# Patient Record
Sex: MALE | Race: WHITE | NOT HISPANIC OR LATINO | Employment: OTHER | URBAN - METROPOLITAN AREA
[De-identification: names, ages, dates, MRNs, and addresses within clinical notes are randomized per-mention and may not be internally consistent; named-entity substitution may affect disease eponyms.]

---

## 2017-04-03 ENCOUNTER — OFFICE VISIT (OUTPATIENT)
Dept: FAMILY MEDICINE CLINIC | Facility: CLINIC | Age: 62
End: 2017-04-03

## 2017-04-03 VITALS
WEIGHT: 315 LBS | BODY MASS INDEX: 39.17 KG/M2 | HEIGHT: 75 IN | DIASTOLIC BLOOD PRESSURE: 80 MMHG | SYSTOLIC BLOOD PRESSURE: 130 MMHG | TEMPERATURE: 98.4 F | RESPIRATION RATE: 18 BRPM | HEART RATE: 76 BPM | OXYGEN SATURATION: 96 %

## 2017-04-03 DIAGNOSIS — F98.8 ADD (ATTENTION DEFICIT DISORDER): ICD-10-CM

## 2017-04-03 DIAGNOSIS — I10 ESSENTIAL HYPERTENSION: ICD-10-CM

## 2017-04-03 DIAGNOSIS — F32.5 MAJOR DEPRESSIVE DISORDER WITH SINGLE EPISODE, IN FULL REMISSION (HCC): ICD-10-CM

## 2017-04-03 DIAGNOSIS — E34.9 TESTOSTERONE DEFICIENCY: ICD-10-CM

## 2017-04-03 DIAGNOSIS — E11.9 TYPE 2 DIABETES MELLITUS WITHOUT COMPLICATION, WITHOUT LONG-TERM CURRENT USE OF INSULIN (HCC): ICD-10-CM

## 2017-04-03 DIAGNOSIS — M19.90 ARTHRITIS: ICD-10-CM

## 2017-04-03 DIAGNOSIS — F51.01 PRIMARY INSOMNIA: ICD-10-CM

## 2017-04-03 DIAGNOSIS — F41.1 GAD (GENERALIZED ANXIETY DISORDER): ICD-10-CM

## 2017-04-03 DIAGNOSIS — E78.5 HYPERLIPIDEMIA, UNSPECIFIED HYPERLIPIDEMIA TYPE: ICD-10-CM

## 2017-04-03 DIAGNOSIS — E34.9 TESTOSTERONE DEFICIENCY: Primary | ICD-10-CM

## 2017-04-03 PROCEDURE — 99214 OFFICE O/P EST MOD 30 MIN: CPT | Performed by: NURSE PRACTITIONER

## 2017-04-03 RX ORDER — TEMAZEPAM 15 MG/1
15 CAPSULE ORAL NIGHTLY PRN
Qty: 30 CAPSULE | Refills: 2 | Status: SHIPPED | OUTPATIENT
Start: 2017-04-03 | End: 2017-10-26

## 2017-04-03 RX ORDER — LOSARTAN POTASSIUM AND HYDROCHLOROTHIAZIDE 12.5; 1 MG/1; MG/1
1 TABLET ORAL DAILY
Qty: 30 TABLET | Refills: 5 | Status: SHIPPED | OUTPATIENT
Start: 2017-04-03 | End: 2017-10-26 | Stop reason: SDUPTHER

## 2017-04-03 RX ORDER — SIMVASTATIN 40 MG
40 TABLET ORAL NIGHTLY
Qty: 30 TABLET | Refills: 5 | Status: SHIPPED | OUTPATIENT
Start: 2017-04-03 | End: 2017-11-10 | Stop reason: SDUPTHER

## 2017-04-03 RX ORDER — DEXTROAMPHETAMINE SACCHARATE, AMPHETAMINE ASPARTATE, DEXTROAMPHETAMINE SULFATE AND AMPHETAMINE SULFATE 7.5; 7.5; 7.5; 7.5 MG/1; MG/1; MG/1; MG/1
30 TABLET ORAL 2 TIMES DAILY
Qty: 60 TABLET | Refills: 0 | Status: SHIPPED | OUTPATIENT
Start: 2017-04-03 | End: 2017-05-10 | Stop reason: SDUPTHER

## 2017-04-03 RX ORDER — TESTOSTERONE 30 MG/1.5ML
2 SOLUTION TOPICAL DAILY
Qty: 90 ML | Refills: 5 | Status: SHIPPED | OUTPATIENT
Start: 2017-04-03 | End: 2017-10-26

## 2017-04-03 RX ORDER — ALPRAZOLAM 1 MG/1
1 TABLET ORAL 3 TIMES DAILY PRN
Qty: 90 TABLET | Refills: 2 | Status: SHIPPED | OUTPATIENT
Start: 2017-04-03 | End: 2017-10-26

## 2017-04-03 RX ORDER — GLIMEPIRIDE 4 MG/1
4 TABLET ORAL 2 TIMES DAILY
Qty: 60 TABLET | Refills: 5 | Status: SHIPPED | OUTPATIENT
Start: 2017-04-03 | End: 2017-12-04 | Stop reason: SDUPTHER

## 2017-04-03 RX ORDER — DULOXETIN HYDROCHLORIDE 60 MG/1
60 CAPSULE, DELAYED RELEASE ORAL DAILY
Qty: 30 CAPSULE | Refills: 5 | Status: SHIPPED | OUTPATIENT
Start: 2017-04-03 | End: 2017-11-13 | Stop reason: SDUPTHER

## 2017-04-03 NOTE — PROGRESS NOTES
Subjective   Duane Mark Witten is a 62 y.o. male.     History of Present Illness   Patient comes in today to see me for the first time. Is a long term patient of Dr. Lindsey. Seeing me today due to Dr. Lindsey no longer working here. Discussed with pt today that I will not be writing pain medications or benzodiazepines for them past a 90 day window and that we will be aggressively decreasing doses along the way. They have a choice of being sent to pain management for any narcotics taken, if they wish. I will be getting them off of any benzos.they are taking, or they can see a psychiatrist to discuss this further (list of names and phone numbers given to pt today). They are also aware that they are free to transfer their care to a different facility before the 90 days are up if they wish. After that point, they are aware that they will no longer be seen in this facility by our doctors. Again, reiterated the fact that those types of medications will not be written here past 90 days, and pt voices understanding. Pt has been told that we are not currently accepting new patients and that, after 90 days, they will need to have found and seen a new PCP in order to receive care.    Chief Complaint:   Chief Complaint   Patient presents with   • Diabetes     send to [facility]   • Hypertension   • Anxiety   • Hyperlipidemia       Duane Mark Witten 62 y.o. male who presents today for Medical Management of the below listed issues and medication refills.  he has a history of   Patient Active Problem List   Diagnosis   • YANELI (generalized anxiety disorder)   • ADD (attention deficit disorder)   • Depression   • Diabetes type 2, controlled   • ED (erectile dysfunction) of non-organic origin   • HLD (hyperlipidemia)   • Essential hypertension   • Arthritis   • Psoriasis   • Testosterone deficiency   • Primary insomnia   .  Since the last visit, he has overall felt well.  he has been compliant with   Current Outpatient Prescriptions:   •  " metFORMIN (GLUCOPHAGE) 1000 MG tablet, Take 1 tablet by mouth 2 (Two) Times a Day With Meals., Disp: 60 tablet, Rfl: 5  •  ALPRAZolam (XANAX) 1 MG tablet, Take 1 tablet by mouth 3 (Three) Times a Day As Needed for Anxiety., Disp: 90 tablet, Rfl: 2  •  amphetamine-dextroamphetamine (ADDERALL) 30 MG tablet, Take 1 tablet by mouth 2 (Two) Times a Day., Disp: 60 tablet, Rfl: 0  •  DULoxetine (CYMBALTA) 60 MG capsule, Take 1 capsule by mouth Daily., Disp: 30 capsule, Rfl: 5  •  exenatide (BYETTA 10 MCG PEN) 10 MCG/0.04ML injection, Inject 0.04 mL under the skin 2 (Two) Times a Day., Disp: 2.4 mL, Rfl: 5  •  glimepiride (AMARYL) 4 MG tablet, Take 1 tablet by mouth 2 (Two) Times a Day., Disp: 60 tablet, Rfl: 5  •  glucose blood test strip, Use as instructed, Disp: 100 each, Rfl: 12  •  losartan-hydrochlorothiazide (HYZAAR) 100-12.5 MG per tablet, Take 1 tablet by mouth Daily for 30 days., Disp: 30 tablet, Rfl: 5  •  oxaprozin (DAYPRO) 600 MG tablet, Take 2 tablets by mouth Daily., Disp: 60 tablet, Rfl: 5  •  simvastatin (ZOCOR) 40 MG tablet, Take 1 tablet by mouth Every Night., Disp: 30 tablet, Rfl: 5  •  temazepam (RESTORIL) 15 MG capsule, Take 1 capsule by mouth At Night As Needed for Sleep., Disp: 30 capsule, Rfl: 2  •  Testosterone (AXIRON) 30 MG/ACT solution, Place 2 applicators on the skin Daily., Disp: 90 mL, Rfl: 5.  he denies medication side effects.    All of the chronic condition(s) listed above are stable w/o issues.    /80  Pulse 76  Temp 98.4 °F (36.9 °C)  Resp 18  Ht 75\" (190.5 cm)  Wt (!) 354 lb (161 kg)  SpO2 96%  BMI 44.25 kg/m2    Results for orders placed or performed in visit on 11/02/16   Hemoglobin A1c   Result Value Ref Range    Hemoglobin A1C 9.06 (H) 4.80 - 5.60 %       The following portions of the patient's history were reviewed and updated as appropriate: allergies, current medications, past family history, past medical history, past social history, past surgical history and " problem list.    Review of Systems   Constitutional: Negative for fatigue.   Respiratory: Negative for cough and shortness of breath.    Cardiovascular: Negative for chest pain and palpitations.   Skin: Negative for rash.   Psychiatric/Behavioral: Negative for dysphoric mood and sleep disturbance. The patient is not nervous/anxious.        Objective   Physical Exam   Constitutional: He is oriented to person, place, and time. He appears well-developed and well-nourished.   Neck: Carotid bruit is not present.   Cardiovascular: Normal rate and regular rhythm.    Pulmonary/Chest: Effort normal and breath sounds normal.   Neurological: He is oriented to person, place, and time.   Skin: Skin is warm and dry.   Psychiatric: He has a normal mood and affect. His behavior is normal. Judgment and thought content normal.   Nursing note and vitals reviewed.      Assessment/Plan   Duane was seen today for diabetes, hypertension, anxiety and hyperlipidemia.    Diagnoses and all orders for this visit:    Testosterone deficiency  -     Testosterone, Free, Total    Major depressive disorder with single episode, in full remission  -     DULoxetine (CYMBALTA) 60 MG capsule; Take 1 capsule by mouth Daily.    Type 2 diabetes mellitus without complication, without long-term current use of insulin  -     Comprehensive metabolic panel  -     Lipid panel  -     Hemoglobin A1c  -     CBC & Differential  -     exenatide (BYETTA 10 MCG PEN) 10 MCG/0.04ML injection; Inject 0.04 mL under the skin 2 (Two) Times a Day.  -     glimepiride (AMARYL) 4 MG tablet; Take 1 tablet by mouth 2 (Two) Times a Day.  -     metFORMIN (GLUCOPHAGE) 1000 MG tablet; Take 1 tablet by mouth 2 (Two) Times a Day With Meals.  -     glucose blood test strip; Use as instructed    Essential hypertension  -     losartan-hydrochlorothiazide (HYZAAR) 100-12.5 MG per tablet; Take 1 tablet by mouth Daily for 30 days.    Arthritis  -     oxaprozin (DAYPRO) 600 MG tablet; Take 2  tablets by mouth Daily.    Hyperlipidemia, unspecified hyperlipidemia type  -     simvastatin (ZOCOR) 40 MG tablet; Take 1 tablet by mouth Every Night.          Patient given RX for temazepam, xanax and adderall at reduced doses.  Temazepam reduced to 15 mg at bedtime. Xanax reduced to TID.  Adderall reduced to BID.  BEKAH reviewed.   Discussed with pt the current medications and the fact that we are not writing these for them going forward past 90 days. If they wish to stay on these medications, they are given referrals to the appropriate specialists.

## 2017-05-10 ENCOUNTER — TELEPHONE (OUTPATIENT)
Dept: FAMILY MEDICINE CLINIC | Facility: CLINIC | Age: 62
End: 2017-05-10

## 2017-05-10 DIAGNOSIS — E11.9 CONTROLLED TYPE 2 DIABETES MELLITUS WITHOUT COMPLICATION, WITH LONG-TERM CURRENT USE OF INSULIN (HCC): Primary | ICD-10-CM

## 2017-05-10 DIAGNOSIS — Z79.4 CONTROLLED TYPE 2 DIABETES MELLITUS WITHOUT COMPLICATION, WITH LONG-TERM CURRENT USE OF INSULIN (HCC): Primary | ICD-10-CM

## 2017-05-10 DIAGNOSIS — F98.8 ADD (ATTENTION DEFICIT DISORDER): ICD-10-CM

## 2017-05-10 RX ORDER — BLOOD SUGAR DIAGNOSTIC
STRIP MISCELLANEOUS
Qty: 100 EACH | Refills: 11 | Status: SHIPPED | OUTPATIENT
Start: 2017-05-10 | End: 2017-10-26 | Stop reason: SDUPTHER

## 2017-05-10 RX ORDER — DEXTROAMPHETAMINE SACCHARATE, AMPHETAMINE ASPARTATE, DEXTROAMPHETAMINE SULFATE AND AMPHETAMINE SULFATE 7.5; 7.5; 7.5; 7.5 MG/1; MG/1; MG/1; MG/1
30 TABLET ORAL 2 TIMES DAILY
Qty: 60 TABLET | Refills: 0 | Status: SHIPPED | OUTPATIENT
Start: 2017-05-10 | End: 2017-10-26 | Stop reason: ALTCHOICE

## 2017-10-09 DIAGNOSIS — E11.9 TYPE 2 DIABETES MELLITUS WITHOUT COMPLICATION, WITHOUT LONG-TERM CURRENT USE OF INSULIN (HCC): ICD-10-CM

## 2017-10-10 RX ORDER — GLIMEPIRIDE 4 MG/1
TABLET ORAL
Qty: 60 TABLET | Refills: 4 | OUTPATIENT
Start: 2017-10-10

## 2017-10-16 DIAGNOSIS — M19.90 ARTHRITIS: ICD-10-CM

## 2017-10-26 ENCOUNTER — OFFICE VISIT (OUTPATIENT)
Dept: FAMILY MEDICINE CLINIC | Facility: CLINIC | Age: 62
End: 2017-10-26

## 2017-10-26 VITALS
TEMPERATURE: 98.7 F | RESPIRATION RATE: 18 BRPM | DIASTOLIC BLOOD PRESSURE: 70 MMHG | OXYGEN SATURATION: 99 % | WEIGHT: 315 LBS | BODY MASS INDEX: 39.17 KG/M2 | SYSTOLIC BLOOD PRESSURE: 128 MMHG | HEIGHT: 75 IN | HEART RATE: 75 BPM

## 2017-10-26 DIAGNOSIS — E78.5 HYPERLIPIDEMIA, UNSPECIFIED HYPERLIPIDEMIA TYPE: ICD-10-CM

## 2017-10-26 DIAGNOSIS — F98.8 ATTENTION DEFICIT DISORDER, UNSPECIFIED HYPERACTIVITY PRESENCE: ICD-10-CM

## 2017-10-26 DIAGNOSIS — I10 ESSENTIAL HYPERTENSION: Primary | ICD-10-CM

## 2017-10-26 DIAGNOSIS — E34.9 TESTOSTERONE DEFICIENCY: ICD-10-CM

## 2017-10-26 DIAGNOSIS — E11.9 TYPE 2 DIABETES MELLITUS NOT AT GOAL (HCC): ICD-10-CM

## 2017-10-26 DIAGNOSIS — J06.9 ACUTE URI: ICD-10-CM

## 2017-10-26 PROCEDURE — 99214 OFFICE O/P EST MOD 30 MIN: CPT | Performed by: NURSE PRACTITIONER

## 2017-10-26 RX ORDER — LOSARTAN POTASSIUM AND HYDROCHLOROTHIAZIDE 12.5; 1 MG/1; MG/1
1 TABLET ORAL DAILY
Qty: 30 TABLET | Refills: 5 | Status: SHIPPED | OUTPATIENT
Start: 2017-10-26 | End: 2018-05-18 | Stop reason: SDUPTHER

## 2017-10-26 RX ORDER — ATOMOXETINE 40 MG/1
40 CAPSULE ORAL DAILY
Qty: 30 CAPSULE | Refills: 0 | Status: SHIPPED | OUTPATIENT
Start: 2017-10-26 | End: 2017-11-15 | Stop reason: SDUPTHER

## 2017-10-26 RX ORDER — BLOOD SUGAR DIAGNOSTIC
STRIP MISCELLANEOUS
Qty: 100 EACH | Refills: 11 | Status: SHIPPED | OUTPATIENT
Start: 2017-10-26 | End: 2018-05-18 | Stop reason: SDUPTHER

## 2017-10-26 RX ORDER — AMOXICILLIN AND CLAVULANATE POTASSIUM 875; 125 MG/1; MG/1
1 TABLET, FILM COATED ORAL 2 TIMES DAILY
Qty: 20 TABLET | Refills: 0 | Status: SHIPPED | OUTPATIENT
Start: 2017-10-26 | End: 2017-11-05

## 2017-10-26 NOTE — PROGRESS NOTES
Subjective   Duane Mark Witten is a 62 y.o. male.     History of Present Illness   Chief Complaint:   Chief Complaint   Patient presents with   • Hypertension     med refill   • Diabetes   • ADD     wanting adderall       Duane Mark Witten 62 y.o. male who presents today for Medical Management of the below listed issues and medication refills.  he has a problem list of   Patient Active Problem List   Diagnosis   • YANELI (generalized anxiety disorder)   • ADD (attention deficit disorder)   • Depression   • Diabetes type 2, controlled   • ED (erectile dysfunction) of non-organic origin   • HLD (hyperlipidemia)   • Essential hypertension   • Arthritis   • Psoriasis   • Testosterone deficiency   • Primary insomnia   .  Since the last visit, he has overall felt well.  he has been compliant with   Current Outpatient Prescriptions:   •  ACCU-CHEK AGNES PLUS test strip, Use as directed to test blood sugar tid E11.9, Disp: 100 each, Rfl: 11  •  amoxicillin-clavulanate (AUGMENTIN) 875-125 MG per tablet, Take 1 tablet by mouth 2 (Two) Times a Day for 10 days., Disp: 20 tablet, Rfl: 0  •  atomoxetine (STRATTERA) 40 MG capsule, Take 1 capsule by mouth Daily., Disp: 30 capsule, Rfl: 0  •  DULoxetine (CYMBALTA) 60 MG capsule, Take 1 capsule by mouth Daily., Disp: 30 capsule, Rfl: 5  •  exenatide (BYETTA 10 MCG PEN) 10 MCG/0.04ML injection, Inject 0.04 mL under the skin 2 (Two) Times a Day., Disp: 2.4 mL, Rfl: 5  •  glimepiride (AMARYL) 4 MG tablet, Take 1 tablet by mouth 2 (Two) Times a Day., Disp: 60 tablet, Rfl: 5  •  Insulin Pen Needle (BD ULTRA-FINE PEN NEEDLES) 29G X 12.7MM misc, 12.7 mm 2 (Two) Times a Day., Disp: 60 each, Rfl: 5  •  losartan-hydrochlorothiazide (HYZAAR) 100-12.5 MG per tablet, Take 1 tablet by mouth Daily for 30 days., Disp: 30 tablet, Rfl: 5  •  metFORMIN (GLUCOPHAGE) 1000 MG tablet, Take 1 tablet by mouth 2 (Two) Times a Day With Meals., Disp: 60 tablet, Rfl: 5  •  oxaprozin (DAYPRO) 600 MG tablet, Take 2  "tablets by mouth Daily., Disp: 60 tablet, Rfl: 5  •  simvastatin (ZOCOR) 40 MG tablet, Take 1 tablet by mouth Every Night., Disp: 30 tablet, Rfl: 5.  he denies medication side effects.    All of the chronic condition(s) listed above are stable w/o issues.    /70  Pulse 75  Temp 98.7 °F (37.1 °C)  Resp 18  Ht 75\" (190.5 cm)  Wt (!) 348 lb (158 kg)  SpO2 99%  BMI 43.5 kg/m2    Results for orders placed or performed in visit on 11/02/16   Hemoglobin A1c   Result Value Ref Range    Hemoglobin A1C 9.06 (H) 4.80 - 5.60 %       Patient has not had labs in a year and was not controlled with last lab set.  He understands labs need to be done and will get done Saturday.      Patient is still requesting adderall RX.  It has already been explained to him that this medication will not be filled at this office but that he can be referred to specialist to continue medication.   Patient states he would like to try strattera first.      Patient is also still using axiron.  He understands this will also not be filled here and that he will need referral to urologist. He declines referral at this time and will notify when he would like referral.        Duane Mark Witten 62 y.o. male who presents for evaluation of upper respiratory congestion, sinus pressure and congestion. Symptoms include congestion, sore throat, nasal blockage, post nasal drip and dry cough.  Onset of symptoms was 5 days ago, gradually worsening since that time. Patient denies shortness of breath, fever.   Evaluation to date: none Treatment to date:  OTC antihistamines.     The following portions of the patient's history were reviewed and updated as appropriate: allergies, current medications, past family history, past medical history, past social history, past surgical history and problem list.    Review of Systems   Constitutional: Negative for chills, fatigue and fever.   HENT: Positive for congestion, sinus pain, sinus pressure and sore throat.  "   Respiratory: Positive for cough. Negative for shortness of breath.    Cardiovascular: Negative for chest pain and palpitations.   Skin: Negative for rash.   Psychiatric/Behavioral: Negative for dysphoric mood and sleep disturbance. The patient is not nervous/anxious.        Objective   Physical Exam   Constitutional: He is oriented to person, place, and time. He appears well-developed and well-nourished.   HENT:   Right Ear: Tympanic membrane, external ear and ear canal normal.   Left Ear: Tympanic membrane, external ear and ear canal normal.   Nose: Right sinus exhibits no maxillary sinus tenderness and no frontal sinus tenderness. Left sinus exhibits no maxillary sinus tenderness and no frontal sinus tenderness.   Mouth/Throat: Uvula is midline and oropharynx is clear and moist.   Cardiovascular: Normal rate and regular rhythm.    Pulmonary/Chest: Effort normal and breath sounds normal.   Neurological: He is oriented to person, place, and time.   Skin: Skin is warm and dry.   Psychiatric: He has a normal mood and affect. His behavior is normal. Judgment and thought content normal.   Nursing note and vitals reviewed.      Assessment/Plan   Duane was seen today for hypertension, diabetes and add.    Diagnoses and all orders for this visit:    Essential hypertension  -     Comprehensive metabolic panel  -     Lipid panel  -     CBC and Differential  -     TSH  -     Hemoglobin A1c  -     MicroAlbumin, Urine, Random - Urine, Clean Catch  -     losartan-hydrochlorothiazide (HYZAAR) 100-12.5 MG per tablet; Take 1 tablet by mouth Daily for 30 days.    Hyperlipidemia, unspecified hyperlipidemia type  -     Comprehensive metabolic panel  -     Lipid panel  -     CBC and Differential  -     TSH  -     Hemoglobin A1c  -     MicroAlbumin, Urine, Random - Urine, Clean Catch    Type 2 diabetes mellitus not at goal  -     Comprehensive metabolic panel  -     Lipid panel  -     CBC and Differential  -     TSH  -     Hemoglobin  A1c  -     MicroAlbumin, Urine, Random - Urine, Clean Catch  -     ACCU-CHEK AGNES PLUS test strip; Use as directed to test blood sugar tid E11.9  -     Insulin Pen Needle (BD ULTRA-FINE PEN NEEDLES) 29G X 12.7MM misc; 12.7 mm 2 (Two) Times a Day.    Attention deficit disorder, unspecified hyperactivity presence  -     atomoxetine (STRATTERA) 40 MG capsule; Take 1 capsule by mouth Daily.    Acute URI  -     amoxicillin-clavulanate (AUGMENTIN) 875-125 MG per tablet; Take 1 tablet by mouth 2 (Two) Times a Day for 10 days.    Testosterone deficiency          Will await lab results to determine next f/u.  Patient will contact if strattera is helping ADD. Will increase as needed.      Will refer to urology for testosterone management. Patient will notify when wants referral placed as he is helping to care for his wife currently.

## 2017-10-30 LAB
ALBUMIN SERPL-MCNC: 4.1 G/DL (ref 3.6–4.8)
ALBUMIN/GLOB SERPL: 1.6 {RATIO} (ref 1.2–2.2)
ALP SERPL-CCNC: 75 IU/L (ref 39–117)
ALT SERPL-CCNC: 16 IU/L (ref 0–44)
AST SERPL-CCNC: 19 IU/L (ref 0–40)
BASOPHILS # BLD AUTO: 0.1 X10E3/UL (ref 0–0.2)
BASOPHILS NFR BLD AUTO: 1 %
BILIRUB SERPL-MCNC: 0.5 MG/DL (ref 0–1.2)
BUN SERPL-MCNC: 18 MG/DL (ref 8–27)
BUN/CREAT SERPL: 16 (ref 10–24)
CALCIUM SERPL-MCNC: 9.5 MG/DL (ref 8.6–10.2)
CHLORIDE SERPL-SCNC: 99 MMOL/L (ref 96–106)
CHOLEST SERPL-MCNC: 123 MG/DL (ref 100–199)
CO2 SERPL-SCNC: 26 MMOL/L (ref 18–29)
CREAT SERPL-MCNC: 1.15 MG/DL (ref 0.76–1.27)
EOSINOPHIL # BLD AUTO: 1.2 X10E3/UL (ref 0–0.4)
EOSINOPHIL NFR BLD AUTO: 12 %
ERYTHROCYTE [DISTWIDTH] IN BLOOD BY AUTOMATED COUNT: 14.1 % (ref 12.3–15.4)
GFR SERPLBLD CREATININE-BSD FMLA CKD-EPI: 68 ML/MIN/1.73
GFR SERPLBLD CREATININE-BSD FMLA CKD-EPI: 78 ML/MIN/1.73
GLOBULIN SER CALC-MCNC: 2.6 G/DL (ref 1.5–4.5)
GLUCOSE SERPL-MCNC: 162 MG/DL (ref 65–99)
HBA1C MFR BLD: 9.6 % (ref 4.8–5.6)
HCT VFR BLD AUTO: 45.8 % (ref 37.5–51)
HDLC SERPL-MCNC: 23 MG/DL
HGB BLD-MCNC: 14.8 G/DL (ref 12.6–17.7)
IMM GRANULOCYTES # BLD: 0 X10E3/UL (ref 0–0.1)
IMM GRANULOCYTES NFR BLD: 0 %
LDLC SERPL CALC-MCNC: 60 MG/DL (ref 0–99)
LYMPHOCYTES # BLD AUTO: 2.7 X10E3/UL (ref 0.7–3.1)
LYMPHOCYTES NFR BLD AUTO: 25 %
MCH RBC QN AUTO: 28.2 PG (ref 26.6–33)
MCHC RBC AUTO-ENTMCNC: 32.3 G/DL (ref 31.5–35.7)
MCV RBC AUTO: 87 FL (ref 79–97)
MICROALBUMIN UR-MCNC: 15.7 UG/ML
MONOCYTES # BLD AUTO: 0.8 X10E3/UL (ref 0.1–0.9)
MONOCYTES NFR BLD AUTO: 8 %
NEUTROPHILS # BLD AUTO: 5.7 X10E3/UL (ref 1.4–7)
NEUTROPHILS NFR BLD AUTO: 54 %
PLATELET # BLD AUTO: 300 X10E3/UL (ref 150–379)
POTASSIUM SERPL-SCNC: 4.4 MMOL/L (ref 3.5–5.2)
PROT SERPL-MCNC: 6.7 G/DL (ref 6–8.5)
RBC # BLD AUTO: 5.25 X10E6/UL (ref 4.14–5.8)
SODIUM SERPL-SCNC: 141 MMOL/L (ref 134–144)
TRIGL SERPL-MCNC: 200 MG/DL (ref 0–149)
TSH SERPL DL<=0.005 MIU/L-ACNC: 1.37 UIU/ML (ref 0.45–4.5)
VLDLC SERPL CALC-MCNC: 40 MG/DL (ref 5–40)
WBC # BLD AUTO: 10.5 X10E3/UL (ref 3.4–10.8)

## 2017-11-10 DIAGNOSIS — E78.5 HYPERLIPIDEMIA, UNSPECIFIED HYPERLIPIDEMIA TYPE: ICD-10-CM

## 2017-11-10 RX ORDER — SIMVASTATIN 40 MG
TABLET ORAL
Qty: 30 TABLET | Refills: 0 | Status: SHIPPED | OUTPATIENT
Start: 2017-11-10 | End: 2017-12-04 | Stop reason: SDUPTHER

## 2017-11-13 DIAGNOSIS — E11.9 TYPE 2 DIABETES MELLITUS WITHOUT COMPLICATION, WITHOUT LONG-TERM CURRENT USE OF INSULIN (HCC): ICD-10-CM

## 2017-11-13 DIAGNOSIS — F32.5 MAJOR DEPRESSIVE DISORDER WITH SINGLE EPISODE, IN FULL REMISSION (HCC): ICD-10-CM

## 2017-11-13 RX ORDER — DULOXETIN HYDROCHLORIDE 60 MG/1
60 CAPSULE, DELAYED RELEASE ORAL DAILY
Qty: 30 CAPSULE | Refills: 5 | Status: SHIPPED | OUTPATIENT
Start: 2017-11-13 | End: 2018-05-18 | Stop reason: SDUPTHER

## 2017-11-15 ENCOUNTER — OFFICE VISIT (OUTPATIENT)
Dept: FAMILY MEDICINE CLINIC | Facility: CLINIC | Age: 62
End: 2017-11-15

## 2017-11-15 VITALS
WEIGHT: 315 LBS | HEART RATE: 77 BPM | TEMPERATURE: 97.8 F | RESPIRATION RATE: 18 BRPM | HEIGHT: 75 IN | SYSTOLIC BLOOD PRESSURE: 130 MMHG | BODY MASS INDEX: 39.17 KG/M2 | DIASTOLIC BLOOD PRESSURE: 72 MMHG

## 2017-11-15 DIAGNOSIS — F98.8 ATTENTION DEFICIT DISORDER, UNSPECIFIED HYPERACTIVITY PRESENCE: ICD-10-CM

## 2017-11-15 DIAGNOSIS — E11.9 CONTROLLED TYPE 2 DIABETES MELLITUS WITHOUT COMPLICATION, UNSPECIFIED LONG TERM INSULIN USE STATUS: Primary | ICD-10-CM

## 2017-11-15 PROCEDURE — 99214 OFFICE O/P EST MOD 30 MIN: CPT | Performed by: NURSE PRACTITIONER

## 2017-11-15 RX ORDER — ATOMOXETINE 40 MG/1
40 CAPSULE ORAL DAILY
Qty: 30 CAPSULE | Refills: 5 | Status: SHIPPED | OUTPATIENT
Start: 2017-11-15 | End: 2018-05-18 | Stop reason: SDUPTHER

## 2017-11-15 NOTE — PROGRESS NOTES
Subjective   Duane Mark Witten is a 62 y.o. male.     History of Present Illness   Chief Complaint:   Chief Complaint   Patient presents with   • Diabetes       Duane Mark Witten 62 y.o. male who presents today for Medical Management of the below listed issues and medication refills.  he has a problem list of   Patient Active Problem List   Diagnosis   • YANELI (generalized anxiety disorder)   • ADD (attention deficit disorder)   • Depression   • Diabetes type 2, controlled   • ED (erectile dysfunction) of non-organic origin   • HLD (hyperlipidemia)   • Essential hypertension   • Arthritis   • Psoriasis   • Testosterone deficiency   • Primary insomnia   .  Since the last visit, he has overall felt well.  he has been compliant with   Current Outpatient Prescriptions:   •  atomoxetine (STRATTERA) 40 MG capsule, Take 1 capsule by mouth Daily., Disp: 30 capsule, Rfl: 5  •  DULoxetine (CYMBALTA) 60 MG capsule, Take 1 capsule by mouth Daily., Disp: 30 capsule, Rfl: 5  •  exenatide (BYETTA 10 MCG PEN) 10 MCG/0.04ML injection, Inject 0.04 mL under the skin 2 (Two) Times a Day., Disp: 2.4 mL, Rfl: 5  •  glimepiride (AMARYL) 4 MG tablet, Take 1 tablet by mouth 2 (Two) Times a Day., Disp: 60 tablet, Rfl: 5  •  Insulin Pen Needle (BD ULTRA-FINE PEN NEEDLES) 29G X 12.7MM misc, 12.7 mm 2 (Two) Times a Day., Disp: 60 each, Rfl: 5  •  losartan-hydrochlorothiazide (HYZAAR) 100-12.5 MG per tablet, Take 1 tablet by mouth Daily for 30 days., Disp: 30 tablet, Rfl: 5  •  metFORMIN (GLUCOPHAGE) 1000 MG tablet, Take 1 tablet by mouth 2 (Two) Times a Day With Meals., Disp: 60 tablet, Rfl: 5  •  oxaprozin (DAYPRO) 600 MG tablet, Take 2 tablets by mouth Daily., Disp: 60 tablet, Rfl: 5  •  simvastatin (ZOCOR) 40 MG tablet, TAKE ONE TABLET BY MOUTH ONCE NIGHTLY, Disp: 30 tablet, Rfl: 0  •  ACCU-CHEK AGNES PLUS test strip, Use as directed to test blood sugar tid E11.9, Disp: 100 each, Rfl: 11  •  Dapagliflozin Propanediol 10 MG tablet, Take 10 mg  "by mouth Daily., Disp: 30 tablet, Rfl: 5.  he denies medication side effects.    All of the chronic condition(s) listed above are stable w/o issues.    /72  Pulse 77  Temp 97.8 °F (36.6 °C) (Oral)   Resp 18  Ht 75\" (190.5 cm)  Wt (!) 353 lb (160 kg)  BMI 44.12 kg/m2    Results for orders placed or performed in visit on 10/26/17   Comprehensive metabolic panel   Result Value Ref Range    Glucose 162 (H) 65 - 99 mg/dL    BUN 18 8 - 27 mg/dL    Creatinine 1.15 0.76 - 1.27 mg/dL    eGFR Non African Am 68 >59 mL/min/1.73    eGFR African Am 78 >59 mL/min/1.73    BUN/Creatinine Ratio 16 10 - 24    Sodium 141 134 - 144 mmol/L    Potassium 4.4 3.5 - 5.2 mmol/L    Chloride 99 96 - 106 mmol/L    Total CO2 26 18 - 29 mmol/L    Calcium 9.5 8.6 - 10.2 mg/dL    Total Protein 6.7 6.0 - 8.5 g/dL    Albumin 4.1 3.6 - 4.8 g/dL    Globulin 2.6 1.5 - 4.5 g/dL    A/G Ratio 1.6 1.2 - 2.2    Total Bilirubin 0.5 0.0 - 1.2 mg/dL    Alkaline Phosphatase 75 39 - 117 IU/L    AST (SGOT) 19 0 - 40 IU/L    ALT (SGPT) 16 0 - 44 IU/L   Lipid panel   Result Value Ref Range    Total Cholesterol 123 100 - 199 mg/dL    Triglycerides 200 (H) 0 - 149 mg/dL    HDL Cholesterol 23 (L) >39 mg/dL    VLDL Cholesterol 40 5 - 40 mg/dL    LDL Cholesterol  60 0 - 99 mg/dL   TSH   Result Value Ref Range    TSH 1.370 0.450 - 4.500 uIU/mL   Hemoglobin A1c   Result Value Ref Range    Hemoglobin A1C 9.6 (H) 4.8 - 5.6 %   MicroAlbumin, Urine, Random - Urine, Clean Catch   Result Value Ref Range    Microalbumin, Urine 15.7 Not Estab. ug/mL   CBC and Differential   Result Value Ref Range    WBC 10.5 3.4 - 10.8 x10E3/uL    RBC 5.25 4.14 - 5.80 x10E6/uL    Hemoglobin 14.8 12.6 - 17.7 g/dL    Hematocrit 45.8 37.5 - 51.0 %    MCV 87 79 - 97 fL    MCH 28.2 26.6 - 33.0 pg    MCHC 32.3 31.5 - 35.7 g/dL    RDW 14.1 12.3 - 15.4 %    Platelets 300 150 - 379 x10E3/uL    Neutrophil Rel % 54 Not Estab. %    Lymphocyte Rel % 25 Not Estab. %    Monocyte Rel % 8 Not Estab. % "    Eosinophil Rel % 12 Not Estab. %    Basophil Rel % 1 Not Estab. %    Neutrophils Absolute 5.7 1.4 - 7.0 x10E3/uL    Lymphocytes Absolute 2.7 0.7 - 3.1 x10E3/uL    Monocytes Absolute 0.8 0.1 - 0.9 x10E3/uL    Eosinophils Absolute 1.2 (H) 0.0 - 0.4 x10E3/uL    Basophils Absolute 0.1 0.0 - 0.2 x10E3/uL    Immature Granulocyte Rel % 0 Not Estab. %    Immature Grans Absolute 0.0 0.0 - 0.1 x10E3/uL     Presents to discuss lab results. A1c not at goal with metformin, glimepiride and byetta.  Patient motivated to lose weight.    The following portions of the patient's history were reviewed and updated as appropriate: allergies, current medications, past family history, past medical history, past social history, past surgical history and problem list.    Review of Systems   Constitutional: Negative for fatigue.   Respiratory: Negative for cough and shortness of breath.    Cardiovascular: Negative for chest pain and palpitations.   Skin: Negative for rash.   Psychiatric/Behavioral: Negative for dysphoric mood and sleep disturbance. The patient is not nervous/anxious.        Objective   Physical Exam   Constitutional: He is oriented to person, place, and time. He appears well-developed and well-nourished.   Pulmonary/Chest: Effort normal.   Neurological: He is oriented to person, place, and time.   Skin: Skin is warm and dry.   Psychiatric: He has a normal mood and affect. His behavior is normal. Judgment and thought content normal.   Nursing note and vitals reviewed.      Assessment/Plan   Duane was seen today for diabetes.    Diagnoses and all orders for this visit:    Controlled type 2 diabetes mellitus without complication, unspecified long term insulin use status  -     Dapagliflozin Propanediol 10 MG tablet; Take 10 mg by mouth Daily.  -     Hemoglobin A1c    Attention deficit disorder, unspecified hyperactivity presence  -     atomoxetine (STRATTERA) 40 MG capsule; Take 1 capsule by mouth Daily.        Patient would  like to try diet and farxiga before seeing endocrinologist.   He will make diet changes and exercise to help reduce a1c and triglycerides. Will recheck a1c in 3 months.

## 2017-12-04 DIAGNOSIS — E78.5 HYPERLIPIDEMIA, UNSPECIFIED HYPERLIPIDEMIA TYPE: ICD-10-CM

## 2017-12-04 DIAGNOSIS — E11.9 TYPE 2 DIABETES MELLITUS WITHOUT COMPLICATION, WITHOUT LONG-TERM CURRENT USE OF INSULIN (HCC): ICD-10-CM

## 2017-12-05 RX ORDER — GLIMEPIRIDE 4 MG/1
TABLET ORAL
Qty: 60 TABLET | Refills: 4 | Status: SHIPPED | OUTPATIENT
Start: 2017-12-05 | End: 2018-05-18 | Stop reason: SDUPTHER

## 2017-12-05 RX ORDER — SIMVASTATIN 40 MG
TABLET ORAL
Qty: 30 TABLET | Refills: 4 | Status: SHIPPED | OUTPATIENT
Start: 2017-12-05 | End: 2018-05-18 | Stop reason: SDUPTHER

## 2018-03-20 DIAGNOSIS — E11.9 TYPE 2 DIABETES MELLITUS WITHOUT COMPLICATION, WITHOUT LONG-TERM CURRENT USE OF INSULIN (HCC): ICD-10-CM

## 2018-05-18 ENCOUNTER — OFFICE VISIT (OUTPATIENT)
Dept: FAMILY MEDICINE CLINIC | Facility: CLINIC | Age: 63
End: 2018-05-18

## 2018-05-18 VITALS
SYSTOLIC BLOOD PRESSURE: 120 MMHG | BODY MASS INDEX: 39.17 KG/M2 | TEMPERATURE: 98 F | RESPIRATION RATE: 16 BRPM | HEIGHT: 75 IN | DIASTOLIC BLOOD PRESSURE: 78 MMHG | WEIGHT: 315 LBS

## 2018-05-18 DIAGNOSIS — Z12.12 SCREENING FOR COLORECTAL CANCER: Primary | ICD-10-CM

## 2018-05-18 DIAGNOSIS — M19.90 ARTHRITIS: ICD-10-CM

## 2018-05-18 DIAGNOSIS — E11.9 TYPE 2 DIABETES MELLITUS WITHOUT COMPLICATION, WITHOUT LONG-TERM CURRENT USE OF INSULIN (HCC): ICD-10-CM

## 2018-05-18 DIAGNOSIS — F98.8 ATTENTION DEFICIT DISORDER, UNSPECIFIED HYPERACTIVITY PRESENCE: ICD-10-CM

## 2018-05-18 DIAGNOSIS — E78.5 HYPERLIPIDEMIA, UNSPECIFIED HYPERLIPIDEMIA TYPE: ICD-10-CM

## 2018-05-18 DIAGNOSIS — F32.5 MAJOR DEPRESSIVE DISORDER WITH SINGLE EPISODE, IN FULL REMISSION (HCC): ICD-10-CM

## 2018-05-18 DIAGNOSIS — I10 ESSENTIAL HYPERTENSION: ICD-10-CM

## 2018-05-18 DIAGNOSIS — E11.9 TYPE 2 DIABETES MELLITUS NOT AT GOAL (HCC): ICD-10-CM

## 2018-05-18 DIAGNOSIS — Z12.11 SCREENING FOR COLORECTAL CANCER: Primary | ICD-10-CM

## 2018-05-18 PROCEDURE — 99214 OFFICE O/P EST MOD 30 MIN: CPT | Performed by: NURSE PRACTITIONER

## 2018-05-18 RX ORDER — SIMVASTATIN 40 MG
TABLET ORAL
Qty: 30 TABLET | Refills: 3 | Status: CANCELLED | OUTPATIENT
Start: 2018-05-18

## 2018-05-18 RX ORDER — LOSARTAN POTASSIUM AND HYDROCHLOROTHIAZIDE 12.5; 1 MG/1; MG/1
1 TABLET ORAL DAILY
Qty: 30 TABLET | Refills: 5 | Status: SHIPPED | OUTPATIENT
Start: 2018-05-18 | End: 2018-10-22 | Stop reason: SDUPTHER

## 2018-05-18 RX ORDER — ATOMOXETINE 60 MG/1
60 CAPSULE ORAL DAILY
Qty: 30 CAPSULE | Refills: 5 | Status: SHIPPED | OUTPATIENT
Start: 2018-05-18 | End: 2018-06-11 | Stop reason: SDUPTHER

## 2018-05-18 RX ORDER — GLIMEPIRIDE 4 MG/1
TABLET ORAL
Qty: 60 TABLET | Refills: 3 | Status: CANCELLED | OUTPATIENT
Start: 2018-05-18

## 2018-05-18 RX ORDER — SIMVASTATIN 40 MG
40 TABLET ORAL NIGHTLY
Qty: 30 TABLET | Refills: 5 | Status: SHIPPED | OUTPATIENT
Start: 2018-05-18 | End: 2018-10-22 | Stop reason: SDUPTHER

## 2018-05-18 RX ORDER — MELOXICAM 15 MG/1
15 TABLET ORAL DAILY
Qty: 30 TABLET | Refills: 5 | Status: SHIPPED | OUTPATIENT
Start: 2018-05-18 | End: 2018-10-22 | Stop reason: SDUPTHER

## 2018-05-18 RX ORDER — BLOOD SUGAR DIAGNOSTIC
STRIP MISCELLANEOUS
Qty: 100 EACH | Refills: 11 | Status: SHIPPED | OUTPATIENT
Start: 2018-05-18 | End: 2020-11-12 | Stop reason: SDUPTHER

## 2018-05-18 RX ORDER — DULOXETIN HYDROCHLORIDE 60 MG/1
60 CAPSULE, DELAYED RELEASE ORAL DAILY
Qty: 30 CAPSULE | Refills: 5 | Status: SHIPPED | OUTPATIENT
Start: 2018-05-18 | End: 2018-10-22 | Stop reason: SDUPTHER

## 2018-05-18 RX ORDER — GLIMEPIRIDE 4 MG/1
4 TABLET ORAL 2 TIMES DAILY
Qty: 60 TABLET | Refills: 5 | Status: SHIPPED | OUTPATIENT
Start: 2018-05-18 | End: 2018-10-17 | Stop reason: SDUPTHER

## 2018-05-18 NOTE — PROGRESS NOTES
Subjective   Duane Mark Witten is a 63 y.o. male.     History of Present Illness   Duane Mark Witten 63 y.o. male who presents today for routine follow up check and medication refills.  he has a history of   Patient Active Problem List   Diagnosis   • YANELI (generalized anxiety disorder)   • ADD (attention deficit disorder)   • Depression   • Diabetes type 2, controlled   • ED (erectile dysfunction) of non-organic origin   • HLD (hyperlipidemia)   • Essential hypertension   • Arthritis   • Psoriasis   • Testosterone deficiency   • Primary insomnia   .  Since the last visit, he has overall felt well.  He has Hypertenision and is well controlled on medication, Hyperlipidemia and working on this with diet and exercise and type 2 diabetes and is due for labs.   he has been compliant with current medications have reviewed them.  The patient denies medication side effects.    Results for orders placed or performed in visit on 10/26/17   Comprehensive metabolic panel   Result Value Ref Range    Glucose 162 (H) 65 - 99 mg/dL    BUN 18 8 - 27 mg/dL    Creatinine 1.15 0.76 - 1.27 mg/dL    eGFR Non African Am 68 >59 mL/min/1.73    eGFR African Am 78 >59 mL/min/1.73    BUN/Creatinine Ratio 16 10 - 24    Sodium 141 134 - 144 mmol/L    Potassium 4.4 3.5 - 5.2 mmol/L    Chloride 99 96 - 106 mmol/L    Total CO2 26 18 - 29 mmol/L    Calcium 9.5 8.6 - 10.2 mg/dL    Total Protein 6.7 6.0 - 8.5 g/dL    Albumin 4.1 3.6 - 4.8 g/dL    Globulin 2.6 1.5 - 4.5 g/dL    A/G Ratio 1.6 1.2 - 2.2    Total Bilirubin 0.5 0.0 - 1.2 mg/dL    Alkaline Phosphatase 75 39 - 117 IU/L    AST (SGOT) 19 0 - 40 IU/L    ALT (SGPT) 16 0 - 44 IU/L   Lipid panel   Result Value Ref Range    Total Cholesterol 123 100 - 199 mg/dL    Triglycerides 200 (H) 0 - 149 mg/dL    HDL Cholesterol 23 (L) >39 mg/dL    VLDL Cholesterol 40 5 - 40 mg/dL    LDL Cholesterol  60 0 - 99 mg/dL   TSH   Result Value Ref Range    TSH 1.370 0.450 - 4.500 uIU/mL   Hemoglobin A1c   Result  Value Ref Range    Hemoglobin A1C 9.6 (H) 4.8 - 5.6 %   MicroAlbumin, Urine, Random - Urine, Clean Catch   Result Value Ref Range    Microalbumin, Urine 15.7 Not Estab. ug/mL   CBC and Differential   Result Value Ref Range    WBC 10.5 3.4 - 10.8 x10E3/uL    RBC 5.25 4.14 - 5.80 x10E6/uL    Hemoglobin 14.8 12.6 - 17.7 g/dL    Hematocrit 45.8 37.5 - 51.0 %    MCV 87 79 - 97 fL    MCH 28.2 26.6 - 33.0 pg    MCHC 32.3 31.5 - 35.7 g/dL    RDW 14.1 12.3 - 15.4 %    Platelets 300 150 - 379 x10E3/uL    Neutrophil Rel % 54 Not Estab. %    Lymphocyte Rel % 25 Not Estab. %    Monocyte Rel % 8 Not Estab. %    Eosinophil Rel % 12 Not Estab. %    Basophil Rel % 1 Not Estab. %    Neutrophils Absolute 5.7 1.4 - 7.0 x10E3/uL    Lymphocytes Absolute 2.7 0.7 - 3.1 x10E3/uL    Monocytes Absolute 0.8 0.1 - 0.9 x10E3/uL    Eosinophils Absolute 1.2 (H) 0.0 - 0.4 x10E3/uL    Basophils Absolute 0.1 0.0 - 0.2 x10E3/uL    Immature Granulocyte Rel % 0 Not Estab. %    Immature Grans Absolute 0.0 0.0 - 0.1 x10E3/uL     Needs to increase strattera to 60 mg.  Will likely need to increase in a month as he was previously on 100 mg.   The following portions of the patient's history were reviewed and updated as appropriate: allergies, current medications, past family history, past medical history, past social history, past surgical history and problem list.    Review of Systems   Constitutional: Negative for fatigue.   Respiratory: Negative for cough and shortness of breath.    Cardiovascular: Negative for chest pain and palpitations.   Skin: Negative for rash.   Psychiatric/Behavioral: Negative for dysphoric mood and sleep disturbance. The patient is not nervous/anxious.        Objective   Physical Exam   Constitutional: He is oriented to person, place, and time. He appears well-developed and well-nourished.   Cardiovascular: Normal rate and regular rhythm.    Pulmonary/Chest: Effort normal and breath sounds normal.   Neurological: He is oriented to  person, place, and time.   Skin: Skin is warm and dry.   Psychiatric: He has a normal mood and affect. His behavior is normal. Judgment and thought content normal.   Nursing note and vitals reviewed.      Assessment/Plan   Duane was seen today for diabetes, hyperlipidemia, hypertension, add, anxiety, depression and insomnia.    Diagnoses and all orders for this visit:    Screening for colorectal cancer  -     Ambulatory Referral For Screening Colonoscopy    Type 2 diabetes mellitus not at goal  -     ACCU-CHEK AGNES PLUS test strip; Use as directed to test blood sugar tid E11.9    Attention deficit disorder, unspecified hyperactivity presence  -     atomoxetine (STRATTERA) 60 MG capsule; Take 1 capsule by mouth Daily.    Major depressive disorder with single episode, in full remission  -     DULoxetine (CYMBALTA) 60 MG capsule; Take 1 capsule by mouth Daily.    Type 2 diabetes mellitus without complication, without long-term current use of insulin  -     glimepiride (AMARYL) 4 MG tablet; Take 1 tablet by mouth 2 (Two) Times a Day.  -     metFORMIN (GLUCOPHAGE) 1000 MG tablet; Take 1 tablet by mouth 2 (Two) Times a Day With Meals.  -     Comprehensive metabolic panel  -     Lipid panel  -     CBC and Differential  -     TSH  -     PSA SCREENING  -     Hemoglobin A1c    Essential hypertension  -     losartan-hydrochlorothiazide (HYZAAR) 100-12.5 MG per tablet; Take 1 tablet by mouth Daily for 30 days.  -     Comprehensive metabolic panel  -     Lipid panel  -     CBC and Differential  -     TSH  -     PSA SCREENING  -     Hemoglobin A1c    Arthritis  -     Discontinue: oxaprozin (DAYPRO) 600 MG tablet; Take 2 tablets by mouth Daily.  -     meloxicam (MOBIC) 15 MG tablet; Take 1 tablet by mouth Daily.    Hyperlipidemia, unspecified hyperlipidemia type  -     simvastatin (ZOCOR) 40 MG tablet; Take 1 tablet by mouth Every Night.  -     Comprehensive metabolic panel  -     Lipid panel  -     CBC and Differential  -      TSH  -     PSA SCREENING  -     Hemoglobin A1c    Other orders  -     Dulaglutide (TRULICITY) 1.5 MG/0.5ML solution pen-injector; Inject 1.5 mg under the skin 1 (One) Time Per Week.        farxiga caused urinary frequecy.  Requests change. Patient would like to switch from byetta to trulicity.     Will get labs today.    Due for colonoscopy.   Will call in 30 days for increase in strattera if tolerating.

## 2018-05-19 LAB
ALBUMIN SERPL-MCNC: 4.7 G/DL (ref 3.6–4.8)
ALBUMIN/GLOB SERPL: 1.8 {RATIO} (ref 1.2–2.2)
ALP SERPL-CCNC: 80 IU/L (ref 39–117)
ALT SERPL-CCNC: 59 IU/L (ref 0–44)
AST SERPL-CCNC: 48 IU/L (ref 0–40)
BASOPHILS # BLD AUTO: 0.1 X10E3/UL (ref 0–0.2)
BASOPHILS NFR BLD AUTO: 1 %
BILIRUB SERPL-MCNC: 0.7 MG/DL (ref 0–1.2)
BUN SERPL-MCNC: 21 MG/DL (ref 8–27)
BUN/CREAT SERPL: 18 (ref 10–24)
CALCIUM SERPL-MCNC: 10 MG/DL (ref 8.6–10.2)
CHLORIDE SERPL-SCNC: 96 MMOL/L (ref 96–106)
CHOLEST SERPL-MCNC: 152 MG/DL (ref 100–199)
CO2 SERPL-SCNC: 23 MMOL/L (ref 18–29)
CREAT SERPL-MCNC: 1.14 MG/DL (ref 0.76–1.27)
EOSINOPHIL # BLD AUTO: 1.1 X10E3/UL (ref 0–0.4)
EOSINOPHIL NFR BLD AUTO: 10 %
ERYTHROCYTE [DISTWIDTH] IN BLOOD BY AUTOMATED COUNT: 14 % (ref 12.3–15.4)
GFR SERPLBLD CREATININE-BSD FMLA CKD-EPI: 68 ML/MIN/1.73
GFR SERPLBLD CREATININE-BSD FMLA CKD-EPI: 79 ML/MIN/1.73
GLOBULIN SER CALC-MCNC: 2.6 G/DL (ref 1.5–4.5)
GLUCOSE SERPL-MCNC: 277 MG/DL (ref 65–99)
HBA1C MFR BLD: 10.8 % (ref 4.8–5.6)
HCT VFR BLD AUTO: 46 % (ref 37.5–51)
HDLC SERPL-MCNC: 36 MG/DL
HGB BLD-MCNC: 16 G/DL (ref 13–17.7)
IMM GRANULOCYTES # BLD: 0 X10E3/UL (ref 0–0.1)
IMM GRANULOCYTES NFR BLD: 0 %
LDLC SERPL CALC-MCNC: 84 MG/DL (ref 0–99)
LYMPHOCYTES # BLD AUTO: 3.1 X10E3/UL (ref 0.7–3.1)
LYMPHOCYTES NFR BLD AUTO: 28 %
MCH RBC QN AUTO: 29.5 PG (ref 26.6–33)
MCHC RBC AUTO-ENTMCNC: 34.8 G/DL (ref 31.5–35.7)
MCV RBC AUTO: 85 FL (ref 79–97)
MONOCYTES # BLD AUTO: 0.5 X10E3/UL (ref 0.1–0.9)
MONOCYTES NFR BLD AUTO: 4 %
NEUTROPHILS # BLD AUTO: 6.4 X10E3/UL (ref 1.4–7)
NEUTROPHILS NFR BLD AUTO: 57 %
PLATELET # BLD AUTO: 264 X10E3/UL (ref 150–379)
POTASSIUM SERPL-SCNC: 4.5 MMOL/L (ref 3.5–5.2)
PROT SERPL-MCNC: 7.3 G/DL (ref 6–8.5)
PSA SERPL-MCNC: 0.4 NG/ML (ref 0–4)
RBC # BLD AUTO: 5.42 X10E6/UL (ref 4.14–5.8)
SODIUM SERPL-SCNC: 139 MMOL/L (ref 134–144)
TRIGL SERPL-MCNC: 158 MG/DL (ref 0–149)
TSH SERPL DL<=0.005 MIU/L-ACNC: 2.04 UIU/ML (ref 0.45–4.5)
VLDLC SERPL CALC-MCNC: 32 MG/DL (ref 5–40)
WBC # BLD AUTO: 11.3 X10E3/UL (ref 3.4–10.8)

## 2018-05-22 DIAGNOSIS — E11.9 TYPE 2 DIABETES MELLITUS WITHOUT COMPLICATION, WITHOUT LONG-TERM CURRENT USE OF INSULIN (HCC): ICD-10-CM

## 2018-05-22 DIAGNOSIS — E78.5 HYPERLIPIDEMIA, UNSPECIFIED HYPERLIPIDEMIA TYPE: ICD-10-CM

## 2018-05-23 RX ORDER — GLIMEPIRIDE 4 MG/1
TABLET ORAL
Qty: 60 TABLET | Refills: 3 | OUTPATIENT
Start: 2018-05-23

## 2018-05-23 RX ORDER — SIMVASTATIN 40 MG
TABLET ORAL
Qty: 30 TABLET | Refills: 3 | OUTPATIENT
Start: 2018-05-23

## 2018-05-24 DIAGNOSIS — R73.09 ELEVATED HEMOGLOBIN A1C: ICD-10-CM

## 2018-05-24 DIAGNOSIS — R74.8 ELEVATED LIVER ENZYMES: Primary | ICD-10-CM

## 2018-06-10 ENCOUNTER — PATIENT MESSAGE (OUTPATIENT)
Dept: FAMILY MEDICINE CLINIC | Facility: CLINIC | Age: 63
End: 2018-06-10

## 2018-06-10 DIAGNOSIS — F98.8 ATTENTION DEFICIT DISORDER, UNSPECIFIED HYPERACTIVITY PRESENCE: ICD-10-CM

## 2018-06-11 RX ORDER — ATOMOXETINE 80 MG/1
80 CAPSULE ORAL DAILY
Qty: 30 CAPSULE | Refills: 1 | Status: SHIPPED | OUTPATIENT
Start: 2018-06-11 | End: 2018-06-18 | Stop reason: SDUPTHER

## 2018-06-11 NOTE — TELEPHONE ENCOUNTER
Jessica Brunner 6/11/2018 10:33 AM EDT        ----- Message -----  From: Duane Mark Witten  Sent: 6/10/2018 9:50 PM  To: [facility] Clinical Pool  Subject: Prescription Question     Can you call in a script to increase my Strattera to 80mg per our last visit. Please send it to [facility] pharmacy on [address] Thanks Wojciech Ramirez

## 2018-06-17 DIAGNOSIS — F98.8 ATTENTION DEFICIT DISORDER, UNSPECIFIED HYPERACTIVITY PRESENCE: ICD-10-CM

## 2018-06-18 DIAGNOSIS — F98.8 ATTENTION DEFICIT DISORDER, UNSPECIFIED HYPERACTIVITY PRESENCE: ICD-10-CM

## 2018-06-18 RX ORDER — ATOMOXETINE 60 MG/1
60 CAPSULE ORAL DAILY
Qty: 30 CAPSULE | Refills: 0 | Status: SHIPPED | OUTPATIENT
Start: 2018-06-18 | End: 2018-08-22 | Stop reason: SDUPTHER

## 2018-06-18 RX ORDER — ATOMOXETINE 80 MG/1
80 CAPSULE ORAL DAILY
Qty: 30 CAPSULE | Refills: 1 | Status: SHIPPED | OUTPATIENT
Start: 2018-06-18 | End: 2018-08-22 | Stop reason: SDUPTHER

## 2018-07-16 DIAGNOSIS — F98.8 ATTENTION DEFICIT DISORDER, UNSPECIFIED HYPERACTIVITY PRESENCE: ICD-10-CM

## 2018-07-16 RX ORDER — ATOMOXETINE 80 MG/1
80 CAPSULE ORAL DAILY
Qty: 30 CAPSULE | OUTPATIENT
Start: 2018-07-16

## 2018-08-16 DIAGNOSIS — F98.8 ATTENTION DEFICIT DISORDER, UNSPECIFIED HYPERACTIVITY PRESENCE: ICD-10-CM

## 2018-08-16 RX ORDER — ATOMOXETINE 80 MG/1
80 CAPSULE ORAL DAILY
Qty: 30 CAPSULE | Refills: 0 | OUTPATIENT
Start: 2018-08-16

## 2018-08-22 DIAGNOSIS — F98.8 ATTENTION DEFICIT DISORDER, UNSPECIFIED HYPERACTIVITY PRESENCE: ICD-10-CM

## 2018-08-22 RX ORDER — ATOMOXETINE 80 MG/1
80 CAPSULE ORAL DAILY
Qty: 30 CAPSULE | Refills: 0 | Status: SHIPPED | OUTPATIENT
Start: 2018-08-22 | End: 2018-09-17 | Stop reason: SDUPTHER

## 2018-08-24 ENCOUNTER — TELEPHONE (OUTPATIENT)
Dept: FAMILY MEDICINE CLINIC | Facility: CLINIC | Age: 63
End: 2018-08-24

## 2018-08-24 NOTE — TELEPHONE ENCOUNTER
KRISHNA THE PHARMACIST WITH [employer] IS WANTING TO KNOW IF THE STRATTERA IS 80 MG THE PATIENT SWEARS IT IS SUPPOSE TO  MG. [phone]

## 2018-08-24 NOTE — TELEPHONE ENCOUNTER
We have increased the dose a couple of times at patient's request but the last fill was 80 mg.  If he just has the pharmacy send a refill request then it will just be refilled at 80 mg.  If he needs the dose to be increased, he will have to call to request a dose increase.  It can be increased to 100 mg, but that is the max daily dose.

## 2018-08-26 LAB
ALBUMIN SERPL-MCNC: 4.6 G/DL (ref 3.6–4.8)
ALBUMIN/GLOB SERPL: 1.8 {RATIO} (ref 1.2–2.2)
ALP SERPL-CCNC: 81 IU/L (ref 39–117)
ALT SERPL-CCNC: 43 IU/L (ref 0–44)
AST SERPL-CCNC: 31 IU/L (ref 0–40)
BILIRUB SERPL-MCNC: 0.8 MG/DL (ref 0–1.2)
BUN SERPL-MCNC: 18 MG/DL (ref 8–27)
BUN/CREAT SERPL: 20 (ref 10–24)
CALCIUM SERPL-MCNC: 10 MG/DL (ref 8.6–10.2)
CHLORIDE SERPL-SCNC: 99 MMOL/L (ref 96–106)
CO2 SERPL-SCNC: 24 MMOL/L (ref 20–29)
CREAT SERPL-MCNC: 0.88 MG/DL (ref 0.76–1.27)
GLOBULIN SER CALC-MCNC: 2.6 G/DL (ref 1.5–4.5)
GLUCOSE SERPL-MCNC: 165 MG/DL (ref 65–99)
HBA1C MFR BLD: 8.6 % (ref 4.8–5.6)
POTASSIUM SERPL-SCNC: 4.2 MMOL/L (ref 3.5–5.2)
PROT SERPL-MCNC: 7.2 G/DL (ref 6–8.5)
SODIUM SERPL-SCNC: 141 MMOL/L (ref 134–144)

## 2018-08-27 DIAGNOSIS — F98.8 ATTENTION DEFICIT DISORDER, UNSPECIFIED HYPERACTIVITY PRESENCE: ICD-10-CM

## 2018-08-27 RX ORDER — ATOMOXETINE 80 MG/1
80 CAPSULE ORAL DAILY
Qty: 30 CAPSULE | Refills: 0 | Status: SHIPPED | OUTPATIENT
Start: 2018-08-27 | End: 2018-09-17 | Stop reason: SDUPTHER

## 2018-08-28 DIAGNOSIS — R73.09 ELEVATED HEMOGLOBIN A1C: Primary | ICD-10-CM

## 2018-08-28 RX ORDER — PIOGLITAZONEHYDROCHLORIDE 15 MG/1
15 TABLET ORAL DAILY
Qty: 90 TABLET | Refills: 0 | Status: SHIPPED | OUTPATIENT
Start: 2018-08-28 | End: 2018-10-17 | Stop reason: SDUPTHER

## 2018-09-17 DIAGNOSIS — F98.8 ATTENTION DEFICIT DISORDER, UNSPECIFIED HYPERACTIVITY PRESENCE: ICD-10-CM

## 2018-09-17 RX ORDER — ATOMOXETINE 80 MG/1
80 CAPSULE ORAL DAILY
Qty: 30 CAPSULE | Refills: 1 | Status: SHIPPED | OUTPATIENT
Start: 2018-09-17 | End: 2018-09-21

## 2018-09-21 RX ORDER — ATOMOXETINE 100 MG/1
100 CAPSULE ORAL DAILY
Qty: 30 CAPSULE | Refills: 1 | Status: SHIPPED | OUTPATIENT
Start: 2018-09-21 | End: 2018-10-22 | Stop reason: SDUPTHER

## 2018-10-17 DIAGNOSIS — E11.9 TYPE 2 DIABETES MELLITUS WITHOUT COMPLICATION, WITHOUT LONG-TERM CURRENT USE OF INSULIN (HCC): ICD-10-CM

## 2018-10-17 RX ORDER — GLIMEPIRIDE 4 MG/1
TABLET ORAL
Qty: 60 TABLET | Refills: 0 | Status: SHIPPED | OUTPATIENT
Start: 2018-10-17 | End: 2018-10-22 | Stop reason: SDUPTHER

## 2018-10-17 RX ORDER — PIOGLITAZONEHYDROCHLORIDE 15 MG/1
15 TABLET ORAL DAILY
Qty: 30 TABLET | Refills: 0 | Status: SHIPPED | OUTPATIENT
Start: 2018-10-17 | End: 2018-10-22 | Stop reason: SDUPTHER

## 2018-10-19 RX ORDER — DULAGLUTIDE 1.5 MG/.5ML
1.5 INJECTION, SOLUTION SUBCUTANEOUS WEEKLY
Qty: 4 PEN | Refills: 0 | Status: SHIPPED | OUTPATIENT
Start: 2018-10-19 | End: 2018-10-22 | Stop reason: SDUPTHER

## 2018-10-22 ENCOUNTER — OFFICE VISIT (OUTPATIENT)
Dept: FAMILY MEDICINE CLINIC | Facility: CLINIC | Age: 63
End: 2018-10-22

## 2018-10-22 VITALS
SYSTOLIC BLOOD PRESSURE: 124 MMHG | HEART RATE: 80 BPM | WEIGHT: 315 LBS | TEMPERATURE: 98.1 F | BODY MASS INDEX: 39.17 KG/M2 | RESPIRATION RATE: 18 BRPM | HEIGHT: 75 IN | OXYGEN SATURATION: 97 % | DIASTOLIC BLOOD PRESSURE: 78 MMHG

## 2018-10-22 DIAGNOSIS — E78.5 HYPERLIPIDEMIA, UNSPECIFIED HYPERLIPIDEMIA TYPE: ICD-10-CM

## 2018-10-22 DIAGNOSIS — I10 ESSENTIAL HYPERTENSION: ICD-10-CM

## 2018-10-22 DIAGNOSIS — E11.9 TYPE 2 DIABETES MELLITUS WITHOUT COMPLICATION, WITHOUT LONG-TERM CURRENT USE OF INSULIN (HCC): ICD-10-CM

## 2018-10-22 DIAGNOSIS — M19.90 ARTHRITIS: ICD-10-CM

## 2018-10-22 DIAGNOSIS — F32.5 MAJOR DEPRESSIVE DISORDER WITH SINGLE EPISODE, IN FULL REMISSION (HCC): ICD-10-CM

## 2018-10-22 PROCEDURE — 99214 OFFICE O/P EST MOD 30 MIN: CPT | Performed by: NURSE PRACTITIONER

## 2018-10-22 RX ORDER — LOSARTAN POTASSIUM AND HYDROCHLOROTHIAZIDE 12.5; 1 MG/1; MG/1
1 TABLET ORAL DAILY
Qty: 30 TABLET | Refills: 5 | Status: SHIPPED | OUTPATIENT
Start: 2018-10-22 | End: 2018-12-19 | Stop reason: HOSPADM

## 2018-10-22 RX ORDER — SIMVASTATIN 40 MG
40 TABLET ORAL NIGHTLY
Qty: 30 TABLET | Refills: 5 | Status: SHIPPED | OUTPATIENT
Start: 2018-10-22 | End: 2019-05-13

## 2018-10-22 RX ORDER — PIOGLITAZONEHYDROCHLORIDE 15 MG/1
15 TABLET ORAL DAILY
Qty: 30 TABLET | Refills: 5 | Status: SHIPPED | OUTPATIENT
Start: 2018-10-22 | End: 2018-12-19 | Stop reason: HOSPADM

## 2018-10-22 RX ORDER — ATOMOXETINE 100 MG/1
100 CAPSULE ORAL DAILY
Qty: 30 CAPSULE | Refills: 5 | Status: SHIPPED | OUTPATIENT
Start: 2018-10-22 | End: 2019-04-02 | Stop reason: SDUPTHER

## 2018-10-22 RX ORDER — MELOXICAM 15 MG/1
15 TABLET ORAL DAILY
Qty: 30 TABLET | Refills: 5 | Status: SHIPPED | OUTPATIENT
Start: 2018-10-22 | End: 2018-12-19 | Stop reason: HOSPADM

## 2018-10-22 RX ORDER — GLIMEPIRIDE 4 MG/1
4 TABLET ORAL 2 TIMES DAILY
Qty: 60 TABLET | Refills: 5 | Status: SHIPPED | OUTPATIENT
Start: 2018-10-22 | End: 2019-05-13 | Stop reason: SDUPTHER

## 2018-10-22 RX ORDER — DULOXETIN HYDROCHLORIDE 60 MG/1
60 CAPSULE, DELAYED RELEASE ORAL DAILY
Qty: 30 CAPSULE | Refills: 5 | Status: SHIPPED | OUTPATIENT
Start: 2018-10-22 | End: 2019-05-13 | Stop reason: SDUPTHER

## 2018-10-22 NOTE — PROGRESS NOTES
Subjective   Duane Mark Witten is a 63 y.o. male.     History of Present Illness   Duane Mark Witten 63 y.o. male who presents today for routine follow up check and medication refills.  he has a history of   Patient Active Problem List   Diagnosis   • YANELI (generalized anxiety disorder)   • ADD (attention deficit disorder)   • Depression   • Diabetes type 2, controlled (CMS/[facility])   • ED (erectile dysfunction) of non-organic origin   • HLD (hyperlipidemia)   • Essential hypertension   • Arthritis   • Psoriasis   • Testosterone deficiency   • Primary insomnia   .  Since the last visit, he has overall felt tired.  He has Hypertenision and is well controlled on medication and DM2 and is due for labs.  Patient has ADD which he believes is wel maintained on Strattera at 100 mg dose.  He states anxiety and depression well controlled on cymbalta.  he has been compliant with current medications have reviewed them.  The patient denies medication side effects.    Results for orders placed or performed in visit on 05/24/18   Comprehensive metabolic panel   Result Value Ref Range    Glucose 165 (H) 65 - 99 mg/dL    BUN 18 8 - 27 mg/dL    Creatinine 0.88 0.76 - 1.27 mg/dL    eGFR Non African Am 91 >59 mL/min/1.73    eGFR African Am 106 >59 mL/min/1.73    BUN/Creatinine Ratio 20 10 - 24    Sodium 141 134 - 144 mmol/L    Potassium 4.2 3.5 - 5.2 mmol/L    Chloride 99 96 - 106 mmol/L    Total CO2 24 20 - 29 mmol/L    Calcium 10.0 8.6 - 10.2 mg/dL    Total Protein 7.2 6.0 - 8.5 g/dL    Albumin 4.6 3.6 - 4.8 g/dL    Globulin 2.6 1.5 - 4.5 g/dL    A/G Ratio 1.8 1.2 - 2.2    Total Bilirubin 0.8 0.0 - 1.2 mg/dL    Alkaline Phosphatase 81 39 - 117 IU/L    AST (SGOT) 31 0 - 40 IU/L    ALT (SGPT) 43 0 - 44 IU/L   Hemoglobin A1c   Result Value Ref Range    Hemoglobin A1C 8.6 (H) 4.8 - 5.6 %       The following portions of the patient's history were reviewed and updated as appropriate: allergies, current medications, past family history, past  medical history, past social history, past surgical history and problem list.    Review of Systems   Constitutional: Negative for fatigue.   Respiratory: Negative for cough and shortness of breath.    Cardiovascular: Negative for chest pain and palpitations.   Endocrine: Negative for cold intolerance, heat intolerance, polydipsia, polyphagia and polyuria.   Skin: Negative for rash.   Psychiatric/Behavioral: Negative for dysphoric mood and sleep disturbance. The patient is not nervous/anxious.        Objective   Physical Exam   Constitutional: He is oriented to person, place, and time. He appears well-developed and well-nourished.   Neck: Carotid bruit is not present.   Cardiovascular: Normal rate and regular rhythm.    Pulmonary/Chest: Effort normal and breath sounds normal.    Duane had a diabetic foot exam performed today.   During the foot exam he had a monofilament test performed (sensation intact bilaterally ).  Neurological: He is oriented to person, place, and time.   Skin: Skin is warm and dry.   Psychiatric: He has a normal mood and affect. His behavior is normal. Judgment and thought content normal.   Nursing note and vitals reviewed.      Assessment/Plan   Problems Addressed this Visit        Cardiovascular and Mediastinum    HLD (hyperlipidemia)    Relevant Medications    simvastatin (ZOCOR) 40 MG tablet    Other Relevant Orders    Comprehensive metabolic panel    Lipid panel    CBC and Differential    TSH    MicroAlbumin, Urine, Random - Urine, Clean Catch    Hemoglobin A1c    Essential hypertension    Relevant Medications    losartan-hydrochlorothiazide (HYZAAR) 100-12.5 MG per tablet    Other Relevant Orders    Comprehensive metabolic panel    Lipid panel    CBC and Differential    TSH    MicroAlbumin, Urine, Random - Urine, Clean Catch    Hemoglobin A1c       Musculoskeletal and Integument    Arthritis    Relevant Medications    meloxicam (MOBIC) 15 MG tablet       Other    Depression    Relevant  Medications    DULoxetine (CYMBALTA) 60 MG capsule    atomoxetine (STRATTERA) 100 MG capsule      Other Visit Diagnoses     Type 2 diabetes mellitus without complication, without long-term current use of insulin (CMS/[facility])        Relevant Medications    glimepiride (AMARYL) 4 MG tablet    metFORMIN (GLUCOPHAGE) 1000 MG tablet    pioglitazone (ACTOS) 15 MG tablet    Dulaglutide (TRULICITY) 1.5 MG/0.5ML solution pen-injector    Other Relevant Orders    Comprehensive metabolic panel    Lipid panel    CBC and Differential    TSH    MicroAlbumin, Urine, Random - Urine, Clean Catch    Hemoglobin A1c            Will get labs today as he is fasting.

## 2018-10-23 LAB
ALBUMIN SERPL-MCNC: 4.4 G/DL (ref 3.5–5.2)
ALBUMIN/GLOB SERPL: 1.5 G/DL
ALP SERPL-CCNC: 93 U/L (ref 39–117)
ALT SERPL-CCNC: 23 U/L (ref 1–41)
AST SERPL-CCNC: 16 U/L (ref 1–40)
BASOPHILS # BLD AUTO: 0.07 10*3/MM3 (ref 0–0.2)
BASOPHILS NFR BLD AUTO: 0.7 % (ref 0–1.5)
BILIRUB SERPL-MCNC: 0.6 MG/DL (ref 0.1–1.2)
BUN SERPL-MCNC: 9 MG/DL (ref 8–23)
BUN/CREAT SERPL: 7.6 (ref 7–25)
CALCIUM SERPL-MCNC: 9.5 MG/DL (ref 8.6–10.5)
CHLORIDE SERPL-SCNC: 100 MMOL/L (ref 98–107)
CHOLEST SERPL-MCNC: 154 MG/DL (ref 0–200)
CO2 SERPL-SCNC: 29.4 MMOL/L (ref 22–29)
CREAT SERPL-MCNC: 1.18 MG/DL (ref 0.76–1.27)
EOSINOPHIL # BLD AUTO: 1.25 10*3/MM3 (ref 0–0.7)
EOSINOPHIL NFR BLD AUTO: 11.8 % (ref 0.3–6.2)
ERYTHROCYTE [DISTWIDTH] IN BLOOD BY AUTOMATED COUNT: 13.9 % (ref 11.5–14.5)
GLOBULIN SER CALC-MCNC: 3 GM/DL
GLUCOSE SERPL-MCNC: 150 MG/DL (ref 65–99)
HBA1C MFR BLD: 7.32 % (ref 4.8–5.6)
HCT VFR BLD AUTO: 47.6 % (ref 40.4–52.2)
HDLC SERPL-MCNC: 40 MG/DL (ref 40–60)
HGB BLD-MCNC: 14.8 G/DL (ref 13.7–17.6)
IMM GRANULOCYTES # BLD: 0.03 10*3/MM3 (ref 0–0.03)
IMM GRANULOCYTES NFR BLD: 0.3 % (ref 0–0.5)
LDLC SERPL CALC-MCNC: 89 MG/DL (ref 0–100)
LYMPHOCYTES # BLD AUTO: 2.79 10*3/MM3 (ref 0.9–4.8)
LYMPHOCYTES NFR BLD AUTO: 26.4 % (ref 19.6–45.3)
MCH RBC QN AUTO: 28.1 PG (ref 27–32.7)
MCHC RBC AUTO-ENTMCNC: 31.1 G/DL (ref 32.6–36.4)
MCV RBC AUTO: 90.3 FL (ref 79.8–96.2)
MICROALBUMIN UR-MCNC: 12.2 UG/ML
MONOCYTES # BLD AUTO: 0.55 10*3/MM3 (ref 0.2–1.2)
MONOCYTES NFR BLD AUTO: 5.2 % (ref 5–12)
NEUTROPHILS # BLD AUTO: 5.88 10*3/MM3 (ref 1.9–8.1)
NEUTROPHILS NFR BLD AUTO: 55.6 % (ref 42.7–76)
PLATELET # BLD AUTO: 256 10*3/MM3 (ref 140–500)
POTASSIUM SERPL-SCNC: 5 MMOL/L (ref 3.5–5.2)
PROT SERPL-MCNC: 7.4 G/DL (ref 6–8.5)
RBC # BLD AUTO: 5.27 10*6/MM3 (ref 4.6–6)
SODIUM SERPL-SCNC: 141 MMOL/L (ref 136–145)
TRIGL SERPL-MCNC: 126 MG/DL (ref 0–150)
TSH SERPL DL<=0.005 MIU/L-ACNC: 2.36 MIU/ML (ref 0.27–4.2)
VLDLC SERPL CALC-MCNC: 25.2 MG/DL (ref 5–40)
WBC # BLD AUTO: 10.57 10*3/MM3 (ref 4.5–10.7)

## 2018-11-06 ENCOUNTER — OFFICE VISIT (OUTPATIENT)
Dept: FAMILY MEDICINE CLINIC | Facility: CLINIC | Age: 63
End: 2018-11-06

## 2018-11-06 VITALS
SYSTOLIC BLOOD PRESSURE: 125 MMHG | HEART RATE: 80 BPM | TEMPERATURE: 98 F | OXYGEN SATURATION: 99 % | DIASTOLIC BLOOD PRESSURE: 72 MMHG

## 2018-11-06 DIAGNOSIS — R53.1 GENERALIZED WEAKNESS: ICD-10-CM

## 2018-11-06 DIAGNOSIS — T50.905A ADVERSE EFFECT OF DRUG, INITIAL ENCOUNTER: Primary | ICD-10-CM

## 2018-11-06 PROCEDURE — 99214 OFFICE O/P EST MOD 30 MIN: CPT | Performed by: NURSE PRACTITIONER

## 2018-11-06 NOTE — PROGRESS NOTES
Subjective   Duane Mark Witten is a 63 y.o. male.     History of Present Illness   Patient presents to office to discuss generalized weakness to bilateral upper and lower extremities. Patient also reports dizziness and fatigue.  He denies headache, syncope, confusion, speech changes, vision changes, shortness of breath, chest pain, palpitations, lower extremity edema.  Patient denies pain in extremities.  States he just feels weak.  Patient states it started after his dose of Trulicity on Saturday. He reports two family members that have used the medication and reported similar symptoms which resolved after discontinuing or changing to different medication.   The following portions of the patient's history were reviewed and updated as appropriate: allergies, current medications, past family history, past medical history, past social history, past surgical history and problem list.    Review of Systems   Constitutional: Positive for fatigue. Negative for activity change, appetite change, chills, diaphoresis, fever and unexpected weight change.   HENT: Negative for congestion, ear pain, postnasal drip, sinus pain, sinus pressure and sore throat.    Eyes: Negative for photophobia, pain, discharge, redness, itching and visual disturbance.   Respiratory: Negative for apnea, cough, choking, chest tightness, shortness of breath, wheezing and stridor.    Cardiovascular: Negative for chest pain and palpitations.   Gastrointestinal: Negative for abdominal pain, diarrhea and vomiting.   Endocrine: Negative for cold intolerance, heat intolerance, polydipsia, polyphagia and polyuria.   Genitourinary: Negative for decreased urine volume, difficulty urinating and urgency.   Musculoskeletal: Positive for gait problem. Negative for arthralgias, back pain, joint swelling, myalgias, neck pain and neck stiffness.   Skin: Negative for rash.   Neurological: Positive for dizziness and weakness. Negative for tremors, seizures, syncope,  facial asymmetry, speech difficulty, light-headedness, numbness and headaches.   Psychiatric/Behavioral: Negative for agitation, dysphoric mood and sleep disturbance. The patient is not nervous/anxious.        Objective   Physical Exam   Constitutional: He is oriented to person, place, and time. He appears well-developed and well-nourished.   Cardiovascular: Normal rate and regular rhythm.    Pulmonary/Chest: Effort normal and breath sounds normal.   Neurological: He is alert and oriented to person, place, and time. He has normal strength. No sensory deficit.   Reflex Scores:       Tricep reflexes are 2+ on the right side and 2+ on the left side.       Bicep reflexes are 2+ on the right side and 2+ on the left side.       Brachioradialis reflexes are 2+ on the right side and 2+ on the left side.       Patellar reflexes are 2+ on the right side and 2+ on the left side.       Achilles reflexes are 2+ on the right side and 2+ on the left side.  Strength slightly decreased in upper and lower extremities but equal bilaterally   Skin: Skin is warm and dry.   Psychiatric: He has a normal mood and affect. His behavior is normal. Judgment and thought content normal.   Nursing note and vitals reviewed.      Assessment/Plan   Duane was seen today for medication problem, numbness, extremity weakness and dizziness.    Diagnoses and all orders for this visit:    Adverse effect of drug, initial encounter    Generalized weakness        Patient will stop trulicity and will f/u in 3 weeks either by appt or contacting office. He will contact office sooner or go to ER if symptoms worsen.

## 2018-11-09 ENCOUNTER — PATIENT MESSAGE (OUTPATIENT)
Dept: FAMILY MEDICINE CLINIC | Facility: CLINIC | Age: 63
End: 2018-11-09

## 2018-11-19 ENCOUNTER — TELEPHONE (OUTPATIENT)
Dept: FAMILY MEDICINE CLINIC | Facility: CLINIC | Age: 63
End: 2018-11-19

## 2018-11-19 DIAGNOSIS — R53.1 GENERALIZED WEAKNESS: Primary | ICD-10-CM

## 2018-11-22 ENCOUNTER — APPOINTMENT (OUTPATIENT)
Dept: CT IMAGING | Facility: HOSPITAL | Age: 63
End: 2018-11-22

## 2018-11-22 ENCOUNTER — HOSPITAL ENCOUNTER (INPATIENT)
Facility: HOSPITAL | Age: 63
LOS: 8 days | Discharge: NURSING, REHAB OR LONG-TERM CARE | End: 2018-11-30
Attending: EMERGENCY MEDICINE | Admitting: NEUROLOGICAL SURGERY

## 2018-11-22 ENCOUNTER — APPOINTMENT (OUTPATIENT)
Dept: MRI IMAGING | Facility: HOSPITAL | Age: 63
End: 2018-11-22
Attending: PSYCHIATRY & NEUROLOGY

## 2018-11-22 ENCOUNTER — APPOINTMENT (OUTPATIENT)
Dept: MRI IMAGING | Facility: HOSPITAL | Age: 63
End: 2018-11-22

## 2018-11-22 DIAGNOSIS — R29.898 WEAKNESS OF BOTH LOWER EXTREMITIES: Primary | ICD-10-CM

## 2018-11-22 DIAGNOSIS — R20.2 PARESTHESIAS: ICD-10-CM

## 2018-11-22 DIAGNOSIS — R26.2 DIFFICULTY WALKING: ICD-10-CM

## 2018-11-22 PROBLEM — M79.601 PARESTHESIA AND PAIN OF BOTH UPPER EXTREMITIES: Status: ACTIVE | Noted: 2018-11-22

## 2018-11-22 PROBLEM — D72.10 EOSINOPHILIA: Status: ACTIVE | Noted: 2018-11-22

## 2018-11-22 PROBLEM — E66.01 MORBID OBESITY WITH BMI OF 40.0-44.9, ADULT (HCC): Status: ACTIVE | Noted: 2018-11-22

## 2018-11-22 PROBLEM — M79.602 PARESTHESIA AND PAIN OF BOTH UPPER EXTREMITIES: Status: ACTIVE | Noted: 2018-11-22

## 2018-11-22 LAB
ALBUMIN SERPL-MCNC: 4.1 G/DL (ref 3.5–5.2)
ALBUMIN SERPL-MCNC: 4.2 G/DL (ref 3.5–5.2)
ALBUMIN/GLOB SERPL: 1.4 G/DL
ALBUMIN/GLOB SERPL: 1.4 G/DL
ALP SERPL-CCNC: 81 U/L (ref 39–117)
ALP SERPL-CCNC: 84 U/L (ref 39–117)
ALT SERPL W P-5'-P-CCNC: 23 U/L (ref 1–41)
ALT SERPL W P-5'-P-CCNC: 24 U/L (ref 1–41)
ANION GAP SERPL CALCULATED.3IONS-SCNC: 13.9 MMOL/L
ANION GAP SERPL CALCULATED.3IONS-SCNC: 15.5 MMOL/L
AST SERPL-CCNC: 15 U/L (ref 1–40)
AST SERPL-CCNC: 16 U/L (ref 1–40)
BACTERIA UR QL AUTO: ABNORMAL /HPF
BASOPHILS # BLD AUTO: 0.1 10*3/MM3 (ref 0–0.2)
BASOPHILS # BLD AUTO: 0.11 10*3/MM3 (ref 0–0.2)
BASOPHILS NFR BLD AUTO: 0.8 % (ref 0–1.5)
BASOPHILS NFR BLD AUTO: 0.8 % (ref 0–1.5)
BILIRUB SERPL-MCNC: 0.5 MG/DL (ref 0.1–1.2)
BILIRUB SERPL-MCNC: 0.7 MG/DL (ref 0.1–1.2)
BILIRUB UR QL STRIP: NEGATIVE
BUN BLD-MCNC: 20 MG/DL (ref 8–23)
BUN BLD-MCNC: 21 MG/DL (ref 8–23)
BUN/CREAT SERPL: 19.8 (ref 7–25)
BUN/CREAT SERPL: 20 (ref 7–25)
CALCIUM SPEC-SCNC: 9.3 MG/DL (ref 8.6–10.5)
CALCIUM SPEC-SCNC: 9.7 MG/DL (ref 8.6–10.5)
CHLORIDE SERPL-SCNC: 98 MMOL/L (ref 98–107)
CHLORIDE SERPL-SCNC: 99 MMOL/L (ref 98–107)
CK SERPL-CCNC: 108 U/L (ref 20–200)
CLARITY UR: CLEAR
CO2 SERPL-SCNC: 24.5 MMOL/L (ref 22–29)
CO2 SERPL-SCNC: 27.1 MMOL/L (ref 22–29)
COLOR UR: YELLOW
CREAT BLD-MCNC: 1 MG/DL (ref 0.76–1.27)
CREAT BLD-MCNC: 1.06 MG/DL (ref 0.76–1.27)
CRP SERPL-MCNC: 0.12 MG/DL (ref 0–0.5)
DEPRECATED RDW RBC AUTO: 43.3 FL (ref 37–54)
DEPRECATED RDW RBC AUTO: 43.8 FL (ref 37–54)
EOSINOPHIL # BLD AUTO: 1.28 10*3/MM3 (ref 0–0.7)
EOSINOPHIL # BLD AUTO: 1.38 10*3/MM3 (ref 0–0.7)
EOSINOPHIL NFR BLD AUTO: 10.3 % (ref 0.3–6.2)
EOSINOPHIL NFR BLD AUTO: 9.8 % (ref 0.3–6.2)
ERYTHROCYTE [DISTWIDTH] IN BLOOD BY AUTOMATED COUNT: 13.5 % (ref 11.5–14.5)
ERYTHROCYTE [DISTWIDTH] IN BLOOD BY AUTOMATED COUNT: 13.5 % (ref 11.5–14.5)
ERYTHROCYTE [SEDIMENTATION RATE] IN BLOOD: 6 MM/HR (ref 0–20)
GFR SERPL CREATININE-BSD FRML MDRD: 71 ML/MIN/1.73
GFR SERPL CREATININE-BSD FRML MDRD: 75 ML/MIN/1.73
GLOBULIN UR ELPH-MCNC: 2.9 GM/DL
GLOBULIN UR ELPH-MCNC: 3.1 GM/DL
GLUCOSE BLD-MCNC: 180 MG/DL (ref 65–99)
GLUCOSE BLD-MCNC: 188 MG/DL (ref 65–99)
GLUCOSE BLDC GLUCOMTR-MCNC: 150 MG/DL (ref 70–130)
GLUCOSE BLDC GLUCOMTR-MCNC: 203 MG/DL (ref 70–130)
GLUCOSE BLDC GLUCOMTR-MCNC: 240 MG/DL (ref 70–130)
GLUCOSE BLDC GLUCOMTR-MCNC: 396 MG/DL (ref 70–130)
GLUCOSE UR STRIP-MCNC: ABNORMAL MG/DL
HCT VFR BLD AUTO: 44.3 % (ref 40.4–52.2)
HCT VFR BLD AUTO: 45.6 % (ref 40.4–52.2)
HGB BLD-MCNC: 14.5 G/DL (ref 13.7–17.6)
HGB BLD-MCNC: 15 G/DL (ref 13.7–17.6)
HGB UR QL STRIP.AUTO: NEGATIVE
HYALINE CASTS UR QL AUTO: ABNORMAL /LPF
IMM GRANULOCYTES # BLD: 0.04 10*3/MM3 (ref 0–0.03)
IMM GRANULOCYTES # BLD: 0.04 10*3/MM3 (ref 0–0.03)
IMM GRANULOCYTES NFR BLD: 0.3 % (ref 0–0.5)
IMM GRANULOCYTES NFR BLD: 0.3 % (ref 0–0.5)
KETONES UR QL STRIP: ABNORMAL
LEUKOCYTE ESTERASE UR QL STRIP.AUTO: ABNORMAL
LYMPHOCYTES # BLD AUTO: 2.99 10*3/MM3 (ref 0.9–4.8)
LYMPHOCYTES # BLD AUTO: 3.62 10*3/MM3 (ref 0.9–4.8)
LYMPHOCYTES NFR BLD AUTO: 22.9 % (ref 19.6–45.3)
LYMPHOCYTES NFR BLD AUTO: 27.1 % (ref 19.6–45.3)
MAGNESIUM SERPL-MCNC: 1.7 MG/DL (ref 1.6–2.4)
MCH RBC QN AUTO: 28.8 PG (ref 27–32.7)
MCH RBC QN AUTO: 28.9 PG (ref 27–32.7)
MCHC RBC AUTO-ENTMCNC: 32.7 G/DL (ref 32.6–36.4)
MCHC RBC AUTO-ENTMCNC: 32.9 G/DL (ref 32.6–36.4)
MCV RBC AUTO: 87.9 FL (ref 79.8–96.2)
MCV RBC AUTO: 88.1 FL (ref 79.8–96.2)
MONOCYTES # BLD AUTO: 0.68 10*3/MM3 (ref 0.2–1.2)
MONOCYTES # BLD AUTO: 0.71 10*3/MM3 (ref 0.2–1.2)
MONOCYTES NFR BLD AUTO: 5.1 % (ref 5–12)
MONOCYTES NFR BLD AUTO: 5.4 % (ref 5–12)
NEUTROPHILS # BLD AUTO: 7.54 10*3/MM3 (ref 1.9–8.1)
NEUTROPHILS # BLD AUTO: 7.96 10*3/MM3 (ref 1.9–8.1)
NEUTROPHILS NFR BLD AUTO: 56.4 % (ref 42.7–76)
NEUTROPHILS NFR BLD AUTO: 60.8 % (ref 42.7–76)
NITRITE UR QL STRIP: NEGATIVE
PH UR STRIP.AUTO: <=5 [PH] (ref 5–8)
PLATELET # BLD AUTO: 239 10*3/MM3 (ref 140–500)
PLATELET # BLD AUTO: 255 10*3/MM3 (ref 140–500)
PMV BLD AUTO: 10 FL (ref 6–12)
PMV BLD AUTO: 9.9 FL (ref 6–12)
POTASSIUM BLD-SCNC: 3.6 MMOL/L (ref 3.5–5.2)
POTASSIUM BLD-SCNC: 4.1 MMOL/L (ref 3.5–5.2)
PROT SERPL-MCNC: 7 G/DL (ref 6–8.5)
PROT SERPL-MCNC: 7.3 G/DL (ref 6–8.5)
PROT UR QL STRIP: NEGATIVE
RBC # BLD AUTO: 5.03 10*6/MM3 (ref 4.6–6)
RBC # BLD AUTO: 5.19 10*6/MM3 (ref 4.6–6)
RBC # UR: ABNORMAL /HPF
REF LAB TEST METHOD: ABNORMAL
SODIUM BLD-SCNC: 139 MMOL/L (ref 136–145)
SODIUM BLD-SCNC: 139 MMOL/L (ref 136–145)
SP GR UR STRIP: 1.03 (ref 1–1.03)
SQUAMOUS #/AREA URNS HPF: ABNORMAL /HPF
UROBILINOGEN UR QL STRIP: ABNORMAL
WBC NRBC COR # BLD: 13.08 10*3/MM3 (ref 4.5–10.7)
WBC NRBC COR # BLD: 13.37 10*3/MM3 (ref 4.5–10.7)
WBC UR QL AUTO: ABNORMAL /HPF

## 2018-11-22 PROCEDURE — 63710000001 INSULIN GLARGINE PER 5 UNITS: Performed by: HOSPITALIST

## 2018-11-22 PROCEDURE — 72156 MRI NECK SPINE W/O & W/DYE: CPT

## 2018-11-22 PROCEDURE — 85025 COMPLETE CBC W/AUTO DIFF WBC: CPT | Performed by: INTERNAL MEDICINE

## 2018-11-22 PROCEDURE — 0 GADOBENATE DIMEGLUMINE 529 MG/ML SOLUTION: Performed by: HOSPITALIST

## 2018-11-22 PROCEDURE — 25010000002 ENOXAPARIN PER 10 MG: Performed by: INTERNAL MEDICINE

## 2018-11-22 PROCEDURE — 80053 COMPREHEN METABOLIC PANEL: CPT | Performed by: PHYSICIAN ASSISTANT

## 2018-11-22 PROCEDURE — 85652 RBC SED RATE AUTOMATED: CPT | Performed by: PHYSICIAN ASSISTANT

## 2018-11-22 PROCEDURE — 99285 EMERGENCY DEPT VISIT HI MDM: CPT

## 2018-11-22 PROCEDURE — 25010000002 LORAZEPAM PER 2 MG: Performed by: PSYCHIATRY & NEUROLOGY

## 2018-11-22 PROCEDURE — A9577 INJ MULTIHANCE: HCPCS | Performed by: HOSPITALIST

## 2018-11-22 PROCEDURE — 99233 SBSQ HOSP IP/OBS HIGH 50: CPT | Performed by: PSYCHIATRY & NEUROLOGY

## 2018-11-22 PROCEDURE — 86140 C-REACTIVE PROTEIN: CPT | Performed by: PHYSICIAN ASSISTANT

## 2018-11-22 PROCEDURE — 80053 COMPREHEN METABOLIC PANEL: CPT | Performed by: INTERNAL MEDICINE

## 2018-11-22 PROCEDURE — 82550 ASSAY OF CK (CPK): CPT | Performed by: PHYSICIAN ASSISTANT

## 2018-11-22 PROCEDURE — 83735 ASSAY OF MAGNESIUM: CPT | Performed by: PHYSICIAN ASSISTANT

## 2018-11-22 PROCEDURE — 70553 MRI BRAIN STEM W/O & W/DYE: CPT

## 2018-11-22 PROCEDURE — 85025 COMPLETE CBC W/AUTO DIFF WBC: CPT | Performed by: PHYSICIAN ASSISTANT

## 2018-11-22 PROCEDURE — 82962 GLUCOSE BLOOD TEST: CPT

## 2018-11-22 PROCEDURE — 63710000001 INSULIN LISPRO (HUMAN) PER 5 UNITS: Performed by: INTERNAL MEDICINE

## 2018-11-22 PROCEDURE — 25010000002 METHYLPREDNISOLONE PER 125 MG: Performed by: PSYCHIATRY & NEUROLOGY

## 2018-11-22 PROCEDURE — 70450 CT HEAD/BRAIN W/O DYE: CPT

## 2018-11-22 PROCEDURE — 36415 COLL VENOUS BLD VENIPUNCTURE: CPT | Performed by: INTERNAL MEDICINE

## 2018-11-22 PROCEDURE — 81001 URINALYSIS AUTO W/SCOPE: CPT | Performed by: PHYSICIAN ASSISTANT

## 2018-11-22 RX ORDER — SODIUM PHOSPHATE,MONO-DIBASIC 19G-7G/118
1 ENEMA (ML) RECTAL DAILY
COMMUNITY
End: 2022-08-26

## 2018-11-22 RX ORDER — INSULIN GLARGINE 100 [IU]/ML
15 INJECTION, SOLUTION SUBCUTANEOUS NIGHTLY
Status: DISCONTINUED | OUTPATIENT
Start: 2018-11-22 | End: 2018-11-23

## 2018-11-22 RX ORDER — PIOGLITAZONEHYDROCHLORIDE 15 MG/1
15 TABLET ORAL DAILY
Status: DISCONTINUED | OUTPATIENT
Start: 2018-11-22 | End: 2018-11-30 | Stop reason: HOSPADM

## 2018-11-22 RX ORDER — SODIUM CHLORIDE 0.9 % (FLUSH) 0.9 %
3 SYRINGE (ML) INJECTION EVERY 12 HOURS SCHEDULED
Status: DISCONTINUED | OUTPATIENT
Start: 2018-11-22 | End: 2018-11-22

## 2018-11-22 RX ORDER — ONDANSETRON 4 MG/1
4 TABLET, ORALLY DISINTEGRATING ORAL EVERY 6 HOURS PRN
Status: DISCONTINUED | OUTPATIENT
Start: 2018-11-22 | End: 2018-11-30 | Stop reason: HOSPADM

## 2018-11-22 RX ORDER — LORAZEPAM 2 MG/ML
1 INJECTION INTRAMUSCULAR ONCE
Status: COMPLETED | OUTPATIENT
Start: 2018-11-22 | End: 2018-11-22

## 2018-11-22 RX ORDER — ACETAMINOPHEN 325 MG/1
650 TABLET ORAL EVERY 4 HOURS PRN
Status: DISCONTINUED | OUTPATIENT
Start: 2018-11-22 | End: 2018-11-28

## 2018-11-22 RX ORDER — ONDANSETRON 2 MG/ML
4 INJECTION INTRAMUSCULAR; INTRAVENOUS EVERY 6 HOURS PRN
Status: DISCONTINUED | OUTPATIENT
Start: 2018-11-22 | End: 2018-11-30 | Stop reason: HOSPADM

## 2018-11-22 RX ORDER — ONDANSETRON 4 MG/1
4 TABLET, FILM COATED ORAL EVERY 6 HOURS PRN
Status: DISCONTINUED | OUTPATIENT
Start: 2018-11-22 | End: 2018-11-30 | Stop reason: HOSPADM

## 2018-11-22 RX ORDER — ASPIRIN 325 MG
325 TABLET ORAL DAILY
COMMUNITY
End: 2018-12-19 | Stop reason: HOSPADM

## 2018-11-22 RX ORDER — DULOXETIN HYDROCHLORIDE 60 MG/1
60 CAPSULE, DELAYED RELEASE ORAL DAILY
Status: DISCONTINUED | OUTPATIENT
Start: 2018-11-22 | End: 2018-11-30 | Stop reason: HOSPADM

## 2018-11-22 RX ORDER — SENNA AND DOCUSATE SODIUM 50; 8.6 MG/1; MG/1
2 TABLET, FILM COATED ORAL 2 TIMES DAILY PRN
Status: DISCONTINUED | OUTPATIENT
Start: 2018-11-22 | End: 2018-11-30 | Stop reason: HOSPADM

## 2018-11-22 RX ORDER — DEXTROSE MONOHYDRATE 25 G/50ML
25 INJECTION, SOLUTION INTRAVENOUS
Status: DISCONTINUED | OUTPATIENT
Start: 2018-11-22 | End: 2018-11-30 | Stop reason: HOSPADM

## 2018-11-22 RX ORDER — NICOTINE POLACRILEX 4 MG
15 LOZENGE BUCCAL
Status: DISCONTINUED | OUTPATIENT
Start: 2018-11-22 | End: 2018-11-30 | Stop reason: HOSPADM

## 2018-11-22 RX ORDER — GLIPIZIDE 10 MG/1
10 TABLET ORAL
Status: DISCONTINUED | OUTPATIENT
Start: 2018-11-22 | End: 2018-11-30 | Stop reason: HOSPADM

## 2018-11-22 RX ORDER — SODIUM CHLORIDE 0.9 % (FLUSH) 0.9 %
10 SYRINGE (ML) INJECTION AS NEEDED
Status: DISCONTINUED | OUTPATIENT
Start: 2018-11-22 | End: 2018-11-22

## 2018-11-22 RX ORDER — SODIUM CHLORIDE 0.9 % (FLUSH) 0.9 %
3-10 SYRINGE (ML) INJECTION AS NEEDED
Status: DISCONTINUED | OUTPATIENT
Start: 2018-11-22 | End: 2018-11-22

## 2018-11-22 RX ADMIN — ACETAMINOPHEN 650 MG: 325 TABLET, FILM COATED ORAL at 18:33

## 2018-11-22 RX ADMIN — DULOXETINE HYDROCHLORIDE 60 MG: 60 CAPSULE, DELAYED RELEASE ORAL at 12:27

## 2018-11-22 RX ADMIN — INSULIN LISPRO 5 UNITS: 100 INJECTION, SOLUTION INTRAVENOUS; SUBCUTANEOUS at 18:33

## 2018-11-22 RX ADMIN — SODIUM CHLORIDE 500 MG: 900 INJECTION INTRAVENOUS at 15:12

## 2018-11-22 RX ADMIN — LORAZEPAM 1 MG: 2 INJECTION INTRAMUSCULAR; INTRAVENOUS at 16:15

## 2018-11-22 RX ADMIN — ENOXAPARIN SODIUM 40 MG: 40 INJECTION SUBCUTANEOUS at 08:33

## 2018-11-22 RX ADMIN — GADOBENATE DIMEGLUMINE 20 ML: 529 INJECTION, SOLUTION INTRAVENOUS at 21:05

## 2018-11-22 RX ADMIN — INSULIN LISPRO 5 UNITS: 100 INJECTION, SOLUTION INTRAVENOUS; SUBCUTANEOUS at 12:25

## 2018-11-22 RX ADMIN — INSULIN GLARGINE 15 UNITS: 100 INJECTION, SOLUTION SUBCUTANEOUS at 22:21

## 2018-11-22 RX ADMIN — INSULIN LISPRO 12 UNITS: 100 INJECTION, SOLUTION INTRAVENOUS; SUBCUTANEOUS at 22:22

## 2018-11-22 RX ADMIN — ATOMOXETINE 100 MG: 60 CAPSULE ORAL at 12:26

## 2018-11-22 RX ADMIN — SODIUM CHLORIDE, PRESERVATIVE FREE 3 ML: 5 INJECTION INTRAVENOUS at 08:33

## 2018-11-22 RX ADMIN — SODIUM CHLORIDE 1000 ML: 9 INJECTION, SOLUTION INTRAVENOUS at 04:39

## 2018-11-22 RX ADMIN — GADOBENATE DIMEGLUMINE 20 ML: 529 INJECTION, SOLUTION INTRAVENOUS at 17:45

## 2018-11-22 RX ADMIN — GLIPIZIDE 10 MG: 10 TABLET ORAL at 12:26

## 2018-11-22 RX ADMIN — PIOGLITAZONE 15 MG: 15 TABLET ORAL at 12:26

## 2018-11-23 ENCOUNTER — APPOINTMENT (OUTPATIENT)
Dept: GENERAL RADIOLOGY | Facility: HOSPITAL | Age: 63
End: 2018-11-23
Attending: NEUROLOGICAL SURGERY

## 2018-11-23 PROBLEM — M48.02 CERVICAL STENOSIS OF SPINAL CANAL: Status: ACTIVE | Noted: 2018-11-23

## 2018-11-23 PROBLEM — G95.19 EDEMA OF SPINAL CORD: Status: ACTIVE | Noted: 2018-11-23

## 2018-11-23 PROBLEM — E11.65 TYPE 2 DIABETES MELLITUS WITH HYPERGLYCEMIA: Status: ACTIVE | Noted: 2018-11-23

## 2018-11-23 PROBLEM — T38.0X5A ADVERSE EFFECT OF CORTICOSTEROIDS: Status: ACTIVE | Noted: 2018-11-23

## 2018-11-23 LAB
ALBUMIN SERPL-MCNC: 4.2 G/DL (ref 3.5–5.2)
ALBUMIN/GLOB SERPL: 1.4 G/DL
ALP SERPL-CCNC: 85 U/L (ref 39–117)
ALT SERPL W P-5'-P-CCNC: 22 U/L (ref 1–41)
ANION GAP SERPL CALCULATED.3IONS-SCNC: 13.4 MMOL/L
AST SERPL-CCNC: 15 U/L (ref 1–40)
BASOPHILS # BLD AUTO: 0.01 10*3/MM3 (ref 0–0.2)
BASOPHILS NFR BLD AUTO: 0.1 % (ref 0–1.5)
BILIRUB SERPL-MCNC: 0.6 MG/DL (ref 0.1–1.2)
BUN BLD-MCNC: 21 MG/DL (ref 8–23)
BUN/CREAT SERPL: 19.3 (ref 7–25)
CALCIUM SPEC-SCNC: 9.7 MG/DL (ref 8.6–10.5)
CHLORIDE SERPL-SCNC: 100 MMOL/L (ref 98–107)
CO2 SERPL-SCNC: 24.6 MMOL/L (ref 22–29)
CREAT BLD-MCNC: 1.09 MG/DL (ref 0.76–1.27)
DEPRECATED RDW RBC AUTO: 40.8 FL (ref 37–54)
EOSINOPHIL # BLD AUTO: 0.02 10*3/MM3 (ref 0–0.7)
EOSINOPHIL NFR BLD AUTO: 0.2 % (ref 0.3–6.2)
ERYTHROCYTE [DISTWIDTH] IN BLOOD BY AUTOMATED COUNT: 13.3 % (ref 11.5–14.5)
GFR SERPL CREATININE-BSD FRML MDRD: 68 ML/MIN/1.73
GLOBULIN UR ELPH-MCNC: 2.9 GM/DL
GLUCOSE BLD-MCNC: 343 MG/DL (ref 65–99)
GLUCOSE BLDC GLUCOMTR-MCNC: 295 MG/DL (ref 70–130)
GLUCOSE BLDC GLUCOMTR-MCNC: 321 MG/DL (ref 70–130)
GLUCOSE BLDC GLUCOMTR-MCNC: 383 MG/DL (ref 70–130)
GLUCOSE BLDC GLUCOMTR-MCNC: 509 MG/DL (ref 70–130)
GLUCOSE BLDC GLUCOMTR-MCNC: 516 MG/DL (ref 70–130)
HCT VFR BLD AUTO: 43.5 % (ref 40.4–52.2)
HGB BLD-MCNC: 15.2 G/DL (ref 13.7–17.6)
IMM GRANULOCYTES # BLD: 0.04 10*3/MM3 (ref 0–0.03)
IMM GRANULOCYTES NFR BLD: 0.3 % (ref 0–0.5)
LYMPHOCYTES # BLD AUTO: 1.25 10*3/MM3 (ref 0.9–4.8)
LYMPHOCYTES NFR BLD AUTO: 10.2 % (ref 19.6–45.3)
MCH RBC QN AUTO: 29.3 PG (ref 27–32.7)
MCHC RBC AUTO-ENTMCNC: 34.9 G/DL (ref 32.6–36.4)
MCV RBC AUTO: 84 FL (ref 79.8–96.2)
MONOCYTES # BLD AUTO: 0.05 10*3/MM3 (ref 0.2–1.2)
MONOCYTES NFR BLD AUTO: 0.4 % (ref 5–12)
NEUTROPHILS # BLD AUTO: 10.93 10*3/MM3 (ref 1.9–8.1)
NEUTROPHILS NFR BLD AUTO: 89.1 % (ref 42.7–76)
PLATELET # BLD AUTO: 252 10*3/MM3 (ref 140–500)
PMV BLD AUTO: 10.1 FL (ref 6–12)
POTASSIUM BLD-SCNC: 4.5 MMOL/L (ref 3.5–5.2)
PROT SERPL-MCNC: 7.1 G/DL (ref 6–8.5)
RBC # BLD AUTO: 5.18 10*6/MM3 (ref 4.6–6)
SODIUM BLD-SCNC: 138 MMOL/L (ref 136–145)
WBC NRBC COR # BLD: 12.26 10*3/MM3 (ref 4.5–10.7)

## 2018-11-23 PROCEDURE — 63710000001 INSULIN LISPRO (HUMAN) PER 5 UNITS: Performed by: HOSPITALIST

## 2018-11-23 PROCEDURE — 82962 GLUCOSE BLOOD TEST: CPT

## 2018-11-23 PROCEDURE — 25010000002 METHYLPREDNISOLONE PER 125 MG: Performed by: PSYCHIATRY & NEUROLOGY

## 2018-11-23 PROCEDURE — 99231 SBSQ HOSP IP/OBS SF/LOW 25: CPT | Performed by: PSYCHIATRY & NEUROLOGY

## 2018-11-23 PROCEDURE — 63710000001 INSULIN GLARGINE PER 5 UNITS: Performed by: HOSPITALIST

## 2018-11-23 PROCEDURE — 72052 X-RAY EXAM NECK SPINE 6/>VWS: CPT

## 2018-11-23 PROCEDURE — 80053 COMPREHEN METABOLIC PANEL: CPT | Performed by: HOSPITALIST

## 2018-11-23 PROCEDURE — 63710000001 INSULIN LISPRO (HUMAN) PER 5 UNITS: Performed by: INTERNAL MEDICINE

## 2018-11-23 PROCEDURE — 25010000002 METHYLPREDNISOLONE: Performed by: PSYCHIATRY & NEUROLOGY

## 2018-11-23 PROCEDURE — 85025 COMPLETE CBC W/AUTO DIFF WBC: CPT | Performed by: HOSPITALIST

## 2018-11-23 PROCEDURE — 99254 IP/OBS CNSLTJ NEW/EST MOD 60: CPT | Performed by: NEUROLOGICAL SURGERY

## 2018-11-23 RX ORDER — INSULIN GLARGINE 100 [IU]/ML
20 INJECTION, SOLUTION SUBCUTANEOUS EVERY MORNING
Status: DISCONTINUED | OUTPATIENT
Start: 2018-11-23 | End: 2018-11-24

## 2018-11-23 RX ORDER — FAMOTIDINE 20 MG/1
20 TABLET, FILM COATED ORAL DAILY
Status: DISCONTINUED | OUTPATIENT
Start: 2018-11-23 | End: 2018-11-30 | Stop reason: HOSPADM

## 2018-11-23 RX ADMIN — INSULIN LISPRO 20 UNITS: 100 INJECTION, SOLUTION INTRAVENOUS; SUBCUTANEOUS at 21:22

## 2018-11-23 RX ADMIN — ATOMOXETINE 100 MG: 60 CAPSULE ORAL at 08:12

## 2018-11-23 RX ADMIN — INSULIN LISPRO 10 UNITS: 100 INJECTION, SOLUTION INTRAVENOUS; SUBCUTANEOUS at 08:10

## 2018-11-23 RX ADMIN — INSULIN LISPRO 24 UNITS: 100 INJECTION, SOLUTION INTRAVENOUS; SUBCUTANEOUS at 11:37

## 2018-11-23 RX ADMIN — INSULIN LISPRO 10 UNITS: 100 INJECTION, SOLUTION INTRAVENOUS; SUBCUTANEOUS at 12:40

## 2018-11-23 RX ADMIN — ACETAMINOPHEN 650 MG: 325 TABLET, FILM COATED ORAL at 12:40

## 2018-11-23 RX ADMIN — PIOGLITAZONE 15 MG: 15 TABLET ORAL at 08:11

## 2018-11-23 RX ADMIN — SODIUM CHLORIDE 500 MG: 900 INJECTION INTRAVENOUS at 12:40

## 2018-11-23 RX ADMIN — INSULIN LISPRO 10 UNITS: 100 INJECTION, SOLUTION INTRAVENOUS; SUBCUTANEOUS at 17:34

## 2018-11-23 RX ADMIN — DULOXETINE HYDROCHLORIDE 60 MG: 60 CAPSULE, DELAYED RELEASE ORAL at 08:12

## 2018-11-23 RX ADMIN — SODIUM CHLORIDE 500 MG: 900 INJECTION INTRAVENOUS at 01:37

## 2018-11-23 RX ADMIN — INSULIN LISPRO 12 UNITS: 100 INJECTION, SOLUTION INTRAVENOUS; SUBCUTANEOUS at 17:34

## 2018-11-23 RX ADMIN — LOSARTAN POTASSIUM: 50 TABLET, FILM COATED ORAL at 08:12

## 2018-11-23 RX ADMIN — FAMOTIDINE 20 MG: 20 TABLET, FILM COATED ORAL at 11:37

## 2018-11-23 RX ADMIN — INSULIN GLARGINE 20 UNITS: 100 INJECTION, SOLUTION SUBCUTANEOUS at 11:38

## 2018-11-23 RX ADMIN — GLIPIZIDE 10 MG: 10 TABLET ORAL at 08:12

## 2018-11-24 LAB
GLUCOSE BLDC GLUCOMTR-MCNC: 238 MG/DL (ref 70–130)
GLUCOSE BLDC GLUCOMTR-MCNC: 249 MG/DL (ref 70–130)
GLUCOSE BLDC GLUCOMTR-MCNC: 250 MG/DL (ref 70–130)
GLUCOSE BLDC GLUCOMTR-MCNC: 375 MG/DL (ref 70–130)

## 2018-11-24 PROCEDURE — 25010000002 METHYLPREDNISOLONE PER 125 MG: Performed by: PSYCHIATRY & NEUROLOGY

## 2018-11-24 PROCEDURE — 99231 SBSQ HOSP IP/OBS SF/LOW 25: CPT | Performed by: PSYCHIATRY & NEUROLOGY

## 2018-11-24 PROCEDURE — 63710000001 INSULIN LISPRO (HUMAN) PER 5 UNITS: Performed by: HOSPITALIST

## 2018-11-24 PROCEDURE — 63710000001 INSULIN GLARGINE PER 5 UNITS: Performed by: HOSPITALIST

## 2018-11-24 PROCEDURE — 82962 GLUCOSE BLOOD TEST: CPT

## 2018-11-24 RX ORDER — INSULIN GLARGINE 100 [IU]/ML
40 INJECTION, SOLUTION SUBCUTANEOUS EVERY MORNING
Status: DISCONTINUED | OUTPATIENT
Start: 2018-11-25 | End: 2018-11-29

## 2018-11-24 RX ORDER — INSULIN GLARGINE 100 [IU]/ML
20 INJECTION, SOLUTION SUBCUTANEOUS ONCE
Status: COMPLETED | OUTPATIENT
Start: 2018-11-24 | End: 2018-11-24

## 2018-11-24 RX ADMIN — INSULIN LISPRO 10 UNITS: 100 INJECTION, SOLUTION INTRAVENOUS; SUBCUTANEOUS at 08:47

## 2018-11-24 RX ADMIN — GLIPIZIDE 10 MG: 10 TABLET ORAL at 08:43

## 2018-11-24 RX ADMIN — FAMOTIDINE 20 MG: 20 TABLET, FILM COATED ORAL at 08:43

## 2018-11-24 RX ADMIN — INSULIN LISPRO 12 UNITS: 100 INJECTION, SOLUTION INTRAVENOUS; SUBCUTANEOUS at 17:18

## 2018-11-24 RX ADMIN — INSULIN LISPRO 15 UNITS: 100 INJECTION, SOLUTION INTRAVENOUS; SUBCUTANEOUS at 17:17

## 2018-11-24 RX ADMIN — PIOGLITAZONE 15 MG: 15 TABLET ORAL at 08:43

## 2018-11-24 RX ADMIN — INSULIN LISPRO 15 UNITS: 100 INJECTION, SOLUTION INTRAVENOUS; SUBCUTANEOUS at 12:10

## 2018-11-24 RX ADMIN — ATOMOXETINE 100 MG: 60 CAPSULE ORAL at 08:45

## 2018-11-24 RX ADMIN — LOSARTAN POTASSIUM: 50 TABLET, FILM COATED ORAL at 08:43

## 2018-11-24 RX ADMIN — SODIUM CHLORIDE 500 MG: 900 INJECTION INTRAVENOUS at 13:59

## 2018-11-24 RX ADMIN — INSULIN GLARGINE 20 UNITS: 100 INJECTION, SOLUTION SUBCUTANEOUS at 08:52

## 2018-11-24 RX ADMIN — INSULIN GLARGINE 20 UNITS: 100 INJECTION, SOLUTION SUBCUTANEOUS at 12:09

## 2018-11-24 RX ADMIN — INSULIN LISPRO 8 UNITS: 100 INJECTION, SOLUTION INTRAVENOUS; SUBCUTANEOUS at 21:59

## 2018-11-24 RX ADMIN — DULOXETINE HYDROCHLORIDE 60 MG: 60 CAPSULE, DELAYED RELEASE ORAL at 08:44

## 2018-11-24 RX ADMIN — INSULIN LISPRO 8 UNITS: 100 INJECTION, SOLUTION INTRAVENOUS; SUBCUTANEOUS at 08:47

## 2018-11-24 RX ADMIN — INSULIN LISPRO 20 UNITS: 100 INJECTION, SOLUTION INTRAVENOUS; SUBCUTANEOUS at 12:10

## 2018-11-24 RX ADMIN — SODIUM CHLORIDE 500 MG: 900 INJECTION INTRAVENOUS at 00:19

## 2018-11-24 RX ADMIN — ACETAMINOPHEN 650 MG: 325 TABLET, FILM COATED ORAL at 17:22

## 2018-11-25 PROBLEM — D72.10 EOSINOPHILIA: Status: RESOLVED | Noted: 2018-11-22 | Resolved: 2018-11-25

## 2018-11-25 LAB
GLUCOSE BLDC GLUCOMTR-MCNC: 241 MG/DL (ref 70–130)
GLUCOSE BLDC GLUCOMTR-MCNC: 244 MG/DL (ref 70–130)
GLUCOSE BLDC GLUCOMTR-MCNC: 387 MG/DL (ref 70–130)
GLUCOSE BLDC GLUCOMTR-MCNC: 396 MG/DL (ref 70–130)

## 2018-11-25 PROCEDURE — 99231 SBSQ HOSP IP/OBS SF/LOW 25: CPT | Performed by: PSYCHIATRY & NEUROLOGY

## 2018-11-25 PROCEDURE — 25010000002 METHYLPREDNISOLONE: Performed by: PSYCHIATRY & NEUROLOGY

## 2018-11-25 PROCEDURE — 99233 SBSQ HOSP IP/OBS HIGH 50: CPT | Performed by: NEUROLOGICAL SURGERY

## 2018-11-25 PROCEDURE — 82962 GLUCOSE BLOOD TEST: CPT

## 2018-11-25 PROCEDURE — 63710000001 INSULIN GLARGINE PER 5 UNITS: Performed by: HOSPITALIST

## 2018-11-25 PROCEDURE — 63710000001 INSULIN LISPRO (HUMAN) PER 5 UNITS: Performed by: HOSPITALIST

## 2018-11-25 PROCEDURE — 25010000002 METHYLPREDNISOLONE PER 125 MG: Performed by: PSYCHIATRY & NEUROLOGY

## 2018-11-25 PROCEDURE — 25010000002 ENOXAPARIN PER 10 MG: Performed by: HOSPITALIST

## 2018-11-25 RX ADMIN — SODIUM CHLORIDE 500 MG: 900 INJECTION INTRAVENOUS at 01:00

## 2018-11-25 RX ADMIN — INSULIN LISPRO 8 UNITS: 100 INJECTION, SOLUTION INTRAVENOUS; SUBCUTANEOUS at 18:37

## 2018-11-25 RX ADMIN — ACETAMINOPHEN 650 MG: 325 TABLET, FILM COATED ORAL at 07:56

## 2018-11-25 RX ADMIN — GLIPIZIDE 10 MG: 10 TABLET ORAL at 07:48

## 2018-11-25 RX ADMIN — INSULIN LISPRO 20 UNITS: 100 INJECTION, SOLUTION INTRAVENOUS; SUBCUTANEOUS at 21:52

## 2018-11-25 RX ADMIN — FAMOTIDINE 20 MG: 20 TABLET, FILM COATED ORAL at 08:03

## 2018-11-25 RX ADMIN — ENOXAPARIN SODIUM 40 MG: 40 INJECTION SUBCUTANEOUS at 11:42

## 2018-11-25 RX ADMIN — ENOXAPARIN SODIUM 40 MG: 40 INJECTION SUBCUTANEOUS at 21:52

## 2018-11-25 RX ADMIN — ACETAMINOPHEN 650 MG: 325 TABLET, FILM COATED ORAL at 01:00

## 2018-11-25 RX ADMIN — LOSARTAN POTASSIUM: 50 TABLET, FILM COATED ORAL at 08:02

## 2018-11-25 RX ADMIN — INSULIN LISPRO 8 UNITS: 100 INJECTION, SOLUTION INTRAVENOUS; SUBCUTANEOUS at 07:48

## 2018-11-25 RX ADMIN — DULOXETINE HYDROCHLORIDE 60 MG: 60 CAPSULE, DELAYED RELEASE ORAL at 08:03

## 2018-11-25 RX ADMIN — SODIUM CHLORIDE 500 MG: 900 INJECTION INTRAVENOUS at 23:32

## 2018-11-25 RX ADMIN — ATOMOXETINE 100 MG: 60 CAPSULE ORAL at 08:02

## 2018-11-25 RX ADMIN — INSULIN LISPRO 15 UNITS: 100 INJECTION, SOLUTION INTRAVENOUS; SUBCUTANEOUS at 11:43

## 2018-11-25 RX ADMIN — PIOGLITAZONE 15 MG: 15 TABLET ORAL at 08:03

## 2018-11-25 RX ADMIN — INSULIN LISPRO 15 UNITS: 100 INJECTION, SOLUTION INTRAVENOUS; SUBCUTANEOUS at 07:47

## 2018-11-25 RX ADMIN — INSULIN GLARGINE 40 UNITS: 100 INJECTION, SOLUTION SUBCUTANEOUS at 07:46

## 2018-11-25 RX ADMIN — INSULIN LISPRO 15 UNITS: 100 INJECTION, SOLUTION INTRAVENOUS; SUBCUTANEOUS at 18:37

## 2018-11-25 RX ADMIN — INSULIN LISPRO 20 UNITS: 100 INJECTION, SOLUTION INTRAVENOUS; SUBCUTANEOUS at 11:44

## 2018-11-25 RX ADMIN — ACETAMINOPHEN 650 MG: 325 TABLET, FILM COATED ORAL at 18:37

## 2018-11-25 RX ADMIN — SODIUM CHLORIDE 500 MG: 900 INJECTION INTRAVENOUS at 12:51

## 2018-11-26 ENCOUNTER — TRANSCRIBE ORDERS (OUTPATIENT)
Dept: NEUROSURGERY | Facility: CLINIC | Age: 63
End: 2018-11-26

## 2018-11-26 DIAGNOSIS — M79.602 PARESTHESIA AND PAIN OF BOTH UPPER EXTREMITIES: ICD-10-CM

## 2018-11-26 DIAGNOSIS — M79.601 PARESTHESIA AND PAIN OF BOTH UPPER EXTREMITIES: ICD-10-CM

## 2018-11-26 DIAGNOSIS — M48.02 CERVICAL STENOSIS OF SPINAL CANAL: Primary | ICD-10-CM

## 2018-11-26 DIAGNOSIS — R20.2 PARESTHESIA AND PAIN OF BOTH UPPER EXTREMITIES: ICD-10-CM

## 2018-11-26 DIAGNOSIS — R29.898 WEAKNESS OF BOTH LOWER EXTREMITIES: ICD-10-CM

## 2018-11-26 LAB
GLUCOSE BLDC GLUCOMTR-MCNC: 172 MG/DL (ref 70–130)
GLUCOSE BLDC GLUCOMTR-MCNC: 207 MG/DL (ref 70–130)
GLUCOSE BLDC GLUCOMTR-MCNC: 240 MG/DL (ref 70–130)
GLUCOSE BLDC GLUCOMTR-MCNC: 304 MG/DL (ref 70–130)
INR PPP: 1.06 (ref 0.9–1.1)
PROTHROMBIN TIME: 13.7 SECONDS (ref 11.7–14.2)

## 2018-11-26 PROCEDURE — 25010000002 METHYLPREDNISOLONE PER 125 MG: Performed by: PSYCHIATRY & NEUROLOGY

## 2018-11-26 PROCEDURE — 85610 PROTHROMBIN TIME: CPT | Performed by: PHYSICIAN ASSISTANT

## 2018-11-26 PROCEDURE — 63710000001 INSULIN LISPRO (HUMAN) PER 5 UNITS: Performed by: HOSPITALIST

## 2018-11-26 PROCEDURE — 63710000001 INSULIN GLARGINE PER 5 UNITS: Performed by: HOSPITALIST

## 2018-11-26 PROCEDURE — 99231 SBSQ HOSP IP/OBS SF/LOW 25: CPT | Performed by: PSYCHIATRY & NEUROLOGY

## 2018-11-26 PROCEDURE — 82962 GLUCOSE BLOOD TEST: CPT

## 2018-11-26 RX ADMIN — INSULIN LISPRO 4 UNITS: 100 INJECTION, SOLUTION INTRAVENOUS; SUBCUTANEOUS at 18:41

## 2018-11-26 RX ADMIN — INSULIN LISPRO 8 UNITS: 100 INJECTION, SOLUTION INTRAVENOUS; SUBCUTANEOUS at 22:10

## 2018-11-26 RX ADMIN — LOSARTAN POTASSIUM: 50 TABLET, FILM COATED ORAL at 10:35

## 2018-11-26 RX ADMIN — INSULIN LISPRO 8 UNITS: 100 INJECTION, SOLUTION INTRAVENOUS; SUBCUTANEOUS at 10:40

## 2018-11-26 RX ADMIN — INSULIN LISPRO 16 UNITS: 100 INJECTION, SOLUTION INTRAVENOUS; SUBCUTANEOUS at 13:01

## 2018-11-26 RX ADMIN — INSULIN LISPRO 20 UNITS: 100 INJECTION, SOLUTION INTRAVENOUS; SUBCUTANEOUS at 18:41

## 2018-11-26 RX ADMIN — FAMOTIDINE 20 MG: 20 TABLET, FILM COATED ORAL at 10:37

## 2018-11-26 RX ADMIN — GLIPIZIDE 10 MG: 10 TABLET ORAL at 10:37

## 2018-11-26 RX ADMIN — SODIUM CHLORIDE 500 MG: 900 INJECTION INTRAVENOUS at 12:34

## 2018-11-26 RX ADMIN — ATOMOXETINE 100 MG: 60 CAPSULE ORAL at 10:36

## 2018-11-26 RX ADMIN — DULOXETINE HYDROCHLORIDE 60 MG: 60 CAPSULE, DELAYED RELEASE ORAL at 10:37

## 2018-11-26 RX ADMIN — INSULIN GLARGINE 40 UNITS: 100 INJECTION, SOLUTION SUBCUTANEOUS at 10:38

## 2018-11-26 RX ADMIN — INSULIN LISPRO 15 UNITS: 100 INJECTION, SOLUTION INTRAVENOUS; SUBCUTANEOUS at 10:40

## 2018-11-26 RX ADMIN — PIOGLITAZONE 15 MG: 15 TABLET ORAL at 10:37

## 2018-11-26 RX ADMIN — ACETAMINOPHEN 650 MG: 325 TABLET, FILM COATED ORAL at 19:55

## 2018-11-27 ENCOUNTER — APPOINTMENT (OUTPATIENT)
Dept: GENERAL RADIOLOGY | Facility: HOSPITAL | Age: 63
End: 2018-11-27

## 2018-11-27 ENCOUNTER — ANESTHESIA EVENT (OUTPATIENT)
Dept: PERIOP | Facility: HOSPITAL | Age: 63
End: 2018-11-27

## 2018-11-27 ENCOUNTER — ANESTHESIA (OUTPATIENT)
Dept: PERIOP | Facility: HOSPITAL | Age: 63
End: 2018-11-27

## 2018-11-27 LAB
ANION GAP SERPL CALCULATED.3IONS-SCNC: 18.8 MMOL/L
BUN BLD-MCNC: 36 MG/DL (ref 8–23)
BUN/CREAT SERPL: 33.6 (ref 7–25)
CALCIUM SPEC-SCNC: 8.3 MG/DL (ref 8.6–10.5)
CHLORIDE SERPL-SCNC: 99 MMOL/L (ref 98–107)
CO2 SERPL-SCNC: 22.2 MMOL/L (ref 22–29)
CREAT BLD-MCNC: 1.07 MG/DL (ref 0.76–1.27)
DEPRECATED RDW RBC AUTO: 42.3 FL (ref 37–54)
ERYTHROCYTE [DISTWIDTH] IN BLOOD BY AUTOMATED COUNT: 13.2 % (ref 11.5–14.5)
GFR SERPL CREATININE-BSD FRML MDRD: 70 ML/MIN/1.73
GLUCOSE BLD-MCNC: 305 MG/DL (ref 65–99)
GLUCOSE BLDC GLUCOMTR-MCNC: 215 MG/DL (ref 70–130)
GLUCOSE BLDC GLUCOMTR-MCNC: 238 MG/DL (ref 70–130)
GLUCOSE BLDC GLUCOMTR-MCNC: 340 MG/DL (ref 70–130)
GLUCOSE BLDC GLUCOMTR-MCNC: 367 MG/DL (ref 70–130)
HCT VFR BLD AUTO: 43.7 % (ref 40.4–52.2)
HGB BLD-MCNC: 14.3 G/DL (ref 13.7–17.6)
MCH RBC QN AUTO: 28.7 PG (ref 27–32.7)
MCHC RBC AUTO-ENTMCNC: 32.7 G/DL (ref 32.6–36.4)
MCV RBC AUTO: 87.6 FL (ref 79.8–96.2)
PLATELET # BLD AUTO: 274 10*3/MM3 (ref 140–500)
PMV BLD AUTO: 9.9 FL (ref 6–12)
POTASSIUM BLD-SCNC: 4.2 MMOL/L (ref 3.5–5.2)
RBC # BLD AUTO: 4.99 10*6/MM3 (ref 4.6–6)
SODIUM BLD-SCNC: 140 MMOL/L (ref 136–145)
T4 FREE SERPL-MCNC: 1.41 NG/DL (ref 0.93–1.7)
TSH SERPL DL<=0.05 MIU/L-ACNC: 0.53 MIU/ML (ref 0.27–4.2)
WBC NRBC COR # BLD: 13.2 10*3/MM3 (ref 4.5–10.7)

## 2018-11-27 PROCEDURE — 25010000002 HEPARIN (PORCINE) PER 1000 UNITS: Performed by: NEUROLOGICAL SURGERY

## 2018-11-27 PROCEDURE — 93005 ELECTROCARDIOGRAM TRACING: CPT | Performed by: ANESTHESIOLOGY

## 2018-11-27 PROCEDURE — 22854 INSJ BIOMECHANICAL DEVICE: CPT | Performed by: NEUROLOGICAL SURGERY

## 2018-11-27 PROCEDURE — 20936 SP BONE AGRFT LOCAL ADD-ON: CPT | Performed by: NEUROLOGICAL SURGERY

## 2018-11-27 PROCEDURE — 25010000002 HYDROMORPHONE PER 4 MG: Performed by: NURSE ANESTHETIST, CERTIFIED REGISTERED

## 2018-11-27 PROCEDURE — 25010000002 CEFAZOLIN PER 500 MG: Performed by: NEUROLOGICAL SURGERY

## 2018-11-27 PROCEDURE — 85027 COMPLETE CBC AUTOMATED: CPT | Performed by: HOSPITALIST

## 2018-11-27 PROCEDURE — C1713 ANCHOR/SCREW BN/BN,TIS/BN: HCPCS | Performed by: NEUROLOGICAL SURGERY

## 2018-11-27 PROCEDURE — 25010000002 ONDANSETRON PER 1 MG: Performed by: NURSE ANESTHETIST, CERTIFIED REGISTERED

## 2018-11-27 PROCEDURE — 25010000002 DEXAMETHASONE PER 1 MG: Performed by: NURSE ANESTHETIST, CERTIFIED REGISTERED

## 2018-11-27 PROCEDURE — 82962 GLUCOSE BLOOD TEST: CPT

## 2018-11-27 PROCEDURE — 25010000002 ALBUMIN HUMAN 5% PER 50 ML: Performed by: NURSE ANESTHETIST, CERTIFIED REGISTERED

## 2018-11-27 PROCEDURE — 25010000002 MIDAZOLAM PER 1 MG: Performed by: NURSE ANESTHETIST, CERTIFIED REGISTERED

## 2018-11-27 PROCEDURE — 80048 BASIC METABOLIC PNL TOTAL CA: CPT | Performed by: NURSE PRACTITIONER

## 2018-11-27 PROCEDURE — 72040 X-RAY EXAM NECK SPINE 2-3 VW: CPT

## 2018-11-27 PROCEDURE — 99254 IP/OBS CNSLTJ NEW/EST MOD 60: CPT | Performed by: NURSE PRACTITIONER

## 2018-11-27 PROCEDURE — 25010000002 ONDANSETRON PER 1 MG: Performed by: INTERNAL MEDICINE

## 2018-11-27 PROCEDURE — 25010000002 HYDROMORPHONE PER 4 MG: Performed by: NEUROLOGICAL SURGERY

## 2018-11-27 PROCEDURE — 22846 INSERT SPINE FIXATION DEVICE: CPT | Performed by: NEUROLOGICAL SURGERY

## 2018-11-27 PROCEDURE — 25010000002 FENTANYL CITRATE (PF) 100 MCG/2ML SOLUTION: Performed by: NURSE ANESTHETIST, CERTIFIED REGISTERED

## 2018-11-27 PROCEDURE — 76000 FLUOROSCOPY <1 HR PHYS/QHP: CPT

## 2018-11-27 PROCEDURE — 84439 ASSAY OF FREE THYROXINE: CPT | Performed by: NURSE PRACTITIONER

## 2018-11-27 PROCEDURE — 22585 ARTHRD ANT NTRBD MIN DSC EA: CPT | Performed by: NEUROLOGICAL SURGERY

## 2018-11-27 PROCEDURE — 20930 SP BONE ALGRFT MORSEL ADD-ON: CPT | Performed by: NEUROLOGICAL SURGERY

## 2018-11-27 PROCEDURE — 0RB30ZZ EXCISION OF CERVICAL VERTEBRAL DISC, OPEN APPROACH: ICD-10-PCS | Performed by: NEUROLOGICAL SURGERY

## 2018-11-27 PROCEDURE — 93010 ELECTROCARDIOGRAM REPORT: CPT | Performed by: INTERNAL MEDICINE

## 2018-11-27 PROCEDURE — 63710000001 INSULIN LISPRO (HUMAN) PER 5 UNITS: Performed by: HOSPITALIST

## 2018-11-27 PROCEDURE — 25010000002 METHYLPREDNISOLONE PER 125 MG: Performed by: PSYCHIATRY & NEUROLOGY

## 2018-11-27 PROCEDURE — 63082 REMOVE VERTEBRAL BODY ADD-ON: CPT | Performed by: NEUROLOGICAL SURGERY

## 2018-11-27 PROCEDURE — 25010000003 CEFAZOLIN PER 500 MG: Performed by: NEUROLOGICAL SURGERY

## 2018-11-27 PROCEDURE — 63081 REMOVE VERT BODY DCMPRN CRVL: CPT | Performed by: NEUROLOGICAL SURGERY

## 2018-11-27 PROCEDURE — 22554 ARTHRD ANT NTRBD MIN DSC CRV: CPT | Performed by: NEUROLOGICAL SURGERY

## 2018-11-27 PROCEDURE — P9041 ALBUMIN (HUMAN),5%, 50ML: HCPCS | Performed by: NURSE ANESTHETIST, CERTIFIED REGISTERED

## 2018-11-27 PROCEDURE — 0PB30ZZ EXCISION OF CERVICAL VERTEBRA, OPEN APPROACH: ICD-10-PCS | Performed by: NEUROLOGICAL SURGERY

## 2018-11-27 PROCEDURE — 25010000002 MIDAZOLAM PER 1 MG: Performed by: ANESTHESIOLOGY

## 2018-11-27 PROCEDURE — 25010000002 METHOCARBAMOL 1000 MG/10ML SOLUTION: Performed by: NEUROLOGICAL SURGERY

## 2018-11-27 PROCEDURE — 0RG20A0 FUSION OF 2 OR MORE CERVICAL VERTEBRAL JOINTS WITH INTERBODY FUSION DEVICE, ANTERIOR APPROACH, ANTERIOR COLUMN, OPEN APPROACH: ICD-10-PCS | Performed by: NEUROLOGICAL SURGERY

## 2018-11-27 PROCEDURE — 25010000002 PROPOFOL 10 MG/ML EMULSION: Performed by: NURSE ANESTHETIST, CERTIFIED REGISTERED

## 2018-11-27 PROCEDURE — 84443 ASSAY THYROID STIM HORMONE: CPT | Performed by: NURSE PRACTITIONER

## 2018-11-27 PROCEDURE — 25010000002 PHENYLEPHRINE PER 1 ML: Performed by: NURSE ANESTHETIST, CERTIFIED REGISTERED

## 2018-11-27 DEVICE — SCREW 3120317 4.0 X 17 SELF TAP VAR
Type: IMPLANTABLE DEVICE | Site: NECK | Status: FUNCTIONAL
Brand: ATLANTIS® ANTERIOR CERVICAL PLATE SYSTEM

## 2018-11-27 DEVICE — SPACER 6287341 PSR LAT PORTS 13X14X11
Type: IMPLANTABLE DEVICE | Site: NECK | Status: FUNCTIONAL
Brand: VERTE-STACK® SPINAL SYSTEM

## 2018-11-27 DEVICE — DBM T44145 5CC ORTHOBLEND SMALL DEFGRAFT
Type: IMPLANTABLE DEVICE | Site: NECK | Status: FUNCTIONAL
Brand: GRAFTON®AND GRAFTON PLUS®DEMINERALIZED BONE MATRIX (DBM)

## 2018-11-27 DEVICE — SPACER 6272044 PSR STRUT 44 X 14 X 11MM
Type: IMPLANTABLE DEVICE | Site: NECK | Status: FUNCTIONAL
Brand: VERTE-STACK® SPINAL SYSTEM

## 2018-11-27 DEVICE — SSC BONE WAX
Type: IMPLANTABLE DEVICE | Site: NECK | Status: FUNCTIONAL
Brand: SSC BONE WAX

## 2018-11-27 RX ORDER — DEXAMETHASONE SODIUM PHOSPHATE 4 MG/ML
INJECTION, SOLUTION INTRA-ARTICULAR; INTRALESIONAL; INTRAMUSCULAR; INTRAVENOUS; SOFT TISSUE AS NEEDED
Status: DISCONTINUED | OUTPATIENT
Start: 2018-11-27 | End: 2018-11-27 | Stop reason: SURG

## 2018-11-27 RX ORDER — FENTANYL CITRATE 50 UG/ML
50 INJECTION, SOLUTION INTRAMUSCULAR; INTRAVENOUS
Status: DISCONTINUED | OUTPATIENT
Start: 2018-11-27 | End: 2018-11-27 | Stop reason: HOSPADM

## 2018-11-27 RX ORDER — ROCURONIUM BROMIDE 10 MG/ML
INJECTION, SOLUTION INTRAVENOUS AS NEEDED
Status: DISCONTINUED | OUTPATIENT
Start: 2018-11-27 | End: 2018-11-27 | Stop reason: SURG

## 2018-11-27 RX ORDER — MIDAZOLAM HYDROCHLORIDE 1 MG/ML
1 INJECTION INTRAMUSCULAR; INTRAVENOUS
Status: DISCONTINUED | OUTPATIENT
Start: 2018-11-27 | End: 2018-11-27 | Stop reason: HOSPADM

## 2018-11-27 RX ORDER — SODIUM CHLORIDE 0.9 % (FLUSH) 0.9 %
1-10 SYRINGE (ML) INJECTION AS NEEDED
Status: DISCONTINUED | OUTPATIENT
Start: 2018-11-27 | End: 2018-11-27 | Stop reason: HOSPADM

## 2018-11-27 RX ORDER — SODIUM CHLORIDE 0.9 % (FLUSH) 0.9 %
3-10 SYRINGE (ML) INJECTION AS NEEDED
Status: DISCONTINUED | OUTPATIENT
Start: 2018-11-27 | End: 2018-11-28

## 2018-11-27 RX ORDER — LIDOCAINE HYDROCHLORIDE 10 MG/ML
0.5 INJECTION, SOLUTION EPIDURAL; INFILTRATION; INTRACAUDAL; PERINEURAL ONCE AS NEEDED
Status: DISCONTINUED | OUTPATIENT
Start: 2018-11-27 | End: 2018-11-27 | Stop reason: HOSPADM

## 2018-11-27 RX ORDER — SENNA AND DOCUSATE SODIUM 50; 8.6 MG/1; MG/1
1 TABLET, FILM COATED ORAL NIGHTLY PRN
Status: DISCONTINUED | OUTPATIENT
Start: 2018-11-27 | End: 2018-11-30 | Stop reason: HOSPADM

## 2018-11-27 RX ORDER — PROMETHAZINE HYDROCHLORIDE 25 MG/1
25 TABLET ORAL ONCE AS NEEDED
Status: DISCONTINUED | OUTPATIENT
Start: 2018-11-27 | End: 2018-11-27 | Stop reason: HOSPADM

## 2018-11-27 RX ORDER — SODIUM CHLORIDE, SODIUM LACTATE, POTASSIUM CHLORIDE, CALCIUM CHLORIDE 600; 310; 30; 20 MG/100ML; MG/100ML; MG/100ML; MG/100ML
9 INJECTION, SOLUTION INTRAVENOUS CONTINUOUS
Status: DISCONTINUED | OUTPATIENT
Start: 2018-11-27 | End: 2018-11-27

## 2018-11-27 RX ORDER — ONDANSETRON 4 MG/1
4 TABLET, ORALLY DISINTEGRATING ORAL EVERY 6 HOURS PRN
Status: DISCONTINUED | OUTPATIENT
Start: 2018-11-27 | End: 2018-11-30 | Stop reason: HOSPADM

## 2018-11-27 RX ORDER — CEFAZOLIN SODIUM IN 0.9 % NACL 3 G/100 ML
3 INTRAVENOUS SOLUTION, PIGGYBACK (ML) INTRAVENOUS EVERY 8 HOURS
Status: COMPLETED | OUTPATIENT
Start: 2018-11-27 | End: 2018-11-28

## 2018-11-27 RX ORDER — MIDAZOLAM HYDROCHLORIDE 1 MG/ML
INJECTION INTRAMUSCULAR; INTRAVENOUS AS NEEDED
Status: DISCONTINUED | OUTPATIENT
Start: 2018-11-27 | End: 2018-11-27 | Stop reason: SURG

## 2018-11-27 RX ORDER — ONDANSETRON 2 MG/ML
4 INJECTION INTRAMUSCULAR; INTRAVENOUS ONCE AS NEEDED
Status: DISCONTINUED | OUTPATIENT
Start: 2018-11-27 | End: 2018-11-27 | Stop reason: HOSPADM

## 2018-11-27 RX ORDER — ALBUMIN, HUMAN INJ 5% 5 %
SOLUTION INTRAVENOUS CONTINUOUS PRN
Status: DISCONTINUED | OUTPATIENT
Start: 2018-11-27 | End: 2018-11-27 | Stop reason: SURG

## 2018-11-27 RX ORDER — SODIUM CHLORIDE 9 MG/ML
INJECTION, SOLUTION INTRAVENOUS CONTINUOUS PRN
Status: DISCONTINUED | OUTPATIENT
Start: 2018-11-27 | End: 2018-11-27 | Stop reason: SURG

## 2018-11-27 RX ORDER — PROMETHAZINE HYDROCHLORIDE 25 MG/ML
12.5 INJECTION, SOLUTION INTRAMUSCULAR; INTRAVENOUS ONCE AS NEEDED
Status: DISCONTINUED | OUTPATIENT
Start: 2018-11-27 | End: 2018-11-27 | Stop reason: HOSPADM

## 2018-11-27 RX ORDER — SODIUM CHLORIDE 0.9 % (FLUSH) 0.9 %
3 SYRINGE (ML) INJECTION EVERY 12 HOURS SCHEDULED
Status: DISCONTINUED | OUTPATIENT
Start: 2018-11-27 | End: 2018-11-28

## 2018-11-27 RX ORDER — ACETAMINOPHEN 325 MG/1
650 TABLET ORAL EVERY 4 HOURS PRN
Status: DISCONTINUED | OUTPATIENT
Start: 2018-11-27 | End: 2018-11-30 | Stop reason: HOSPADM

## 2018-11-27 RX ORDER — HYDROMORPHONE HCL 110MG/55ML
PATIENT CONTROLLED ANALGESIA SYRINGE INTRAVENOUS AS NEEDED
Status: DISCONTINUED | OUTPATIENT
Start: 2018-11-27 | End: 2018-11-27 | Stop reason: SURG

## 2018-11-27 RX ORDER — MIDAZOLAM HYDROCHLORIDE 1 MG/ML
2 INJECTION INTRAMUSCULAR; INTRAVENOUS
Status: DISCONTINUED | OUTPATIENT
Start: 2018-11-27 | End: 2018-11-27 | Stop reason: HOSPADM

## 2018-11-27 RX ORDER — HYDROCODONE BITARTRATE AND ACETAMINOPHEN 7.5; 325 MG/1; MG/1
1 TABLET ORAL ONCE AS NEEDED
Status: DISCONTINUED | OUTPATIENT
Start: 2018-11-27 | End: 2018-11-27 | Stop reason: HOSPADM

## 2018-11-27 RX ORDER — LABETALOL HYDROCHLORIDE 5 MG/ML
5 INJECTION, SOLUTION INTRAVENOUS
Status: DISCONTINUED | OUTPATIENT
Start: 2018-11-27 | End: 2018-11-27 | Stop reason: HOSPADM

## 2018-11-27 RX ORDER — FLUMAZENIL 0.1 MG/ML
0.2 INJECTION INTRAVENOUS AS NEEDED
Status: DISCONTINUED | OUTPATIENT
Start: 2018-11-27 | End: 2018-11-27 | Stop reason: HOSPADM

## 2018-11-27 RX ORDER — PROMETHAZINE HYDROCHLORIDE 25 MG/1
12.5 TABLET ORAL ONCE AS NEEDED
Status: DISCONTINUED | OUTPATIENT
Start: 2018-11-27 | End: 2018-11-27 | Stop reason: HOSPADM

## 2018-11-27 RX ORDER — CYCLOBENZAPRINE HCL 10 MG
10 TABLET ORAL 3 TIMES DAILY PRN
Status: DISCONTINUED | OUTPATIENT
Start: 2018-11-27 | End: 2018-11-30 | Stop reason: HOSPADM

## 2018-11-27 RX ORDER — SODIUM CHLORIDE, SODIUM LACTATE, POTASSIUM CHLORIDE, CALCIUM CHLORIDE 600; 310; 30; 20 MG/100ML; MG/100ML; MG/100ML; MG/100ML
100 INJECTION, SOLUTION INTRAVENOUS CONTINUOUS
Status: DISCONTINUED | OUTPATIENT
Start: 2018-11-27 | End: 2018-11-29

## 2018-11-27 RX ORDER — DOCUSATE SODIUM 100 MG/1
100 CAPSULE, LIQUID FILLED ORAL 2 TIMES DAILY PRN
Status: DISCONTINUED | OUTPATIENT
Start: 2018-11-27 | End: 2018-11-30 | Stop reason: HOSPADM

## 2018-11-27 RX ORDER — FAMOTIDINE 10 MG/ML
20 INJECTION, SOLUTION INTRAVENOUS ONCE
Status: COMPLETED | OUTPATIENT
Start: 2018-11-27 | End: 2018-11-27

## 2018-11-27 RX ORDER — OXYCODONE AND ACETAMINOPHEN 7.5; 325 MG/1; MG/1
1 TABLET ORAL ONCE AS NEEDED
Status: DISCONTINUED | OUTPATIENT
Start: 2018-11-27 | End: 2018-11-27 | Stop reason: HOSPADM

## 2018-11-27 RX ORDER — HYDROMORPHONE HYDROCHLORIDE 1 MG/ML
0.5 INJECTION, SOLUTION INTRAMUSCULAR; INTRAVENOUS; SUBCUTANEOUS
Status: DISCONTINUED | OUTPATIENT
Start: 2018-11-27 | End: 2018-11-30 | Stop reason: HOSPADM

## 2018-11-27 RX ORDER — DIPHENHYDRAMINE HCL 25 MG
25 CAPSULE ORAL EVERY 6 HOURS PRN
Status: DISCONTINUED | OUTPATIENT
Start: 2018-11-27 | End: 2018-11-27 | Stop reason: HOSPADM

## 2018-11-27 RX ORDER — FAMOTIDINE 10 MG/ML
20 INJECTION, SOLUTION INTRAVENOUS ONCE
Status: DISCONTINUED | OUTPATIENT
Start: 2018-11-27 | End: 2018-11-27 | Stop reason: HOSPADM

## 2018-11-27 RX ORDER — LIDOCAINE HYDROCHLORIDE 20 MG/ML
INJECTION, SOLUTION INFILTRATION; PERINEURAL AS NEEDED
Status: DISCONTINUED | OUTPATIENT
Start: 2018-11-27 | End: 2018-11-27 | Stop reason: SURG

## 2018-11-27 RX ORDER — NALOXONE HCL 0.4 MG/ML
0.2 VIAL (ML) INJECTION AS NEEDED
Status: DISCONTINUED | OUTPATIENT
Start: 2018-11-27 | End: 2018-11-27 | Stop reason: HOSPADM

## 2018-11-27 RX ORDER — ONDANSETRON 2 MG/ML
INJECTION INTRAMUSCULAR; INTRAVENOUS AS NEEDED
Status: DISCONTINUED | OUTPATIENT
Start: 2018-11-27 | End: 2018-11-27 | Stop reason: SURG

## 2018-11-27 RX ORDER — PROMETHAZINE HYDROCHLORIDE 25 MG/1
25 SUPPOSITORY RECTAL ONCE AS NEEDED
Status: DISCONTINUED | OUTPATIENT
Start: 2018-11-27 | End: 2018-11-27 | Stop reason: HOSPADM

## 2018-11-27 RX ORDER — EPHEDRINE SULFATE 50 MG/ML
5 INJECTION, SOLUTION INTRAVENOUS ONCE AS NEEDED
Status: DISCONTINUED | OUTPATIENT
Start: 2018-11-27 | End: 2018-11-27 | Stop reason: HOSPADM

## 2018-11-27 RX ORDER — HYDROCODONE BITARTRATE AND ACETAMINOPHEN 7.5; 325 MG/1; MG/1
2 TABLET ORAL EVERY 4 HOURS PRN
Status: DISCONTINUED | OUTPATIENT
Start: 2018-11-27 | End: 2018-11-30 | Stop reason: HOSPADM

## 2018-11-27 RX ORDER — CEFAZOLIN SODIUM IN 0.9 % NACL 3 G/100 ML
3 INTRAVENOUS SOLUTION, PIGGYBACK (ML) INTRAVENOUS ONCE
Status: COMPLETED | OUTPATIENT
Start: 2018-11-27 | End: 2018-11-27

## 2018-11-27 RX ORDER — FENTANYL CITRATE 50 UG/ML
INJECTION, SOLUTION INTRAMUSCULAR; INTRAVENOUS AS NEEDED
Status: DISCONTINUED | OUTPATIENT
Start: 2018-11-27 | End: 2018-11-27 | Stop reason: SURG

## 2018-11-27 RX ORDER — HYDROMORPHONE HYDROCHLORIDE 1 MG/ML
0.5 INJECTION, SOLUTION INTRAMUSCULAR; INTRAVENOUS; SUBCUTANEOUS
Status: DISCONTINUED | OUTPATIENT
Start: 2018-11-27 | End: 2018-11-27 | Stop reason: HOSPADM

## 2018-11-27 RX ORDER — NALOXONE HCL 0.4 MG/ML
0.4 VIAL (ML) INJECTION
Status: DISCONTINUED | OUTPATIENT
Start: 2018-11-27 | End: 2018-11-30 | Stop reason: HOSPADM

## 2018-11-27 RX ORDER — ONDANSETRON 2 MG/ML
4 INJECTION INTRAMUSCULAR; INTRAVENOUS EVERY 6 HOURS PRN
Status: DISCONTINUED | OUTPATIENT
Start: 2018-11-27 | End: 2018-11-30 | Stop reason: HOSPADM

## 2018-11-27 RX ORDER — ONDANSETRON 4 MG/1
4 TABLET, FILM COATED ORAL EVERY 6 HOURS PRN
Status: DISCONTINUED | OUTPATIENT
Start: 2018-11-27 | End: 2018-11-30 | Stop reason: HOSPADM

## 2018-11-27 RX ORDER — METHOCARBAMOL 100 MG/ML
1000 INJECTION, SOLUTION INTRAMUSCULAR; INTRAVENOUS ONCE
Status: COMPLETED | OUTPATIENT
Start: 2018-11-27 | End: 2018-11-27

## 2018-11-27 RX ORDER — PROPOFOL 10 MG/ML
VIAL (ML) INTRAVENOUS AS NEEDED
Status: DISCONTINUED | OUTPATIENT
Start: 2018-11-27 | End: 2018-11-27 | Stop reason: SURG

## 2018-11-27 RX ADMIN — PHENYLEPHRINE HYDROCHLORIDE 200 MCG: 10 INJECTION INTRAVENOUS at 12:14

## 2018-11-27 RX ADMIN — MIDAZOLAM HYDROCHLORIDE 1 MG: 2 INJECTION, SOLUTION INTRAMUSCULAR; INTRAVENOUS at 07:48

## 2018-11-27 RX ADMIN — ROCURONIUM BROMIDE 50 MG: 10 INJECTION INTRAVENOUS at 08:07

## 2018-11-27 RX ADMIN — HYDROMORPHONE HYDROCHLORIDE 0.5 MG: 1 INJECTION, SOLUTION INTRAMUSCULAR; INTRAVENOUS; SUBCUTANEOUS at 17:06

## 2018-11-27 RX ADMIN — ROCURONIUM BROMIDE 20 MG: 10 INJECTION INTRAVENOUS at 08:29

## 2018-11-27 RX ADMIN — PHENYLEPHRINE HYDROCHLORIDE 200 MCG: 10 INJECTION INTRAVENOUS at 10:54

## 2018-11-27 RX ADMIN — VASOPRESSIN 1 UNITS: 20 INJECTION INTRAMUSCULAR; SUBCUTANEOUS at 13:05

## 2018-11-27 RX ADMIN — PHENYLEPHRINE HYDROCHLORIDE 100 MCG: 10 INJECTION INTRAVENOUS at 09:46

## 2018-11-27 RX ADMIN — FENTANYL CITRATE 50 MCG: 50 INJECTION, SOLUTION INTRAMUSCULAR; INTRAVENOUS at 13:02

## 2018-11-27 RX ADMIN — Medication 2 MG: at 07:59

## 2018-11-27 RX ADMIN — PHENYLEPHRINE HYDROCHLORIDE 150 MCG: 10 INJECTION INTRAVENOUS at 08:30

## 2018-11-27 RX ADMIN — PHENYLEPHRINE HYDROCHLORIDE 200 MCG: 10 INJECTION INTRAVENOUS at 09:10

## 2018-11-27 RX ADMIN — FENTANYL CITRATE 50 MCG: 50 INJECTION, SOLUTION INTRAMUSCULAR; INTRAVENOUS at 08:43

## 2018-11-27 RX ADMIN — DEXAMETHASONE SODIUM PHOSPHATE 10 MG: 4 INJECTION INTRA-ARTICULAR; INTRALESIONAL; INTRAMUSCULAR; INTRAVENOUS; SOFT TISSUE at 08:30

## 2018-11-27 RX ADMIN — FENTANYL CITRATE 50 MCG: 50 INJECTION, SOLUTION INTRAMUSCULAR; INTRAVENOUS at 08:03

## 2018-11-27 RX ADMIN — CEFAZOLIN 3 G: 1 INJECTION, POWDER, FOR SOLUTION INTRAMUSCULAR; INTRAVENOUS; PARENTERAL at 08:13

## 2018-11-27 RX ADMIN — SODIUM CHLORIDE, POTASSIUM CHLORIDE, SODIUM LACTATE AND CALCIUM CHLORIDE 9 ML/HR: 600; 310; 30; 20 INJECTION, SOLUTION INTRAVENOUS at 07:47

## 2018-11-27 RX ADMIN — PHENYLEPHRINE HYDROCHLORIDE 200 MCG: 10 INJECTION INTRAVENOUS at 09:21

## 2018-11-27 RX ADMIN — SODIUM CHLORIDE, POTASSIUM CHLORIDE, SODIUM LACTATE AND CALCIUM CHLORIDE 125 ML/HR: 600; 310; 30; 20 INJECTION, SOLUTION INTRAVENOUS at 17:44

## 2018-11-27 RX ADMIN — METOPROLOL TARTRATE 25 MG: 25 TABLET ORAL at 17:41

## 2018-11-27 RX ADMIN — ONDANSETRON 4 MG: 2 INJECTION INTRAMUSCULAR; INTRAVENOUS at 13:00

## 2018-11-27 RX ADMIN — PHENYLEPHRINE HYDROCHLORIDE 200 MCG: 10 INJECTION INTRAVENOUS at 11:10

## 2018-11-27 RX ADMIN — PHENYLEPHRINE HYDROCHLORIDE 200 MCG: 10 INJECTION INTRAVENOUS at 10:26

## 2018-11-27 RX ADMIN — CEFAZOLIN 3 G: 1 INJECTION, POWDER, FOR SOLUTION INTRAMUSCULAR; INTRAVENOUS; PARENTERAL at 12:08

## 2018-11-27 RX ADMIN — ONDANSETRON 4 MG: 2 INJECTION INTRAMUSCULAR; INTRAVENOUS at 17:07

## 2018-11-27 RX ADMIN — METHOCARBAMOL 1000 MG: 100 INJECTION, SOLUTION INTRAMUSCULAR; INTRAVENOUS at 14:30

## 2018-11-27 RX ADMIN — CYCLOBENZAPRINE 10 MG: 10 TABLET, FILM COATED ORAL at 17:06

## 2018-11-27 RX ADMIN — VASOPRESSIN 2 UNITS: 20 INJECTION INTRAMUSCULAR; SUBCUTANEOUS at 12:26

## 2018-11-27 RX ADMIN — FENTANYL CITRATE 50 MCG: 50 INJECTION, SOLUTION INTRAMUSCULAR; INTRAVENOUS at 10:17

## 2018-11-27 RX ADMIN — SODIUM CHLORIDE 500 MG: 900 INJECTION INTRAVENOUS at 00:02

## 2018-11-27 RX ADMIN — SODIUM CHLORIDE, PRESERVATIVE FREE 3 ML: 5 INJECTION INTRAVENOUS at 20:05

## 2018-11-27 RX ADMIN — PHENYLEPHRINE HYDROCHLORIDE 150 MCG: 10 INJECTION INTRAVENOUS at 08:36

## 2018-11-27 RX ADMIN — ALBUMIN (HUMAN): 0.05 SOLUTION INTRAVENOUS at 12:53

## 2018-11-27 RX ADMIN — VASOPRESSIN 1 UNITS: 20 INJECTION INTRAMUSCULAR; SUBCUTANEOUS at 12:39

## 2018-11-27 RX ADMIN — SODIUM CHLORIDE: 9 INJECTION, SOLUTION INTRAVENOUS at 12:45

## 2018-11-27 RX ADMIN — PHENYLEPHRINE HYDROCHLORIDE 200 MCG: 10 INJECTION INTRAVENOUS at 10:59

## 2018-11-27 RX ADMIN — ROCURONIUM BROMIDE 10 MG: 10 INJECTION INTRAVENOUS at 09:28

## 2018-11-27 RX ADMIN — PROPOFOL 150 MG: 10 INJECTION, EMULSION INTRAVENOUS at 08:07

## 2018-11-27 RX ADMIN — PHENYLEPHRINE HYDROCHLORIDE 200 MCG: 10 INJECTION INTRAVENOUS at 10:41

## 2018-11-27 RX ADMIN — FENTANYL CITRATE 50 MCG: 50 INJECTION, SOLUTION INTRAMUSCULAR; INTRAVENOUS at 08:15

## 2018-11-27 RX ADMIN — HYDROCODONE BITARTRATE AND ACETAMINOPHEN 2 TABLET: 7.5; 325 TABLET ORAL at 18:56

## 2018-11-27 RX ADMIN — INSULIN LISPRO 20 UNITS: 100 INJECTION, SOLUTION INTRAVENOUS; SUBCUTANEOUS at 21:44

## 2018-11-27 RX ADMIN — CEFAZOLIN SODIUM 3 G: 10 INJECTION, POWDER, FOR SOLUTION INTRAVENOUS at 20:04

## 2018-11-27 RX ADMIN — ROCURONIUM BROMIDE 10 MG: 10 INJECTION INTRAVENOUS at 11:25

## 2018-11-27 RX ADMIN — HYDROMORPHONE HYDROCHLORIDE 0.5 MG: 2 INJECTION INTRAMUSCULAR; INTRAVENOUS; SUBCUTANEOUS at 13:19

## 2018-11-27 RX ADMIN — DEXAMETHASONE SODIUM PHOSPHATE 10 MG: 4 INJECTION INTRA-ARTICULAR; INTRALESIONAL; INTRAMUSCULAR; INTRAVENOUS; SOFT TISSUE at 12:18

## 2018-11-27 RX ADMIN — PHENYLEPHRINE HYDROCHLORIDE 200 MCG: 10 INJECTION INTRAVENOUS at 11:58

## 2018-11-27 RX ADMIN — SODIUM CHLORIDE, POTASSIUM CHLORIDE, SODIUM LACTATE AND CALCIUM CHLORIDE: 600; 310; 30; 20 INJECTION, SOLUTION INTRAVENOUS at 07:59

## 2018-11-27 RX ADMIN — SUGAMMADEX 400 MG: 100 INJECTION, SOLUTION INTRAVENOUS at 13:17

## 2018-11-27 RX ADMIN — VASOPRESSIN 2 UNITS: 20 INJECTION INTRAMUSCULAR; SUBCUTANEOUS at 11:58

## 2018-11-27 RX ADMIN — PHENYLEPHRINE HYDROCHLORIDE 200 MCG: 10 INJECTION INTRAVENOUS at 08:50

## 2018-11-27 RX ADMIN — ROCURONIUM BROMIDE 10 MG: 10 INJECTION INTRAVENOUS at 10:50

## 2018-11-27 RX ADMIN — PROPOFOL 50 MG: 10 INJECTION, EMULSION INTRAVENOUS at 08:26

## 2018-11-27 RX ADMIN — HYDROMORPHONE HYDROCHLORIDE 0.5 MG: 2 INJECTION INTRAMUSCULAR; INTRAVENOUS; SUBCUTANEOUS at 13:35

## 2018-11-27 RX ADMIN — LIDOCAINE HYDROCHLORIDE 100 MG: 20 INJECTION, SOLUTION INFILTRATION; PERINEURAL at 08:07

## 2018-11-27 RX ADMIN — FAMOTIDINE 20 MG: 10 INJECTION, SOLUTION INTRAVENOUS at 07:48

## 2018-11-27 RX ADMIN — PHENYLEPHRINE HYDROCHLORIDE 200 MCG: 10 INJECTION INTRAVENOUS at 11:21

## 2018-11-27 RX ADMIN — INSULIN LISPRO 16 UNITS: 100 INJECTION, SOLUTION INTRAVENOUS; SUBCUTANEOUS at 17:42

## 2018-11-27 NOTE — ANESTHESIA PROCEDURE NOTES
ANESTHESIA INTUBATION  Urgency: elective      General Information and Staff    Patient location during procedure: OR  Anesthesiologist: Odette Castellon MD  CRNA: Emily Myrick CRNA    Indications and Patient Condition  Indications for airway management: airway protection    Preoxygenated: yes  Mask difficulty assessment: 3 - difficult mask (inadequate, unstable or two providers) +/- NMBA    Final Airway Details  Final airway type: endotracheal airway      Successful airway: ETT  Cuffed: yes   Successful intubation technique: video laryngoscopy  Facilitating devices/methods: cricoid pressure and intubating stylet  Blade: CMAC  Blade size: D  ETT size (mm): 7.5  Cormack-Lehane Classification: grade IIb - view of arytenoids or posterior of glottis only  Placement verified by: chest auscultation and capnometry   Measured from: lips  ETT to lips (cm): 24  Number of attempts at approach: 1    Additional Comments  Preoxygenated with 100% FiO2. Smooth IV induction. Atraumatic insertion. No change to dentition or soft tissue.    CMAC D blade utilized for first attempt. 2 handed mask with oral airway r/t beard. Neck neutral throughout induction.

## 2018-11-27 NOTE — ANESTHESIA POSTPROCEDURE EVALUATION
"Patient: Duane Mark Witten    Procedure Summary     Date:  11/27/18 Room / Location:  [facility] OR [address] [place] [address] MAIN OR    Anesthesia Start:  0758 Anesthesia Stop:  1356    Procedure:  C4, C5, C6 ANTERIOR CERVICAL CORPECTOMY (Bilateral ) Diagnosis:       Paresthesia and pain of both upper extremities      Weakness of both lower extremities      Cervical stenosis of spinal canal      (Paresthesia and pain of both upper extremities [R20.2, M79.601, M79.602])      (Weakness of both lower extremities [R29.898])      (Cervical stenosis of spinal canal [M48.02])    Surgeon:  Chirag Markham MD Provider:  Odette Castellon MD    Anesthesia Type:  general ASA Status:  3          Anesthesia Type: general  Last vitals  BP   109/64 (11/27/18 1503)   Temp   36.5 °C (97.7 °F) (11/27/18 1352)   Pulse   114 (11/27/18 1503)   Resp   22 (11/27/18 1445)     SpO2   94 % (11/27/18 1503)     Post Anesthesia Care and Evaluation    Patient location during evaluation: bedside  Patient participation: complete - patient participated  Level of consciousness: sleepy but conscious  Pain score: 0  Pain management: adequate  Airway patency: patent  Anesthetic complications: No anesthetic complications    Cardiovascular status: acceptable  Respiratory status: acceptable  Hydration status: acceptable    Comments: /64   Pulse 114   Temp 36.5 °C (97.7 °F) (Oral)   Resp 22   Ht 190.5 cm (75\")   Wt (!) 152 kg (334 lb)   SpO2 94%   BMI 41.75 kg/m²         "

## 2018-11-27 NOTE — ANESTHESIA PREPROCEDURE EVALUATION
Anesthesia Evaluation     history of anesthetic complications: PONV  NPO Solid Status: > 8 hours             Airway   Mallampati: IV  Dental - normal exam     Pulmonary - normal exam   Cardiovascular - normal exam    (+) hypertension, hyperlipidemia,       Neuro/Psych  GI/Hepatic/Renal/Endo    (+) obesity, morbid obesity,  diabetes mellitus type 2 using insulin,     Musculoskeletal     Abdominal   (+) obese,    Substance History      OB/GYN          Other   (+) arthritis                     Anesthesia Plan    ASA 3     general     intravenous induction   Anesthetic plan, all risks, benefits, and alternatives have been provided, discussed and informed consent has been obtained with: patient.

## 2018-11-28 ENCOUNTER — APPOINTMENT (OUTPATIENT)
Dept: CARDIOLOGY | Facility: HOSPITAL | Age: 63
End: 2018-11-28

## 2018-11-28 ENCOUNTER — APPOINTMENT (OUTPATIENT)
Dept: GENERAL RADIOLOGY | Facility: HOSPITAL | Age: 63
End: 2018-11-28
Attending: NEUROLOGICAL SURGERY

## 2018-11-28 DIAGNOSIS — M48.02 CERVICAL STENOSIS OF SPINAL CANAL: Primary | ICD-10-CM

## 2018-11-28 PROBLEM — I48.91 POSTOPERATIVE ATRIAL FIBRILLATION (HCC): Status: ACTIVE | Noted: 2018-11-28

## 2018-11-28 PROBLEM — I97.89 POSTOPERATIVE ATRIAL FIBRILLATION: Status: ACTIVE | Noted: 2018-11-28

## 2018-11-28 LAB
ANION GAP SERPL CALCULATED.3IONS-SCNC: 10.4 MMOL/L
AORTIC DIMENSIONLESS INDEX: 0.4 (DI)
BASOPHILS # BLD AUTO: 0 10*3/MM3 (ref 0–0.2)
BASOPHILS NFR BLD AUTO: 0 % (ref 0–1.5)
BH CV ECHO MEAS - ACS: 0.7 CM
BH CV ECHO MEAS - AO MAX PG (FULL): 30 MMHG
BH CV ECHO MEAS - AO MAX PG: 36.7 MMHG
BH CV ECHO MEAS - AO MEAN PG (FULL): 17 MMHG
BH CV ECHO MEAS - AO MEAN PG: 21 MMHG
BH CV ECHO MEAS - AO ROOT AREA (BSA CORRECTED): 1.3
BH CV ECHO MEAS - AO ROOT AREA: 10.2 CM^2
BH CV ECHO MEAS - AO ROOT DIAM: 3.6 CM
BH CV ECHO MEAS - AO V2 MAX: 303 CM/SEC
BH CV ECHO MEAS - AO V2 MEAN: 217 CM/SEC
BH CV ECHO MEAS - AO V2 VTI: 66.3 CM
BH CV ECHO MEAS - AVA(I,A): 1.6 CM^2
BH CV ECHO MEAS - AVA(I,D): 1.6 CM^2
BH CV ECHO MEAS - AVA(V,A): 1.8 CM^2
BH CV ECHO MEAS - AVA(V,D): 1.8 CM^2
BH CV ECHO MEAS - BSA(HAYCOCK): 2.9 M^2
BH CV ECHO MEAS - BSA: 2.7 M^2
BH CV ECHO MEAS - BZI_BMI: 41.7 KILOGRAMS/M^2
BH CV ECHO MEAS - BZI_METRIC_HEIGHT: 190.5 CM
BH CV ECHO MEAS - BZI_METRIC_WEIGHT: 151.5 KG
BH CV ECHO MEAS - EDV(CUBED): 262.1 ML
BH CV ECHO MEAS - EDV(MOD-SP2): 152 ML
BH CV ECHO MEAS - EDV(MOD-SP4): 176 ML
BH CV ECHO MEAS - EDV(TEICH): 208.5 ML
BH CV ECHO MEAS - EF(CUBED): 62.9 %
BH CV ECHO MEAS - EF(MOD-BP): 47 %
BH CV ECHO MEAS - EF(MOD-SP2): 46.1 %
BH CV ECHO MEAS - EF(MOD-SP4): 45.5 %
BH CV ECHO MEAS - EF(TEICH): 53.3 %
BH CV ECHO MEAS - ESV(CUBED): 97.3 ML
BH CV ECHO MEAS - ESV(MOD-SP2): 82 ML
BH CV ECHO MEAS - ESV(MOD-SP4): 96 ML
BH CV ECHO MEAS - ESV(TEICH): 97.3 ML
BH CV ECHO MEAS - FS: 28.1 %
BH CV ECHO MEAS - IVS/LVPW: 1.1
BH CV ECHO MEAS - IVSD: 1.2 CM
BH CV ECHO MEAS - LAT PEAK E' VEL: 16 CM/SEC
BH CV ECHO MEAS - LV DIASTOLIC VOL/BSA (35-75): 64.5 ML/M^2
BH CV ECHO MEAS - LV MASS(C)D: 330.4 GRAMS
BH CV ECHO MEAS - LV MASS(C)DI: 121.1 GRAMS/M^2
BH CV ECHO MEAS - LV MAX PG: 6.8 MMHG
BH CV ECHO MEAS - LV MEAN PG: 4 MMHG
BH CV ECHO MEAS - LV SYSTOLIC VOL/BSA (12-30): 35.2 ML/M^2
BH CV ECHO MEAS - LV V1 MAX: 130 CM/SEC
BH CV ECHO MEAS - LV V1 MEAN: 92 CM/SEC
BH CV ECHO MEAS - LV V1 VTI: 26.2 CM
BH CV ECHO MEAS - LVIDD: 6.4 CM
BH CV ECHO MEAS - LVIDS: 4.6 CM
BH CV ECHO MEAS - LVLD AP2: 8.7 CM
BH CV ECHO MEAS - LVLD AP4: 9.2 CM
BH CV ECHO MEAS - LVLS AP2: 8.1 CM
BH CV ECHO MEAS - LVLS AP4: 7.9 CM
BH CV ECHO MEAS - LVOT AREA (M): 4.2 CM^2
BH CV ECHO MEAS - LVOT AREA: 4.2 CM^2
BH CV ECHO MEAS - LVOT DIAM: 2.3 CM
BH CV ECHO MEAS - LVPWD: 1.1 CM
BH CV ECHO MEAS - MED PEAK E' VEL: 8 CM/SEC
BH CV ECHO MEAS - MR MAX PG: 98.8 MMHG
BH CV ECHO MEAS - MR MAX VEL: 497 CM/SEC
BH CV ECHO MEAS - MV DEC SLOPE: 524 CM/SEC^2
BH CV ECHO MEAS - MV DEC TIME: 0.19 SEC
BH CV ECHO MEAS - MV E MAX VEL: 149 CM/SEC
BH CV ECHO MEAS - MV MAX PG: 9.5 MMHG
BH CV ECHO MEAS - MV MEAN PG: 4 MMHG
BH CV ECHO MEAS - MV P1/2T MAX VEL: 156 CM/SEC
BH CV ECHO MEAS - MV P1/2T: 87.2 MSEC
BH CV ECHO MEAS - MV V2 MAX: 154 CM/SEC
BH CV ECHO MEAS - MV V2 MEAN: 86.6 CM/SEC
BH CV ECHO MEAS - MV V2 VTI: 31.3 CM
BH CV ECHO MEAS - MVA P1/2T LCG: 1.4 CM^2
BH CV ECHO MEAS - MVA(P1/2T): 2.5 CM^2
BH CV ECHO MEAS - MVA(VTI): 3.5 CM^2
BH CV ECHO MEAS - PA ACC TIME: 0.13 SEC
BH CV ECHO MEAS - PA MAX PG (FULL): 4.8 MMHG
BH CV ECHO MEAS - PA MAX PG: 7 MMHG
BH CV ECHO MEAS - PA PR(ACCEL): 21.9 MMHG
BH CV ECHO MEAS - PA V2 MAX: 132 CM/SEC
BH CV ECHO MEAS - PULM DIAS VEL: 69.1 CM/SEC
BH CV ECHO MEAS - PULM S/D: 0.74
BH CV ECHO MEAS - PULM SYS VEL: 51.4 CM/SEC
BH CV ECHO MEAS - PVA(V,A): 2.5 CM^2
BH CV ECHO MEAS - PVA(V,D): 2.5 CM^2
BH CV ECHO MEAS - QP/QS: 0.62
BH CV ECHO MEAS - RV MAX PG: 2.1 MMHG
BH CV ECHO MEAS - RV MEAN PG: 1 MMHG
BH CV ECHO MEAS - RV V1 MAX: 73.2 CM/SEC
BH CV ECHO MEAS - RV V1 MEAN: 44.1 CM/SEC
BH CV ECHO MEAS - RV V1 VTI: 14.9 CM
BH CV ECHO MEAS - RVOT AREA: 4.5 CM^2
BH CV ECHO MEAS - RVOT DIAM: 2.4 CM
BH CV ECHO MEAS - SI(AO): 247.3 ML/M^2
BH CV ECHO MEAS - SI(CUBED): 60.4 ML/M^2
BH CV ECHO MEAS - SI(LVOT): 39.9 ML/M^2
BH CV ECHO MEAS - SI(MOD-SP2): 25.7 ML/M^2
BH CV ECHO MEAS - SI(MOD-SP4): 29.3 ML/M^2
BH CV ECHO MEAS - SI(TEICH): 40.7 ML/M^2
BH CV ECHO MEAS - SV(AO): 674.9 ML
BH CV ECHO MEAS - SV(CUBED): 164.8 ML
BH CV ECHO MEAS - SV(LVOT): 108.9 ML
BH CV ECHO MEAS - SV(MOD-SP2): 70 ML
BH CV ECHO MEAS - SV(MOD-SP4): 80 ML
BH CV ECHO MEAS - SV(RVOT): 67.4 ML
BH CV ECHO MEAS - SV(TEICH): 111.2 ML
BH CV ECHO MEAS - TAPSE (>1.6): 2.2 CM2
BH CV ECHO MEASUREMENTS AVERAGE E/E' RATIO: 12.42
BH CV VAS BP RIGHT ARM: NORMAL MMHG
BH CV XLRA - RV BASE: 4 CM
BH CV XLRA - TDI S': 15 CM/SEC
BUN BLD-MCNC: 35 MG/DL (ref 8–23)
BUN/CREAT SERPL: 27.3 (ref 7–25)
CALCIUM SPEC-SCNC: 8.6 MG/DL (ref 8.6–10.5)
CHLORIDE SERPL-SCNC: 98 MMOL/L (ref 98–107)
CO2 SERPL-SCNC: 27.6 MMOL/L (ref 22–29)
CREAT BLD-MCNC: 1.28 MG/DL (ref 0.76–1.27)
DEPRECATED RDW RBC AUTO: 42.7 FL (ref 37–54)
EOSINOPHIL # BLD AUTO: 0.01 10*3/MM3 (ref 0–0.7)
EOSINOPHIL NFR BLD AUTO: 0.1 % (ref 0.3–6.2)
ERYTHROCYTE [DISTWIDTH] IN BLOOD BY AUTOMATED COUNT: 13.3 % (ref 11.5–14.5)
GFR SERPL CREATININE-BSD FRML MDRD: 57 ML/MIN/1.73
GLUCOSE BLD-MCNC: 315 MG/DL (ref 65–99)
GLUCOSE BLDC GLUCOMTR-MCNC: 217 MG/DL (ref 70–130)
GLUCOSE BLDC GLUCOMTR-MCNC: 255 MG/DL (ref 70–130)
GLUCOSE BLDC GLUCOMTR-MCNC: 255 MG/DL (ref 70–130)
GLUCOSE BLDC GLUCOMTR-MCNC: 421 MG/DL (ref 70–130)
GLUCOSE BLDC GLUCOMTR-MCNC: 443 MG/DL (ref 70–130)
HCT VFR BLD AUTO: 42.2 % (ref 40.4–52.2)
HGB BLD-MCNC: 13.4 G/DL (ref 13.7–17.6)
IMM GRANULOCYTES # BLD: 0.13 10*3/MM3 (ref 0–0.03)
IMM GRANULOCYTES NFR BLD: 0.8 % (ref 0–0.5)
LEFT ATRIUM VOLUME INDEX: 34 ML/M2
LEFT ATRIUM VOLUME: 87 CM3
LYMPHOCYTES # BLD AUTO: 1.07 10*3/MM3 (ref 0.9–4.8)
LYMPHOCYTES NFR BLD AUTO: 6.9 % (ref 19.6–45.3)
MAXIMAL PREDICTED HEART RATE: 157 BPM
MCH RBC QN AUTO: 28.1 PG (ref 27–32.7)
MCHC RBC AUTO-ENTMCNC: 31.8 G/DL (ref 32.6–36.4)
MCV RBC AUTO: 88.5 FL (ref 79.8–96.2)
MONOCYTES # BLD AUTO: 0.65 10*3/MM3 (ref 0.2–1.2)
MONOCYTES NFR BLD AUTO: 4.2 % (ref 5–12)
NEUTROPHILS # BLD AUTO: 13.61 10*3/MM3 (ref 1.9–8.1)
NEUTROPHILS NFR BLD AUTO: 88 % (ref 42.7–76)
PLATELET # BLD AUTO: 238 10*3/MM3 (ref 140–500)
PMV BLD AUTO: 10.2 FL (ref 6–12)
POTASSIUM BLD-SCNC: 4.3 MMOL/L (ref 3.5–5.2)
RBC # BLD AUTO: 4.77 10*6/MM3 (ref 4.6–6)
SODIUM BLD-SCNC: 136 MMOL/L (ref 136–145)
STRESS TARGET HR: 133 BPM
WBC NRBC COR # BLD: 15.47 10*3/MM3 (ref 4.5–10.7)

## 2018-11-28 PROCEDURE — 97535 SELF CARE MNGMENT TRAINING: CPT

## 2018-11-28 PROCEDURE — 93306 TTE W/DOPPLER COMPLETE: CPT | Performed by: INTERNAL MEDICINE

## 2018-11-28 PROCEDURE — 93306 TTE W/DOPPLER COMPLETE: CPT

## 2018-11-28 PROCEDURE — 97165 OT EVAL LOW COMPLEX 30 MIN: CPT

## 2018-11-28 PROCEDURE — 85025 COMPLETE CBC W/AUTO DIFF WBC: CPT | Performed by: NEUROLOGICAL SURGERY

## 2018-11-28 PROCEDURE — 93005 ELECTROCARDIOGRAM TRACING: CPT | Performed by: INTERNAL MEDICINE

## 2018-11-28 PROCEDURE — 72040 X-RAY EXAM NECK SPINE 2-3 VW: CPT

## 2018-11-28 PROCEDURE — 25010000003 METHYLPREDNISOLONE PER 125 MG: Performed by: NEUROLOGICAL SURGERY

## 2018-11-28 PROCEDURE — 97162 PT EVAL MOD COMPLEX 30 MIN: CPT

## 2018-11-28 PROCEDURE — 25010000002 CEFAZOLIN PER 500 MG: Performed by: NEUROLOGICAL SURGERY

## 2018-11-28 PROCEDURE — 82962 GLUCOSE BLOOD TEST: CPT

## 2018-11-28 PROCEDURE — 99232 SBSQ HOSP IP/OBS MODERATE 35: CPT | Performed by: INTERNAL MEDICINE

## 2018-11-28 PROCEDURE — 63710000001 INSULIN LISPRO (HUMAN) PER 5 UNITS: Performed by: HOSPITALIST

## 2018-11-28 PROCEDURE — 63710000001 INSULIN GLARGINE PER 5 UNITS: Performed by: HOSPITALIST

## 2018-11-28 PROCEDURE — 99024 POSTOP FOLLOW-UP VISIT: CPT | Performed by: PHYSICIAN ASSISTANT

## 2018-11-28 PROCEDURE — 80048 BASIC METABOLIC PNL TOTAL CA: CPT | Performed by: NEUROLOGICAL SURGERY

## 2018-11-28 PROCEDURE — 97110 THERAPEUTIC EXERCISES: CPT

## 2018-11-28 PROCEDURE — 99232 SBSQ HOSP IP/OBS MODERATE 35: CPT | Performed by: NURSE PRACTITIONER

## 2018-11-28 PROCEDURE — 93010 ELECTROCARDIOGRAM REPORT: CPT | Performed by: INTERNAL MEDICINE

## 2018-11-28 RX ADMIN — PIOGLITAZONE 15 MG: 15 TABLET ORAL at 09:56

## 2018-11-28 RX ADMIN — CEFAZOLIN SODIUM 3 G: 10 INJECTION, POWDER, FOR SOLUTION INTRAVENOUS at 03:50

## 2018-11-28 RX ADMIN — CYCLOBENZAPRINE 10 MG: 10 TABLET, FILM COATED ORAL at 00:31

## 2018-11-28 RX ADMIN — INSULIN LISPRO 20 UNITS: 100 INJECTION, SOLUTION INTRAVENOUS; SUBCUTANEOUS at 12:08

## 2018-11-28 RX ADMIN — HYDROCODONE BITARTRATE AND ACETAMINOPHEN 2 TABLET: 7.5; 325 TABLET ORAL at 12:52

## 2018-11-28 RX ADMIN — SODIUM CHLORIDE, PRESERVATIVE FREE 3 ML: 5 INJECTION INTRAVENOUS at 09:51

## 2018-11-28 RX ADMIN — METOPROLOL TARTRATE 25 MG: 25 TABLET ORAL at 09:56

## 2018-11-28 RX ADMIN — HYDROCODONE BITARTRATE AND ACETAMINOPHEN 2 TABLET: 7.5; 325 TABLET ORAL at 22:14

## 2018-11-28 RX ADMIN — FAMOTIDINE 20 MG: 20 TABLET, FILM COATED ORAL at 09:54

## 2018-11-28 RX ADMIN — INSULIN LISPRO 24 UNITS: 100 INJECTION, SOLUTION INTRAVENOUS; SUBCUTANEOUS at 12:08

## 2018-11-28 RX ADMIN — GLIPIZIDE 10 MG: 10 TABLET ORAL at 09:55

## 2018-11-28 RX ADMIN — INSULIN LISPRO 20 UNITS: 100 INJECTION, SOLUTION INTRAVENOUS; SUBCUTANEOUS at 10:01

## 2018-11-28 RX ADMIN — INSULIN LISPRO 10 UNITS: 100 INJECTION, SOLUTION INTRAVENOUS; SUBCUTANEOUS at 17:18

## 2018-11-28 RX ADMIN — SODIUM CHLORIDE 500 MG: 900 INJECTION, SOLUTION INTRAVENOUS at 00:30

## 2018-11-28 RX ADMIN — HYDROCODONE BITARTRATE AND ACETAMINOPHEN 2 TABLET: 7.5; 325 TABLET ORAL at 01:02

## 2018-11-28 RX ADMIN — HYDROCODONE BITARTRATE AND ACETAMINOPHEN 2 TABLET: 7.5; 325 TABLET ORAL at 17:19

## 2018-11-28 RX ADMIN — METOPROLOL TARTRATE 25 MG: 25 TABLET ORAL at 22:14

## 2018-11-28 RX ADMIN — INSULIN LISPRO 4 UNITS: 100 INJECTION, SOLUTION INTRAVENOUS; SUBCUTANEOUS at 17:21

## 2018-11-28 RX ADMIN — INSULIN GLARGINE 40 UNITS: 100 INJECTION, SOLUTION SUBCUTANEOUS at 09:57

## 2018-11-28 RX ADMIN — CEFAZOLIN SODIUM 3 G: 10 INJECTION, POWDER, FOR SOLUTION INTRAVENOUS at 12:09

## 2018-11-28 RX ADMIN — CYCLOBENZAPRINE 10 MG: 10 TABLET, FILM COATED ORAL at 17:28

## 2018-11-28 RX ADMIN — ATOMOXETINE 100 MG: 60 CAPSULE ORAL at 09:54

## 2018-11-28 RX ADMIN — INSULIN LISPRO 12 UNITS: 100 INJECTION, SOLUTION INTRAVENOUS; SUBCUTANEOUS at 09:57

## 2018-11-28 RX ADMIN — SODIUM CHLORIDE, POTASSIUM CHLORIDE, SODIUM LACTATE AND CALCIUM CHLORIDE 125 ML/HR: 600; 310; 30; 20 INJECTION, SOLUTION INTRAVENOUS at 01:05

## 2018-11-28 RX ADMIN — SODIUM CHLORIDE 500 MG: 900 INJECTION, SOLUTION INTRAVENOUS at 13:21

## 2018-11-28 RX ADMIN — INSULIN LISPRO 6 UNITS: 100 INJECTION, SOLUTION INTRAVENOUS; SUBCUTANEOUS at 22:15

## 2018-11-28 RX ADMIN — DULOXETINE HYDROCHLORIDE 60 MG: 60 CAPSULE, DELAYED RELEASE ORAL at 09:56

## 2018-11-29 LAB
ANION GAP SERPL CALCULATED.3IONS-SCNC: 8 MMOL/L
BASOPHILS # BLD AUTO: 0 10*3/MM3 (ref 0–0.2)
BASOPHILS NFR BLD AUTO: 0 % (ref 0–1.5)
BUN BLD-MCNC: 30 MG/DL (ref 8–23)
BUN/CREAT SERPL: 39 (ref 7–25)
CALCIUM SPEC-SCNC: 8 MG/DL (ref 8.6–10.5)
CHLORIDE SERPL-SCNC: 102 MMOL/L (ref 98–107)
CO2 SERPL-SCNC: 26 MMOL/L (ref 22–29)
CREAT BLD-MCNC: 0.77 MG/DL (ref 0.76–1.27)
DEPRECATED RDW RBC AUTO: 40.5 FL (ref 37–54)
EOSINOPHIL # BLD AUTO: 0 10*3/MM3 (ref 0–0.7)
EOSINOPHIL NFR BLD AUTO: 0 % (ref 0.3–6.2)
ERYTHROCYTE [DISTWIDTH] IN BLOOD BY AUTOMATED COUNT: 13.4 % (ref 11.5–14.5)
GFR SERPL CREATININE-BSD FRML MDRD: 102 ML/MIN/1.73
GLUCOSE BLD-MCNC: 206 MG/DL (ref 65–99)
GLUCOSE BLDC GLUCOMTR-MCNC: 117 MG/DL (ref 70–130)
GLUCOSE BLDC GLUCOMTR-MCNC: 177 MG/DL (ref 70–130)
GLUCOSE BLDC GLUCOMTR-MCNC: 287 MG/DL (ref 70–130)
GLUCOSE BLDC GLUCOMTR-MCNC: 99 MG/DL (ref 70–130)
HCT VFR BLD AUTO: 35.3 % (ref 40.4–52.2)
HGB BLD-MCNC: 11.8 G/DL (ref 13.7–17.6)
IMM GRANULOCYTES # BLD: 0.17 10*3/MM3 (ref 0–0.03)
IMM GRANULOCYTES NFR BLD: 1.2 % (ref 0–0.5)
LYMPHOCYTES # BLD AUTO: 1.59 10*3/MM3 (ref 0.9–4.8)
LYMPHOCYTES NFR BLD AUTO: 10.9 % (ref 19.6–45.3)
MCH RBC QN AUTO: 28.2 PG (ref 27–32.7)
MCHC RBC AUTO-ENTMCNC: 33.4 G/DL (ref 32.6–36.4)
MCV RBC AUTO: 84.2 FL (ref 79.8–96.2)
MONOCYTES # BLD AUTO: 0.89 10*3/MM3 (ref 0.2–1.2)
MONOCYTES NFR BLD AUTO: 6.1 % (ref 5–12)
NEUTROPHILS # BLD AUTO: 12.05 10*3/MM3 (ref 1.9–8.1)
NEUTROPHILS NFR BLD AUTO: 83 % (ref 42.7–76)
PLATELET # BLD AUTO: 212 10*3/MM3 (ref 140–500)
PMV BLD AUTO: 10.4 FL (ref 6–12)
POTASSIUM BLD-SCNC: 4.2 MMOL/L (ref 3.5–5.2)
RBC # BLD AUTO: 4.19 10*6/MM3 (ref 4.6–6)
SODIUM BLD-SCNC: 136 MMOL/L (ref 136–145)
WBC NRBC COR # BLD: 14.53 10*3/MM3 (ref 4.5–10.7)

## 2018-11-29 PROCEDURE — 82962 GLUCOSE BLOOD TEST: CPT

## 2018-11-29 PROCEDURE — 80048 BASIC METABOLIC PNL TOTAL CA: CPT | Performed by: HOSPITALIST

## 2018-11-29 PROCEDURE — 99232 SBSQ HOSP IP/OBS MODERATE 35: CPT | Performed by: NURSE PRACTITIONER

## 2018-11-29 PROCEDURE — 63710000001 INSULIN GLARGINE PER 5 UNITS: Performed by: HOSPITALIST

## 2018-11-29 PROCEDURE — 85025 COMPLETE CBC W/AUTO DIFF WBC: CPT | Performed by: PHYSICIAN ASSISTANT

## 2018-11-29 PROCEDURE — 97110 THERAPEUTIC EXERCISES: CPT

## 2018-11-29 PROCEDURE — 97110 THERAPEUTIC EXERCISES: CPT | Performed by: PHYSICAL THERAPIST

## 2018-11-29 PROCEDURE — 63710000001 INSULIN LISPRO (HUMAN) PER 5 UNITS: Performed by: HOSPITALIST

## 2018-11-29 RX ORDER — INSULIN GLARGINE 100 [IU]/ML
30 INJECTION, SOLUTION SUBCUTANEOUS EVERY MORNING
Status: DISCONTINUED | OUTPATIENT
Start: 2018-11-30 | End: 2018-11-30

## 2018-11-29 RX ADMIN — CYCLOBENZAPRINE 10 MG: 10 TABLET, FILM COATED ORAL at 22:41

## 2018-11-29 RX ADMIN — CYCLOBENZAPRINE 10 MG: 10 TABLET, FILM COATED ORAL at 03:36

## 2018-11-29 RX ADMIN — METOPROLOL TARTRATE 25 MG: 25 TABLET ORAL at 22:40

## 2018-11-29 RX ADMIN — HYDROCODONE BITARTRATE AND ACETAMINOPHEN 2 TABLET: 7.5; 325 TABLET ORAL at 13:04

## 2018-11-29 RX ADMIN — HYDROCODONE BITARTRATE AND ACETAMINOPHEN 2 TABLET: 7.5; 325 TABLET ORAL at 03:36

## 2018-11-29 RX ADMIN — INSULIN LISPRO 6 UNITS: 100 INJECTION, SOLUTION INTRAVENOUS; SUBCUTANEOUS at 12:15

## 2018-11-29 RX ADMIN — METOPROLOL TARTRATE 25 MG: 25 TABLET ORAL at 08:46

## 2018-11-29 RX ADMIN — INSULIN LISPRO 10 UNITS: 100 INJECTION, SOLUTION INTRAVENOUS; SUBCUTANEOUS at 08:47

## 2018-11-29 RX ADMIN — METFORMIN HYDROCHLORIDE 1000 MG: 1000 TABLET ORAL at 18:35

## 2018-11-29 RX ADMIN — SODIUM CHLORIDE, POTASSIUM CHLORIDE, SODIUM LACTATE AND CALCIUM CHLORIDE 100 ML/HR: 600; 310; 30; 20 INJECTION, SOLUTION INTRAVENOUS at 04:05

## 2018-11-29 RX ADMIN — INSULIN LISPRO 2 UNITS: 100 INJECTION, SOLUTION INTRAVENOUS; SUBCUTANEOUS at 08:45

## 2018-11-29 RX ADMIN — HYDROCODONE BITARTRATE AND ACETAMINOPHEN 2 TABLET: 7.5; 325 TABLET ORAL at 08:46

## 2018-11-29 RX ADMIN — INSULIN LISPRO 10 UNITS: 100 INJECTION, SOLUTION INTRAVENOUS; SUBCUTANEOUS at 18:35

## 2018-11-29 RX ADMIN — HYDROCODONE BITARTRATE AND ACETAMINOPHEN 2 TABLET: 7.5; 325 TABLET ORAL at 18:35

## 2018-11-29 RX ADMIN — HYDROCODONE BITARTRATE AND ACETAMINOPHEN 2 TABLET: 7.5; 325 TABLET ORAL at 22:41

## 2018-11-29 RX ADMIN — INSULIN GLARGINE 40 UNITS: 100 INJECTION, SOLUTION SUBCUTANEOUS at 06:58

## 2018-11-29 RX ADMIN — CYCLOBENZAPRINE 10 MG: 10 TABLET, FILM COATED ORAL at 11:13

## 2018-11-29 RX ADMIN — ATOMOXETINE 100 MG: 60 CAPSULE ORAL at 08:46

## 2018-11-29 RX ADMIN — GLIPIZIDE 10 MG: 10 TABLET ORAL at 06:58

## 2018-11-29 RX ADMIN — FAMOTIDINE 20 MG: 20 TABLET, FILM COATED ORAL at 08:46

## 2018-11-29 RX ADMIN — PIOGLITAZONE 15 MG: 15 TABLET ORAL at 08:46

## 2018-11-29 RX ADMIN — INSULIN LISPRO 10 UNITS: 100 INJECTION, SOLUTION INTRAVENOUS; SUBCUTANEOUS at 12:16

## 2018-11-29 RX ADMIN — DULOXETINE HYDROCHLORIDE 60 MG: 60 CAPSULE, DELAYED RELEASE ORAL at 08:46

## 2018-11-30 ENCOUNTER — HOSPITAL ENCOUNTER (INPATIENT)
Facility: HOSPITAL | Age: 63
LOS: 19 days | Discharge: HOME | End: 2018-12-19
Attending: PHYSICAL MEDICINE & REHABILITATION | Admitting: PHYSICAL MEDICINE & REHABILITATION

## 2018-11-30 ENCOUNTER — RESULTS ENCOUNTER (OUTPATIENT)
Dept: FAMILY MEDICINE CLINIC | Facility: CLINIC | Age: 63
End: 2018-11-30

## 2018-11-30 VITALS
HEIGHT: 75 IN | HEART RATE: 85 BPM | BODY MASS INDEX: 39.17 KG/M2 | SYSTOLIC BLOOD PRESSURE: 147 MMHG | RESPIRATION RATE: 18 BRPM | OXYGEN SATURATION: 97 % | WEIGHT: 315 LBS | TEMPERATURE: 98.3 F | DIASTOLIC BLOOD PRESSURE: 86 MMHG

## 2018-11-30 DIAGNOSIS — R26.89 FUNCTIONAL GAIT ABNORMALITY: ICD-10-CM

## 2018-11-30 DIAGNOSIS — M48.02 CERVICAL STENOSIS OF SPINAL CANAL: Primary | ICD-10-CM

## 2018-11-30 DIAGNOSIS — Z74.09 IMPAIRED FUNCTIONAL MOBILITY, BALANCE, GAIT, AND ENDURANCE: ICD-10-CM

## 2018-11-30 DIAGNOSIS — M79.601 PARESTHESIA AND PAIN OF BOTH UPPER EXTREMITIES: ICD-10-CM

## 2018-11-30 DIAGNOSIS — R20.2 PARESTHESIA AND PAIN OF BOTH UPPER EXTREMITIES: ICD-10-CM

## 2018-11-30 DIAGNOSIS — E66.01 MORBID OBESITY WITH BMI OF 40.0-44.9, ADULT (HCC): ICD-10-CM

## 2018-11-30 DIAGNOSIS — M79.602 PARESTHESIA AND PAIN OF BOTH UPPER EXTREMITIES: ICD-10-CM

## 2018-11-30 DIAGNOSIS — R29.898 WEAKNESS OF BOTH LOWER EXTREMITIES: ICD-10-CM

## 2018-11-30 DIAGNOSIS — R73.09 ELEVATED HEMOGLOBIN A1C: ICD-10-CM

## 2018-11-30 LAB
ANION GAP SERPL CALCULATED.3IONS-SCNC: 8.5 MMOL/L
BUN BLD-MCNC: 26 MG/DL (ref 8–23)
BUN/CREAT SERPL: 32.5 (ref 7–25)
CALCIUM SPEC-SCNC: 8.2 MG/DL (ref 8.6–10.5)
CHLORIDE SERPL-SCNC: 104 MMOL/L (ref 98–107)
CO2 SERPL-SCNC: 26.5 MMOL/L (ref 22–29)
CREAT BLD-MCNC: 0.8 MG/DL (ref 0.76–1.27)
DEPRECATED RDW RBC AUTO: 43.3 FL (ref 37–54)
ERYTHROCYTE [DISTWIDTH] IN BLOOD BY AUTOMATED COUNT: 13.5 % (ref 11.5–14.5)
GFR SERPL CREATININE-BSD FRML MDRD: 98 ML/MIN/1.73
GLUCOSE BLD-MCNC: 71 MG/DL (ref 65–99)
GLUCOSE BLDC GLUCOMTR-MCNC: 104 MG/DL (ref 70–130)
GLUCOSE BLDC GLUCOMTR-MCNC: 126 MG/DL (ref 70–130)
GLUCOSE BLDC GLUCOMTR-MCNC: 63 MG/DL (ref 70–130)
GLUCOSE BLDC GLUCOMTR-MCNC: 77 MG/DL (ref 70–130)
GLUCOSE BLDC GLUCOMTR-MCNC: 90 MG/DL (ref 70–130)
HCT VFR BLD AUTO: 37.9 % (ref 40.4–52.2)
HGB BLD-MCNC: 12.2 G/DL (ref 13.7–17.6)
MCH RBC QN AUTO: 28.2 PG (ref 27–32.7)
MCHC RBC AUTO-ENTMCNC: 32.2 G/DL (ref 32.6–36.4)
MCV RBC AUTO: 87.5 FL (ref 79.8–96.2)
PLATELET # BLD AUTO: 219 10*3/MM3 (ref 140–500)
PMV BLD AUTO: 10.4 FL (ref 6–12)
POTASSIUM BLD-SCNC: 4 MMOL/L (ref 3.5–5.2)
RBC # BLD AUTO: 4.33 10*6/MM3 (ref 4.6–6)
SODIUM BLD-SCNC: 139 MMOL/L (ref 136–145)
WBC NRBC COR # BLD: 15.71 10*3/MM3 (ref 4.5–10.7)

## 2018-11-30 PROCEDURE — 85027 COMPLETE CBC AUTOMATED: CPT | Performed by: NURSE PRACTITIONER

## 2018-11-30 PROCEDURE — 99024 POSTOP FOLLOW-UP VISIT: CPT | Performed by: PHYSICIAN ASSISTANT

## 2018-11-30 PROCEDURE — 80048 BASIC METABOLIC PNL TOTAL CA: CPT | Performed by: HOSPITALIST

## 2018-11-30 PROCEDURE — 63710000001 INSULIN GLARGINE PER 5 UNITS: Performed by: HOSPITALIST

## 2018-11-30 PROCEDURE — 82962 GLUCOSE BLOOD TEST: CPT

## 2018-11-30 PROCEDURE — 99232 SBSQ HOSP IP/OBS MODERATE 35: CPT | Performed by: NURSE PRACTITIONER

## 2018-11-30 PROCEDURE — 97110 THERAPEUTIC EXERCISES: CPT

## 2018-11-30 RX ORDER — INSULIN GLARGINE 100 [IU]/ML
20 INJECTION, SOLUTION SUBCUTANEOUS EVERY MORNING
Status: DISCONTINUED | OUTPATIENT
Start: 2018-12-01 | End: 2018-11-30 | Stop reason: HOSPADM

## 2018-11-30 RX ORDER — DOCUSATE SODIUM 100 MG/1
100 CAPSULE, LIQUID FILLED ORAL 2 TIMES DAILY PRN
Status: CANCELLED | OUTPATIENT
Start: 2018-11-30

## 2018-11-30 RX ORDER — INSULIN GLARGINE 100 [IU]/ML
20 INJECTION, SOLUTION SUBCUTANEOUS EVERY MORNING
Status: DISCONTINUED | OUTPATIENT
Start: 2018-12-01 | End: 2018-12-01

## 2018-11-30 RX ORDER — GLIPIZIDE 10 MG/1
10 TABLET ORAL
Status: CANCELLED | OUTPATIENT
Start: 2018-12-01

## 2018-11-30 RX ORDER — ACETAMINOPHEN 325 MG/1
650 TABLET ORAL EVERY 4 HOURS PRN
Status: DISCONTINUED | OUTPATIENT
Start: 2018-11-30 | End: 2018-12-19 | Stop reason: HOSPADM

## 2018-11-30 RX ORDER — NICOTINE POLACRILEX 4 MG
15 LOZENGE BUCCAL
Status: CANCELLED | OUTPATIENT
Start: 2018-11-30

## 2018-11-30 RX ORDER — PIOGLITAZONEHYDROCHLORIDE 15 MG/1
15 TABLET ORAL DAILY
Status: CANCELLED | OUTPATIENT
Start: 2018-12-01

## 2018-11-30 RX ORDER — INSULIN GLARGINE 100 [IU]/ML
20 INJECTION, SOLUTION SUBCUTANEOUS EVERY MORNING
Status: CANCELLED | OUTPATIENT
Start: 2018-12-01

## 2018-11-30 RX ORDER — ONDANSETRON 4 MG/1
4 TABLET, FILM COATED ORAL EVERY 6 HOURS PRN
Status: DISCONTINUED | OUTPATIENT
Start: 2018-11-30 | End: 2018-12-19 | Stop reason: HOSPADM

## 2018-11-30 RX ORDER — NICOTINE POLACRILEX 4 MG
15 LOZENGE BUCCAL
Status: DISCONTINUED | OUTPATIENT
Start: 2018-11-30 | End: 2018-12-19 | Stop reason: HOSPADM

## 2018-11-30 RX ORDER — SENNA AND DOCUSATE SODIUM 50; 8.6 MG/1; MG/1
1 TABLET, FILM COATED ORAL NIGHTLY PRN
Status: DISCONTINUED | OUTPATIENT
Start: 2018-11-30 | End: 2018-12-05

## 2018-11-30 RX ORDER — FAMOTIDINE 20 MG/1
20 TABLET, FILM COATED ORAL DAILY
Status: DISCONTINUED | OUTPATIENT
Start: 2018-12-01 | End: 2018-12-19 | Stop reason: HOSPADM

## 2018-11-30 RX ORDER — DULOXETIN HYDROCHLORIDE 60 MG/1
60 CAPSULE, DELAYED RELEASE ORAL DAILY
Status: DISCONTINUED | OUTPATIENT
Start: 2018-12-01 | End: 2018-12-19 | Stop reason: HOSPADM

## 2018-11-30 RX ORDER — CYCLOBENZAPRINE HCL 10 MG
10 TABLET ORAL 3 TIMES DAILY PRN
Status: DISCONTINUED | OUTPATIENT
Start: 2018-11-30 | End: 2018-12-19 | Stop reason: HOSPADM

## 2018-11-30 RX ORDER — PIOGLITAZONEHYDROCHLORIDE 15 MG/1
15 TABLET ORAL DAILY
Status: DISCONTINUED | OUTPATIENT
Start: 2018-12-01 | End: 2018-12-13

## 2018-11-30 RX ORDER — ONDANSETRON 2 MG/ML
4 INJECTION INTRAMUSCULAR; INTRAVENOUS EVERY 6 HOURS PRN
Status: CANCELLED | OUTPATIENT
Start: 2018-11-30

## 2018-11-30 RX ORDER — HYDROCODONE BITARTRATE AND ACETAMINOPHEN 7.5; 325 MG/1; MG/1
2 TABLET ORAL EVERY 4 HOURS PRN
Status: DISCONTINUED | OUTPATIENT
Start: 2018-11-30 | End: 2018-12-13

## 2018-11-30 RX ORDER — DEXTROSE MONOHYDRATE 25 G/50ML
25 INJECTION, SOLUTION INTRAVENOUS
Status: CANCELLED | OUTPATIENT
Start: 2018-11-30

## 2018-11-30 RX ORDER — GLIPIZIDE 10 MG/1
10 TABLET ORAL
Status: DISCONTINUED | OUTPATIENT
Start: 2018-12-01 | End: 2018-12-19 | Stop reason: HOSPADM

## 2018-11-30 RX ORDER — DOCUSATE SODIUM 100 MG/1
100 CAPSULE, LIQUID FILLED ORAL 2 TIMES DAILY PRN
Status: DISCONTINUED | OUTPATIENT
Start: 2018-11-30 | End: 2018-12-19 | Stop reason: HOSPADM

## 2018-11-30 RX ORDER — ONDANSETRON 4 MG/1
4 TABLET, ORALLY DISINTEGRATING ORAL EVERY 6 HOURS PRN
Status: CANCELLED | OUTPATIENT
Start: 2018-11-30

## 2018-11-30 RX ORDER — HYDROCODONE BITARTRATE AND ACETAMINOPHEN 7.5; 325 MG/1; MG/1
2 TABLET ORAL EVERY 4 HOURS PRN
Status: CANCELLED | OUTPATIENT
Start: 2018-11-30 | End: 2018-12-07

## 2018-11-30 RX ORDER — DEXTROSE MONOHYDRATE 25 G/50ML
25 INJECTION, SOLUTION INTRAVENOUS
Status: DISCONTINUED | OUTPATIENT
Start: 2018-11-30 | End: 2018-12-19 | Stop reason: HOSPADM

## 2018-11-30 RX ORDER — ONDANSETRON 2 MG/ML
4 INJECTION INTRAMUSCULAR; INTRAVENOUS EVERY 6 HOURS PRN
Status: DISCONTINUED | OUTPATIENT
Start: 2018-11-30 | End: 2018-12-19 | Stop reason: HOSPADM

## 2018-11-30 RX ORDER — CYCLOBENZAPRINE HCL 10 MG
10 TABLET ORAL 3 TIMES DAILY PRN
Status: CANCELLED | OUTPATIENT
Start: 2018-11-30

## 2018-11-30 RX ORDER — ONDANSETRON 4 MG/1
4 TABLET, ORALLY DISINTEGRATING ORAL EVERY 6 HOURS PRN
Status: DISCONTINUED | OUTPATIENT
Start: 2018-11-30 | End: 2018-12-19 | Stop reason: HOSPADM

## 2018-11-30 RX ORDER — FAMOTIDINE 20 MG/1
20 TABLET, FILM COATED ORAL DAILY
Status: CANCELLED | OUTPATIENT
Start: 2018-12-01

## 2018-11-30 RX ORDER — DULOXETIN HYDROCHLORIDE 60 MG/1
60 CAPSULE, DELAYED RELEASE ORAL DAILY
Status: CANCELLED | OUTPATIENT
Start: 2018-12-01

## 2018-11-30 RX ORDER — UREA 10 %
3 LOTION (ML) TOPICAL NIGHTLY
Status: DISCONTINUED | OUTPATIENT
Start: 2018-11-30 | End: 2018-12-07

## 2018-11-30 RX ORDER — ACETAMINOPHEN 325 MG/1
650 TABLET ORAL EVERY 4 HOURS PRN
Status: CANCELLED | OUTPATIENT
Start: 2018-11-30

## 2018-11-30 RX ORDER — SENNA AND DOCUSATE SODIUM 50; 8.6 MG/1; MG/1
1 TABLET, FILM COATED ORAL NIGHTLY PRN
Status: CANCELLED | OUTPATIENT
Start: 2018-11-30

## 2018-11-30 RX ORDER — ONDANSETRON 4 MG/1
4 TABLET, FILM COATED ORAL EVERY 6 HOURS PRN
Status: CANCELLED | OUTPATIENT
Start: 2018-11-30

## 2018-11-30 RX ADMIN — CYCLOBENZAPRINE 10 MG: 10 TABLET, FILM COATED ORAL at 16:35

## 2018-11-30 RX ADMIN — ATOMOXETINE 100 MG: 60 CAPSULE ORAL at 08:40

## 2018-11-30 RX ADMIN — METFORMIN HYDROCHLORIDE 1000 MG: 1000 TABLET ORAL at 18:38

## 2018-11-30 RX ADMIN — HYDROCODONE BITARTRATE AND ACETAMINOPHEN 2 TABLET: 7.5; 325 TABLET ORAL at 16:35

## 2018-11-30 RX ADMIN — RIVAROXABAN 20 MG: 20 TABLET, FILM COATED ORAL at 18:38

## 2018-11-30 RX ADMIN — METOPROLOL TARTRATE 25 MG: 25 TABLET ORAL at 08:40

## 2018-11-30 RX ADMIN — PIOGLITAZONE 15 MG: 15 TABLET ORAL at 08:40

## 2018-11-30 RX ADMIN — GLIPIZIDE 10 MG: 10 TABLET ORAL at 08:40

## 2018-11-30 RX ADMIN — INSULIN GLARGINE 30 UNITS: 100 INJECTION, SOLUTION SUBCUTANEOUS at 09:42

## 2018-11-30 RX ADMIN — CYCLOBENZAPRINE 10 MG: 10 TABLET, FILM COATED ORAL at 08:43

## 2018-11-30 RX ADMIN — METFORMIN HYDROCHLORIDE 1000 MG: 1000 TABLET ORAL at 08:40

## 2018-11-30 RX ADMIN — HYDROCODONE BITARTRATE AND ACETAMINOPHEN 2 TABLET: 7.5; 325 TABLET ORAL at 11:30

## 2018-11-30 RX ADMIN — HYDROCODONE BITARTRATE AND ACETAMINOPHEN 2 TABLET: 7.5; 325 TABLET ORAL at 05:10

## 2018-11-30 RX ADMIN — METOPROLOL TARTRATE 25 MG: 25 TABLET ORAL at 21:11

## 2018-11-30 RX ADMIN — FAMOTIDINE 20 MG: 20 TABLET, FILM COATED ORAL at 08:40

## 2018-11-30 RX ADMIN — DULOXETINE HYDROCHLORIDE 60 MG: 60 CAPSULE, DELAYED RELEASE ORAL at 08:41

## 2018-11-30 RX ADMIN — HYDROCODONE BITARTRATE AND ACETAMINOPHEN 2 TABLET: 7.5; 325 TABLET ORAL at 22:36

## 2018-12-01 LAB
GLUCOSE BLDC GLUCOMTR-MCNC: 101 MG/DL (ref 70–130)
GLUCOSE BLDC GLUCOMTR-MCNC: 157 MG/DL (ref 70–130)
GLUCOSE BLDC GLUCOMTR-MCNC: 159 MG/DL (ref 70–130)
GLUCOSE BLDC GLUCOMTR-MCNC: 72 MG/DL (ref 70–130)

## 2018-12-01 PROCEDURE — 97535 SELF CARE MNGMENT TRAINING: CPT

## 2018-12-01 PROCEDURE — 97530 THERAPEUTIC ACTIVITIES: CPT

## 2018-12-01 PROCEDURE — 97166 OT EVAL MOD COMPLEX 45 MIN: CPT

## 2018-12-01 PROCEDURE — 97110 THERAPEUTIC EXERCISES: CPT | Performed by: PHYSICAL THERAPIST

## 2018-12-01 PROCEDURE — 63710000001 INSULIN LISPRO (HUMAN) PER 5 UNITS: Performed by: HOSPITALIST

## 2018-12-01 PROCEDURE — 97162 PT EVAL MOD COMPLEX 30 MIN: CPT | Performed by: PHYSICAL THERAPIST

## 2018-12-01 PROCEDURE — 82962 GLUCOSE BLOOD TEST: CPT

## 2018-12-01 PROCEDURE — 94640 AIRWAY INHALATION TREATMENT: CPT

## 2018-12-01 PROCEDURE — 63710000001 INSULIN GLARGINE PER 5 UNITS: Performed by: HOSPITALIST

## 2018-12-01 RX ORDER — ALBUTEROL SULFATE 2.5 MG/3ML
2.5 SOLUTION RESPIRATORY (INHALATION) EVERY 6 HOURS PRN
Status: DISCONTINUED | OUTPATIENT
Start: 2018-12-01 | End: 2018-12-19 | Stop reason: HOSPADM

## 2018-12-01 RX ADMIN — METOPROLOL TARTRATE 25 MG: 25 TABLET ORAL at 08:35

## 2018-12-01 RX ADMIN — CYCLOBENZAPRINE 10 MG: 10 TABLET, FILM COATED ORAL at 00:20

## 2018-12-01 RX ADMIN — METOPROLOL TARTRATE 25 MG: 25 TABLET ORAL at 20:41

## 2018-12-01 RX ADMIN — ALBUTEROL SULFATE 2.5 MG: 2.5 SOLUTION RESPIRATORY (INHALATION) at 18:59

## 2018-12-01 RX ADMIN — ATOMOXETINE 100 MG: 60 CAPSULE ORAL at 08:34

## 2018-12-01 RX ADMIN — CYCLOBENZAPRINE 10 MG: 10 TABLET, FILM COATED ORAL at 08:35

## 2018-12-01 RX ADMIN — INSULIN GLARGINE 20 UNITS: 100 INJECTION, SOLUTION SUBCUTANEOUS at 08:27

## 2018-12-01 RX ADMIN — INSULIN LISPRO 2 UNITS: 100 INJECTION, SOLUTION INTRAVENOUS; SUBCUTANEOUS at 11:39

## 2018-12-01 RX ADMIN — HYDROCODONE BITARTRATE AND ACETAMINOPHEN 2 TABLET: 7.5; 325 TABLET ORAL at 15:19

## 2018-12-01 RX ADMIN — GLIPIZIDE 10 MG: 10 TABLET ORAL at 08:35

## 2018-12-01 RX ADMIN — PIOGLITAZONE 15 MG: 15 TABLET ORAL at 08:35

## 2018-12-01 RX ADMIN — HYDROCODONE BITARTRATE AND ACETAMINOPHEN 2 TABLET: 7.5; 325 TABLET ORAL at 20:40

## 2018-12-01 RX ADMIN — FAMOTIDINE 20 MG: 20 TABLET, FILM COATED ORAL at 08:35

## 2018-12-01 RX ADMIN — METFORMIN HYDROCHLORIDE 1000 MG: 1000 TABLET ORAL at 16:59

## 2018-12-01 RX ADMIN — DULOXETINE HYDROCHLORIDE 60 MG: 60 CAPSULE, DELAYED RELEASE ORAL at 08:35

## 2018-12-01 RX ADMIN — CYCLOBENZAPRINE 10 MG: 10 TABLET, FILM COATED ORAL at 16:59

## 2018-12-01 RX ADMIN — HYDROCODONE BITARTRATE AND ACETAMINOPHEN 2 TABLET: 7.5; 325 TABLET ORAL at 10:28

## 2018-12-01 RX ADMIN — RIVAROXABAN 20 MG: 20 TABLET, FILM COATED ORAL at 16:59

## 2018-12-01 RX ADMIN — INSULIN LISPRO 2 UNITS: 100 INJECTION, SOLUTION INTRAVENOUS; SUBCUTANEOUS at 21:09

## 2018-12-01 RX ADMIN — HYDROCODONE BITARTRATE AND ACETAMINOPHEN 2 TABLET: 7.5; 325 TABLET ORAL at 06:29

## 2018-12-01 RX ADMIN — METFORMIN HYDROCHLORIDE 1000 MG: 1000 TABLET ORAL at 08:35

## 2018-12-01 NOTE — PROGRESS NOTES
SECTION GG    Mobility Performance:     Roll Left and Right: [place] provides verbal cues and/or touching/steadying  and/or contact guard assistance as patient completes activity. Assistance may be  provided throughout the activity or intermittently.   Sit to Lying: [place] does all of the effort. Patient does none of the effort to  complete the activity. Or, the assistance of 2 or more helpers is required for  the patient to complete the activity.   Lying to Sitting on Side of Bed: [place] does all of the effort. Patient does  none of the effort to complete the activity. Or, the assistance of 2 or more  helpers is required for the patient to complete the activity.   Sit to Stand: [place] does all of the effort. Patient does none of the effort to  complete the activity. Or, the assistance of 2 or more helpers is required for  the patient to complete the activity.   Chair/Bed to Chair Transfer: [place] does all of the effort. Patient does none  of the effort to complete the activity. Or, the assistance of 2 or more helpers  is required for the patient to complete the activity.   Car Transfer: Not attempted due to medical or safety concerns.   Walk 10 Feet:   [place] does all of the effort. Patient does none of the effort  to complete the activity. Or, the assistance of 2 or more helpers is required  for the patient to complete the activity.  Walk 50 Feet with 2 Turns:   [place] does all of the effort. Patient does none of  the effort to complete the activity. Or, the assistance of 2 or more helpers is  required for the patient to complete the activity.  Walk 150 Feet:   Not attempted due to medical or safety concerns.  Walking 10 Feet on Uneven Surfaces:   Not attempted due to medical or safety  concerns.  1 Step Over Curb or Up/Down Stair:   Not attempted due to medical or safety  concerns.  Picking up an Object:   Not attempted due to medical or safety concerns.  Uses Wheelchair/Scooter: No    Mobility Discharge Goals:    Sit to Stand: [place] provides verbal cues or touching/steadying assistance as  patient completes activity.    Signed by: Zaria Parker PT

## 2018-12-01 NOTE — PROGRESS NOTES
Inpatient Rehabilitation Functional Measures Assessment    Functional Measures  ROCCO Eating:  Branch  [place] Grooming: Patient requires no physical assistance for washing, rinsing and  drying the face. Patient requires no physical assistance for washing, rinsing  and drying the hands. Patient requires total assistance for brushing teeth.  Patient requires total assistance for brushing/combing hair. Shaving or applying  makeup was not observed for this patient. Patient performs 50 % of grooming  tasks. Grooming Score = 3, Moderate Assistance. No assistive devices were  required.  [place] Bathing:  Patient took sponge bath. Patient requires total assistance for  washing, rinsing, or drying the right arm. Patient requires total assistance for  washing, rinsing, or drying the left arm. Patient requires no physical  assistance for washing, rinsing, and drying the chest. Patient requires no  physical assistance for washing, rinsing, and drying the abdomen. Patient  requires total assistance for washing, rinsing, or drying the perineal area.  Patient requires total assistance for washing, rinsing, or drying the buttocks.  Patient requires no physical assistance for washing, rinsing, and drying the  right upper leg. Patient requires no physical assistance for washing, rinsing,  and drying the left upper leg. Patient requires total assistance for washing,  rinsing, or drying the right lower leg, including the foot. Patient requires  total assistance for washing, rinsing, or drying the left lower leg, including  the foot. Patient performs 40 % of bathing tasks. Bathing Score = 2, Maximal  Assistance. No assistive devices were required.  [place]CCO Upper Body Dressing:  Patient requires no physical assistance for gathering  clothes. Wearing a bra or undershirt was not observed for this patient. Patient  requires no physical assistance for threading the right arm through the garment  (shirt/sweater). Patient requires no physical assistance  for threading the left  arm through the garment (shirt/sweater). Patient requires total assistance for  holding clothing and/or pulling an over-head-garment over head or pulling  front-fastening-garment around back. Patient requires total assistance for  holding clothing and/or pulling an over-head-garment down the trunk or  adjusting/fastening together a front-fastening-garment. Patient performs 60 % of  upper body dressing tasks. Upper Body Dressing Score = 3, Moderate Assistance.  No assistive devices were required.  ROCCO Lower Body Dressing:  Patient requires no physical assistance for gathering  clothes. Wearing underwear or an undergarment was not observed for this patient.  Patient requires total assistance for holding clothing and/or threading the  right leg through the pants/skirt. Patient requires total assistance for holding  clothing and/or threading the left leg through the pants/skirt. Patient requires  total assistance for holding clothing and/or pulling pants/skirt over hips and  adjusting fasteners. Patient requires total assistance for holding clothing  and/or donning and/or doffing right sock. Patient requires total assistance for  holding clothing and/or donning and/or doffing left sock. Donning and/or doffing  right shoe was not observed for this patient. Donning and/or doffing left shoe  was not observed for this patient. Patient performs 16.67 -  24% of lower body  dressing tasks. Lower Body Dressing  Score = 1, Total Assistance. No assistive  devices were required.  ROCCO Toileting:  Patient requires total assistance for adjusting clothing before  using a toilet, commode, bedpan, or urinal. Patient requires total assistance  for hygiene. Patient requires total assistance for adjusting clothing after  using a toilet, commode, bedpan, or urinal. Patient performs 0 -  24% of  toileting tasks.  Toileting Score = 1, Total Assistance. Patient requires the  following assistive device(s): Grab  bar.    ROCCO Bladder Management  Level of Assistance:  Branch  Frequency/Number of Accidents this Shift:  Branch    ROCCO Bowel Management  Level of Assistance: Branch  Frequency/Number of Accidents this Shift: Branch    ROCCO Bed/Chair/Wheelchair Transfer:  Bed/chair/wheelchair Transfer Score = 3.  Patient performs 50-74% of effort and requires moderate assistance (some  lifting) for transferring to and from the bed/chair/wheelchair. Patient requires  the following assistive device(s): Walker.  ROCCO Toilet Transfer:  Toilet Transfer Score = 3.  Patient performs 50-74% of  effort and requires moderate assistance (some lifting) for transferring to and  from the toilet/commode. Patient requires the following assistive device(s):  Grab bars.  ROCCO Tub/Shower Transfer:  Branch    Previously Documented Mode of Locomotion at Discharge: Field  [facility] Expected Mode of Locomotion at Discharge: Branch  ROCCO Walk/Wheelchair:  Branch  ROCCO Stairs:  Branch    ROCCO Comprehension:  Branch  ROCCO Expression:  Branch  [facility] Social Interaction:  Branch  [facility] Problem Solving:  Branch  [facility] Memory:  Branch    Therapy Mode Minutes  Occupational Therapy: Branch  Physical Therapy: Branch  Speech Language Pathology:  Branch    Signed by: Precious De Leon OT

## 2018-12-01 NOTE — THERAPY TREATMENT NOTE
Inpatient Rehabilitation - Occupational Therapy Treatment Note    [facility]     Patient Name: Duane Mark Witten  : 1955  MRN: [id number]    Today's Date: 2018                 Admit Date: 2018      Visit Dx:    ICD-10-CM ICD-9-CM   1. Cervical stenosis of spinal canal M48.02 723.0   2. Weakness of both lower extremities R29.898 729.89   3. Paresthesia of both upper extremities R20.2 782.0    M79.601 729.5    M79.602    4. Morbid obesity with BMI of 40.0-44.9, adult (CMS/[facility]) E66.01 278.01    Z68.41 V85.41   5. Impaired functional mobility, balance, gait, and endurance Z74.09 V49.89   6. Functional gait abnormality R26.89 781.2       Patient Active Problem List   Diagnosis   • YANELI (generalized anxiety disorder)   • ADD (attention deficit disorder)   • Depression   • Diabetes type 2, controlled (CMS/[facility])   • ED (erectile dysfunction) of non-organic origin   • HLD (hyperlipidemia)   • Essential hypertension   • Arthritis   • Psoriasis   • Testosterone deficiency   • Primary insomnia   • Weakness of both lower extremities   • Paresthesia of both upper extremities   • Morbid obesity with BMI of 40.0-44.9, adult (CMS/[facility])   • Adverse effect of corticosteroids   • Type 2 diabetes mellitus with hyperglycemia (CMS/[facility])   • Cervical stenosis of spinal canal   • Edema of cervical spinal cord (CMS/[facility])   • Postoperative atrial fibrillation (CMS/[facility])         Therapy Treatment    IRF Treatment Summary     Row Name 18 0889             Evaluation/Treatment Time and Intent    Subjective Information  no complaints;complains of;weakness;fatigue  -RP      Existing Precautions/Restrictions  fall;spinal  -RP      Document Type  therapy note (daily note)  -RP      Mode of Treatment  occupational therapy  -RP      Patient/Family Observations  pt supine in bed resting; still in pain but agreeable to therapy  -RP      Recorded by [RP] Precious De Leon OT      Row Name 18 1629              Cognition/Psychosocial- PT/OT    Orientation Status (Cognition)  oriented x 4  -RP      Follows Commands (Cognition)  WFL  -RP      Personal Safety Interventions  fall prevention program maintained;gait belt;nonskid shoes/slippers when out of bed;muscle strengthening facilitated  -RP      Recorded by [RP] Precious De Leon, OT      Row Name 12/01/18 7980             Bed Mobility Assessment/Treatment    Supine-Sit [place] (Bed Mobility)  moderate assist (50% patient effort);maximum assist (25% patient effort);verbal cues  -RP      Sit-Supine [place] (Bed Mobility)  minimum assist (75% patient effort);verbal cues  -RP      Bed Mobility, Safety Issues  decreased use of legs for bridging/pushing;decreased use of arms for pushing/pulling  -RP      Assistive Device (Bed Mobility)  bed rails  -RP      Recorded by [RP] Precious De Leon, OT      Row Name 12/01/18 1330             Transfer Assessment/Treatment    Transfer Assessment/Treatment  bed-chair transfer;chair-bed transfer;toilet transfer  -RP      Recorded by [RP] Precious De Leon, OT      Row Name 12/01/18 0320             Bed-Chair Transfer    Bed-Chair [place] (Transfers)  moderate assist (50% patient effort);verbal cues  -RP      Assistive Device (Bed-Chair Transfers)  wheelchair  -RP      Recorded by [RP] Precious De Leon OT      Row Name 12/01/18 1330             Chair-Bed Transfer    Chair-Bed [place] (Transfers)  moderate assist (50% patient effort);verbal cues  -RP      Assistive Device (Chair-Bed Transfers)  wheelchair  -RP      Recorded by [RP] Precious De Leon OT      Row Name 12/01/18 1330             Toilet Transfer    Type (Toilet Transfer)  stand pivot/stand step  -RP      [place] Level (Toilet Transfer)  moderate assist (50% patient effort);verbal cues  -RP      Assistive Device (Toilet Transfer)  grab bars/safety frame;wheelchair  -RP      Recorded by [RP] Precious De Leon, OT      Row Name 12/01/18 9010             Basic  Activities of Daily Living (BADLs)    Basic Activities of Daily Living  toileting  -RP      Recorded by [RP] Precious De Leon, OT      Row Name 12/01/18 6523             Toileting Assessment/Treatment    Toileting [place] Level  toileting skills;adjust/manage clothing;perform perineal hygiene;maximum assist (25% patient effort);dependent (less than 25% patient effort);verbal cues  -RP      Assistive Device Use (Toileting)  grab bar/safety frame  -RP      Toileting Position  supported sitting;supported standing  -RP      Comment (Toileting)  pt able to assist w/ clothing mgnt before toileting, but required A for completeness  -RP      Recorded by [RP] Precious De Leon, OT      Row Name 12/01/18 1376             Motor Assessment/Intervention    Additional Documentation  Fine Motor Testing & Training (Group);Hand  Strength Testing (Group)  -RP      Recorded by [RP] Precious De Leon, OT      Row Name 12/01/18 6118             Hand  Strength Testing    Right Hand, Setting 1 (Dynamometer Testing)  31  -RP      Left Hand, Setting 1 (Dynamometer Testing)  34  -RP      Right Hand: Lateral (Key) Pinch Strength (Pinch Dynamometer Testing)  8.5  -RP      Left Hand: Lateral (Key) Pinch Strength (Pinch Dynamometer Testing)  10.9  -RP      Recorded by [RP] Precious De Leon, OT      Row Name 12/01/18 7958             Fine Motor Testing & Training    Fine Motor Tests  9 Hole Peg Test of Fine Motor Coordination Results;Box N Block Test Results  -RP      Results, 9 Hole Peg Test of Fine Motor Coordination-Right  3 min 35 seconds  -RP      Results, 9 Hole Peg Test of Fine Motor Coordination-Left  10min 30 sec  -RP      Results, Box N Block Test-Right  27  -RP      Results, Box N Block Test-Left  29  -RP      Recorded by [RP] Precious De Leon, OT      Row Name 12/01/18 6511             Pain Scale: Numbers Pre/Post-Treatment    Pain Scale: Numbers, Pretreatment  9/10  -RP      Pain Scale: Numbers, Post-Treatment  9/10  -RP       Pain Location  neck  -RP      Pain Intervention(s)  Medication (See MAR);Repositioned;Ambulation/increased activity  -RP      Recorded by [RP] Precious De Leon OT      Row Name 12/01/18 1330             Positioning and Restraints    Pre-Treatment Position  in bed  -RP      Post Treatment Position  bed  -RP      In Bed  supine;call light within reach;encouraged to call for assist  -RP      Recorded by [RP] Precious De Leon OT        User Key  (r) = Recorded By, (t) = Taken By, (c) = Cosigned By    Initials Name Effective Dates    RP Precious De Leon OT 05/03/18 -           Wound 11/27/18 1318 Other (See comments) neck incision (Active)   Dressing Appearance dry;intact 12/1/2018  8:27 AM   Closure Liquid skin adhesive;Adhesive closure strips 12/1/2018  8:27 AM   Base purple 12/1/2018  8:27 AM   Drainage Characteristics/Odor serosanguineous 12/1/2018  8:27 AM   Drainage Amount scant 11/30/2018  9:11 PM   Care, Wound cleansed with;sterile normal saline 11/30/2018  9:11 PM   Dressing Care, Wound dressing removed;dressing applied;dressing changed;transparent film;other (see comments) 11/30/2018  9:11 PM         OT Recommendation and Plan    Planned Therapy Interventions (OT Eval): activity tolerance training, adaptive equipment training, BADL retraining, functional balance retraining, neuromuscular control/coordination retraining, passive ROM/stretching, patient/caregiver education/training, ROM/therapeutic exercise, strengthening exercise, transfer/mobility retraining, occupation/activity based interventions            OT IRF GOALS     Row Name 12/01/18 1000             Transfer Goal 1 (OT-IRF)    Activity/Assistive Device (Transfer Goal 1, OT-IRF)  toilet  -RP      [place] Level (Transfer Goal 1, OT-IRF)  minimum assist (75% or more patient effort)  -RP      Time Frame (Transfer Goal 1, OT-IRF)  short term goal (STG)  -RP      Progress/Outcomes (Transfer Goal 1, OT-IRF)  goal ongoing  -RP         Transfer Goal 2  (OT-IRF)    Activity/Assistive Device (Transfer Goal 2, OT-IRF)  toilet  -RP      [place] Level (Transfer Goal 2, OT-IRF)  supervision required  -RP      Time Frame (Transfer Goal 2, OT-IRF)  long term goal (LTG)  -RP      Progress/Outcomes (Transfer Goal 2, OT-IRF)  goal ongoing  -RP         Bathing Goal 1 (OT-IRF)    Activity/Device (Bathing Goal 1, OT-IRF)  bathing skills, all  -RP      [place] Level (Bathing Goal 1, OT-IRF)  moderate assist (50-74% patient effort)  -RP      Time Frame (Bathing Goal 1, OT-IRF)  short term goal (STG)  -RP      Progress/Outcomes (Bathing Goal 1, OT-IRF)  goal ongoing  -RP         Bathing Goal 2 (OT-IRF)    Activity/Device (Bathing Goal 2, OT-IRF)  bathing skills, all  -RP      [place] Level (Bathing Goal 2, OT-IRF)  supervision required;standby assist  -RP      Time Frame (Bathing Goal 2, OT-IRF)  long term goal (LTG)  -RP      Progress/Outcomes (Bathing Goal 2, OT-IRF)  goal ongoing  -RP         UB Dressing Goal 1 (OT-IRF)    Activity/Device (UB Dressing Goal 1, OT-IRF)  upper body dressing  -RP      [place] (UB Dress Goal 1, OT-IRF)  minimum assist (75% or more patient effort)  -RP      Time Frame (UB Dressing Goal 1, OT-IRF)  short term goal (STG)  -RP      Progress/Outcomes (UB Dressing Goal 1, OT-IRF)  goal ongoing  -RP         UB Dressing Goal 2 (OT-IRF)    Activity/Device (UB Dressing Goal 2, OT-IRF)  upper body dressing  -RP      [place] (UB Dress Goal 2, OT-IRF)  supervision required  -RP      Time Frame (UB Dressing Goal 2, OT-IRF)  long term goal (LTG)  -RP      Progress/Outcomes (UB Dressing Goal 2, OT-IRF)  goal ongoing  -RP         LB Dressing Goal 1 (OT-IRF)    Activity/Device (LB Dressing Goal 1, OT-IRF)  lower body dressing  -RP      [place] (LB Dressing Goal 1, OT-IRF)  maximum assist (25-49% patient effort)  -RP      Time Frame (LB Dressing Goal 1, OT-IRF)  short term goal (STG)  -RP      Progress/Outcomes (LB Dressing Goal 1,  OT-IRF)  goal ongoing  -RP         LB Dressing Goal 2 (OT-IRF)    Activity/Device (LB Dressing Goal 2, OT-IRF)  lower body dressing  -RP      [place] (LB Dressing Goal 2, OT-IRF)  supervision required;standby assist  -RP      Time Frame (LB Dressing Goal 2, OT-IRF)  long term goal (LTG)  -RP      Progress/Outcomes (LB Dressing Goal 2, OT-IRF)  goal ongoing  -RP         Grooming Goal 1 (OT-IRF)    Activity/Device (Grooming Goal 1, OT-IRF)  grooming skills, all  -RP      [place] (Grooming Goal 1, OT-IRF)  minimum assist (75% or more patient effort)  -RP      Time Frame (Grooming Goal 1, OT-IRF)  short term goal (STG)  -RP      Progress/Outcomes (Grooming Goal 1, OT-IRF)  goal ongoing  -RP         Grooming Goal 2 (OT-IRF)    Activity/Device (Grooming Goal 2, OT-IRF)  grooming skills, all  -RP      [place] (Grooming Goal 2, OT-IRF)  supervision required  -RP      Time Frame (Grooming Goal 2, OT-IRF)  long term goal (LTG)  -RP      Progress/Outcomes (Grooming Goal 2, OT-IRF)  goal ongoing  -RP         Toileting Goal 1 (OT-IRF)    Activity/Device (Toileting Goal 1, OT-IRF)  toileting skills, all  -RP      [place] Level (Toileting Goal 1, OT-IRF)  maximum assist (25-49% patient effort)  -RP      Time Frame (Toileting Goal 1, OT-IRF)  short term goal (STG)  -RP      Progress/Outcomes (Toileting Goal 1, OT-IRF)  goal ongoing  -RP         Toileting Goal 2 (OT-IRF)    Activity/Device (Toileting Goal 2, OT-IRF)  toileting skills, all  -RP      [place] Level (Toileting Goal 2, OT-IRF)  supervision required;standby assist  -RP      Time Frame (Toileting Goal 2, OT-IRF)  long term goal (LTG)  -RP      Progress/Outcomes (Toileting Goal 2, OT-IRF)  goal ongoing  -RP         ROM Goal 1 (OT-IRF)    ROM Goal 1 (OT-IRF)  Pt will increase B UE AROM to approx. 3/4 shoulder flexion to assist w/ ADLs.  -RP      Time Frame (ROM Goal 1, OT-IRF)  long term goal (LTG)  -RP      Progress/Outcomes (ROM Goal 1, OT-IRF)   goal ongoing  -RP        User Key  (r) = Recorded By, (t) = Taken By, (c) = Cosigned By    Initials Name Provider Type    RP Precious De Leon, OT Occupational Therapist          Outcome Measures     Row Name 11/30/18 1200 11/29/18 1557 11/29/18 1521       How much difficulty does the patient currently have...    Turning from your back to your side while in flat bed without using bedrails?  3  -  3  -KH  --    Standing up from a chair using your arms (e.g., wheelchair, bedside chair)?  2  -  3  -KH  --    Moving from lying on back to sitting on the side of a flat bed without bedrails?  2  -  3  -KH  --       How much help from another person do you currently need...    Moving to and from a bed to a chair (including a wheelchair)?  3  -  3  -KH  --    Climbing 3-5 steps with a railing?  1  -  2  -KH  --    To walk in hospital room?  3  -  3  -KH  --    AM-[facility] 6 Clicks Score  14  -  17  -KH  --       How much help from another is currently needed...    Putting on and taking off regular lower body clothing?  --  --  1  -SG    Bathing (including washing, rinsing, and drying)  --  --  2  -SG    Toileting (which includes using toilet bed pan or urinal)  --  --  1  -SG    Putting on and taking off regular upper body clothing  --  --  2  -SG    Taking care of personal grooming (such as brushing teeth)  --  --  2  -SG    Eating meals  --  --  2  -SG    Score  --  --  10  -SG       Functional Assessment    Outcome Measure Options  --  AM-PAC 6 Clicks Basic Mobility (PT)  -  --      User Key  (r) = Recorded By, (t) = Taken By, (c) = Cosigned By    Initials Name Provider Type    Yenny Jordan, PT Physical Therapist    Rosibel Haddad, OTR Occupational Therapist    Leticia Lugo, PTA Physical Therapy Assistant             Time Calculation:     Time Calculation- OT     Row Name 12/01/18 1418             Time Calculation- OT    OT Start Time  1330  -RP      OT Stop Time  1400  -RP      OT Time  Calculation (min)  30 min  -RP      OT Received On  12/01/18  -RP        User Key  (r) = Recorded By, (t) = Taken By, (c) = Cosigned By    Initials Name Provider Type    RP Precious De Leon OT Occupational Therapist          Therapy Suggested Charges     Code   Minutes Charges    None              Therapy Charges for Today     Code Description Service Date Service Provider Modifiers Qty    [id number]  OT THERAPEUTIC ACT EA 15 MIN 12/1/2018 Precious De Leon OT GO 1    [id number]  OT SELF CARE/MGMT/TRAIN EA 15 MIN 12/1/2018 Precious De Leon OT GO 1                   Precious De Leon OT  12/1/2018

## 2018-12-01 NOTE — PROGRESS NOTES
Occupational Therapy: Individual: 90 minutes.    Physical Therapy: Branch    Speech Language Pathology:  Branch    Signed by: Precious De Leon OT

## 2018-12-01 NOTE — PROGRESS NOTES
Inpatient Rehabilitation Functional Measures Assessment    Functional Measures  ROCCO Eating:  [facility] Grooming: [facility] Bathing:  [facility] Upper Body Dressing:  [facility] Lower Body Dressing:  [facility] Toileting:  [facility] Bladder Management  Level of Assistance:  [place]  Frequency/Number of Accidents this Shift:  [facility] Bowel Management  Level of Assistance: [place]  Frequency/Number of Accidents this Shift: [facility] Bed/Chair/Wheelchair Transfer:  [facility] Toilet Transfer:  [facility] Tub/Shower Transfer:  [place]    Previously Documented Mode of Locomotion at Discharge: Field  ROCCO Expected Mode of Locomotion at Discharge: [facility] Walk/Wheelchair:  [facility] Stairs:  [facility] Comprehension:  Auditory comprehension is the usual mode. Comprehension  Score = 7, Independent.  Patient comprehends complex/abstract information in  their primary language.  Patient is completely independent for auditory  comprehension.  There are no activity limitations.  ROCCO Expression:  Vocal expression is the usual mode. Expression Score = 7,  Independent.  Patient expresses complex/abstract information in their primary  language.  Patient is completely independent for vocal expression.  There are no  activity limitations.  ROCCO Social Interaction:  Social Interaction Score = 7, Independent. Patient is  completely independent for social interaction.  There are no activity  limitations.  [place]CC[place] Problem Solving:  Activity was not observed.  ROCCO Memory:  Memory Score = 6, Modified [place].  Patient is modified  independent for memory, requiring: Requires additional time.    Therapy Mode Minutes  Occupational Therapy: [place]  Physical Therapy: [place]  Speech Language Pathology:  [place]    Signed by: Shonda Collins RN

## 2018-12-01 NOTE — THERAPY EVALUATION
Inpatient Rehabilitation - Physical Therapy Initial Evaluation       [facility]     Patient Name: Duane Mark Witten  : 1955  MRN: [id number]    Today's Date: 2018                    Admit Date: 2018      Visit Dx:     ICD-10-CM ICD-9-CM   1. Cervical stenosis of spinal canal M48.02 723.0   2. Weakness of both lower extremities R29.898 729.89   3. Paresthesia of both upper extremities R20.2 782.0    M79.601 729.5    M79.602    4. Morbid obesity with BMI of 40.0-44.9, adult (CMS/[facility]) E66.01 278.01    Z68.41 V85.41   5. Impaired functional mobility, balance, gait, and endurance Z74.09 V49.89   6. Functional gait abnormality R26.89 781.2       Patient Active Problem List   Diagnosis   • YANELI (generalized anxiety disorder)   • ADD (attention deficit disorder)   • Depression   • Diabetes type 2, controlled (CMS/[facility])   • ED (erectile dysfunction) of non-organic origin   • HLD (hyperlipidemia)   • Essential hypertension   • Arthritis   • Psoriasis   • Testosterone deficiency   • Primary insomnia   • Weakness of both lower extremities   • Paresthesia of both upper extremities   • Morbid obesity with BMI of 40.0-44.9, adult (CMS/[facility])   • Adverse effect of corticosteroids   • Type 2 diabetes mellitus with hyperglycemia (CMS/[facility])   • Cervical stenosis of spinal canal   • Edema of cervical spinal cord (CMS/[facility])   • Postoperative atrial fibrillation (CMS/[facility])       Past Medical History:   Diagnosis Date   • ADD (attention deficit disorder)    • Depression    • ED (erectile dysfunction)    • Essential hypertension    • YANELI (generalized anxiety disorder)    • HLD (hyperlipidemia)    • Osteoarthrosis    • PONV (postoperative nausea and vomiting)    • Psoriasis    • Testosterone deficiency    • Type 2 diabetes mellitus (CMS/HCC)        Past Surgical History:   Procedure Laterality Date   • BIOPSY / EXCISION / DISSECTION AXILLARY NODE  1994    Lymph Node testing for cancer    • COLONOSCOPY      10+ years; normal    • PARATHYROIDECTOMY  2008          PT ASSESSMENT (last 72 hours)      IRF Physical Therapy Evaluation     Row Name 12/01/18 0950          Evaluation/Treatment Time and Intent    Subjective Information  complains of pulling in upper back and shoulders  -JK     Existing Precautions/Restrictions  fall;spinal  -     Document Type  evaluation  -     Mode of Treatment  physical therapy  -K     Patient/Family Observations  pt supine in bed  -JK     Row Name 12/01/18 0950          Relationship/Environment    Lives With  spouse  -K     Family Caregiver if Needed  spouse  -K     Row Name 12/01/18 0950          Resource/Environmental Concerns    Current Living Arrangements  home/apartment/condo  -K     Resource/Environmental Concerns  none  -     Transportation Concerns  car, none  -     Row Name 12/01/18 0950          Living Environment    Living Arrangements  house  -JK     Home Accessibility  stairs to enter home;stairs within home  -K     Living Environment Comment  2 step entry w/o rails; 5 steps to top floor with one rail  -     Additional Documentation  -- owns no DME, wife at home to help after DC to home  -     Row Name 12/01/18 0950          Cognition/Psychosocial- PT/OT    Affect/Mental Status (Cognitive)  WFL  -JK     Orientation Status (Cognition)  oriented x 4  -JK     Follows Commands (Cognition)  WFL  -     Personal Safety Interventions  fall prevention program maintained;gait belt  -     Row Name 12/01/18 0950          Bed Mobility Assessment/Treatment    Rolling Right [place] (Bed Mobility)  conditional independence  -JK     Supine-Sit [place] (Bed Mobility)  maximum assist (25% patient effort);2 person assist  -K     Sit-Supine [place] (Bed Mobility)  maximum assist (25% patient effort);2 person assist  -K     Bed Mobility, Safety Issues  decreased use of arms for pushing/pulling;impaired trunk control for bed mobility  -JK     Assistive Device (Bed Mobility)  bed  rails  -JK     Comment (Bed Mobility)  pt can roll R/L mod I  with use of rails; unable to lift upper body from supine postion without Max x 2  -JK     Row Name 12/01/18 0950          Bed-Chair Transfer    Bed-Chair [place] (Transfers)  minimum assist (75% patient effort);2 person assist;verbal cues  -JK     Assistive Device (Bed-Chair Transfers)  walker, front-wheeled  -JK     Row Name 12/01/18 0950          Chair-Bed Transfer    Chair-Bed [place] (Transfers)  minimum assist (75% patient effort);2 person assist;verbal cues  -JK     Assistive Device (Chair-Bed Transfers)  walker, front-wheeled  -JK     Row Name 12/01/18 0950          Sit-Stand Transfer    Sit-Stand [place] (Transfers)  minimum assist (75% patient effort);2 person assist;verbal cues  -JK     Assistive Device (Sit-Stand Transfers)  walker, front-wheeled  -JK     Row Name 12/01/18 0950          Stand-Sit Transfer    Stand-Sit [place] (Transfers)  minimum assist (75% patient effort);2 person assist;verbal cues  -JK     Assistive Device (Stand-Sit Transfers)  walker, front-wheeled  -JK     Row Name 12/01/18 0950          Gait/Stairs Assessment/Training    [place] Level (Gait)  minimum assist (75% patient effort);2 person assist;verbal cues  -JK     Assistive Device (Gait)  walker, front-wheeled  -JK     Distance in Feet (Gait)  80  -JK     Pattern (Gait)  step-to  -JK     Deviations/Abnormal Patterns (Gait)  ataxic;sarah decreased;stride length decreased  -JK     Bilateral Gait Deviations  weight shift ability decreased  -JK     Left Sided Gait Deviations  forward flexed posture;heel strike decreased  -JK     Right Sided Gait Deviations  forward flexed posture;heel strike decreased  -JK     Row Name 12/01/18 0950          Safety Issues, Functional Mobility    Impairments Affecting Function (Mobility)  balance;pain;strength;sensation/sensory awareness  -JK     Row Name 12/01/18 0950          General ROM    GENERAL ROM COMMENTS   B LEs WFL for size  -     Row Name 12/01/18 0950          MMT (Manual Muscle Testing)    General MMT Comments  B LEs grossly 3+/5 to 4-/5  -     Row Name 12/01/18 0950          Pain Scale: Numbers Pre/Post-Treatment    Pain Scale: Numbers, Pretreatment  4/10  -K     Pain Scale: Numbers, Post-Treatment  6/10  -     Pain Location  neck  -     Pain Intervention(s)  Medication (See MAR);Repositioned;Ambulation/increased activity  -     Row Name 12/01/18 0950          Lower Extremity Seated Therapeutic Exercise    Performed, Seated Lower Extremity (Therapeutic Exercise)  hip flexion/extension;knee flexion/extension;ankle dorsiflexion/plantarflexion  -     Exercise Type, Seated Lower Extremity (Therapeutic Exercise)  AROM (active range of motion)  -     Sets/Reps Detail, Seated Lower Extremity (Therapeutic Exercise)  1/10  -     Row Name             Wound 11/27/18 1318 Other (See comments) neck incision    Wound - Properties Group Date first assessed: 11/27/18  - Time first assessed: 1318  - Side: Other (See comments)  - Location: neck  - Type: incision  -    Row Name 12/01/18 0950          PT Clinical Impression    General Observations of Patient  Pt presents to PT following C4, C5, C^ anterior cervical corpectomy with use of cage and plates on 11/27 due to worsening weakness B LEs and paresthesia B UEs. Pt was previously independent with  all functional tasks and was working. Pt's goal is to return to work and return to home as independent as able. Pt has impaired transfers (supine to sit most impaired), impaired standing balance, impaired gait, impaired tolerance to functional tasks, pain. Pt is motivated to achieve goals and would benefit from skilled PT to improve functional abilities and facilitate safe return to home.  -     Patient's Goals For Discharge  return home;take care of myself at home;return to work;return to all previous roles/activities  -     Family Goals For Discharge   patient able to return to all previous activities/roles;patient able to return to work  -     Plan For Care Reviewed: Physical Therapy  patient voices agreement with PT plan for care  -K     Rehab Potential/Prognosis (PT Eval)  good, to achieve stated therapy goals  -K     Frequency of Treatment (PT Eval)  90 minutes per session  -JK     Estimated Length of Stay, Weeks (PT Eval)  2 weeks  -JK     Problem List (PT Eval)  sensation decreased;strength decreased;impaired balance;pain  -JK     Equipment Currently Used at Home  none  -JK     Anticipated Equipment Needs at Discharge (PT Eval)  front wheeled walker  -JK     Expected Discharge Disposition (PT Eval)  home or self care  -JK     Planned Therapy Interventions (PT Eval)  activity tolerance training;functional balance retraining;patient/caregiver education/training;ROM/therapeutic exercise;strengthening exercise;transfer/mobility retraining  -     Row Name 12/01/18 0950          IRF PT Goals    Bed Mobility Goal Selection (PT-IRF)  bed mobility, PT goal 1  -JK     Transfer Goal Selection (PT-IRF)  transfers, PT goal 1;transfers, PT goal 2  -JK     Gait (Walking Locomotion) Goal Selection (PT-IRF)  gait, PT goal 1  -JK     Stairs Goal Selection (PT-IRF)  stairs, PT goal 1;stairs, PT goal 2  -     Row Name 12/01/18 0950          Bed Mobility Goal 1 (PT-IRF)    Activity/Assistive Device (Bed Mobility Goal 1, PT-IRF)  sit to supine/supine to sit  -K     [place] Level (Bed Mobility Goal 1, PT-IRF)  conditional independence  -JK     Time Frame (Bed Mobility Goal 1, PT-IRF)  2 weeks;long term goal (LTG)  -     Progress/Outcomes (Bed Mobility Goal 1, PT-IRF)  goal ongoing  -     Row Name 12/01/18 0950          Transfer Goal 1 (PT-IRF)    Activity/Assistive Device (Transfer Goal 1, PT-IRF)  sit-to-stand/stand-to-sit  -K     [place] Level (Transfer Goal 1, PT-IRF)  supervision required  -K     Time Frame (Transfer Goal 1, PT-IRF)  2 weeks;long term  "goal (LTG)  -JK     Progress/Outcomes (Transfer Goal 1, PT-IRF)  goal ongoing  -"[employer]"TAYLOR     Row Name 12/01/18 0950          Transfer Goal 2 (PT-IRF)    Activity/Assistive Device (Transfer Goal 2, PT-IRF)  car transfer  -JK     [place] Level (Transfer Goal 2, PT-IRF)  supervision required  -JK     Time Frame (Transfer Goal 2, PT-IRF)  2 weeks;long term goal (LTG)  -JK     Progress/Outcomes (Transfer Goal 2, PT-IRF)  goal ongoing  -[employer]     Row Name 12/01/18 0950          Gait/Walking Locomotion Goal 1 (PT-IRF)    Activity/Assistive Device (Gait/Walking Locomotion Goal 1, PT-IRF)  walker, rolling  -JK     Gait/Walking Locomotion Distance Goal 1 (PT-IRF)  250  -JK     [place] Level (Gait/Walking Locomotion Goal 1, PT-IRF)  supervision required  -JK     Time Frame (Gait/Walking Locomotion Goal 1, PT-IRF)  2 weeks;long term goal (LTG)  -JK     Progress/Outcomes (Gait/Walking Locomotion Goal 1, PT-IRF)  goal ongoing  -[employer]     Row Name 12/01/18 0950          Stairs Goal 1 (PT-IRF)    Activity/Assistive Device (Stairs Goal 1, PT-IRF)  stairs, all skills  -JK     Number of Stairs (Stairs Goal 1, PT-IRF)  8  -JK     [place] Level (Stairs Goal 1, PT-IRF)  standby assist  -JK     Time Frame (Stairs Goal 1, PT-IRF)  2 weeks;long term goal (LTG)  -JK     Progress/Outcomes (Stairs Goal 1, PT-IRF)  goal ongoing  -IAN     Row Name 12/01/18 0950          Stairs Goal 2 (PT-IRF)    Activity/Assistive Device (Stairs Goal 2, PT-IRF)  walker, rolling  -JK     Number of Stairs (Stairs Goal 2, PT-IRF)  curb (6\" step)  -JK     [place] Level (Stairs Goal 2, PT-IRF)  standby assist  -JK     Time Frame (Stairs Goal 2, PT-IRF)  2 weeks;long term goal (LTG)  -JK     Progress/Outcomes (Stairs Goal 2, PT-IRF)  goal ongoing  -[employer]     Row Name 12/01/18 0950          Positioning and Restraints    Pre-Treatment Position  in bed  -JK     Post Treatment Position  bed  -JK     In Bed  supine;call light within reach;encouraged to call for " assist;exit alarm on  -JK       User Key  (r) = Recorded By, (t) = Taken By, (c) = Cosigned By    Initials Name Provider Type    Janae Wheatley, PT Physical Therapist    Laura Starr, RN Registered Nurse        IRF Treatment Summary     Row Name 12/01/18 1330             Evaluation/Treatment Time and Intent    Subjective Information  no complaints;complains of;weakness;fatigue  -RP      Existing Precautions/Restrictions  fall;spinal  -RP      Document Type  therapy note (daily note)  -RP      Mode of Treatment  occupational therapy  -RP      Patient/Family Observations  pt supine in bed resting; still in pain but agreeable to therapy  -RP      Recorded by [RP] Precious De Leon, OT      Row Name 12/01/18 1330             Cognition/Psychosocial- PT/OT    Orientation Status (Cognition)  oriented x 4  -RP      Follows Commands (Cognition)  WFL  -RP      Personal Safety Interventions  fall prevention program maintained;gait belt;nonskid shoes/slippers when out of bed;muscle strengthening facilitated  -RP      Recorded by [RP] Precious De Leon, OT      Row Name 12/01/18 1330             Bed Mobility Assessment/Treatment    Supine-Sit [place] (Bed Mobility)  moderate assist (50% patient effort);maximum assist (25% patient effort);verbal cues  -RP      Sit-Supine [place] (Bed Mobility)  minimum assist (75% patient effort);verbal cues  -RP      Bed Mobility, Safety Issues  decreased use of legs for bridging/pushing;decreased use of arms for pushing/pulling  -RP      Assistive Device (Bed Mobility)  bed rails  -RP      Recorded by [RP] Precious De Leon, OT      Row Name 12/01/18 1330             Transfer Assessment/Treatment    Transfer Assessment/Treatment  bed-chair transfer;chair-bed transfer;toilet transfer  -RP      Recorded by [RP] Precious De Leon, OT      Row Name 12/01/18 1330             Bed-Chair Transfer    Bed-Chair [place] (Transfers)  moderate assist (50% patient effort);verbal cues  -RP       Assistive Device (Bed-Chair Transfers)  wheelchair  -RP      Recorded by [RP] Precious De Leon, OT      Row Name 12/01/18 1330             Chair-Bed Transfer    Chair-Bed [place] (Transfers)  moderate assist (50% patient effort);verbal cues  -RP      Assistive Device (Chair-Bed Transfers)  wheelchair  -RP      Recorded by [RP] Precious De Leon, OT      Row Name 12/01/18 1330             Toilet Transfer    Type (Toilet Transfer)  stand pivot/stand step  -RP      [place] Level (Toilet Transfer)  moderate assist (50% patient effort);verbal cues  -RP      Assistive Device (Toilet Transfer)  grab bars/safety frame;wheelchair  -RP      Recorded by [RP] Precious De Leon, OT      Row Name 12/01/18 5690             Basic Activities of Daily Living (BADLs)    Basic Activities of Daily Living  toileting  -RP      Recorded by [RP] Precious De Leon, OT      Row Name 12/01/18 7010             Toileting Assessment/Treatment    Toileting [place] Level  toileting skills;adjust/manage clothing;perform perineal hygiene;maximum assist (25% patient effort);dependent (less than 25% patient effort);verbal cues  -RP      Assistive Device Use (Toileting)  grab bar/safety frame  -RP      Toileting Position  supported sitting;supported standing  -RP      Comment (Toileting)  pt able to assist w/ clothing mgnt before toileting, but required A for completeness  -RP      Recorded by [RP] Precious De Leon, OT      Row Name 12/01/18 8610             Motor Assessment/Intervention    Additional Documentation  Fine Motor Testing & Training (Group);Hand  Strength Testing (Group)  -RP      Recorded by [RP] Precious De Leon, OT      Row Name 12/01/18 0280             Hand  Strength Testing    Right Hand, Setting 1 (Dynamometer Testing)  31  -RP      Left Hand, Setting 1 (Dynamometer Testing)  34  -RP      Right Hand: Lateral (Key) Pinch Strength (Pinch Dynamometer Testing)  8.5  -RP      Left Hand: Lateral (Key) Pinch Strength (Pinch  Dynamometer Testing)  10.9  -RP      Recorded by [RP] Precious De Leon OT      Row Name 12/01/18 1330             Fine Motor Testing & Training    Fine Motor Tests  9 Hole Peg Test of Fine Motor Coordination Results;Box N Block Test Results  -RP      Results, 9 Hole Peg Test of Fine Motor Coordination-Right  3 min 35 seconds  -RP      Results, 9 Hole Peg Test of Fine Motor Coordination-Left  10min 30 sec  -RP      Results, Box N Block Test-Right  27  -RP      Results, Box N Block Test-Left  29  -RP      Recorded by [RP] Precious De Leon OT      Row Name 12/01/18 1330             Pain Scale: Numbers Pre/Post-Treatment    Pain Scale: Numbers, Pretreatment  9/10  -RP      Pain Scale: Numbers, Post-Treatment  9/10  -RP      Pain Location  neck  -RP      Pain Intervention(s)  Medication (See MAR);Repositioned;Ambulation/increased activity  -RP      Recorded by [RP] Precious De Leon OT      Row Name 12/01/18 3713             Positioning and Restraints    Pre-Treatment Position  in bed  -RP      Post Treatment Position  bed  -RP      In Bed  supine;call light within reach;encouraged to call for assist  -RP      Recorded by [RP] Precious De Leon OT        User Key  (r) = Recorded By, (t) = Taken By, (c) = Cosigned By    Initials Name Effective Dates    Precious Hamilton OT 05/03/18 -           PT Recommendation and Plan    Planned Therapy Interventions (PT Eval): balance training, bed mobility training, gait training, home exercise program, neuromuscular re-education, patient/family education, stair training, strengthening, transfer training  Frequency of Treatment (PT Eval): 90 minutes per session  Anticipated Equipment Needs at Discharge (PT Eval): front wheeled walker         Outcome Measures     Row Name 11/30/18 1200 11/29/18 1557 11/29/18 1521       How much difficulty does the patient currently have...    Turning from your back to your side while in flat bed without using bedrails?  3  -JM  3  -KH  --    Standing  up from a chair using your arms (e.g., wheelchair, bedside chair)?  2  -  3  -KH  --    Moving from lying on back to sitting on the side of a flat bed without bedrails?  2  -  3  -KH  --       How much help from another person do you currently need...    Moving to and from a bed to a chair (including a wheelchair)?  3  -  3  -KH  --    Climbing 3-5 steps with a railing?  1  -JM  2  -KH  --    To walk in hospital room?  3  -  3  -KH  --    AM-PAC 6 Clicks Score  14  -  17  -KH  --       How much help from another is currently needed...    Putting on and taking off regular lower body clothing?  --  --  1  -SG    Bathing (including washing, rinsing, and drying)  --  --  2  -SG    Toileting (which includes using toilet bed pan or urinal)  --  --  1  -SG    Putting on and taking off regular upper body clothing  --  --  2  -SG    Taking care of personal grooming (such as brushing teeth)  --  --  2  -SG    Eating meals  --  --  2  -SG    Score  --  --  10  -SG       Functional Assessment    Outcome Measure Options  --  AM-PAC 6 Clicks Basic Mobility (PT)  -  --      User Key  (r) = Recorded By, (t) = Taken By, (c) = Cosigned By    Initials Name Provider Type    Yenny Jordan, PT Physical Therapist    Rosibel Haddad, OTR Occupational Therapist    Leticia Lugo, PTA Physical Therapy Assistant               Time Calculation:     PT Charges     Row Name 12/01/18 1648 12/01/18 1628          Time Calculation    Start Time  1000  -JK  1230  -JK     Stop Time  1100  -JK  1300  -JK     Time Calculation (min)  60 min  -JK  30 min  -JK       User Key  (r) = Recorded By, (t) = Taken By, (c) = Cosigned By    Initials Name Provider Type    Janae Wheatley, PT Physical Therapist        Therapy Suggested Charges     Code   Minutes Charges    None             Therapy Charges for Today     Code Description Service Date Service Provider Modifiers Qty    29276395822 HC PT EVAL MOD COMPLEXITY 2 12/1/2018  Janae Trujillo, PT GP 1    38381268001 HC PT THER PROC EA 15 MIN 12/1/2018 Janae Trujillo, PT GP 5    77734496260 HC PT THER SUPP EA 15 MIN 12/1/2018 Janae Trujillo, PT GP 6                   Janae MURILLO. Ronnie, PT  12/1/2018

## 2018-12-01 NOTE — PROGRESS NOTES
LOS: 1 day   Patient Care Team:  Miladis Colbert APRN as PCP - General (Family Medicine)    Chief Complaint: same    Subjective     History of Present Illness    Subjective Pt states that his first night on rehab went well.     History taken from: patient    Objective     Vital Signs  Temp:  [97.2 °F (36.2 °C)-98.3 °F (36.8 °C)] 97.3 °F (36.3 °C)  Heart Rate:  [73-88] 73  Resp:  [18] 18  BP: (119-161)/(65-88) 124/77    Objective exam unchanged calves soft and NT resp unlabored and regul;ar    Results Review:     I reviewed the patient's new clinical results.    Medication Review:     Assessment/Plan       * No active hospital problems. *      Assessment & Plan Continue eval phase. H&P was dictated while pt was at [facility] yesterday. Adm orders reviewed and signed today    Mike Melissa MD  12/01/18  7:37 AM    Time:

## 2018-12-01 NOTE — PROGRESS NOTES
Inpatient Rehabilitation Functional Measures Assessment    Functional Measures  ROCCO Eating:  Eating Score = 5. Patient is supervision/set-up for eating,  requiring: No assistive devices were required.  ROCCO Grooming: Activity was not observed.  ROCCO Bathing:  Activity was not observed.  ROCCO Upper Body Dressing:  Activity was not observed.  ROCCO Lower Body Dressing:  Activity was not observed.  ROCCO Toileting:  Patient requires moderate assistance for adjusting clothing  before using a toilet, commode, bedpan, or urinal. Patient requires moderate  assistance for hygiene. Patient requires moderate assistance for adjusting  clothing after using a toilet, commode, bedpan, or urinal. Patient performs 0 -  24% of toileting tasks.  Toileting Score = 1, Total Assistance. No assistive  devices were required.    ROCCO Bladder Management  Level of Assistance:  Bladder Score = 4. Patient performs 75% or more of tasks  and requires minimal assistance for bladder management. Ashley positions the  urinal.  Frequency/Number of Accidents this Shift:  Bladder accidents this shift:  0 .  Patient has not had an accident this shift.    ROCCO Bowel Management  Level of Assistance: Bowel Score = 7.  Patient is completely independent for  bowel management.  Patient did not have bowel movement.  No  medication/intervention was provided.  Frequency/Number of Accidents this Shift: [place]    ROCCO Bed/Chair/Wheelchair Transfer:  Bed/chair/wheelchair Transfer Score = 4.  Patient performs 75% or more of effort and minimal assistance (little/incidental  help/lifting of one limb/steadying) for transferring to and from the  bed/chair/wheelchair, requiring: Contact guard. Steadying. Patient requires the  following assistive device(s): Bed rails. Walker. Grab bars. Arm rest.  ROCCO Toilet Transfer:  Toilet Transfer Score = 4.  Patient performs 75% or more  of effort and minimal assistance (little/incidental help/steadying) for  transferring to and from the  toilet/commode, requiring: Contact guard.  Steadying. Patient requires the following assistive device(s): Grab bars.  ROCCO Tub/Shower Transfer:  Activity was not observed.    Previously Documented Mode of Locomotion at Discharge: Field  ROCCO Expected Mode of Locomotion at Discharge: [facility] Walk/Wheelchair:  [facility] Stairs:  [place]    [facility] Comprehension:  [facility] Expression:  [facility] Social Interaction:  [facility] Problem Solving:  [facility] Memory:  [place]    Therapy Mode Minutes  Occupational Therapy: Branch  Physical Therapy: Branch  Speech Language Pathology:  [place]oro    Signed by: ALEJO Haynes

## 2018-12-01 NOTE — PROGRESS NOTES
Inpatient Rehabilitation Functional Measures Assessment    Functional Measures  [facility] Eating:  [facility] Grooming: [facility] Bathing:  [facility] Upper Body Dressing:  [facility] Lower Body Dressing:  [facility] Toileting:  [facility] Bladder Management  Level of Assistance:  [place]  Frequency/Number of Accidents this Shift:  [facility] Bowel Management  Level of Assistance: [place]  Frequency/Number of Accidents this Shift: [facility] Bed/Chair/Wheelchair Transfer:  [facility] Toilet Transfer:  [facility] Tub/Shower Transfer:  [place]    Previously Documented Mode of Locomotion at Discharge: Field  [facility] Expected Mode of Locomotion at Discharge: [facility] Walk/Wheelchair:  [facility] Stairs:  [facility] Comprehension:  Auditory comprehension is the usual mode. Comprehension  Score = 6, Modified [place].  Patient comprehends complex/abstract  information in their primary language, requiring:  [facility] Expression:  Vocal expression is the usual mode. Expression Score = 6,  Modified Independent.  Patient expresses complex/abstract information in their  primary language, requiring:  [facility] Social Interaction:  Social Interaction Score = 6, Modified Independent.  Patient is modified independent for social interaction, requiring:  [facility] Problem Solving:  Activity was not observed.  [facility] Memory:  Memory Score = 6, Modified [place].  Patient is modified  independent for memory, requiring:    Therapy Mode Minutes  Occupational Therapy: Branch  Physical Therapy: Branch  Speech Language Pathology:  Branch    Signed by: Maria Luisa Lora RN

## 2018-12-01 NOTE — PROGRESS NOTES
SECTION GG    Self Care Performance:   Oral Hygiene: [place] does less than half the effort. [place] lifts, holds or  supports trunk or limbs but provides less than half the effort.   Toileting Hygiene: [place] does all of the effort. Patient does none of the  effort to complete the activity. Or, the assistance of 2 or more helpers is  required for the patient to complete the activity.   Shower/Bathe Self: [place] does more than half the effort. [place] lifts or holds  trunk or limbs and provides more than half the effort.   Upper Body Dressing: [place] does less than half the effort. [place] lifts, holds  or supports trunk or limbs but provides less than half the effort.   Lower Body Dressing: [place] does all of the effort. Patient does none of the  effort to complete the activity. Or, the assistance of 2 or more helpers is  required for the patient to complete the activity.   Putting On/Taking Off Footwear: [place] does all of the effort. Patient does  none of the effort to complete the activity. Or, the assistance of 2 or more  helpers is required for the patient to complete the activity.    Self Care Discharge Goals:   Oral Hygiene: [place] sets up or cleans up; patient completes activity. [place]  assists only prior to or following the activity.   Toileting Hygiene: [place] sets up or cleans up; patient completes activity.  [place] assists only prior to or following the activity.   Shower/Bathe Self: [place] sets up or cleans up; patient completes activity.  [place] assists only prior to or following the activity.   Upper Body Dressing: [place] sets up or cleans up; patient completes activity.  [place] assists only prior to or following the activity.   Lower Body Dressing: [place] sets up or cleans up; patient completes activity.  [place] assists only prior to or following the activity.   Putting On/Taking Off Footwear: [place] sets up or cleans up; patient completes  activity. [place] assists only prior to or following the  activity.    Mobility Toilet Transfer Performance: [place] does less than half the effort.  [place] lifts, holds or supports trunk or limbs but provides less than half the  effort.    Mobility Toilet Transfer Discharge Goal: [place] sets up or cleans up; patient  completes activity. [place] assists only prior to or following the activity.    Signed by: Precious De Leon OT

## 2018-12-01 NOTE — PLAN OF CARE
Problem: Fall Risk (Adult)  Goal: Absence of Fall  Outcome: Ongoing (interventions implemented as appropriate)   12/01/18 0330   Fall Risk (Adult)   Absence of Fall making progress toward outcome       Problem: Pain, Acute (Adult)  Goal: Acceptable Pain Control/Comfort Level  Outcome: Ongoing (interventions implemented as appropriate)   12/01/18 0330   Pain, Acute (Adult)   Acceptable Pain Control/Comfort Level making progress toward outcome       Problem: Skin Injury Risk (Adult)  Goal: Skin Health and Integrity  Outcome: Ongoing (interventions implemented as appropriate)   12/01/18 0330   Skin Injury Risk (Adult)   Skin Health and Integrity making progress toward outcome

## 2018-12-01 NOTE — PROGRESS NOTES
Inpatient Rehabilitation Functional Measures Assessment    Functional Measures  [facility] Eating:  Branch  [facility] Grooming: Branch  [facility] Bathing:  Branch  [facility] Upper Body Dressing:  [place]  [facility] Lower Body Dressing:  [place]  [facility] Toileting:  Activity was not observed.    [facility] Bladder Management  Level of Assistance:  Bladder Score = 2. Patient performs 25-49% of tasks and  requires maximal assistance for bladder management. [place] provides most of the  assist to position the urinal, holds it in place, and empties the urinal.  Frequency/Number of Accidents this Shift:  Bladder accidents this shift:  0 .  Patient has not had an accident, but used a device/medication this shift  requiring: Urinal .    [facility] Bowel Management  Level of Assistance: Activity was not observed.  Frequency/Number of Accidents this Shift: Bowel accidents this shift: 0 .  Patient has not had an accident this shift.    [facility] Bed/Chair/Wheelchair Transfer:  Activity was not observed.  [facility] Toilet Transfer:  [place]anch  [facility] Tub/Shower Transfer:  Branch    Previously Documented Mode of Locomotion at Discharge: Field  [facility] Expected Mode of Locomotion at Discharge: [place]  [facility] Walk/Wheelchair:  [place]  [facility] Stairs:  [place]    [facility] Comprehension:  [place]  [facility] Expression:  [place]  [facility] Social Interaction:  [place]  [facility] Problem Solving:  [place]anch  [facility] Memory:  [place]anch    Therapy Mode Minutes  Occupational Therapy: Branch  Physical Therapy: [place]  Speech Language Pathology:  [place]    Signed by: ALEJO Ibanez

## 2018-12-01 NOTE — PROGRESS NOTES
Inpatient Rehabilitation Plan of Care Note    Plan of Care  Care Plan Reviewed - Updates as Follows    Body Systems    [RN] Endocrine(Active)  Current Status(12/01/2018): Blood sugar not controlled  Weekly Goal(12/08/2018): Blood sugar will be within acceptable limits  Discharge Goal: Independent with diabetes regimen    Performed Intervention(s)  Blood glucose AC & HS & insulin as ordered      Pain    [RN] Pain Management(Active)  Current Status(12/01/2018): Pain related to surgery  Weekly Goal(12/08/2018): Able to tolerate therapies, & pain controlled  Discharge Goal: Pain under control    Performed Intervention(s)  Medication prn & evaluate effectiveness  Monitor neck incision q shift      Psychosocial    [RN] Behavior(Active)  Current Status(01/01/1753):  Weekly Goal(01/01/1753):  Discharge Goal:    [RN] Coping/Adjustment(Active)  Current Status(12/01/2018): Expresses appropriate coping  Weekly Goal(12/08/2018): Allow opportunity to express concerns regarding current  status  Discharge Goal: Adequate coping regarding life changes with ongoing support.    Performed Intervention(s)  Verbalizes needs & concerns      Safety    [RN] Potential for Injury(Active)  Current Status(12/01/2018): No unsafe behaviors, uses call light appropriately  Weekly Goal(12/08/2018): Instruct family regarding safety precautions & need for  close supervision  Discharge Goal: Family will be knowledgeable of need for cueing & supervision to  avoid falls.    Performed Intervention(s)  Safety rounds/ Fall precautions  Items in reach, bed alarm & chair alarms      Sphincter Control    [RN] bowel & bladder management(Active)  Current Status(12/01/2018): Continent 100%  Weekly Goal(12/08/2018): Remains continent 100%  Discharge Goal: Remains 100% continent    Performed Intervention(s)  Monitor intake, output, & bowel movements  Encourage appropriate diet & fluids    Signed by: Maria Luisa Lora RN

## 2018-12-01 NOTE — PROGRESS NOTES
Inpatient Rehabilitation Plan of Care Note    Plan of Care  Care Plan Reviewed - No updates at this time.    Psychosocial    Performed Intervention(s)  Verbalizes needs & concerns      Safety    Performed Intervention(s)  Safety rounds/ Fall precautions  Items in reach, bed alarm & chair alarms      Sphincter Control    Performed Intervention(s)  Monitor intake, output, & bowel movements  Encourage appropriate diet & fluids      Body Systems    Performed Intervention(s)  Blood glucose AC & HS & insulin as ordered      Pain    Performed Intervention(s)  Medication prn & evaluate effectiveness  Monitor neck incision q shift    Signed by: Shonda Collins RN

## 2018-12-01 NOTE — PROGRESS NOTES
Inpatient Rehabilitation Functional Measures Assessment    Functional Measures  [facility] Eating:  Branch  [facility] Grooming: Branch  [facility] Bathing:  [place]anch  [facility] Upper Body Dressing:  [facility] Lower Body Dressing:  [facility] Toileting:  [facility] Bladder Management  Level of Assistance:  [place]  Frequency/Number of Accidents this Shift:  [facility] Bowel Management  Level of Assistance: [place]  Frequency/Number of Accidents this Shift: [place]    [facility] Bed/Chair/Wheelchair Transfer:  Bed/chair/wheelchair Transfer Score = 1.  Patient performs 25-49% of effort and requires maximal assistance (most of the  lifting) of two or more people for transferring to and from the  bed/chair/wheelchair. for lifiing . Patient requires the following assistive  device(s): Walker.  [facility] Toilet Transfer:  [facility] Tub/Shower Transfer:  [place]    Previously Documented Mode of Locomotion at Discharge: Field  [facility] Expected Mode of Locomotion at Discharge: [facility] Walk/Wheelchair:  WHEELCHAIR OBSERVATION   Activity was not observed.    WALK OBSERVATION   Walk Distance Scale = 2.  Distance walked is 50 -149 feet. Walk Score = 1.  Patient performs 50-74% of effort and requires moderate assistance of two or  more people. for safety . Patient walked a distance of 80 feet. Patient requires  the following assistive device(s): Rolling walker.  [facility] Stairs:  Stairs did not occur because activity was unsafe for patient.    [facility] Comprehension:  [facility] Expression:  [facility] Social Interaction:  [facility] Problem Solving:  [facility] Memory:  [place]    Therapy Mode Minutes  Occupational Therapy: [place]  Physical Therapy: Individual: 90 minutes.  Speech Language Pathology:  [place]    Signed by: Zaria Parker PT

## 2018-12-01 NOTE — PROGRESS NOTES
Inpatient Rehabilitation Functional Measures Assessment and Plan of Care    Plan of Care  Updated Problems/Interventions  Mobility    [OT] Bed/Chair/Wheelchair(Active)  Current Status(01/01/1753):  Weekly Goal(01/01/1753):  Discharge Goal:    [OT] Toilet Transfers(Active)  Current Status(12/01/2018): Mod A  Weekly Goal(12/08/2018): Min A  Discharge Goal: Sup        Self Care    [OT] Bathing(Active)  Current Status(12/01/2018): Max A  Weekly Goal(12/08/2018): Mod A  Discharge Goal: Sup/SBA    [OT] Dressing (Lower)(Active)  Current Status(12/01/2018): Dep  Weekly Goal(12/08/2018): Max/Mod A  Discharge Goal: Sup/SBA    [OT] Dressing (Upper)(Active)  Current Status(12/01/2018): Mod A  Weekly Goal(12/08/2018): Min A  Discharge Goal: Sup    [OT] Grooming(Active)  Current Status(12/01/2018): Mod A  Weekly Goal(12/08/2018): Min A  Discharge Goal: Sup    [OT] Toileting(Active)  Current Status(12/01/2018): Dep  Weekly Goal(12/08/2018): Max/Mod A  Discharge Goal: Sup/SBA    Functional Measures  [facility] Eating:  Branch  [facility] Grooming: [place]  [facility] Bathing:  Branch  [facility] Upper Body Dressing:  Branch  [facility] Lower Body Dressing:  [place]anch  [facility] Toileting:  Branch    [facility] Bladder Management  Level of Assistance:  [place]  Frequency/Number of Accidents this Shift:  [place]    [facility] Bowel Management  Level of Assistance: [place]  Frequency/Number of Accidents this Shift: [place]    [facility] Bed/Chair/Wheelchair Transfer:  [place]anch  [facility] Toilet Transfer:  Branch  [facility] Tub/Shower Transfer:  Branch    Previously Documented Mode of Locomotion at Discharge: [place]  [facility] Expected Mode of Locomotion at Discharge: [place]  [facility] Walk/Wheelchair:  [place]anch  [facility] Stairs:  [place]anch    [facility] Comprehension:  [place]anch  [facility] Expression:  Branch  [facility] Social Interaction:  [place]anch  [facility] Problem Solving:  Branch  [facility] Memory:  Branch    Therapy Mode Minutes  Occupational Therapy: Branch  Physical Therapy: Branch  Speech Language Pathology:  Branch    Signed by: Precious De Leon OT

## 2018-12-01 NOTE — THERAPY TREATMENT NOTE
Inpatient Rehabilitation - Occupational Therapy Initial Evaluation    Psychiatric     Patient Name: Duane Mark Witten  : 1955  MRN: [id number]    Today's Date: 2018                 Admit Date: 2018         ICD-10-CM ICD-9-CM   1. Cervical stenosis of spinal canal M48.02 723.0   2. Weakness of both lower extremities R29.898 729.89   3. Paresthesia of both upper extremities R20.2 782.0    M79.601 729.5    M79.602    4. Morbid obesity with BMI of 40.0-44.9, adult (CMS/[facility]) E66.01 278.01    Z68.41 V85.41   5. Impaired functional mobility, balance, gait, and endurance Z74.09 V49.89   6. Functional gait abnormality R26.89 781.2       Patient Active Problem List   Diagnosis   • YANELI (generalized anxiety disorder)   • ADD (attention deficit disorder)   • Depression   • Diabetes type 2, controlled (CMS/[facility])   • ED (erectile dysfunction) of non-organic origin   • HLD (hyperlipidemia)   • Essential hypertension   • Arthritis   • Psoriasis   • Testosterone deficiency   • Primary insomnia   • Weakness of both lower extremities   • Paresthesia of both upper extremities   • Morbid obesity with BMI of 40.0-44.9, adult (CMS/[facility])   • Adverse effect of corticosteroids   • Type 2 diabetes mellitus with hyperglycemia (CMS/[facility])   • Cervical stenosis of spinal canal   • Edema of cervical spinal cord (CMS/HCC)   • Postoperative atrial fibrillation (CMS/HCC)       Past Medical History:   Diagnosis Date   • ADD (attention deficit disorder)    • Depression    • ED (erectile dysfunction)    • Essential hypertension    • YANELI (generalized anxiety disorder)    • HLD (hyperlipidemia)    • Osteoarthrosis    • PONV (postoperative nausea and vomiting)    • Psoriasis    • Testosterone deficiency    • Type 2 diabetes mellitus (CMS/HCC)        Past Surgical History:   Procedure Laterality Date   • BIOPSY / EXCISION / DISSECTION AXILLARY NODE  1994    Lymph Node testing for cancer    • COLONOSCOPY      10+ years; normal   •  PARATHYROIDECTOMY  2008            IRF OT ASSESSMENT FLOWSHEET (last 72 hours)      IRF Occupational Therapy Evaluation     Row Name 12/01/18 1000                   Evaluation/Treatment Time and Intent    Subjective Information  complains of;weakness;fatigue;pain  -RP        Existing Precautions/Restrictions  fall;spinal  -RP        Document Type  evaluation  -RP        Mode of Treatment  occupational therapy  -RP        Patient/Family Observations  pt supine in bed following PT- pt very fatigued and in significant pain but willing to work w/ OT  -RP        Row Name 12/01/18 1000                   Cognition/Psychosocial- PT/OT    Orientation Status (Cognition)  oriented x 4  -RP        Follows Commands (Cognition)  WFL  -RP        Personal Safety Interventions  fall prevention program maintained;gait belt;nonskid shoes/slippers when out of bed  -RP        Row Name 12/01/18 1000                   Bed Mobility Assessment/Treatment    Bed Mobility Assessment/Treatment  supine-sit;sit-supine  -RP        Supine-Sit [place] (Bed Mobility)  maximum assist (25% patient effort);verbal cues  -RP        Sit-Supine [place] (Bed Mobility)  minimum assist (75% patient effort);verbal cues  -RP        Bed Mobility, Safety Issues  decreased use of arms for pushing/pulling;decreased use of legs for bridging/pushing  -RP        Assistive Device (Bed Mobility)  bed rails  -RP        Row Name 12/01/18 1000                   Transfer Assessment/Treatment    Transfer Assessment/Treatment  sit-stand transfer;stand-sit transfer;toilet transfer;bed-chair transfer;chair-bed transfer  -RP        Row Name 12/01/18 1000                   Bed-Chair Transfer    Bed-Chair [place] (Transfers)  moderate assist (50% patient effort);verbal cues  -RP        Assistive Device (Bed-Chair Transfers)  walker, front-wheeled  -RP        Row Name 12/01/18 1000                   Chair-Bed Transfer    Chair-Bed [place] (Transfers)  moderate  assist (50% patient effort);verbal cues  -RP        Assistive Device (Chair-Bed Transfers)  walker, front-wheeled  -RP        Row Name 12/01/18 1000                   Sit-Stand Transfer    Sit-Stand [place] (Transfers)  moderate assist (50% patient effort);verbal cues  -RP        Assistive Device (Sit-Stand Transfers)  walker, front-wheeled  -RP        Row Name 12/01/18 1000                   Stand-Sit Transfer    Stand-Sit [place] (Transfers)  minimum assist (75% patient effort);verbal cues  -RP        Assistive Device (Stand-Sit Transfers)  walker, front-wheeled  -RP        Row Name 12/01/18 1000                   Toilet Transfer    Type (Toilet Transfer)  stand pivot/stand step  -RP        [place] (Toilet Transfer)  moderate assist (50% patient effort);verbal cues  -RP        Assistive Device (Toilet Transfer)  grab bars/safety frame;wheelchair  -RP        Row Name 12/01/18 1000                   Basic Activities of Daily Living (BADLs)    Basic Activities of Daily Living  bathing;upper body dressing;lower body dressing;grooming;toileting  -RP        Additional Documentation  -- pain was a significant limiting factor during all ADLs  -RP        Row Name 12/01/18 1000                   Bathing Assessment/Treatment    Bathing [place]  bathing skills;upper body;lower body;maximum assist (25% patient effort);verbal cues  -RP        Bathing Position  sink side;supported sitting  -RP        Bathing Setup Assistance  obtain supplies  -RP        Comment (Bathing)  Pt in significant pain, limiting independence; Pt required assist to wash B UEs, danny regions, and B LEs  -RP        Row Name 12/01/18 1000                   Upper Body Dressing Assessment/Treatment    Upper Body Dressing Task  doff;don;pull over garment;moderate assist (50-74% patient effort);verbal cues  -RP        Upper Body Dressing Position  supported sitting  -RP        Set-up Assistance (Upper Body Dressing)  obtain  clothing  -RP        Comment (Upper Body Dressing)  A to pull shirt over neck and pull down in back  -        Row Name 12/01/18 1000                   Lower Body Dressing Assessment/Treatment    Lower Body Dressing [place] Level  doff;don;pants/bottoms;socks;dependent (less than 25% patient effort)  -RP        Lower Body Dressing Position  supported sitting;supported standing  -RP        Lower Body Dressing Setup Assistance  obtain clothing  -RP        Comment (Lower Body Dressing)  Assistance w/ all steps; pain was a significant limiting factor  -        Row Name 12/01/18 1000                   Grooming Assessment/Treatment    Grooming [place] Level  grooming skills;deodorant application;hair care, combing/brushing;oral care regimen;wash face, hands;moderate assist (50% patient effort);verbal cues  -RP        Grooming Position  sink side;supported sitting  -RP        Grooming Setup Assistance  obtain supplies;open containers  -RP        Comment (Grooming)  A to comb hair and apply deoderant under B UEs  -        Row Name 12/01/18 1000                   Toileting Assessment/Treatment    Toileting [place] Level  toileting skills;adjust/manage clothing;perform perineal hygiene;dependent (less than 25% patient effort);verbal cues  -RP        Assistive Device Use (Toileting)  grab bar/safety frame  -RP        Toileting Position  supported sitting;supported standing  -        Row Name 12/01/18 1000                   General ROM    GENERAL ROM COMMENTS  B UE AROM to approx. 1/2 shoulder flexion  -        Row Name 12/01/18 1000                   MMT (Manual Muscle Testing)    General MMT Comments  B UE MMT: approx. 2+/5 shoulder flexion  -        Row Name 12/01/18 1000                   Sensory    Sensory General Assessment  light touch sensation deficits identified  -        Row Name 12/01/18 1000                   Light Touch Sensation Assessment    Left Upper Extremity: Light Touch Sensation  Assessment  mild impairment, 75% or more correct responses  -RP        Right Upper Extremity: Light Touch Sensation Assessment  mild impairment, 75% or more correct responses  -RP        Row Name 12/01/18 1000                   Pain Scale: Numbers Pre/Post-Treatment    Pain Scale: Numbers, Pretreatment  9/10  -RP        Pain Scale: Numbers, Post-Treatment  9/10  -RP        Pain Location  neck  -RP        Pain Intervention(s)  Medication (See MAR);Repositioned;Ambulation/increased activity RN administed pain medication during therapy  -RP        Row Name 12/01/18 1000                   OT Clinical Impression    General Observations of Patient  Pt is a pleasant 63 year old male s/p C4, C5. C6 anterior cervical corpectomy and cervical plate from C3-C7. Pt lives w/ spouse and was previously independent w/ no use of AD. Pt presents to OT eval w/ significant pain limiting factors, decreased B UE ROM, strength, sensation, and coordination, impacting functional independence w/ self-care tasks and functional transfers. Pt may benefit from skilled OT services to address deficits, improve functional independence, and faciliate safe return home.    -RP        Patient's Goals For Discharge  return home  -RP        Rehab Potential/Prognosis: Occupational Therapy  good, to achieve stated therapy goals  -RP        Frequency of Treatment (OT Eval)  5 times per week;90 minutes per session  -RP        Estimated Length of Stay (OT Eval)  3 weeks  -RP        Problem List: Occupational Therapy  decreased flexibility;ROM decreased;sensation decreased;strength decreased;impaired balance;coordination impaired;motor control impaired;postural control impaired  -RP        Planned Therapy Interventions (OT Eval)  activity tolerance training;adaptive equipment training;BADL retraining;functional balance retraining;neuromuscular control/coordination retraining;passive ROM/stretching;patient/caregiver education/training;ROM/therapeutic  exercise;strengthening exercise;transfer/mobility retraining;occupation/activity based interventions  -RP        Row Name 12/01/18 1000                   IRF OT Goals    Transfer Goal Selection (OT-IRF)  transfers, OT goal 1;transfers, OT goal 2  -RP        Bathing Goal Selection (OT-IRF)  bathing, OT goal 1;bathing, OT goal 2  -RP        UB Dressing Goal Selection (OT-IRF)  UB dressing, OT goal 1;UB dressing, OT goal 2  -RP        LB Dressing Goal Selection (OT-IRF)  LB dressing, OT goal 1;LB dressing, OT goal 2  -RP        Grooming Goal Selection (OT-IRF)  grooming, OT goal 1;grooming, OT goal 2  -RP        Toileting Goal Selection (OT-IRF)  toileting, OT goal 1;toileting, OT goal 2  -RP        ROM Goal Selection (OT-IRF)  ROM, OT goal 1 (free text)  -RP        Coordination Goal Selection (OT)  coordination, OT goal 1  -RP        Additional Documentation  Transfer Goal Selection (OT-IRF) (Row);Toileting Goal Selection (OT-IRF) (Row);Grooming Goal Selection (OT-IRF) (Row);Coordination Goal Selection (OT-IRF) (Row);ROM Goal Selection (OT-IRF) (Row)  -RP        Row Name 12/01/18 1000                   Transfer Goal 1 (OT-IRF)    Activity/Assistive Device (Transfer Goal 1, OT-IRF)  toilet  -RP        [place] Level (Transfer Goal 1, OT-IRF)  minimum assist (75% or more patient effort)  -RP        Time Frame (Transfer Goal 1, OT-IRF)  short term goal (STG)  -RP        Progress/Outcomes (Transfer Goal 1, OT-IRF)  goal ongoing  -RP        Row Name 12/01/18 1000                   Transfer Goal 2 (OT-IRF)    Activity/Assistive Device (Transfer Goal 2, OT-IRF)  toilet  -RP        [place] Level (Transfer Goal 2, OT-IRF)  supervision required  -RP        Time Frame (Transfer Goal 2, OT-IRF)  long term goal (LTG)  -RP        Progress/Outcomes (Transfer Goal 2, OT-IRF)  goal ongoing  -RP        Row Name 12/01/18 1000                   Bathing Goal 1 (OT-IRF)    Activity/Device (Bathing Goal 1, OT-IRF)  bathing skills,  all  -RP        [place] Level (Bathing Goal 1, OT-IRF)  moderate assist (50-74% patient effort)  -RP        Time Frame (Bathing Goal 1, OT-IRF)  short term goal (STG)  -RP        Progress/Outcomes (Bathing Goal 1, OT-IRF)  goal ongoing  -RP        Row Name 12/01/18 1000                   Bathing Goal 2 (OT-IRF)    Activity/Device (Bathing Goal 2, OT-IRF)  bathing skills, all  -RP        Time Frame (Bathing Goal 2, OT-IRF)  long term goal (LTG)  -RP        Progress/Outcomes (Bathing Goal 2, OT-IRF)  goal ongoing  -RP        Row Name 12/01/18 1000                   UB Dressing Goal 1 (OT-IRF)    Activity/Device (UB Dressing Goal 1, OT-IRF)  upper body dressing  -RP        [place] (UB Dress Goal 1, OT-IRF)  minimum assist (75% or more patient effort)  -RP        Time Frame (UB Dressing Goal 1, OT-IRF)  short term goal (STG)  -RP        Progress/Outcomes (UB Dressing Goal 1, OT-IRF)  goal ongoing  -RP        Row Name 12/01/18 1000                   UB Dressing Goal 2 (OT-IRF)    Activity/Device (UB Dressing Goal 2, OT-IRF)  upper body dressing  -RP        [place] (UB Dress Goal 2, OT-IRF)  supervision required  -RP        Time Frame (UB Dressing Goal 2, OT-IRF)  long term goal (LTG)  -RP        Progress/Outcomes (UB Dressing Goal 2, OT-IRF)  goal ongoing  -RP        Row Name 12/01/18 1000                   LB Dressing Goal 1 (OT-IRF)    Activity/Device (LB Dressing Goal 1, OT-IRF)  lower body dressing  -RP        [place] (LB Dressing Goal 1, OT-IRF)  maximum assist (25-49% patient effort)  -RP        Time Frame (LB Dressing Goal 1, OT-IRF)  short term goal (STG)  -RP        Progress/Outcomes (LB Dressing Goal 1, OT-IRF)  goal ongoing  -RP        Row Name 12/01/18 1000                   LB Dressing Goal 2 (OT-IRF)    Activity/Device (LB Dressing Goal 2, OT-IRF)  lower body dressing  -RP        Time Frame (LB Dressing Goal 2, OT-IRF)  long term goal (LTG)  -RP        Progress/Outcomes (LB Dressing Goal  2, OT-IRF)  goal ongoing  -RP        Row Name 12/01/18 1000                   Grooming Goal 1 (OT-IRF)    Activity/Device (Grooming Goal 1, OT-IRF)  grooming skills, all  -RP        [place] (Grooming Goal 1, OT-IRF)  minimum assist (75% or more patient effort)  -RP        Time Frame (Grooming Goal 1, OT-IRF)  short term goal (STG)  -RP        Progress/Outcomes (Grooming Goal 1, OT-IRF)  goal ongoing  -RP        Row Name 12/01/18 1000                   Grooming Goal 2 (OT-IRF)    Activity/Device (Grooming Goal 2, OT-IRF)  grooming skills, all  -RP        [place] (Grooming Goal 2, OT-IRF)  supervision required  -RP        Time Frame (Grooming Goal 2, OT-IRF)  long term goal (LTG)  -RP        Progress/Outcomes (Grooming Goal 2, OT-IRF)  goal ongoing  -RP        Row Name 12/01/18 1000                   Toileting Goal 1 (OT-IRF)    Activity/Device (Toileting Goal 1, OT-IRF)  toileting skills, all  -RP        [place] Level (Toileting Goal 1, OT-IRF)  maximum assist (25-49% patient effort)  -RP        Time Frame (Toileting Goal 1, OT-IRF)  short term goal (STG)  -RP        Progress/Outcomes (Toileting Goal 1, OT-IRF)  goal ongoing  -RP        Row Name 12/01/18 1000                   Toileting Goal 2 (OT-IRF)    Activity/Device (Toileting Goal 2, OT-IRF)  toileting skills, all  -RP        [place] Level (Toileting Goal 2, OT-IRF)  supervision required;standby assist  -RP        Time Frame (Toileting Goal 2, OT-IRF)  long term goal (LTG)  -RP        Progress/Outcomes (Toileting Goal 2, OT-IRF)  goal ongoing  -RP        Row Name 12/01/18 1000                   ROM Goal 1 (OT-IRF)    ROM Goal 1 (OT-IRF)  Pt will increase B UE ROM to approx. 3/4 shoulder flexion to assist w/ ADLs.  -RP        Time Frame (ROM Goal 1, OT-IRF)  long term goal (LTG)  -RP        Progress/Outcomes (ROM Goal 1, OT-IRF)  goal ongoing  -RP        Row Name 12/01/18 1000                   Coordination Goal 1 (OT)    Activity/Assistive  Device (Coordination Goal 1, OT)  FM task to assist w/ ADLs  -RP        [place] Level/Cues Needed (Coordination Goal 1, OT)  standby assist  -RP        Time Frame (Coordination Goal 1, OT)  long term goal (LTG)  -RP        Progress/Outcomes (Coordination Goal 1, OT)  goal ongoing  -RP        Row Name 12/01/18 1000                   Positioning and Restraints    Pre-Treatment Position  in bed  -RP        Post Treatment Position  bed  -RP        In Bed  supine;call light within reach;encouraged to call for assist  -RP          User Key  (r) = Recorded By, (t) = Taken By, (c) = Cosigned By    Initials Name Effective Dates    RP Precious De Leon OT 05/03/18 -            Occupational Therapy Education     Title: PT OT SLP Therapies (In Progress)     Topic: Occupational Therapy (In Progress)     Point: ADL training (Done)     Description: Instruct learner(s) on proper safety adaptation and remediation techniques during self care or transfers.   Instruct in proper use of assistive devices.    Learning Progress Summary           Patient Acceptance, E, VU by RP at 12/1/2018 11:48 AM    Comment:  OT educ on OT role in therapeutic process and pt's POC.                   Point: Precautions (Done)     Description: Instruct learner(s) on prescribed precautions during self-care and functional transfers.    Learning Progress Summary           Patient Acceptance, E, VU by RP at 12/1/2018 11:48 AM    Comment:  OT educ on OT role in therapeutic process and pt's POC.                               User Key     Initials Effective Dates Name Provider Type Discipline    RP 05/03/18 -  Precious De Leon OT Occupational Therapist OT                    OT Recommendation and Plan    Planned Therapy Interventions (OT Eval): activity tolerance training, adaptive equipment training, BADL retraining, functional balance retraining, neuromuscular control/coordination retraining, passive ROM/stretching, patient/caregiver education/training,  ROM/therapeutic exercise, strengthening exercise, transfer/mobility retraining, occupation/activity based interventions              Outcome Measures     Row Name 11/30/18 1200 11/29/18 1557 11/29/18 1521       How much difficulty does the patient currently have...    Turning from your back to your side while in flat bed without using bedrails?  3  -JM  3  -KH  --    Standing up from a chair using your arms (e.g., wheelchair, bedside chair)?  2  -JM  3  -KH  --    Moving from lying on back to sitting on the side of a flat bed without bedrails?  2  -JM  3  -KH  --       How much help from another person do you currently need...    Moving to and from a bed to a chair (including a wheelchair)?  3  -JM  3  -KH  --    Climbing 3-5 steps with a railing?  1  -JM  2  -KH  --    To walk in hospital room?  3  -  3  -KH  --    AM-PAC 6 Clicks Score  14  -  17  -KH  --       How much help from another is currently needed...    Putting on and taking off regular lower body clothing?  --  --  1  -SG    Bathing (including washing, rinsing, and drying)  --  --  2  -SG    Toileting (which includes using toilet bed pan or urinal)  --  --  1  -SG    Putting on and taking off regular upper body clothing  --  --  2  -SG    Taking care of personal grooming (such as brushing teeth)  --  --  2  -SG    Eating meals  --  --  2  -SG    Score  --  --  10  -SG       Functional Assessment    Outcome Measure Options  --  AM-PAC 6 Clicks Basic Mobility (PT)  -  --      User Key  (r) = Recorded By, (t) = Taken By, (c) = Cosigned By    Initials Name Provider Type    Yenny Jordan, PT Physical Therapist    Rosibel Haddad OTR Occupational Therapist    Leticia Lugo, PTA Physical Therapy Assistant            Time Calculation:     OT Start Time: 1000  OT Stop Time: 1100  OT Time Calculation (min): 60 min    Therapy Suggested Charges     Code   Minutes Charges    None           Therapy Charges for Today     Code Description  Service Date Service Provider Modifiers Qty    57780141170 HC OT THERAPEUTIC ACT EA 15 MIN 12/1/2018 Precious De Leon, OT GO 1    68077264836 HC OT SELF CARE/MGMT/TRAIN EA 15 MIN 12/1/2018 Precious De Leon OT GO 1    67106048980 HC OT EVAL MOD COMPLEXITY 2 12/1/2018 Precious De Leon OT GO 1    23312763013 HC OT SELF CARE/MGMT/TRAIN EA 15 MIN 12/1/2018 Precious De Leon OT GO 2                   Precious De Leon OT  12/1/2018

## 2018-12-02 PROBLEM — G95.9 CERVICAL MYELOPATHY: Status: ACTIVE | Noted: 2018-12-02

## 2018-12-02 LAB
GLUCOSE BLDC GLUCOMTR-MCNC: 105 MG/DL (ref 70–130)
GLUCOSE BLDC GLUCOMTR-MCNC: 113 MG/DL (ref 70–130)
GLUCOSE BLDC GLUCOMTR-MCNC: 123 MG/DL (ref 70–130)
GLUCOSE BLDC GLUCOMTR-MCNC: 196 MG/DL (ref 70–130)

## 2018-12-02 PROCEDURE — 63710000001 INSULIN LISPRO (HUMAN) PER 5 UNITS: Performed by: HOSPITALIST

## 2018-12-02 PROCEDURE — 82962 GLUCOSE BLOOD TEST: CPT

## 2018-12-02 RX ORDER — LIDOCAINE 50 MG/G
2 PATCH TOPICAL
Status: DISCONTINUED | OUTPATIENT
Start: 2018-12-02 | End: 2018-12-19 | Stop reason: HOSPADM

## 2018-12-02 RX ADMIN — HYDROCODONE BITARTRATE AND ACETAMINOPHEN 2 TABLET: 7.5; 325 TABLET ORAL at 11:46

## 2018-12-02 RX ADMIN — PIOGLITAZONE 15 MG: 15 TABLET ORAL at 07:41

## 2018-12-02 RX ADMIN — HYDROCODONE BITARTRATE AND ACETAMINOPHEN 2 TABLET: 7.5; 325 TABLET ORAL at 07:42

## 2018-12-02 RX ADMIN — METFORMIN HYDROCHLORIDE 1000 MG: 1000 TABLET ORAL at 07:41

## 2018-12-02 RX ADMIN — HYDROCODONE BITARTRATE AND ACETAMINOPHEN 2 TABLET: 7.5; 325 TABLET ORAL at 20:30

## 2018-12-02 RX ADMIN — METOPROLOL TARTRATE 25 MG: 25 TABLET ORAL at 07:41

## 2018-12-02 RX ADMIN — CYCLOBENZAPRINE 10 MG: 10 TABLET, FILM COATED ORAL at 07:42

## 2018-12-02 RX ADMIN — HYDROCODONE BITARTRATE AND ACETAMINOPHEN 2 TABLET: 7.5; 325 TABLET ORAL at 16:38

## 2018-12-02 RX ADMIN — RIVAROXABAN 20 MG: 20 TABLET, FILM COATED ORAL at 16:39

## 2018-12-02 RX ADMIN — HYDROCODONE BITARTRATE AND ACETAMINOPHEN 2 TABLET: 7.5; 325 TABLET ORAL at 01:04

## 2018-12-02 RX ADMIN — FAMOTIDINE 20 MG: 20 TABLET, FILM COATED ORAL at 07:42

## 2018-12-02 RX ADMIN — INSULIN LISPRO 2 UNITS: 100 INJECTION, SOLUTION INTRAVENOUS; SUBCUTANEOUS at 20:44

## 2018-12-02 RX ADMIN — METFORMIN HYDROCHLORIDE 1000 MG: 1000 TABLET ORAL at 16:39

## 2018-12-02 RX ADMIN — CYCLOBENZAPRINE 10 MG: 10 TABLET, FILM COATED ORAL at 01:04

## 2018-12-02 RX ADMIN — DULOXETINE HYDROCHLORIDE 60 MG: 60 CAPSULE, DELAYED RELEASE ORAL at 07:42

## 2018-12-02 RX ADMIN — METOPROLOL TARTRATE 25 MG: 25 TABLET ORAL at 20:27

## 2018-12-02 RX ADMIN — GLIPIZIDE 10 MG: 10 TABLET ORAL at 07:41

## 2018-12-02 RX ADMIN — LIDOCAINE 2 PATCH: 50 PATCH CUTANEOUS at 13:59

## 2018-12-02 RX ADMIN — CYCLOBENZAPRINE 10 MG: 10 TABLET, FILM COATED ORAL at 16:38

## 2018-12-02 RX ADMIN — ATOMOXETINE 100 MG: 60 CAPSULE ORAL at 07:42

## 2018-12-02 NOTE — PLAN OF CARE
Problem: Fall Risk (Adult)  Goal: Absence of Fall  Outcome: Ongoing (interventions implemented as appropriate)   12/02/18 0449   Fall Risk (Adult)   Absence of Fall making progress toward outcome       Problem: Pain, Acute (Adult)  Goal: Acceptable Pain Control/Comfort Level  Outcome: Ongoing (interventions implemented as appropriate)   12/02/18 0449   Pain, Acute (Adult)   Acceptable Pain Control/Comfort Level making progress toward outcome       Problem: Skin Injury Risk (Adult)  Goal: Skin Health and Integrity  Outcome: Ongoing (interventions implemented as appropriate)   12/02/18 0449   Skin Injury Risk (Adult)   Skin Health and Integrity making progress toward outcome

## 2018-12-02 NOTE — PROGRESS NOTES
Inpatient Rehabilitation Functional Measures Assessment    Functional Measures  ROCCO Eating:  Eating Score = 5. Patient is supervision/set-up for eating,  requiring: No assistive devices were required.  ROCCO Grooming: Activity was not observed.  [place] Bathing:  Activity was not observed.  ROCCO Upper Body Dressing:  Activity was not observed.  ROCCO Lower Body Dressing:  Activity was not observed.  ROCCO Toileting:  Patient requires moderate assistance for adjusting clothing  before using a toilet, commode, bedpan, or urinal. Patient requires maximal  assistance for hygiene. Patient requires maximal assistance for adjusting  clothing after using a toilet, commode, bedpan, or urinal. Patient performs 0 -  24% of toileting tasks.  Toileting Score = 1, Total Assistance. No assistive  devices were required.    ROCCO Bladder Management  Level of Assistance:  Bladder Score = 3. Patient performs 50-74% of tasks and  requires moderate assistance for bladder management. [place] provides some assist  to position the urinal, holds it in place, or empties the urinal.  Frequency/Number of Accidents this Shift:  Bladder accidents this shift:  0 .  Patient has not had an accident this shift.    ROCCO Bowel Management  Level of Assistance: Bowel Score = 7.  Patient is completely independent for  bowel management. There are no activity limitations.  Frequency/Number of Accidents this Shift: Bowel accidents this shift: 0 .  Patient has not had an accident this shift.    ROCC[place] Bed/Chair/Wheelchair Transfer:  Bed/chair/wheelchair Transfer Score = 3.  Patient performs 50-74% of effort and requires moderate assistance (some  lifting) for transferring to and from the bed/chair/wheelchair. Patient requires  the following assistive device(s): Walker. Bed rails. Grab bars. Arm rest.  [place]CC[place] Toilet Transfer:  Toilet Transfer Score = 3.  Patient performs 50-74% of  effort and requires moderate assistance (some lifting) for transferring to and  from the  toilet/commode. Patient requires the following assistive device(s):  Walker. Grab bars.  ROCCO Tub/Shower Transfer:  Activity was not observed.    Previously Documented Mode of Locomotion at Discharge: Field  ROCCO Expected Mode of Locomotion at Discharge: [facility] Walk/Wheelchair:  [facility] Stairs:  [facility] Comprehension:  [facility] Expression:  [facility] Social Interaction:  [facility] Problem Solving:  [facility] Memory:  [place]    Therapy Mode Minutes  Occupational Therapy: [place]  Physical Therapy: [place]  Speech Language Pathology:  [place]    Signed by: ALEJO Haynes

## 2018-12-02 NOTE — PROGRESS NOTES
LOS: 2 days   Patient Care Team:  Miladis Colbert APRN as PCP - General (Family Medicine)    Chief Complaint:   Cervical myelopathy    Subjective     History of Present Illness    Subjective  He complains of expected neck pain. Bilateral scapular pain. Feels strength is better. Still incoordination in right hand.     History taken from: patient    Objective     Vital Signs  Temp:  [97 °F (36.1 °C)-97.4 °F (36.3 °C)] 97.4 °F (36.3 °C)  Heart Rate:  [64-80] 64  Resp:  [18] 18  BP: (121-134)/(68-72) 134/68    Objective  MENTAL STATUS -  AWAKE / ALERT  HEENT- neck incision intact. No Drainage.   LUNGS - CTA, NO WHEEZES, RALES OR RHONCHI  HEART-S1,S2  ABD - NORMOACTIVE BOWEL SOUNDS, SOFT, NT.    EXT - NO EDEMA OR CYANOSIS  NEURO - right hand incoordination.   MOTOR EXAM - takes resistance BUE and BLE.     Results Review:     I reviewed the patient's new clinical results.  Results from last 7 days   Lab Units  11/30/18   0458  11/29/18   0553  11/28/18   0456   WBC 10*3/mm3  15.71*  14.53*  15.47*   HEMOGLOBIN g/dL  12.2*  11.8*  13.4*   HEMATOCRIT %  37.9*  35.3*  42.2   PLATELETS 10*3/mm3  219  212  238     Results from last 7 days   Lab Units  11/30/18   0458  11/29/18   0553  11/28/18   0456   SODIUM mmol/L  139  136  136   POTASSIUM mmol/L  4.0  4.2  4.3   CHLORIDE mmol/L  104  102  98   CO2 mmol/L  26.5  26.0  27.6   BUN mg/dL  26*  30*  35*   CREATININE mg/dL  0.80  0.77  1.28*   CALCIUM mg/dL  8.2*  8.0*  8.6   GLUCOSE mg/dL  71  206*  315*       Medication Review: done  Scheduled Meds:  atomoxetine 100 mg Oral Daily   DULoxetine 60 mg Oral Daily   famotidine 20 mg Oral Daily   glipiZIDE 10 mg Oral QAM AC   insulin lispro 0-9 Units Subcutaneous 4x Daily With Meals & Nightly   melatonin 3 mg Oral Nightly   metFORMIN 1,000 mg Oral BID With Meals   metoprolol tartrate 25 mg Oral Q12H   pioglitazone 15 mg Oral Daily   rivaroxaban 20 mg Oral Daily With Dinner     Continuous Infusions:   PRN Meds:.•   acetaminophen  •  albuterol  •  cyclobenzaprine  •  dextrose  •  dextrose  •  docusate sodium  •  HYDROcodone-acetaminophen  •  ondansetron **OR** ondansetron ODT **OR** ondansetron  •  sennosides-docusate sodium      Assessment/Plan       Cervical myelopathy (CMS/HCC)      Assessment & Plan  Cervical myelopathy    Immobilization syndrome status post C4, C5, 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 BEKAH reviewed. Add lidoderm patch to B scapular area.     history of morbid obesity.    Type 2 diabetes mellitus. Glipizide/metformin/actos    Hypertension - metoprolol    Atrial fibrillation - metoprolol/ Xarelto    Anxiety/depression -Cymbalta    Attention deficit disorder- Strattera    DVT prophylaxis - SCDs/ Xarelto    Now admit for comprehensive acute inpatient rehabilitation .  This would be an interdisciplinary program with physical therapy 1 hour,  occupational therapy 1 hour, and speech therapy 1 hour, 5 days a week.  Rehabilitation nursing for carryover, monitoring of  neurologic   status, bowel and bladder, and skin  Ongoing physician follow-up.  Weekly team conferences.  Goals are indeterminate .   Rehabilitation prognosis indeterminate.  Medical prognosis indeterminate.  Estimated length of stay is indeterminate.     Jose Shah MD  12/02/18  9:20 AM    Time:

## 2018-12-02 NOTE — PROGRESS NOTES
Inpatient Rehabilitation Plan of Care Note    Plan of Care  Care Plan Reviewed - No updates at this time.    Signed by: Shonda Collins RN

## 2018-12-02 NOTE — PLAN OF CARE
Problem: Patient Care Overview  Goal: Plan of Care Review  Outcome: Ongoing (interventions implemented as appropriate)   12/02/18 1518   Patient Care Overview   IRF Plan of Care Review progress ongoing, continue   Progress, Functional Goals demonstrating adequate progress   Coping/Psychosocial   Plan of Care Reviewed With patient   OTHER   Outcome Summary Patient doing better today pain well controlled, taking Lortab PRN Q 4 hours, incision intact no drainage.        Problem: Fall Risk (Adult)  Goal: Absence of Fall  Outcome: Ongoing (interventions implemented as appropriate)   12/02/18 1518   Fall Risk (Adult)   Absence of Fall making progress toward outcome       Problem: Pain, Acute (Adult)  Goal: Acceptable Pain Control/Comfort Level  Outcome: Ongoing (interventions implemented as appropriate)   12/02/18 1518   Pain, Acute (Adult)   Acceptable Pain Control/Comfort Level making progress toward outcome       Problem: Skin Injury Risk (Adult)  Goal: Skin Health and Integrity  Outcome: Ongoing (interventions implemented as appropriate)   12/02/18 1518   Skin Injury Risk (Adult)   Skin Health and Integrity making progress toward outcome

## 2018-12-02 NOTE — PROGRESS NOTES
Inpatient Rehabilitation Functional Measures Assessment    Functional Measures  [facility] Eating:  [facility] Grooming: [facility] Bathing:  [facility] Upper Body Dressing:  [facility] Lower Body Dressing:  [facility] Toileting:  [facility] Bladder Management  Level of Assistance:  [place]  Frequency/Number of Accidents this Shift:  [facility] Bowel Management  Level of Assistance: [place]  Frequency/Number of Accidents this Shift: [facility] Bed/Chair/Wheelchair Transfer:  [facility] Toilet Transfer:  [facility] Tub/Shower Transfer:  [place]    Previously Documented Mode of Locomotion at Discharge: Field  [facility] Expected Mode of Locomotion at Discharge: [facility] Walk/Wheelchair:  [facility] Stairs:  [facility] Comprehension:  Auditory comprehension is the usual mode. Comprehension  Score = 6, Modified [place].  Patient comprehends complex/abstract  information in their primary language, requiring:  [facility] Expression:  Vocal expression is the usual mode. Expression Score = 6,  Modified Independent.  Patient expresses complex/abstract information in their  primary language, requiring:  [facility] Social Interaction:  Social Interaction Score = 6, Modified Independent.  Patient is modified independent for social interaction, requiring:  [facility] Problem Solving:  Activity was not observed.  [facility] Memory:  Memory Score = 6, Modified [place].  Patient is modified  independent for memory, requiring:    Therapy Mode Minutes  Occupational Therapy: [place]  Physical Therapy: [place]  Speech Language Pathology:  [place]    Signed by: Maria Luisa Lora RN

## 2018-12-02 NOTE — PROGRESS NOTES
Inpatient Rehabilitation Functional Measures Assessment    Functional Measures  [facility] Eating:  [facility] Grooming: [facility] Bathing:  [facility] Upper Body Dressing:  [facility] Lower Body Dressing:  [facility] Toileting:  [facility] Bladder Management  Level of Assistance:  [place]  Frequency/Number of Accidents this Shift:  [facility] Bowel Management  Level of Assistance: [place]  Frequency/Number of Accidents this Shift: [facility] Bed/Chair/Wheelchair Transfer:  [facility] Toilet Transfer:  [facility] Tub/Shower Transfer:  [place]    Previously Documented Mode of Locomotion at Discharge: Field  [facility] Expected Mode of Locomotion at Discharge: [facility] Walk/Wheelchair:  [facility] Stairs:  [facility] Comprehension:  Auditory comprehension is the usual mode. Comprehension  Score = 7, Independent.  Patient comprehends complex/abstract information in  their primary language.  Patient is completely independent for auditory  comprehension.  There are no activity limitations.  [facility] Expression:  Vocal expression is the usual mode. Expression Score = 7,  Independent.  Patient expresses complex/abstract information in their primary  language.  Patient is completely independent for vocal expression.  There are no  activity limitations.  [facility] Social Interaction:  Social Interaction Score = 7, Independent. Patient is  completely independent for social interaction.  There are no activity  limitations.  [facility] Problem Solving:  Activity was not observed.  [facility] Memory:  Memory Score = 6, Modified [place].  Patient is modified  independent for memory, requiring: Requires additional time.    Therapy Mode Minutes  Occupational Therapy: [place]  Physical Therapy: [place]  Speech Language Pathology:  [place]    Signed by: Shonda Collins RN

## 2018-12-02 NOTE — PROGRESS NOTES
Inpatient Rehabilitation Plan of Care Note    Plan of Care  Care Plan Reviewed - No updates at this time.    Psychosocial    Performed Intervention(s)  Verbalizes needs & concerns      Safety    Performed Intervention(s)  Safety rounds/ Fall precautions  Items in reach, bed alarm & chair alarms      Sphincter Control    Performed Intervention(s)  Monitor intake, output, & bowel movements  Encourage appropriate diet & fluids      Body Systems    Performed Intervention(s)  Blood glucose AC & HS & insulin as ordered      Pain    Performed Intervention(s)  Medication prn & evaluate effectiveness  Monitor neck incision q shift    Signed by: Maria Luisa Lora RN

## 2018-12-03 LAB
GLUCOSE BLDC GLUCOMTR-MCNC: 110 MG/DL (ref 70–130)
GLUCOSE BLDC GLUCOMTR-MCNC: 115 MG/DL (ref 70–130)
GLUCOSE BLDC GLUCOMTR-MCNC: 123 MG/DL (ref 70–130)
GLUCOSE BLDC GLUCOMTR-MCNC: 195 MG/DL (ref 70–130)

## 2018-12-03 PROCEDURE — 97110 THERAPEUTIC EXERCISES: CPT

## 2018-12-03 PROCEDURE — 63710000001 INSULIN LISPRO (HUMAN) PER 5 UNITS: Performed by: HOSPITALIST

## 2018-12-03 PROCEDURE — 97112 NEUROMUSCULAR REEDUCATION: CPT

## 2018-12-03 PROCEDURE — 82962 GLUCOSE BLOOD TEST: CPT

## 2018-12-03 PROCEDURE — 97535 SELF CARE MNGMENT TRAINING: CPT

## 2018-12-03 RX ADMIN — METFORMIN HYDROCHLORIDE 1000 MG: 1000 TABLET ORAL at 08:36

## 2018-12-03 RX ADMIN — HYDROCODONE BITARTRATE AND ACETAMINOPHEN 2 TABLET: 7.5; 325 TABLET ORAL at 16:55

## 2018-12-03 RX ADMIN — CYCLOBENZAPRINE 10 MG: 10 TABLET, FILM COATED ORAL at 16:54

## 2018-12-03 RX ADMIN — HYDROCODONE BITARTRATE AND ACETAMINOPHEN 2 TABLET: 7.5; 325 TABLET ORAL at 04:41

## 2018-12-03 RX ADMIN — PIOGLITAZONE 15 MG: 15 TABLET ORAL at 08:36

## 2018-12-03 RX ADMIN — METOPROLOL TARTRATE 25 MG: 25 TABLET ORAL at 20:57

## 2018-12-03 RX ADMIN — CYCLOBENZAPRINE 10 MG: 10 TABLET, FILM COATED ORAL at 08:37

## 2018-12-03 RX ADMIN — METOPROLOL TARTRATE 25 MG: 25 TABLET ORAL at 08:36

## 2018-12-03 RX ADMIN — GLIPIZIDE 10 MG: 10 TABLET ORAL at 08:37

## 2018-12-03 RX ADMIN — HYDROCODONE BITARTRATE AND ACETAMINOPHEN 2 TABLET: 7.5; 325 TABLET ORAL at 12:45

## 2018-12-03 RX ADMIN — INSULIN LISPRO 2 UNITS: 100 INJECTION, SOLUTION INTRAVENOUS; SUBCUTANEOUS at 20:57

## 2018-12-03 RX ADMIN — DULOXETINE HYDROCHLORIDE 60 MG: 60 CAPSULE, DELAYED RELEASE ORAL at 08:37

## 2018-12-03 RX ADMIN — HYDROCODONE BITARTRATE AND ACETAMINOPHEN 2 TABLET: 7.5; 325 TABLET ORAL at 00:40

## 2018-12-03 RX ADMIN — HYDROCODONE BITARTRATE AND ACETAMINOPHEN 2 TABLET: 7.5; 325 TABLET ORAL at 20:57

## 2018-12-03 RX ADMIN — METFORMIN HYDROCHLORIDE 1000 MG: 1000 TABLET ORAL at 17:23

## 2018-12-03 RX ADMIN — ATOMOXETINE 100 MG: 60 CAPSULE ORAL at 08:36

## 2018-12-03 RX ADMIN — CYCLOBENZAPRINE 10 MG: 10 TABLET, FILM COATED ORAL at 00:40

## 2018-12-03 RX ADMIN — FAMOTIDINE 20 MG: 20 TABLET, FILM COATED ORAL at 08:37

## 2018-12-03 RX ADMIN — LIDOCAINE 2 PATCH: 50 PATCH CUTANEOUS at 12:45

## 2018-12-03 RX ADMIN — HYDROCODONE BITARTRATE AND ACETAMINOPHEN 2 TABLET: 7.5; 325 TABLET ORAL at 08:38

## 2018-12-03 RX ADMIN — RIVAROXABAN 20 MG: 20 TABLET, FILM COATED ORAL at 17:23

## 2018-12-03 NOTE — PLAN OF CARE
Problem: Patient Care Overview  Goal: Plan of Care Review  Outcome: Ongoing (interventions implemented as appropriate)   12/03/18 0330   Patient Care Overview   IRF Plan of Care Review progress ongoing, continue   Progress, Functional Goals demonstrating adequate progress   Coping/Psychosocial   Plan of Care Reviewed With patient   OTHER   Outcome Summary Patient calm, cooperative, and pleasant. Wife assisting patient. Lortab given q 4 hours and Flexeril given q 8 hours. Incision CDI with steri strips. No unsafe behaviors.        Problem: Fall Risk (Adult)  Goal: Absence of Fall  Outcome: Ongoing (interventions implemented as appropriate)   12/03/18 0330   Fall Risk (Adult)   Absence of Fall making progress toward outcome       Problem: Pain, Acute (Adult)  Goal: Acceptable Pain Control/Comfort Level  Outcome: Ongoing (interventions implemented as appropriate)   12/03/18 0330   Pain, Acute (Adult)   Acceptable Pain Control/Comfort Level making progress toward outcome       Problem: Skin Injury Risk (Adult)  Goal: Skin Health and Integrity  Outcome: Ongoing (interventions implemented as appropriate)   12/03/18 0330   Skin Injury Risk (Adult)   Skin Health and Integrity making progress toward outcome

## 2018-12-03 NOTE — PLAN OF CARE
Problem: Patient Care Overview  Goal: Plan of Care Review  Outcome: Ongoing (interventions implemented as appropriate)   12/03/18 1521   Patient Care Overview   IRF Plan of Care Review progress ongoing, continue   Progress, Functional Goals demonstrating adequate progress   Coping/Psychosocial   Plan of Care Reviewed With patient   OTHER   Outcome Summary Mr. Ramirez has done well today. He consistently rates his pain a 4-5, but states that is a very tolerable number for him. He has been wearing his soft collar at times for comfort. He has voided today and is continent of bowel and bladder. Still has bruising present. Steri strips remain intact to incision. Incision clean and dry and open to air (covered for shower). Wife has been at bedside all day today. Blood sugars within normal limits, no supplemental insulin needed. No unsafe behaviors and no further concerns at this time.      Goal: Coping Plan  Outcome: Ongoing (interventions implemented as appropriate)      Problem: Fall Risk (Adult)  Goal: Absence of Fall  Outcome: Ongoing (interventions implemented as appropriate)      Problem: Pain, Acute (Adult)  Goal: Acceptable Pain Control/Comfort Level  Outcome: Ongoing (interventions implemented as appropriate)      Problem: Skin Injury Risk (Adult)  Goal: Skin Health and Integrity  Outcome: Ongoing (interventions implemented as appropriate)

## 2018-12-03 NOTE — THERAPY TREATMENT NOTE
Inpatient Rehabilitation - Occupational Therapy Treatment Note    [facility]     Patient Name: Duane Mark Witten  : 1955  MRN: [id number]    Today's Date: 12/3/2018                 Admit Date: 2018      Visit Dx:    ICD-10-CM ICD-9-CM   1. Cervical stenosis of spinal canal M48.02 723.0   2. Weakness of both lower extremities R29.898 729.89   3. Paresthesia of both upper extremities R20.2 782.0    M79.601 729.5    M79.602    4. Morbid obesity with BMI of 40.0-44.9, adult (CMS/[facility]) E66.01 278.01    Z68.41 V85.41   5. Impaired functional mobility, balance, gait, and endurance Z74.09 V49.89   6. Functional gait abnormality R26.89 781.2       Patient Active Problem List   Diagnosis   • YANELI (generalized anxiety disorder)   • ADD (attention deficit disorder)   • Depression   • Diabetes type 2, controlled (CMS/[facility])   • ED (erectile dysfunction) of non-organic origin   • HLD (hyperlipidemia)   • Essential hypertension   • Arthritis   • Psoriasis   • Testosterone deficiency   • Primary insomnia   • Weakness of both lower extremities   • Paresthesia of both upper extremities   • Morbid obesity with BMI of 40.0-44.9, adult (CMS/[facility])   • Adverse effect of corticosteroids   • Type 2 diabetes mellitus with hyperglycemia (CMS/[facility])   • Cervical stenosis of spinal canal   • Edema of cervical spinal cord (CMS/[facility])   • Postoperative atrial fibrillation (CMS/[facility])   • Cervical myelopathy (CMS/[facility])         Therapy Treatment    IRF Treatment Summary     Row Name 18 1539 18 1138 18 1137       Evaluation/Treatment Time and Intent    Subjective Information  no complaints  -AF  no complaints  -AF  no complaints  -AF    Existing Precautions/Restrictions  fall;spinal;other (see comments) brace for comfort  -AF  fall;spinal brace for comfort   -AF  --    Document Type  therapy note (daily note)  -AF  therapy note (daily note)  -AF  --    Mode of Treatment  occupational therapy  -AF  occupational therapy  -AF  --     Patient/Family Observations  sitting up in recliner chair with wife present   -AF  sitting up in recliner chair with wife present   -AF  --    Recorded by [AF] Lolis Cooley OTR [AF] Lolis Cooley OTR [AF] Lolis Cooley, OTR    Row Name 12/03/18 0900             Evaluation/Treatment Time and Intent    Subjective Information  no complaints  -EE      Existing Precautions/Restrictions  fall;spinal  -EE      Document Type  therapy note (daily note)  -EE      Mode of Treatment  physical therapy  -EE      Patient/Family Observations  Pt sitting EOB in no acute distress; spouse present  -EE      Recorded by [EE] Cathleen Rosario, PT      Row Name 12/03/18 1539 12/03/18 1138 12/03/18 0900       Cognition/Psychosocial- PT/OT    Affect/Mental Status (Cognitive)  WFL  -AF  WFL  -AF  WFL  -EE    Orientation Status (Cognition)  oriented x 4  -AF  oriented x 4  -AF  oriented x 4  -EE    Follows Commands (Cognition)  WFL  -AF  WFL  -AF  WFL  -EE    Personal Safety Interventions  fall prevention program maintained;gait belt;nonskid shoes/slippers when out of bed  -AF  fall prevention program maintained;gait belt;nonskid shoes/slippers when out of bed  -AF  fall prevention program maintained;gait belt;muscle strengthening facilitated;nonskid shoes/slippers when out of bed;supervised activity  -EE    Recorded by [AF] Lolis Cooley, OTJEANINE [AF] Lolis Cooley, OTR [EE] Cathleen Rosario, PT    Row Name 12/03/18 0900             Mobility    Additional Documentation  Wheelchair Mobility/Management (Group)  -EE      Recorded by [EE] Cathleen Rosario, PT      Row Name 12/03/18 0900             Bed Mobility Assessment/Treatment    Rolling Right [place] (Bed Mobility)  contact guard;minimum assist (75% patient effort);verbal cues  -EE      Supine-Sit [place] (Bed Mobility)  moderate assist (50% patient effort);verbal cues  -EE      Sit-Supine [place] (Bed Mobility)  minimum assist (75% patient effort);verbal cues  -EE       Bed Mobility, Safety Issues  decreased use of arms for pushing/pulling  -EE      Comment (Bed Mobility)  performed on mat table without bed rails; verbal cues required for log roll technique  -EE      Recorded by [EE] Cathleen Rosario, PT      Row Name 12/03/18 1138             Functional Mobility    Functional Mobility- Comment  walked short distance to and from w/c and recliner chair wtih RWX with MIN   -AF      Recorded by [AF] Lolis Cooley OTR      Row Name 12/03/18 1539 12/03/18 1138 12/03/18 0900       Transfer Assessment/Treatment    Transfer Assessment/Treatment  --  sit-stand transfer;stand-sit transfer;shower transfer  -AF  car transfer  -EE    Comment (Transfers)  recliner chair to w/c RWX MIN   -AF  --  Practiced sit <> stand to/from seat of car. Did not place body/legs fully into car due to height and spinal precautions, unable to safely duck head under door frame of car without excessive bending of cervical spine.   (Significant)   -EE    Recorded by [AF] Lolis Cooley OTR [AF] Lolis Cooley OTR [EE] Cathleen Rosario, PT    Row Name 12/03/18 0900             Bed-Chair Transfer    Bed-Chair [place] (Transfers)  minimum assist (75% patient effort);verbal cues  -EE      Assistive Device (Bed-Chair Transfers)  walker, front-wheeled  -EE      Recorded by [EE] Cathleen Rosario, PT      Row Name 12/03/18 1539 12/03/18 0900          Chair-Bed Transfer    Chair-Bed [place] (Transfers)  minimum assist (75% patient effort);verbal cues  -AF  minimum assist (75% patient effort);verbal cues  -EE     Assistive Device (Chair-Bed Transfers)  walker, front-wheeled;wheelchair  -AF  walker, front-wheeled  -EE     Recorded by [AF] Lolis Cooley OTR [EE] Cathleen Rosario, PT     Row Name 12/03/18 0900             Sit-Stand Transfer    Sit-Stand [place] (Transfers)  minimum assist (75% patient effort);verbal cues  -EE      Assistive Device (Sit-Stand Transfers)  walker, front-wheeled  -EE       Recorded by [EE] Cathleen Rosario, HAILEY      Row Name 12/03/18 0900             Stand-Sit Transfer    Stand-Sit [place] (Transfers)  minimum assist (75% patient effort);verbal cues  -EE      Assistive Device (Stand-Sit Transfers)  walker, front-wheeled  -EE      Recorded by [EE] Cathleen Rosario, HAILEY      Row Name 12/03/18 1138             Shower Transfer    Type (Shower Transfer)  stand pivot/stand step  -AF      [place] Level (Shower Transfer)  minimum assist (75% patient effort);verbal cues to use LEs to push and less UEs  -AF      Assistive Device (Shower Transfer)  grab bars/tub rail;tub bench;wheelchair  -AF      Recorded by [AF] Lolis Cooley OTR      Row Name 12/03/18 0900             Car Transfer    Type (Car Transfer)  stand pivot/stand step  -EE      [place] Level (Car Transfer)  minimum assist (75% patient effort);verbal cues  -EE      Assistive Device (Car Transfer)  walker, front-wheeled  -EE      Recorded by [EE] Cathleen Rosario, HAILEY      Row Name 12/03/18 0900             Gait/Stairs Assessment/Training    [place] Level (Gait)  minimum assist (75% patient effort);verbal cues  -EE      Assistive Device (Gait)  walker, front-wheeled  -EE      Distance in Feet (Gait)  80' x 3  -EE      Pattern (Gait)  step-to;step-through  -EE      Deviations/Abnormal Patterns (Gait)  ataxic;sarah decreased;stride length decreased  -EE      Bilateral Gait Deviations  weight shift ability decreased;forward flexed posture;heel strike decreased  -EE      Recorded by [EE] Cathleen Rosario, HAILEY      Row Name 12/03/18 0900             Wheelchair Mobility/Management    Method of Wheelchair Locomotion (Mobility)  bipedal (lower extremity) propulsion  -EE      Mobility Activities (Wheelchair)  forward propulsion;steering;turning;doorway navigation  -EE      Forward Propulsion [place] (Wheelchair)  supervision  -EE      Steering [place] (Wheelchair)  supervision  -EE      Turning [place] (Wheelchair)   supervision  -EE      Doorway Navigation [employer] (Wheelchair)  supervision  -EE      Distance Propelled in Feet (Wheelchair)  160  -EE      Recorded by [EE] Cathleen Rosario, HAILEY      Row Name 12/03/18 0900             Safety Issues, Functional Mobility    Impairments Affecting Function (Mobility)  balance;pain;endurance/activity tolerance;strength;sensation/sensory awareness  -EE      Recorded by [EE] Cathleen Rosario, HAILEY      Row Name 12/03/18 1138             Bathing Assessment/Treatment    Bathing [employer] Level  bathing skills;upper body;contact guard assist;lower body;moderate assist (50% patient effort)  -AF      Bathing Position  supported sitting;supported standing  -AF      Bathing Setup Assistance  obtain supplies;adjust water temperature  -AF      Comment (Bathing)  seated on TTB in shower   -AF      Recorded by [AF] Lolis Cooley OTR      Row Name 12/03/18 1138             Upper Body Dressing Assessment/Treatment    Upper Body Dressing Task  doff;don;pull over garment;moderate assist (50-74% patient effort);verbal cues  -AF      Upper Body Dressing Position  supported sitting  -AF      Set-up Assistance (Upper Body Dressing)  obtain clothing  -AF      Comment (Upper Body Dressing)  A with pulling over head   -AF      Recorded by [AF] Lolis Cooley OTR      Row Name 12/03/18 1138             Lower Body Dressing Assessment/Treatment    Lower Body Dressing [employer] Level  doff;don;pants/bottoms;shoes/slippers;socks;underwear;maximum assist (25% patient effort);verbal cues  -AF      Lower Body Dressing Position  unsupported sitting;supported standing  -AF      Lower Body Dressing Setup Assistance  obtain clothing  -AF      Comment (Lower Body Dressing)  vc's for technique  -AF      Recorded by [AF] Lolis Cooley OTR      Row Name 12/03/18 1138             Grooming Assessment/Treatment    Grooming [employer] Level  deodorant application;set up  -AF      Comment (Grooming)  decreased  ability to manipulate the comb R >L hand, assist to comb the back to his hair  -AF      Recorded by [AF] Lolis Cooley OTR      Row Name 12/03/18 1539             Fine Motor Testing & Training    Comment, Fine Motor Coordination  pink sponge in R hand, blue sponge in L hand, FMC task alternating R and L hand with placing pegs into resistive peg board with min/mod difficulty  -AF      Recorded by [AF] Lolis Cooley OTR      Row Name 12/03/18 1539             Sensory    Sensory General Assessment  stereognosis deficits identified  -AF      Recorded by [AF] Lolis Cooley OTR      Row Name 12/03/18 1539             Stereognosis Assessment    Left Hand Stereognosis  absent sensation  -AF      Left Hand Stereognosis Detail  able to hold item unable to tell what it is  -AF      Right Hand Stereognosis  absent sensation  -AF      Right Hand Stereognosis Detail  able to hold item but unable to tell what it is  -AF      Recorded by [AF] Lolis Cooley OTR      Row Name 12/03/18 0900             Pain Assessment    Additional Documentation  Pain Scale: Numbers Pre/Post-Treatment (Group)  -EE      Recorded by [EE] Cathleen Rosario, PT      Row Name 12/03/18 1539 12/03/18 1138 12/03/18 0900       Pain Scale: Numbers Pre/Post-Treatment    Pain Scale: Numbers, Pretreatment  3/10  -AF  3/10  -AF  4/10  -EE    Pain Scale: Numbers, Post-Treatment  3/10  -AF  3/10  -AF  4/10  -EE    Pain Location  neck  -AF  neck  -AF  neck and back  -EE    Pre/Post Treatment Pain Comment  --  --  tolerated tx  -EE    Pain Intervention(s)  Repositioned  -AF  Repositioned  -AF  Repositioned;Ambulation/increased activity;Medication (See MAR) medicated prior to tx  -EE    Recorded by [AF] Lolis Cooley, NURIS [AF] Lolis Cooley, NURIS [EE] Cathleen Rosario, PT    Row Name 12/03/18 0900             Therapeutic Exercise    Therapeutic Exercise  seated, lower extremities;supine, lower extremities  -EE      Additional Documentation  Therapeutic  Exercise (Row)  -EE      Recorded by [EE] Cathleen Rosario PT      Row Name 12/03/18 0900             Lower Extremity Seated Therapeutic Exercise    Performed, Seated Lower Extremity (Therapeutic Exercise)  hip flexion/extension;hip abduction/adduction;ankle dorsiflexion/plantarflexion;LAQ (long arc quad), knee extension;knee flexion/extension  -EE      Device, Seated Lower Extremity (Therapeutic Exercise)  elastic bands/tubing;free weights, cuff red band, 1.5# weights  -EE      Exercise Type, Seated Lower Extremity (Therapeutic Exercise)  resistive exercise  -EE      Sets/Reps Detail, Seated Lower Extremity (Therapeutic Exercise)  1/15  -EE      Recorded by [EE] Cathleen Rosario PT      Row Name 12/03/18 0900             Lower Extremity Supine Therapeutic Exercise    Performed, Supine Lower Extremity (Therapeutic Exercise)  SLR (straight leg raise);SAQ (short arc quad) over bolster;hip abduction/adduction;heel slides;bridging (bilateral w/bolster)  -EE      Exercise Type, Supine Lower Extremity (Therapeutic Exercise)  AROM (active range of motion)  -EE      Sets/Reps Detail, Supine Lower Extremity (Therapeutic Exercise)  1/15  -EE      Recorded by [EE] Cathleen Rosario PT      Row Name 12/03/18 1539 12/03/18 1138 12/03/18 0900       Positioning and Restraints    Pre-Treatment Position  sitting in chair/recliner  -AF  sitting in chair/recliner  -AF  in bed  -EE    Post Treatment Position  bed  -AF  chair  -AF  wheelchair  -EE    In Bed  sitting EOB;with family/caregiver in PM  -AF  --  --    In Chair  --  sitting;call light within reach;encouraged to call for assist;exit alarm on;with family/caregiver wife present   -AF  --    In Wheelchair  --  --  sitting;call light within reach;encouraged to call for assist;with family/caregiver  -EE    Recorded by [AF] Lolis Cooley, NURIS [AF] Lolis Cooley, NURIS [EE] Cathleen Rosario PT      User Key  (r) = Recorded By, (t) = Taken By, (c) = Cosigned By    Initials Name Effective  Dates    EE Cathleen Rosario, PT 04/03/18 -     AF Lolis Cooley, OTR 04/03/18 -           Wound 11/27/18 1318 Other (See comments) neck incision (Active)   Dressing Appearance dry;intact 12/3/2018  7:31 AM   Closure Adhesive closure strips 12/3/2018  7:31 AM   Base clean 12/3/2018  7:31 AM   Drainage Amount none 12/3/2018  7:31 AM         OT Recommendation and Plan                 OT IRF GOALS     Row Name 12/01/18 1000             Transfer Goal 1 (OT-IRF)    Activity/Assistive Device (Transfer Goal 1, OT-IRF)  toilet  -RP      [place] Level (Transfer Goal 1, OT-IRF)  minimum assist (75% or more patient effort)  -RP      Time Frame (Transfer Goal 1, OT-IRF)  short term goal (STG)  -RP      Progress/Outcomes (Transfer Goal 1, OT-IRF)  goal ongoing  -RP         Transfer Goal 2 (OT-IRF)    Activity/Assistive Device (Transfer Goal 2, OT-IRF)  toilet  -RP      [place] Level (Transfer Goal 2, OT-IRF)  supervision required  -RP      Time Frame (Transfer Goal 2, OT-IRF)  long term goal (LTG)  -RP      Progress/Outcomes (Transfer Goal 2, OT-IRF)  goal ongoing  -RP         Bathing Goal 1 (OT-IRF)    Activity/Device (Bathing Goal 1, OT-IRF)  bathing skills, all  -RP      [place] Level (Bathing Goal 1, OT-IRF)  moderate assist (50-74% patient effort)  -RP      Time Frame (Bathing Goal 1, OT-IRF)  short term goal (STG)  -RP      Progress/Outcomes (Bathing Goal 1, OT-IRF)  goal ongoing  -RP         Bathing Goal 2 (OT-IRF)    Activity/Device (Bathing Goal 2, OT-IRF)  bathing skills, all  -RP      [place] Level (Bathing Goal 2, OT-IRF)  supervision required;standby assist  -RP      Time Frame (Bathing Goal 2, OT-IRF)  long term goal (LTG)  -RP      Progress/Outcomes (Bathing Goal 2, OT-IRF)  goal ongoing  -RP         UB Dressing Goal 1 (OT-IRF)    Activity/Device (UB Dressing Goal 1, OT-IRF)  upper body dressing  -RP      [place] (UB Dress Goal 1, OT-IRF)  minimum assist (75% or more patient effort)  -RP       Time Frame (UB Dressing Goal 1, OT-IRF)  short term goal (STG)  -RP      Progress/Outcomes (UB Dressing Goal 1, OT-IRF)  goal ongoing  -RP         UB Dressing Goal 2 (OT-IRF)    Activity/Device (UB Dressing Goal 2, OT-IRF)  upper body dressing  -RP      [place] (UB Dress Goal 2, OT-IRF)  supervision required  -RP      Time Frame (UB Dressing Goal 2, OT-IRF)  long term goal (LTG)  -RP      Progress/Outcomes (UB Dressing Goal 2, OT-IRF)  goal ongoing  -RP         LB Dressing Goal 1 (OT-IRF)    Activity/Device (LB Dressing Goal 1, OT-IRF)  lower body dressing  -RP      [place] (LB Dressing Goal 1, OT-IRF)  maximum assist (25-49% patient effort)  -RP      Time Frame (LB Dressing Goal 1, OT-IRF)  short term goal (STG)  -RP      Progress/Outcomes (LB Dressing Goal 1, OT-IRF)  goal ongoing  -RP         LB Dressing Goal 2 (OT-IRF)    Activity/Device (LB Dressing Goal 2, OT-IRF)  lower body dressing  -RP      [place] (LB Dressing Goal 2, OT-IRF)  supervision required;standby assist  -RP      Time Frame (LB Dressing Goal 2, OT-IRF)  long term goal (LTG)  -RP      Progress/Outcomes (LB Dressing Goal 2, OT-IRF)  goal ongoing  -RP         Grooming Goal 1 (OT-IRF)    Activity/Device (Grooming Goal 1, OT-IRF)  grooming skills, all  -RP      [place] (Grooming Goal 1, OT-IRF)  minimum assist (75% or more patient effort)  -RP      Time Frame (Grooming Goal 1, OT-IRF)  short term goal (STG)  -RP      Progress/Outcomes (Grooming Goal 1, OT-IRF)  goal ongoing  -RP         Grooming Goal 2 (OT-IRF)    Activity/Device (Grooming Goal 2, OT-IRF)  grooming skills, all  -RP      [place] (Grooming Goal 2, OT-IRF)  supervision required  -RP      Time Frame (Grooming Goal 2, OT-IRF)  long term goal (LTG)  -RP      Progress/Outcomes (Grooming Goal 2, OT-IRF)  goal ongoing  -RP         Toileting Goal 1 (OT-IRF)    Activity/Device (Toileting Goal 1, OT-IRF)  toileting skills, all  -RP      [place] Level (Toileting  Goal 1, OT-IRF)  maximum assist (25-49% patient effort)  -RP      Time Frame (Toileting Goal 1, OT-IRF)  short term goal (STG)  -RP      Progress/Outcomes (Toileting Goal 1, OT-IRF)  goal ongoing  -RP         Toileting Goal 2 (OT-IRF)    Activity/Device (Toileting Goal 2, OT-IRF)  toileting skills, all  -RP      [place] Level (Toileting Goal 2, OT-IRF)  supervision required;standby assist  -RP      Time Frame (Toileting Goal 2, OT-IRF)  long term goal (LTG)  -RP      Progress/Outcomes (Toileting Goal 2, OT-IRF)  goal ongoing  -RP         ROM Goal 1 (OT-IRF)    ROM Goal 1 (OT-IRF)  Pt will increase B UE ROM to approx. 3/4 shoulder flexion to assist w/ ADLs.  -RP      Time Frame (ROM Goal 1, OT-IRF)  long term goal (LTG)  -RP      Progress/Outcomes (ROM Goal 1, OT-IRF)  goal ongoing  -RP        User Key  (r) = Recorded By, (t) = Taken By, (c) = Cosigned By    Initials Name Provider Type    Precious Hamilton, OT Occupational Therapist                 Time Calculation:     Time Calculation- OT     Row Name 12/03/18 1544 12/03/18 1146          Time Calculation- OT    OT Start Time  1430  -AF  1030  -AF     OT Stop Time  1500  -AF  1130  -AF     OT Time Calculation (min)  30 min  -AF  60 min  -AF       User Key  (r) = Recorded By, (t) = Taken By, (c) = Cosigned By    Initials Name Provider Type    AF Lolis Cooley OTR Occupational Therapist          Therapy Suggested Charges     Code   Minutes Charges    None              Therapy Charges for Today     Code Description Service Date Service Provider Modifiers Qty    56036917571 HC OT SELF CARE/MGMT/TRAIN EA 15 MIN 12/3/2018 Lolis Cooley OTR GO 4    13623511081 HC OT NEUROMUSC RE EDUCATION EA 15 MIN 12/3/2018 Lolis Cooley OTR GO 2                   NURIS Grimm  12/3/2018

## 2018-12-03 NOTE — PROGRESS NOTES
Case Management  Inpatient Rehabilitation Plan of Care and Discharge Plan Note    Rehabilitation Diagnosis:  Branch  Date of Onset:  Branch    Medical Summary:  Branch  Past Medical History: Branch    Plan of Care  Updated Problems/Interventions  Field    Expected Intensity:  Branch  Interdisciplinary Team:  Cristian  Estimated Length of Stay/Anticipated Discharge Date: Branch  Anticipated Discharge Destination:  Anticipated discharge destination from inpatient rehabilitation is community  discharge with assistance. Pt lives in a bi-level home, 2 steps to enter, then 5  steps to the living area. Pt will have 24 hour assistance from his wife at  discharge.      Based on the patient's medical and functional status, their prognosis and  expected level of functional improvement is:  Cristian    Signed by: SEBASTIAN Lizarraga

## 2018-12-03 NOTE — PROGRESS NOTES
Inpatient Rehabilitation Plan of Care Note    Plan of Care  Care Plan Reviewed - No updates at this time.    Psychosocial    Performed Intervention(s)  Verbalizes needs & concerns      Safety    Performed Intervention(s)  Safety rounds/ Fall precautions  Items in reach, bed alarm & chair alarms      Sphincter Control    Performed Intervention(s)  Monitor intake, output, & bowel movements  Encourage appropriate diet & fluids      Body Systems    Performed Intervention(s)  Blood glucose AC & HS & insulin as ordered      Pain    Performed Intervention(s)  Medication prn & evaluate effectiveness  Monitor neck incision q shift    Signed by: Marissa Torres RN

## 2018-12-03 NOTE — PROGRESS NOTES
Case Management  Inpatient Rehabilitation Plan of Care and Discharge Plan Note    Rehabilitation Diagnosis:  Incomplete quad - cervical myelopathy  Date of Onset:  11/27/18    Medical Summary:  Admitted with weakness [place] LEs, parasthesia [place] UEs, symptoms  have gradually worsened over the past 3 weeks; to PCP 2 weeks ago and 2 days PTA  - has had 2 falls secondary leg giving out, denies striking head or LOC; stopped  trulicity after seeing PCP due to concern regarding side effect of weakness  Past Medical History: Lasik; HTN, hyperlipid; morbid obesity; DM; ADD,  genralized anxiety disorder, depression    Plan of Care  Updated Problems/Interventions      Expected Intensity:  Average of 3 hours of therapy 5 days/week.  Interdisciplinary Team:  Interdisciplinary Team: Medical Supervision and 24 Hour Rehabilitation Nursing.,  Physical Therapy:, Occupational Therapy:, Social Work, Therapeutic Recreation.  Physical Therapy Intensity/Duration: 90 minutes/day, 5 days/week  Occupational Therapy Intensity/Duration: 90 minutes/day, 5 days/week  Estimated Length of Stay/Anticipated Discharge Date: ELOS: 10 - 14 days  Anticipated Discharge Destination:  Anticipated discharge destination from inpatient rehabilitation is community  discharge with assistance. Pt lives in a bi-level home, 2 steps to enter, then 5  steps to the living area. Pt will have 24 hour assistance from his wife at  discharge.      Based on the patient's medical and functional status, their prognosis and  expected level of functional improvement is:  Supervision    Signed by: Buck Gan RN

## 2018-12-03 NOTE — PROGRESS NOTES
[facility] CMG Coordinator  Inpatient Rehabilitation Admission    Ethnic Group: White.  Marital Status:  Marital Status: .    IRF Admission Date:  11/30/2018  Admission Class: Initial Rehab.  Admit From:  [facility]    Pre-Hospital Living: Home. Pre-Hospital Living  With: (2) Family/Relatives.    Payment Sources: Primary: Not Listed.  Secondary: Not Listed.  Impairment Group: [id number] Quadriplegia, Incomplete C5-8  Date of Onset of Impairment: 11/27/2018    Etiologic Diagnosis Code(s):  Rank Code      Description  1    M48.02    Spinal stenosis, cervical region    Comorbidities:      Are there any arthritis conditions recorded for Impairment Group, Etiologic  Diagnosis, or Comorbid Conditions that meet all of the regulatory requirements  for IRF classification (in 42 .29(b)(2)(x), (xi), and xii))? No    ROCCO Bladder Accidents:  0 - Accidents.    Patient used medications/device this  shift.  12/1/2018 7:39:00 AM  Bladder Score = 4.  Two (2) bladder accidents.  ROCCO Bowel Accident: 0 -Accidents.  Bowel Score = 7. Patient has no accidents.    Presence of Pressure Ulcer:  No observed/documented pressure ulcers.    MEDICAL NEEDS  Height on Admission:  75 inches.  Weight on Admission:  327 pounds.    QUALITY INDICATORS  Prior Functioning:  Self Care: Patient needed partial assistance from another person to complete  activities.  Indoor Mobility: Patient needed partial assistance from another person to  complete activities.  Stairs: Patient needed partial assistance from another person to complete  activities.  Functional Cognition: Patient completed the activities by him/herself, with or  without an assistive device, with no assistance from a helper.  Prior Device Use: Patient does not use manual or motorized wheelchair or  scooter, mechanical lift, walker, or an orthotic/prosthesis.    Bladder and Bowel: Bladder Continence: Always continent (no documented  incontinence).  Bowel Continence: Always  continent (no documented incontinence).  Swallowing/Nutritional Status: Regular food (solids and liquids swallowed safely  without supervision or modified food or liquid consistency).  Special Conditions: Patient did not receive total parenteral nutrition treatment  at the time of admission.    Section I. Active Diagnosis: Comorbidities and Co-existing Conditions:  Diabetes Mellitus (DM) - e.g., diabetic retinopathy, nephropathy, and  neuropathy).  Section J. Health Conditions: Patient has had two or more falls, or a fall with  injury, in the past year. Patient has had major surgery during the 100 days  prior to admission.  Section M. Skin Conditions  Unhealed Pressure Ulcer/Injuries at Stage 1 or  Higher on Admission:  No.  Section N. Medication:  Potential Clinically Significant Medication Issues: No issues found during  review    Signed by: Buck Gan RN

## 2018-12-03 NOTE — CONSULTS
"Adult Nutrition  Assessment/PES    Patient Name:  Duane Mark Witten  YOB: 1955  MRN: [id number]  Admit Date:  11/30/2018    Assessment Date:  12/3/2018    Comments:  Rehab assessment complete. S/p c-spine surgery. Pt is morbidly obese with BMI 41, however down approx 46 lb over 2 year period. He is on multiple OHAs for his type 2 diabetes. Currently on a regular diet - added diabetic (consistent carb) restriction to help with BG management, however pt requested to stay on regular due to his intolerance to artificial sweeteners. He says they make his joints swollen and he fears long-term side effects such as cancer. Aic in 5/2018 = 10.8. Do not see a more recent value. He denies need for any diet education. When asked what he does diet-wise to help manage BG, he says \"just watch all things, like not eating mashed potatoes and macaroni & cheese at same meal; try to avoid desserts.\" Will continue to monitor and work with pt on diet tx as he desires.     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         Body Mass Index (BMI)    BMI Assessment  BMI 40 or greater: obesity grade III      Labs/Tests/Procedures/Meds     Row Name 12/03/18 1057          Labs/Procedures/Meds    Lab Results Reviewed  reviewed     Lab Results Comments  glu        Diagnostic Tests/Procedures    Diagnostic Test/Procedure Reviewed  reviewed, pertinent " "    Diagnostic Test/Procedures Comments  C4, C5, C6 anterior cervical corpectomy and cervical plate from C3 through C7 posteriorly        Medications    Pertinent Medications Reviewed  reviewed     Pertinent Medications Comments  OHAs, ppi, insulin         Physical Findings     Row Name 12/03/18 1059          Physical Findings    Overall Physical Appearance  obese     Skin  other (see comments) neck incision         Estimated/Assessed Needs     Row Name 12/03/18 1059 12/03/18 1057       Calculation Measurements    Weight Used For Calculations  148 kg (326 lb 4.5 oz)  --    Height  --  190.5 cm (75\")       Estimated/Assessed Needs    Additional Documentation  Protein Requirements (Group);KCAL/KG (Group)  --       KCAL/KG    14 Kcal/Kg (kcal)  2072  --    15 Kcal/Kg (kcal)  2220  --    18 Kcal/Kg (kcal)  2664  --    20 Kcal/Kg (kcal)  2960  --    25 Kcal/Kg (kcal)  3700  --    30 Kcal/Kg (kcal)  4440  --    35 Kcal/Kg (kcal)  5180  --    40 Kcal/Kg (kcal)  5920  --    45 Kcal/Kg (kcal)  6660  --    50 Kcal/Kg (kcal)  7400  --    kcal/kg (Specify)  15  --       Protein Requirements    Est Protein Requirement Amount (gms/kg)  0.8 gm protein  --    Estimated Protein Requirements (gms/day)  118.4  --       Fluid Requirements       --        Nutrition Prescription Ordered     Row Name 12/03/18 1100          Nutrition Prescription PO    Current PO Diet  Regular     Fluid Consistency  Thin         Evaluation of Received Nutrient/Fluid Intake     Row Name 12/03/18 1100 12/03/18 1059       Calculation Measurements    Weight Used For Calculations  --  148 kg (326 lb 4.5 oz)       PO Evaluation    Number of Meals  4  --    % PO Intake  75% avg  --           Problem/Interventions:  Problem 1     Row Name 12/03/18 1101          Nutrition Diagnoses Problem 1    Problem 1  Overweight/Obesity     Signs/Symptoms (evidenced by)  BMI     BMI  Greater than 40         Problem 2     Row Name 12/03/18 1101          Nutrition Diagnoses " Problem 2    Problem 2  Needs Alternate Composition     Macronutrient  Carbohydrate     Etiology (related to)  Medical Diagnosis     Endocrine  DM Type 2               Intervention Goal     Row Name 12/03/18 1102          Intervention Goal    General  Maintain nutrition;Disease management/therapy     PO  PO intake (%)     PO Intake %  75 %     Weight  Appropriate weight loss         Nutrition Intervention     Row Name 12/03/18 1103          Nutrition Intervention    RD/Tech Action  Menu adjusted;Follow Tx progress;Encourage intake         Nutrition Prescription     Row Name 12/03/18 1103          Nutrition Prescription PO    PO Prescription  Begin/change diet     Common Modifiers  Consistent Carbohydrate     New PO Prescription Ordered?  Yes         Education/Evaluation     Row Name 12/03/18 1105          Education    Education  Will Instruct as appropriate        Monitor/Evaluation    Monitor  Per protocol           Electronically signed by:  Sally Conti RD  12/03/18 11:08 AM

## 2018-12-03 NOTE — PROGRESS NOTES
Inpatient Rehabilitation Functional Measures Assessment and Plan of Care    Plan of Care  Updated Problems/Interventions  Mobility    [OT] Toilet Transfers(Active)  Current Status(12/03/2018): Mod A  Weekly Goal(12/10/2018): Min A  Discharge Goal: Sup    [OT] Tub/Shower Transfers(Active)  Current Status(12/03/2018): MOD  Weekly Goal(12/10/2018): CGA  Discharge Goal: Supervision        Self Care    [OT] Bathing(Active)  Current Status(12/03/2018): MOD  Weekly Goal(12/10/2018): MIN  Discharge Goal: Sup/SBA    [OT] Dressing (Lower)(Active)  Current Status(12/03/2018): MAX  Weekly Goal(12/10/2018): MOD  Discharge Goal: Sup/SBA    [OT] Dressing (Upper)(Active)  Current Status(12/03/2018): Mod A  Weekly Goal(12/10/2018): Min A  Discharge Goal: Sup    [OT] Grooming(Active)  Current Status(12/03/2018): Mod A  Weekly Goal(12/10/2018): Min A  Discharge Goal: Sup    [OT] Toileting(Active)  Current Status(12/03/2018): Dep  Weekly Goal(12/10/2018): Max/Mod A  Discharge Goal: Sup/SBA    Functional Measures  ROCCO Eating:  [place]  ROCCO Grooming: Branch  ROCCO Bathing:  Branch  ROCCO Upper Body Dressing:  Branch  ROCCO Lower Body Dressing:  Branch  ROCCO Toileting:  Branch    ROCCO Bladder Management  Level of Assistance:  [place]  Frequency/Number of Accidents this Shift:  Branch    ROCCO Bowel Management  Level of Assistance: [place]  Frequency/Number of Accidents this Shift: Branch    ROCCO Bed/Chair/Wheelchair Transfer:  Branch  ROCCO Toilet Transfer:  Branch  ROCCO Tub/Shower Transfer:  Branch    Previously Documented Mode of Locomotion at Discharge: Field  ROCCO Expected Mode of Locomotion at Discharge: Branch  ROCCO Walk/Wheelchair:  Branch  ROCCO Stairs:  Branch    ROCCO Comprehension:  Branch  ROCCO Expression:  Branch  ROCCO Social Interaction:  Branch  ROCCO Problem Solving:  Branch  ROCCO Memory:  Branch    Therapy Mode Minutes  Occupational Therapy: Individual: 90 minutes.  Physical Therapy: Branch  Speech Language Pathology:  [place]anch    Signed by: Lolis  Yasir, OT

## 2018-12-03 NOTE — PROGRESS NOTES
LOS: 3 days   Patient Care Team:  Miladis Colbert APRN as PCP - General (Family Medicine)    Chief Complaint:   Cervical myelopathy    Subjective     History of Present Illness    Subjective  Lidoderm helps with shoulder pain  Making progress in therapies.     History taken from: patient    Objective     Vital Signs  Temp:  [96.8 °F (36 °C)-98 °F (36.7 °C)] 98 °F (36.7 °C)  Heart Rate:  [74-82] 76  Resp:  [18] 18  BP: (122-129)/(67-82) 129/82    Objective  MENTAL STATUS -  AWAKE / ALERT  HEENT- neck incision intact. No Drainage.   LUNGS - CTA, NO WHEEZES, RALES OR RHONCHI  HEART-S1,S2- IRREGULAR  ABD - NORMOACTIVE BOWEL SOUNDS, SOFT, NT.    EXT - NO EDEMA OR CYANOSIS  NEURO - right hand incoordination.   MOTOR EXAM - takes resistance BUE and BLE.     Results Review:     I reviewed the patient's new clinical results.  Results from last 7 days   Lab Units  11/30/18 0458 11/29/18   0553  11/28/18   0456   WBC 10*3/mm3  15.71*  14.53*  15.47*   HEMOGLOBIN g/dL  12.2*  11.8*  13.4*   HEMATOCRIT %  37.9*  35.3*  42.2   PLATELETS 10*3/mm3  219  212  238     Results from last 7 days   Lab Units  11/30/18   0458  11/29/18   0553  11/28/18   0456   SODIUM mmol/L  139  136  136   POTASSIUM mmol/L  4.0  4.2  4.3   CHLORIDE mmol/L  104  102  98   CO2 mmol/L  26.5  26.0  27.6   BUN mg/dL  26*  30*  35*   CREATININE mg/dL  0.80  0.77  1.28*   CALCIUM mg/dL  8.2*  8.0*  8.6   GLUCOSE mg/dL  71  206*  315*       Medication Review: done  Scheduled Meds:    atomoxetine 100 mg Oral Daily   DULoxetine 60 mg Oral Daily   famotidine 20 mg Oral Daily   glipiZIDE 10 mg Oral QAM AC   insulin lispro 0-9 Units Subcutaneous 4x Daily With Meals & Nightly   lidocaine 2 patch Transdermal Q24H   melatonin 3 mg Oral Nightly   metFORMIN 1,000 mg Oral BID With Meals   metoprolol tartrate 25 mg Oral Q12H   pioglitazone 15 mg Oral Daily   rivaroxaban 20 mg Oral Daily With Dinner     Continuous Infusions:   PRN Meds:.•  acetaminophen  •   albuterol  •  cyclobenzaprine  •  dextrose  •  dextrose  •  docusate sodium  •  HYDROcodone-acetaminophen  •  ondansetron **OR** ondansetron ODT **OR** ondansetron  •  sennosides-docusate sodium      Assessment/Plan       Cervical myelopathy (CMS/HCC)      Assessment & Plan  Cervical myelopathy    Immobilization syndrome status post C4, C5, 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 BEKAH reviewed. Add lidoderm patch to B scapular area.     history of morbid obesity.    Type 2 diabetes mellitus. Glipizide/metformin/actos    Hypertension - metoprolol    Atrial fibrillation - metoprolol/ Xarelto    Anxiety/depression -Cymbalta    Attention deficit disorder- Strattera    DVT prophylaxis - SCDs/ Xarelto    Now admit for comprehensive acute inpatient rehabilitation .  This would be an interdisciplinary program with physical therapy 1 hour,  occupational therapy 1 hour, and speech therapy 1 hour, 5 days a week.  Rehabilitation nursing for carryover, monitoring of  neurologic   status, bowel and bladder, and skin  Ongoing physician follow-up.  Weekly team conferences.  Goals are indeterminate .   Rehabilitation prognosis indeterminate.  Medical prognosis indeterminate.  Estimated length of stay is indeterminate.     Jose Shah MD  12/03/18  6:29 PM    Time:

## 2018-12-03 NOTE — PROGRESS NOTES
Inpatient Rehabilitation Functional Measures Assessment    Functional Measures  ROCCO Eating:  [facility] Grooming: [facility] Bathing:  [facility] Upper Body Dressing:  [facility] Lower Body Dressing:  [facility] Toileting:  [facility] Bladder Management  Level of Assistance:  [place]  Frequency/Number of Accidents this Shift:  [facility] Bowel Management  Level of Assistance: [place]  Frequency/Number of Accidents this Shift: [facility] Bed/Chair/Wheelchair Transfer:  [facility] Toilet Transfer:  [facility] Tub/Shower Transfer:  [place]    Previously Documented Mode of Locomotion at Discharge: Field  ROCCO Expected Mode of Locomotion at Discharge: [facility] Walk/Wheelchair:  [facility] Stairs:  [facility] Comprehension:  Auditory comprehension is the usual mode. Comprehension  Score = 7, Independent.  Patient comprehends complex/abstract information in  their primary language.  Patient is completely independent for auditory  comprehension.  There are no activity limitations.  ROCCO Expression:  Vocal expression is the usual mode. Expression Score = 7,  Independent.  Patient expresses complex/abstract information in their primary  language.  Patient is completely independent for vocal expression.  There are no  activity limitations.  ROCCO Social Interaction:  Social Interaction Score = 7, Independent. Patient is  completely independent for social interaction.  There are no activity  limitations.  ROCCO Problem Solving:  Problem Solving Score = 6, Modified [place].  Patient  makes appropriate decisions in order to solve complex problems, but requires  extra time.  ROCCO Memory:  Memory Score = 6, Modified [place].  Patient is modified  independent for memory, requiring:    Therapy Mode Minutes  Occupational Therapy: Branch  Physical Therapy: [place]  Speech Language Pathology:  [place]    Signed by: Ciera Walter RN

## 2018-12-03 NOTE — THERAPY TREATMENT NOTE
Inpatient Rehabilitation - Physical Therapy Treatment Note  [facility]     Patient Name: Duane Mark Witten  : 1955  MRN: [id number]    Today's Date: 12/3/2018                 Admit Date: 2018      Visit Dx:      ICD-10-CM ICD-9-CM   1. Cervical stenosis of spinal canal M48.02 723.0   2. Weakness of both lower extremities R29.898 729.89   3. Paresthesia of both upper extremities R20.2 782.0    M79.601 729.5    M79.602    4. Morbid obesity with BMI of 40.0-44.9, adult (CMS/LTAC, located within [facility]) E66.01 278.01    Z68.41 V85.41   5. Impaired functional mobility, balance, gait, and endurance Z74.09 V49.89   6. Functional gait abnormality R26.89 781.2       Patient Active Problem List   Diagnosis   • YANELI (generalized anxiety disorder)   • ADD (attention deficit disorder)   • Depression   • Diabetes type 2, controlled (CMS/LTAC, located within [facility])   • ED (erectile dysfunction) of non-organic origin   • HLD (hyperlipidemia)   • Essential hypertension   • Arthritis   • Psoriasis   • Testosterone deficiency   • Primary insomnia   • Weakness of both lower extremities   • Paresthesia of both upper extremities   • Morbid obesity with BMI of 40.0-44.9, adult (CMS/LTAC, located within [facility])   • Adverse effect of corticosteroids   • Type 2 diabetes mellitus with hyperglycemia (CMS/LTAC, located within [facility])   • Cervical stenosis of spinal canal   • Edema of cervical spinal cord (CMS/LTAC, located within [facility])   • Postoperative atrial fibrillation (CMS/LTAC, located within [facility])   • Cervical myelopathy (CMS/LTAC, located within [facility])       Therapy Treatment    IRF Treatment Summary     Row Name 18 1138 18 1137 18 0900       Evaluation/Treatment Time and Intent    Subjective Information  no complaints  -AF  no complaints  -AF  no complaints  -EE    Existing Precautions/Restrictions  fall;spinal brace for comfort   -AF  --  fall;spinal  -EE    Document Type  therapy note (daily note)  -AF  --  therapy note (daily note)  -EE    Mode of Treatment  occupational therapy  -AF  --  physical therapy  -EE    Patient/Family Observations  sitting up in  recliner chair with wife present   -AF  --  Pt sitting EOB in no acute distress; spouse present  -EE    Recorded by [AF] Lolis Cooley OTR [AF] Lolis Cooley OTR [EE] Cathleen Rosario, PT    Row Name 12/03/18 1138 12/03/18 0900          Cognition/Psychosocial- PT/OT    Affect/Mental Status (Cognitive)  WFL  -AF  WFL  -EE     Orientation Status (Cognition)  oriented x 4  -AF  oriented x 4  -EE     Follows Commands (Cognition)  WFL  -AF  WFL  -EE     Personal Safety Interventions  fall prevention program maintained;gait belt;nonskid shoes/slippers when out of bed  -AF  fall prevention program maintained;gait belt;muscle strengthening facilitated;nonskid shoes/slippers when out of bed;supervised activity  -EE     Recorded by [AF] Lolis Cooley OTR [EE] Cathleen Rosario, PT     Row Name 12/03/18 0900             Bed Mobility Assessment/Treatment    Rolling Right [place] (Bed Mobility)  contact guard;minimum assist (75% patient effort);verbal cues  -EE      Supine-Sit [place] (Bed Mobility)  moderate assist (50% patient effort);verbal cues  -EE      Sit-Supine [place] (Bed Mobility)  minimum assist (75% patient effort);verbal cues  -EE      Bed Mobility, Safety Issues  decreased use of arms for pushing/pulling  -EE      Comment (Bed Mobility)  performed on mat table without bed rails; verbal cues required for log roll technique  -EE      Recorded by [EE] Cathleen Rosario, PT      Row Name 12/03/18 1138             Functional Mobility    Functional Mobility- Comment  walked short distance to and from w/c and recliner chair wtih RWX with MIN   -AF      Recorded by [AF] Lolis Cooley OTR      Row Name 12/03/18 1138 12/03/18 0900          Transfer Assessment/Treatment    Transfer Assessment/Treatment  sit-stand transfer;stand-sit transfer;shower transfer  -AF  car transfer  -EE     Comment (Transfers)  --  Practiced sit <> stand to/from seat of car. Did not place body/legs fully into car due to height  and spinal precautions, unable to safely duck head under door frame of car without excessive bending of cervical spine.   (Significant)   -EE     Recorded by [AF] Lolis Cooley OTR [EE] Cathleen Rosario, PT     Row Name 12/03/18 0900             Bed-Chair Transfer    Bed-Chair [place] (Transfers)  minimum assist (75% patient effort);verbal cues  -EE      Assistive Device (Bed-Chair Transfers)  walker, front-wheeled  -EE      Recorded by [EE] Cathleen Rosario, PT      Row Name 12/03/18 0900             Chair-Bed Transfer    Chair-Bed [place] (Transfers)  minimum assist (75% patient effort);verbal cues  -EE      Assistive Device (Chair-Bed Transfers)  walker, front-wheeled  -EE      Recorded by [EE] Cathleen Rosario, PT      Row Name 12/03/18 0900             Sit-Stand Transfer    Sit-Stand [place] (Transfers)  minimum assist (75% patient effort);verbal cues  -EE      Assistive Device (Sit-Stand Transfers)  walker, front-wheeled  -EE      Recorded by [EE] Cathleen Rosario, PT      Row Name 12/03/18 0900             Stand-Sit Transfer    Stand-Sit [place] (Transfers)  minimum assist (75% patient effort);verbal cues  -EE      Assistive Device (Stand-Sit Transfers)  walker, front-wheeled  -EE      Recorded by [EE] Cathleen Rosario, PT      Row Name 12/03/18 1138             Shower Transfer    Type (Shower Transfer)  stand pivot/stand step  -AF      [place] Level (Shower Transfer)  minimum assist (75% patient effort);verbal cues to use LEs to push and less UEs  -AF      Assistive Device (Shower Transfer)  grab bars/tub rail;tub bench;wheelchair  -AF      Recorded by [AF] Lolis Cooley, MAYOR      Row Name 12/03/18 0900             Car Transfer    Type (Car Transfer)  stand pivot/stand step  -EE      [place] Level (Car Transfer)  minimum assist (75% patient effort);verbal cues  -EE      Assistive Device (Car Transfer)  walker, front-wheeled  -EE      Recorded by [EE] Cathleen Rosario, PT      Row Name 12/03/18  0900             Gait/Stairs Assessment/Training    [place] Level (Gait)  minimum assist (75% patient effort);verbal cues  -EE      Assistive Device (Gait)  walker, front-wheeled  -EE      Distance in Feet (Gait)  80' x 3  -EE      Pattern (Gait)  step-to;step-through  -EE      Deviations/Abnormal Patterns (Gait)  ataxic;sarah decreased;stride length decreased  -EE      Bilateral Gait Deviations  weight shift ability decreased;forward flexed posture;heel strike decreased  -EE      Recorded by [EE] Cathleen Rosario, HAILEY      Row Name 12/03/18 0900             Safety Issues, Functional Mobility    Impairments Affecting Function (Mobility)  balance;pain;endurance/activity tolerance;strength;sensation/sensory awareness  -EE      Recorded by [EE] Cathleen Rosario, HAILEY      Row Name 12/03/18 1138             Bathing Assessment/Treatment    Bathing [place] Level  bathing skills;upper body;contact guard assist;lower body;moderate assist (50% patient effort)  -AF      Bathing Position  supported sitting;supported standing  -AF      Bathing Setup Assistance  obtain supplies;adjust water temperature  -AF      Comment (Bathing)  seated on TTB in shower   -AF      Recorded by [AF] Lolis Cooley OTR      Row Name 12/03/18 1138             Upper Body Dressing Assessment/Treatment    Upper Body Dressing Task  doff;don;pull over garment;moderate assist (50-74% patient effort);verbal cues  -AF      Upper Body Dressing Position  supported sitting  -AF      Set-up Assistance (Upper Body Dressing)  obtain clothing  -AF      Comment (Upper Body Dressing)  A with pulling over head   -AF      Recorded by [AF] Lolis Cooley OTR      Row Name 12/03/18 1138             Lower Body Dressing Assessment/Treatment    Lower Body Dressing [place] Level  doff;don;pants/bottoms;shoes/slippers;socks;underwear;maximum assist (25% patient effort);verbal cues  -AF      Lower Body Dressing Position  unsupported sitting;supported standing   -AF      Lower Body Dressing Setup Assistance  obtain clothing  -AF      Comment (Lower Body Dressing)  vc's for technique  -AF      Recorded by [AF] Lolis Cooley OTR      Row Name 12/03/18 1138             Grooming Assessment/Treatment    Grooming [place] Level  deodorant application;set up  -AF      Comment (Grooming)  decreased ability to manipulate the comb R >L hand, assist to comb the back to his hair  -AF      Recorded by [AF] Lolis Cooley OTR      Row Name 12/03/18 0900             Pain Assessment    Additional Documentation  Pain Scale: Numbers Pre/Post-Treatment (Group)  -EE      Recorded by [EE] Cathleen Rosario, PT      Row Name 12/03/18 1138 12/03/18 0900          Pain Scale: Numbers Pre/Post-Treatment    Pain Scale: Numbers, Pretreatment  3/10  -AF  4/10  -EE     Pain Scale: Numbers, Post-Treatment  3/10  -AF  4/10  -EE     Pain Location  neck  -AF  neck and back  -EE     Pre/Post Treatment Pain Comment  --  tolerated tx  -EE     Pain Intervention(s)  Repositioned  -AF  Repositioned;Ambulation/increased activity;Medication (See MAR) medicated prior to tx  -EE     Recorded by [AF] Lolis Cooley, OTR [EE] Cathleen Rosario, PT     Row Name 12/03/18 0900             Therapeutic Exercise    Therapeutic Exercise  seated, lower extremities;supine, lower extremities  -EE      Additional Documentation  Therapeutic Exercise (Row)  -EE      Recorded by [EE] Cathleen Rosario, PT      Row Name 12/03/18 0900             Lower Extremity Seated Therapeutic Exercise    Performed, Seated Lower Extremity (Therapeutic Exercise)  hip flexion/extension;hip abduction/adduction;ankle dorsiflexion/plantarflexion;LAQ (long arc quad), knee extension;knee flexion/extension  -EE      Device, Seated Lower Extremity (Therapeutic Exercise)  elastic bands/tubing;free weights, cuff red band, 1.5# weights  -EE      Exercise Type, Seated Lower Extremity (Therapeutic Exercise)  resistive exercise  -EE      Sets/Reps Detail,  Seated Lower Extremity (Therapeutic Exercise)  1/15  -EE      Recorded by [EE] Cathleen Rosario PT      Row Name 12/03/18 0900             Lower Extremity Supine Therapeutic Exercise    Performed, Supine Lower Extremity (Therapeutic Exercise)  SLR (straight leg raise);SAQ (short arc quad) over bolster;hip abduction/adduction;heel slides;bridging (bilateral w/bolster)  -EE      Exercise Type, Supine Lower Extremity (Therapeutic Exercise)  AROM (active range of motion)  -EE      Sets/Reps Detail, Supine Lower Extremity (Therapeutic Exercise)  1/15  -EE      Recorded by [EE] Cathleen Rosario PT      Row Name 12/03/18 1138 12/03/18 0900          Positioning and Restraints    Pre-Treatment Position  sitting in chair/recliner  -AF  in bed  -EE     Post Treatment Position  chair  -AF  wheelchair  -EE     In Chair  sitting;call light within reach;encouraged to call for assist;exit alarm on;with family/caregiver wife present   -AF  --     In Wheelchair  --  sitting;call light within reach;encouraged to call for assist;with family/caregiver  -EE     Recorded by [AF] Lolis Cooley, OTR [EE] Cathleen Rosario PT       User Key  (r) = Recorded By, (t) = Taken By, (c) = Cosigned By    Initials Name Effective Dates    EE Cathleen Rosario PT 04/03/18 -     AF Lolis Cooley, OTR 04/03/18 -         Wound 11/27/18 1318 Other (See comments) neck incision (Active)   Dressing Appearance dry;intact 12/3/2018  7:31 AM   Closure Adhesive closure strips 12/3/2018  7:31 AM   Base clean 12/3/2018  7:31 AM   Drainage Amount none 12/3/2018  7:31 AM     Physical Therapy Education     Title: PT OT SLP Therapies (In Progress)     Topic: Physical Therapy (Done)     Point: Mobility training (Done)     Learning Progress Summary           Patient Acceptance, E,TB, VU,NR by EE at 12/3/2018  9:48 AM    Acceptance, E, VU,NR by JK at 12/1/2018  9:50 AM                   Point: Home exercise program (Done)     Learning Progress Summary           Patient  Acceptance, E,TB, VU,NR by EE at 12/3/2018  9:48 AM                   Point: Body mechanics (Done)     Learning Progress Summary           Patient Acceptance, E,TB, VU,NR by EE at 12/3/2018  9:48 AM                   Point: Precautions (Done)     Learning Progress Summary           Patient Acceptance, E,TB, VU,NR by EE at 12/3/2018  9:48 AM    Acceptance, E, VU,NR by JK at 12/1/2018  9:50 AM                               User Key     Initials Effective Dates Name Provider Type Discipline    EE 04/03/18 -  Cathleen Rosario, PT Physical Therapist PT    JK 04/03/18 -  Janae Trujillo PT Physical Therapist PT                  PT Recommendation and Plan                        Time Calculation:     PT Charges     Row Name 12/03/18 1406 12/03/18 1012 12/03/18 0758       Time Calculation    Start Time  1300  -EE  0900  -EE  --    Stop Time  1330  -EE  1000  -EE  --    Time Calculation (min)  30 min  -EE  60 min  -EE  --    PT Received On  12/03/18  -EE  12/03/18  -EE  --    PT - Next Appointment  12/04/18  -EE  12/03/18  -EE  --    PT Goal Re-Cert Due Date  --  --  12/07/18  -EE      User Key  (r) = Recorded By, (t) = Taken By, (c) = Cosigned By    Initials Name Provider Type    Cathleen Jacobson, PT Physical Therapist            Therapy Charges for Today     Code Description Service Date Service Provider Modifiers Qty    [id number] HC PT THER PROC EA 15 MIN 12/3/2018 Cathleen Rosario, PT GP 4                   Cathleen Rosario PT  12/3/2018

## 2018-12-03 NOTE — PROGRESS NOTES
Inpatient Rehabilitation Functional Measures Assessment    Functional Measures  [facility] Eating:  Branch  [facility] Grooming: Branch  [facility] Bathing:  Branch  [facility] Upper Body Dressing:  Branch  [facility] Lower Body Dressing:  Branch  [facility] Toileting:  Toileting Score = 4.  Patient requires minimal assistance for  toileting, such as steadying for balance while cleansing or adjusting clothes.  Patient requires the following assistive device(s): Adaptive device to maintain  balance.    [facility] Bladder Management  Level of Assistance:  Bladder Score = 2. Patient performs 25-49% of tasks and  requires maximal assistance for bladder management. [place] provides most of the  assist to position the urinal, holds it in place, and empties the urinal.  Frequency/Number of Accidents this Shift:  Bladder accidents this shift:  0 .  Patient has not had an accident this shift.    [facility] Bowel Management  Level of Assistance: Activity was not observed.  Frequency/Number of Accidents this Shift: Bowel accidents this shift: 0 .  Patient has not had an accident this shift.    [facility] Bed/Chair/Wheelchair Transfer:  Activity was not observed.  [facility] Toilet Transfer:  Toilet Transfer Score = 7.  Patient is completely  independent for transferring to and from the toilet/commode. There are no  activity limitations.  [facility] Tub/Shower Transfer:    Previously Documented Mode of Locomotion at Discharge: Field  [facility] Expected Mode of Locomotion at Discharge: Branch  [facility] Walk/Wheelchair:  Branch  [facility] Stairs:  Branch    [facility] Comprehension:  Branch  [facility] Expression:  Branch  [facility] Social Interaction:  [facility] Problem Solving:  [facility] Memory:  Branch    Therapy Mode Minutes  Occupational Therapy: Branch  Physical Therapy: Branch  Speech Language Pathology:  Branch    Signed by: ALEJO Gutierrez

## 2018-12-03 NOTE — PROGRESS NOTES
Discharge Planning Assessment  [facility]     Patient Name: Duane Mark Witten  MRN: [id number]  Today's Date: 12/3/2018    Admit Date: 11/30/2018    Discharge Needs Assessment     Row Name 12/03/18 1615       Living Environment    Lives With  spouse    Name(s) of Who Lives With Patient  Mindy Ramirez    Unique Family Situation  3 adult daughters live locally    Current Living Arrangements  home/apartment/condo    Primary Care Provided by  self    Provides Primary Care For  no one    Family Caregiver if Needed  none    Family Caregiver Names  Mindy Ramirez    Quality of Family Relationships  supportive       Resource/Environmental Concerns    Resource/Environmental Concerns  home accessibility    Home Accessibility Concerns  stairs to enter home;stairs to access bedroom or bathroom    Transportation Concerns  car, none       Transition Planning    Patient/Family Anticipates Transition to  home with family    Patient/Family Anticipated Services at Transition  rehabilitation services    Transportation Anticipated  family or friend will provide       Discharge Needs Assessment    Readmission Within the Last 30 Days  no previous admission in last 30 days    Concerns to be Addressed  care coordination/care conferences;discharge planning;employment/school    Concerns Comments  Pt has FMLA and short term disabilty applications to be completed    Equipment Currently Used at Home  none    Anticipated Changes Related to Illness  inability to care for self;inability to work    Equipment Needed After Discharge  -- To be determined    Outpatient/Agency/Support Group Needs  -- To be determined    Current Discharge Risk  dependent with mobility/activities of daily living        Discharge Plan     Row Name 12/03/18 1620       Plan    Plan  Home with wife    Patient/Family in Agreement with Plan  yes    Plan Comments  Assessment completed with pt and pt's wife. Explained rehab program and SW role.  Pt lives with his wife in a  "bi-level home, 2 steps to enter and 5 steps with rail to the main living area.   Prior to admission, pt's functioned independently at home and in the community. Pt works full time and is active with his family. He does expect to return to work. Three daughters live locally and are supportive.    Pt's wife does not work due to a health issue. She has applied for social security disability, but states she will be able to provide \"some\" physical assistance for her , if needed, at discharge .     Pt denies depression or anxiety and expresses good motivation for rehab.         Destination      No service coordination in this encounter.      Durable Medical Equipment      No service coordination in this encounter.      Dialysis/Infusion      No service coordination in this encounter.      Home Medical Care      No service coordination in this encounter.      Community Resources      No service coordination in this encounter.          Demographic Summary     Row Name 12/03/18 1611       General Information    Admission Type  inpatient    Arrived From  hospital    Referral Source  hospital clinician/department    Reason for Consult  community resources;care coordination/care conference;discharge planning    Preferred Language  English     Used During This Interaction  no       Contact Information    Permission Granted to Share Info With  family/designee        Functional Status     Row Name 12/03/18 1611       Functional Status    Usual Activity Tolerance  good    Current Activity Tolerance  fair       Functional Status, IADL    Medications  independent    Meal Preparation  independent    Housekeeping  independent    Laundry  independent    Shopping  independent       Mental Status    General Appearance WDL  WDL       Mental Status Summary    Recent Changes in Mental Status/Cognitive Functioning  no changes       Employment/    Employment Status  employed full time    Shift Worked  first shift    " Current or Previous Occupation  desk job    Employment/ Comments  Pt works as a dispatcher for [employer]        Psychosocial     Row Name 12/03/18 1612       Values/Beliefs    Spiritual, Cultural Beliefs, Sabianist Practices, Values that Affect Care  no       Speech WDL    Speech WDL  WDL       Perceptual State WDL    Perceptual State WDL  WDL       Thought Process WDL    Thought Process WDL  WDL       Intellectual Performance WDL    Intellectual Performance WDL  WDL    Level of Consciousness  Alert       Coping/Stress    Major Change/Loss/Stressor  hospitalization;loss of independence    Patient Personal Strengths  able to adapt;expressive of emotions;expressive of needs;duglas/spirituality;motivated;strong support system    Sources of Support  adult child(chica);spouse    Techniques to [place] with Loss/Stress/Change  diversional activities    Reaction to Health Status  accepting;adjusting;hopeful    Understanding of Condition and Treatment  adequate understanding of medical condition;adequate understanding of treatment       Developmental Stage (Eriksson's)    Developmental Stage  Stage 7 (35-65 years/Middle Adulthood) Generativity vs. Stagnation       C-SSRS (Recent)    Wish to be Dead  no    Suicidal Thoughts  no    Suicide Behavior  no       Violence Risk    Feels Like Hurting Others  no    Previous Attempt to Harm Others  no        Abuse/Neglect    No documentation.       Legal    No documentation.       Substance Abuse     Row Name 12/03/18 1615       Family Member Substance Use (#4)    Substance Use Comments  Pt states he does not use alcohol or tobacco        Patient Forms    No documentation.           Alesha Kurtz

## 2018-12-03 NOTE — PROGRESS NOTES
Inpatient Rehabilitation Functional Measures Assessment and Plan of Care    Plan of Care  Updated Problems/Interventions  Mobility    [PT] Stairs(Active)  Current Status(12/03/2018): TBD  Weekly Goal(12/11/2018): PT only  Discharge Goal: 8, SBA with rails    [PT] Walk(Active)  Current Status(12/03/2018): 80' Min A with RWX  Weekly Goal(12/11/2018): CGA to BR with RWX  Discharge Goal: 250' Supervision with RWX    [PT] Bed/Chair/Wheelchair(Active)  Current Status(12/03/2018): Min A with RWX  Weekly Goal(12/11/2018): CGA/SBA with RWX  Discharge Goal: Supervision    [PT] Bed Mobility(Active)  Current Status(12/03/2018): Mod A  Weekly Goal(12/11/2018): CGA  Discharge Goal: Mod I    Functional Measures  [facility] Eating:  Branch  [facility] Grooming: Branch  [facility] Bathing:  Branch  [facility] Upper Body Dressing:  [facility] Lower Body Dressing:  [facility] Toileting:  [facility] Bladder Management  Level of Assistance:  [place]  Frequency/Number of Accidents this Shift:  [facility] Bowel Management  Level of Assistance: [place]  Frequency/Number of Accidents this Shift: [facility] Bed/Chair/Wheelchair Transfer:  Bed/chair/wheelchair Transfer Score = 4.  Patient performs 75% or more of effort and minimal assistance (little/incidental  help/lifting of one limb/steadying) for transferring to and from the  bed/chair/wheelchair, requiring: Patient requires the following assistive  device(s): Walker.  [facility] Toilet Transfer:  Branch  [facility] Tub/Shower Transfer:  Branch    Previously Documented Mode of Locomotion at Discharge: Field  [facility] Expected Mode of Locomotion at Discharge: [facility] Walk/Wheelchair:  WHEELCHAIR OBSERVATION   Wheelchair locomotion was observed using a manual wheelchair. Wheelchair  Distance Scale = 3.  Distance traveled in wheelchair is greater than 150 feet.  Wheelchair Score = 5.  Patient is supervision or set up only for propelling  wheelchair, requiring: Patient was able to propel a distance of 160 feet in  a  wheelchair. No other assistive devices were required.    WALK OBSERVATION   Walk Distance Scale = 2.  Distance walked is 50 -149 feet. Walk Score = 2.  Patient performs 75% or more of effort and requires minimal assistance.  Incidental assistance, contact guard or steadying was provided. Patient walked a  distance of 80 feet. Patient requires the following assistive device(s): Rolling  walker.  [place] Stairs:  Stairs did not occur because activity was unsafe for patient.    [facility] Comprehension:  [place]anch  [facility] Expression:  Branch  [facility] Social Interaction:  Branch  [facility] Problem Solving:  Branch  [facility] Memory:  [place]anch    Therapy Mode Minutes  Occupational Therapy: Branch  Physical Therapy: Individual: 90 minutes.  Speech Language Pathology:  Branch    Signed by: Cathleen Rosario DPT

## 2018-12-03 NOTE — PROGRESS NOTES
Inpatient Rehabilitation Functional Measures Assessment    Functional Measures  ROCCO Eating:  [facility] Grooming: [facility] Bathing:  [facility] Upper Body Dressing:  [facility] Lower Body Dressing:  [facility] Toileting:  [facility] Bladder Management  Level of Assistance:  [place]  Frequency/Number of Accidents this Shift:  [facility] Bowel Management  Level of Assistance: [place]  Frequency/Number of Accidents this Shift: [facility] Bed/Chair/Wheelchair Transfer:  [facility] Toilet Transfer:  [facility] Tub/Shower Transfer:  [place]    Previously Documented Mode of Locomotion at Discharge: Field  [facility] Expected Mode of Locomotion at Discharge: [facility] Walk/Wheelchair:  [facility] Stairs:  [facility] Comprehension:  Auditory comprehension is the usual mode. Comprehension  Score = 7, Independent.  Patient comprehends complex/abstract information in  their primary language.  Patient is completely independent for auditory  comprehension.  There are no activity limitations.  [place] Expression:  Vocal expression is the usual mode. Expression Score = 7,  Independent.  Patient expresses complex/abstract information in their primary  language.  Patient is completely independent for vocal expression.  There are no  activity limitations.  [place] Social Interaction:  Social Interaction Score = 7, Independent. Patient is  completely independent for social interaction.  There are no activity  limitations.  [facility] Problem Solving:  Problem Solving Score = 7, Independent.  Patient makes  appropriate decisions in order to solve complex problems.  Patient is completely  independent for problem solving.  There are no activity limitations.  [place] Memory:  Memory Score = 7, Independent.  Patient is completely independent  for memory.  There are no activity limitations.    Therapy Mode Minutes  Occupational Therapy: Branch  Physical Therapy: Branch  Speech Language Pathology:  Branch    Signed by: Marissa Torres RN

## 2018-12-03 NOTE — PROGRESS NOTES
Inpatient Rehabilitation Plan of Care Note    Plan of Care  Care Plan Reviewed - Updates as Follows    Psychosocial    Performed Intervention(s)  Verbalizes needs & concerns      Safety    Performed Intervention(s)  Safety rounds/ Fall precautions  Items in reach, bed alarm & chair alarms      Sphincter Control    Performed Intervention(s)  Monitor intake, output, & bowel movements  Encourage appropriate diet & fluids      Body Systems    Performed Intervention(s)  Blood glucose AC & HS & insulin as ordered      Pain    Performed Intervention(s)  Medication prn & evaluate effectiveness  Monitor neck incision q shift    Signed by: Ciera Walter RN

## 2018-12-03 NOTE — THERAPY TREATMENT NOTE
Inpatient Rehabilitation - Occupational Therapy Treatment Note    Psychiatric     Patient Name: Duane Mark Witten  : 1955  MRN: [id number]    Today's Date: 12/3/2018                 Admit Date: 2018      Visit Dx:    ICD-10-CM ICD-9-CM   1. Cervical stenosis of spinal canal M48.02 723.0   2. Weakness of both lower extremities R29.898 729.89   3. Paresthesia of both upper extremities R20.2 782.0    M79.601 729.5    M79.602    4. Morbid obesity with BMI of 40.0-44.9, adult (CMS/[facility]) E66.01 278.01    Z68.41 V85.41   5. Impaired functional mobility, balance, gait, and endurance Z74.09 V49.89   6. Functional gait abnormality R26.89 781.2       Patient Active Problem List   Diagnosis   • YANELI (generalized anxiety disorder)   • ADD (attention deficit disorder)   • Depression   • Diabetes type 2, controlled (CMS/[facility])   • ED (erectile dysfunction) of non-organic origin   • HLD (hyperlipidemia)   • Essential hypertension   • Arthritis   • Psoriasis   • Testosterone deficiency   • Primary insomnia   • Weakness of both lower extremities   • Paresthesia of both upper extremities   • Morbid obesity with BMI of 40.0-44.9, adult (CMS/[facility])   • Adverse effect of corticosteroids   • Type 2 diabetes mellitus with hyperglycemia (CMS/[facility])   • Cervical stenosis of spinal canal   • Edema of cervical spinal cord (CMS/[facility])   • Postoperative atrial fibrillation (CMS/[facility])   • Cervical myelopathy (CMS/[facility])         Therapy Treatment    IRF Treatment Summary     Row Name 18 1138 18 1137 18 0900       Evaluation/Treatment Time and Intent    Subjective Information  no complaints  -AF  no complaints  -AF  no complaints  -EE    Existing Precautions/Restrictions  fall;spinal brace for comfort   -AF  --  fall;spinal  -EE    Document Type  therapy note (daily note)  -AF  --  therapy note (daily note)  -EE    Mode of Treatment  occupational therapy  -AF  --  physical therapy  -EE    Patient/Family Observations  sitting up  in recliner chair with wife present   -AF  --  Pt sitting EOB in no acute distress; spouse present  -EE    Recorded by [AF] Lolis Cooley OTR [AF] Lolis Cooley OTR [EE] Cathleen Rosario, PT    Row Name 12/03/18 1138 12/03/18 0900          Cognition/Psychosocial- PT/OT    Affect/Mental Status (Cognitive)  WFL  -AF  WFL  -EE     Orientation Status (Cognition)  oriented x 4  -AF  oriented x 4  -EE     Follows Commands (Cognition)  WFL  -AF  WFL  -EE     Personal Safety Interventions  fall prevention program maintained;gait belt;nonskid shoes/slippers when out of bed  -AF  fall prevention program maintained;gait belt;muscle strengthening facilitated;nonskid shoes/slippers when out of bed;supervised activity  -EE     Recorded by [AF] Lolis Cooley OTR [EE] Cathleen Rosario, PT     Row Name 12/03/18 1138             Functional Mobility    Functional Mobility- Comment  walked short distance to and from w/c and recliner chair wtih RWX with MIN   -AF      Recorded by [AF] Lolis Cooley OTR      Row Name 12/03/18 1138 12/03/18 0900          Transfer Assessment/Treatment    Transfer Assessment/Treatment  sit-stand transfer;stand-sit transfer;shower transfer  -AF  car transfer  -EE     Comment (Transfers)  --  Practiced sit <> stand to/from seat of car. Did not place body/legs fully into car due to height and spinal precautions, unable to safely duck head under door frame of car without excessive bending of cervical spine.   (Significant)   -EE     Recorded by [AF] Lolis Cooley OTR [EE] Cathleen Rosario, PT     Row Name 12/03/18 0900             Bed-Chair Transfer    Bed-Chair [place] (Transfers)  minimum assist (75% patient effort);verbal cues  -EE      Assistive Device (Bed-Chair Transfers)  walker, front-wheeled  -EE      Recorded by [EE] Cathleen Rosario, PT      Row Name 12/03/18 0900             Chair-Bed Transfer    Chair-Bed [place] (Transfers)  minimum assist (75% patient effort);verbal cues  -EE       Assistive Device (Chair-Bed Transfers)  walker, front-wheeled  -EE      Recorded by [EE] Cathleen Rosario, PT      Row Name 12/03/18 0900             Sit-Stand Transfer    Sit-Stand [place] (Transfers)  minimum assist (75% patient effort);verbal cues  -EE      Assistive Device (Sit-Stand Transfers)  walker, front-wheeled  -EE      Recorded by [EE] Cathleen Rosario, PT      Row Name 12/03/18 0900             Stand-Sit Transfer    Stand-Sit [place] (Transfers)  minimum assist (75% patient effort);verbal cues  -EE      Assistive Device (Stand-Sit Transfers)  walker, front-wheeled  -EE      Recorded by [EE] Cathleen Rosario, PT      Row Name 12/03/18 1138             Shower Transfer    Type (Shower Transfer)  stand pivot/stand step  -AF      [place] Level (Shower Transfer)  minimum assist (75% patient effort);verbal cues to use LEs to push and less UEs  -AF      Assistive Device (Shower Transfer)  grab bars/tub rail;tub bench;wheelchair  -AF      Recorded by [AF] Lolis Cooley OTR      Row Name 12/03/18 0900             Car Transfer    Type (Car Transfer)  stand pivot/stand step  -EE      [place] Level (Car Transfer)  minimum assist (75% patient effort);verbal cues  -EE      Assistive Device (Car Transfer)  walker, front-wheeled  -EE      Recorded by [EE] Cathleen Rosario, PT      Row Name 12/03/18 0900             Gait/Stairs Assessment/Training    [place] Level (Gait)  minimum assist (75% patient effort);verbal cues  -EE      Assistive Device (Gait)  walker, front-wheeled  -EE      Distance in Feet (Gait)  80' x 3  -EE      Pattern (Gait)  step-to;step-through  -EE      Deviations/Abnormal Patterns (Gait)  ataxic;sarah decreased;stride length decreased  -EE      Bilateral Gait Deviations  weight shift ability decreased;forward flexed posture;heel strike decreased  -EE      Recorded by [EE] Cathleen Rosario, PT      Row Name 12/03/18 0900             Safety Issues, Functional Mobility    Impairments  Affecting Function (Mobility)  balance;pain;endurance/activity tolerance;strength;sensation/sensory awareness  -EE      Recorded by [EE] Cathleen Rosario PT      Row Name 12/03/18 1138             Bathing Assessment/Treatment    Bathing [place] Level  bathing skills;upper body;contact guard assist;lower body;moderate assist (50% patient effort)  -AF      Bathing Position  supported sitting;supported standing  -AF      Bathing Setup Assistance  obtain supplies;adjust water temperature  -AF      Comment (Bathing)  seated on TTB in shower   -AF      Recorded by [AF] Lolis Cooley OTR      Row Name 12/03/18 1138             Upper Body Dressing Assessment/Treatment    Upper Body Dressing Task  doff;don;pull over garment;moderate assist (50-74% patient effort);verbal cues  -AF      Upper Body Dressing Position  supported sitting  -AF      Set-up Assistance (Upper Body Dressing)  obtain clothing  -AF      Comment (Upper Body Dressing)  A with pulling over head   -AF      Recorded by [AF] Lolis Cooley OTR      Row Name 12/03/18 1138             Lower Body Dressing Assessment/Treatment    Lower Body Dressing [place] Level  doff;don;pants/bottoms;shoes/slippers;socks;underwear;maximum assist (25% patient effort);verbal cues  -AF      Lower Body Dressing Position  unsupported sitting;supported standing  -AF      Lower Body Dressing Setup Assistance  obtain clothing  -AF      Comment (Lower Body Dressing)  vc's for technique  -AF      Recorded by [AF] Lolis Cooley OTR      Row Name 12/03/18 1138             Grooming Assessment/Treatment    Grooming [place] Level  deodorant application;set up  -AF      Comment (Grooming)  decreased ability to manipulate the comb R >L hand, assist to comb the back to his hair  -AF      Recorded by [AF] Lolis Cooley OTR      Row Name 12/03/18 0900             Pain Assessment    Additional Documentation  Pain Scale: Numbers Pre/Post-Treatment (Group)  -EE       Recorded by [EE] Cathleen Rosario, PT      Row Name 12/03/18 1138 12/03/18 0900          Pain Scale: Numbers Pre/Post-Treatment    Pain Scale: Numbers, Pretreatment  3/10  -AF  4/10  -EE     Pain Scale: Numbers, Post-Treatment  3/10  -AF  4/10  -EE     Pain Location  neck  -AF  neck and back  -EE     Pre/Post Treatment Pain Comment  --  tolerated tx  -EE     Pain Intervention(s)  Repositioned  -AF  Repositioned;Ambulation/increased activity;Medication (See MAR) medicated prior to tx  -EE     Recorded by [AF] Lolis Cooley, OTR [EE] Cathleen Rosario, PT     Row Name 12/03/18 0900             Therapeutic Exercise    Therapeutic Exercise  seated, lower extremities  -EE      Additional Documentation  Therapeutic Exercise (Row)  -EE      Recorded by [EE] Cathleen Rosario, PT      Row Name 12/03/18 0900             Lower Extremity Seated Therapeutic Exercise    Performed, Seated Lower Extremity (Therapeutic Exercise)  hip flexion/extension;hip abduction/adduction;ankle dorsiflexion/plantarflexion;LAQ (long arc quad), knee extension;knee flexion/extension  -EE      Device, Seated Lower Extremity (Therapeutic Exercise)  elastic bands/tubing;free weights, cuff red band, 1.5# weights  -EE      Exercise Type, Seated Lower Extremity (Therapeutic Exercise)  resistive exercise  -EE      Sets/Reps Detail, Seated Lower Extremity (Therapeutic Exercise)  1/15  -EE      Recorded by [EE] Cathleen Rosario, PT      Row Name 12/03/18 1138 12/03/18 0900          Positioning and Restraints    Pre-Treatment Position  sitting in chair/recliner  -AF  in bed  -EE     Post Treatment Position  chair  -AF  --     In Chair  sitting;call light within reach;encouraged to call for assist;exit alarm on;with family/caregiver wife present   -AF  --     Recorded by [AF] Lolis Cooley, OTR [EE] Cathleen Rosario, PT       User Key  (r) = Recorded By, (t) = Taken By, (c) = Cosigned By    Initials Name Effective Dates    EE Cathleen Rosario, PT 04/03/18 -     AF Lolis Cooley  Katy, OTR 04/03/18 -           Wound 11/27/18 1318 Other (See comments) neck incision (Active)   Dressing Appearance dry;intact 12/3/2018  7:31 AM   Closure Adhesive closure strips 12/3/2018  7:31 AM   Base clean 12/3/2018  7:31 AM   Drainage Amount none 12/3/2018  7:31 AM         OT Recommendation and Plan                 OT IRF GOALS     Row Name 12/01/18 1000             Transfer Goal 1 (OT-IRF)    Activity/Assistive Device (Transfer Goal 1, OT-IRF)  toilet  -RP      [place] Level (Transfer Goal 1, OT-IRF)  minimum assist (75% or more patient effort)  -RP      Time Frame (Transfer Goal 1, OT-IRF)  short term goal (STG)  -RP      Progress/Outcomes (Transfer Goal 1, OT-IRF)  goal ongoing  -RP         Transfer Goal 2 (OT-IRF)    Activity/Assistive Device (Transfer Goal 2, OT-IRF)  toilet  -RP      [place] Level (Transfer Goal 2, OT-IRF)  supervision required  -RP      Time Frame (Transfer Goal 2, OT-IRF)  long term goal (LTG)  -RP      Progress/Outcomes (Transfer Goal 2, OT-IRF)  goal ongoing  -RP         Bathing Goal 1 (OT-IRF)    Activity/Device (Bathing Goal 1, OT-IRF)  bathing skills, all  -RP      [place] Level (Bathing Goal 1, OT-IRF)  moderate assist (50-74% patient effort)  -RP      Time Frame (Bathing Goal 1, OT-IRF)  short term goal (STG)  -RP      Progress/Outcomes (Bathing Goal 1, OT-IRF)  goal ongoing  -RP         Bathing Goal 2 (OT-IRF)    Activity/Device (Bathing Goal 2, OT-IRF)  bathing skills, all  -RP      [place] Level (Bathing Goal 2, OT-IRF)  supervision required;standby assist  -RP      Time Frame (Bathing Goal 2, OT-IRF)  long term goal (LTG)  -RP      Progress/Outcomes (Bathing Goal 2, OT-IRF)  goal ongoing  -RP         UB Dressing Goal 1 (OT-IRF)    Activity/Device (UB Dressing Goal 1, OT-IRF)  upper body dressing  -RP      [place] (UB Dress Goal 1, OT-IRF)  minimum assist (75% or more patient effort)  -RP      Time Frame (UB Dressing Goal 1, OT-IRF)  short term goal  (STG)  -RP      Progress/Outcomes (UB Dressing Goal 1, OT-IRF)  goal ongoing  -RP         UB Dressing Goal 2 (OT-IRF)    Activity/Device (UB Dressing Goal 2, OT-IRF)  upper body dressing  -RP      [place] (UB Dress Goal 2, OT-IRF)  supervision required  -RP      Time Frame (UB Dressing Goal 2, OT-IRF)  long term goal (LTG)  -RP      Progress/Outcomes (UB Dressing Goal 2, OT-IRF)  goal ongoing  -RP         LB Dressing Goal 1 (OT-IRF)    Activity/Device (LB Dressing Goal 1, OT-IRF)  lower body dressing  -RP      [place] (LB Dressing Goal 1, OT-IRF)  maximum assist (25-49% patient effort)  -RP      Time Frame (LB Dressing Goal 1, OT-IRF)  short term goal (STG)  -RP      Progress/Outcomes (LB Dressing Goal 1, OT-IRF)  goal ongoing  -RP         LB Dressing Goal 2 (OT-IRF)    Activity/Device (LB Dressing Goal 2, OT-IRF)  lower body dressing  -RP      [place] (LB Dressing Goal 2, OT-IRF)  supervision required;standby assist  -RP      Time Frame (LB Dressing Goal 2, OT-IRF)  long term goal (LTG)  -RP      Progress/Outcomes (LB Dressing Goal 2, OT-IRF)  goal ongoing  -RP         Grooming Goal 1 (OT-IRF)    Activity/Device (Grooming Goal 1, OT-IRF)  grooming skills, all  -RP      [place] (Grooming Goal 1, OT-IRF)  minimum assist (75% or more patient effort)  -RP      Time Frame (Grooming Goal 1, OT-IRF)  short term goal (STG)  -RP      Progress/Outcomes (Grooming Goal 1, OT-IRF)  goal ongoing  -RP         Grooming Goal 2 (OT-IRF)    Activity/Device (Grooming Goal 2, OT-IRF)  grooming skills, all  -RP      [place] (Grooming Goal 2, OT-IRF)  supervision required  -RP      Time Frame (Grooming Goal 2, OT-IRF)  long term goal (LTG)  -RP      Progress/Outcomes (Grooming Goal 2, OT-IRF)  goal ongoing  -RP         Toileting Goal 1 (OT-IRF)    Activity/Device (Toileting Goal 1, OT-IRF)  toileting skills, all  -RP      [place] Level (Toileting Goal 1, OT-IRF)  maximum assist (25-49% patient effort)  -RP       Time Frame (Toileting Goal 1, OT-IRF)  short term goal (STG)  -RP      Progress/Outcomes (Toileting Goal 1, OT-IRF)  goal ongoing  -RP         Toileting Goal 2 (OT-IRF)    Activity/Device (Toileting Goal 2, OT-IRF)  toileting skills, all  -RP      [facility] Level (Toileting Goal 2, OT-IRF)  supervision required;standby assist  -RP      Time Frame (Toileting Goal 2, OT-IRF)  long term goal (LTG)  -RP      Progress/Outcomes (Toileting Goal 2, OT-IRF)  goal ongoing  -RP         ROM Goal 1 (OT-IRF)    ROM Goal 1 (OT-IRF)  Pt will increase B UE ROM to approx. 3/4 shoulder flexion to assist w/ ADLs.  -RP      Time Frame (ROM Goal 1, OT-IRF)  long term goal (LTG)  -RP      Progress/Outcomes (ROM Goal 1, OT-IRF)  goal ongoing  -RP        User Key  (r) = Recorded By, (t) = Taken By, (c) = Cosigned By    Initials Name Provider Type    Precious Hamilton, OT Occupational Therapist          Outcome Measures     Row Name 11/30/18 1200             How much difficulty does the patient currently have...    Turning from your back to your side while in flat bed without using bedrails?  3  -JM      Standing up from a chair using your arms (e.g., wheelchair, bedside chair)?  2  -JM      Moving from lying on back to sitting on the side of a flat bed without bedrails?  2  -JM         How much help from another person do you currently need...    Moving to and from a bed to a chair (including a wheelchair)?  3  -JM      Climbing 3-5 steps with a railing?  1  -JM      To walk in hospital room?  3  -JM      AM-PAC 6 Clicks Score  14  -JM        User Key  (r) = Recorded By, (t) = Taken By, (c) = Cosigned By    Initials Name Provider Type    Leticia Lugo PTA Physical Therapy Assistant             Time Calculation:     Time Calculation- OT     Row Name 12/03/18 1146             Time Calculation- OT    OT Start Time  1030  -AF      OT Stop Time  1130  -AF      OT Time Calculation (min)  60 min  -AF        User Key  (r) = Recorded  By, (t) = Taken By, (c) = Cosigned By    Initials Name Provider Type    AF Lolis Cooley, OTR Occupational Therapist          Therapy Suggested Charges     Code   Minutes Charges    None              Therapy Charges for Today     Code Description Service Date Service Provider Modifiers Qty    [id number] HC OT SELF CARE/MGMT/TRAIN EA 15 MIN 12/3/2018 Lolis Cooley, OTR GO 4                   Lolis Cooley OTR  12/3/2018

## 2018-12-04 LAB
GLUCOSE BLDC GLUCOMTR-MCNC: 103 MG/DL (ref 70–130)
GLUCOSE BLDC GLUCOMTR-MCNC: 111 MG/DL (ref 70–130)
GLUCOSE BLDC GLUCOMTR-MCNC: 125 MG/DL (ref 70–130)
GLUCOSE BLDC GLUCOMTR-MCNC: 127 MG/DL (ref 70–130)
GLUCOSE BLDC GLUCOMTR-MCNC: 134 MG/DL (ref 70–130)

## 2018-12-04 PROCEDURE — 97110 THERAPEUTIC EXERCISES: CPT

## 2018-12-04 PROCEDURE — 97535 SELF CARE MNGMENT TRAINING: CPT

## 2018-12-04 PROCEDURE — 82962 GLUCOSE BLOOD TEST: CPT

## 2018-12-04 PROCEDURE — 97112 NEUROMUSCULAR REEDUCATION: CPT

## 2018-12-04 RX ADMIN — ATOMOXETINE 100 MG: 60 CAPSULE ORAL at 09:32

## 2018-12-04 RX ADMIN — HYDROCODONE BITARTRATE AND ACETAMINOPHEN 2 TABLET: 7.5; 325 TABLET ORAL at 13:44

## 2018-12-04 RX ADMIN — GLIPIZIDE 10 MG: 10 TABLET ORAL at 09:32

## 2018-12-04 RX ADMIN — HYDROCODONE BITARTRATE AND ACETAMINOPHEN 2 TABLET: 7.5; 325 TABLET ORAL at 00:58

## 2018-12-04 RX ADMIN — HYDROCODONE BITARTRATE AND ACETAMINOPHEN 2 TABLET: 7.5; 325 TABLET ORAL at 04:59

## 2018-12-04 RX ADMIN — PIOGLITAZONE 15 MG: 15 TABLET ORAL at 09:32

## 2018-12-04 RX ADMIN — HYDROCODONE BITARTRATE AND ACETAMINOPHEN 2 TABLET: 7.5; 325 TABLET ORAL at 09:31

## 2018-12-04 RX ADMIN — CYCLOBENZAPRINE 10 MG: 10 TABLET, FILM COATED ORAL at 00:58

## 2018-12-04 RX ADMIN — RIVAROXABAN 20 MG: 20 TABLET, FILM COATED ORAL at 17:27

## 2018-12-04 RX ADMIN — DULOXETINE HYDROCHLORIDE 60 MG: 60 CAPSULE, DELAYED RELEASE ORAL at 09:32

## 2018-12-04 RX ADMIN — HYDROCODONE BITARTRATE AND ACETAMINOPHEN 2 TABLET: 7.5; 325 TABLET ORAL at 17:27

## 2018-12-04 RX ADMIN — CYCLOBENZAPRINE 10 MG: 10 TABLET, FILM COATED ORAL at 09:32

## 2018-12-04 RX ADMIN — LIDOCAINE 2 PATCH: 50 PATCH CUTANEOUS at 09:31

## 2018-12-04 RX ADMIN — HYDROCODONE BITARTRATE AND ACETAMINOPHEN 2 TABLET: 7.5; 325 TABLET ORAL at 21:47

## 2018-12-04 RX ADMIN — METFORMIN HYDROCHLORIDE 1000 MG: 1000 TABLET ORAL at 09:32

## 2018-12-04 RX ADMIN — METOPROLOL TARTRATE 25 MG: 25 TABLET ORAL at 21:10

## 2018-12-04 RX ADMIN — FAMOTIDINE 20 MG: 20 TABLET, FILM COATED ORAL at 09:32

## 2018-12-04 RX ADMIN — METOPROLOL TARTRATE 25 MG: 25 TABLET ORAL at 09:32

## 2018-12-04 RX ADMIN — METFORMIN HYDROCHLORIDE 1000 MG: 1000 TABLET ORAL at 17:27

## 2018-12-04 RX ADMIN — CYCLOBENZAPRINE 10 MG: 10 TABLET, FILM COATED ORAL at 17:27

## 2018-12-04 NOTE — THERAPY TREATMENT NOTE
Inpatient Rehabilitation - Occupational Therapy Treatment Note    [facility]     Patient Name: Duane Mark Witten  : 1955  MRN: [id number]    Today's Date: 2018                 Admit Date: 2018      Visit Dx:    ICD-10-CM ICD-9-CM   1. Cervical stenosis of spinal canal M48.02 723.0   2. Weakness of both lower extremities R29.898 729.89   3. Paresthesia of both upper extremities R20.2 782.0    M79.601 729.5    M79.602    4. Morbid obesity with BMI of 40.0-44.9, adult (CMS/[facility]) E66.01 278.01    Z68.41 V85.41   5. Impaired functional mobility, balance, gait, and endurance Z74.09 V49.89   6. Functional gait abnormality R26.89 781.2       Patient Active Problem List   Diagnosis   • YANELI (generalized anxiety disorder)   • ADD (attention deficit disorder)   • Depression   • Diabetes type 2, controlled (CMS/[facility])   • ED (erectile dysfunction) of non-organic origin   • HLD (hyperlipidemia)   • Essential hypertension   • Arthritis   • Psoriasis   • Testosterone deficiency   • Primary insomnia   • Weakness of both lower extremities   • Paresthesia of both upper extremities   • Morbid obesity with BMI of 40.0-44.9, adult (CMS/[facility])   • Adverse effect of corticosteroids   • Type 2 diabetes mellitus with hyperglycemia (CMS/[facility])   • Cervical stenosis of spinal canal   • Edema of cervical spinal cord (CMS/[facility])   • Postoperative atrial fibrillation (CMS/[facility])   • Cervical myelopathy (CMS/[facility])         Therapy Treatment    IRF Treatment Summary     Row Name 18 1202 18 0946          Evaluation/Treatment Time and Intent    Subjective Information  no complaints  -AF  complains of;pain  -LB     Existing Precautions/Restrictions  fall;spinal brace for comfort  -AF  fall;spinal brace for comfort  -LB     Document Type  wound care  -AF  therapy note (daily note)  -LB     Mode of Treatment  occupational therapy  -AF  physical therapy  -LB     Patient/Family Observations  sitting up in bed, wife present   -AF  --      Recorded by [AF] Lolis Cooley OTR [LB] Odette Orourke, PTA     Row Name 12/04/18 1202             Cognition/Psychosocial- PT/OT    Affect/Mental Status (Cognitive)  WFL  -AF      Orientation Status (Cognition)  oriented x 4  -AF      Follows Commands (Cognition)  WFL  -AF      Personal Safety Interventions  gait belt;fall prevention program maintained;nonskid shoes/slippers when out of bed  -AF      Recorded by [AF] Lolis Cooley OTR      Row Name 12/04/18 1202 12/04/18 0946          Bed Mobility Assessment/Treatment    Supine-Sit [place] (Bed Mobility)  minimum assist (75% patient effort);verbal cues  -AF  --     Sit-Supine [place] (Bed Mobility)  contact guard;verbal cues  -AF  contact guard;minimum assist (75% patient effort)  -LB     Comment (Bed Mobility)  elevated HOB  -AF  --     Recorded by [AF] Lolis Cooley OTR [LB] Odette Orourke, LUCINA     Row Name 12/04/18 1202             Transfer Assessment/Treatment    Comment (Transfers)  sit to  bathroom MIN A, x 3 trials   -AF      Recorded by [AF] Lolis Cooley OTR      Row Name 12/04/18 1202             Bed-Chair Transfer    Bed-Chair [place] (Transfers)  minimum assist (75% patient effort);verbal cues  -AF      Assistive Device (Bed-Chair Transfers)  walker, front-wheeled;wheelchair  -AF      Recorded by [AF] Lolis Cooley OTR      Row Name 12/04/18 1202 12/04/18 0946          Chair-Bed Transfer    Chair-Bed [place] (Transfers)  minimum assist (75% patient effort);verbal cues  -AF  minimum assist (75% patient effort)  -LB     Assistive Device (Chair-Bed Transfers)  wheelchair  -AF  walker, front-wheeled  -LB     Recorded by [AF] Lolis Cooley OTR [LB] Odette Orourke, PTA     Row Name 12/04/18 0946             Sit-Stand Transfer    Sit-Stand [place] (Transfers)  minimum assist (75% patient effort)  -LB      Assistive Device (Sit-Stand Transfers)  walker, front-wheeled  -LB      Recorded  by [LB] Odette Orourke PTA      Row Name 12/04/18 0946             Stand-Sit Transfer    Stand-Sit [place] (Transfers)  minimum assist (75% patient effort)  -LB      Assistive Device (Stand-Sit Transfers)  walker, front-wheeled  -LB      Recorded by [LB] Odette Orourke PTA      Row Name 12/04/18 0946             Gait/Stairs Assessment/Training    [place] Level (Gait)  minimum assist (75% patient effort)  -LB      Assistive Device (Gait)  walker, front-wheeled  -LB      Distance in Feet (Gait)  80 x 4  -LB      Pattern (Gait)  step-to;swing-through  -LB      Deviations/Abnormal Patterns (Gait)  ataxic;sarah decreased;stride length decreased  -LB      Bilateral Gait Deviations  weight shift ability decreased;forward flexed posture;heel strike decreased  -LB      Left Sided Gait Deviations  forward flexed posture;heel strike decreased  -LB      Right Sided Gait Deviations  forward flexed posture;heel strike decreased  -LB      Recorded by [LB] Odette Orourke PTA      Row Name 12/04/18 1202             Bathing Assessment/Treatment    Bathing [place] Level  upper body;set up;lower body;moderate assist (50% patient effort)  -AF      Bathing Position  supported sitting;supported standing  -AF      Bathing Setup Assistance  obtain supplies  -AF      Comment (Bathing)  seated w/c at sink  -AF      Recorded by [AF] Lolis Cooley OTR      Row Name 12/04/18 1202             Upper Body Dressing Assessment/Treatment    Upper Body Dressing Task  pull over garment;moderate assist (50-74% patient effort)  -AF      Upper Body Dressing Position  supported sitting  -AF      Set-up Assistance (Upper Body Dressing)  obtain clothing  -AF      Comment (Upper Body Dressing)  vcs for tech  -AF      Recorded by [AF] Lolis Cooley OTR      Row Name 12/04/18 1202             Lower Body Dressing Assessment/Treatment    Lower Body Dressing [place] Level   doff;don;pants/bottoms;shoes/slippers;socks;underwear;moderate assist (50% patient effort)  -AF      Lower Body Dressing Position  supported sitting;supported standing  -AF      Lower Body Dressing Setup Assistance  obtain clothing  -AF      Comment (Lower Body Dressing)  LH shoe horn, elastic laces and reacher  -AF      Recorded by [AF] Lolis Cooley OTR      Row Name 12/04/18 1202             Grooming Assessment/Treatment    Grooming [place] Level  set up;oral care regimen  -AF      Grooming Position  sink side;supported sitting  -AF      Comment (Grooming)  increased time, used L hand to hold toothbrush  -AF      Recorded by [AF] Lolis Cooley OTR      Row Name 12/04/18 1202 12/04/18 0946          Pain Scale: Numbers Pre/Post-Treatment    Pain Scale: Numbers, Pretreatment  3/10  -AF  6/10  -LB     Pain Scale: Numbers, Post-Treatment  3/10  -AF  --     Pain Location  neck  -AF  neck  -LB     Pre/Post Treatment Pain Comment  NSG applied lioderm patches to scapulas B'ly  -AF  --     Pain Intervention(s)  --  Medication (See MAR) applied soft collar, notified nsg  -LB     Recorded by [AF] Lolis Cooley OTR [LB] Odette Orourke, LUCINA     Row Name 12/04/18 1202             Therapeutic Exercise    Therapeutic Exercise  seated, upper extremities  -AF      Recorded by [AF] Lolis Cooley OTR      Row Name 12/04/18 1202             Upper Extremity Seated Therapeutic Exercise    Performed, Seated Upper Extremity (Therapeutic Exercise)  scapular protraction/retraction;shoulder external/internal rotation  -AF      Exercise Type, Seated Upper Extremity (Therapeutic Exercise)  AAROM (active assistive range of motion)  -AF      Expected Outcomes, Seated Upper Extremity (Therapeutic Exercise)  improve motor control;improve performance, BADLs;improve performance, reaching for objects;improve performance, reaching/manipulating objects  -AF      Restrictions, Seated Upper Extremity (Therapeutic Exercise)   cervical precautions  -AF      Sets/Reps Detail, Seated Upper Extremity (Therapeutic Exercise)  2/15  -AF      Comment, Seated Upper Extremity (Therapeutic Exercise)  table slides  -AF      Recorded by [AF] Lolis Cooley OTR      Row Name 12/04/18 0946             Lower Extremity Seated Therapeutic Exercise    Performed, Seated Lower Extremity (Therapeutic Exercise)  hip flexion/extension;LAQ (long arc quad), knee extension;ankle dorsiflexion/plantarflexion red T-band for knee flex,hip abd. Isometric: QS, GS, hip add  -LB      Sets/Reps Detail, Seated Lower Extremity (Therapeutic Exercise)  1/15  -LB      Recorded by [LB] Odette Orourke PTA      Row Name 12/04/18 1202 12/04/18 0946          Positioning and Restraints    Pre-Treatment Position  in bed  -AF  --     Post Treatment Position  bed  -AF  bed  -LB     In Bed  sitting;call light within reach;encouraged to call for assist;exit alarm on;with family/caregiver wife present   -AF  supine;call light within reach;with family/caregiver  -LB     Recorded by [AF] Lolis Cooley OTR [LB] Odette Orourke PTA       User Key  (r) = Recorded By, (t) = Taken By, (c) = Cosigned By    Initials Name Effective Dates    LB Odette Orourke PTA 03/07/18 -     AF Lolis Cooley OTR 04/03/18 -           Wound 11/27/18 1318 Other (See comments) neck incision (Active)   Dressing Appearance dry;intact 12/3/2018  8:57 PM   Closure Liquid skin adhesive;Adhesive closure strips 12/4/2018  8:05 AM   Base purple 12/3/2018  8:57 PM   Periwound dry;intact 12/4/2018  8:05 AM   Drainage Amount none 12/4/2018  8:05 AM         OT Recommendation and Plan                 OT IRF GOALS     Row Name 12/01/18 1000             Transfer Goal 1 (OT-IRF)    Activity/Assistive Device (Transfer Goal 1, OT-IRF)  toilet  -RP      [place] Level (Transfer Goal 1, OT-IRF)  minimum assist (75% or more patient effort)  -RP      Time Frame (Transfer Goal 1, OT-IRF)  short term goal  (STG)  -RP      Progress/Outcomes (Transfer Goal 1, OT-IRF)  goal ongoing  -RP         Transfer Goal 2 (OT-IRF)    Activity/Assistive Device (Transfer Goal 2, OT-IRF)  toilet  -RP      [place] Level (Transfer Goal 2, OT-IRF)  supervision required  -RP      Time Frame (Transfer Goal 2, OT-IRF)  long term goal (LTG)  -RP      Progress/Outcomes (Transfer Goal 2, OT-IRF)  goal ongoing  -RP         Bathing Goal 1 (OT-IRF)    Activity/Device (Bathing Goal 1, OT-IRF)  bathing skills, all  -RP      [place] Level (Bathing Goal 1, OT-IRF)  moderate assist (50-74% patient effort)  -RP      Time Frame (Bathing Goal 1, OT-IRF)  short term goal (STG)  -RP      Progress/Outcomes (Bathing Goal 1, OT-IRF)  goal ongoing  -RP         Bathing Goal 2 (OT-IRF)    Activity/Device (Bathing Goal 2, OT-IRF)  bathing skills, all  -RP      [place] Level (Bathing Goal 2, OT-IRF)  supervision required;standby assist  -RP      Time Frame (Bathing Goal 2, OT-IRF)  long term goal (LTG)  -RP      Progress/Outcomes (Bathing Goal 2, OT-IRF)  goal ongoing  -RP         UB Dressing Goal 1 (OT-IRF)    Activity/Device (UB Dressing Goal 1, OT-IRF)  upper body dressing  -RP      [place] (UB Dress Goal 1, OT-IRF)  minimum assist (75% or more patient effort)  -RP      Time Frame (UB Dressing Goal 1, OT-IRF)  short term goal (STG)  -RP      Progress/Outcomes (UB Dressing Goal 1, OT-IRF)  goal ongoing  -RP         UB Dressing Goal 2 (OT-IRF)    Activity/Device (UB Dressing Goal 2, OT-IRF)  upper body dressing  -RP      [place] (UB Dress Goal 2, OT-IRF)  supervision required  -RP      Time Frame (UB Dressing Goal 2, OT-IRF)  long term goal (LTG)  -RP      Progress/Outcomes (UB Dressing Goal 2, OT-IRF)  goal ongoing  -RP         LB Dressing Goal 1 (OT-IRF)    Activity/Device (LB Dressing Goal 1, OT-IRF)  lower body dressing  -RP      [place] (LB Dressing Goal 1, OT-IRF)  maximum assist (25-49% patient effort)  -RP      Time Frame (LB  Dressing Goal 1, OT-IRF)  short term goal (STG)  -RP      Progress/Outcomes (LB Dressing Goal 1, OT-IRF)  goal ongoing  -RP         LB Dressing Goal 2 (OT-IRF)    Activity/Device (LB Dressing Goal 2, OT-IRF)  lower body dressing  -RP      [place] (LB Dressing Goal 2, OT-IRF)  supervision required;standby assist  -RP      Time Frame (LB Dressing Goal 2, OT-IRF)  long term goal (LTG)  -RP      Progress/Outcomes (LB Dressing Goal 2, OT-IRF)  goal ongoing  -RP         Grooming Goal 1 (OT-IRF)    Activity/Device (Grooming Goal 1, OT-IRF)  grooming skills, all  -RP      [place] (Grooming Goal 1, OT-IRF)  minimum assist (75% or more patient effort)  -RP      Time Frame (Grooming Goal 1, OT-IRF)  short term goal (STG)  -RP      Progress/Outcomes (Grooming Goal 1, OT-IRF)  goal ongoing  -RP         Grooming Goal 2 (OT-IRF)    Activity/Device (Grooming Goal 2, OT-IRF)  grooming skills, all  -RP      [place] (Grooming Goal 2, OT-IRF)  supervision required  -RP      Time Frame (Grooming Goal 2, OT-IRF)  long term goal (LTG)  -RP      Progress/Outcomes (Grooming Goal 2, OT-IRF)  goal ongoing  -RP         Toileting Goal 1 (OT-IRF)    Activity/Device (Toileting Goal 1, OT-IRF)  toileting skills, all  -RP      [place] Level (Toileting Goal 1, OT-IRF)  maximum assist (25-49% patient effort)  -RP      Time Frame (Toileting Goal 1, OT-IRF)  short term goal (STG)  -RP      Progress/Outcomes (Toileting Goal 1, OT-IRF)  goal ongoing  -RP         Toileting Goal 2 (OT-IRF)    Activity/Device (Toileting Goal 2, OT-IRF)  toileting skills, all  -RP      [place] Level (Toileting Goal 2, OT-IRF)  supervision required;standby assist  -RP      Time Frame (Toileting Goal 2, OT-IRF)  long term goal (LTG)  -RP      Progress/Outcomes (Toileting Goal 2, OT-IRF)  goal ongoing  -RP         ROM Goal 1 (OT-IRF)    ROM Goal 1 (OT-IRF)  Pt will increase B UE ROM to approx. 3/4 shoulder flexion to assist w/ ADLs.  -RP      Time Frame  (ROM Goal 1, OT-IRF)  long term goal (LTG)  -RP      Progress/Outcomes (ROM Goal 1, OT-IRF)  goal ongoing  -RP        User Key  (r) = Recorded By, (t) = Taken By, (c) = Cosigned By    Initials Name Provider Type    RP Precious De Leon, OT Occupational Therapist                 Time Calculation:     Time Calculation- OT     Row Name 12/04/18 1207             Time Calculation- OT    OT Start Time  1030  -AF      OT Stop Time  1130  -AF      OT Time Calculation (min)  60 min  -AF        User Key  (r) = Recorded By, (t) = Taken By, (c) = Cosigned By    Initials Name Provider Type    AF Lolis Cooley OTR Occupational Therapist          Therapy Suggested Charges     Code   Minutes Charges    None              Therapy Charges for Today     Code Description Service Date Service Provider Modifiers Qty    50219916014 HC OT SELF CARE/MGMT/TRAIN EA 15 MIN 12/3/2018 Lolis Cooley OTR GO 4    38932980811 HC OT NEUROMUSC RE EDUCATION EA 15 MIN 12/3/2018 Lolis Cooley OTR GO 2    58209650319 HC OT SELF CARE/MGMT/TRAIN EA 15 MIN 12/4/2018 Lolis Cooley OTR GO 3    59428148563 HC OT THER PROC EA 15 MIN 12/4/2018 Lolis Cooley OTR GO 1                   NURIS Girmm  12/4/2018

## 2018-12-04 NOTE — PROGRESS NOTES
Case Management  Inpatient Rehabilitation Team Conference    Conference Date/Time: 12/4/2018 7:47:25 AM    Team Conference Attendees:  Ashley Aaron MSSW Kathie Powell PT  Lolis Cooley, OT  Rosibel Snell, CTRS  Sally Conti RD, LD  Wojciech Lentz, VENKATESH Gan, RN  Chaplain Bryon    Demographics            Age: 63Y            Gender: Male    Admission Date: 11/30/2018 3:13:00 PM  Rehabilitation Diagnosis:  Incomplete quad - cervical myelopathy  Past Medical History: Lasik; HTN, hyperlipid; morbid obesity; DM; ADD,  genralized anxiety disorder, depression      Plan of Care  Anticipated Discharge Date/Estimated Length of Stay: ELOS: 10 - 14 days  Anticipated Discharge Destination: Community discharge with assistance  Discharge Plan : Pt lives in a bi-level home, 2 steps to enter, then 5 steps to  the living area. Pt will have 24 hour assistance from his wife at discharge.  Medical Necessity Expected Level Rationale: Supervision  Intensity and Duration: an average of 3 hours/5 days per week  Medical Supervision and 24 Hour Rehab Nursing: x  Physical Therapy: x  PT Intensity/Duration: 90 minutes/day, 5 days/week  Occupational Therapy: x  OT Intensity/Duration: 90 minutes/day, 5 days/week  Social Work: x  Therapeutic Recreation: x  Updated (if changes indicated)    Anticipated Discharge Date/Estimated Length of Stay:   ELOS: 2 weeks    Based on the patient's medical and functional status, their prognosis and  expected level of functional improvement is: Supervision      Interdisciplinary Problem/Goals/Status    All Rehab Problems:  Body Systems    [RN] Endocrine(Active)  Current Status(12/01/2018): Blood sugar not controlled  Weekly Goal(12/08/2018): Blood sugar will be within acceptable limits  Discharge Goal: Independent with diabetes regimen        Mobility    [OT] Bed/Chair/Wheelchair(Active)  Current Status(01/01/1753):  Weekly Goal(01/01/1753):  Discharge  Goal:    [OT] Toilet Transfers(Active)  Current Status(12/03/2018): Mod A  Weekly Goal(12/10/2018): Min A  Discharge Goal: Sup    [PT] Stairs(Active)  Current Status(12/03/2018): TBD  Weekly Goal(12/11/2018): PT only  Discharge Goal: 8, SBA with rails    [PT] Walk(Active)  Current Status(12/03/2018): 80' Min A with RWX  Weekly Goal(12/11/2018): CGA to BR with RWX  Discharge Goal: 250' Supervision with RWX    [PT] Bed/Chair/Wheelchair(Active)  Current Status(12/03/2018): Min A with RWX  Weekly Goal(12/11/2018): CGA/SBA with RWX  Discharge Goal: Supervision    [PT] Bed Mobility(Active)  Current Status(12/03/2018): Mod A  Weekly Goal(12/11/2018): CGA  Discharge Goal: Mod I    [OT] Tub/Shower Transfers(Active)  Current Status(12/03/2018): MOD  Weekly Goal(12/10/2018): CGA  Discharge Goal: Supervision        Pain    [RN] Pain Management(Active)  Current Status(12/01/2018): Pain related to surgery  Weekly Goal(12/08/2018): Able to tolerate therapies, & pain controlled  Discharge Goal: Pain under control        Psychosocial    [RN] Coping/Adjustment(Active)  Current Status(12/01/2018): Expresses appropriate coping  Weekly Goal(12/08/2018): Allow opportunity to express concerns regarding current  status  Discharge Goal: Adequate coping regarding life changes with ongoing support.        Safety    [RN] Potential for Injury(Active)  Current Status(12/01/2018): No unsafe behaviors, uses call light appropriately  Weekly Goal(12/08/2018): Instruct family regarding safety precautions & need for  close supervision  Discharge Goal: Family will be knowledgeable of need for cueing & supervision to  avoid falls.        Self Care    [OT] Bathing(Active)  Current Status(12/03/2018): MOD  Weekly Goal(12/10/2018): MIN  Discharge Goal: Sup/SBA    [OT] Dressing (Lower)(Active)  Current Status(12/03/2018): MAX  Weekly Goal(12/10/2018): MOD  Discharge Goal: Sup/SBA    [OT] Dressing (Upper)(Active)  Current Status(12/03/2018): Mod A  Weekly  Goal(12/10/2018): Min A  Discharge Goal: Sup    [OT] Grooming(Active)  Current Status(12/03/2018): Mod A  Weekly Goal(12/10/2018): Min A  Discharge Goal: Sup    [OT] Toileting(Active)  Current Status(12/03/2018): Dep  Weekly Goal(12/10/2018): Max/Mod A  Discharge Goal: Sup/SBA        Sphincter Control    [RN] bowel & bladder management(Active)  Current Status(12/01/2018): Continent 100%  Weekly Goal(12/08/2018): Remains continent 100%  Discharge Goal: Remains 100% continent        Comments: 12/4: poor coordination/strength in hands; to resume trulicity upon  discharge;    Signed by: Buck Gan RN    Physician CoSigned By: Mike Melissa 12/04/2018 07:55:48

## 2018-12-04 NOTE — PROGRESS NOTES
"Per staff report.    Section B. Hearing, Speech, Vision: Expression of Ideas and Wants: Expresses  complex messages without difficulty and with speech that is clear and easy to  understand.  Understanding Verbal and Non-Verbal Content: Understands: Clear comprehension  without cues or repetitions.    Section C. Cognitive Patterns: Brief Interview for Mental Status (BIMS) was  conducted.  Repetition of Three Words: Three words  Able to report correct year: Correct  Able to report correct month: Accurate within 5 days  Able to report correct day of the week: Correct  Able to recall \"sock\": Yes, no cue required  Able to recall \"blue\": Yes, no cue required  Able to recall \"bed\": Yes, no cue required    BIMS SUMMARY SCORE: 15 Cognitively intact Patient was able to complete the Brief  Interview for Mental Status    Signed by: Buck Gan RN    "

## 2018-12-04 NOTE — THERAPY TREATMENT NOTE
"Inpatient Rehabilitation - Occupational Therapy Treatment Note    [facility]     Patient Name: Duane Mark Witten  : 1955  MRN: [id number]    Today's Date: 2018                 Admit Date: 2018      Visit Dx:    ICD-10-CM ICD-9-CM   1. Cervical stenosis of spinal canal M48.02 723.0   2. Weakness of both lower extremities R29.898 729.89   3. Paresthesia of both upper extremities R20.2 782.0    M79.601 729.5    M79.602    4. Morbid obesity with BMI of 40.0-44.9, adult (CMS/[facility]) E66.01 278.01    Z68.41 V85.41   5. Impaired functional mobility, balance, gait, and endurance Z74.09 V49.89   6. Functional gait abnormality R26.89 781.2       Patient Active Problem List   Diagnosis   • YANELI (generalized anxiety disorder)   • ADD (attention deficit disorder)   • Depression   • Diabetes type 2, controlled (CMS/[facility])   • ED (erectile dysfunction) of non-organic origin   • HLD (hyperlipidemia)   • Essential hypertension   • Arthritis   • Psoriasis   • Testosterone deficiency   • Primary insomnia   • Weakness of both lower extremities   • Paresthesia of both upper extremities   • Morbid obesity with BMI of 40.0-44.9, adult (CMS/[facility])   • Adverse effect of corticosteroids   • Type 2 diabetes mellitus with hyperglycemia (CMS/[facility])   • Cervical stenosis of spinal canal   • Edema of cervical spinal cord (CMS/[facility])   • Postoperative atrial fibrillation (CMS/[facility])   • Cervical myelopathy (CMS/[facility])         Therapy Treatment    IRF Treatment Summary     Row Name 18 1526 18 1202 18 0946       Evaluation/Treatment Time and Intent    Subjective Information  no complaints  -AF  no complaints  -AF  complains of;pain in PM c/o \"legs feel weaker\"  -LB    Existing Precautions/Restrictions  fall;spinal brace for comfort  -AF  fall;spinal brace for comfort  -AF  fall;spinal brace for comfort  -LB    Document Type  therapy note (daily note)  -AF  wound care  -AF  therapy note (daily note)  -LB    Mode of Treatment  " occupational therapy  -AF  occupational therapy  -AF  physical therapy  -LB    Patient/Family Observations  supine in bed  -AF  sitting up in bed, wife present   -AF  supine in bed. Wife present in room.  -LB    Recorded by [AF] Lolis Cooley OTR [AF] Lolis Cooley OTR [LB] Odette Orourke PTA    Row Name 12/04/18 1526 12/04/18 1202          Cognition/Psychosocial- PT/OT    Affect/Mental Status (Cognitive)  WFL  -AF  WFL  -AF     Orientation Status (Cognition)  oriented x 4  -AF  oriented x 4  -AF     Follows Commands (Cognition)  WFL  -AF  WFL  -AF     Personal Safety Interventions  gait belt;fall prevention program maintained;nonskid shoes/slippers when out of bed  -AF  gait belt;fall prevention program maintained;nonskid shoes/slippers when out of bed  -AF     Recorded by [AF] Lolis Cooley OTR [AF] Lolis Cooley OTR     Row Name 12/04/18 1526 12/04/18 1202 12/04/18 0946       Bed Mobility Assessment/Treatment    Supine-Sit [place] (Bed Mobility)  minimum assist (75% patient effort);verbal cues  -AF  minimum assist (75% patient effort);verbal cues  -AF  moderate assist (50% patient effort)  -LB    Sit-Supine [place] (Bed Mobility)  contact guard;verbal cues  -AF  contact guard;verbal cues  -AF  contact guard;minimum assist (75% patient effort)  -LB    Assistive Device (Bed Mobility)  --  --  head of bed elevated;bed rails  -LB    Comment (Bed Mobility)  --  elevated HOB  -AF  --    Recorded by [AF] Lolis Cooley OTR [AF] Lolis Cooley OTR [LB] Odette Orourke PTA    Row Name 12/04/18 1202             Transfer Assessment/Treatment    Comment (Transfers)  sit to  bathroom MIN A, x 3 trials   -AF      Recorded by [AF] Lolis Cooley OTR      Row Name 12/04/18 1526 12/04/18 1202 12/04/18 0946       Bed-Chair Transfer    Bed-Chair [place] (Transfers)  minimum assist (75% patient effort);verbal cues  -AF  minimum assist (75% patient effort);verbal cues   -AF  minimum assist (75% patient effort)  -LB    Assistive Device (Bed-Chair Transfers)  walker, front-wheeled;wheelchair  -AF  walker, front-wheeled;wheelchair  -AF  walker, front-wheeled  -LB    Recorded by [AF] Lolis Cooley, NURIS [AF] Lolis Cooley, OTJEANINE [LB] Odette Orourke, LUCINA    Row Name 12/04/18 1526 12/04/18 1202 12/04/18 0946       Chair-Bed Transfer    Chair-Bed [place] (Transfers)  minimum assist (75% patient effort);verbal cues  -AF  minimum assist (75% patient effort);verbal cues  -AF  minimum assist (75% patient effort)  -LB    Assistive Device (Chair-Bed Transfers)  wheelchair;walker, front-wheeled  -AF  wheelchair  -AF  walker, front-wheeled  -LB    Recorded by [AF] Lolis Cooley, NURIS [AF] Lolis Cooley, OTR [LB] Odette Orourke, PTA    Row Name 12/04/18 0946             Sit-Stand Transfer    Sit-Stand [place] (Transfers)  minimum assist (75% patient effort)  -LB      Assistive Device (Sit-Stand Transfers)  walker, front-wheeled  -LB      Recorded by [LB] Odette Orourke, LUCINA      Row Name 12/04/18 0946             Stand-Sit Transfer    Stand-Sit [place] (Transfers)  minimum assist (75% patient effort)  -LB      Assistive Device (Stand-Sit Transfers)  walker, front-wheeled  -LB      Recorded by [LB] Odette Orourke, LUCINA      Row Name 12/04/18 0946             Gait/Stairs Assessment/Training    [place] Level (Gait)  minimum assist (75% patient effort)  -LB      Assistive Device (Gait)  walker, front-wheeled  -LB      Distance in Feet (Gait)  80 x 4  -LB      Pattern (Gait)  step-to;swing-through  -LB      Deviations/Abnormal Patterns (Gait)  ataxic;sarah decreased;stride length decreased  -LB      Bilateral Gait Deviations  weight shift ability decreased;forward flexed posture;heel strike decreased  -LB      Left Sided Gait Deviations  forward flexed posture;heel strike decreased  -LB      Right Sided Gait Deviations  forward flexed posture;heel strike  decreased  -LB      Recorded by [LB] Odette Orourke PTA      Row Name 12/04/18 1202             Bathing Assessment/Treatment    Bathing [place] Level  upper body;set up;lower body;moderate assist (50% patient effort)  -AF      Bathing Position  supported sitting;supported standing  -AF      Bathing Setup Assistance  obtain supplies  -AF      Comment (Bathing)  seated w/c at sink  -AF      Recorded by [AF] Lolis Cooley OTR      Row Name 12/04/18 1202             Upper Body Dressing Assessment/Treatment    Upper Body Dressing Task  pull over garment;moderate assist (50-74% patient effort)  -AF      Upper Body Dressing Position  supported sitting  -AF      Set-up Assistance (Upper Body Dressing)  obtain clothing  -AF      Comment (Upper Body Dressing)  vcs for tech  -AF      Recorded by [AF] Lolis Cooley OTR      Row Name 12/04/18 1202             Lower Body Dressing Assessment/Treatment    Lower Body Dressing [place] Level  doff;don;pants/bottoms;shoes/slippers;socks;underwear;moderate assist (50% patient effort)  -AF      Lower Body Dressing Position  supported sitting;supported standing  -AF      Lower Body Dressing Setup Assistance  obtain clothing  -AF      Comment (Lower Body Dressing)  LH shoe horn, elastic laces and reacher  -AF      Recorded by [AF] Lolis Cooley OTR      Row Name 12/04/18 1202             Grooming Assessment/Treatment    Grooming [place] Level  set up;oral care regimen  -AF      Grooming Position  sink side;supported sitting  -AF      Comment (Grooming)  increased time, used L hand to hold toothbrush  -AF      Recorded by [AF] Lolis Cooley OTR      Row Name 12/04/18 1526             Fine Motor Testing & Training    Comment, Fine Motor Coordination  yellow theraputty manipulation, large beads stringing B'ly hands. beads on vertical dowel han with vc's and tcs for compensations winging present   -AF      Recorded by [AF] Lolis Cooley OTR Row  Name 12/04/18 1526 12/04/18 1202 12/04/18 0946       Pain Scale: Numbers Pre/Post-Treatment    Pain Scale: Numbers, Pretreatment  3/10  -AF  3/10  -AF  6/10  -LB    Pain Scale: Numbers, Post-Treatment  3/10  -AF  3/10  -AF  --    Pain Location  neck  -AF  neck  -AF  neck  -LB    Pre/Post Treatment Pain Comment  --  NSG applied lioderm patches to scapulas B'ly  -AF  --    Pain Intervention(s)  --  --  Medication (See MAR) applied soft collar, notified nsg  -LB    Recorded by [AF] Lolis Cooley, OTR [AF] Lolis Cooley OTJEANINE [LB] Odette Orourke PTA    Row Name 12/04/18 1202             Therapeutic Exercise    Therapeutic Exercise  seated, upper extremities  -AF      Recorded by [AF] Lolis Cooley OTR      Row Name 12/04/18 1202             Upper Extremity Seated Therapeutic Exercise    Performed, Seated Upper Extremity (Therapeutic Exercise)  scapular protraction/retraction;shoulder external/internal rotation  -AF      Exercise Type, Seated Upper Extremity (Therapeutic Exercise)  AAROM (active assistive range of motion)  -AF      Expected Outcomes, Seated Upper Extremity (Therapeutic Exercise)  improve motor control;improve performance, BADLs;improve performance, reaching for objects;improve performance, reaching/manipulating objects  -AF      Restrictions, Seated Upper Extremity (Therapeutic Exercise)  cervical precautions  -AF      Sets/Reps Detail, Seated Upper Extremity (Therapeutic Exercise)  2/15  -AF      Comment, Seated Upper Extremity (Therapeutic Exercise)  table slides  -AF      Recorded by [AF] Lolis Cooley OTR      Row Name 12/04/18 0946             Lower Extremity Seated Therapeutic Exercise    Performed, Seated Lower Extremity (Therapeutic Exercise)  hip flexion/extension;LAQ (long arc quad), knee extension;ankle dorsiflexion/plantarflexion red T-band for knee flex,hip abd. Isometric: QS, GS, hip add  -LB      Sets/Reps Detail, Seated Lower Extremity (Therapeutic Exercise)  1/15   -LB      Recorded by [LB] Odette Orourke PTA      Row Name 12/04/18 1526 12/04/18 1202 12/04/18 0946       Positioning and Restraints    Pre-Treatment Position  in bed  -AF  in bed  -AF  --    Post Treatment Position  bed  -AF  bed  -AF  bed  -LB    In Bed  sitting;call light within reach;encouraged to call for assist;exit alarm on;with family/caregiver wife present   -AF  sitting;call light within reach;encouraged to call for assist;exit alarm on;with family/caregiver wife present   -AF  supine;call light within reach;with family/caregiver  -LB    Recorded by [AF] Lolis Cooley, OTR [AF] Lolis Cooley, OTR [LB] Odette Orourke PTA      User Key  (r) = Recorded By, (t) = Taken By, (c) = Cosigned By    Initials Name Effective Dates    LB Odette Orourke PTA 03/07/18 -     AF Lolis Cooley, OTR 04/03/18 -           Wound 11/27/18 1318 Other (See comments) neck incision (Active)   Dressing Appearance dry;intact 12/3/2018  8:57 PM   Closure Liquid skin adhesive;Adhesive closure strips 12/4/2018  8:05 AM   Base purple 12/3/2018  8:57 PM   Periwound dry;intact 12/4/2018  8:05 AM   Drainage Amount none 12/4/2018  8:05 AM         OT Recommendation and Plan                 OT IRF GOALS     Row Name 12/01/18 1000             Transfer Goal 1 (OT-IRF)    Activity/Assistive Device (Transfer Goal 1, OT-IRF)  toilet  -RP      [place] Level (Transfer Goal 1, OT-IRF)  minimum assist (75% or more patient effort)  -RP      Time Frame (Transfer Goal 1, OT-IRF)  short term goal (STG)  -RP      Progress/Outcomes (Transfer Goal 1, OT-IRF)  goal ongoing  -RP         Transfer Goal 2 (OT-IRF)    Activity/Assistive Device (Transfer Goal 2, OT-IRF)  toilet  -RP      [place] Level (Transfer Goal 2, OT-IRF)  supervision required  -RP      Time Frame (Transfer Goal 2, OT-IRF)  long term goal (LTG)  -RP      Progress/Outcomes (Transfer Goal 2, OT-IRF)  goal ongoing  -RP         Bathing Goal 1 (OT-IRF)     Activity/Device (Bathing Goal 1, OT-IRF)  bathing skills, all  -RP      [place] Level (Bathing Goal 1, OT-IRF)  moderate assist (50-74% patient effort)  -RP      Time Frame (Bathing Goal 1, OT-IRF)  short term goal (STG)  -RP      Progress/Outcomes (Bathing Goal 1, OT-IRF)  goal ongoing  -RP         Bathing Goal 2 (OT-IRF)    Activity/Device (Bathing Goal 2, OT-IRF)  bathing skills, all  -RP      [place] Level (Bathing Goal 2, OT-IRF)  supervision required;standby assist  -RP      Time Frame (Bathing Goal 2, OT-IRF)  long term goal (LTG)  -RP      Progress/Outcomes (Bathing Goal 2, OT-IRF)  goal ongoing  -RP         UB Dressing Goal 1 (OT-IRF)    Activity/Device (UB Dressing Goal 1, OT-IRF)  upper body dressing  -RP      [place] (UB Dress Goal 1, OT-IRF)  minimum assist (75% or more patient effort)  -RP      Time Frame (UB Dressing Goal 1, OT-IRF)  short term goal (STG)  -RP      Progress/Outcomes (UB Dressing Goal 1, OT-IRF)  goal ongoing  -RP         UB Dressing Goal 2 (OT-IRF)    Activity/Device (UB Dressing Goal 2, OT-IRF)  upper body dressing  -RP      [place] (UB Dress Goal 2, OT-IRF)  supervision required  -RP      Time Frame (UB Dressing Goal 2, OT-IRF)  long term goal (LTG)  -RP      Progress/Outcomes (UB Dressing Goal 2, OT-IRF)  goal ongoing  -RP         LB Dressing Goal 1 (OT-IRF)    Activity/Device (LB Dressing Goal 1, OT-IRF)  lower body dressing  -RP      [place] (LB Dressing Goal 1, OT-IRF)  maximum assist (25-49% patient effort)  -RP      Time Frame (LB Dressing Goal 1, OT-IRF)  short term goal (STG)  -RP      Progress/Outcomes (LB Dressing Goal 1, OT-IRF)  goal ongoing  -RP         LB Dressing Goal 2 (OT-IRF)    Activity/Device (LB Dressing Goal 2, OT-IRF)  lower body dressing  -RP      [place] (LB Dressing Goal 2, OT-IRF)  supervision required;standby assist  -RP      Time Frame (LB Dressing Goal 2, OT-IRF)  long term goal (LTG)  -RP      Progress/Outcomes (LB  Dressing Goal 2, OT-IRF)  goal ongoing  -RP         Grooming Goal 1 (OT-IRF)    Activity/Device (Grooming Goal 1, OT-IRF)  grooming skills, all  -RP      [place] (Grooming Goal 1, OT-IRF)  minimum assist (75% or more patient effort)  -RP      Time Frame (Grooming Goal 1, OT-IRF)  short term goal (STG)  -RP      Progress/Outcomes (Grooming Goal 1, OT-IRF)  goal ongoing  -RP         Grooming Goal 2 (OT-IRF)    Activity/Device (Grooming Goal 2, OT-IRF)  grooming skills, all  -RP      [place] (Grooming Goal 2, OT-IRF)  supervision required  -RP      Time Frame (Grooming Goal 2, OT-IRF)  long term goal (LTG)  -RP      Progress/Outcomes (Grooming Goal 2, OT-IRF)  goal ongoing  -RP         Toileting Goal 1 (OT-IRF)    Activity/Device (Toileting Goal 1, OT-IRF)  toileting skills, all  -RP      [place] Level (Toileting Goal 1, OT-IRF)  maximum assist (25-49% patient effort)  -RP      Time Frame (Toileting Goal 1, OT-IRF)  short term goal (STG)  -RP      Progress/Outcomes (Toileting Goal 1, OT-IRF)  goal ongoing  -RP         Toileting Goal 2 (OT-IRF)    Activity/Device (Toileting Goal 2, OT-IRF)  toileting skills, all  -RP      [place] Level (Toileting Goal 2, OT-IRF)  supervision required;standby assist  -RP      Time Frame (Toileting Goal 2, OT-IRF)  long term goal (LTG)  -RP      Progress/Outcomes (Toileting Goal 2, OT-IRF)  goal ongoing  -RP         ROM Goal 1 (OT-IRF)    ROM Goal 1 (OT-IRF)  Pt will increase B UE ROM to approx. 3/4 shoulder flexion to assist w/ ADLs.  -RP      Time Frame (ROM Goal 1, OT-IRF)  long term goal (LTG)  -RP      Progress/Outcomes (ROM Goal 1, OT-IRF)  goal ongoing  -RP        User Key  (r) = Recorded By, (t) = Taken By, (c) = Cosigned By    Initials Name Provider Type    Precious Hamilton OT Occupational Therapist                 Time Calculation:     Time Calculation- OT     Row Name 12/04/18 1530 12/04/18 1207          Time Calculation- OT    OT Start Time  1430  -AF   1030  -AF     OT Stop Time  1500  -AF  1130  -AF     OT Time Calculation (min)  30 min  -AF  60 min  -AF       User Key  (r) = Recorded By, (t) = Taken By, (c) = Cosigned By    Initials Name Provider Type    AF Lolis Cooley OTJEANINE Occupational Therapist          Therapy Suggested Charges     Code   Minutes Charges    None              Therapy Charges for Today     Code Description Service Date Service Provider Modifiers Qty    66428105045 HC OT SELF CARE/MGMT/TRAIN EA 15 MIN 12/3/2018 Lolis Cooley, OTR GO 4    92138457617 HC OT NEUROMUSC RE EDUCATION EA 15 MIN 12/3/2018 Lolis Cooley OTR GO 2    25076088658 HC OT SELF CARE/MGMT/TRAIN EA 15 MIN 12/4/2018 Lolis Cooley, OTR GO 3    86921413468 HC OT THER PROC EA 15 MIN 12/4/2018 Lolis Cooley, OTR GO 1    70577072389 HC OT NEUROMUSC RE EDUCATION EA 15 MIN 12/4/2018 Lolis Cooley, OTR GO 1    42005595630 HC OT THER PROC EA 15 MIN 12/4/2018 Lolis Cooley, OTR GO 1                   NURIS Grimm  12/4/2018

## 2018-12-04 NOTE — PROGRESS NOTES
Inpatient Rehabilitation Functional Measures Assessment    Functional Measures  [facility] Eating:  [facility] Grooming: [facility] Bathing:  [facility] Upper Body Dressing:  [facility] Lower Body Dressing:  [facility] Toileting:  [facility] Bladder Management  Level of Assistance:  [place]  Frequency/Number of Accidents this Shift:  [facility] Bowel Management  Level of Assistance: [place]  Frequency/Number of Accidents this Shift: [facility] Bed/Chair/Wheelchair Transfer:  [facility] Toilet Transfer:  [facility] Tub/Shower Transfer:  [place]    Previously Documented Mode of Locomotion at Discharge: Field  [facility]CCO Expected Mode of Locomotion at Discharge: [facility] Walk/Wheelchair:  [facility] Stairs:  [facility] Comprehension:  Auditory comprehension is the usual mode. Comprehension  Score = 6, Modified [place].  Patient comprehends complex/abstract  information in their primary language, requiring: Additional time.  [place] Expression:  Vocal expression is the usual mode. Expression Score = 7,  Independent.  Patient expresses complex/abstract information in their primary  language.  Patient is completely independent for vocal expression.  There are no  activity limitations.  [facility] Social Interaction:  Social Interaction Score = 7, Independent. Patient is  completely independent for social interaction.  There are no activity  limitations.  [place] Problem Solving:  Problem Solving Score = 6, Modified [place].  Patient  makes appropriate decisions in order to solve complex problems with mild  difficulty but self-corrects.  [place] Memory:  Memory Score = 6, Modified [place].  Patient is modified  independent for memory, requiring: Requires additional time.    Therapy Mode Minutes  Occupational Therapy: [place]  Physical Therapy: [facility]  Speech Language Pathology:  [place]    Signed by: Wojciech Lentz RN

## 2018-12-04 NOTE — PROGRESS NOTES
LOS: 4 days   Patient Care Team:  Miladis Colbert APRN as PCP - General (Family Medicine)    Chief Complaint: same    Subjective     History of Present Illness    Subjective Pt is awake and alert. No new issues. Pt questions oral meds for DM management    History taken from: patient    Objective     Vital Signs  Temp:  [97.5 °F (36.4 °C)-98 °F (36.7 °C)] 97.5 °F (36.4 °C)  Heart Rate:  [74-77] 74  Resp:  [18] 18  BP: (129-149)/(67-82) 136/67    Objectiveexam unchanged    Results Review:     I reviewed the patient's new clinical results.    Medication Review:     Assessment/Plan       Cervical myelopathy (CMS/HCC)      Assessment & PlanContinue to prepare for dc. I returned to pt's room to discuss dc oplan with pt and wife as finalized in care coordination  Conference this am. Action as ordered. I discussed oral hypogycemic meds with pt and wife. Will resume his home regimen at dc which includes Amaryl 4mg BID, Glucophage 1000mg BID, Actose 15 q day, and Trulicity (unsure of dose)    Mike Melissa MD  12/04/18  6:50 AM    Time:

## 2018-12-04 NOTE — PROGRESS NOTES
Inpatient Rehabilitation Functional Measures Assessment    Functional Measures  [facility] Eating:  [facility] Grooming: [facility] Bathing:  [facility] Upper Body Dressing:  [facility] Lower Body Dressing:  [facility] Toileting:  [facility] Bladder Management  Level of Assistance:  [place]  Frequency/Number of Accidents this Shift:  [facility] Bowel Management  Level of Assistance: [place]  Frequency/Number of Accidents this Shift: [facility] Bed/Chair/Wheelchair Transfer:  [facility] Toilet Transfer:  [facility] Tub/Shower Transfer:  [place]    Previously Documented Mode of Locomotion at Discharge: [place]  [facility] Expected Mode of Locomotion at Discharge: [facility] Walk/Wheelchair:  [facility] Stairs:  [facility] Comprehension:  Auditory comprehension is the usual mode. Comprehension  Score = 7, Independent.  Patient comprehends complex/abstract information in  their primary language.  Patient is completely independent for auditory  comprehension.  There are no activity limitations.  [place] Expression:  Vocal expression is the usual mode. Expression Score = 7,  Independent.  Patient expresses complex/abstract information in their primary  language.  Patient is completely independent for vocal expression.  There are no  activity limitations.  [place] Social Interaction:  Social Interaction Score = 7, Independent. Patient is  completely independent for social interaction.  There are no activity  limitations.  [place] Problem Solving:  Problem Solving Score = 7, Independent.  Patient makes  appropriate decisions in order to solve complex problems.  Patient is completely  independent for problem solving.  There are no activity limitations.  [facility] Memory:  Memory Score = 7, Independent.  Patient is completely independent  for memory.  There are no activity limitations.    Therapy Mode Minutes  Occupational Therapy: [place]  Physical Therapy: [facility]  Speech Language Pathology:  [place]    Signed by: Ciera Walter RN

## 2018-12-04 NOTE — PLAN OF CARE
Problem: Patient Care Overview  Goal: Plan of Care Review  Outcome: Ongoing (interventions implemented as appropriate)   12/04/18 1521   Patient Care Overview   IRF Plan of Care Review progress ongoing, continue   Progress, Functional Goals demonstrating adequate progress   Coping/Psychosocial   Plan of Care Reviewed With patient   OTHER   Outcome Summary taking pain med q 4 hours       Problem: Fall Risk (Adult)  Goal: Absence of Fall  Outcome: Ongoing (interventions implemented as appropriate)   12/04/18 1521   Fall Risk (Adult)   Absence of Fall making progress toward outcome

## 2018-12-04 NOTE — PROGRESS NOTES
Treatment goals and length of stay discussed with pt and pt's wife.  Both are pleased with pt's progress and appreciative of the rehab program in general.  Will schedule family conference next week.

## 2018-12-04 NOTE — PROGRESS NOTES
Inpatient Rehabilitation Plan of Care Note    Plan of Care  Care Plan Reviewed - No updates at this time.    Psychosocial    Performed Intervention(s)  Verbalizes needs & concerns      Safety    Performed Intervention(s)  Safety rounds/ Fall precautions  Items in reach, bed alarm & chair alarms      Sphincter Control    Performed Intervention(s)  Monitor intake, output, & bowel movements  Encourage appropriate diet & fluids      Body Systems    Performed Intervention(s)  Blood glucose AC & HS & insulin as ordered      Pain    Performed Intervention(s)  Medication prn & evaluate effectiveness  Monitor neck incision q shift    Signed by: Ciera Walter RN

## 2018-12-04 NOTE — THERAPY TREATMENT NOTE
"Inpatient Rehabilitation - Physical Therapy Treatment Note  [facility]     Patient Name: Duane Mark Witten  : 1955  MRN: [id number]    Today's Date: 2018                 Admit Date: 2018      Visit Dx:      ICD-10-CM ICD-9-CM   1. Cervical stenosis of spinal canal M48.02 723.0   2. Weakness of both lower extremities R29.898 729.89   3. Paresthesia of both upper extremities R20.2 782.0    M79.601 729.5    M79.602    4. Morbid obesity with BMI of 40.0-44.9, adult (CMS/[facility]) E66.01 278.01    Z68.41 V85.41   5. Impaired functional mobility, balance, gait, and endurance Z74.09 V49.89   6. Functional gait abnormality R26.89 781.2       Patient Active Problem List   Diagnosis   • YANELI (generalized anxiety disorder)   • ADD (attention deficit disorder)   • Depression   • Diabetes type 2, controlled (CMS/[facility])   • ED (erectile dysfunction) of non-organic origin   • HLD (hyperlipidemia)   • Essential hypertension   • Arthritis   • Psoriasis   • Testosterone deficiency   • Primary insomnia   • Weakness of both lower extremities   • Paresthesia of both upper extremities   • Morbid obesity with BMI of 40.0-44.9, adult (CMS/[facility])   • Adverse effect of corticosteroids   • Type 2 diabetes mellitus with hyperglycemia (CMS/[facility])   • Cervical stenosis of spinal canal   • Edema of cervical spinal cord (CMS/[facility])   • Postoperative atrial fibrillation (CMS/[facility])   • Cervical myelopathy (CMS/[facility])       Therapy Treatment    IRF Treatment Summary     Row Name 18 1526 18 1202 18 0946       Evaluation/Treatment Time and Intent    Subjective Information  no complaints  -AF  no complaints  -AF  complains of;pain in PM c/o \"legs feel weaker\"  -LB    Existing Precautions/Restrictions  fall;spinal brace for comfort  -AF  fall;spinal brace for comfort  -AF  fall;spinal brace for comfort  -LB    Document Type  therapy note (daily note)  -AF  wound care  -AF  therapy note (daily note)  -LB    Mode of Treatment  " occupational therapy  -AF  occupational therapy  -AF  physical therapy  -LB    Patient/Family Observations  supine in bed  -AF  sitting up in bed, wife present   -AF  supine in bed. Wife present in room.  -LB    Recorded by [AF] Lolis Cooley OTR [AF] Lolis Cooley OTR [LB] Odette Orourke PTA    Row Name 12/04/18 1526 12/04/18 1202          Cognition/Psychosocial- PT/OT    Affect/Mental Status (Cognitive)  WFL  -AF  WFL  -AF     Orientation Status (Cognition)  oriented x 4  -AF  oriented x 4  -AF     Follows Commands (Cognition)  WFL  -AF  WFL  -AF     Personal Safety Interventions  gait belt;fall prevention program maintained;nonskid shoes/slippers when out of bed  -AF  gait belt;fall prevention program maintained;nonskid shoes/slippers when out of bed  -AF     Recorded by [AF] Lolis Cooley OTR [AF] Lolis Cooley OTR     Row Name 12/04/18 1526 12/04/18 1202 12/04/18 0946       Bed Mobility Assessment/Treatment    Supine-Sit [place] (Bed Mobility)  minimum assist (75% patient effort);verbal cues  -AF  minimum assist (75% patient effort);verbal cues  -AF  moderate assist (50% patient effort)  -LB    Sit-Supine [place] (Bed Mobility)  contact guard;verbal cues  -AF  contact guard;verbal cues  -AF  contact guard;minimum assist (75% patient effort)  -LB    Assistive Device (Bed Mobility)  --  --  head of bed elevated;bed rails  -LB    Comment (Bed Mobility)  --  elevated HOB  -AF  --    Recorded by [AF] Lolis Cooley OTR [AF] Lolis Cooley OTR [LB] Odette Orourke PTA    Row Name 12/04/18 1202             Transfer Assessment/Treatment    Comment (Transfers)  sit to  bathroom MIN A, x 3 trials   -AF      Recorded by [AF] Lolis Cooley OTR      Row Name 12/04/18 1526 12/04/18 1202 12/04/18 0946       Bed-Chair Transfer    Bed-Chair [place] (Transfers)  minimum assist (75% patient effort);verbal cues  -AF  minimum assist (75% patient effort);verbal cues   -AF  minimum assist (75% patient effort)  -LB    Assistive Device (Bed-Chair Transfers)  walker, front-wheeled;wheelchair  -AF  walker, front-wheeled;wheelchair  -AF  walker, front-wheeled  -LB    Recorded by [AF] Lolis Cooley, NURIS [AF] Lolis Cooley, OTJEANINE [LB] Odette Orourke, LUCINA    Row Name 12/04/18 1526 12/04/18 1202 12/04/18 0946       Chair-Bed Transfer    Chair-Bed [place] (Transfers)  minimum assist (75% patient effort);verbal cues  -AF  minimum assist (75% patient effort);verbal cues  -AF  minimum assist (75% patient effort)  -LB    Assistive Device (Chair-Bed Transfers)  wheelchair;walker, front-wheeled  -AF  wheelchair  -AF  walker, front-wheeled  -LB    Recorded by [AF] Lolis Cooley, NURIS [AF] Lolis Cooley, OTR [LB] Odette Orourke, PTA    Row Name 12/04/18 0946             Sit-Stand Transfer    Sit-Stand [place] (Transfers)  minimum assist (75% patient effort)  -LB      Assistive Device (Sit-Stand Transfers)  walker, front-wheeled  -LB      Recorded by [LB] Odette Orourke, LUCINA      Row Name 12/04/18 0946             Stand-Sit Transfer    Stand-Sit [place] (Transfers)  minimum assist (75% patient effort)  -LB      Assistive Device (Stand-Sit Transfers)  walker, front-wheeled  -LB      Recorded by [LB] Odette Orourke, LUCINA      Row Name 12/04/18 0946             Gait/Stairs Assessment/Training    [place] Level (Gait)  minimum assist (75% patient effort)  -LB      Assistive Device (Gait)  walker, front-wheeled  -LB      Distance in Feet (Gait)  80 x 4  -LB      Pattern (Gait)  step-to;swing-through  -LB      Deviations/Abnormal Patterns (Gait)  ataxic;sarah decreased;stride length decreased  -LB      Bilateral Gait Deviations  weight shift ability decreased;forward flexed posture;heel strike decreased  -LB      Left Sided Gait Deviations  forward flexed posture;heel strike decreased  -LB      Right Sided Gait Deviations  forward flexed posture;heel strike  decreased  -LB      Comment (Gait/Stairs)  In PM stated that he felt weaker. Last walk was 40 ft only.  -LB      Recorded by [LB] Odette Orourke PTA      Row Name 12/04/18 1202             Bathing Assessment/Treatment    Bathing [place] Level  upper body;set up;lower body;moderate assist (50% patient effort)  -AF      Bathing Position  supported sitting;supported standing  -AF      Bathing Setup Assistance  obtain supplies  -AF      Comment (Bathing)  seated w/c at sink  -AF      Recorded by [AF] Lolis Cooley OTR      Row Name 12/04/18 1202             Upper Body Dressing Assessment/Treatment    Upper Body Dressing Task  pull over garment;moderate assist (50-74% patient effort)  -AF      Upper Body Dressing Position  supported sitting  -AF      Set-up Assistance (Upper Body Dressing)  obtain clothing  -AF      Comment (Upper Body Dressing)  vcs for tech  -AF      Recorded by [AF] Lolis Cooley OTR      Row Name 12/04/18 1202             Lower Body Dressing Assessment/Treatment    Lower Body Dressing [place] Level  doff;don;pants/bottoms;shoes/slippers;socks;underwear;moderate assist (50% patient effort)  -AF      Lower Body Dressing Position  supported sitting;supported standing  -AF      Lower Body Dressing Setup Assistance  obtain clothing  -AF      Comment (Lower Body Dressing)  LH shoe horn, elastic laces and reacher  -AF      Recorded by [AF] Lolis Cooley OTR      Row Name 12/04/18 1202             Grooming Assessment/Treatment    Grooming [place] Level  set up;oral care regimen  -AF      Grooming Position  sink side;supported sitting  -AF      Comment (Grooming)  increased time, used L hand to hold toothbrush  -AF      Recorded by [AF] Lolis Cooley OTR      Row Name 12/04/18 1526             Fine Motor Testing & Training    Comment, Fine Motor Coordination  yellow theraputty manipulation, large beads stringing B'ly hands. beads on vertical dowel han with vc's and tcs  for compensations winging present   -AF      Recorded by [AF] Lolis Cooley OTR      Row Name 12/04/18 1526 12/04/18 1202 12/04/18 0946       Pain Scale: Numbers Pre/Post-Treatment    Pain Scale: Numbers, Pretreatment  3/10  -AF  3/10  -AF  6/10  -LB    Pain Scale: Numbers, Post-Treatment  3/10  -AF  3/10  -AF  --    Pain Location  neck  -AF  neck  -AF  neck  -LB    Pre/Post Treatment Pain Comment  --  NSG applied lioderm patches to scapulas B'ly  -AF  --    Pain Intervention(s)  --  --  Medication (See MAR) applied soft collar, notified nsg  -LB    Recorded by [AF] Lolis Cooley OTR [AF] Lolis Cooley OTR [LB] Odette Orourke PTA    Row Name 12/04/18 1202             Therapeutic Exercise    Therapeutic Exercise  seated, upper extremities  -AF      Recorded by [AF] Lolis Cooley OTR      Row Name 12/04/18 1202             Upper Extremity Seated Therapeutic Exercise    Performed, Seated Upper Extremity (Therapeutic Exercise)  scapular protraction/retraction;shoulder external/internal rotation  -AF      Exercise Type, Seated Upper Extremity (Therapeutic Exercise)  AAROM (active assistive range of motion)  -AF      Expected Outcomes, Seated Upper Extremity (Therapeutic Exercise)  improve motor control;improve performance, BADLs;improve performance, reaching for objects;improve performance, reaching/manipulating objects  -AF      Restrictions, Seated Upper Extremity (Therapeutic Exercise)  cervical precautions  -AF      Sets/Reps Detail, Seated Upper Extremity (Therapeutic Exercise)  2/15  -AF      Comment, Seated Upper Extremity (Therapeutic Exercise)  table slides  -AF      Recorded by [AF] Lolis Cooley OTR      Row Name 12/04/18 0946             Lower Extremity Seated Therapeutic Exercise    Performed, Seated Lower Extremity (Therapeutic Exercise)  hip flexion/extension;LAQ (long arc quad), knee extension;ankle dorsiflexion/plantarflexion red T-band for knee flex,hip abd. Isometric: QS,  GS, hip add  -LB      Sets/Reps Detail, Seated Lower Extremity (Therapeutic Exercise)  1/15  -LB      Recorded by [LB] Odette Orourke PTA      Row Name 12/04/18 0946             Lower Extremity Supine Therapeutic Exercise    Performed, Supine Lower Extremity (Therapeutic Exercise)  gluteal sets;quadriceps sets  -LB      Exercise Type, Supine Lower Extremity (Therapeutic Exercise)  isometric contraction, static  -LB      Sets/Reps Detail, Supine Lower Extremity (Therapeutic Exercise)  1/15  -LB      Recorded by [SIMÓN] Odette Orourke PTA      Row Name 12/04/18 1526 12/04/18 1202 12/04/18 0946       Positioning and Restraints    Pre-Treatment Position  in bed  -AF  in bed  -AF  --    Post Treatment Position  bed  -AF  bed  -AF  bed  -LB    In Bed  sitting;call light within reach;encouraged to call for assist;exit alarm on;with family/caregiver wife present   -AF  sitting;call light within reach;encouraged to call for assist;exit alarm on;with family/caregiver wife present   -AF  supine;call light within reach;with family/caregiver  -LB    Recorded by [AF] Lolis Cooley, OTR [AF] Lolis Cooley, OTR [LB] Odette Orourke PTA      User Key  (r) = Recorded By, (t) = Taken By, (c) = Cosigned By    Initials Name Effective Dates    LB Odette Orourke PTA 03/07/18 -     AF Lolis Cooley, OTR 04/03/18 -         Wound 11/27/18 1318 Other (See comments) neck incision (Active)   Dressing Appearance dry;intact 12/3/2018  8:57 PM   Closure Liquid skin adhesive;Adhesive closure strips 12/4/2018  8:05 AM   Base purple 12/3/2018  8:57 PM   Periwound dry;intact 12/4/2018  8:05 AM   Drainage Amount none 12/4/2018  8:05 AM     Physical Therapy Education     Title: PT OT SLP Therapies (In Progress)     Topic: Physical Therapy (In Progress)     Point: Mobility training (In Progress)     Learning Progress Summary           Patient Acceptance, E, NR by SIMÓN at 12/4/2018 10:12 AM    Acceptance, E,TB, VU,NR by EE at  12/3/2018  9:48 AM    Acceptance, E, VU,NR by JK at 12/1/2018  9:50 AM                   Point: Home exercise program (Done)     Learning Progress Summary           Patient Acceptance, E,TB, VU,NR by JAMARCUS at 12/3/2018  9:48 AM                   Point: Body mechanics (Done)     Learning Progress Summary           Patient Acceptance, E,TB, VU,NR by EE at 12/3/2018  9:48 AM                   Point: Precautions (Done)     Learning Progress Summary           Patient Acceptance, E,TB, VU,NR by JAMARCUS at 12/3/2018  9:48 AM    Acceptance, E, VU,NR by JK at 12/1/2018  9:50 AM                               User Key     Initials Effective Dates Name Provider Type Discipline     03/07/18 -  Odette Orourke PTA Physical Therapy Assistant PT    EE 04/03/18 -  Cathleen Rosario, PT Physical Therapist PT    JK 04/03/18 -  Janae Trujillo, PT Physical Therapist PT                  PT Recommendation and Plan                        Time Calculation:     PT Charges     Row Name 12/04/18 1615 12/04/18 1013          Time Calculation    Start Time  1300  -LB  0900  -LB     Stop Time  1330  -LB  1000  -LB     Time Calculation (min)  30 min  -LB  60 min  -LB       User Key  (r) = Recorded By, (t) = Taken By, (c) = Cosigned By    Initials Name Provider Type     Odette Orourke PTA Physical Therapy Assistant            Therapy Charges for Today     Code Description Service Date Service Provider Modifiers Qty    [id number] HC PT THER PROC EA 15 MIN 12/4/2018 Odette Orourke PTA GP 6                   Odette Orourke PTA  12/4/2018

## 2018-12-04 NOTE — PLAN OF CARE
Problem: Patient Care Overview  Goal: Plan of Care Review  Outcome: Ongoing (interventions implemented as appropriate)   12/04/18 0349   Patient Care Overview   IRF Plan of Care Review progress ongoing, continue   Progress, Functional Goals demonstrating adequate progress   Coping/Psychosocial   Plan of Care Reviewed With patient   OTHER   Outcome Summary Patient calm, cooperative, and pleasant with staff this evening. Pain continues to be an issue with the patient wanting pain medication q 4 hours and flexeril q 8 hours. Incision is CDI with steristrips. BG elevated this evening . Insulin given. No unsafe behaviors.        Problem: Fall Risk (Adult)  Goal: Absence of Fall  Outcome: Ongoing (interventions implemented as appropriate)   12/04/18 0349   Fall Risk (Adult)   Absence of Fall making progress toward outcome       Problem: Pain, Acute (Adult)  Goal: Acceptable Pain Control/Comfort Level  Outcome: Ongoing (interventions implemented as appropriate)   12/04/18 0349   Pain, Acute (Adult)   Acceptable Pain Control/Comfort Level making progress toward outcome       Problem: Skin Injury Risk (Adult)  Goal: Skin Health and Integrity  Outcome: Ongoing (interventions implemented as appropriate)   12/04/18 0349   Skin Injury Risk (Adult)   Skin Health and Integrity making progress toward outcome

## 2018-12-04 NOTE — PROGRESS NOTES
Inpatient Rehabilitation Plan of Care Note    Plan of Care  Care Plan Reviewed - No updates at this time.    Safety    Performed Intervention(s)  Safety rounds/ Fall precautions  Items in reach, bed alarm & chair alarms      Body Systems    Performed Intervention(s)  Blood glucose AC & HS & insulin as ordered      Pain    Performed Intervention(s)  Medication prn & evaluate effectiveness  Monitor neck incision q shift    Signed by: Wojciech Lentz RN

## 2018-12-04 NOTE — PROGRESS NOTES
Inpatient Rehabilitation Functional Measures Assessment and Plan of Care    Plan of Care  Updated Problems/Interventions  Field    Functional Measures  [place]CC[place] Eating:  [facility] Grooming: [facility] Bathing:  [facility] Upper Body Dressing:  [facility] Lower Body Dressing:  [facility] Toileting:  [facility] Bladder Management  Level of Assistance:  [place]  Frequency/Number of Accidents this Shift:  [facility] Bowel Management  Level of Assistance: [place]  Frequency/Number of Accidents this Shift: [facility] Bed/Chair/Wheelchair Transfer:  Bed/chair/wheelchair Transfer Score = 3.  Patient performs 50-74% of effort and requires moderate assistance (some  lifting) for transferring to and from the bed/chair/wheelchair. Patient requires  the following assistive device(s): Walker. Elevated head of bed. Bed rails.  [place] Toilet Transfer:  [facility] Tub/Shower Transfer:  [place]    Previously Documented Mode of Locomotion at Discharge: Field  [place]CC[place] Expected Mode of Locomotion at Discharge: [facility] Walk/Wheelchair:  WHEELCHAIR OBSERVATION   Activity was not observed.    WALK OBSERVATION   Walk Distance Scale = 2.  Distance walked is 50 -149 feet. Walk Score = 2.  Patient performs 75% or more of effort and requires minimal assistance.  Incidental assistance, contact guard or steadying was provided. Patient walked a  distance of 80 feet. Patient requires the following assistive device(s): Rolling  walker.  [place] Stairs:  Stairs did not occur.    [place] Comprehension:  [facility] Expression:  [facility] Social Interaction:  [facility] Problem Solving:  [facility] Memory:  [place]    Therapy Mode Minutes  Occupational Therapy: [place]  Physical Therapy: Individual: 90 minutes.  Speech Language Pathology:  [place]    Signed by: Odette Orourke PTA

## 2018-12-05 ENCOUNTER — TELEPHONE (OUTPATIENT)
Dept: NEUROSURGERY | Facility: CLINIC | Age: 63
End: 2018-12-05

## 2018-12-05 LAB
GLUCOSE BLDC GLUCOMTR-MCNC: 117 MG/DL (ref 70–130)
GLUCOSE BLDC GLUCOMTR-MCNC: 140 MG/DL (ref 70–130)
GLUCOSE BLDC GLUCOMTR-MCNC: 149 MG/DL (ref 70–130)
GLUCOSE BLDC GLUCOMTR-MCNC: 263 MG/DL (ref 70–130)

## 2018-12-05 PROCEDURE — 97110 THERAPEUTIC EXERCISES: CPT

## 2018-12-05 PROCEDURE — 63710000001 INSULIN LISPRO (HUMAN) PER 5 UNITS: Performed by: HOSPITALIST

## 2018-12-05 PROCEDURE — 97535 SELF CARE MNGMENT TRAINING: CPT

## 2018-12-05 PROCEDURE — 82962 GLUCOSE BLOOD TEST: CPT

## 2018-12-05 PROCEDURE — 97112 NEUROMUSCULAR REEDUCATION: CPT

## 2018-12-05 RX ORDER — SENNA AND DOCUSATE SODIUM 50; 8.6 MG/1; MG/1
2 TABLET, FILM COATED ORAL 2 TIMES DAILY PRN
Status: DISCONTINUED | OUTPATIENT
Start: 2018-12-05 | End: 2018-12-19 | Stop reason: HOSPADM

## 2018-12-05 RX ADMIN — HYDROCODONE BITARTRATE AND ACETAMINOPHEN 2 TABLET: 7.5; 325 TABLET ORAL at 13:30

## 2018-12-05 RX ADMIN — CYCLOBENZAPRINE 10 MG: 10 TABLET, FILM COATED ORAL at 08:47

## 2018-12-05 RX ADMIN — METOPROLOL TARTRATE 25 MG: 25 TABLET ORAL at 21:25

## 2018-12-05 RX ADMIN — METOPROLOL TARTRATE 25 MG: 25 TABLET ORAL at 08:46

## 2018-12-05 RX ADMIN — PIOGLITAZONE 15 MG: 15 TABLET ORAL at 08:47

## 2018-12-05 RX ADMIN — FAMOTIDINE 20 MG: 20 TABLET, FILM COATED ORAL at 08:47

## 2018-12-05 RX ADMIN — HYDROCODONE BITARTRATE AND ACETAMINOPHEN 2 TABLET: 7.5; 325 TABLET ORAL at 17:33

## 2018-12-05 RX ADMIN — INSULIN LISPRO 6 UNITS: 100 INJECTION, SOLUTION INTRAVENOUS; SUBCUTANEOUS at 21:25

## 2018-12-05 RX ADMIN — METFORMIN HYDROCHLORIDE 1000 MG: 1000 TABLET ORAL at 08:47

## 2018-12-05 RX ADMIN — LIDOCAINE 2 PATCH: 50 PATCH CUTANEOUS at 08:43

## 2018-12-05 RX ADMIN — HYDROCODONE BITARTRATE AND ACETAMINOPHEN 2 TABLET: 7.5; 325 TABLET ORAL at 02:18

## 2018-12-05 RX ADMIN — CYCLOBENZAPRINE 10 MG: 10 TABLET, FILM COATED ORAL at 02:18

## 2018-12-05 RX ADMIN — METFORMIN HYDROCHLORIDE 1000 MG: 1000 TABLET ORAL at 17:33

## 2018-12-05 RX ADMIN — GLIPIZIDE 10 MG: 10 TABLET ORAL at 08:45

## 2018-12-05 RX ADMIN — RIVAROXABAN 20 MG: 20 TABLET, FILM COATED ORAL at 17:33

## 2018-12-05 RX ADMIN — HYDROCODONE BITARTRATE AND ACETAMINOPHEN 2 TABLET: 7.5; 325 TABLET ORAL at 08:45

## 2018-12-05 RX ADMIN — DOCUSATE SODIUM 100 MG: 100 CAPSULE, LIQUID FILLED ORAL at 08:45

## 2018-12-05 RX ADMIN — HYDROCODONE BITARTRATE AND ACETAMINOPHEN 2 TABLET: 7.5; 325 TABLET ORAL at 21:26

## 2018-12-05 RX ADMIN — ATOMOXETINE 100 MG: 60 CAPSULE ORAL at 08:50

## 2018-12-05 RX ADMIN — DULOXETINE HYDROCHLORIDE 60 MG: 60 CAPSULE, DELAYED RELEASE ORAL at 08:47

## 2018-12-05 RX ADMIN — CYCLOBENZAPRINE 10 MG: 10 TABLET, FILM COATED ORAL at 17:33

## 2018-12-05 NOTE — PROGRESS NOTES
Occupational Therapy: Individual: 90 minutes.    Physical Therapy: [place]    Speech Language Pathology:  [place]    Signed by: Lolis Cooley OT

## 2018-12-05 NOTE — PROGRESS NOTES
LOS: 5 days   Patient Care Team:  Miladis Colbert APRN as PCP - General (Family Medicine)    Chief Complaint: same    Subjective     History of Present Illness    Subjective Pt is awake and alert. No new issues.     History taken from: patient    Objective     Vital Signs  Temp:  [97.8 °F (36.6 °C)-98.1 °F (36.7 °C)] 97.8 °F (36.6 °C)  Heart Rate:  [79-89] 89  Resp:  [18] 18  BP: (120-143)/(69-77) 120/73    Objective exam unchanged calves soft and NT resp unlabored and regular    Results Review:     I reviewed the patient's new clinical results.    Medication Review:     Assessment/Plan       Cervical myelopathy (CMS/HCC)      Assessment & Plan Continue to prepare for dc. ELOS 2 weeks    Mike Melissa MD  12/05/18  7:49 AM    Time:

## 2018-12-05 NOTE — PROGRESS NOTES
Occupational Therapy: Individual: 90 minutes.    Physical Therapy: [place]    Speech Language Pathology:  [place]    Signed by: NURIS Severino/REVA

## 2018-12-05 NOTE — PLAN OF CARE
Problem: Patient Care Overview  Goal: Plan of Care Review  Outcome: Ongoing (interventions implemented as appropriate)   12/05/18 1848   Patient Care Overview   IRF Plan of Care Review progress ongoing, continue   Progress, Functional Goals demonstrating adequate progress   Coping/Psychosocial   Plan of Care Reviewed With spouse   OTHER   Outcome Summary Incision intact. Pain fairly well controlled.       Problem: Fall Risk (Adult)  Goal: Absence of Fall  Outcome: Ongoing (interventions implemented as appropriate)   12/05/18 1848   Fall Risk (Adult)   Absence of Fall making progress toward outcome

## 2018-12-05 NOTE — PROGRESS NOTES
Inpatient Rehabilitation Functional Measures Assessment    Functional Measures  [place] Eating:  [facility] Grooming: [facility] Bathing:  [facility] Upper Body Dressing:  [facility] Lower Body Dressing:  [facility] Toileting:  [facility] Bladder Management  Level of Assistance:  [place]  Frequency/Number of Accidents this Shift:  [facility] Bowel Management  Level of Assistance: [place]  Frequency/Number of Accidents this Shift: [facility] Bed/Chair/Wheelchair Transfer:  [facility] Toilet Transfer:  [facility] Tub/Shower Transfer:  [place]    Previously Documented Mode of Locomotion at Discharge: [place]  [place] Expected Mode of Locomotion at Discharge: [facility] Walk/Wheelchair:  [facility] Stairs:  [facility] Comprehension:  Auditory comprehension is the usual mode. Comprehension  Score = 6, Modified [place].  Patient comprehends complex/abstract  information in their primary language with only mild difficulty.  [place] Expression:  Vocal expression is the usual mode. Expression Score = 6,  Modified Independent.  Patient expresses complex/abstract information in their  primary language with only mild difficulty with tasks.  [place] Social Interaction:  Social Interaction Score = 6, Modified Independent.  Patient is modified independent for social interaction, requiring: Requires  additional time.  [place] Problem Solving:  Problem Solving Score = 6, Modified [place].  Patient  makes appropriate decisions in order to solve complex problems with mild  difficulty but self-corrects.  [place] Memory:  Memory Score = 6, Modified [place].  Patient is modified  independent for memory, having only mild difficulty and using self-initiated or  environmental cues to remember.    Therapy Mode Minutes  Occupational Therapy: Branch  Physical Therapy: Branch  Speech Language Pathology:  [place]    Signed by: Wojciech Lentz RN

## 2018-12-05 NOTE — PROGRESS NOTES
Inpatient Rehabilitation Functional Measures Assessment    Functional Measures  [place] Eating:  [place]  [facility] Grooming: [place]  [facility] Bathing:  [place]  [facility] Upper Body Dressing:  [place]  [facility] Lower Body Dressing:  [facility] Toileting:  [facility] Bladder Management  Level of Assistance:  [place]  Frequency/Number of Accidents this Shift:  [facility] Bowel Management  Level of Assistance: [place]  Frequency/Number of Accidents this Shift: [place]    [facility] Bed/Chair/Wheelchair Transfer:  Bed/chair/wheelchair Transfer Score = 4.  Patient performs 75% or more of effort and minimal assistance (little/incidental  help/lifting of one limb/steadying) for transferring to and from the  bed/chair/wheelchair, requiring: Patient requires the following assistive  device(s): Walker.  [place] Toilet Transfer:  [facility] Tub/Shower Transfer:  [place]    Previously Documented Mode of Locomotion at Discharge: [place]  [place] Expected Mode of Locomotion at Discharge: [facility] Walk/Wheelchair:  WHEELCHAIR OBSERVATION   Wheelchair did not occur.    WALK OBSERVATION   Walk Distance Scale = 2.  Distance walked is 50 -149 feet. Walk Score = 2.  Patient performs 75% or more of effort and requires minimal assistance.  Incidental assistance, contact guard or steadying was provided. Patient walked a  distance of 80 feet. Patient requires the following assistive device(s): Rolling  walker.  [place] Stairs:  Stairs did not occur because activity was unsafe for patient.    [place] Comprehension:  [facility] Expression:  [facility] Social Interaction:  [facility] Problem Solving:  [facility] Memory:  [place]    Therapy Mode Minutes  Occupational Therapy: [place]  Physical Therapy: Individual: 90 minutes.  Speech Language Pathology:  [place]    Signed by: Cathleen Rosario DPT

## 2018-12-05 NOTE — PROGRESS NOTES
Inpatient Rehabilitation Functional Measures Assessment    Functional Measures  ROCCO Eating:  Branch  ROCCO Grooming: Branch  ROCCO Bathing:  Branch  ROCCO Upper Body Dressing:  Branch  ROCCO Lower Body Dressing:  Branch  ROCCO Toileting:  Toileting Score = 4.  Patient requires minimal assistance for  toileting, such as steadying for balance while cleansing or adjusting clothes.  Patient requires the following assistive device(s): Grab bar.    ROCCO Bladder Management  Level of Assistance:  Bladder Score = 2. Patient performs 25-49% of tasks and  requires maximal assistance for bladder management. [place] provides most of the  assist to position the urinal, holds it in place, and empties the urinal.  Frequency/Number of Accidents this Shift:  Bladder accidents this shift:  0 .  Patient has not had an accident this shift.    ROCCO Bowel Management  Level of Assistance: Activity was not observed.  Frequency/Number of Accidents this Shift: Bowel accidents this shift: 0 .  Patient has not had an accident this shift.    ROCCO Bed/Chair/Wheelchair Transfer:  Activity was not observed.  ROCCO Toilet Transfer:  Activity was not observed.  ROCCO Tub/Shower Transfer:  Branch    Previously Documented Mode of Locomotion at Discharge: Field  ROCCO Expected Mode of Locomotion at Discharge: [facility] Walk/Wheelchair:  Branch  [facility] Stairs:  Branch    ROCCO Comprehension:  Branch  ROCCO Expression:  Branch  ROCCO Social Interaction:  Branch  [facility] Problem Solving:  Branch  ROCCO Memory:  Branch    Therapy Mode Minutes  Occupational Therapy: Branch  Physical Therapy: Branch  Speech Language Pathology:  Branch    Signed by: ALEJO Gutierrez

## 2018-12-05 NOTE — PROGRESS NOTES
Inpatient Rehabilitation Functional Measures Assessment    Functional Measures  ROCCO Eating:  [facility] Grooming: [facility] Bathing:  [facility] Upper Body Dressing:  [facility] Lower Body Dressing:  [facility] Toileting:  [facility] Bladder Management  Level of Assistance:  [place]  Frequency/Number of Accidents this Shift:  [facility] Bowel Management  Level of Assistance: [place]  Frequency/Number of Accidents this Shift: [facility] Bed/Chair/Wheelchair Transfer:  [facility] Toilet Transfer:  [facility] Tub/Shower Transfer:  [place]    Previously Documented Mode of Locomotion at Discharge: Field  ROCCO Expected Mode of Locomotion at Discharge: [facility] Walk/Wheelchair:  [facility] Stairs:  [facility] Comprehension:  Auditory comprehension is the usual mode. Comprehension  Score = 6, Modified [place].  Patient comprehends complex/abstract  information in their primary language, requiring:  [facility] Expression:  Vocal expression is the usual mode. Expression Score = 6,  Modified Independent.  Patient expresses complex/abstract information in their  primary language, requiring:  [facility] Social Interaction:  Social Interaction Score = 6, Modified Independent.  Patient is modified independent for social interaction, requiring:  [facility] Problem Solving:  Activity was not observed.  ROCCO Memory:  Memory Score = 6, Modified [place].  Patient is modified  independent for memory, requiring:    Therapy Mode Minutes  Occupational Therapy: Branch  Physical Therapy: Branch  Speech Language Pathology:  Branch    Signed by: Maria Luisa Lora RN

## 2018-12-05 NOTE — PLAN OF CARE
Problem: Fall Risk (Adult)  Goal: Absence of Fall  Outcome: Ongoing (interventions implemented as appropriate)   12/05/18 0335   Fall Risk (Adult)   Absence of Fall making progress toward outcome       Problem: Pain, Acute (Adult)  Goal: Acceptable Pain Control/Comfort Level  Outcome: Ongoing (interventions implemented as appropriate)   12/05/18 0335   Pain, Acute (Adult)   Acceptable Pain Control/Comfort Level making progress toward outcome       Problem: Skin Injury Risk (Adult)  Goal: Skin Health and Integrity  Outcome: Ongoing (interventions implemented as appropriate)   12/05/18 0335   Skin Injury Risk (Adult)   Skin Health and Integrity making progress toward outcome

## 2018-12-05 NOTE — PROGRESS NOTES
Inpatient Rehabilitation Plan of Care Note    Plan of Care  Care Plan Reviewed - No updates at this time.    Safety    Performed Intervention(s)  Safety rounds/ Fall precautions      Body Systems    Performed Intervention(s)  Blood glucose AC & HS & insulin as ordered      Pain    Performed Intervention(s)  Monitor neck incision q shift    Signed by: Wojciech Lentz RN

## 2018-12-05 NOTE — THERAPY TREATMENT NOTE
Inpatient Rehabilitation - Physical Therapy Treatment Note  [facility]     Patient Name: Duane Mark Witten  : 1955  MRN: [id number]    Today's Date: 2018                 Admit Date: 2018      Visit Dx:      ICD-10-CM ICD-9-CM   1. Cervical stenosis of spinal canal M48.02 723.0   2. Weakness of both lower extremities R29.898 729.89   3. Paresthesia of both upper extremities R20.2 782.0    M79.601 729.5    M79.602    4. Morbid obesity with BMI of 40.0-44.9, adult (CMS/[facility]) E66.01 278.01    Z68.41 V85.41   5. Impaired functional mobility, balance, gait, and endurance Z74.09 V49.89   6. Functional gait abnormality R26.89 781.2       Patient Active Problem List   Diagnosis   • YANELI (generalized anxiety disorder)   • ADD (attention deficit disorder)   • Depression   • Diabetes type 2, controlled (CMS/[facility])   • ED (erectile dysfunction) of non-organic origin   • HLD (hyperlipidemia)   • Essential hypertension   • Arthritis   • Psoriasis   • Testosterone deficiency   • Primary insomnia   • Weakness of both lower extremities   • Paresthesia of both upper extremities   • Morbid obesity with BMI of 40.0-44.9, adult (CMS/[facility])   • Adverse effect of corticosteroids   • Type 2 diabetes mellitus with hyperglycemia (CMS/[facility])   • Cervical stenosis of spinal canal   • Edema of cervical spinal cord (CMS/[facility])   • Postoperative atrial fibrillation (CMS/[facility])   • Cervical myelopathy (CMS/[facility])       Therapy Treatment    IRF Treatment Summary     Row Name 18             Evaluation/Treatment Time and Intent    Subjective Information  complains of;fatigue;pain  -EE      Existing Precautions/Restrictions  fall;spinal  -EE      Document Type  therapy note (daily note)  -EE      Mode of Treatment  physical therapy  -EE      Patient/Family Observations  Pt sitting up in recliner in no acute distress  -EE      Recorded by [EE] Cathleen Rosario, PT      Row Name 18             Cognition/Psychosocial- PT/OT     Affect/Mental Status (Cognitive)  WFL  -EE      Orientation Status (Cognition)  oriented x 4  -EE      Follows Commands (Cognition)  WFL  -EE      Personal Safety Interventions  fall prevention program maintained;gait belt;muscle strengthening facilitated;nonskid shoes/slippers when out of bed;supervised activity  -EE      Recorded by [EE] Cathleen Rosario, PT      Row Name 12/05/18 0930             Bed Mobility Assessment/Treatment    Supine-Sit [place] (Bed Mobility)  moderate assist (50% patient effort);verbal cues  -EE      Sit-Supine [place] (Bed Mobility)  minimum assist (75% patient effort);contact guard;verbal cues  -EE      Assistive Device (Bed Mobility)  head of bed elevated  -EE      Comment (Bed Mobility)  on mat table, no bed rails; head slightly elevated on foam wedge  -EE      Recorded by [EE] Cathleen Rosario, PT      Row Name 12/05/18 0930             Bed-Chair Transfer    Bed-Chair [place] (Transfers)  minimum assist (75% patient effort);verbal cues  -EE      Assistive Device (Bed-Chair Transfers)  walker, front-wheeled  -EE      Recorded by [EE] Cathleen Rosario, PT      Row Name 12/05/18 0930             Chair-Bed Transfer    Chair-Bed [place] (Transfers)  minimum assist (75% patient effort);verbal cues  -EE      Assistive Device (Chair-Bed Transfers)  walker, front-wheeled  -EE      Recorded by [EE] Cathleen Rosario, PT      Row Name 12/05/18 0930             Sit-Stand Transfer    Sit-Stand [place] (Transfers)  minimum assist (75% patient effort);moderate assist (50% patient effort);verbal cues mod A required from lower recliner surface  -EE      Assistive Device (Sit-Stand Transfers)  walker, front-wheeled  -EE      Recorded by [EE] Cathleen Rosario, PT      Row Name 12/05/18 0930             Stand-Sit Transfer    Stand-Sit [place] (Transfers)  minimum assist (75% patient effort);verbal cues  -EE      Assistive Device (Stand-Sit Transfers)  walker, front-wheeled  -EE      Recorded by  [EE] Cathleen Rosario, PT      Row Name 12/05/18 0930             Toilet Transfer    Type (Toilet Transfer)  sit-stand;stand-sit  -EE      [place] Level (Toilet Transfer)  minimum assist (75% patient effort);moderate assist (50% patient effort);verbal cues  -EE      Assistive Device (Toilet Transfer)  grab bars/safety frame;walker, front-wheeled  -EE      Recorded by [EE] Cathleen Rosario, HAILEY      Row Name 12/05/18 0930             Gait/Stairs Assessment/Training    [place] Level (Gait)  minimum assist (75% patient effort);verbal cues  -EE      Assistive Device (Gait)  walker, front-wheeled  -EE      Distance in Feet (Gait)  80' x 4  -EE      Pattern (Gait)  step-through  -EE      Deviations/Abnormal Patterns (Gait)  ataxic;sarah decreased;stride length decreased  -EE      Bilateral Gait Deviations  weight shift ability decreased;forward flexed posture;heel strike decreased  -EE      Recorded by [EE] Cathleen Rosario, HAILEY      Row Name 12/05/18 0930             Safety Issues, Functional Mobility    Impairments Affecting Function (Mobility)  balance;coordination;endurance/activity tolerance;pain;strength;sensation/sensory awareness  -EE      Recorded by [EE] Cathleen Rosario, PT      Row Name 12/05/18 0930             Pain Scale: Numbers Pre/Post-Treatment    Pain Scale: Numbers, Pretreatment  5/10  -EE      Pain Scale: Numbers, Post-Treatment  7/10  -EE      Pain Location - Orientation  incisional  -EE      Pain Location  neck  -EE      Pre/Post Treatment Pain Comment  pain inc slightly during session but pt tolerated tx   -EE      Pain Intervention(s)  Repositioned;Ambulation/increased activity;Medication (See MAR) pain meds given prior to session  -EE      Recorded by [EE] Cathleen Rosario, PT      Row Name 12/05/18 0930             Lower Extremity Seated Therapeutic Exercise    Performed, Seated Lower Extremity (Therapeutic Exercise)  hip flexion/extension;hip abduction/adduction;knee flexion/extension;ankle  dorsiflexion/plantarflexion;LAQ (long arc quad), knee extension  -EE      Device, Seated Lower Extremity (Therapeutic Exercise)  elastic bands/tubing;free weights, cuff;small ball red tband, 1.5# weights  -EE      Exercise Type, Seated Lower Extremity (Therapeutic Exercise)  resistive exercise  -EE      Sets/Reps Detail, Seated Lower Extremity (Therapeutic Exercise)  1/15  -EE      Recorded by [EE] Cathleen Rosario PT      Row Name 12/05/18 0930             Lower Extremity Supine Therapeutic Exercise    Performed, Supine Lower Extremity (Therapeutic Exercise)  SAQ (short arc quad) over bolster;bridging (bilateral w/bolster);heel slides;hip abduction/adduction  -EE      Device, Supine Lower Extremity (Therapeutic Exercise)  elastic bands/tubing;free weights, cuff red tband, 1.5# weights  -EE      Exercise Type, Supine Lower Extremity (Therapeutic Exercise)  resistive exercise;AROM (active range of motion) no resistance during bridges  -EE      Sets/Reps Detail, Supine Lower Extremity (Therapeutic Exercise)  1/15  -EE      Recorded by [EE] Cathleen Rosario PT      Row Name 12/05/18 0953             Positioning and Restraints    Pre-Treatment Position  sitting in chair/recliner  -EE      Post Treatment Position  bed  -EE      In Bed  fowlers;call light within reach;encouraged to call for assist;with nsg;exit alarm on  -EE      Recorded by [EE] Cathleen Rosario PT        User Key  (r) = Recorded By, (t) = Taken By, (c) = Cosigned By    Initials Name Effective Dates    EE Cathleen Rosario PT 04/03/18 -         Wound 11/27/18 1318 Other (See comments) neck incision (Active)   Dressing Appearance dry;intact 12/4/2018  9:10 PM   Closure Liquid skin adhesive;Adhesive closure strips 12/5/2018  7:08 AM   Periwound dry;intact 12/5/2018  7:08 AM   Drainage Amount none 12/5/2018  7:08 AM     Physical Therapy Education     Title: PT OT SLP Therapies (In Progress)     Topic: Physical Therapy (Done)     Point: Mobility training (Done)      Learning Progress Summary           Patient Acceptance, E,TB, VU,NR by EE at 12/5/2018 10:10 AM    Acceptance, E, NR by LB at 12/4/2018 10:12 AM    Acceptance, E,TB, VU,NR by EE at 12/3/2018  9:48 AM    Acceptance, E, VU,NR by JK at 12/1/2018  9:50 AM                   Point: Home exercise program (Done)     Learning Progress Summary           Patient Acceptance, E,TB, VU,NR by EE at 12/5/2018 10:10 AM    Acceptance, E,TB, VU,NR by EE at 12/3/2018  9:48 AM                   Point: Body mechanics (Done)     Learning Progress Summary           Patient Acceptance, E,TB, VU,NR by EE at 12/5/2018 10:10 AM    Acceptance, E,TB, VU,NR by EE at 12/3/2018  9:48 AM                   Point: Precautions (Done)     Learning Progress Summary           Patient Acceptance, E,TB, VU,NR by EE at 12/5/2018 10:10 AM    Acceptance, E,TB, VU,NR by EE at 12/3/2018  9:48 AM    Acceptance, E, VU,NR by JK at 12/1/2018  9:50 AM                               User Key     Initials Effective Dates Name Provider Type Discipline    LB 03/07/18 -  Odette Orourke PTA Physical Therapy Assistant PT    EE 04/03/18 -  Cathleen Rosario, PT Physical Therapist PT    JK 04/03/18 -  Janae Trujillo, PT Physical Therapist PT                  PT Recommendation and Plan                        Time Calculation:     PT Charges     Row Name 12/05/18 1513 12/05/18 1139          Time Calculation    Start Time  1300  -EE  0930  -EE     Stop Time  1330  -EE  1030  -EE     Time Calculation (min)  30 min  -EE  60 min  -EE     PT Received On  12/05/18  -EE  12/05/18  -EE     PT - Next Appointment  12/06/18  -EE  12/05/18  -EE       User Key  (r) = Recorded By, (t) = Taken By, (c) = Cosigned By    Initials Name Provider Type    EE Cathleen Rosario, PT Physical Therapist            Therapy Charges for Today     Code Description Service Date Service Provider Modifiers Qty    23449898812 HC PT THER PROC EA 15 MIN 12/5/2018 Cathleen Rosario, PT GP 6    40925147177 HC PT THER SUPP EA  15 MIN 12/5/2018 Cathleen Rosario, PT GP 1                   Cathleen Rosario, PT  12/5/2018

## 2018-12-05 NOTE — THERAPY TREATMENT NOTE
Inpatient Rehabilitation - Occupational Therapy Treatment Note    [facility]     Patient Name: Duane Mark Witten  : 1955  MRN: [id number]    Today's Date: 2018                 Admit Date: 2018      Visit Dx:    ICD-10-CM ICD-9-CM   1. Cervical stenosis of spinal canal M48.02 723.0   2. Weakness of both lower extremities R29.898 729.89   3. Paresthesia of both upper extremities R20.2 782.0    M79.601 729.5    M79.602    4. Morbid obesity with BMI of 40.0-44.9, adult (CMS/[facility]) E66.01 278.01    Z68.41 V85.41   5. Impaired functional mobility, balance, gait, and endurance Z74.09 V49.89   6. Functional gait abnormality R26.89 781.2       Patient Active Problem List   Diagnosis   • YANELI (generalized anxiety disorder)   • ADD (attention deficit disorder)   • Depression   • Diabetes type 2, controlled (CMS/[facility])   • ED (erectile dysfunction) of non-organic origin   • HLD (hyperlipidemia)   • Essential hypertension   • Arthritis   • Psoriasis   • Testosterone deficiency   • Primary insomnia   • Weakness of both lower extremities   • Paresthesia of both upper extremities   • Morbid obesity with BMI of 40.0-44.9, adult (CMS/[facility])   • Adverse effect of corticosteroids   • Type 2 diabetes mellitus with hyperglycemia (CMS/[facility])   • Cervical stenosis of spinal canal   • Edema of cervical spinal cord (CMS/[facility])   • Postoperative atrial fibrillation (CMS/[facility])   • Cervical myelopathy (CMS/[facility])         Therapy Treatment    IRF Treatment Summary     Row Name 18 1523 18 0930          Evaluation/Treatment Time and Intent    Subjective Information  complains of;pain;fatigue  -AF  complains of;fatigue;pain  -EE     Existing Precautions/Restrictions  fall;spinal  -AF  fall;spinal  -EE     Document Type  therapy note (daily note)  -AF  therapy note (daily note)  -EE     Mode of Treatment  occupational therapy  -AF  physical therapy  -EE     Patient/Family Observations  sitting up in bed in AM and PM  -AF  Pt  sitting up in recliner in no acute distress  -EE     Recorded by [AF] Lolis Cooley OTR [EE] Cathleen Rosario, PT     Row Name 12/05/18 1523 12/05/18 0930          Cognition/Psychosocial- PT/OT    Affect/Mental Status (Cognitive)  WFL  -AF  WFL  -EE     Orientation Status (Cognition)  oriented x 4  -AF  oriented x 4  -EE     Follows Commands (Cognition)  WFL  -AF  WFL  -EE     Personal Safety Interventions  fall prevention program maintained;gait belt;nonskid shoes/slippers when out of bed  -AF  fall prevention program maintained;gait belt;muscle strengthening facilitated;nonskid shoes/slippers when out of bed;supervised activity  -EE     Recorded by [AF] Lolis Cooley OTR [EE] Cathleen Rosario, PT     Row Name 12/05/18 1523 12/05/18 0930          Bed Mobility Assessment/Treatment    Supine-Sit [place] (Bed Mobility)  minimum assist (75% patient effort);verbal cues  -AF  moderate assist (50% patient effort);verbal cues  -EE     Sit-Supine [place] (Bed Mobility)  minimum assist (75% patient effort);contact guard;verbal cues  -AF  minimum assist (75% patient effort);contact guard;verbal cues  -EE     Assistive Device (Bed Mobility)  --  head of bed elevated  -EE     Comment (Bed Mobility)  --  on mat table, no bed rails; head slightly elevated on foam wedge  -EE     Recorded by [AF] Lolis Cooley OTR [EE] Cathleen Rosario, PT     Row Name 12/05/18 1523             Transfer Assessment/Treatment    Comment (Transfers)  sit to stand at sink MIN/CGA  -AF      Recorded by [AF] Lolis Cooley OTR      Row Name 12/05/18 1523 12/05/18 0930          Bed-Chair Transfer    Bed-Chair [place] (Transfers)  minimum assist (75% patient effort);verbal cues  -AF  minimum assist (75% patient effort);verbal cues  -EE     Assistive Device (Bed-Chair Transfers)  walker, front-wheeled;wheelchair  -AF  walker, front-wheeled  -EE     Recorded by [AF] Lolis Cooley OTR [EE] Cathleen Rosario, PT     Row Name 12/05/18 1523  12/05/18 0930          Chair-Bed Transfer    Chair-Bed [place] (Transfers)  minimum assist (75% patient effort);verbal cues  -AF  minimum assist (75% patient effort);verbal cues  -EE     Assistive Device (Chair-Bed Transfers)  wheelchair;walker, front-wheeled  -AF  walker, front-wheeled  -EE     Recorded by [AF] Lolis Cooley OTR [EE] Cathleen Rosario, PT     Row Name 12/05/18 0930             Sit-Stand Transfer    Sit-Stand [place] (Transfers)  minimum assist (75% patient effort);moderate assist (50% patient effort);verbal cues mod A required from lower recliner surface  -EE      Assistive Device (Sit-Stand Transfers)  walker, front-wheeled  -EE      Recorded by [EE] Cathleen Rosario, PT      Row Name 12/05/18 0930             Stand-Sit Transfer    Stand-Sit [place] (Transfers)  minimum assist (75% patient effort);verbal cues  -EE      Assistive Device (Stand-Sit Transfers)  walker, front-wheeled  -EE      Recorded by [EE] Cathleen Rosario, PT      Row Name 12/05/18 0930             Toilet Transfer    Type (Toilet Transfer)  sit-stand;stand-sit  -EE      [place] Level (Toilet Transfer)  minimum assist (75% patient effort);moderate assist (50% patient effort);verbal cues  -EE      Assistive Device (Toilet Transfer)  grab bars/safety frame;walker, front-wheeled  -EE      Recorded by [EE] Cathleen Rosario, PT      Row Name 12/05/18 0930             Gait/Stairs Assessment/Training    [place] Level (Gait)  minimum assist (75% patient effort);verbal cues  -EE      Assistive Device (Gait)  walker, front-wheeled  -EE      Distance in Feet (Gait)  80' x 4  -EE      Pattern (Gait)  step-through  -EE      Deviations/Abnormal Patterns (Gait)  ataxic;sarah decreased;stride length decreased  -EE      Bilateral Gait Deviations  weight shift ability decreased;forward flexed posture;heel strike decreased  -EE      Recorded by [EE] Cathleen Rosario, PT      Row Name 12/05/18 0930             Safety Issues, Functional  Mobility    Impairments Affecting Function (Mobility)  balance;coordination;endurance/activity tolerance;pain;strength;sensation/sensory awareness  -EE      Recorded by [EE] Cathleen Rosario PT      Row Name 12/05/18 [facility]             Bathing Assessment/Treatment    Bathing [place] Level  bathing skills;moderate assist (50% patient effort);minimum assist (75% patient effort);verbal cues  -AF      Bathing Position  supported sitting;supported standing  -AF      Bathing Setup Assistance  obtain supplies  -AF      Recorded by [AF] Lolis Cooley OTR      Row Name 12/05/18 [facility]             Upper Body Dressing Assessment/Treatment    Upper Body Dressing Task  upper body dressing skills;minimum assist (75% or more patient effort);verbal cues  -AF      Upper Body Dressing Position  supported sitting  -AF      Recorded by [AF] Lolis Cooley OTR      Row Name 12/05/18 [facility]             Lower Body Dressing Assessment/Treatment    Lower Body Dressing [place] Level  doff;don;socks;shoes/slippers;pants/bottoms;underwear;moderate assist (50% patient effort)  -AF      Lower Body Dressing Position  supported standing;supported sitting  -AF      Comment (Lower Body Dressing)  LH shoe horn  -AF      Recorded by [AF] Lolis Cooley OTR      Row Name 12/05/18 [facility]             Grooming Assessment/Treatment    Grooming [place] Level  grooming skills;set up;oral care regimen;hair care, combing/brushing;maximum assist (25% patient effort)  -AF      Grooming Position  sink side;supported sitting  -AF      Comment (Grooming)  A to comb hair  -AF      Recorded by [AF] Lolis Cooley OTR      Row Name 12/05/18 [facility]             Fine Motor Testing & Training    Comment, Fine Motor Coordination  yellow theraputty, manipulated golf tees with MOD difficulty, checkers into vertical board with MOD difficulty, table slides to decrease compensation with winging and tip to tip pinch with large 1 inch square pegs  -AF       Recorded by [AF] Lolis Cooley OTR      Row Name 12/05/18 1523             Sensory Re-Education    Desensitization (Sensory Re-Education)  particle immersion, uncooked beans both hands, ervin to identify with L hand not R  -AF      Recorded by [AF] Lolis Cooley OTR      Row Name 12/05/18 1523 12/05/18 0930          Pain Scale: Numbers Pre/Post-Treatment    Pain Scale: Numbers, Pretreatment  5/10  -AF  5/10  -EE     Pain Scale: Numbers, Post-Treatment  5/10  -AF  7/10  -EE     Pain Location - Orientation  incisional  -AF  incisional  -EE     Pain Location  neck  -AF  neck  -EE     Pre/Post Treatment Pain Comment  --  pain inc slightly during session but pt tolerated tx   -EE     Pain Intervention(s)  --  Repositioned;Ambulation/increased activity;Medication (See MAR) pain meds given prior to session  -EE     Recorded by [AF] Lolis Cooley OTR [EE] Cathleen Rosario, PT     Row Name 12/05/18 0930             Lower Extremity Seated Therapeutic Exercise    Performed, Seated Lower Extremity (Therapeutic Exercise)  hip flexion/extension;hip abduction/adduction;knee flexion/extension;ankle dorsiflexion/plantarflexion;LAQ (long arc quad), knee extension  -EE      Device, Seated Lower Extremity (Therapeutic Exercise)  elastic bands/tubing;free weights, cuff;small ball red tband, 1.5# weights  -EE      Exercise Type, Seated Lower Extremity (Therapeutic Exercise)  resistive exercise  -EE      Sets/Reps Detail, Seated Lower Extremity (Therapeutic Exercise)  1/15  -EE      Recorded by [EE] Cathleen Rosario, PT      Row Name 12/05/18 0930             Lower Extremity Supine Therapeutic Exercise    Performed, Supine Lower Extremity (Therapeutic Exercise)  SAQ (short arc quad) over bolster;bridging (bilateral w/bolster);heel slides;hip abduction/adduction  -EE      Device, Supine Lower Extremity (Therapeutic Exercise)  elastic bands/tubing;free weights, cuff red tband, 1.5# weights  -EE      Exercise Type, Supine Lower  Extremity (Therapeutic Exercise)  resistive exercise;AROM (active range of motion) no resistance during bridges  -EE      Sets/Reps Detail, Supine Lower Extremity (Therapeutic Exercise)  1/15  -EE      Recorded by [EE] Cathleen Rosario PT      Row Name 12/05/18 1523 12/05/18 0930          Positioning and Restraints    Pre-Treatment Position  in bed  -AF  sitting in chair/recliner  -EE     Post Treatment Position  bed  -AF  bed  -EE     In Bed  supine;call light within reach;encouraged to call for assist;exit alarm on in AM and PM  -AF  fowlers;call light within reach;encouraged to call for assist;with nsg;exit alarm on  -EE     Recorded by [AF] Lolis Cooley, OTR [EE] Cathleen Rosario PT       User Key  (r) = Recorded By, (t) = Taken By, (c) = Cosigned By    Initials Name Effective Dates    EE Cathleen Rosario, HAILEY 04/03/18 -     AF Lolis Cooley, OTR 04/03/18 -           Wound 11/27/18 1318 Other (See comments) neck incision (Active)   Dressing Appearance dry;intact 12/4/2018  9:10 PM   Closure Liquid skin adhesive;Adhesive closure strips 12/5/2018  7:08 AM   Periwound dry;intact 12/5/2018  7:08 AM   Drainage Amount none 12/5/2018  7:08 AM         OT Recommendation and Plan                 OT IRF GOALS     Row Name 12/01/18 1000             Transfer Goal 1 (OT-IRF)    Activity/Assistive Device (Transfer Goal 1, OT-IRF)  toilet  -RP      [place] Level (Transfer Goal 1, OT-IRF)  minimum assist (75% or more patient effort)  -RP      Time Frame (Transfer Goal 1, OT-IRF)  short term goal (STG)  -RP      Progress/Outcomes (Transfer Goal 1, OT-IRF)  goal ongoing  -RP         Transfer Goal 2 (OT-IRF)    Activity/Assistive Device (Transfer Goal 2, OT-IRF)  toilet  -RP      [place] Level (Transfer Goal 2, OT-IRF)  supervision required  -RP      Time Frame (Transfer Goal 2, OT-IRF)  long term goal (LTG)  -RP      Progress/Outcomes (Transfer Goal 2, OT-IRF)  goal ongoing  -RP         Bathing Goal 1 (OT-IRF)     Activity/Device (Bathing Goal 1, OT-IRF)  bathing skills, all  -RP      [place] Level (Bathing Goal 1, OT-IRF)  moderate assist (50-74% patient effort)  -RP      Time Frame (Bathing Goal 1, OT-IRF)  short term goal (STG)  -RP      Progress/Outcomes (Bathing Goal 1, OT-IRF)  goal ongoing  -RP         Bathing Goal 2 (OT-IRF)    Activity/Device (Bathing Goal 2, OT-IRF)  bathing skills, all  -RP      [place] Level (Bathing Goal 2, OT-IRF)  supervision required;standby assist  -RP      Time Frame (Bathing Goal 2, OT-IRF)  long term goal (LTG)  -RP      Progress/Outcomes (Bathing Goal 2, OT-IRF)  goal ongoing  -RP         UB Dressing Goal 1 (OT-IRF)    Activity/Device (UB Dressing Goal 1, OT-IRF)  upper body dressing  -RP      [place] (UB Dress Goal 1, OT-IRF)  minimum assist (75% or more patient effort)  -RP      Time Frame (UB Dressing Goal 1, OT-IRF)  short term goal (STG)  -RP      Progress/Outcomes (UB Dressing Goal 1, OT-IRF)  goal ongoing  -RP         UB Dressing Goal 2 (OT-IRF)    Activity/Device (UB Dressing Goal 2, OT-IRF)  upper body dressing  -RP      [place] (UB Dress Goal 2, OT-IRF)  supervision required  -RP      Time Frame (UB Dressing Goal 2, OT-IRF)  long term goal (LTG)  -RP      Progress/Outcomes (UB Dressing Goal 2, OT-IRF)  goal ongoing  -RP         LB Dressing Goal 1 (OT-IRF)    Activity/Device (LB Dressing Goal 1, OT-IRF)  lower body dressing  -RP      [place] (LB Dressing Goal 1, OT-IRF)  maximum assist (25-49% patient effort)  -RP      Time Frame (LB Dressing Goal 1, OT-IRF)  short term goal (STG)  -RP      Progress/Outcomes (LB Dressing Goal 1, OT-IRF)  goal ongoing  -RP         LB Dressing Goal 2 (OT-IRF)    Activity/Device (LB Dressing Goal 2, OT-IRF)  lower body dressing  -RP      [place] (LB Dressing Goal 2, OT-IRF)  supervision required;standby assist  -RP      Time Frame (LB Dressing Goal 2, OT-IRF)  long term goal (LTG)  -RP      Progress/Outcomes (LB  Dressing Goal 2, OT-IRF)  goal ongoing  -RP         Grooming Goal 1 (OT-IRF)    Activity/Device (Grooming Goal 1, OT-IRF)  grooming skills, all  -RP      [place] (Grooming Goal 1, OT-IRF)  minimum assist (75% or more patient effort)  -RP      Time Frame (Grooming Goal 1, OT-IRF)  short term goal (STG)  -RP      Progress/Outcomes (Grooming Goal 1, OT-IRF)  goal ongoing  -RP         Grooming Goal 2 (OT-IRF)    Activity/Device (Grooming Goal 2, OT-IRF)  grooming skills, all  -RP      [place] (Grooming Goal 2, OT-IRF)  supervision required  -RP      Time Frame (Grooming Goal 2, OT-IRF)  long term goal (LTG)  -RP      Progress/Outcomes (Grooming Goal 2, OT-IRF)  goal ongoing  -RP         Toileting Goal 1 (OT-IRF)    Activity/Device (Toileting Goal 1, OT-IRF)  toileting skills, all  -RP      [place] Level (Toileting Goal 1, OT-IRF)  maximum assist (25-49% patient effort)  -RP      Time Frame (Toileting Goal 1, OT-IRF)  short term goal (STG)  -RP      Progress/Outcomes (Toileting Goal 1, OT-IRF)  goal ongoing  -RP         Toileting Goal 2 (OT-IRF)    Activity/Device (Toileting Goal 2, OT-IRF)  toileting skills, all  -RP      [place] Level (Toileting Goal 2, OT-IRF)  supervision required;standby assist  -RP      Time Frame (Toileting Goal 2, OT-IRF)  long term goal (LTG)  -RP      Progress/Outcomes (Toileting Goal 2, OT-IRF)  goal ongoing  -RP         ROM Goal 1 (OT-IRF)    ROM Goal 1 (OT-IRF)  Pt will increase B UE ROM to approx. 3/4 shoulder flexion to assist w/ ADLs.  -RP      Time Frame (ROM Goal 1, OT-IRF)  long term goal (LTG)  -RP      Progress/Outcomes (ROM Goal 1, OT-IRF)  goal ongoing  -RP        User Key  (r) = Recorded By, (t) = Taken By, (c) = Cosigned By    Initials Name Provider Type    Precious Hamilton OT Occupational Therapist                 Time Calculation:     Time Calculation- OT     Row Name 12/05/18 1529 12/05/18 1528          Time Calculation- OT    OT Start Time  1430  -AF   1030  -AF     OT Stop Time  1500  -AF  1130  -AF     OT Time Calculation (min)  30 min  -AF  60 min  -AF       User Key  (r) = Recorded By, (t) = Taken By, (c) = Cosigned By    Initials Name Provider Type    AF Lolis Cooley OTJEANINE Occupational Therapist          Therapy Suggested Charges     Code   Minutes Charges    None              Therapy Charges for Today     Code Description Service Date Service Provider Modifiers Qty    20516993597 HC OT SELF CARE/MGMT/TRAIN EA 15 MIN 12/4/2018 Lolis Cooley OTR GO 3    89875559068 HC OT THER PROC EA 15 MIN 12/4/2018 Lolis Cooley OTR GO 1    55573703224 HC OT NEUROMUSC RE EDUCATION EA 15 MIN 12/4/2018 Lolis Cooley OTR GO 1    80178661064 HC OT THER PROC EA 15 MIN 12/4/2018 Lolis Cooley OTR GO 1    52126909094 HC OT SELF CARE/MGMT/TRAIN EA 15 MIN 12/5/2018 Lolis Cooley OTR GO 3    61936945279 HC OT NEUROMUSC RE EDUCATION EA 15 MIN 12/5/2018 Lolis Cooley OTR GO 2    11636039396 HC OT THER PROC EA 15 MIN 12/5/2018 Lolis Cooley OTR GO 1                   NURIS Grimm  12/5/2018

## 2018-12-06 LAB
GLUCOSE BLDC GLUCOMTR-MCNC: 130 MG/DL (ref 70–130)
GLUCOSE BLDC GLUCOMTR-MCNC: 145 MG/DL (ref 70–130)
GLUCOSE BLDC GLUCOMTR-MCNC: 157 MG/DL (ref 70–130)
GLUCOSE BLDC GLUCOMTR-MCNC: 204 MG/DL (ref 70–130)

## 2018-12-06 PROCEDURE — 97535 SELF CARE MNGMENT TRAINING: CPT

## 2018-12-06 PROCEDURE — 82962 GLUCOSE BLOOD TEST: CPT

## 2018-12-06 PROCEDURE — 97110 THERAPEUTIC EXERCISES: CPT

## 2018-12-06 PROCEDURE — 97112 NEUROMUSCULAR REEDUCATION: CPT | Performed by: OCCUPATIONAL THERAPIST

## 2018-12-06 PROCEDURE — 63710000001 INSULIN LISPRO (HUMAN) PER 5 UNITS: Performed by: HOSPITALIST

## 2018-12-06 RX ADMIN — INSULIN LISPRO 2 UNITS: 100 INJECTION, SOLUTION INTRAVENOUS; SUBCUTANEOUS at 12:05

## 2018-12-06 RX ADMIN — DULOXETINE HYDROCHLORIDE 60 MG: 60 CAPSULE, DELAYED RELEASE ORAL at 08:50

## 2018-12-06 RX ADMIN — ATOMOXETINE 100 MG: 60 CAPSULE ORAL at 08:50

## 2018-12-06 RX ADMIN — HYDROCODONE BITARTRATE AND ACETAMINOPHEN 2 TABLET: 7.5; 325 TABLET ORAL at 22:17

## 2018-12-06 RX ADMIN — METFORMIN HYDROCHLORIDE 1000 MG: 1000 TABLET ORAL at 17:57

## 2018-12-06 RX ADMIN — CYCLOBENZAPRINE 10 MG: 10 TABLET, FILM COATED ORAL at 17:57

## 2018-12-06 RX ADMIN — GLIPIZIDE 10 MG: 10 TABLET ORAL at 05:41

## 2018-12-06 RX ADMIN — RIVAROXABAN 20 MG: 20 TABLET, FILM COATED ORAL at 17:57

## 2018-12-06 RX ADMIN — CYCLOBENZAPRINE 10 MG: 10 TABLET, FILM COATED ORAL at 09:49

## 2018-12-06 RX ADMIN — HYDROCODONE BITARTRATE AND ACETAMINOPHEN 2 TABLET: 7.5; 325 TABLET ORAL at 01:30

## 2018-12-06 RX ADMIN — HYDROCODONE BITARTRATE AND ACETAMINOPHEN 2 TABLET: 7.5; 325 TABLET ORAL at 09:49

## 2018-12-06 RX ADMIN — HYDROCODONE BITARTRATE AND ACETAMINOPHEN 2 TABLET: 7.5; 325 TABLET ORAL at 17:57

## 2018-12-06 RX ADMIN — PIOGLITAZONE 15 MG: 15 TABLET ORAL at 08:50

## 2018-12-06 RX ADMIN — METOPROLOL TARTRATE 25 MG: 25 TABLET ORAL at 21:39

## 2018-12-06 RX ADMIN — METOPROLOL TARTRATE 25 MG: 25 TABLET ORAL at 08:50

## 2018-12-06 RX ADMIN — METFORMIN HYDROCHLORIDE 1000 MG: 1000 TABLET ORAL at 08:51

## 2018-12-06 RX ADMIN — LIDOCAINE 2 PATCH: 50 PATCH CUTANEOUS at 11:25

## 2018-12-06 RX ADMIN — HYDROCODONE BITARTRATE AND ACETAMINOPHEN 2 TABLET: 7.5; 325 TABLET ORAL at 05:41

## 2018-12-06 RX ADMIN — INSULIN LISPRO 4 UNITS: 100 INJECTION, SOLUTION INTRAVENOUS; SUBCUTANEOUS at 21:39

## 2018-12-06 RX ADMIN — CYCLOBENZAPRINE 10 MG: 10 TABLET, FILM COATED ORAL at 01:30

## 2018-12-06 RX ADMIN — HYDROCODONE BITARTRATE AND ACETAMINOPHEN 2 TABLET: 7.5; 325 TABLET ORAL at 13:51

## 2018-12-06 RX ADMIN — FAMOTIDINE 20 MG: 20 TABLET, FILM COATED ORAL at 08:50

## 2018-12-06 NOTE — PROGRESS NOTES
Inpatient Rehabilitation Plan of Care Note    Plan of Care  Care Plan Reviewed - No updates at this time.    Psychosocial    Performed Intervention(s)  Verbalizes needs & concerns      Safety    Performed Intervention(s)  Safety rounds/ Fall precautions  Items in reach, bed alarm & chair alarms      Sphincter Control    Performed Intervention(s)  Monitor intake, output, & bowel movements  Encourage appropriate diet & fluids      Body Systems    Performed Intervention(s)  Blood glucose AC & HS & insulin as ordered      Pain    Performed Intervention(s)  Monitor neck incision q shift    Signed by: Marissa Torres RN

## 2018-12-06 NOTE — THERAPY TREATMENT NOTE
Inpatient Rehabilitation - Occupational Therapy Treatment Note    [facility]     Patient Name: Duane Mark Witten  : 1955  MRN: [id number]    Today's Date: 2018                 Admit Date: 2018      Visit Dx:    ICD-10-CM ICD-9-CM   1. Cervical stenosis of spinal canal M48.02 723.0   2. Weakness of both lower extremities R29.898 729.89   3. Paresthesia of both upper extremities R20.2 782.0    M79.601 729.5    M79.602    4. Morbid obesity with BMI of 40.0-44.9, adult (CMS/[facility]) E66.01 278.01    Z68.41 V85.41   5. Impaired functional mobility, balance, gait, and endurance Z74.09 V49.89   6. Functional gait abnormality R26.89 781.2       Patient Active Problem List   Diagnosis   • YANELI (generalized anxiety disorder)   • ADD (attention deficit disorder)   • Depression   • Diabetes type 2, controlled (CMS/[facility])   • ED (erectile dysfunction) of non-organic origin   • HLD (hyperlipidemia)   • Essential hypertension   • Arthritis   • Psoriasis   • Testosterone deficiency   • Primary insomnia   • Weakness of both lower extremities   • Paresthesia of both upper extremities   • Morbid obesity with BMI of 40.0-44.9, adult (CMS/[facility])   • Adverse effect of corticosteroids   • Type 2 diabetes mellitus with hyperglycemia (CMS/[facility])   • Cervical stenosis of spinal canal   • Edema of cervical spinal cord (CMS/[facility])   • Postoperative atrial fibrillation (CMS/[facility])   • Cervical myelopathy (CMS/[facility])         Therapy Treatment    IRF Treatment Summary     Row Name 18 1528 18 0900          Evaluation/Treatment Time and Intent    Subjective Information  complains of;pain  -AF  complains of;pain  -EE     Existing Precautions/Restrictions  fall;spinal  -AF  fall;spinal  -EE     Document Type  therapy note (daily note)  -AF  therapy note (daily note)  -EE     Mode of Treatment  occupational therapy  -AF  physical therapy  -EE     Patient/Family Observations  supine in bed   -AF  Pt supine in bed in no acute distress   -EE     Recorded by [AF] Lolis Cooley OTR [EE] Cathleen Rosario, PT     Row Name 12/06/18 1528 12/06/18 0900          Cognition/Psychosocial- PT/OT    Affect/Mental Status (Cognitive)  WFL  -AF  WFL  -EE     Orientation Status (Cognition)  oriented x 4  -AF  oriented x 4  -EE     Follows Commands (Cognition)  WFL  -AF  WFL  -EE     Personal Safety Interventions  fall prevention program maintained;gait belt;nonskid shoes/slippers when out of bed  -AF  fall prevention program maintained;gait belt;muscle strengthening facilitated;nonskid shoes/slippers when out of bed;supervised activity  -EE     Recorded by [AF] Lolis Cooley OTR [EE] Cathleen Rosario, PT     Row Name 12/06/18 1528 12/06/18 0900          Bed Mobility Assessment/Treatment    Bed Mobility Assessment/Treatment  --  rolling left  -EE     Rolling Left [place] (Bed Mobility)  --  contact guard  -EE     Supine-Sit [place] (Bed Mobility)  contact guard;verbal cues  -AF  minimum assist (75% patient effort)  -EE     Sit-Supine [place] (Bed Mobility)  contact guard;verbal cues  -AF  contact guard  -EE     Assistive Device (Bed Mobility)  --  bed rails  -EE     Recorded by [AF] Lolis Cooley OTR [EE] Cathleen Rosario, PT     Row Name 12/06/18 1528 12/06/18 0900          Bed-Chair Transfer    Bed-Chair [place] (Transfers)  minimum assist (75% patient effort)  -AF  minimum assist (75% patient effort);verbal cues  -EE     Assistive Device (Bed-Chair Transfers)  walker, front-wheeled  -AF  walker, front-wheeled  -EE     Recorded by [AF] Lolis Cooley OTR [EE] Cathleen Rosario, PT     Row Name 12/06/18 1528 12/06/18 0900          Chair-Bed Transfer    Chair-Bed [place] (Transfers)  minimum assist (75% patient effort)  -AF  minimum assist (75% patient effort);verbal cues  -EE     Assistive Device (Chair-Bed Transfers)  walker, front-wheeled  -AF  wheelchair;walker, front-wheeled  -EE     Recorded by [AF] Lolis Cooley OTR [EE] Abraham  HAILEY Connolly     Row Name 12/06/18 0900             Sit-Stand Transfer    Sit-Stand [place] (Transfers)  minimum assist (75% patient effort);verbal cues  -EE      Assistive Device (Sit-Stand Transfers)  walker, front-wheeled  -EE      Recorded by [EE] Cathleen Rosario PT      Row Name 12/06/18 0900             Stand-Sit Transfer    Stand-Sit [place] (Transfers)  minimum assist (75% patient effort);verbal cues  -EE      Assistive Device (Stand-Sit Transfers)  walker, front-wheeled  -EE      Recorded by [EE] Cathleen Rosario PT      Row Name 12/06/18 1528             Toilet Transfer    Type (Toilet Transfer)  stand pivot/stand step  -AF      [place] Level (Toilet Transfer)  minimum assist (75% patient effort);verbal cues  -AF      Assistive Device (Toilet Transfer)  commode;grab bars/safety frame;wheelchair  -AF      Recorded by [AF] Lolis Cooley OTR      Row Name 12/06/18 1528             Shower Transfer    Type (Shower Transfer)  stand pivot/stand step  -AF      [place] Level (Shower Transfer)  minimum assist (75% patient effort);verbal cues  -AF      Assistive Device (Shower Transfer)  grab bars/tub rail;tub bench;wheelchair  -AF      Recorded by [AF] Lolis Cooley OTR      Row Name 12/06/18 0900             Gait/Stairs Assessment/Training    [place] Level (Gait)  minimum assist (75% patient effort);verbal cues  -EE      Assistive Device (Gait)  walker, front-wheeled  -EE      Distance in Feet (Gait)  160, 80 x 2  -EE      Pattern (Gait)  step-through  -EE      Deviations/Abnormal Patterns (Gait)  ataxic;sarah decreased;stride length decreased  -EE      Bilateral Gait Deviations  weight shift ability decreased;forward flexed posture;heel strike decreased  -EE      Recorded by [EE] Cathleen Rosario, HAILEY      Row Name 12/06/18 0900             Wheelchair Mobility/Management    Method of Wheelchair Locomotion (Mobility)  bipedal (lower extremity) propulsion  -EE      Forward Propulsion  [place] (Wheelchair)  supervision  -EE      [place] (Wheelchair)  supervision  -EE      [place] (Wheelchair)  supervision  -EE      Doorway [place] (Wheelchair)  supervision  -EE      Distance Propelled in Feet (Wheelchair)  160  -EE      Recorded by [EE] Cathleen Rosario PT      Row Name 12/06/18 0900             Safety Issues, Functional Mobility    Impairments Affecting Function (Mobility)  balance;coordination;endurance/activity tolerance;strength;pain;sensation/sensory awareness  -EE      Recorded by [EE] Cathleen Rosario PT      Row Name 12/06/18 1528             Bathing Assessment/Treatment    Bathing [place] Level  bathing skills;minimum assist (75% patient effort);verbal cues  -AF      Bathing Position  supported sitting;supported standing  -AF      Bathing Setup Assistance  obtain supplies  -AF      Comment (Bathing)  with  sponge  -AF      Recorded by [AF] Lolis Cooley OTR      Row Name 12/06/18 1528             Upper Body Dressing Assessment/Treatment    Upper Body Dressing Task  upper body dressing skills;minimum assist (75% or more patient effort);verbal cues  -AF      Upper Body Dressing Position  supported sitting  -AF      Set-up Assistance (Upper Body Dressing)  obtain clothing  -AF      Recorded by [AF] Lolis Cooley OTR      Row Name 12/06/18 1528             Lower Body Dressing Assessment/Treatment    Lower Body Dressing [place] Level  doff;don;pants/bottoms;shoes/slippers;socks;underwear;moderate assist (50% patient effort);minimum assist (75% patient effort);verbal cues  -AF      Lower Body Dressing Position  supported standing;unsupported sitting  -AF      Lower Body Dressing Setup Assistance  obtain clothing  -AF      Comment (Lower Body Dressing)   shoe horn  -AF      Recorded by [AF] Lolis Cooley OTR      Row Name 12/06/18 1528             Grooming Assessment/Treatment    Grooming [place] Level  grooming  skills;supervision;minimum assist (75% patient effort)  -AF      Grooming Position  sink side;supported sitting  -AF      Grooming Setup Assistance  obtain supplies  -AF      Comment (Grooming)  MIN to comb hair  -AF      Recorded by [AF] Lolis Cooley OTR      Row Name 12/06/18 1528             Toileting Assessment/Treatment    Toileting [place] Level  toileting skills;verbal cues;minimum assist (75% patient effort)  -AF      Assistive Device Use (Toileting)  grab bar/safety frame;raised toilet seat  -AF      Toileting Position  supported standing;unsupported sitting  -AF      Recorded by [AF] Lolis Cooley OTR      Row Name 12/06/18 1528 12/06/18 0900          Pain Scale: Numbers Pre/Post-Treatment    Pain Scale: Numbers, Pretreatment  4/10  -AF  5/10  -EE     Pain Scale: Numbers, Post-Treatment  4/10  -AF  5/10  -EE     Pain Location - Orientation  --  incisional  -EE     Pain Location  neck  -AF  neck  -EE     Pre/Post Treatment Pain Comment  --  tolerated tx  -EE     Pain Intervention(s)  Repositioned;Rest  -AF  Repositioned;Medication (See MAR)  -EE     Recorded by [AF] Lolis Cooley, MAYOR [EE] Cathleen Rosario PT     Row Name 12/06/18 0900             Balance    Balance  dynamic balance activity  -EE      Additional Documentation  Balance (Row)  -EE      Recorded by [EE] Cathleen Rosario PT      Row Name 12/06/18 0900             Dynamic Balance Activity    Therapeutic Training Performed (Dynamic Balance)  side stepping  -EE      Support Needed for Balance (Dynamic Balance Training)  uses both upper extremities for support;CGA;minimal external support for balance, 75% patient effort  -EE      Comment (Dynamic Balance Training)  2 x 8'  -EE      Recorded by [EE] Cathleen Rosario PT      Row Name 12/06/18 0900             Therapeutic Exercise    Therapeutic Exercise  standing, lower extremities  -EE      Recorded by [EE] Cathleen Rosario PT      Row Name 12/06/18 0900             Lower Extremity Standing  Therapeutic Exercise    Performed, Standing Lower Extremity (Therapeutic Exercise)  heel raises;hip/knee flexion/extension  -EE      Device, Standing Lower Extremity (Therapeutic Exercise)  jacob bars B UE support  -EE      Exercise Type, Standing Lower Extremity (Therapeutic Exercise)  AROM (active range of motion)  -EE      Sets/Reps Detail, Standing Lower Extremity (Therapeutic Exercise)  1/10  -EE      Recorded by [EE] Cathleen Rosario, HAILEY      Row Name 12/06/18 0900             Lower Extremity Seated Therapeutic Exercise    Performed, Seated Lower Extremity (Therapeutic Exercise)  hip flexion/extension;hip abduction/adduction;LAQ (long arc quad), knee extension;knee flexion/extension  -EE      Device, Seated Lower Extremity (Therapeutic Exercise)  elastic bands/tubing;free weights, cuff;small ball red theraband, 1.5# weights  -EE      Exercise Type, Seated Lower Extremity (Therapeutic Exercise)  resistive exercise  -EE      Sets/Reps Detail, Seated Lower Extremity (Therapeutic Exercise)  2/15  -EE      Recorded by [EE] Cathleen Rosario, HAILEY      Row Name 12/06/18 1528 12/06/18 0900          Positioning and Restraints    Pre-Treatment Position  in bed  -AF  in bed  -EE     Post Treatment Position  bed  -AF  bed  -EE     In Bed  sitting EOB;exit alarm on;encouraged to call for assist;call light within reach  -AF  fowlers;call light within reach;encouraged to call for assist;exit alarm on  -EE     Recorded by [AF] Lolis Cooley OTR [EE] Cathleen Rosario, HAILEY     Row Name 12/06/18 0800             Weekly Summary of Progress (PT)    Weekly Outcome Summary: Physical Therapy  Pt demonstrates good progress in PT with functional mobility. He is demonstrating improved B LE strength, endurance, and balance, as well as increased independence with functional mobility. Pt now performing most tasks with min A and RWX; continues to have increased difficulty with bed mobility due to UE weakness. Pt would benefit from continued PT to  further address impairments in order to reach long term goals, increase independence, and prepare for DC home.   -EE      Recorded by [EE] Cathleen Rosario PT        User Key  (r) = Recorded By, (t) = Taken By, (c) = Cosigned By    Initials Name Effective Dates    EE Cathleen Rosario, PT 04/03/18 -     AF Lolis Cooley, OTR 04/03/18 -           Wound 11/27/18 1318 Other (See comments) neck incision (Active)   Dressing Appearance intact;dry;open to air 12/6/2018  8:00 AM   Closure Adhesive closure strips 12/6/2018  8:00 AM   Base clean;dry 12/6/2018  8:00 AM   Periwound dry;intact 12/5/2018  9:26 PM   Drainage Amount none 12/6/2018  8:00 AM   Dressing Care, Wound open to air 12/6/2018  8:00 AM         OT Recommendation and Plan                 OT IRF GOALS     Row Name 12/01/18 1000             Transfer Goal 1 (OT-IRF)    Activity/Assistive Device (Transfer Goal 1, OT-IRF)  toilet  -RP      [place] Level (Transfer Goal 1, OT-IRF)  minimum assist (75% or more patient effort)  -RP      Time Frame (Transfer Goal 1, OT-IRF)  short term goal (STG)  -RP      Progress/Outcomes (Transfer Goal 1, OT-IRF)  goal ongoing  -RP         Transfer Goal 2 (OT-IRF)    Activity/Assistive Device (Transfer Goal 2, OT-IRF)  toilet  -RP      [place] Level (Transfer Goal 2, OT-IRF)  supervision required  -RP      Time Frame (Transfer Goal 2, OT-IRF)  long term goal (LTG)  -RP      Progress/Outcomes (Transfer Goal 2, OT-IRF)  goal ongoing  -RP         Bathing Goal 1 (OT-IRF)    Activity/Device (Bathing Goal 1, OT-IRF)  bathing skills, all  -RP      [place] Level (Bathing Goal 1, OT-IRF)  moderate assist (50-74% patient effort)  -RP      Time Frame (Bathing Goal 1, OT-IRF)  short term goal (STG)  -RP      Progress/Outcomes (Bathing Goal 1, OT-IRF)  goal ongoing  -RP         Bathing Goal 2 (OT-IRF)    Activity/Device (Bathing Goal 2, OT-IRF)  bathing skills, all  -RP      [place] Level (Bathing Goal 2, OT-IRF)  supervision  required;standby assist  -RP      Time Frame (Bathing Goal 2, OT-IRF)  long term goal (LTG)  -RP      Progress/Outcomes (Bathing Goal 2, OT-IRF)  goal ongoing  -RP         UB Dressing Goal 1 (OT-IRF)    Activity/Device (UB Dressing Goal 1, OT-IRF)  upper body dressing  -RP      [place] (UB Dress Goal 1, OT-IRF)  minimum assist (75% or more patient effort)  -RP      Time Frame (UB Dressing Goal 1, OT-IRF)  short term goal (STG)  -RP      Progress/Outcomes (UB Dressing Goal 1, OT-IRF)  goal ongoing  -RP         UB Dressing Goal 2 (OT-IRF)    Activity/Device (UB Dressing Goal 2, OT-IRF)  upper body dressing  -RP      [place] (UB Dress Goal 2, OT-IRF)  supervision required  -RP      Time Frame (UB Dressing Goal 2, OT-IRF)  long term goal (LTG)  -RP      Progress/Outcomes (UB Dressing Goal 2, OT-IRF)  goal ongoing  -RP         LB Dressing Goal 1 (OT-IRF)    Activity/Device (LB Dressing Goal 1, OT-IRF)  lower body dressing  -RP      [place] (LB Dressing Goal 1, OT-IRF)  maximum assist (25-49% patient effort)  -RP      Time Frame (LB Dressing Goal 1, OT-IRF)  short term goal (STG)  -RP      Progress/Outcomes (LB Dressing Goal 1, OT-IRF)  goal ongoing  -RP         LB Dressing Goal 2 (OT-IRF)    Activity/Device (LB Dressing Goal 2, OT-IRF)  lower body dressing  -RP      [place] (LB Dressing Goal 2, OT-IRF)  supervision required;standby assist  -RP      Time Frame (LB Dressing Goal 2, OT-IRF)  long term goal (LTG)  -RP      Progress/Outcomes (LB Dressing Goal 2, OT-IRF)  goal ongoing  -RP         Grooming Goal 1 (OT-IRF)    Activity/Device (Grooming Goal 1, OT-IRF)  grooming skills, all  -RP      [place] (Grooming Goal 1, OT-IRF)  minimum assist (75% or more patient effort)  -RP      Time Frame (Grooming Goal 1, OT-IRF)  short term goal (STG)  -RP      Progress/Outcomes (Grooming Goal 1, OT-IRF)  goal ongoing  -RP         Grooming Goal 2 (OT-IRF)    Activity/Device (Grooming Goal 2, OT-IRF)  grooming  skills, all  -RP      [place] (Grooming Goal 2, OT-IRF)  supervision required  -RP      Time Frame (Grooming Goal 2, OT-IRF)  long term goal (LTG)  -RP      Progress/Outcomes (Grooming Goal 2, OT-IRF)  goal ongoing  -RP         Toileting Goal 1 (OT-IRF)    Activity/Device (Toileting Goal 1, OT-IRF)  toileting skills, all  -RP      [place] Level (Toileting Goal 1, OT-IRF)  maximum assist (25-49% patient effort)  -RP      Time Frame (Toileting Goal 1, OT-IRF)  short term goal (STG)  -RP      Progress/Outcomes (Toileting Goal 1, OT-IRF)  goal ongoing  -RP         Toileting Goal 2 (OT-IRF)    Activity/Device (Toileting Goal 2, OT-IRF)  toileting skills, all  -RP      [place] Level (Toileting Goal 2, OT-IRF)  supervision required;standby assist  -RP      Time Frame (Toileting Goal 2, OT-IRF)  long term goal (LTG)  -RP      Progress/Outcomes (Toileting Goal 2, OT-IRF)  goal ongoing  -RP         ROM Goal 1 (OT-IRF)    ROM Goal 1 (OT-IRF)  Pt will increase B UE ROM to approx. 3/4 shoulder flexion to assist w/ ADLs.  -RP      Time Frame (ROM Goal 1, OT-IRF)  long term goal (LTG)  -RP      Progress/Outcomes (ROM Goal 1, OT-IRF)  goal ongoing  -RP        User Key  (r) = Recorded By, (t) = Taken By, (c) = Cosigned By    Initials Name Provider Type    Precious Hamilton, OT Occupational Therapist                 Time Calculation:     Time Calculation- OT     Row Name 12/06/18 1532             Time Calculation- OT    OT Start Time  1030  -AF      OT Stop Time  1130  -AF      OT Time Calculation (min)  60 min  -AF        User Key  (r) = Recorded By, (t) = Taken By, (c) = Cosigned By    Initials Name Provider Type    AF Lolis Cooley OTR Occupational Therapist          Therapy Suggested Charges     Code   Minutes Charges    None              Therapy Charges for Today     Code Description Service Date Service Provider Modifiers Qty    38283420674 HC OT SELF CARE/MGMT/TRAIN EA 15 MIN 12/5/2018 Lolis Cooley  OTR GO 3    16533699172 HC OT NEUROMUSC RE EDUCATION EA 15 MIN 12/5/2018 Lolis Cooley OTR GO 2    28836067444 HC OT THER PROC EA 15 MIN 12/5/2018 Lolis Cooley, OTR GO 1    27894196259 HC OT SELF CARE/MGMT/TRAIN EA 15 MIN 12/6/2018 Lolis Cooley, OTR GO 4                   NURIS Grimm  12/6/2018

## 2018-12-06 NOTE — PLAN OF CARE
Problem: Patient Care Overview  Goal: Plan of Care Review  Outcome: Ongoing (interventions implemented as appropriate)   12/06/18 1530   Patient Care Overview   IRF Plan of Care Review progress ongoing, continue   Progress, Functional Goals demonstrating adequate progress   Coping/Psychosocial   Plan of Care Reviewed With patient   OTHER   Outcome Summary Mr. Ramirez has done well today. VS stable, pain staying around a 4/5 and patient states that is very tolerable for him. He says the pain medications keeps him at that number after therapy. He was somewhat hoarse this morning and still states that he has some trouble swallowing. He states his pain is between his shoulder blades and has been radiating down his left arm. His lungs were clear; sleeps with a CPAP at night. He has 2+ edema to bilateral lower extremities. He has voided and also had a bowel movement today. No skin issues other than some bruising and scabs. His incision is clean dry and open to air (steri strips have mostly fallen off). Wife at bedside this AM. He has participated in all therapies and appears to be resting well this afternoon.      Goal: Coping Plan  Outcome: Ongoing (interventions implemented as appropriate)      Problem: Fall Risk (Adult)  Goal: Absence of Fall  Outcome: Ongoing (interventions implemented as appropriate)      Problem: Pain, Acute (Adult)  Goal: Acceptable Pain Control/Comfort Level  Outcome: Ongoing (interventions implemented as appropriate)      Problem: Skin Injury Risk (Adult)  Goal: Skin Health and Integrity  Outcome: Ongoing (interventions implemented as appropriate)

## 2018-12-06 NOTE — PROGRESS NOTES
Inpatient Rehabilitation Functional Measures Assessment    Functional Measures  ROCCO Eating:  [facility] Grooming: [facility] Bathing:  [facility] Upper Body Dressing:  [facility] Lower Body Dressing:  [facility] Toileting:  [facility] Bladder Management  Level of Assistance:  [place]  Frequency/Number of Accidents this Shift:  [facility] Bowel Management  Level of Assistance: [place]  Frequency/Number of Accidents this Shift: [facility] Bed/Chair/Wheelchair Transfer:  [facility] Toilet Transfer:  [facility] Tub/Shower Transfer:  [place]    Previously Documented Mode of Locomotion at Discharge: Field  ROCCO Expected Mode of Locomotion at Discharge: [facility] Walk/Wheelchair:  [facility] Stairs:  [facility] Comprehension:  Auditory comprehension is the usual mode. Comprehension  Score = 7, Independent.  Patient comprehends complex/abstract information in  their primary language.  Patient is completely independent for auditory  comprehension.  There are no activity limitations.  [place] Expression:  Vocal expression is the usual mode. Expression Score = 7,  Independent.  Patient expresses complex/abstract information in their primary  language.  Patient is completely independent for vocal expression.  There are no  activity limitations.  [place]CC[place] Social Interaction:  Social Interaction Score = 7, Independent. Patient is  completely independent for social interaction.  There are no activity  limitations.  [place] Problem Solving:  Problem Solving Score = 7, Independent.  Patient makes  appropriate decisions in order to solve complex problems.  Patient is completely  independent for problem solving.  There are no activity limitations.  [place]CC[place] Memory:  Memory Score = 7, Independent.  Patient is completely independent  for memory.  There are no activity limitations.    Therapy Mode Minutes  Occupational Therapy: Branch  Physical Therapy: Branch  Speech Language Pathology:  Branch    Signed by: Franc Flood RN

## 2018-12-06 NOTE — PROGRESS NOTES
Inpatient Rehabilitation Functional Measures Assessment    Functional Measures  ROCCO Eating:  [facility] Grooming: [facility] Bathing:  [facility] Upper Body Dressing:  [facility] Lower Body Dressing:  [facility] Toileting:  [facility] Bladder Management  Level of Assistance:  [place]  Frequency/Number of Accidents this Shift:  [facility] Bowel Management  Level of Assistance: [place]  Frequency/Number of Accidents this Shift: [facility] Bed/Chair/Wheelchair Transfer:  [facility] Toilet Transfer:  [facility] Tub/Shower Transfer:  [place]    Previously Documented Mode of Locomotion at Discharge: Field  [place]CC[place] Expected Mode of Locomotion at Discharge: [facility] Walk/Wheelchair:  [facility] Stairs:  [facility] Comprehension:  Auditory comprehension is the usual mode. Comprehension  Score = 7, Independent.  Patient comprehends complex/abstract information in  their primary language.  Patient is completely independent for auditory  comprehension.  There are no activity limitations.  [place]CC[place] Expression:  Vocal expression is the usual mode. Expression Score = 7,  Independent.  Patient expresses complex/abstract information in their primary  language.  Patient is completely independent for vocal expression.  There are no  activity limitations.  [place]CC[place] Social Interaction:  Social Interaction Score = 7, Independent. Patient is  completely independent for social interaction.  There are no activity  limitations.  [place] Problem Solving:  Problem Solving Score = 6, Modified [place].  Patient  makes appropriate decisions in order to solve complex problems, but requires  extra time.  [place]CC[place] Memory:  Memory Score = 7, Independent.  Patient is completely independent  for memory.  There are no activity limitations.    Therapy Mode Minutes  Occupational Therapy: Branch  Physical Therapy: [place]  Speech Language Pathology:  [place]    Signed by: Marissa Torres RN

## 2018-12-06 NOTE — THERAPY TREATMENT NOTE
Inpatient Rehabilitation - Physical Therapy Progress Note  [facility]     Patient Name: Duane Mark Witten  : 1955  MRN: [id number]    Today's Date: 2018                 Admit Date: 2018      Visit Dx:      ICD-10-CM ICD-9-CM   1. Cervical stenosis of spinal canal M48.02 723.0   2. Weakness of both lower extremities R29.898 729.89   3. Paresthesia of both upper extremities R20.2 782.0    M79.601 729.5    M79.602    4. Morbid obesity with BMI of 40.0-44.9, adult (CMS/Formerly [facility]) E66.01 278.01    Z68.41 V85.41   5. Impaired functional mobility, balance, gait, and endurance Z74.09 V49.89   6. Functional gait abnormality R26.89 781.2       Patient Active Problem List   Diagnosis   • YANELI (generalized anxiety disorder)   • ADD (attention deficit disorder)   • Depression   • Diabetes type 2, controlled (CMS/[facility]ly [facility])   • ED (erectile dysfunction) of non-organic origin   • HLD (hyperlipidemia)   • Essential hypertension   • Arthritis   • Psoriasis   • Testosterone deficiency   • Primary insomnia   • Weakness of both lower extremities   • Paresthesia of both upper extremities   • Morbid obesity with BMI of 40.0-44.9, adult (CMS/[facility])   • Adverse effect of corticosteroids   • Type 2 diabetes mellitus with hyperglycemia (CMS/[facility])   • Cervical stenosis of spinal canal   • Edema of cervical spinal cord (CMS/[facility])   • Postoperative atrial fibrillation (CMS/[facility])   • Cervical myelopathy (CMS/[facility]ly [facility])       Therapy Treatment    IRF Treatment Summary     Row Name 18 0800             Weekly Summary of Progress (PT)    Weekly Outcome Summary: Physical Therapy  Pt demonstrates good progress in PT with functional mobility. He is demonstrating improved B LE strength, endurance, and balance, as well as increased independence with functional mobility. Pt now performing most tasks with min A and RWX; continues to have increased difficulty with bed mobility due to UE weakness. Pt would benefit from continued PT to further address  impairments in order to reach long term goals, increase independence, and prepare for DC home.   -EE      Recorded by [EE] Cathleen Rosario, PT        User Key  (r) = Recorded By, (t) = Taken By, (c) = Cosigned By    Initials Name Effective Dates    EE Cathleen Rosario PT 04/03/18 -         Wound 11/27/18 1318 Other (See comments) neck incision (Active)   Dressing Appearance other (see comments) 12/5/2018  9:26 PM   Closure Liquid skin adhesive;Adhesive closure strips 12/5/2018  9:26 PM   Periwound dry;intact 12/5/2018  9:26 PM   Drainage Amount none 12/5/2018  9:26 PM   Dressing Care, Wound open to air 12/5/2018  9:26 PM     Physical Therapy Education     Title: PT OT SLP Therapies (In Progress)     Topic: Physical Therapy (Done)     Point: Mobility training (Done)     Learning Progress Summary           Patient Acceptance, E,TB, VU,NR by EE at 12/5/2018 10:10 AM    Acceptance, E, NR by SIMÓN at 12/4/2018 10:12 AM    Acceptance, E,TB, VU,NR by EE at 12/3/2018  9:48 AM    Acceptance, E, VU,NR by JK at 12/1/2018  9:50 AM                   Point: Home exercise program (Done)     Learning Progress Summary           Patient Acceptance, E,TB, VU,NR by EE at 12/5/2018 10:10 AM    Acceptance, E,TB, VU,NR by EE at 12/3/2018  9:48 AM                   Point: Body mechanics (Done)     Learning Progress Summary           Patient Acceptance, E,TB, VU,NR by EE at 12/5/2018 10:10 AM    Acceptance, E,TB, VU,NR by EE at 12/3/2018  9:48 AM                   Point: Precautions (Done)     Learning Progress Summary           Patient Acceptance, E,TB, VU,NR by EE at 12/5/2018 10:10 AM    Acceptance, E,TB, VU,NR by EE at 12/3/2018  9:48 AM    Acceptance, E, VU,NR by JK at 12/1/2018  9:50 AM                               User Key     Initials Effective Dates Name Provider Type Discipline    LB 03/07/18 -  Odette Orourke PTA Physical Therapy Assistant PT    EE 04/03/18 -  Cathleen Rosario, PT Physical Therapist PT    JTAYLOR 04/03/18 -  Janae Trujillo  PT Physical Therapist PT                  PT Recommendation and Plan               PT IRF GOALS     Row Name 12/06/18 0800             Bed Mobility Goal 1 (PT-IRF)    Time Frame (Bed Mobility Goal 1, PT-IRF)  1 week;long term goal (LTG)  -EE      Progress/Outcomes (Bed Mobility Goal 1, PT-IRF)  goal ongoing;good progress toward goal  -EE         Transfer Goal 1 (PT-IRF)    Time Frame (Transfer Goal 1, PT-IRF)  1 week;long term goal (LTG)  -EE      Progress/Outcomes (Transfer Goal 1, PT-IRF)  goal ongoing;good progress toward goal  -EE         Transfer Goal 2 (PT-IRF)    Time Frame (Transfer Goal 2, PT-IRF)  1 week;long term goal (LTG)  -EE      Progress/Outcomes (Transfer Goal 2, PT-IRF)  goal ongoing;good progress toward goal  -EE         Gait/Walking Locomotion Goal 1 (PT-IRF)    Time Frame (Gait/Walking Locomotion Goal 1, PT-IRF)  1 week;long term goal (LTG)  -EE      Progress/Outcomes (Gait/Walking Locomotion Goal 1, PT-IRF)  goal ongoing;good progress toward goal  -EE         Stairs Goal 1 (PT-IRF)    Time Frame (Stairs Goal 1, PT-IRF)  1 week;long term goal (LTG)  -EE      Progress/Outcomes (Stairs Goal 1, PT-IRF)  goal ongoing  -EE         Stairs Goal 2 (PT-IRF)    Time Frame (Stairs Goal 2, PT-IRF)  1 week;long term goal (LTG)  -EE      Progress/Outcomes (Stairs Goal 2, PT-IRF)  goal ongoing  -EE        User Key  (r) = Recorded By, (t) = Taken By, (c) = Cosigned By    Initials Name Provider Type    EE Cathleen Rosario, PT Physical Therapist               Time Calculation:     PT Charges     Row Name 12/06/18 0815             Time Calculation    PT Goal Re-Cert Due Date  12/13/18  -EE        User Key  (r) = Recorded By, (t) = Taken By, (c) = Cosigned By    Initials Name Provider Type    Cathleen Jacobson, PT Physical Therapist            Therapy Charges for Today     Code Description Service Date Service Provider Modifiers Qty    60731899328 HC PT THER PROC EA 15 MIN 12/5/2018 Cathleen Rosario, PT GP 6    [id number]  HC PT THER SUPP EA 15 MIN 12/5/2018 Cathleen Rosario, PT GP 1                   Cathleen Rosario, PT  12/6/2018

## 2018-12-06 NOTE — PROGRESS NOTES
LOS: 6 days   Patient Care Team:  Miladis Colbert APRN as PCP - General (Family Medicine)    Chief Complaint: same    Subjective     History of Present Illness    Subjective Pt is sleeping but easily awakened. No c/o. Pt and wife are pleased with his progress so far.     History taken from: patient    Objective     Vital Signs  Temp:  [97.5 °F (36.4 °C)-97.9 °F (36.6 °C)] 97.8 °F (36.6 °C)  Heart Rate:  [72-79] 72  Resp:  [18] 18  BP: (130-141)/(74-79) 130/74    Objectiveexam unchanged    Results Review:     I reviewed the patient's new clinical results.    Medication Review:     Assessment/Plan       Cervical myelopathy (CMS/[facility])      Assessment & Plan Continue to prepare for dc.     Mike Melissa MD  12/06/18  7:16 AM    Time:

## 2018-12-06 NOTE — PROGRESS NOTES
Inpatient Rehabilitation Functional Measures Assessment    Functional Measures  [facility] Eating:  [facility] Grooming: [facility] Bathing:  [facility] Upper Body Dressing:  [facility] Lower Body Dressing:  [facility] Toileting:  [facility] Bladder Management  Level of Assistance:  [place]  Frequency/Number of Accidents this Shift:  [facility] Bowel Management  Level of Assistance: [place]  Frequency/Number of Accidents this Shift: [facility] Bed/Chair/Wheelchair Transfer:  Bed/chair/wheelchair Transfer Score = 4.  Patient performs 75% or more of effort and minimal assistance (little/incidental  help/lifting of one limb/steadying) for transferring to and from the  bed/chair/wheelchair, requiring: Patient requires the following assistive  device(s): Walker.  [facility] Toilet Transfer:  [facility] Tub/Shower Transfer:  [place]    Previously Documented Mode of Locomotion at Discharge: Field  [facility] Expected Mode of Locomotion at Discharge: [facility] Walk/Wheelchair:  WHEELCHAIR OBSERVATION   Wheelchair locomotion was observed using a manual wheelchair. Wheelchair  Distance Scale = 3.  Distance traveled in wheelchair is greater than 150 feet.  Wheelchair Score = 5.  Patient is supervision or set up only for propelling  wheelchair, requiring: Patient was able to propel a distance of 160 feet in a  wheelchair. No other assistive devices were required.    WALK OBSERVATION   Walk Distance Scale = 3.  Distance walked is greater than 150 feet. Walk Score  = 4.  Patient performs 75% or more of effort and requires minimal assistance.  Incidental help/contact guard/steadying was provided. Patient walked a distance  of  160 feet. Patient requires the following assistive device(s): Rolling  walker.  [facility] Stairs:  Stairs did not occur because activity was unsafe for patient.    [facility] Comprehension:  [facility] Expression:  [facility] Social Interaction:  [facility] Problem Solving:  [facility] Memory:  [place]    Therapy Mode  Minutes  Occupational Therapy: [place]  Physical Therapy: Individual: 90 minutes.  Speech Language Pathology:  [place]    Signed by: Cathleen Rosario DPT

## 2018-12-06 NOTE — THERAPY TREATMENT NOTE
Inpatient Rehabilitation - Physical Therapy Treatment Note  [facility]     Patient Name: Duane Mark Witten  : 1955  MRN: [id number]    Today's Date: 2018                 Admit Date: 2018      Visit Dx:      ICD-10-CM ICD-9-CM   1. Cervical stenosis of spinal canal M48.02 723.0   2. Weakness of both lower extremities R29.898 729.89   3. Paresthesia of both upper extremities R20.2 782.0    M79.601 729.5    M79.602    4. Morbid obesity with BMI of 40.0-44.9, adult (CMS/[facility]) E66.01 278.01    Z68.41 V85.41   5. Impaired functional mobility, balance, gait, and endurance Z74.09 V49.89   6. Functional gait abnormality R26.89 781.2       Patient Active Problem List   Diagnosis   • YANELI (generalized anxiety disorder)   • ADD (attention deficit disorder)   • Depression   • Diabetes type 2, controlled (CMS/[facility])   • ED (erectile dysfunction) of non-organic origin   • HLD (hyperlipidemia)   • Essential hypertension   • Arthritis   • Psoriasis   • Testosterone deficiency   • Primary insomnia   • Weakness of both lower extremities   • Paresthesia of both upper extremities   • Morbid obesity with BMI of 40.0-44.9, adult (CMS/[facility])   • Adverse effect of corticosteroids   • Type 2 diabetes mellitus with hyperglycemia (CMS/[facility])   • Cervical stenosis of spinal canal   • Edema of cervical spinal cord (CMS/[facility])   • Postoperative atrial fibrillation (CMS/[facility])   • Cervical myelopathy (CMS/[facility])       Therapy Treatment    IRF Treatment Summary     Row Name 18             Evaluation/Treatment Time and Intent    Subjective Information  complains of;pain  -EE      Existing Precautions/Restrictions  fall;spinal  -EE      Document Type  therapy note (daily note)  -EE      Mode of Treatment  physical therapy  -EE      Patient/Family Observations  Pt supine in bed in no acute distress  -EE      Recorded by [EE] Cathleen Rosario, PT      Row Name 18             Cognition/Psychosocial- PT/OT    Affect/Mental  Status (Cognitive)  WFL  -EE      Orientation Status (Cognition)  oriented x 4  -EE      Follows Commands (Cognition)  WFL  -EE      Personal Safety Interventions  fall prevention program maintained;gait belt;muscle strengthening facilitated;nonskid shoes/slippers when out of bed;supervised activity  -EE      Recorded by [EE] Cathleen Rosario, HAILEY      Row Name 12/06/18 0900             Bed Mobility Assessment/Treatment    Bed Mobility Assessment/Treatment  rolling left  -EE      Rolling Left [place] (Bed Mobility)  contact guard  -EE      Supine-Sit [place] (Bed Mobility)  minimum assist (75% patient effort)  -EE      Sit-Supine [place] (Bed Mobility)  contact guard  -EE      Assistive Device (Bed Mobility)  bed rails  -EE      Recorded by [EE] Cathleen Rosario PT      Row Name 12/06/18 0900             Bed-Chair Transfer    Bed-Chair [place] (Transfers)  minimum assist (75% patient effort);verbal cues  -EE      Assistive Device (Bed-Chair Transfers)  walker, front-wheeled  -EE      Recorded by [EE] Cathleen Rosario PT      Row Name 12/06/18 0900             Chair-Bed Transfer    Chair-Bed [place] (Transfers)  minimum assist (75% patient effort);verbal cues  -EE      Assistive Device (Chair-Bed Transfers)  wheelchair;walker, front-wheeled  -EE      Recorded by [EE] Cathleen Rosario, HAILEY      Row Name 12/06/18 0900             Sit-Stand Transfer    Sit-Stand [place] (Transfers)  minimum assist (75% patient effort);verbal cues  -EE      Assistive Device (Sit-Stand Transfers)  walker, front-wheeled  -EE      Recorded by [EE] Cathleen Rosario PT      Row Name 12/06/18 0900             Stand-Sit Transfer    Stand-Sit [place] (Transfers)  minimum assist (75% patient effort);verbal cues  -EE      Assistive Device (Stand-Sit Transfers)  walker, front-wheeled  -EE      Recorded by [EE] Cathleen Rosario, HAILEY      Row Name 12/06/18 0900             Gait/Stairs Assessment/Training    [place] Level (Gait)   minimum assist (75% patient effort);verbal cues  -EE      Assistive Device (Gait)  walker, front-wheeled  -EE      Distance in Feet (Gait)  160, 80 x 2  -EE      Pattern (Gait)  step-through  -EE      Deviations/Abnormal Patterns (Gait)  ataxic;sarah decreased;stride length decreased  -EE      Bilateral Gait Deviations  weight shift ability decreased;forward flexed posture;heel strike decreased  -EE      Recorded by [EE] Cathleen Rosario PT      Row Name 12/06/18 0900             Wheelchair Mobility/Management    Method of Wheelchair Locomotion (Mobility)  bipedal (lower extremity) propulsion  -EE      Forward Propulsion [place] (Wheelchair)  supervision  -EE      Steering [place] (Wheelchair)  supervision  -EE      Turning [place] (Wheelchair)  supervision  -EE      Doorway Navigation [place] (Wheelchair)  supervision  -EE      Distance Propelled in Feet (Wheelchair)  160  -EE      Recorded by [EE] Cathleen Rosario PT      Row Name 12/06/18 0900             Safety Issues, Functional Mobility    Impairments Affecting Function (Mobility)  balance;coordination;endurance/activity tolerance;strength;pain;sensation/sensory awareness  -EE      Recorded by [EE] Cathleen Rosario PT      Row Name 12/06/18 0900             Pain Scale: Numbers Pre/Post-Treatment    Pain Scale: Numbers, Pretreatment  5/10  -EE      Pain Scale: Numbers, Post-Treatment  5/10  -EE      Pain Location - Orientation  incisional  -EE      Pain Location  neck  -EE      Pre/Post Treatment Pain Comment  tolerated tx  -EE      Pain Intervention(s)  Repositioned;Medication (See MAR)  -EE      Recorded by [EE] Cathleen Rosario PT      Row Name 12/06/18 0900             Balance    Balance  dynamic balance activity  -EE      Additional Documentation  Balance (Row)  -EE      Recorded by [EE] Cathleen Rosario PT      Row Name 12/06/18 0900             Dynamic Balance Activity    Therapeutic Training Performed (Dynamic Balance)  side stepping  -EE       Support Needed for Balance (Dynamic Balance Training)  uses both upper extremities for support;CGA;minimal external support for balance, 75% patient effort  -EE      Comment (Dynamic Balance Training)  2 x 8'  -EE      Recorded by [EE] Cathleen Rosario PT      Row Name 12/06/18 0900             Therapeutic Exercise    Therapeutic Exercise  standing, lower extremities  -EE      Recorded by [EE] Cathleen Rosario PT      Row Name 12/06/18 0900             Lower Extremity Standing Therapeutic Exercise    Performed, Standing Lower Extremity (Therapeutic Exercise)  heel raises;hip/knee flexion/extension  -EE      Device, Standing Lower Extremity (Therapeutic Exercise)  jacob bars B UE support  -EE      Exercise Type, Standing Lower Extremity (Therapeutic Exercise)  AROM (active range of motion)  -EE      Sets/Reps Detail, Standing Lower Extremity (Therapeutic Exercise)  1/10  -EE      Recorded by [EE] Cathleen Rosario PT      Row Name 12/06/18 0900             Lower Extremity Seated Therapeutic Exercise    Performed, Seated Lower Extremity (Therapeutic Exercise)  hip flexion/extension;hip abduction/adduction;LAQ (long arc quad), knee extension;knee flexion/extension  -EE      Device, Seated Lower Extremity (Therapeutic Exercise)  elastic bands/tubing;free weights, cuff;small ball red theraband, 1.5# weights  -EE      Exercise Type, Seated Lower Extremity (Therapeutic Exercise)  resistive exercise  -EE      Sets/Reps Detail, Seated Lower Extremity (Therapeutic Exercise)  2/15  -EE      Recorded by [EE] Cathleen Rosario PT      Row Name 12/06/18 0900             Positioning and Restraints    Pre-Treatment Position  in bed  -EE      Post Treatment Position  bed  -EE      In Bed  fowlers;call light within reach;encouraged to call for assist;exit alarm on  -EE      Recorded by [EE] Cathleen Rosario PT      Row Name 12/06/18 0800             Weekly Summary of Progress (PT)    Weekly Outcome Summary: Physical Therapy  Pt demonstrates good  progress in PT with functional mobility. He is demonstrating improved B LE strength, endurance, and balance, as well as increased independence with functional mobility. Pt now performing most tasks with min A and RWX; continues to have increased difficulty with bed mobility due to UE weakness. Pt would benefit from continued PT to further address impairments in order to reach long term goals, increase independence, and prepare for DC home.   -EE      Recorded by [EE] Cathleen Rosario PT        User Key  (r) = Recorded By, (t) = Taken By, (c) = Cosigned By    Initials Name Effective Dates    EE Cathleen Rosario, HAILEY 04/03/18 -         Wound 11/27/18 1318 Other (See comments) neck incision (Active)   Dressing Appearance intact;dry;open to air 12/6/2018  8:00 AM   Closure Adhesive closure strips 12/6/2018  8:00 AM   Base clean;dry 12/6/2018  8:00 AM   Periwound dry;intact 12/5/2018  9:26 PM   Drainage Amount none 12/6/2018  8:00 AM   Dressing Care, Wound open to air 12/6/2018  8:00 AM     Physical Therapy Education     Title: PT OT SLP Therapies (In Progress)     Topic: Physical Therapy (Done)     Point: Mobility training (Done)     Learning Progress Summary           Patient Acceptance, E,TB, VU,NR by EE at 12/6/2018  9:15 AM    Acceptance, E,TB, VU,NR by EE at 12/5/2018 10:10 AM    Acceptance, E, NR by LB at 12/4/2018 10:12 AM    Acceptance, E,TB, VU,NR by EE at 12/3/2018  9:48 AM    Acceptance, E, VU,NR by JK at 12/1/2018  9:50 AM                   Point: Home exercise program (Done)     Learning Progress Summary           Patient Acceptance, E,TB, VU,NR by EE at 12/6/2018  9:15 AM    Acceptance, E,TB, VU,NR by EE at 12/5/2018 10:10 AM    Acceptance, E,TB, VU,NR by EE at 12/3/2018  9:48 AM                   Point: Body mechanics (Done)     Learning Progress Summary           Patient Acceptance, E,TB, VU,NR by EE at 12/6/2018  9:15 AM    Acceptance, E,TB, VU,NR by EE at 12/5/2018 10:10 AM    Acceptance, E,TB, VU,NR by EE at  12/3/2018  9:48 AM                   Point: Precautions (Done)     Learning Progress Summary           Patient Acceptance, E,TB, VU,NR by EE at 12/6/2018  9:15 AM    Acceptance, E,TB, VU,NR by EE at 12/5/2018 10:10 AM    Acceptance, E,TB, VU,NR by EE at 12/3/2018  9:48 AM    Acceptance, E, VU,NR by JK at 12/1/2018  9:50 AM                               User Key     Initials Effective Dates Name Provider Type Discipline    LB 03/07/18 -  Odette Orourke, PTA Physical Therapy Assistant PT    EE 04/03/18 -  Cathleen Rosario, PT Physical Therapist PT    JK 04/03/18 -  Janae Trujillo, PT Physical Therapist PT                  PT Recommendation and Plan               PT IRF GOALS     Row Name 12/06/18 0800             Bed Mobility Goal 1 (PT-IRF)    Time Frame (Bed Mobility Goal 1, PT-IRF)  1 week;long term goal (LTG)  -EE      Progress/Outcomes (Bed Mobility Goal 1, PT-IRF)  goal ongoing;good progress toward goal  -EE         Transfer Goal 1 (PT-IRF)    Time Frame (Transfer Goal 1, PT-IRF)  1 week;long term goal (LTG)  -EE      Progress/Outcomes (Transfer Goal 1, PT-IRF)  goal ongoing;good progress toward goal  -EE         Transfer Goal 2 (PT-IRF)    Time Frame (Transfer Goal 2, PT-IRF)  1 week;long term goal (LTG)  -EE      Progress/Outcomes (Transfer Goal 2, PT-IRF)  goal ongoing;good progress toward goal  -EE         Gait/Walking Locomotion Goal 1 (PT-IRF)    Time Frame (Gait/Walking Locomotion Goal 1, PT-IRF)  1 week;long term goal (LTG)  -EE      Progress/Outcomes (Gait/Walking Locomotion Goal 1, PT-IRF)  goal ongoing;good progress toward goal  -EE         Stairs Goal 1 (PT-IRF)    Time Frame (Stairs Goal 1, PT-IRF)  1 week;long term goal (LTG)  -EE      Progress/Outcomes (Stairs Goal 1, PT-IRF)  goal ongoing  -EE         Stairs Goal 2 (PT-IRF)    Time Frame (Stairs Goal 2, PT-IRF)  1 week;long term goal (LTG)  -EE      Progress/Outcomes (Stairs Goal 2, PT-IRF)  goal ongoing  -EE        User Key  (r) = Recorded By,  (t) = Taken By, (c) = Cosigned By    Initials Name Provider Type    EE Cathleen Rosario, PT Physical Therapist               Time Calculation:     PT Charges     Row Name 12/06/18 1328 12/06/18 1119 12/06/18 1019       Time Calculation    Start Time  1300  -EE  1000  -EE  0900  -EE    Stop Time  1330  -EE  1030  -EE  0930  -EE    Time Calculation (min)  30 min  -EE  30 min  -EE  30 min  -EE    PT Received On  12/06/18  -EE  12/06/18  -EE  12/06/18  -EE    PT - Next Appointment  12/07/18  -EE  12/06/18  -EE  12/06/18  -EE    Row Name 12/06/18 0815             Time Calculation    PT Goal Re-Cert Due Date  12/13/18  -EE        User Key  (r) = Recorded By, (t) = Taken By, (c) = Cosigned By    Initials Name Provider Type    EE Cathleen Rosario, PT Physical Therapist            Therapy Charges for Today     Code Description Service Date Service Provider Modifiers Qty    99085304992 HC PT THER PROC EA 15 MIN 12/5/2018 Cathleen Rosario, PT GP 6    54980806593 HC PT THER SUPP EA 15 MIN 12/5/2018 Cathleen Rosario, PT GP 1    23425019394 HC PT THER PROC EA 15 MIN 12/6/2018 Cathleen Rosario, PT GP 6                   Cathleen Rosario, PT  12/6/2018

## 2018-12-06 NOTE — PROGRESS NOTES
Inpatient Rehabilitation Functional Measures Assessment    Functional Measures  RO[facility] Eating:  Branch  [facility] Grooming: Branch  [facility] Bathing:  Branch  [facility] Upper Body Dressing:  Branch  [facility] Lower Body Dressing:  Branch  [facility] Toileting:  Toileting Score = 4.  Patient requires minimal assistance for  toileting, such as steadying for balance while cleansing or adjusting clothes.  Patient requires the following assistive device(s): Grab bar.    [facility] Bladder Management  Level of Assistance:  Bladder Score = 5.  Patient is supervision/set-up for  bladder management, requiring: Stand by assistance. No assistive devices were  required.  Frequency/Number of Accidents this Shift:  Bladder accidents this shift:  0 .  Patient has not had an accident this shift.    ROCCO Bowel Management  Level of Assistance: Activity was not observed.  Frequency/Number of Accidents this Shift: Bowel accidents this shift: 0 .  Patient has not had an accident this shift.    [facility]CC[facility] Bed/Chair/Wheelchair Transfer:  Bed/chair/wheelchair Transfer Score = 5.  Patient is supervision/set-up for transferring to and from the  bed/chair/wheelchair, requiring: Stand by assistance. Patient requires the  following assistive device(s): Walker. Bed rails.  [place] Toilet Transfer:  Toilet Transfer Score = 4.  Patient performs 75% or more  of effort and minimal assistance (little/incidental help/steadying) for  transferring to and from the toilet/commode, requiring: Contact guard. Patient  requires the following assistive device(s): Grab bars.  ROCCO Tub/Shower Transfer:  Branch    Previously Documented Mode of Locomotion at Discharge: Field  ROCCO Expected Mode of Locomotion at Discharge: Branch  [facility] Walk/Wheelchair:  Branch  [facility] Stairs:  Branch    [facility] Comprehension:  Branch  [facility] Expression:  Branch  [facility] Social Interaction:  Branch  [facility] Problem Solving:  Branch  [facility] Memory:  Branch    Therapy Mode Minutes  Occupational Therapy: Branch  Physical Therapy: Branch  Speech Language  Pathology:  Branch    Signed by: ALEJO Garcia

## 2018-12-06 NOTE — PROGRESS NOTES
Inpatient Rehabilitation Plan of Care Note    Plan of Care  Care Plan Reviewed - No updates at this time.    Safety    Performed Intervention(s)  Safety rounds/ Fall precautions  Items in reach, bed alarm & chair alarms      Sphincter Control    Performed Intervention(s)  Monitor intake, output, & bowel movements  Encourage appropriate diet & fluids    Signed by: Franc Flood, VENKATESH

## 2018-12-06 NOTE — THERAPY TREATMENT NOTE
Inpatient Rehabilitation - Occupational Therapy Treatment Note    [facility]     Patient Name: Duane Mark Witten  : 1955  MRN: [id number]    Today's Date: 2018                 Admit Date: 2018      Visit Dx:    ICD-10-CM ICD-9-CM   1. Cervical stenosis of spinal canal M48.02 723.0   2. Weakness of both lower extremities R29.898 729.89   3. Paresthesia of both upper extremities R20.2 782.0    M79.601 729.5    M79.602    4. Morbid obesity with BMI of 40.0-44.9, adult (CMS/[facility]ly [facility]) E66.01 278.01    Z68.41 V85.41   5. Impaired functional mobility, balance, gait, and endurance Z74.09 V49.89   6. Functional gait abnormality R26.89 781.2       Patient Active Problem List   Diagnosis   • YANELI (generalized anxiety disorder)   • ADD (attention deficit disorder)   • Depression   • Diabetes type 2, controlled (CMS/[facility])   • ED (erectile dysfunction) of non-organic origin   • HLD (hyperlipidemia)   • Essential hypertension   • Arthritis   • Psoriasis   • Testosterone deficiency   • Primary insomnia   • Weakness of both lower extremities   • Paresthesia of both upper extremities   • Morbid obesity with BMI of 40.0-44.9, adult (CMS/[facility])   • Adverse effect of corticosteroids   • Type 2 diabetes mellitus with hyperglycemia (CMS/[facility])   • Cervical stenosis of spinal canal   • Edema of cervical spinal cord (CMS/[facility])   • Postoperative atrial fibrillation (CMS/[facility])   • Cervical myelopathy (CMS/[facility])         Therapy Treatment    IRF Treatment Summary     Row Name 18 1609 18 1528 18 0900       Evaluation/Treatment Time and Intent    Subjective Information  no complaints  -SO  complains of;pain  -AF  complains of;pain  -EE    Existing Precautions/Restrictions  fall;spinal  -SO  fall;spinal  -AF  fall;spinal  -EE    Document Type  therapy note (daily note)  -SO  therapy note (daily note)  -AF  therapy note (daily note)  -EE    Mode of Treatment  occupational therapy  -SO  occupational therapy  -AF  physical  therapy  -EE    Patient/Family Observations  Pt sitting up EOB  -SO  supine in bed   -AF  Pt supine in bed in no acute distress  -EE    Recorded by [SO] hSerrie Nguyen OTR [AF] Lolis Cooley OTR [EE] AbrahamKeiraCathleen, PT    Row Name 12/06/18 1609 12/06/18 1528 12/06/18 0900       Cognition/Psychosocial- PT/OT    Affect/Mental Status (Cognitive)  WFL  -SO  WFL  -AF  WFL  -EE    Orientation Status (Cognition)  oriented x 4  -SO  oriented x 4  -AF  oriented x 4  -EE    Follows Commands (Cognition)  WFL  -SO  WFL  -AF  WFL  -EE    Personal Safety Interventions  gait belt;fall prevention program maintained  -SO  fall prevention program maintained;gait belt;nonskid shoes/slippers when out of bed  -AF  fall prevention program maintained;gait belt;muscle strengthening facilitated;nonskid shoes/slippers when out of bed;supervised activity  -EE    Recorded by [SO] Sherrie Nguyen, NURIS [AF] Lolis Cooley, NUIRS [EE] Cathleen Rosario, PT    Row Name 12/06/18 1528 12/06/18 0900          Bed Mobility Assessment/Treatment    Bed Mobility Assessment/Treatment  --  rolling left  -EE     Rolling Left [place] (Bed Mobility)  --  contact guard  -EE     Supine-Sit [place] (Bed Mobility)  contact guard;verbal cues  -AF  minimum assist (75% patient effort)  -EE     Sit-Supine [place] (Bed Mobility)  contact guard;verbal cues  -AF  contact guard  -EE     Assistive Device (Bed Mobility)  --  bed rails  -EE     Recorded by [AF] Lolis Cooley OTR [EE] Cathleen Rosario, PT     Row Name 12/06/18 1609 12/06/18 1528 12/06/18 0900       Bed-Chair Transfer    Bed-Chair [place] (Transfers)  minimum assist (75% patient effort);verbal cues;nonverbal cues (demo/gesture)  -SO  minimum assist (75% patient effort)  -AF  minimum assist (75% patient effort);verbal cues  -EE    Assistive Device (Bed-Chair Transfers)  walker, front-wheeled  -SO  walker, front-wheeled  -AF  walker, front-wheeled  -EE    Recorded by [SO]  Sherrie Nguyen, OTR [AF] Lolis Cooley, NURIS [EE] Cathleen Rosario, PT    Row Name 12/06/18 1528 12/06/18 0900          Chair-Bed Transfer    Chair-Bed [place] (Transfers)  minimum assist (75% patient effort)  -AF  minimum assist (75% patient effort);verbal cues  -EE     Assistive Device (Chair-Bed Transfers)  walker, front-wheeled  -AF  wheelchair;walker, front-wheeled  -EE     Recorded by [AF] Lolis Cooley, MAYOR [EE] Cathleen Rosario, PT     Row Name 12/06/18 1609 12/06/18 0900          Sit-Stand Transfer    Sit-Stand [place] (Transfers)  minimum assist (75% patient effort);verbal cues  -SO  minimum assist (75% patient effort);verbal cues  -EE     Assistive Device (Sit-Stand Transfers)  --  walker, front-wheeled  -EE     Recorded by [SO] Sherrie Nguyen OTR [EE] Cathleen Rosario, PT     Row Name 12/06/18 1609 12/06/18 0900          Stand-Sit Transfer    Stand-Sit [place] (Transfers)  minimum assist (75% patient effort);verbal cues  -SO  minimum assist (75% patient effort);verbal cues  -EE     Assistive Device (Stand-Sit Transfers)  --  walker, front-wheeled  -EE     Recorded by [SO] Sherrie Nguyen, NURIS [EE] Cathleen Rosario, PT     Row Name 12/06/18 1528             Toilet Transfer    Type (Toilet Transfer)  stand pivot/stand step  -AF      [place] Level (Toilet Transfer)  minimum assist (75% patient effort);verbal cues  -AF      Assistive Device (Toilet Transfer)  commode;grab bars/safety frame;wheelchair  -AF      Recorded by [AF] Lolis Cooley OTR      Row Name 12/06/18 1528             Shower Transfer    Type (Shower Transfer)  stand pivot/stand step  -AF      [place] Level (Shower Transfer)  minimum assist (75% patient effort);verbal cues  -AF      Assistive Device (Shower Transfer)  grab bars/tub rail;tub bench;wheelchair  -AF      Recorded by [AF] Lolis Cooley OTR      Row Name 12/06/18 0900             Gait/Stairs Assessment/Training    [place]  Level (Gait)  minimum assist (75% patient effort);verbal cues  -EE      Assistive Device (Gait)  walker, front-wheeled  -EE      Distance in Feet (Gait)  160, 80 x 2  -EE      Pattern (Gait)  step-through  -EE      Deviations/Abnormal Patterns (Gait)  ataxic;sarah decreased;stride length decreased  -EE      Bilateral Gait Deviations  weight shift ability decreased;forward flexed posture;heel strike decreased  -EE      Recorded by [EE] Cathleen Rosario, HAILEY      Row Name 12/06/18 0900             Wheelchair Mobility/Management    Method of Wheelchair Locomotion (Mobility)  bipedal (lower extremity) propulsion  -EE      Forward Propulsion [place] (Wheelchair)  supervision  -EE      Steering [place] (Wheelchair)  supervision  -EE      Turning [place] (Wheelchair)  supervision  -EE      Doorway Navigation [place] (Wheelchair)  supervision  -EE      Distance Propelled in Feet (Wheelchair)  160  -EE      Recorded by [EE] Cathleen Rosario, HAILEY      Row Name 12/06/18 0900             Safety Issues, Functional Mobility    Impairments Affecting Function (Mobility)  balance;coordination;endurance/activity tolerance;strength;pain;sensation/sensory awareness  -EE      Recorded by [EE] Cathleen Rosario, HAILEY      Row Name 12/06/18 1528             Bathing Assessment/Treatment    Bathing [place] Level  bathing skills;minimum assist (75% patient effort);verbal cues  -AF      Bathing Position  supported sitting;supported standing  -AF      Bathing Setup Assistance  obtain supplies  -AF      Comment (Bathing)  with LH sponge  -AF      Recorded by [AF] Lolis Cooley OTR      Row Name 12/06/18 1528             Upper Body Dressing Assessment/Treatment    Upper Body Dressing Task  upper body dressing skills;minimum assist (75% or more patient effort);verbal cues  -AF      Upper Body Dressing Position  supported sitting  -AF      Set-up Assistance (Upper Body Dressing)  obtain clothing  -AF      Recorded by [AF] Yasir  NURIS Ortiz      Row Name 12/06/18 1528             Lower Body Dressing Assessment/Treatment    Lower Body Dressing [place] Level  doff;don;pants/bottoms;shoes/slippers;socks;underwear;moderate assist (50% patient effort);minimum assist (75% patient effort);verbal cues  -AF      Lower Body Dressing Position  supported standing;unsupported sitting  -AF      Lower Body Dressing Setup Assistance  obtain clothing  -AF      Comment (Lower Body Dressing)  LH shoe horn  -AF      Recorded by [AF] Lolis Cooley OTR      Row Name 12/06/18 1528             Grooming Assessment/Treatment    Grooming [place] Level  grooming skills;supervision;minimum assist (75% patient effort)  -AF      Grooming Position  sink side;supported sitting  -AF      Grooming Setup Assistance  obtain supplies  -AF      Comment (Grooming)  MIN to comb hair  -AF      Recorded by [AF] Lolis Cooley OTR      Row Name 12/06/18 1528             Toileting Assessment/Treatment    Toileting [place] Level  toileting skills;verbal cues;minimum assist (75% patient effort)  -AF      Assistive Device Use (Toileting)  grab bar/safety frame;raised toilet seat  -AF      Toileting Position  supported standing;unsupported sitting  -AF      Recorded by [AF] Lolis Cooley OTR      Row Name 12/06/18 1528 12/06/18 0900          Pain Scale: Numbers Pre/Post-Treatment    Pain Scale: Numbers, Pretreatment  4/10  -AF  5/10  -EE     Pain Scale: Numbers, Post-Treatment  4/10  -AF  5/10  -EE     Pain Location - Orientation  --  incisional  -EE     Pain Location  neck  -AF  neck  -EE     Pre/Post Treatment Pain Comment  --  tolerated tx  -EE     Pain Intervention(s)  Repositioned;Rest  -AF  Repositioned;Medication (See MAR)  -EE     Recorded by [AF] Lolis Cooley OTR [EE] Cathleen Rosario PT     Row Name 12/06/18 0900             Balance    Balance  dynamic balance activity  -EE      Additional Documentation  Balance (Row)  -EE      Recorded by [EE]  Abraham Cathleen, PT      Row Name 12/06/18 0900             Dynamic Balance Activity    Therapeutic Training Performed (Dynamic Balance)  side stepping  -EE      Support Needed for Balance (Dynamic Balance Training)  uses both upper extremities for support;CGA;minimal external support for balance, 75% patient effort  -EE      Comment (Dynamic Balance Training)  2 x 8'  -EE      Recorded by [EE] AbrahamCathleen, HAILEY      Row Name 12/06/18 1609 12/06/18 0900          Therapeutic Exercise    Therapeutic Exercise  neuromuscular re-education  -SO  standing, lower extremities  -EE     Recorded by [SO] Sherrie Nguyen OTR [EE] AbrahamKeiarCathleen, HAILEY     Row Name 12/06/18 1609             Upper Extremity Seated Therapeutic Exercise    Comment, Seated Upper Extremity (Therapeutic Exercise)  Table slides on incline 10x2  -SO      Recorded by [SO] Sherrie Nguyen OTR      Row Name 12/06/18 0900             Lower Extremity Standing Therapeutic Exercise    Performed, Standing Lower Extremity (Therapeutic Exercise)  heel raises;hip/knee flexion/extension  -EE      Device, Standing Lower Extremity (Therapeutic Exercise)  jacob bars B UE support  -EE      Exercise Type, Standing Lower Extremity (Therapeutic Exercise)  AROM (active range of motion)  -EE      Sets/Reps Detail, Standing Lower Extremity (Therapeutic Exercise)  1/10  -EE      Recorded by [EE] Cathleen Rosario, PT      Row Name 12/06/18 0900             Lower Extremity Seated Therapeutic Exercise    Performed, Seated Lower Extremity (Therapeutic Exercise)  hip flexion/extension;hip abduction/adduction;LAQ (long arc quad), knee extension;knee flexion/extension  -EE      Device, Seated Lower Extremity (Therapeutic Exercise)  elastic bands/tubing;free weights, cuff;small ball red theraband, 1.5# weights  -EE      Exercise Type, Seated Lower Extremity (Therapeutic Exercise)  resistive exercise  -EE      Sets/Reps Detail, Seated Lower Extremity (Therapeutic Exercise)  2/15   -EE      Recorded by [EE] Cathleen Rosario, PT      Row Name 12/06/18 1609             Neuromuscular Re-education    Comment (Neuromuscular Re-education)  Pt performs FMC task with clothespins with BUE; pt shows weaker lat pinch of R hand. Pt performs 1.5# gripper with BEU.  -SO      Recorded by [SO] Sherrie Nguyen OTR      Row Name 12/06/18 1609 12/06/18 1528 12/06/18 0900       Positioning and Restraints    Pre-Treatment Position  sitting in chair/recliner  -SO  in bed  -AF  in bed  -EE    Post Treatment Position  wheelchair  -SO  bed  -AF  bed  -EE    In Bed  --  sitting EOB;exit alarm on;encouraged to call for assist;call light within reach  -AF  fowlers;call light within reach;encouraged to call for assist;exit alarm on  -EE    In Wheelchair  sitting;with PT  -SO  --  --    Recorded by [SO] Sherrie Nguyen OTR [AF] Lolis Cooley OTR [EE] Cathleen Rosario, HAILEY    Row Name 12/06/18 0800             Weekly Summary of Progress (PT)    Weekly Outcome Summary: Physical Therapy  Pt demonstrates good progress in PT with functional mobility. He is demonstrating improved B LE strength, endurance, and balance, as well as increased independence with functional mobility. Pt now performing most tasks with min A and RWX; continues to have increased difficulty with bed mobility due to UE weakness. Pt would benefit from continued PT to further address impairments in order to reach long term goals, increase independence, and prepare for DC home.   -EE      Recorded by [EE] Cathleen Rosario PT        User Key  (r) = Recorded By, (t) = Taken By, (c) = Cosigned By    Initials Name Effective Dates    SO Sherrie Nguyen OTR 04/13/15 -     EE Cathleen Rosario PT 04/03/18 -     Lolis Horne OTR 04/03/18 -           Wound 11/27/18 1318 Other (See comments) neck incision (Active)   Dressing Appearance intact;dry;open to air 12/6/2018  8:00 AM   Closure Adhesive closure strips 12/6/2018  8:00 AM   Base clean;dry  12/6/2018  8:00 AM   Periwound dry;intact 12/5/2018  9:26 PM   Drainage Amount none 12/6/2018  8:00 AM   Dressing Care, Wound open to air 12/6/2018  8:00 AM         OT Recommendation and Plan                 OT IRF GOALS     Row Name 12/01/18 1000             Transfer Goal 1 (OT-IRF)    Activity/Assistive Device (Transfer Goal 1, OT-IRF)  toilet  -RP      [place] Level (Transfer Goal 1, OT-IRF)  minimum assist (75% or more patient effort)  -RP      Time Frame (Transfer Goal 1, OT-IRF)  short term goal (STG)  -RP      Progress/Outcomes (Transfer Goal 1, OT-IRF)  goal ongoing  -RP         Transfer Goal 2 (OT-IRF)    Activity/Assistive Device (Transfer Goal 2, OT-IRF)  toilet  -RP      [place] Level (Transfer Goal 2, OT-IRF)  supervision required  -RP      Time Frame (Transfer Goal 2, OT-IRF)  long term goal (LTG)  -RP      Progress/Outcomes (Transfer Goal 2, OT-IRF)  goal ongoing  -RP         Bathing Goal 1 (OT-IRF)    Activity/Device (Bathing Goal 1, OT-IRF)  bathing skills, all  -RP      [place] Level (Bathing Goal 1, OT-IRF)  moderate assist (50-74% patient effort)  -RP      Time Frame (Bathing Goal 1, OT-IRF)  short term goal (STG)  -RP      Progress/Outcomes (Bathing Goal 1, OT-IRF)  goal ongoing  -RP         Bathing Goal 2 (OT-IRF)    Activity/Device (Bathing Goal 2, OT-IRF)  bathing skills, all  -RP      [place] Level (Bathing Goal 2, OT-IRF)  supervision required;standby assist  -RP      Time Frame (Bathing Goal 2, OT-IRF)  long term goal (LTG)  -RP      Progress/Outcomes (Bathing Goal 2, OT-IRF)  goal ongoing  -RP         UB Dressing Goal 1 (OT-IRF)    Activity/Device (UB Dressing Goal 1, OT-IRF)  upper body dressing  -RP      [place] (UB Dress Goal 1, OT-IRF)  minimum assist (75% or more patient effort)  -RP      Time Frame (UB Dressing Goal 1, OT-IRF)  short term goal (STG)  -RP      Progress/Outcomes (UB Dressing Goal 1, OT-IRF)  goal ongoing  -RP         UB Dressing Goal 2  (OT-IRF)    Activity/Device (UB Dressing Goal 2, OT-IRF)  upper body dressing  -RP      [place] (UB Dress Goal 2, OT-IRF)  supervision required  -RP      Time Frame (UB Dressing Goal 2, OT-IRF)  long term goal (LTG)  -RP      Progress/Outcomes (UB Dressing Goal 2, OT-IRF)  goal ongoing  -RP         LB Dressing Goal 1 (OT-IRF)    Activity/Device (LB Dressing Goal 1, OT-IRF)  lower body dressing  -RP      [place] (LB Dressing Goal 1, OT-IRF)  maximum assist (25-49% patient effort)  -RP      Time Frame (LB Dressing Goal 1, OT-IRF)  short term goal (STG)  -RP      Progress/Outcomes (LB Dressing Goal 1, OT-IRF)  goal ongoing  -RP         LB Dressing Goal 2 (OT-IRF)    Activity/Device (LB Dressing Goal 2, OT-IRF)  lower body dressing  -RP      [place] (LB Dressing Goal 2, OT-IRF)  supervision required;standby assist  -RP      Time Frame (LB Dressing Goal 2, OT-IRF)  long term goal (LTG)  -RP      Progress/Outcomes (LB Dressing Goal 2, OT-IRF)  goal ongoing  -RP         Grooming Goal 1 (OT-IRF)    Activity/Device (Grooming Goal 1, OT-IRF)  grooming skills, all  -RP      [place] (Grooming Goal 1, OT-IRF)  minimum assist (75% or more patient effort)  -RP      Time Frame (Grooming Goal 1, OT-IRF)  short term goal (STG)  -RP      Progress/Outcomes (Grooming Goal 1, OT-IRF)  goal ongoing  -RP         Grooming Goal 2 (OT-IRF)    Activity/Device (Grooming Goal 2, OT-IRF)  grooming skills, all  -RP      [place] (Grooming Goal 2, OT-IRF)  supervision required  -RP      Time Frame (Grooming Goal 2, OT-IRF)  long term goal (LTG)  -RP      Progress/Outcomes (Grooming Goal 2, OT-IRF)  goal ongoing  -RP         Toileting Goal 1 (OT-IRF)    Activity/Device (Toileting Goal 1, OT-IRF)  toileting skills, all  -RP      [place] Level (Toileting Goal 1, OT-IRF)  maximum assist (25-49% patient effort)  -RP      Time Frame (Toileting Goal 1, OT-IRF)  short term goal (STG)  -RP      Progress/Outcomes (Toileting Goal  1, OT-IRF)  goal ongoing  -RP         Toileting Goal 2 (OT-IRF)    Activity/Device (Toileting Goal 2, OT-IRF)  toileting skills, all  -RP      [place] Level (Toileting Goal 2, OT-IRF)  supervision required;standby assist  -RP      Time Frame (Toileting Goal 2, OT-IRF)  long term goal (LTG)  -RP      Progress/Outcomes (Toileting Goal 2, OT-IRF)  goal ongoing  -RP         ROM Goal 1 (OT-IRF)    ROM Goal 1 (OT-IRF)  Pt will increase B UE ROM to approx. 3/4 shoulder flexion to assist w/ ADLs.  -RP      Time Frame (ROM Goal 1, OT-IRF)  long term goal (LTG)  -RP      Progress/Outcomes (ROM Goal 1, OT-IRF)  goal ongoing  -RP        User Key  (r) = Recorded By, (t) = Taken By, (c) = Cosigned By    Initials Name Provider Type    RP Precious De Leon, OT Occupational Therapist                 Time Calculation:     Time Calculation- OT     Row Name 12/06/18 1614 12/06/18 1532          Time Calculation- OT    OT Start Time  1230  -SO  1030  -AF     OT Stop Time  1300  -SO  1130  -AF     OT Time Calculation (min)  30 min  -SO  60 min  -AF     OT Received On  12/06/18  -SO  --       User Key  (r) = Recorded By, (t) = Taken By, (c) = Cosigned By    Initials Name Provider Type    Sherrie Corado OTJEANINE Occupational Therapist    AF Lolis Cooley OTR Occupational Therapist          Therapy Suggested Charges     Code   Minutes Charges    None              Therapy Charges for Today     Code Description Service Date Service Provider Modifiers Qty    07170233945 HC OT NEUROMUSC RE EDUCATION EA 15 MIN 12/6/2018 Sherrie Nguyen OTR GO 2                   NURIS Ashley  12/6/2018

## 2018-12-06 NOTE — PLAN OF CARE
Problem: Patient Care Overview  Goal: Plan of Care Review  Outcome: Ongoing (interventions implemented as appropriate)   12/06/18 0454   Patient Care Overview   IRF Plan of Care Review progress ongoing, continue   Progress, Functional Goals demonstrating adequate progress   Coping/Psychosocial   Plan of Care Reviewed With patient;spouse   OTHER   Outcome Summary Pt. is pleasant and cooperative with staff. Complained of any pain to neck, bilateral shoulders and back. Norco 2 tab PO PRN given and relieved well. Flexeril 10 mg PO PRN given per pt. required. BG is 263. Insulin 6 units given at night. Uses CPAP at night. Spouse at bedside. No unsafe behavior to note at this time. Will continue to monitor.     Goal: Coping Plan  Outcome: Ongoing (interventions implemented as appropriate)   12/06/18 0454   Coping Plan   Demonstration of Effective Coping Strategies progressing to functional independence       Problem: Fall Risk (Adult)  Goal: Absence of Fall  Outcome: Ongoing (interventions implemented as appropriate)   12/06/18 0454   Fall Risk (Adult)   Absence of Fall making progress toward outcome       Problem: Pain, Acute (Adult)  Goal: Acceptable Pain Control/Comfort Level  Outcome: Ongoing (interventions implemented as appropriate)   12/06/18 0454   Pain, Acute (Adult)   Acceptable Pain Control/Comfort Level making progress toward outcome       Problem: Skin Injury Risk (Adult)  Goal: Skin Health and Integrity  Outcome: Ongoing (interventions implemented as appropriate)   12/06/18 0454   Skin Injury Risk (Adult)   Skin Health and Integrity making progress toward outcome

## 2018-12-07 LAB
GLUCOSE BLDC GLUCOMTR-MCNC: 111 MG/DL (ref 70–130)
GLUCOSE BLDC GLUCOMTR-MCNC: 129 MG/DL (ref 70–130)
GLUCOSE BLDC GLUCOMTR-MCNC: 178 MG/DL (ref 70–130)
GLUCOSE BLDC GLUCOMTR-MCNC: 183 MG/DL (ref 70–130)

## 2018-12-07 PROCEDURE — 82962 GLUCOSE BLOOD TEST: CPT

## 2018-12-07 PROCEDURE — 97110 THERAPEUTIC EXERCISES: CPT

## 2018-12-07 PROCEDURE — 63710000001 INSULIN LISPRO (HUMAN) PER 5 UNITS: Performed by: HOSPITALIST

## 2018-12-07 PROCEDURE — 97535 SELF CARE MNGMENT TRAINING: CPT

## 2018-12-07 PROCEDURE — 97112 NEUROMUSCULAR REEDUCATION: CPT

## 2018-12-07 RX ORDER — UREA 10 %
3 LOTION (ML) TOPICAL NIGHTLY PRN
Status: DISCONTINUED | OUTPATIENT
Start: 2018-12-07 | End: 2018-12-19 | Stop reason: HOSPADM

## 2018-12-07 RX ADMIN — GLIPIZIDE 10 MG: 10 TABLET ORAL at 06:18

## 2018-12-07 RX ADMIN — LIDOCAINE 2 PATCH: 50 PATCH CUTANEOUS at 08:54

## 2018-12-07 RX ADMIN — CYCLOBENZAPRINE 10 MG: 10 TABLET, FILM COATED ORAL at 11:51

## 2018-12-07 RX ADMIN — RIVAROXABAN 20 MG: 20 TABLET, FILM COATED ORAL at 16:46

## 2018-12-07 RX ADMIN — CYCLOBENZAPRINE 10 MG: 10 TABLET, FILM COATED ORAL at 02:07

## 2018-12-07 RX ADMIN — INSULIN LISPRO 2 UNITS: 100 INJECTION, SOLUTION INTRAVENOUS; SUBCUTANEOUS at 20:18

## 2018-12-07 RX ADMIN — HYDROCODONE BITARTRATE AND ACETAMINOPHEN 2 TABLET: 7.5; 325 TABLET ORAL at 02:06

## 2018-12-07 RX ADMIN — DULOXETINE HYDROCHLORIDE 60 MG: 60 CAPSULE, DELAYED RELEASE ORAL at 08:53

## 2018-12-07 RX ADMIN — METFORMIN HYDROCHLORIDE 1000 MG: 1000 TABLET ORAL at 16:47

## 2018-12-07 RX ADMIN — METFORMIN HYDROCHLORIDE 1000 MG: 1000 TABLET ORAL at 08:53

## 2018-12-07 RX ADMIN — METOPROLOL TARTRATE 25 MG: 25 TABLET ORAL at 08:54

## 2018-12-07 RX ADMIN — FAMOTIDINE 20 MG: 20 TABLET, FILM COATED ORAL at 08:53

## 2018-12-07 RX ADMIN — HYDROCODONE BITARTRATE AND ACETAMINOPHEN 2 TABLET: 7.5; 325 TABLET ORAL at 16:12

## 2018-12-07 RX ADMIN — ATOMOXETINE 100 MG: 60 CAPSULE ORAL at 08:52

## 2018-12-07 RX ADMIN — HYDROCODONE BITARTRATE AND ACETAMINOPHEN 2 TABLET: 7.5; 325 TABLET ORAL at 20:10

## 2018-12-07 RX ADMIN — CYCLOBENZAPRINE 10 MG: 10 TABLET, FILM COATED ORAL at 20:10

## 2018-12-07 RX ADMIN — INSULIN LISPRO 2 UNITS: 100 INJECTION, SOLUTION INTRAVENOUS; SUBCUTANEOUS at 11:51

## 2018-12-07 RX ADMIN — METOPROLOL TARTRATE 25 MG: 25 TABLET ORAL at 20:10

## 2018-12-07 RX ADMIN — PIOGLITAZONE 15 MG: 15 TABLET ORAL at 08:53

## 2018-12-07 RX ADMIN — HYDROCODONE BITARTRATE AND ACETAMINOPHEN 2 TABLET: 7.5; 325 TABLET ORAL at 11:51

## 2018-12-07 RX ADMIN — HYDROCODONE BITARTRATE AND ACETAMINOPHEN 2 TABLET: 7.5; 325 TABLET ORAL at 06:18

## 2018-12-07 NOTE — PROGRESS NOTES
Inpatient Rehabilitation Functional Measures Assessment    Functional Measures  [place] Eating:  Branch  [facility] Grooming: Branch  [facility] Bathing:  Branch  [facility] Upper Body Dressing:  Branch  [facility] Lower Body Dressing:  [facility] Toileting:  [facility] Bladder Management  Level of Assistance:  [place]  Frequency/Number of Accidents this Shift:  [facility] Bowel Management  Level of Assistance: [place]  Frequency/Number of Accidents this Shift: Branch    [facility] Bed/Chair/Wheelchair Transfer:  Bed/chair/wheelchair Transfer Score = 4.  Patient performs 75% or more of effort and minimal assistance (little/incidental  help/lifting of one limb/steadying) for transferring to and from the  bed/chair/wheelchair, requiring: Patient requires the following assistive  device(s): Walker.  [place] Toilet Transfer:  [facility] Tub/Shower Transfer:  [place]    Previously Documented Mode of Locomotion at Discharge: Field  [place] Expected Mode of Locomotion at Discharge: [facility] Walk/Wheelchair:  WHEELCHAIR OBSERVATION   Wheelchair did not occur.    WALK OBSERVATION   Walk Distance Scale = 2.  Distance walked is 50 -149 feet. Walk Score = 2.  Patient performs 75% or more of effort and requires minimal assistance.  Incidental assistance, contact guard or steadying was provided. Patient walked a  distance of 80 feet. Patient requires the following assistive device(s): Rolling  walker.  [place] Stairs:  Stairs did not occur because activity was unsafe for patient.    [place] Comprehension:  [facility] Expression:  [facility] Social Interaction:  [facility] Problem Solving:  [facility] Memory:  [place]    Therapy Mode Minutes  Occupational Therapy: [place]  Physical Therapy: Individual: 90 minutes.  Speech Language Pathology:  [place]    Signed by: Cathleen Rosario DPT

## 2018-12-07 NOTE — PLAN OF CARE
Problem: Fall Risk (Adult)  Goal: Absence of Fall   12/07/18 0008   Fall Risk (Adult)   Absence of Fall making progress toward outcome       Problem: Skin Injury Risk (Adult)  Goal: Skin Health and Integrity   12/07/18 0008   Skin Injury Risk (Adult)   Skin Health and Integrity making progress toward outcome

## 2018-12-07 NOTE — THERAPY TREATMENT NOTE
Inpatient Rehabilitation - Physical Therapy Treatment Note  [facility]     Patient Name: Duane Mark Witten  : 1955  MRN: [id number]    Today's Date: 2018                 Admit Date: 2018      Visit Dx:      ICD-10-CM ICD-9-CM   1. Cervical stenosis of spinal canal M48.02 723.0   2. Weakness of both lower extremities R29.898 729.89   3. Paresthesia of both upper extremities R20.2 782.0    M79.601 729.5    M79.602    4. Morbid obesity with BMI of 40.0-44.9, adult (CMS/[facility]) E66.01 278.01    Z68.41 V85.41   5. Impaired functional mobility, balance, gait, and endurance Z74.09 V49.89   6. Functional gait abnormality R26.89 781.2       Patient Active Problem List   Diagnosis   • YANELI (generalized anxiety disorder)   • ADD (attention deficit disorder)   • Depression   • Diabetes type 2, controlled (CMS/[facility])   • ED (erectile dysfunction) of non-organic origin   • HLD (hyperlipidemia)   • Essential hypertension   • Arthritis   • Psoriasis   • Testosterone deficiency   • Primary insomnia   • Weakness of both lower extremities   • Paresthesia of both upper extremities   • Morbid obesity with BMI of 40.0-44.9, adult (CMS/[facility])   • Adverse effect of corticosteroids   • Type 2 diabetes mellitus with hyperglycemia (CMS/[facility])   • Cervical stenosis of spinal canal   • Edema of cervical spinal cord (CMS/[facility])   • Postoperative atrial fibrillation (CMS/[facility])   • Cervical myelopathy (CMS/[facility])       Therapy Treatment    IRF Treatment Summary     Row Name 18 1209 18 0900          Evaluation/Treatment Time and Intent    Subjective Information  complains of;pain;fatigue  -AF  complains of;pain  -EE     Existing Precautions/Restrictions  fall;spinal brace for comfort  -AF  fall;spinal  -EE     Document Type  therapy note (daily note)  -AF  therapy note (daily note)  -EE     Mode of Treatment  occupational therapy  -AF  physical therapy  -EE     Patient/Family Observations  sitting up in bed in room  -AF  Pt up in  BR prior to am session  -EE     Recorded by [AF] Lolis Cooley OTR [EE] Cathleen Rosario, HAILEY     Row Name 12/07/18 1209 12/07/18 0900          Cognition/Psychosocial- PT/OT    Affect/Mental Status (Cognitive)  WFL  -AF  WFL  -EE     Orientation Status (Cognition)  oriented x 4  -AF  oriented x 4  -EE     Follows Commands (Cognition)  WFL  -AF  WFL  -EE     Personal Safety Interventions  fall prevention program maintained;gait belt;nonskid shoes/slippers when out of bed  -AF  fall prevention program maintained;gait belt;muscle strengthening facilitated;nonskid shoes/slippers when out of bed;supervised activity  -EE     Recorded by [AF] Lolis Cooley OTR [EE] Cathleen Rosario, HAILEY     Row Name 12/07/18 1209 12/07/18 0900          Bed Mobility Assessment/Treatment    Supine-Sit [place] (Bed Mobility)  contact guard;verbal cues  -AF  contact guard;verbal cues  -EE     Sit-Supine [place] (Bed Mobility)  --  contact guard;verbal cues  -EE     Assistive Device (Bed Mobility)  --  head of bed elevated  -EE     Comment (Bed Mobility)  --  performed on mat table without bed rails; head elevated on foam wedge  -EE     Recorded by [AF] Lolis Cooley OTR [EE] Cathleen Rosario, PT     Row Name 12/07/18 1209             Transfer Assessment/Treatment    Comment (Transfers)  sit to stand at sink with MIN A, decreased propreception with coming to stand pushes legs against w/c and will move w/c not realizing it  -AF      Recorded by [AF] Lolis Cooley OTR      Row Name 12/07/18 1209 12/07/18 0900          Bed-Chair Transfer    Bed-Chair [place] (Transfers)  minimum assist (75% patient effort);verbal cues  -AF  minimum assist (75% patient effort);verbal cues  -EE     Assistive Device (Bed-Chair Transfers)  wheelchair;walker, front-wheeled  -AF  walker, front-wheeled  -EE     Recorded by [AF] Lolis Cooley OTR [EE] Cathleen Rosario, HAILEY     Row Name 12/07/18 0900             Chair-Bed Transfer    Chair-Bed  [place] (Transfers)  minimum assist (75% patient effort);verbal cues  -EE      Assistive Device (Chair-Bed Transfers)  walker, front-wheeled  -EE      Recorded by [EE] Cathleen Rosario, HAILEY      Row Name 12/07/18 0900             Sit-Stand Transfer    Sit-Stand [place] (Transfers)  minimum assist (75% patient effort)  -EE      Assistive Device (Sit-Stand Transfers)  walker, front-wheeled  -EE      Recorded by [EE] Cathleen Rosario PT      Row Name 12/07/18 0900             Stand-Sit Transfer    Stand-Sit [place] (Transfers)  minimum assist (75% patient effort);verbal cues  -EE      Assistive Device (Stand-Sit Transfers)  walker, front-wheeled  -EE      Recorded by [EE] Cathleen Rosario PT      Row Name 12/07/18 0900             Gait/Stairs Assessment/Training    [place] Level (Gait)  minimum assist (75% patient effort);verbal cues  -EE      Assistive Device (Gait)  walker, front-wheeled  -EE      Distance in Feet (Gait)  80 x 3  -EE      Pattern (Gait)  step-through  -EE      Deviations/Abnormal Patterns (Gait)  ataxic;sarah decreased;stride length decreased  -EE      Bilateral Gait Deviations  heel strike decreased;forward flexed posture  -EE      Right Sided Gait Deviations  foot drop/toe drag slight foot drag at times  -EE      Recorded by [EE] Cathleen Rosario, PT      Row Name 12/07/18 1209             Bathing Assessment/Treatment    Bathing [place] Level  bathing skills;minimum assist (75% patient effort);verbal cues  -AF      Bathing Position  supported sitting;supported standing  -AF      Bathing Setup Assistance  obtain supplies;adjust water temperature  -AF      Comment (Bathing)  at sink  -AF      Recorded by [AF] Lolis Cooley OTR      Row Name 12/07/18 1209             Upper Body Dressing Assessment/Treatment    Upper Body Dressing Task  upper body dressing skills;minimum assist (75% or more patient effort);verbal cues  -AF      Upper Body Dressing Position  supported sitting  -AF       Set-up Assistance (Upper Body Dressing)  obtain clothing  -AF      Comment (Upper Body Dressing)  w/c level  -AF      Recorded by [AF] Lolis Cooley OTR      Row Name 12/07/18 1209             Lower Body Dressing Assessment/Treatment    Lower Body Dressing [place] Level  doff;don;pants/bottoms;shoes/slippers;socks;underwear;moderate assist (50% patient effort);verbal cues  -AF      Lower Body Dressing Position  supported sitting;supported standing  -AF      Lower Body Dressing Setup Assistance  obtain clothing  -AF      Comment (Lower Body Dressing)  LH shoe horn  -AF      Recorded by [AF] Lolis Cooley OTR      Row Name 12/07/18 1209             Grooming Assessment/Treatment    Grooming [place] Level  grooming skills;minimum assist (75% patient effort);supervision  -AF      Grooming Position  sink side;supported sitting  -AF      Grooming Setup Assistance  obtain supplies  -AF      Comment (Grooming)  MIN to comb hair   -AF      Recorded by [AF] Lolis Cooley OTR      Row Name 12/07/18 1209             Fine Motor Testing & Training    Comment, Fine Motor Coordination  theraputty manipulation [place] to increase sensory awareness and strength for ADL tasks   -AF      Recorded by [AF] Lolis Cooley OTR      Row Name 12/07/18 1209 12/07/18 0900          Pain Scale: Numbers Pre/Post-Treatment    Pain Scale: Numbers, Pretreatment  4/10  -AF  4/10  -EE     Pain Scale: Numbers, Post-Treatment  4/10  -AF  --     Pain Location - Orientation  incisional  -AF  --     Pain Location  neck  -AF  neck  -EE     Pre/Post Treatment Pain Comment  stated that he is very tired today  -AF  tolerated tx  -EE     Pain Intervention(s)  --  Repositioned;Medication (See MAR);Ambulation/increased activity  -EE     Recorded by [AF] Lolis Cooley, OTR [EE] Cathleen Rosario PT     Row Name 12/07/18 0900             Balance    Balance  standing balance activity  -EE      Recorded by [EE] Cathleen Rosario, HAILEY      Row Name  12/07/18 0900             Standing Balance Activity    Activities Performed (Standing, Balance Training)  standing reaching outside base of support  -EE      Support Needed for Balance (Standing, Balance Training)  uses at least one upper extremity for support;CGA  -EE      Comment (Standing, Balance Training)  reaching for rings w/B UE x 5 min; 1 UE supported on table  -EE      Recorded by [EE] Cathleen Rosario, HAILEY      Row Name 12/07/18 0900             Lower Extremity Seated Therapeutic Exercise    Performed, Seated Lower Extremity (Therapeutic Exercise)  hip flexion/extension;hip abduction/adduction;knee flexion/extension;ankle dorsiflexion/plantarflexion;LAQ (long arc quad), knee extension  -EE      Device, Seated Lower Extremity (Therapeutic Exercise)  elastic bands/tubing;free weights, cuff;small ball red tband, 1.5# weights  -EE      Exercise Type, Seated Lower Extremity (Therapeutic Exercise)  resistive exercise  -EE      Sets/Reps Detail, Seated Lower Extremity (Therapeutic Exercise)  1/15  -EE      Recorded by [EE] Cathleen Rosario, PT      Row Name 12/07/18 0900             Lower Extremity Supine Therapeutic Exercise    Performed, Supine Lower Extremity (Therapeutic Exercise)  hip abduction/adduction;SAQ (short arc quad) over bolster;ankle dorsiflexion/plantarflexion;heel slides;bridging (bilateral w/bolster)  -EE      Device, Supine Lower Extremity (Therapeutic Exercise)  elastic bands/tubing;free weights, cuff red tband, 1.5# weights  -EE      Exercise Type, Supine Lower Extremity (Therapeutic Exercise)  resistive exercise  -EE      Sets/Reps Detail, Supine Lower Extremity (Therapeutic Exercise)  1/15  -EE      Recorded by [EE] Cathleen Rosario, PT      Row Name 12/07/18 1209             Neuromuscular Re-education    Comment (Neuromuscular Re-education)  table slides on flat and slightly angled surface shoulder flex/ext and IR/ER  -AF      Recorded by [AF] Lolis Cooley OTR      Row Name 12/07/18 1201  12/07/18 0900          Positioning and Restraints    Pre-Treatment Position  in bed  -AF  bathroom  -EE     Post Treatment Position  wheelchair  -AF  wheelchair  -EE     In Wheelchair  sitting;call light within reach;encouraged to call for assist;exit alarm on;notified nsg set up with lunch tray  -AF  sitting;call light within reach;encouraged to call for assist  -EE     Recorded by [AF] Lolis Cooley, OTR [EE] Cathleen Rosario, PT       User Key  (r) = Recorded By, (t) = Taken By, (c) = Cosigned By    Initials Name Effective Dates    EE Cathleen Rosario, HAILEY 04/03/18 -     AF Lolis Cooley, OTR 04/03/18 -         Wound 11/27/18 1318 Other (See comments) neck incision (Active)   Dressing Appearance intact;dry;open to air 12/6/2018  8:15 PM   Closure Liquid skin adhesive;Adhesive closure strips 12/7/2018  7:19 AM   Periwound dry;intact 12/7/2018  7:19 AM   [place] Temperature warm 12/6/2018  8:15 PM   Drainage Amount none 12/7/2018  7:19 AM   Dressing Care, Wound open to air 12/6/2018  8:15 PM     Physical Therapy Education     Title: PT OT SLP Therapies (In Progress)     Topic: Physical Therapy (Done)     Point: Mobility training (Done)     Learning Progress Summary           Patient Acceptance, E,TB,D, VU,NR by EE at 12/7/2018  9:22 AM    Acceptance, E,TB, VU,NR by EE at 12/6/2018  9:15 AM    Acceptance, E,TB, VU,NR by EE at 12/5/2018 10:10 AM    Acceptance, E, NR by LB at 12/4/2018 10:12 AM    Acceptance, E,TB, VU,NR by EE at 12/3/2018  9:48 AM    Acceptance, E, VU,NR by JK at 12/1/2018  9:50 AM                   Point: Home exercise program (Done)     Learning Progress Summary           Patient Acceptance, E,TB,D, VU,NR by EE at 12/7/2018  9:22 AM    Acceptance, E,TB, VU,NR by EE at 12/6/2018  9:15 AM    Acceptance, E,TB, VU,NR by EE at 12/5/2018 10:10 AM    Acceptance, E,TB, VU,NR by EE at 12/3/2018  9:48 AM                   Point: Body mechanics (Done)     Learning Progress Summary           Patient  Acceptance, E,TB,D, VU,NR by EE at 12/7/2018  9:22 AM    Acceptance, E,TB, VU,NR by EE at 12/6/2018  9:15 AM    Acceptance, E,TB, VU,NR by EE at 12/5/2018 10:10 AM    Acceptance, E,TB, VU,NR by EE at 12/3/2018  9:48 AM                   Point: Precautions (Done)     Learning Progress Summary           Patient Acceptance, E,TB,D, VU,NR by EE at 12/7/2018  9:22 AM    Acceptance, E,TB, VU,NR by EE at 12/6/2018  9:15 AM    Acceptance, E,TB, VU,NR by EE at 12/5/2018 10:10 AM    Acceptance, E,TB, VU,NR by EE at 12/3/2018  9:48 AM    Acceptance, E, VU,NR by JK at 12/1/2018  9:50 AM                               User Key     Initials Effective Dates Name Provider Type Discipline    LB 03/07/18 -  Odette Orourke PTA Physical Therapy Assistant PT    EE 04/03/18 -  Cathleen Rosario, PT Physical Therapist PT    JK 04/03/18 -  Janae Trujillo, PT Physical Therapist PT                  PT Recommendation and Plan                        Time Calculation:     PT Charges     Row Name 12/07/18 1350 12/07/18 1035          Time Calculation    Start Time  1300  -EE  0900  -EE     Stop Time  1330  -EE  1000  -EE     Time Calculation (min)  30 min  -EE  60 min  -EE     PT Received On  12/07/18  -EE  12/07/18  -EE     PT - Next Appointment  12/08/18  -EE  12/07/18  -EE       User Key  (r) = Recorded By, (t) = Taken By, (c) = Cosigned By    Initials Name Provider Type    EE Cathleen Rosario, PT Physical Therapist            Therapy Charges for Today     Code Description Service Date Service Provider Modifiers Qty    89776709044 HC PT THER PROC EA 15 MIN 12/6/2018 Cathleen Rosario, PT GP 6    [id number] HC PT THER PROC EA 15 MIN 12/7/2018 Cathleen Rosario, PT GP 6                   Cathleen Rosario, PT  12/7/2018

## 2018-12-07 NOTE — PROGRESS NOTES
Inpatient Rehabilitation Functional Measures Assessment    Functional Measures  [place] Eating:  [facility] Grooming: [facility] Bathing:  [facility] Upper Body Dressing:  [facility] Lower Body Dressing:  [facility] Toileting:  [facility] Bladder Management  Level of Assistance:  [place]  Frequency/Number of Accidents this Shift:  [facility] Bowel Management  Level of Assistance: [place]  Frequency/Number of Accidents this Shift: [facility] Bed/Chair/Wheelchair Transfer:  [facility] Toilet Transfer:  [facility] Tub/Shower Transfer:  [place]    Previously Documented Mode of Locomotion at Discharge: Field  [place] Expected Mode of Locomotion at Discharge: [facility] Walk/Wheelchair:  [facility] Stairs:  [facility] Comprehension:  Auditory comprehension is the usual mode. Comprehension  Score = 7, Independent.  Patient comprehends complex/abstract information in  their primary language.  Patient is completely independent for auditory  comprehension.  There are no activity limitations.  [place] Expression:  Vocal expression is the usual mode. Expression Score = 7,  Independent.  Patient expresses complex/abstract information in their primary  language.  Patient is completely independent for vocal expression.  There are no  activity limitations.  [place] Social Interaction:  Social Interaction Score = 7, Independent. Patient is  completely independent for social interaction.  There are no activity  limitations.  [place] Problem Solving:  Problem Solving Score = 7, Independent.  Patient makes  appropriate decisions in order to solve complex problems.  Patient is completely  independent for problem solving.  There are no activity limitations.  [place] Memory:  Memory Score = 7, Independent.  Patient is completely independent  for memory.  There are no activity limitations.    Therapy Mode Minutes  Occupational Therapy: [place]  Physical Therapy: Branch  Speech Language Pathology:  [place]    Signed by: Susan Cotto RN

## 2018-12-07 NOTE — PROGRESS NOTES
LOS: 7 days   Patient Care Team:  Miladis Colbert APRN as PCP - General (Family Medicine)    Chief Complaint: same    Subjective     History of Present Illness    Subjective    History taken from: patient    Objective     Vital Signs  Temp:  [97.5 °F (36.4 °C)-98.3 °F (36.8 °C)] 97.5 °F (36.4 °C)  Heart Rate:  [69-89] 69  Resp:  [16-18] 16  BP: (133-162)/(61-74) 162/74    Objective exam unchanged    Results Review:     I reviewed the patient's new clinical results.    Medication Review:     Assessment/Plan       Cervical myelopathy (CMS/HCC)      Assessment & Plan Continue to prepare for dc next week.    Mike Melissa MD  12/07/18  7:25 AM    Time:

## 2018-12-07 NOTE — PLAN OF CARE
Problem: Fall Risk (Adult)  Goal: Absence of Fall  Outcome: Ongoing (interventions implemented as appropriate)   12/07/18 1510   Fall Risk (Adult)   Absence of Fall achieves outcome

## 2018-12-07 NOTE — PROGRESS NOTES
Inpatient Rehabilitation Plan of Care Note    Plan of Care  Care Plan Reviewed - No updates at this time.    [place]    Signed by: Susan Cotto RN

## 2018-12-07 NOTE — PROGRESS NOTES
Inpatient Rehabilitation Plan of Care Note    Plan of Care  Care Plan Reviewed - No updates at this time.    Safety    Performed Intervention(s)  Safety rounds/ Fall precautions  Items in reach, bed alarm & chair alarms      Body Systems    Performed Intervention(s)  Blood glucose AC & HS & insulin as ordered      Pain    Performed Intervention(s)  Monitor neck incision q shift    Signed by: Wojciech Lentz RN

## 2018-12-07 NOTE — PLAN OF CARE
Problem: Patient Care Overview  Goal: Plan of Care Review   12/07/18 0009   Patient Care Overview   IRF Plan of Care Review progress ongoing, continue   Progress, Functional Goals progress more gradual than expected   Coping/Psychosocial   Plan of Care Reviewed With patient;spouse   OTHER   Outcome Summary Meds for pain level, every 4 hours, to keep down, inc to neck intact and steri strips noted, lido patches taken off , scabs sm alll over his body, also some dedema to right arm noted today, monitor rectal a r ea, appy basrrier cream to buttock, turn anight in bed

## 2018-12-07 NOTE — THERAPY TREATMENT NOTE
Inpatient Rehabilitation - Occupational Therapy Treatment Note    [facility]     Patient Name: Duane Mark Witten  : 1955  MRN: [id number]    Today's Date: 2018                 Admit Date: 2018      Visit Dx:    ICD-10-CM ICD-9-CM   1. Cervical stenosis of spinal canal M48.02 723.0   2. Weakness of both lower extremities R29.898 729.89   3. Paresthesia of both upper extremities R20.2 782.0    M79.601 729.5    M79.602    4. Morbid obesity with BMI of 40.0-44.9, adult (CMS/[facility]) E66.01 278.01    Z68.41 V85.41   5. Impaired functional mobility, balance, gait, and endurance Z74.09 V49.89   6. Functional gait abnormality R26.89 781.2       Patient Active Problem List   Diagnosis   • YANELI (generalized anxiety disorder)   • ADD (attention deficit disorder)   • Depression   • Diabetes type 2, controlled (CMS/[facility])   • ED (erectile dysfunction) of non-organic origin   • HLD (hyperlipidemia)   • Essential hypertension   • Arthritis   • Psoriasis   • Testosterone deficiency   • Primary insomnia   • Weakness of both lower extremities   • Paresthesia of both upper extremities   • Morbid obesity with BMI of 40.0-44.9, adult (CMS/[facility])   • Adverse effect of corticosteroids   • Type 2 diabetes mellitus with hyperglycemia (CMS/[facility])   • Cervical stenosis of spinal canal   • Edema of cervical spinal cord (CMS/[facility])   • Postoperative atrial fibrillation (CMS/[facility])   • Cervical myelopathy (CMS/[facility])         Therapy Treatment    IRF Treatment Summary     Row Name 18 1209 18 0900          Evaluation/Treatment Time and Intent    Subjective Information  complains of;pain;fatigue  -AF  complains of;pain  -EE     Existing Precautions/Restrictions  fall;spinal brace for comfort  -AF  fall;spinal  -EE     Document Type  therapy note (daily note)  -AF  therapy note (daily note)  -EE     Mode of Treatment  occupational therapy  -AF  physical therapy  -EE     Patient/Family Observations  sitting up in bed in room  -AF  Pt  up in BR prior to am session  -EE     Recorded by [AF] Lolis Cooley OTR [EE] Cathleen Rosario PT     Row Name 12/07/18 1209 12/07/18 0900          Cognition/Psychosocial- PT/OT    Affect/Mental Status (Cognitive)  WFL  -AF  WFL  -EE     Orientation Status (Cognition)  oriented x 4  -AF  oriented x 4  -EE     Follows Commands (Cognition)  WFL  -AF  WFL  -EE     Personal Safety Interventions  fall prevention program maintained;gait belt;nonskid shoes/slippers when out of bed  -AF  fall prevention program maintained;gait belt;muscle strengthening facilitated;nonskid shoes/slippers when out of bed;supervised activity  -EE     Recorded by [AF] Lolis Cooley OTR [EE] Cathleen Rosario PT     Row Name 12/07/18 1209             Bed Mobility Assessment/Treatment    Supine-Sit Hydro (Bed Mobility)  contact guard;verbal cues  -AF      Recorded by [AF] Lolis Cooley OTR      Row Name 12/07/18 1209             Transfer Assessment/Treatment    Comment (Transfers)  sit to stand at sink with MIN A, decreased propreception with coming to stand pushes legs against w/c and will move w/c not realizing it  -AF      Recorded by [AF] Lolis Cooley OTR      Row Name 12/07/18 1209             Bed-Chair Transfer    Bed-Chair Hydro (Transfers)  minimum assist (75% patient effort);verbal cues  -AF      Assistive Device (Bed-Chair Transfers)  wheelchair;walker, front-wheeled  -AF      Recorded by [AF] Lolis Cooley OTR      Row Name 12/07/18 0900             Chair-Bed Transfer    Chair-Bed Hydro (Transfers)  minimum assist (75% patient effort);verbal cues  -EE      Assistive Device (Chair-Bed Transfers)  walker, front-wheeled  -EE      Recorded by [EE] Cathleen Rosario PT      Row Name 12/07/18 0900             Sit-Stand Transfer    Sit-Stand Hydro (Transfers)  minimum assist (75% patient effort)  -EE      Assistive Device (Sit-Stand Transfers)  walker, front-wheeled  -EE      Recorded by [EE] Abraham  CathleenHAILEY hooker      Row Name 12/07/18 0900             Stand-Sit Transfer    Stand-Sit [place] (Transfers)  minimum assist (75% patient effort);verbal cues  -EE      Assistive Device (Stand-Sit Transfers)  walker, front-wheeled  -EE      Recorded by [EE] Cathleen Rosario PT      Row Name 12/07/18 0900             Gait/Stairs Assessment/Training    [place] Level (Gait)  minimum assist (75% patient effort);verbal cues  -EE      Assistive Device (Gait)  walker, front-wheeled  -EE      Distance in Feet (Gait)  80 x 2  -EE      Pattern (Gait)  step-through  -EE      Deviations/Abnormal Patterns (Gait)  ataxic;sarah decreased;stride length decreased  -EE      Bilateral Gait Deviations  heel strike decreased;forward flexed posture  -EE      Right Sided Gait Deviations  foot drop/toe drag slight foot drag at times  -EE      Recorded by [EE] Cathleen Rosario, HAILEY      Row Name 12/07/18 1209             Bathing Assessment/Treatment    Bathing [place] Level  bathing skills;minimum assist (75% patient effort);verbal cues  -AF      Bathing Position  supported sitting;supported standing  -AF      Bathing Setup Assistance  obtain supplies;adjust water temperature  -AF      Comment (Bathing)  at sink  -AF      Recorded by [AF] Lolis Cooley OTR      Row Name 12/07/18 1209             Upper Body Dressing Assessment/Treatment    Upper Body Dressing Task  upper body dressing skills;minimum assist (75% or more patient effort);verbal cues  -AF      Upper Body Dressing Position  supported sitting  -AF      Set-up Assistance (Upper Body Dressing)  obtain clothing  -AF      Comment (Upper Body Dressing)  w/c level  -AF      Recorded by [AF] Lolis Cooley OTR      Row Name 12/07/18 1209             Lower Body Dressing Assessment/Treatment    Lower Body Dressing [place] Level  doff;don;pants/bottoms;shoes/slippers;socks;underwear;moderate assist (50% patient effort);verbal cues  -AF      Lower Body Dressing Position   supported sitting;supported standing  -AF      Lower Body Dressing Setup Assistance  obtain clothing  -AF      Comment (Lower Body Dressing)  LH shoe horn  -AF      Recorded by [AF] Lolis Cooley OTR      Row Name 12/07/18 1209             Grooming Assessment/Treatment    Grooming [place] Level  grooming skills;minimum assist (75% patient effort);supervision  -AF      Grooming Position  sink side;supported sitting  -AF      Grooming Setup Assistance  obtain supplies  -AF      Comment (Grooming)  MIN to comb hair   -AF      Recorded by [AF] Lolis Cooley OTR      Row Name 12/07/18 1209             Fine Motor Testing & Training    Comment, Fine Motor Coordination  theraputty manipulation [place] to increase sensory awareness and strength for ADL tasks   -AF      Recorded by [AF] Lolis Cooley OTR      Row Name 12/07/18 1209 12/07/18 0900          Pain Scale: Numbers Pre/Post-Treatment    Pain Scale: Numbers, Pretreatment  4/10  -AF  4/10  -EE     Pain Scale: Numbers, Post-Treatment  4/10  -AF  --     Pain Location - Orientation  incisional  -AF  --     Pain Location  neck  -AF  neck  -EE     Pre/Post Treatment Pain Comment  stated that he is very tired today  -AF  tolerated tx  -EE     Pain Intervention(s)  --  Repositioned;Medication (See MAR);Ambulation/increased activity  -EE     Recorded by [AF] Lolis Cooley, NURIS [EE] Cathleen Rosario PT     Row Name 12/07/18 0900             Balance    Balance  standing balance activity  -EE      Recorded by [EE] Cathleen Rosario PT      Row Name 12/07/18 0900             Standing Balance Activity    Activities Performed (Standing, Balance Training)  standing reaching outside base of support  -EE      Support Needed for Balance (Standing, Balance Training)  uses at least one upper extremity for support;CGA  -EE      Comment (Standing, Balance Training)  reaching for rings w/B UE x 5 min; 1 UE supported on table  -EE      Recorded by [EE] Cathleen Rosario PT Row  Name 12/07/18 0900             Lower Extremity Seated Therapeutic Exercise    Performed, Seated Lower Extremity (Therapeutic Exercise)  hip flexion/extension;hip abduction/adduction;knee flexion/extension;ankle dorsiflexion/plantarflexion;LAQ (long arc quad), knee extension  -EE      Device, Seated Lower Extremity (Therapeutic Exercise)  elastic bands/tubing;free weights, cuff;small ball red tband, 1.5# weights  -EE      Exercise Type, Seated Lower Extremity (Therapeutic Exercise)  resistive exercise  -EE      Sets/Reps Detail, Seated Lower Extremity (Therapeutic Exercise)  1/15  -EE      Recorded by [EE] Cathleen Rosario, PT      Row Name 12/07/18 1209             Neuromuscular Re-education    Comment (Neuromuscular Re-education)  table slides on flat and slightly angled surface shoulder flex/ext and IR/ER  -AF      Recorded by [AF] Lolis Cooley, OTR      Row Name 12/07/18 1209 12/07/18 0900          Positioning and Restraints    Pre-Treatment Position  in bed  -AF  bathroom  -EE     Post Treatment Position  wheelchair  -AF  --     In Wheelchair  sitting;call light within reach;encouraged to call for assist;exit alarm on;notified nsg set up with lunch tray  -AF  --     Recorded by [AF] Lolis Cooley, OTR [EE] Cathleen Rosario PT       User Key  (r) = Recorded By, (t) = Taken By, (c) = Cosigned By    Initials Name Effective Dates    EE Cathleen Rosario, PT 04/03/18 -     AF Lolis Cooley, OTR 04/03/18 -           Wound 11/27/18 1318 Other (See comments) neck incision (Active)   Dressing Appearance intact;dry;open to air 12/6/2018  8:15 PM   Closure Liquid skin adhesive;Adhesive closure strips 12/7/2018  7:19 AM   Periwound dry;intact 12/7/2018  7:19 AM   [place]iwound Temperature warm 12/6/2018  8:15 PM   Drainage Amount none 12/7/2018  7:19 AM   Dressing Care, Wound open to air 12/6/2018  8:15 PM         OT Recommendation and Plan                 OT IRF GOALS     Row Name 12/07/18 1216 12/01/18 1000          Transfer  Goal 1 (OT-IRF)    Activity/Assistive Device (Transfer Goal 1, OT-IRF)  toilet;walk-in shower;shower chair;walker, rolling  -AF  toilet  -RP     [place] Level (Transfer Goal 1, OT-IRF)  minimum assist (75% or more patient effort);contact guard assist;verbal cues required  -AF  minimum assist (75% or more patient effort)  -RP     Time Frame (Transfer Goal 1, OT-IRF)  short term goal (STG)  -AF  short term goal (STG)  -RP     Progress/Outcomes (Transfer Goal 1, OT-IRF)  goal ongoing  -AF  goal ongoing  -RP        Transfer Goal 2 (OT-IRF)    Activity/Assistive Device (Transfer Goal 2, OT-IRF)  toilet;shower chair;walk-in shower;walker, rolling  -AF  toilet  -RP     [place] Level (Transfer Goal 2, OT-IRF)  supervision required  -AF  supervision required  -RP     Time Frame (Transfer Goal 2, OT-IRF)  long term goal (LTG)  -AF  long term goal (LTG)  -RP     Progress/Outcomes (Transfer Goal 2, OT-IRF)  goal ongoing  -AF  goal ongoing  -RP        Bathing Goal 1 (OT-IRF)    Activity/Device (Bathing Goal 1, OT-IRF)  bathing skills, all;grab bar/tub rail;hand-held shower spray hose;long-handled sponge;tub bench  -AF  bathing skills, all  -RP     [place] Level (Bathing Goal 1, OT-IRF)  minimum assist (75% or more patient effort);verbal cues required  -AF  moderate assist (50-74% patient effort)  -RP     Time Frame (Bathing Goal 1, OT-IRF)  short term goal (STG)  -AF  short term goal (STG)  -RP     Progress/Outcomes (Bathing Goal 1, OT-IRF)  goal ongoing  -AF  goal ongoing  -RP        Bathing Goal 2 (OT-IRF)    Activity/Device (Bathing Goal 2, OT-IRF)  bathing skills, all;grab bar/tub rail;hand-held shower spray hose;tub bench;long-handled sponge  -AF  bathing skills, all  -RP     [place] Level (Bathing Goal 2, OT-IRF)  supervision required  -AF  supervision required;standby assist  -RP     Time Frame (Bathing Goal 2, OT-IRF)  long term goal (LTG)  -AF  long term goal (LTG)  -RP     Progress/Outcomes (Bathing  Goal 2, OT-IRF)  goal ongoing  -AF  goal ongoing  -RP        UB Dressing Goal 1 (OT-IRF)    Activity/Device (UB Dressing Goal 1, OT-IRF)  upper body dressing  -AF  upper body dressing  -RP     [place] (UB Dress Goal 1, OT-IRF)  minimum assist (75% or more patient effort);verbal cues required  -AF  minimum assist (75% or more patient effort)  -RP     Time Frame (UB Dressing Goal 1, OT-IRF)  short term goal (STG)  -AF  short term goal (STG)  -RP     Progress/Outcomes (UB Dressing Goal 1, OT-IRF)  goal ongoing  -AF  goal ongoing  -RP        UB Dressing Goal 2 (OT-IRF)    Activity/Device (UB Dressing Goal 2, OT-IRF)  upper body dressing  -AF  upper body dressing  -RP     [place] (UB Dress Goal 2, OT-IRF)  supervision required  -AF  supervision required  -RP     Time Frame (UB Dressing Goal 2, OT-IRF)  long term goal (LTG)  -AF  long term goal (LTG)  -RP     Progress/Outcomes (UB Dressing Goal 2, OT-IRF)  goal ongoing  -AF  goal ongoing  -RP        LB Dressing Goal 1 (OT-IRF)    Activity/Device (LB Dressing Goal 1, OT-IRF)  lower body dressing;long handled shoe horn;reacher;elastic laces  -AF  lower body dressing  -RP     [place] (LB Dressing Goal 1, OT-IRF)  moderate assist (50-74% patient effort);verbal cues required;minimum assist (75% or more patient effort)  -AF  maximum assist (25-49% patient effort)  -RP     Time Frame (LB Dressing Goal 1, OT-IRF)  short term goal (STG)  -AF  short term goal (STG)  -RP     Progress/Outcomes (LB Dressing Goal 1, OT-IRF)  goal ongoing  -AF  goal ongoing  -RP        LB Dressing Goal 2 (OT-IRF)    Activity/Device (LB Dressing Goal 2, OT-IRF)  lower body dressing;long handled shoe horn;elastic laces;reacher  -AF  lower body dressing  -RP     [place] (LB Dressing Goal 2, OT-IRF)  standby assist;verbal cues required  -AF  supervision required;standby assist  -RP     Time Frame (LB Dressing Goal 2, OT-IRF)  long term goal (LTG)  -AF  long term goal (LTG)  -RP      Progress/Outcomes (LB Dressing Goal 2, OT-IRF)  goal ongoing  -AF  goal ongoing  -RP        Grooming Goal 1 (OT-IRF)    Activity/Device (Grooming Goal 1, OT-IRF)  grooming skills, all  -AF  grooming skills, all  -RP     [place] (Grooming Goal 1, OT-IRF)  minimum assist (75% or more patient effort);verbal cues required  -AF  minimum assist (75% or more patient effort)  -RP     Time Frame (Grooming Goal 1, OT-IRF)  short term goal (STG)  -AF  short term goal (STG)  -RP     Progress/Outcomes (Grooming Goal 1, OT-IRF)  goal ongoing  -AF  goal ongoing  -RP        Grooming Goal 2 (OT-IRF)    Activity/Device (Grooming Goal 2, OT-IRF)  grooming skills, all  -AF  grooming skills, all  -RP     [place] (Grooming Goal 2, OT-IRF)  supervision required  -AF  supervision required  -RP     Time Frame (Grooming Goal 2, OT-IRF)  long term goal (LTG)  -AF  long term goal (LTG)  -RP     Progress/Outcomes (Grooming Goal 2, OT-IRF)  goal ongoing  -AF  goal ongoing  -RP        Toileting Goal 1 (OT-IRF)    Activity/Device (Toileting Goal 1, OT-IRF)  toileting skills, all;grab bar/safety frame;raised toilet seat  -AF  toileting skills, all  -RP     [place] Level (Toileting Goal 1, OT-IRF)  moderate assist (50-74% patient effort);verbal cues required  -AF  maximum assist (25-49% patient effort)  -RP     Time Frame (Toileting Goal 1, OT-IRF)  short term goal (STG)  -AF  short term goal (STG)  -RP     Progress/Outcomes (Toileting Goal 1, OT-IRF)  goal ongoing  -AF  goal ongoing  -RP        Toileting Goal 2 (OT-IRF)    Activity/Device (Toileting Goal 2, OT-IRF)  toileting skills, all;grab bar/safety frame;raised toilet seat  -AF  toileting skills, all  -RP     [place] Level (Toileting Goal 2, OT-IRF)  supervision required;verbal cues required  -AF  supervision required;standby assist  -RP     Time Frame (Toileting Goal 2, OT-IRF)  long term goal (LTG)  -AF  long term goal (LTG)  -RP     Progress/Outcomes (Toileting Goal 2,  OT-IRF)  goal ongoing  -AF  goal ongoing  -RP        ROM Goal 1 (OT-IRF)    ROM Goal 1 (OT-IRF)  pt will increase B UE ROM to approx. 3/4 shoulder flexion to assist with ADL tasks   -AF  Pt will increase B UE ROM to approx. 3/4 shoulder flexion to assist w/ ADLs.  -RP     Time Frame (ROM Goal 1, OT-IRF)  long term goal (LTG)  -AF  long term goal (LTG)  -RP     Progress/Outcomes (ROM Goal 1, OT-IRF)  goal ongoing  -AF  goal ongoing  -RP       User Key  (r) = Recorded By, (t) = Taken By, (c) = Cosigned By    Initials Name Provider Type    Lolis Horne OTR Occupational Therapist    Precious Hamilton, OT Occupational Therapist                 Time Calculation:     Time Calculation- OT     Row Name 12/07/18 1219             Time Calculation- OT    OT Start Time  1030  -AF      OT Stop Time  1130  -AF      OT Time Calculation (min)  60 min  -AF        User Key  (r) = Recorded By, (t) = Taken By, (c) = Cosigned By    Initials Name Provider Type    Lolis Horne OTJEANINE Occupational Therapist          Therapy Suggested Charges     Code   Minutes Charges    None              Therapy Charges for Today     Code Description Service Date Service Provider Modifiers Qty    29694564361 HC OT SELF CARE/MGMT/TRAIN EA 15 MIN 12/6/2018 Lolis Cooley OTJEANINE GO 4    99564244613 HC OT SELF CARE/MGMT/TRAIN EA 15 MIN 12/7/2018 Lolis Cooley OTR GO 2    14762128617 HC OT NEUROMUSC RE EDUCATION EA 15 MIN 12/7/2018 Lolis Cooley OTR GO 1    24656952868 HC OT THER PROC EA 15 MIN 12/7/2018 Lolis Cooley OTR GO 1                   NURIS Grimm  12/7/2018

## 2018-12-07 NOTE — PROGRESS NOTES
Inpatient Rehabilitation Functional Measures Assessment    Functional Measures  ROCCO Eating:  [facility] Grooming: [facility] Bathing:  [facility] Upper Body Dressing:  [facility] Lower Body Dressing:  [facility] Toileting:  [facility] Bladder Management  Level of Assistance:  [place]  Frequency/Number of Accidents this Shift:  [facility] Bowel Management  Level of Assistance: [place]  Frequency/Number of Accidents this Shift: [facility] Bed/Chair/Wheelchair Transfer:  [facility] Toilet Transfer:  [facility] Tub/Shower Transfer:  [place]    Previously Documented Mode of Locomotion at Discharge: Field  ROCCO Expected Mode of Locomotion at Discharge: [facility] Walk/Wheelchair:  [facility] Stairs:  [facility] Comprehension:  Auditory comprehension is the usual mode. Comprehension  Score = 6, Modified [place].  Patient comprehends complex/abstract  information in their primary language with only mild difficulty.  [place] Expression:  Vocal expression is the usual mode. Expression Score = 6,  Modified Independent.  Patient expresses complex/abstract information in their  primary language with only mild difficulty with tasks.  [place] Social Interaction:  Social Interaction Score = 6, Modified Independent.  Patient is modified independent for social interaction, requiring: Requires  additional time.  [place] Problem Solving:  Problem Solving Score = 6, Modified [place].  Patient  makes appropriate decisions in order to solve complex problems with mild  difficulty but self-corrects.  [place]CC[place] Memory:  Memory Score = 6, Modified [place].  Patient is modified  independent for memory, having only mild difficulty and using self-initiated or  environmental cues to remember.    Therapy Mode Minutes  Occupational Therapy: [place]  Physical Therapy: [place]  Speech Language Pathology:  [place]    Signed by: Wojciech Lentz RN

## 2018-12-07 NOTE — PLAN OF CARE
Problem: Patient Care Overview  Goal: Plan of Care Review  Outcome: Ongoing (interventions implemented as appropriate)   12/07/18 1512   Patient Care Overview   IRF Plan of Care Review progress ongoing, continue   Progress, Functional Goals demonstrating adequate progress   Coping/Psychosocial   Plan of Care Reviewed With patient   OTHER   Outcome Summary lortab and flexeril helping with pain

## 2018-12-07 NOTE — PROGRESS NOTES
Inpatient Rehabilitation Functional Measures Assessment    Functional Measures  ROCCO Eating:  [facility] Grooming: [facility] Bathing:  [facility] Upper Body Dressing:  [facility] Lower Body Dressing:  [facility] Toileting:  [facility] Bladder Management  Level of Assistance:  [place]  Frequency/Number of Accidents this Shift:  [facility] Bowel Management  Level of Assistance: [place]  Frequency/Number of Accidents this Shift: [facility] Bed/Chair/Wheelchair Transfer:  [facility] Toilet Transfer:  [facility] Tub/Shower Transfer:  [place]    Previously Documented Mode of Locomotion at Discharge: [place]  [place]CC[place] Expected Mode of Locomotion at Discharge: [facility] Walk/Wheelchair:  [facility] Stairs:  [facility] Comprehension:  Auditory comprehension is the usual mode. Comprehension  Score = 6, Modified [place].  Patient comprehends complex/abstract  information in their primary language, requiring:  [place] Expression:  Vocal expression is the usual mode. Expression Score = 7,  Independent.  Patient expresses complex/abstract information in their primary  language.  Patient is completely independent for vocal expression.  There are no  activity limitations.  [place] Social Interaction:  Social Interaction Score = 6, Modified Independent.  Patient is modified independent for social interaction, requiring:  [place] Problem Solving:  Problem Solving Score = 6, Modified [place].  Patient  makes appropriate decisions in order to solve complex problems, but requires  extra time.  [place]CC[place] Memory:  Memory Score = 6, Modified [place].  Patient is modified  independent for memory, requiring:    Therapy Mode Minutes  Occupational Therapy: [place]  Physical Therapy: [place]  Speech Language Pathology:  [place]    Signed by: Susan Cotto RN

## 2018-12-08 LAB
GLUCOSE BLDC GLUCOMTR-MCNC: 154 MG/DL (ref 70–130)
GLUCOSE BLDC GLUCOMTR-MCNC: 176 MG/DL (ref 70–130)
GLUCOSE BLDC GLUCOMTR-MCNC: 204 MG/DL (ref 70–130)
GLUCOSE BLDC GLUCOMTR-MCNC: 204 MG/DL (ref 70–130)

## 2018-12-08 PROCEDURE — 82962 GLUCOSE BLOOD TEST: CPT

## 2018-12-08 PROCEDURE — 63710000001 INSULIN LISPRO (HUMAN) PER 5 UNITS: Performed by: HOSPITALIST

## 2018-12-08 PROCEDURE — 97110 THERAPEUTIC EXERCISES: CPT

## 2018-12-08 RX ADMIN — HYDROCODONE BITARTRATE AND ACETAMINOPHEN 2 TABLET: 7.5; 325 TABLET ORAL at 00:03

## 2018-12-08 RX ADMIN — HYDROCODONE BITARTRATE AND ACETAMINOPHEN 2 TABLET: 7.5; 325 TABLET ORAL at 20:40

## 2018-12-08 RX ADMIN — INSULIN LISPRO 2 UNITS: 100 INJECTION, SOLUTION INTRAVENOUS; SUBCUTANEOUS at 16:43

## 2018-12-08 RX ADMIN — INSULIN LISPRO 4 UNITS: 100 INJECTION, SOLUTION INTRAVENOUS; SUBCUTANEOUS at 11:55

## 2018-12-08 RX ADMIN — GLIPIZIDE 10 MG: 10 TABLET ORAL at 08:24

## 2018-12-08 RX ADMIN — METFORMIN HYDROCHLORIDE 1000 MG: 1000 TABLET ORAL at 16:43

## 2018-12-08 RX ADMIN — HYDROCODONE BITARTRATE AND ACETAMINOPHEN 2 TABLET: 7.5; 325 TABLET ORAL at 12:29

## 2018-12-08 RX ADMIN — HYDROCODONE BITARTRATE AND ACETAMINOPHEN 2 TABLET: 7.5; 325 TABLET ORAL at 08:23

## 2018-12-08 RX ADMIN — LOSARTAN POTASSIUM: 50 TABLET, FILM COATED ORAL at 09:44

## 2018-12-08 RX ADMIN — FAMOTIDINE 20 MG: 20 TABLET, FILM COATED ORAL at 08:24

## 2018-12-08 RX ADMIN — HYDROCODONE BITARTRATE AND ACETAMINOPHEN 2 TABLET: 7.5; 325 TABLET ORAL at 16:42

## 2018-12-08 RX ADMIN — HYDROCODONE BITARTRATE AND ACETAMINOPHEN 2 TABLET: 7.5; 325 TABLET ORAL at 04:01

## 2018-12-08 RX ADMIN — CYCLOBENZAPRINE 10 MG: 10 TABLET, FILM COATED ORAL at 04:01

## 2018-12-08 RX ADMIN — CYCLOBENZAPRINE 10 MG: 10 TABLET, FILM COATED ORAL at 20:31

## 2018-12-08 RX ADMIN — CYCLOBENZAPRINE 10 MG: 10 TABLET, FILM COATED ORAL at 11:55

## 2018-12-08 RX ADMIN — METFORMIN HYDROCHLORIDE 1000 MG: 1000 TABLET ORAL at 08:24

## 2018-12-08 RX ADMIN — PIOGLITAZONE 15 MG: 15 TABLET ORAL at 08:23

## 2018-12-08 RX ADMIN — DULOXETINE HYDROCHLORIDE 60 MG: 60 CAPSULE, DELAYED RELEASE ORAL at 08:24

## 2018-12-08 RX ADMIN — METOPROLOL TARTRATE 25 MG: 25 TABLET ORAL at 08:24

## 2018-12-08 RX ADMIN — INSULIN LISPRO 2 UNITS: 100 INJECTION, SOLUTION INTRAVENOUS; SUBCUTANEOUS at 08:23

## 2018-12-08 RX ADMIN — METOPROLOL TARTRATE 25 MG: 25 TABLET ORAL at 20:31

## 2018-12-08 RX ADMIN — LIDOCAINE 2 PATCH: 50 PATCH CUTANEOUS at 08:23

## 2018-12-08 RX ADMIN — ATOMOXETINE 100 MG: 60 CAPSULE ORAL at 08:23

## 2018-12-08 RX ADMIN — INSULIN LISPRO 4 UNITS: 100 INJECTION, SOLUTION INTRAVENOUS; SUBCUTANEOUS at 20:31

## 2018-12-08 RX ADMIN — RIVAROXABAN 20 MG: 20 TABLET, FILM COATED ORAL at 16:43

## 2018-12-08 NOTE — PROGRESS NOTES
Inpatient Rehabilitation Plan of Care Note    Plan of Care  Care Plan Reviewed - Updates as Follows    Body Systems    [RN] Endocrine(Active)  Current Status(12/08/2018): Blood sugar not controlled  Weekly Goal(12/15/2018): Blood sugar will be within acceptable limits  Discharge Goal: Independent with diabetes regimen        Pain    [RN] Pain Management(Active)  Current Status(12/08/2018): Pain related to surgery  Weekly Goal(12/15/2018): Able to tolerate therapies, & pain controlled  Discharge Goal: Pain under control    Performed Intervention(s)  Medication prn & evaluate effectiveness  Monitor neck incision q shift      Psychosocial    [RN] Coping/Adjustment(Active)  Current Status(12/08/2018): Expresses appropriate coping  Weekly Goal(12/15/2018): Allow opportunity to express concerns regarding current  status  Discharge Goal: Adequate coping regarding life changes with ongoing support.    Performed Intervention(s)  Verbalizes needs & concerns      Safety    [RN] Potential for Injury(Active)  Current Status(12/08/2018): No unsafe behaviors, uses call light appropriately  Weekly Goal(12/15/2018): Instruct family regarding safety precautions & need for  close supervision  Discharge Goal: Family will be knowledgeable of need for cueing & supervision to  avoid falls.    Performed Intervention(s)  Safety rounds/ Fall precautions  Items in reach, bed alarm & chair alarms      Sphincter Control    [RN] bowel & bladder management(Active)  Current Status(12/08/2018): Continent 100%  Weekly Goal(12/15/2018): Remains continent 100%  Discharge Goal: Remains 100% continent    Performed Intervention(s)  Monitor intake, output, & bowel movements  Encourage appropriate diet & fluids    Signed by: Maisha Benedict RN

## 2018-12-08 NOTE — PLAN OF CARE
Problem: Patient Care Overview  Goal: Plan of Care Review  Outcome: Ongoing (interventions implemented as appropriate)   12/08/18 0134   Patient Care Overview   IRF Plan of Care Review progress ongoing, continue   Progress, Functional Goals demonstrating adequate progress   Coping/Psychosocial   Plan of Care Reviewed With patient   OTHER   Outcome Summary Patient is calm and cooperative. PRN pain medication given for bilateral shoulder and neck pain with positive result. Patient requested to be waken up every 4 hours to give pain medication. Wife at bedside. Call light placed within reach.       Problem: Fall Risk (Adult)  Goal: Absence of Fall  Outcome: Ongoing (interventions implemented as appropriate)      Problem: Pain, Acute (Adult)  Goal: Acceptable Pain Control/Comfort Level  Outcome: Ongoing (interventions implemented as appropriate)      Problem: Skin Injury Risk (Adult)  Goal: Skin Health and Integrity  Outcome: Ongoing (interventions implemented as appropriate)

## 2018-12-08 NOTE — PLAN OF CARE
Problem: Patient Care Overview  Goal: Plan of Care Review  Outcome: Ongoing (interventions implemented as appropriate)   12/08/18 1529   Patient Care Overview   IRF Plan of Care Review progress ongoing, continue   Progress, Functional Goals demonstrating adequate progress   Coping/Psychosocial   Plan of Care Reviewed With patient   OTHER   Outcome Summary Attended therapy and is cooperative with staff. Wife stays at bedside. Ambulates with walker. Requests pain medication q4hrs. No safety issues noted. Uses call light for assistance.       Problem: Fall Risk (Adult)  Goal: Absence of Fall  Outcome: Ongoing (interventions implemented as appropriate)      Problem: Pain, Acute (Adult)  Goal: Acceptable Pain Control/Comfort Level  Outcome: Ongoing (interventions implemented as appropriate)      Problem: Skin Injury Risk (Adult)  Goal: Skin Health and Integrity  Outcome: Ongoing (interventions implemented as appropriate)

## 2018-12-08 NOTE — PROGRESS NOTES
LOS: 8 days   Patient Care Team:  Miladis Colbert APRN as PCP - General (Family Medicine)    Chief Complaint: same    Subjective     History of Present Illness    SubjectivePt is awake and alert. No new issues. Pt voices concern re increase in extremity swelling. Pt had been on Hyzaar 100/12.5 q day at home    History taken from: patient    Objective     Vital Signs  Temp:  [98.1 °F (36.7 °C)-98.3 °F (36.8 °C)] 98.3 °F (36.8 °C)  Heart Rate:  [69-76] 69  Resp:  [16-18] 16  BP: (138-174)/(75-77) 165/75    Objective Exam unchanged. BUE nad LE edema apparent. Lungs CTAB. resp unlabored.     Results Review:     I reviewed the patient's new clinical results.    Medication Review:     Assessment/Plan       Cervical myelopathy (CMS/HCC)      Assessment & Plan Continue to prepare for dc. Action as ordered    Mike Melissa MD  12/08/18  8:14 AM    Time:

## 2018-12-08 NOTE — PROGRESS NOTES
Inpatient Rehabilitation Functional Measures Assessment    Functional Measures  ROCCO Eating:  [facility] Grooming: [facility] Bathing:  [facility] Upper Body Dressing:  [facility] Lower Body Dressing:  [facility] Toileting:  [facility] Bladder Management  Level of Assistance:  [place]  Frequency/Number of Accidents this Shift:  [facility] Bowel Management  Level of Assistance: [place]  Frequency/Number of Accidents this Shift: [facility] Bed/Chair/Wheelchair Transfer:  [facility] Toilet Transfer:  [facility] Tub/Shower Transfer:  [place]    Previously Documented Mode of Locomotion at Discharge: Field  ROCCO Expected Mode of Locomotion at Discharge: [facility] Walk/Wheelchair:  [facility] Stairs:  [facility] Comprehension:  Auditory comprehension is the usual mode. Comprehension  Score = 7, Independent.  Patient comprehends complex/abstract information in  their primary language.  Patient is completely independent for auditory  comprehension.  There are no activity limitations.  ROCCO Expression:  Vocal expression is the usual mode. Expression Score = 7,  Independent.  Patient expresses complex/abstract information in their primary  language.  Patient is completely independent for vocal expression.  There are no  activity limitations.  ROCCO Social Interaction:  Social Interaction Score = 6, Modified Independent.  Patient is modified independent for social interaction, requiring: Medications.    ROCCO Problem Solving:  Problem Solving Score = 7, Independent.  Patient makes  appropriate decisions in order to solve complex problems.  Patient is completely  independent for problem solving.  There are no activity limitations.  ROCCO Memory:  Memory Score = 7, Independent.  Patient is completely independent  for memory.  There are no activity limitations.    Therapy Mode Minutes  Occupational Therapy: Branch  Physical Therapy: Branch  Speech Language Pathology:  [place]anch    Signed by: Maisha Benedict RN

## 2018-12-08 NOTE — PROGRESS NOTES
Inpatient Rehabilitation Functional Measures Assessment    Functional Measures  ROCCO Eating:  Branch  [facility] Grooming: [place]  [facility] Bathing:  [place]  [facility] Upper Body Dressing:  [place]  [facility] Lower Body Dressing:  [facility] Toileting:  [facility] Bladder Management  Level of Assistance:  [place]  Frequency/Number of Accidents this Shift:  [facility] Bowel Management  Level of Assistance: [place]  Frequency/Number of Accidents this Shift: [facility] Bed/Chair/Wheelchair Transfer:  Activity was not observed.  ROCCO Toilet Transfer:  [facility] Tub/Shower Transfer:  [place]    Previously Documented Mode of Locomotion at Discharge: Field  ROCOC Expected Mode of Locomotion at Discharge: [facility] Walk/Wheelchair:  WHEELCHAIR OBSERVATION   Activity was not observed.    WALK OBSERVATION   Walk Distance Scale = 2.  Distance walked is 50 -149 feet. Walk Score = 2.  Patient performs 75% or more of effort and requires minimal assistance.  Incidental assistance, contact guard or steadying was provided. Patient walked a  distance of 80 feet. Patient requires the following assistive device(s): Rolling  walker.  ROCCO Stairs:  Activity was not observed.    ROCCO Comprehension:  [facility] Expression:  [facility] Social Interaction:  [facility] Problem Solving:  [facility] Memory:  [place]    Therapy Mode Minutes  Occupational Therapy: [place]  Physical Therapy: Individual: 30 minutes.  Speech Language Pathology:  [place]    Signed by: Angelic Bautista, PT

## 2018-12-08 NOTE — PROGRESS NOTES
Inpatient Rehabilitation Plan of Care Note    Plan of Care  Care Plan Reviewed - No updates at this time.    Signed by: Stephanie Espana RN

## 2018-12-08 NOTE — THERAPY TREATMENT NOTE
Inpatient Rehabilitation - Physical Therapy Treatment Note  [facility]     Patient Name: Duane Mark Witten  : 1955  MRN: [id number]    Today's Date: 2018                 Admit Date: 2018      Visit Dx:      ICD-10-CM ICD-9-CM   1. Cervical stenosis of spinal canal M48.02 723.0   2. Weakness of both lower extremities R29.898 729.89   3. Paresthesia of both upper extremities R20.2 782.0    M79.601 729.5    M79.602    4. Morbid obesity with BMI of 40.0-44.9, adult (CMS/[facility]) E66.01 278.01    Z68.41 V85.41   5. Impaired functional mobility, balance, gait, and endurance Z74.09 V49.89   6. Functional gait abnormality R26.89 781.2       Patient Active Problem List   Diagnosis   • YANELI (generalized anxiety disorder)   • ADD (attention deficit disorder)   • Depression   • Diabetes type 2, controlled (CMS/[facility])   • ED (erectile dysfunction) of non-organic origin   • HLD (hyperlipidemia)   • Essential hypertension   • Arthritis   • Psoriasis   • Testosterone deficiency   • Primary insomnia   • Weakness of both lower extremities   • Paresthesia of both upper extremities   • Morbid obesity with BMI of 40.0-44.9, adult (CMS/[facility])   • Adverse effect of corticosteroids   • Type 2 diabetes mellitus with hyperglycemia (CMS/[facility])   • Cervical stenosis of spinal canal   • Edema of cervical spinal cord (CMS/[facility])   • Postoperative atrial fibrillation (CMS/[facility])   • Cervical myelopathy (CMS/[facility])       Therapy Treatment    IRF Treatment Summary     Row Name 18 1030             Evaluation/Treatment Time and Intent    Subjective Information  complains of inc tingling in UEs today.  Note inc swelling today.  -      Existing Precautions/Restrictions  fall;spinal Brace for comfort.  -      Mode of Treatment  physical therapy  -      Patient/Family Observations  Up inchair with family present.  -KP      Recorded by [] Angelic Bautista PT      Row Name 18 9248             Cognition/Psychosocial- PT/OT     Affect/Mental Status (Cognitive)  WFL  -KP      Orientation Status (Cognition)  oriented x 4  -KP      Follows Commands (Cognition)  WFL  -KP      Personal Safety Interventions  fall prevention program maintained;gait belt;nonskid shoes/slippers when out of bed;supervised activity  -KP      Recorded by [KP] Angelic Bautista PT      Row Name 12/08/18 1034             Bed Mobility Assessment/Treatment    Comment (Bed Mobility)  deferred  -KP      Recorded by [KP] Angelic Bautista, PT      Row Name 12/08/18 1034             Sit-Stand Transfer    Sit-Stand [place] (Transfers)  minimum assist (75% patient effort);contact guard;verbal cues  -KP      Assistive Device (Sit-Stand Transfers)  walker, front-wheeled  -KP      Recorded by [KP] Angelic Bautista, PT      Row Name 12/08/18 1034             Stand-Sit Transfer    Stand-Sit [place] (Transfers)  contact guard;verbal cues  -KP      Assistive Device (Stand-Sit Transfers)  walker, front-wheeled  -KP      Recorded by [KP] Angelic Bautista PT      Row Name 12/08/18 1034             Gait/Stairs Assessment/Training    [place] Level (Gait)  minimum assist (75% patient effort);verbal cues  -KP      Assistive Device (Gait)  walker, front-wheeled  -KP      Distance in Feet (Gait)  80 x 2  -KP      Pattern (Gait)  step-through  -KP      Deviations/Abnormal Patterns (Gait)  ataxic;base of support, narrow;stride length decreased;gait speed decreased  -KP      Bilateral Gait Deviations  forward flexed posture;heel strike decreased dec push off, variabke step  -KP      Right Sided Gait Deviations  foot drop/toe drag R>L  -KP      Comment (Gait/Stairs)  Pt repors significant fatigue today.  Refused another amb at end of session.  -KP      Recorded by [KP] Angelic Bautista, PT      Row Name 12/08/18 1034             Pain Scale: Numbers Pre/Post-Treatment    Pain Scale: Numbers, Pretreatment  4/10  -KP      Pain Scale: Numbers, Post-Treatment  4/10  -KP      Pain  Location - Orientation  incisional  -KP      Pain Location  neck  -KP      Pre/Post Treatment Pain Comment  Pt very fatigued today  -KP      Pain Intervention(s)  Repositioned;Rest  -KP      Recorded by [KP] Angelic Bautista PT      Row Name 12/08/18 1034             Dynamic Balance Activity    Therapeutic Training Performed (Dynamic Balance)  side stepping  -KP      Support Needed for Balance (Dynamic Balance Training)  uses both upper extremities for support;CGA  -KP      Comment (Dynamic Balance Training)  8x2  -KP      Recorded by [MAR] Angelic Bautista PT      Row Name 12/08/18 1034             Lower Extremity Standing Therapeutic Exercise    Performed, Standing Lower Extremity (Therapeutic Exercise)  heel raises;toe raises;hip flexion/extension  -KP      Device, Standing Lower Extremity (Therapeutic Exercise)  jacob bars  -KP      Exercise Type, Standing Lower Extremity (Therapeutic Exercise)  AROM (active range of motion)  -KP      Restrictions, Standing Lower Extremity (Therapeutic Exercise)  1/10  -KP      Recorded by [KP] Angelic Bautista PT      Row Name 12/08/18 1034             Positioning and Restraints    Pre-Treatment Position  sitting in chair/recliner  -KP      Post Treatment Position  wheelchair  -KP      In Wheelchair  sitting;call light within reach;encouraged to call for assist;notified nsg;with family/caregiver  -KP      Recorded by [KP] Angelic Bautista PT        User Key  (r) = Recorded By, (t) = Taken By, (c) = Cosigned By    Initials Name Effective Dates    KP Angelic Bautista PT 04/03/18 -         Wound 11/27/18 1318 Other (See comments) neck incision (Active)   Dressing Appearance open to air 12/8/2018  8:23 AM   Closure Liquid skin adhesive;Adhesive closure strips 12/8/2018  8:23 AM   Base clean;dry 12/8/2018  8:23 AM   Periwound dry;intact 12/7/2018  7:16 PM   [place]iwound Temperature warm 12/7/2018  7:16 PM   [place]iwound Skin Turgor soft 12/7/2018  7:16 PM   Drainage Amount none  12/8/2018  8:23 AM   Dressing Care, Wound open to air 12/8/2018  8:23 AM     Physical Therapy Education     Title: PT OT SLP Therapies (In Progress)     Topic: Physical Therapy (Done)     Point: Mobility training (Done)     Learning Progress Summary           Patient Eager, E,TB,D, VU,DU by MAR at 12/8/2018 12:00 PM    Acceptance, E,TB,D, VU,NR by EE at 12/7/2018  9:22 AM    Acceptance, E,TB, VU,NR by EE at 12/6/2018  9:15 AM    Acceptance, E,TB, VU,NR by EE at 12/5/2018 10:10 AM    Acceptance, E, NR by SIMÓN at 12/4/2018 10:12 AM    Acceptance, E,TB, VU,NR by EE at 12/3/2018  9:48 AM    Acceptance, E, VU,NR by IAN at 12/1/2018  9:50 AM                   Point: Home exercise program (Done)     Learning Progress Summary           Patient Eager, E,TB,D, VU,DU by MAR at 12/8/2018 12:00 PM    Acceptance, E,TB,D, VU,NR by EE at 12/7/2018  9:22 AM    Acceptance, E,TB, VU,NR by EE at 12/6/2018  9:15 AM    Acceptance, E,TB, VU,NR by EE at 12/5/2018 10:10 AM    Acceptance, E,TB, VU,NR by EE at 12/3/2018  9:48 AM                   Point: Body mechanics (Done)     Learning Progress Summary           Patient Acceptance, E,TB,D, VU,NR by EE at 12/7/2018  9:22 AM    Acceptance, E,TB, VU,NR by EE at 12/6/2018  9:15 AM    Acceptance, E,TB, VU,NR by EE at 12/5/2018 10:10 AM    Acceptance, E,TB, VU,NR by EE at 12/3/2018  9:48 AM                   Point: Precautions (Done)     Learning Progress Summary           Patient Acceptance, E,TB,D, VU,NR by EE at 12/7/2018  9:22 AM    Acceptance, E,TB, VU,NR by EE at 12/6/2018  9:15 AM    Acceptance, E,TB, VU,NR by EE at 12/5/2018 10:10 AM    Acceptance, E,TB, VU,NR by EE at 12/3/2018  9:48 AM    Lena, CLINTON KOTHARI,NR by IAN at 12/1/2018  9:50 AM                               User Key     Initials Effective Dates Name Provider Type Discipline    LB 03/07/18 -  Odette Orourke PTA Physical Therapy Assistant PT    EE 04/03/18 -  Cathleen Rosario, PT Physical Therapist PT    IAN 04/03/18 -  Janae Trujillo  S, PT Physical Therapist PT     04/03/18 -  Angelic Bautista PT Physical Therapist PT                  PT Recommendation and Plan                        Time Calculation:     PT Charges     Row Name 12/08/18 1036             Time Calculation    Start Time  1010  -      Stop Time  1040  -      Time Calculation (min)  30 min  -      PT Received On  12/08/18  -      PT - Next Appointment  12/09/18  -        User Key  (r) = Recorded By, (t) = Taken By, (c) = Cosigned By    Initials Name Provider Type     Angelic Bautista, PT Physical Therapist            Therapy Charges for Today     Code Description Service Date Service Provider Modifiers Qty    [id number] HC PT THER PROC EA 15 MIN 12/8/2018 Angelic Bautisat, PT GP 2                   Angelic Bautista, HAILEY  12/8/2018

## 2018-12-08 NOTE — PROGRESS NOTES
Inpatient Rehabilitation Functional Measures Assessment    Functional Measures  ROCCO Eating:  [facility] Grooming: [facility] Bathing:  [facility] Upper Body Dressing:  [facility] Lower Body Dressing:  [facility] Toileting:  [facility] Bladder Management  Level of Assistance:  [place]  Frequency/Number of Accidents this Shift:  [facility] Bowel Management  Level of Assistance: [place]  Frequency/Number of Accidents this Shift: [facility] Bed/Chair/Wheelchair Transfer:  [facility] Toilet Transfer:  [facility] Tub/Shower Transfer:  [place]    Previously Documented Mode of Locomotion at Discharge: Field  ROCCO Expected Mode of Locomotion at Discharge: [facility] Walk/Wheelchair:  [facility] Stairs:  [facility] Comprehension:  Auditory comprehension is the usual mode. Comprehension  Score = 6, Modified [place].  Patient comprehends complex/abstract  information in their primary language with only mild difficulty.  ROCCO Expression:  Vocal expression is the usual mode. Expression Score = 7,  Independent.  Patient expresses complex/abstract information in their primary  language.  Patient is completely independent for vocal expression.  There are no  activity limitations.  ROCCO Social Interaction:  Social Interaction Score = 6, Modified Independent.  Patient is modified independent for social interaction, requiring: Medications.    ROCCO Problem Solving:  Problem Solving Score = 6, Modified [place].  Patient  makes appropriate decisions in order to solve complex problems with mild  difficulty but self-corrects.  ROCCO Memory:  Memory Score = 6, Modified [place].  Patient is modified  independent for memory, having only mild difficulty and using self-initiated or  environmental cues to remember.    Therapy Mode Minutes  Occupational Therapy: Branch  Physical Therapy: [place]  Speech Language Pathology:  [place]    Signed by: Stephanie Espana RN

## 2018-12-09 LAB
GLUCOSE BLDC GLUCOMTR-MCNC: 132 MG/DL (ref 70–130)
GLUCOSE BLDC GLUCOMTR-MCNC: 166 MG/DL (ref 70–130)
GLUCOSE BLDC GLUCOMTR-MCNC: 166 MG/DL (ref 70–130)
GLUCOSE BLDC GLUCOMTR-MCNC: 208 MG/DL (ref 70–130)

## 2018-12-09 PROCEDURE — 63710000001 INSULIN LISPRO (HUMAN) PER 5 UNITS: Performed by: HOSPITALIST

## 2018-12-09 PROCEDURE — 82962 GLUCOSE BLOOD TEST: CPT

## 2018-12-09 PROCEDURE — 97110 THERAPEUTIC EXERCISES: CPT

## 2018-12-09 RX ADMIN — HYDROCODONE BITARTRATE AND ACETAMINOPHEN 2 TABLET: 7.5; 325 TABLET ORAL at 01:40

## 2018-12-09 RX ADMIN — METFORMIN HYDROCHLORIDE 1000 MG: 1000 TABLET ORAL at 08:06

## 2018-12-09 RX ADMIN — RIVAROXABAN 20 MG: 20 TABLET, FILM COATED ORAL at 17:00

## 2018-12-09 RX ADMIN — LIDOCAINE 2 PATCH: 50 PATCH CUTANEOUS at 08:07

## 2018-12-09 RX ADMIN — INSULIN LISPRO 2 UNITS: 100 INJECTION, SOLUTION INTRAVENOUS; SUBCUTANEOUS at 11:51

## 2018-12-09 RX ADMIN — HYDROCODONE BITARTRATE AND ACETAMINOPHEN 2 TABLET: 7.5; 325 TABLET ORAL at 06:15

## 2018-12-09 RX ADMIN — HYDROCODONE BITARTRATE AND ACETAMINOPHEN 2 TABLET: 7.5; 325 TABLET ORAL at 18:37

## 2018-12-09 RX ADMIN — DULOXETINE HYDROCHLORIDE 60 MG: 60 CAPSULE, DELAYED RELEASE ORAL at 08:05

## 2018-12-09 RX ADMIN — PIOGLITAZONE 15 MG: 15 TABLET ORAL at 08:05

## 2018-12-09 RX ADMIN — GLIPIZIDE 10 MG: 10 TABLET ORAL at 06:15

## 2018-12-09 RX ADMIN — INSULIN LISPRO 4 UNITS: 100 INJECTION, SOLUTION INTRAVENOUS; SUBCUTANEOUS at 17:00

## 2018-12-09 RX ADMIN — CYCLOBENZAPRINE 10 MG: 10 TABLET, FILM COATED ORAL at 22:39

## 2018-12-09 RX ADMIN — CYCLOBENZAPRINE 10 MG: 10 TABLET, FILM COATED ORAL at 06:15

## 2018-12-09 RX ADMIN — METFORMIN HYDROCHLORIDE 1000 MG: 1000 TABLET ORAL at 17:00

## 2018-12-09 RX ADMIN — METOPROLOL TARTRATE 25 MG: 25 TABLET ORAL at 21:10

## 2018-12-09 RX ADMIN — HYDROCODONE BITARTRATE AND ACETAMINOPHEN 2 TABLET: 7.5; 325 TABLET ORAL at 14:31

## 2018-12-09 RX ADMIN — ATOMOXETINE 100 MG: 60 CAPSULE ORAL at 08:05

## 2018-12-09 RX ADMIN — METOPROLOL TARTRATE 25 MG: 25 TABLET ORAL at 08:05

## 2018-12-09 RX ADMIN — LOSARTAN POTASSIUM: 50 TABLET, FILM COATED ORAL at 08:06

## 2018-12-09 RX ADMIN — INSULIN LISPRO 2 UNITS: 100 INJECTION, SOLUTION INTRAVENOUS; SUBCUTANEOUS at 22:16

## 2018-12-09 RX ADMIN — CYCLOBENZAPRINE 10 MG: 10 TABLET, FILM COATED ORAL at 14:31

## 2018-12-09 RX ADMIN — FAMOTIDINE 20 MG: 20 TABLET, FILM COATED ORAL at 08:05

## 2018-12-09 RX ADMIN — HYDROCODONE BITARTRATE AND ACETAMINOPHEN 2 TABLET: 7.5; 325 TABLET ORAL at 10:22

## 2018-12-09 RX ADMIN — HYDROCODONE BITARTRATE AND ACETAMINOPHEN 2 TABLET: 7.5; 325 TABLET ORAL at 22:39

## 2018-12-09 NOTE — PROGRESS NOTES
Inpatient Rehabilitation Functional Measures Assessment    Functional Measures  ROCCO Eating:  Branch  [facility] Grooming: [place]  [facility] Bathing:  [place]  [facility] Upper Body Dressing:  [place]  [facility] Lower Body Dressing:  [facility] Toileting:  [facility] Bladder Management  Level of Assistance:  [place]  Frequency/Number of Accidents this Shift:  [facility] Bowel Management  Level of Assistance: [place]  Frequency/Number of Accidents this Shift: [facility] Bed/Chair/Wheelchair Transfer:  Activity was not observed.  ROCCO Toilet Transfer:  [facility] Tub/Shower Transfer:  [place]    Previously Documented Mode of Locomotion at Discharge: [place]  ROCCO Expected Mode of Locomotion at Discharge: [facility] Walk/Wheelchair:  WHEELCHAIR OBSERVATION   Activity was not observed.    WALK OBSERVATION   Walk Distance Scale = 3.  Distance walked is greater than 150 feet. Walk Score  = 4.  Patient performs 75% or more of effort and requires minimal assistance.  Incidental help/contact guard/steadying was provided. Patient walked a distance  of  200 feet. Patient requires the following assistive device(s): Rolling  walker.  ROCCO Stairs:  Activity was not observed.    ROCCO Comprehension:  [facility] Expression:  [facility] Social Interaction:  [facility] Problem Solving:  [facility] Memory:  [place]    Therapy Mode Minutes  Occupational Therapy: [place]  Physical Therapy: Individual: 30 minutes.  Speech Language Pathology:  [place]    Signed by: Angelic Bautista, PT

## 2018-12-09 NOTE — PROGRESS NOTES
"   LOS: 9 days   Patient Care Team:  Miladis Colbert APRN as PCP - General (Family Medicine)    Chief Complaint: same    Subjective     History of Present Illness    Subjective Pt is awake and alert. C/O \"scratchy\" throat since surgery. OK to try losenges. Pt states that he is urinating more since addition of diuretic yesterday    History taken from: patient    Objective     Vital Signs  Temp:  [97 °F (36.1 °C)-98.6 °F (37 °C)] 97.6 °F (36.4 °C)  Heart Rate:  [66-74] 66  Resp:  [18] 18  BP: (124-150)/(70-82) 145/82    Objectiveexam unchanged    Results Review:     I reviewed the patient's new clinical results.    Medication Review:     Assessment/Plan       Cervical myelopathy (CMS/HCC)      Assessment & Plan Continue to prepare for dc.     Mike Melissa MD  12/09/18  8:35 AM    Time:       "

## 2018-12-09 NOTE — PROGRESS NOTES
Inpatient Rehabilitation Functional Measures Assessment    Functional Measures  ROCCO Eating:  [facility] Grooming: [facility] Bathing:  [facility] Upper Body Dressing:  [facility] Lower Body Dressing:  [facility] Toileting:  [facility] Bladder Management  Level of Assistance:  [place]  Frequency/Number of Accidents this Shift:  [facility] Bowel Management  Level of Assistance: [place]  Frequency/Number of Accidents this Shift: [facility] Bed/Chair/Wheelchair Transfer:  [facility] Toilet Transfer:  [facility] Tub/Shower Transfer:  [place]    Previously Documented Mode of Locomotion at Discharge: Field  ROCCO Expected Mode of Locomotion at Discharge: [facility] Walk/Wheelchair:  [facility] Stairs:  [facility] Comprehension:  Auditory comprehension is the usual mode. Comprehension  Score = 7, Independent.  Patient comprehends complex/abstract information in  their primary language.  Patient is completely independent for auditory  comprehension.  There are no activity limitations.  ROCCO Expression:  Vocal expression is the usual mode. Expression Score = 7,  Independent.  Patient expresses complex/abstract information in their primary  language.  Patient is completely independent for vocal expression.  There are no  activity limitations.  ROCCO Social Interaction:  Social Interaction Score = 7, Independent. Patient is  completely independent for social interaction.  There are no activity  limitations.  ROCCO Problem Solving:  Problem Solving Score = 7, Independent.  Patient makes  appropriate decisions in order to solve complex problems.  Patient is completely  independent for problem solving.  There are no activity limitations.  ROCCO Memory:  Memory Score = 7, Independent.  Patient is completely independent  for memory.  There are no activity limitations.    Therapy Mode Minutes  Occupational Therapy: Branch  Physical Therapy: Branch  Speech Language Pathology:  [place]    Signed by: Franc Flood RN

## 2018-12-09 NOTE — THERAPY TREATMENT NOTE
Inpatient Rehabilitation - Physical Therapy Treatment Note  [facility]     Patient Name: Duane Mark Witten  : 1955  MRN: [id number]    Today's Date: 2018                 Admit Date: 2018      Visit Dx:      ICD-10-CM ICD-9-CM   1. Cervical stenosis of spinal canal M48.02 723.0   2. Weakness of both lower extremities R29.898 729.89   3. Paresthesia of both upper extremities R20.2 782.0    M79.601 729.5    M79.602    4. Morbid obesity with BMI of 40.0-44.9, adult (CMS/Formerly [facility]) E66.01 278.01    Z68.41 V85.41   5. Impaired functional mobility, balance, gait, and endurance Z74.09 V49.89   6. Functional gait abnormality R26.89 781.2       Patient Active Problem List   Diagnosis   • YANELI (generalized anxiety disorder)   • ADD (attention deficit disorder)   • Depression   • Diabetes type 2, controlled (CMS/Formerly [facility])   • ED (erectile dysfunction) of non-organic origin   • HLD (hyperlipidemia)   • Essential hypertension   • Arthritis   • Psoriasis   • Testosterone deficiency   • Primary insomnia   • Weakness of both lower extremities   • Paresthesia of both upper extremities   • Morbid obesity with BMI of 40.0-44.9, adult (CMS/Formerly [facility])   • Adverse effect of corticosteroids   • Type 2 diabetes mellitus with hyperglycemia (CMS/Formerly [facility])   • Cervical stenosis of spinal canal   • Edema of cervical spinal cord (CMS/Formerly [facility])   • Postoperative atrial fibrillation (CMS/Formerly [facility])   • Cervical myelopathy (CMS/Formerly [facility])       Therapy Treatment    IRF Treatment Summary     Row Name 18 0905             Evaluation/Treatment Time and Intent    Subjective Information  no complaints Pt reports he feels victoriano since restarting home BP meds.  -      Existing Precautions/Restrictions  fall;spinal Brace for comfort  -      Document Type  therapy note (daily note)  -      Mode of Treatment  physical therapy  -      Patient/Family Observations  U pin recliner in room with wife present.  LEs elevated.  -KP      Recorded by [] Angelic Bautista,  PT      Row Name 12/09/18 0905             Cognition/Psychosocial- PT/OT    Affect/Mental Status (Cognitive)  WFL  -KP      Orientation Status (Cognition)  oriented x 4  -KP      Follows Commands (Cognition)  WFL  -KP      Personal Safety Interventions  fall prevention program maintained;gait belt;nonskid shoes/slippers when out of bed;supervised activity  -KP      Recorded by [KP] Angelic Bautista, PT      Row Name 12/09/18 0905             Bed Mobility Assessment/Treatment    Comment (Bed Mobility)  deferred  -KP      Recorded by [KP] Angelic Bautista, PT      Row Name 12/09/18 0905             Sit-Stand Transfer    Sit-Stand [place] (Transfers)  minimum assist (75% patient effort);verbal cues from low recliner in room  -KP      Assistive Device (Sit-Stand Transfers)  walker, front-wheeled  -KP      Recorded by [KP] Angelic Bautista, PT      Row Name 12/09/18 0905             Stand-Sit Transfer    Stand-Sit [place] (Transfers)  contact guard;verbal cues  -KP      Assistive Device (Stand-Sit Transfers)  walker, front-wheeled  -KP      Recorded by [KP] Angelic Bautista, PT      Row Name 12/09/18 0905             Gait/Stairs Assessment/Training    [place] Level (Gait)  minimum assist (75% patient effort);verbal cues  -KP      Assistive Device (Gait)  walker, front-wheeled  -KP      Distance in Feet (Gait)  200 x 2  -KP      Pattern (Gait)  step-through  -KP      Deviations/Abnormal Patterns (Gait)  ataxic;base of support, narrow;stride length decreased;gait speed decreased  -KP      Bilateral Gait Deviations  forward flexed posture;heel strike decreased variable step length, dec push off.  -KP      Comment (Gait/Stairs)  Pt tolerated inc distance today but fatigue was notable at the end of session.  -KP      Recorded by [KP] Angelic Bautista, PT      Row Name 12/09/18 0905             Safety Issues, Functional Mobility    Impairments Affecting Function (Mobility)   balance;coordination;endurance/activity tolerance;strength;pain;sensation/sensory awareness  -KP      Recorded by [] Angelic Bautista PT      Row Name 12/09/18 0905             Pain Scale: Numbers Pre/Post-Treatment    Pain Scale: Numbers, Pretreatment  3/10  -KP      Pain Scale: Numbers, Post-Treatment  3/10  -KP      Pain Location - Orientation  incisional  -      Pain Location  neck  -KP      Pre/Post Treatment Pain Comment  fatigued at end of session but no ins in pain.  -      Pain Intervention(s)  Repositioned;Rest  -KP      Recorded by [] Angelic Bautista PT      Row Name 12/09/18 0905             Lower Extremity Seated Therapeutic Exercise    Performed, Seated Lower Extremity (Therapeutic Exercise)  hip flexion/extension;LAQ (long arc quad), knee extension;ankle dorsiflexion/plantarflexion;hip abduction/adduction glut sets  -KP      Device, Seated Lower Extremity (Therapeutic Exercise)  -- pillow  -KP      Exercise Type, Seated Lower Extremity (Therapeutic Exercise)  AROM (active range of motion);isometric contraction, static  -KP      Sets/Reps Detail, Seated Lower Extremity (Therapeutic Exercise)  1/20  -KP      Recorded by [] Angelic Bautista PT      Row Name 12/09/18 0905             Positioning and Restraints    Pre-Treatment Position  sitting in chair/recliner  -KP      Post Treatment Position  chair  -KP      In Chair  sitting;call light within reach;encouraged to call for assist;with family/caregiver  -KP      Recorded by [] Angelic Bautista PT        User Key  (r) = Recorded By, (t) = Taken By, (c) = Cosigned By    Initials Name Effective Dates     Angelic Bautista PT 04/03/18 -         Wound 11/27/18 1318 Other (See comments) neck incision (Active)   Dressing Appearance open to air;other (see comments) 12/8/2018  7:02 PM   Closure Liquid skin adhesive;Adhesive closure strips 12/8/2018  7:02 PM   Base clean;dry 12/8/2018  7:02 PM   Periwound dry;intact 12/8/2018  7:02 PM    Drainage Amount none 12/8/2018  7:02 PM   Dressing Care, Wound open to air 12/8/2018  7:02 PM     Physical Therapy Education     Title: PT OT SLP Therapies (In Progress)     Topic: Physical Therapy (Done)     Point: Mobility training (Done)     Learning Progress Summary           Patient Eager, E,TB,D, VU,DU,NR by  at 12/9/2018 10:24 AM    Eager, E,TB,D, VU,DU by KP at 12/8/2018 12:00 PM    Acceptance, E,TB,D, VU,NR by  at 12/7/2018  9:22 AM    Acceptance, E,TB, VU,NR by EE at 12/6/2018  9:15 AM    Acceptance, E,TB, VU,NR by EE at 12/5/2018 10:10 AM    Acceptance, E, NR by SIMÓN at 12/4/2018 10:12 AM    Acceptance, E,TB, VU,NR by EE at 12/3/2018  9:48 AM    Acceptance, E, VU,NR by IAN at 12/1/2018  9:50 AM   Significant Other Eager, E,TB,D, VU,DU,NR by  at 12/9/2018 10:24 AM                   Point: Home exercise program (Done)     Learning Progress Summary           Patient Eager, E,TB,D, VU,DU,NR by  at 12/9/2018 10:24 AM    Eager, E,TB,D, VU,DU by  at 12/8/2018 12:00 PM    Acceptance, E,TB,D, VU,NR by  at 12/7/2018  9:22 AM    Acceptance, E,TB, VU,NR by  at 12/6/2018  9:15 AM    Acceptance, E,TB, VU,NR by  at 12/5/2018 10:10 AM    Acceptance, E,TB, VU,NR by EE at 12/3/2018  9:48 AM   Significant Other Eager, E,TB,D, VU,DU,NR by  at 12/9/2018 10:24 AM                   Point: Body mechanics (Done)     Learning Progress Summary           Patient Acceptance, E,TB,D, VU,NR by  at 12/7/2018  9:22 AM    Acceptance, E,TB, VU,NR by EE at 12/6/2018  9:15 AM    Acceptance, E,TB, VU,NR by  at 12/5/2018 10:10 AM    Acceptance, E,TB, VU,NR by EE at 12/3/2018  9:48 AM                   Point: Precautions (Done)     Learning Progress Summary           Patient Acceptance, E,TB,D, VU,NR by EE at 12/7/2018  9:22 AM    Acceptance, E,TB, VU,NR by EE at 12/6/2018  9:15 AM    Acceptance, E,TB, VU,NR by EE at 12/5/2018 10:10 AM    Acceptance, E,TB, VU,NR by EE at 12/3/2018  9:48 AM    Acceptance, E, VU,NR by IAN at  12/1/2018  9:50 AM                               User Key     Initials Effective Dates Name Provider Type Discipline    LB 03/07/18 -  Odette Orourke PTA Physical Therapy Assistant PT    EE 04/03/18 -  Cathleen Rosario, PT Physical Therapist PT    JK 04/03/18 -  Janae Trujillo, PT Physical Therapist PT    KP 04/03/18 -  Angelic Bautista PT Physical Therapist PT                  PT Recommendation and Plan                        Time Calculation:     PT Charges     Row Name 12/09/18 1024             Time Calculation    Start Time  0845  -      Stop Time  0915  -      Time Calculation (min)  30 min  -      PT Received On  12/09/18  -      PT - Next Appointment  12/10/18  -        User Key  (r) = Recorded By, (t) = Taken By, (c) = Cosigned By    Initials Name Provider Type    KP Angelci Bautista, PT Physical Therapist            Therapy Charges for Today     Code Description Service Date Service Provider Modifiers Qty    61494097291 HC PT THER PROC EA 15 MIN 12/8/2018 Angelic Bautista, PT GP 2    50031214993 HC PT THER PROC EA 15 MIN 12/9/2018 Angelic Bautista, PT GP 2                   Angelic ARDON. HAILEY Bautista  12/9/2018

## 2018-12-09 NOTE — PROGRESS NOTES
Inpatient Rehabilitation Functional Measures Assessment    Functional Measures  [facility] Eating:  [facility] Grooming: [facility] Bathing:  [facility] Upper Body Dressing:  [facility] Lower Body Dressing:  [facility] Toileting:  [facility] Bladder Management  Level of Assistance:  [place]  Frequency/Number of Accidents this Shift:  [facility] Bowel Management  Level of Assistance: [place]  Frequency/Number of Accidents this Shift: [facility] Bed/Chair/Wheelchair Transfer:  [facility] Toilet Transfer:  [facility] Tub/Shower Transfer:  [place]    Previously Documented Mode of Locomotion at Discharge: Field  [facility] Expected Mode of Locomotion at Discharge: [facility] Walk/Wheelchair:  [facility] Stairs:  [facility] Comprehension:  Auditory comprehension is the usual mode. Comprehension  Score = 7, Independent.  Patient comprehends complex/abstract information in  their primary language.  Patient is completely independent for auditory  comprehension.  There are no activity limitations.  [place] Expression:  Vocal expression is the usual mode. Expression Score = 7,  Independent.  Patient expresses complex/abstract information in their primary  language.  Patient is completely independent for vocal expression.  There are no  activity limitations.  [facility] Social Interaction:  Social Interaction Score = 7, Independent. Patient is  completely independent for social interaction.  There are no activity  limitations.  [place] Problem Solving:  Problem Solving Score = 6, Modified [place].  Patient  makes appropriate decisions in order to solve complex problems, but requires  extra time.  [place] Memory:  Memory Score = 7, Independent.  Patient is completely independent  for memory.  There are no activity limitations.    Therapy Mode Minutes  Occupational Therapy: [facility]  Physical Therapy: [place]  Speech Language Pathology:  [place]    Signed by: Marissa Torres RN

## 2018-12-09 NOTE — PLAN OF CARE
Problem: Patient Care Overview  Goal: Plan of Care Review  Outcome: Ongoing (interventions implemented as appropriate)   12/09/18 1521   Patient Care Overview   IRF Plan of Care Review progress ongoing, continue   Progress, Functional Goals demonstrating adequate progress   Coping/Psychosocial   Plan of Care Reviewed With patient   OTHER   Outcome Summary Mr. Ramirez has done well today. VS stable, pain controlled with PO pain medicine. No skin issues other than scattered bruising. Numbness and tingling continue to bilateral upper arms. He reports some congestion this morning and sore throat. At times he can have difficulty swallowing (encouraged him to speak with the doctor about this). He has considerable edema present to bilateral lower extremities (back on home dieuretic). He has eaten well today, voided and had a bowel movement. Accuchecks continue ACHS. Wife at bedside most of the day. No further concerns at this time.      Goal: Coping Plan  Outcome: Ongoing (interventions implemented as appropriate)      Problem: Fall Risk (Adult)  Goal: Absence of Fall  Outcome: Ongoing (interventions implemented as appropriate)      Problem: Pain, Acute (Adult)  Goal: Acceptable Pain Control/Comfort Level  Outcome: Ongoing (interventions implemented as appropriate)      Problem: Skin Injury Risk (Adult)  Goal: Skin Health and Integrity  Outcome: Ongoing (interventions implemented as appropriate)

## 2018-12-09 NOTE — PROGRESS NOTES
Inpatient Rehabilitation Plan of Care Note    Plan of Care  Care Plan Reviewed - No updates at this time.    Sphincter Control    Performed Intervention(s)  Monitor intake, output, & bowel movements  Encourage appropriate diet & fluids    Signed by: Marissa Torres RN

## 2018-12-09 NOTE — PLAN OF CARE
Problem: Patient Care Overview  Goal: Plan of Care Review  Outcome: Ongoing (interventions implemented as appropriate)   12/09/18 0313   Patient Care Overview   IRF Plan of Care Review progress ongoing, continue   Progress, Functional Goals demonstrating adequate progress   Coping/Psychosocial   Plan of Care Reviewed With patient;spouse   OTHER   Outcome Summary Pt. is pleasant and cooperative. Complained of any pain to bilateral shoulders and back. Norco 2 tabs PO PRN given and relieved. Flexeril 10 mg PO PRN given per pt. required. Takes whole Meds with water. Spouse at bedside. Uses CPAP at night. BG is 204. Insulin 4 units given. No new issues to note at this time. Will continue to monitor.     Goal: Coping Plan  Outcome: Ongoing (interventions implemented as appropriate)   12/09/18 0313   Coping Plan   Demonstration of Effective Coping Strategies demonstrating adequate progress       Problem: Fall Risk (Adult)  Goal: Absence of Fall  Outcome: Ongoing (interventions implemented as appropriate)   12/09/18 0313   Fall Risk (Adult)   Absence of Fall making progress toward outcome       Problem: Pain, Acute (Adult)  Goal: Acceptable Pain Control/Comfort Level  Outcome: Ongoing (interventions implemented as appropriate)   12/09/18 0313   Pain, Acute (Adult)   Acceptable Pain Control/Comfort Level making progress toward outcome       Problem: Skin Injury Risk (Adult)  Goal: Skin Health and Integrity  Outcome: Ongoing (interventions implemented as appropriate)   12/09/18 0313   Skin Injury Risk (Adult)   Skin Health and Integrity making progress toward outcome

## 2018-12-09 NOTE — PROGRESS NOTES
Inpatient Rehabilitation Plan of Care Note    Plan of Care  Care Plan Reviewed - No updates at this time.    Psychosocial    Performed Intervention(s)  Verbalizes needs & concerns      Safety    Performed Intervention(s)  Safety rounds/ Fall precautions  Items in reach, bed alarm & chair alarms      Sphincter Control    Performed Intervention(s)  Monitor intake, output, & bowel movements  Encourage appropriate diet & fluids      Body Systems    Performed Intervention(s)  Blood glucose AC & HS & insulin as ordered      Pain    Performed Intervention(s)  Medication prn & evaluate effectiveness  Monitor neck incision q shift    Signed by: Franc Flood RN

## 2018-12-10 LAB
GLUCOSE BLDC GLUCOMTR-MCNC: 124 MG/DL (ref 70–130)
GLUCOSE BLDC GLUCOMTR-MCNC: 179 MG/DL (ref 70–130)
GLUCOSE BLDC GLUCOMTR-MCNC: 192 MG/DL (ref 70–130)
GLUCOSE BLDC GLUCOMTR-MCNC: 221 MG/DL (ref 70–130)

## 2018-12-10 PROCEDURE — 97110 THERAPEUTIC EXERCISES: CPT

## 2018-12-10 PROCEDURE — 63710000001 INSULIN LISPRO (HUMAN) PER 5 UNITS: Performed by: HOSPITALIST

## 2018-12-10 PROCEDURE — 97112 NEUROMUSCULAR REEDUCATION: CPT

## 2018-12-10 PROCEDURE — 97535 SELF CARE MNGMENT TRAINING: CPT

## 2018-12-10 PROCEDURE — 82962 GLUCOSE BLOOD TEST: CPT

## 2018-12-10 RX ADMIN — METOPROLOL TARTRATE 25 MG: 25 TABLET ORAL at 08:29

## 2018-12-10 RX ADMIN — METFORMIN HYDROCHLORIDE 1000 MG: 1000 TABLET ORAL at 17:11

## 2018-12-10 RX ADMIN — GLIPIZIDE 10 MG: 10 TABLET ORAL at 08:29

## 2018-12-10 RX ADMIN — LOSARTAN POTASSIUM: 50 TABLET, FILM COATED ORAL at 08:29

## 2018-12-10 RX ADMIN — INSULIN LISPRO 2 UNITS: 100 INJECTION, SOLUTION INTRAVENOUS; SUBCUTANEOUS at 17:15

## 2018-12-10 RX ADMIN — CYCLOBENZAPRINE 10 MG: 10 TABLET, FILM COATED ORAL at 08:29

## 2018-12-10 RX ADMIN — INSULIN LISPRO 2 UNITS: 100 INJECTION, SOLUTION INTRAVENOUS; SUBCUTANEOUS at 11:44

## 2018-12-10 RX ADMIN — CYCLOBENZAPRINE 10 MG: 10 TABLET, FILM COATED ORAL at 17:11

## 2018-12-10 RX ADMIN — METFORMIN HYDROCHLORIDE 1000 MG: 1000 TABLET ORAL at 08:30

## 2018-12-10 RX ADMIN — PIOGLITAZONE 15 MG: 15 TABLET ORAL at 08:29

## 2018-12-10 RX ADMIN — FAMOTIDINE 20 MG: 20 TABLET, FILM COATED ORAL at 08:30

## 2018-12-10 RX ADMIN — LIDOCAINE 2 PATCH: 50 PATCH CUTANEOUS at 08:30

## 2018-12-10 RX ADMIN — INSULIN LISPRO 4 UNITS: 100 INJECTION, SOLUTION INTRAVENOUS; SUBCUTANEOUS at 20:33

## 2018-12-10 RX ADMIN — HYDROCODONE BITARTRATE AND ACETAMINOPHEN 2 TABLET: 7.5; 325 TABLET ORAL at 21:14

## 2018-12-10 RX ADMIN — HYDROCODONE BITARTRATE AND ACETAMINOPHEN 2 TABLET: 7.5; 325 TABLET ORAL at 08:29

## 2018-12-10 RX ADMIN — HYDROCODONE BITARTRATE AND ACETAMINOPHEN 2 TABLET: 7.5; 325 TABLET ORAL at 17:11

## 2018-12-10 RX ADMIN — RIVAROXABAN 20 MG: 20 TABLET, FILM COATED ORAL at 17:11

## 2018-12-10 RX ADMIN — ATOMOXETINE 100 MG: 60 CAPSULE ORAL at 08:29

## 2018-12-10 RX ADMIN — DULOXETINE HYDROCHLORIDE 60 MG: 60 CAPSULE, DELAYED RELEASE ORAL at 08:29

## 2018-12-10 RX ADMIN — METOPROLOL TARTRATE 25 MG: 25 TABLET ORAL at 20:33

## 2018-12-10 RX ADMIN — HYDROCODONE BITARTRATE AND ACETAMINOPHEN 2 TABLET: 7.5; 325 TABLET ORAL at 04:47

## 2018-12-10 RX ADMIN — HYDROCODONE BITARTRATE AND ACETAMINOPHEN 2 TABLET: 7.5; 325 TABLET ORAL at 12:44

## 2018-12-10 NOTE — PROGRESS NOTES
Inpatient Rehabilitation Functional Measures Assessment    Functional Measures  [facility] Eating:  [facility] Grooming: [facility] Bathing:  [facility] Upper Body Dressing:  [facility] Lower Body Dressing:  [facility] Toileting:  [facility] Bladder Management  Level of Assistance:  [place]  Frequency/Number of Accidents this Shift:  [facility] Bowel Management  Level of Assistance: [place]  Frequency/Number of Accidents this Shift: [facility] Bed/Chair/Wheelchair Transfer:  [facility] Toilet Transfer:  [facility] Tub/Shower Transfer:  [place]    Previously Documented Mode of Locomotion at Discharge: Field  [place] Expected Mode of Locomotion at Discharge: [facility] Walk/Wheelchair:  [facility] Stairs:  [facility] Comprehension:  Auditory comprehension is the usual mode. Comprehension  Score = 7, Independent.  Patient comprehends complex/abstract information in  their primary language.  Patient is completely independent for auditory  comprehension.  There are no activity limitations.  [place] Expression:  Vocal expression is the usual mode. Expression Score = 7,  Independent.  Patient expresses complex/abstract information in their primary  language.  Patient is completely independent for vocal expression.  There are no  activity limitations.  [place] Social Interaction:  Social Interaction Score = 7, Independent. Patient is  completely independent for social interaction.  There are no activity  limitations.  [place] Problem Solving:  Activity was not observed.  [place] Memory:  Memory Score = 7, Independent.  Patient is completely independent  for memory.  There are no activity limitations.    Therapy Mode Minutes  Occupational Therapy: [facility]  Physical Therapy: [place]  Speech Language Pathology:  [place]    Signed by: Maria Luisa Lora RN

## 2018-12-10 NOTE — THERAPY TREATMENT NOTE
Inpatient Rehabilitation - Physical Therapy Treatment Note  [facility]     Patient Name: Duane Mark Witten  : 1955  MRN: [id number]    Today's Date: 12/10/2018                 Admit Date: 2018      Visit Dx:      ICD-10-CM ICD-9-CM   1. Cervical stenosis of spinal canal M48.02 723.0   2. Weakness of both lower extremities R29.898 729.89   3. Paresthesia of both upper extremities R20.2 782.0    M79.601 729.5    M79.602    4. Morbid obesity with BMI of 40.0-44.9, adult (CMS/Formerly [facility]) E66.01 278.01    Z68.41 V85.41   5. Impaired functional mobility, balance, gait, and endurance Z74.09 V49.89   6. Functional gait abnormality R26.89 781.2       Patient Active Problem List   Diagnosis   • YANELI (generalized anxiety disorder)   • ADD (attention deficit disorder)   • Depression   • Diabetes type 2, controlled (CMS/[facility]ly [facility])   • ED (erectile dysfunction) of non-organic origin   • HLD (hyperlipidemia)   • Essential hypertension   • Arthritis   • Psoriasis   • Testosterone deficiency   • Primary insomnia   • Weakness of both lower extremities   • Paresthesia of both upper extremities   • Morbid obesity with BMI of 40.0-44.9, adult (CMS/Formerly [facility])   • Adverse effect of corticosteroids   • Type 2 diabetes mellitus with hyperglycemia (CMS/[facility]ly [facility])   • Cervical stenosis of spinal canal   • Edema of cervical spinal cord (CMS/[facility]ly [facility])   • Postoperative atrial fibrillation (CMS/[facility]ly [facility])   • Cervical myelopathy (CMS/[facility]ly [facility])       Therapy Treatment    IRF Treatment Summary     Row Name 12/10/18 1309 12/10/18 9270          Evaluation/Treatment Time and Intent    Subjective Information  no complaints  -CC  no complaints  -EE     Existing Precautions/Restrictions  fall;spinal  -CC  fall;spinal  -EE     Document Type  therapy note (daily note)  -CC  therapy note (daily note)  -EE     Mode of Treatment  occupational therapy  -CC  physical therapy  -EE     Patient/Family Observations  -- supine in bed  -CC  Pt supine in bed in no acute distress  -EE      Recorded by [CC] Amina Snyder OTR [EE] Cathleen Rosario, PT     Row Name 12/10/18 1309 12/10/18 0830          Cognition/Psychosocial- PT/OT    Affect/Mental Status (Cognitive)  WFL  -CC  WFL  -EE     Orientation Status (Cognition)  oriented x 4  -CC  oriented x 4  -EE     Follows Commands (Cognition)  WFL  -CC  WFL  -EE     Personal Safety Interventions  fall prevention program maintained;gait belt;nonskid shoes/slippers when out of bed  -CC  fall prevention program maintained;gait belt;muscle strengthening facilitated;nonskid shoes/slippers when out of bed;supervised activity  -EE     Recorded by [CC] Amina Snyder OTR [EE] Cathleen Rosario, PT     Row Name 12/10/18 1309 12/10/18 0830          Bed Mobility Assessment/Treatment    [place] (Bed Mobility)  --  supervision  -EE     [place] (Bed Mobility)  contact guard;minimum assist (75% patient effort);verbal cues  -CC  --     Sidelying-Sit [place] (Bed Mobility)  contact guard;verbal cues  -CC  contact guard;verbal cues  -EE     Assistive Device (Bed Mobility)  --  head of bed elevated;bed rails  -EE     Recorded by [CC] Amina Snyder OTR [EE] Cathleen Rosario, PT     Row Name 12/10/18 0830             Bed-Chair Transfer    Bed-[place]air [place] (Transfers)  minimum assist (75% patient effort);verbal cues  -EE      Assistive Device (Bed-Chair Transfers)  walker, front-wheeled  -EE      Recorded by [EE] Cathleen Rosario, PT      Row Name 12/10/18 1309 12/10/18 0830          Chair-Bed Transfer    Chair-Bed [place] (Transfers)  minimum assist (75% patient effort);verbal cues  -CC  minimum assist (75% patient effort);verbal cues  -EE     Assistive Device (Chair-Bed Transfers)  walker, front-wheeled  -CC  walker, front-wheeled  -EE     Recorded by [CC] Amina Snyder OTR [EE] Cathleen Rosario, PT     Row Name 12/10/18 1309 12/10/18 0830          Sit-Stand Transfer    Sit-Stand [place] (Transfers)  minimum assist (75%  patient effort);verbal cues  -CC  minimum assist (75% patient effort);verbal cues  -EE     Assistive Device (Sit-Stand Transfers)  walker, front-wheeled  -CC  walker, front-wheeled  -EE     Recorded by [CC] Amina Snyder OTR [EE] Cathleen Rosario, PT     Row Name 12/10/18 0830             Stand-Sit Transfer    Stand-Sit [place] (Transfers)  contact guard;verbal cues  -EE      Assistive Device (Stand-Sit Transfers)  walker, front-wheeled  -EE      Recorded by [EE] Cathleen Rosario, PT      Row Name 12/10/18 1309 12/10/18 0830          Toilet Transfer    Type (Toilet Transfer)  stand pivot/stand step  -CC  stand pivot/stand step  -EE     [place] Level (Toilet Transfer)  verbal cues;contact guard;minimum assist (75% patient effort)  -CC  minimum assist (75% patient effort);verbal cues  -EE     Assistive Device (Toilet Transfer)  commode;grab bars/safety frame;wheelchair  -CC  commode;grab bars/safety frame;wheelchair  -EE     Recorded by [CC] Amina Snyder OTR [EE] Cathleen Rosario, PT     Row Name 12/10/18 0830             Gait/Stairs Assessment/Training    [place] Level (Gait)  minimum assist (75% patient effort);verbal cues  -EE      Assistive Device (Gait)  walker, front-wheeled  -EE      Distance in Feet (Gait)  160, 80 x 3  -EE      Pattern (Gait)  step-through  -EE      Deviations/Abnormal Patterns (Gait)  ataxic;base of support, narrow;sarah decreased;stride length decreased  -EE      Bilateral Gait Deviations  forward flexed posture;weight shift ability decreased;heel strike decreased  -EE      Left Sided Gait Deviations  knee buckling, left side  -EE      Right Sided Gait Deviations  --  -EE      Comment (Gait/Stairs)  2 instances of L knee buckling; pt able to recover with min A  -EE      Recorded by [EE] Cathleen Rosario, HAILEY      Row Name 12/10/18 0830             Wheelchair Mobility/Management    Method of Wheelchair Locomotion (Mobility)  bipedal (lower extremity) propulsion;bimanual (upper  extremity) propulsion  -EE      Mobility Activities (Wheelchair)  forward propulsion;steering;turning;doorway navigation  -EE      Forward Propulsion [place] (Wheelchair)  supervision  -EE      Steering [place] (Wheelchair)  supervision  -EE      Turning [place] (Wheelchair)  supervision  -EE      Doorway Navigation [place] (Wheelchair)  supervision  -EE      Distance Propelled in Feet (Wheelchair)  160  -EE      Recorded by [EE] Cathleen Rosario, PT      Row Name 12/10/18 0830             Safety Issues, Functional Mobility    Impairments Affecting Function (Mobility)  balance;coordination;endurance/activity tolerance;strength;pain;sensation/sensory awareness  -EE      Recorded by [EE] Cathleen Rosario, PT      Row Name 12/10/18 1309             Bathing Assessment/Treatment    Bathing [place] Level  bathing skills;minimum assist (75% patient effort);verbal cues  -CC      Bathing Position  supported sitting;supported standing  -CC      Bathing Setup Assistance  adjust water temperature;obtain supplies  -CC      Comment (Bathing)  at sink  -CC      Recorded by [CC] Amina Snyder OTR      Row Name 12/10/18 1309             Upper Body Dressing Assessment/Treatment    Upper Body Dressing Task  upper body dressing skills;doff;don;pull over garment;verbal cues;minimum assist (75% or more patient effort);moderate assist (50-74% patient effort)  -CC      Upper Body Dressing Position  supported sitting  -CC      Set-up Assistance (Upper Body Dressing)  obtain clothing  -CC      Comment (Upper Body Dressing)  -- w/c level  -CC      Recorded by [CC] Amina Snyder OTR      Row Name 12/10/18 1309             Lower Body Dressing Assessment/Treatment    Lower Body Dressing [place] Level  doff;don;pants/bottoms;shoes/slippers;socks;underwear;verbal cues;minimum assist (75% patient effort);moderate assist (50% patient effort)  -CC      Lower Body Dressing Position  supported sitting;supported standing   "-CC      Lower Body Dressing Setup Assistance  obtain clothing  -CC      Comment (Lower Body Dressing)  assist w socks  -CC      Recorded by [CC] Amina Snyder OTR      Row Name 12/10/18 1309             Grooming Assessment/Treatment    Grooming [place] Level  grooming skills;supervision;minimum assist (75% patient effort)  -CC      Grooming Position  sink side;supported sitting  -CC      Grooming Setup Assistance  obtain supplies  -CC      Comment (Grooming)  assist to comb hair  -CC      Recorded by [CC] Amina Snyder OTR      Row Name 12/10/18 1309             Toileting Assessment/Treatment    Toileting [place] Level  toileting skills;verbal cues;minimum assist (75% patient effort)  -CC      Assistive Device Use (Toileting)  grab bar/safety frame;raised toilet seat  -CC      Toileting Position  supported standing;unsupported sitting  -CC      Recorded by [CC] Amina Snyder OTR      Row Name 12/10/18 1309             Fine Motor Testing & Training    Comment, Fine Motor Coordination  3 pt grasp w 1\" square pegs min difficulty w placement in board  -CC      Recorded by [CC] Amina Snyder OTR      Row Name 12/10/18 1309 12/10/18 0830          Pain Scale: Numbers Pre/Post-Treatment    Pain Scale: Numbers, Pretreatment  0/10 - no pain  -CC  6/10  -EE     Pain Scale: Numbers, Post-Treatment  0/10 - no pain  -CC  5/10  -EE     Pain Location - Orientation  --  incisional  -EE     Pain Location  --  neck  -EE     Pain Intervention(s)  --  Repositioned;Rest;Medication (See MAR)  -EE     Recorded by [CC] Amina Snyder OTR [EE] Cathleen Rosario, PT     Row Name 12/10/18 1309             Standing Balance Activity    Activities Performed (Standing, Balance Training)  -- CGA with ADLs  -CC      Recorded by [CC] Amina Snyder OTR      Row Name 12/10/18 0830             Lower Extremity Seated Therapeutic Exercise    Performed, Seated Lower Extremity (Therapeutic Exercise)  hip flexion/extension;hip " abduction/adduction;ankle dorsiflexion/plantarflexion;LAQ (long arc quad), knee extension;knee flexion/extension  -EE      Device, Seated Lower Extremity (Therapeutic Exercise)  elastic bands/tubing;free weights, cuff;small ball red tband, 1.5# weights  -EE      Exercise Type, Seated Lower Extremity (Therapeutic Exercise)  resistive exercise  -EE      Sets/Reps Detail, Seated Lower Extremity (Therapeutic Exercise)  2/20  -EE      Recorded by [EE] Cathleen Rosario PT      Row Name 12/10/18 1309             Neuromuscular Re-education    Activities/Techniques Used (Neuromuscular Re-education)  -- supine dowel han  no resistance 5 repx2 chest press and flex  -CC      Comment (Neuromuscular Re-education)  table sklides on moderatly inclined table. 10 x2  -CC      Recorded by [CC] Amina Snyder OTR      Row Name 12/10/18 1309 12/10/18 0830          Positioning and Restraints    Pre-Treatment Position  in bed  -CC  in bed  -EE     Post Treatment Position  chair  -CC  wheelchair  -EE     In Chair  sitting;call light within reach;with family/caregiver spouse  -CC  --     In Wheelchair  --  sitting;call light within reach;encouraged to call for assist;exit alarm on;with family/caregiver  -EE     Recorded by [CC] Amina Snyder OTR [EE] Cathleen Rosario PT       User Key  (r) = Recorded By, (t) = Taken By, (c) = Cosigned By    Initials Name Effective Dates    CC Amina Snyder OTR 06/08/18 -     EE Cathleen Rosario PT 04/03/18 -         Wound 11/27/18 1318 Other (See comments) neck incision (Active)   Dressing Appearance open to air 12/9/2018  9:10 PM   Closure Liquid skin adhesive;Adhesive closure strips 12/10/2018  7:06 AM   Periwound dry;intact 12/10/2018  7:06 AM   Drainage Amount none 12/10/2018  7:06 AM   Dressing Care, Wound open to air 12/10/2018  7:06 AM     Physical Therapy Education     Title: PT OT SLP Therapies (In Progress)     Topic: Physical Therapy (Done)     Point: Mobility training (Done)     Learning  Progress Summary           Patient Acceptance, E,TB,D, VU,NR by EE at 12/10/2018  8:45 AM    Eager, E,TB,D, VU,DU,NR by KP at 12/9/2018 10:24 AM    Eager, E,TB,D, VU,DU by KP at 12/8/2018 12:00 PM    Acceptance, E,TB,D, VU,NR by EE at 12/7/2018  9:22 AM    Acceptance, E,TB, VU,NR by EE at 12/6/2018  9:15 AM    Acceptance, E,TB, VU,NR by EE at 12/5/2018 10:10 AM    Acceptance, E, NR by SIMÓN at 12/4/2018 10:12 AM    Acceptance, E,TB, VU,NR by EE at 12/3/2018  9:48 AM    Acceptance, E, VU,NR by IAN at 12/1/2018  9:50 AM   Significant Other Eager, E,TB,D, VU,DU,NR by KP at 12/9/2018 10:24 AM                   Point: Home exercise program (Done)     Learning Progress Summary           Patient Acceptance, E,TB,D, VU,NR by EE at 12/10/2018  8:45 AM    Eager, E,TB,D, VU,DU,NR by MAR at 12/9/2018 10:24 AM    Eager, E,TB,D, VU,DU by KP at 12/8/2018 12:00 PM    Acceptance, E,TB,D, VU,NR by EE at 12/7/2018  9:22 AM    Acceptance, E,TB, VU,NR by EE at 12/6/2018  9:15 AM    Acceptance, E,TB, VU,NR by EE at 12/5/2018 10:10 AM    Acceptance, E,TB, VU,NR by EE at 12/3/2018  9:48 AM   Significant Other Eager, E,TB,D, VU,DU,NR by KP at 12/9/2018 10:24 AM                   Point: Body mechanics (Done)     Learning Progress Summary           Patient Acceptance, E,TB,D, VU,NR by EE at 12/10/2018  8:45 AM    Acceptance, E,TB,D, VU,NR by EE at 12/7/2018  9:22 AM    Acceptance, E,TB, VU,NR by EE at 12/6/2018  9:15 AM    Acceptance, E,TB, VU,NR by EE at 12/5/2018 10:10 AM    Acceptance, E,TB, VU,NR by EE at 12/3/2018  9:48 AM                   Point: Precautions (Done)     Learning Progress Summary           Patient Acceptance, E,TB,D, VU,NR by EE at 12/10/2018  8:45 AM    Acceptance, E,TB,D, VU,NR by EE at 12/7/2018  9:22 AM    Acceptance, E,TB, VU,NR by EE at 12/6/2018  9:15 AM    Acceptance, E,TB, VU,NR by EE at 12/5/2018 10:10 AM    Acceptance, E,TB, VU,NR by EE at 12/3/2018  9:48 AM    Acceptance, E, VU,NR by IAN at 12/1/2018  9:50 AM                                User Key     Initials Effective Dates Name Provider Type Discipline    LB 03/07/18 -  Odette Orourke, PTA Physical Therapy Assistant PT    EE 04/03/18 -  Cathleen Rosario, PT Physical Therapist PT    JK 04/03/18 -  Janae Trujillo, PT Physical Therapist PT    KP 04/03/18 -  Angelic Bautista, PT Physical Therapist PT                  PT Recommendation and Plan                        Time Calculation:     PT Charges     Row Name 12/10/18 1341 12/10/18 0932          Time Calculation    Start Time  1300  -EE  0830  -EE     Stop Time  1330  -EE  0930  -EE     Time Calculation (min)  30 min  -EE  60 min  -EE     PT Received On  12/10/18  -EE  12/10/18  -EE     PT - Next Appointment  12/11/18  -EE  12/10/18  -EE       User Key  (r) = Recorded By, (t) = Taken By, (c) = Cosigned By    Initials Name Provider Type    EE Cathleen Rosario, PT Physical Therapist            Therapy Charges for Today     Code Description Service Date Service Provider Modifiers Qty    [id number] HC PT THER PROC EA 15 MIN 12/10/2018 Cathleen Rosario, PT GP 6                   Cathleen Rosario, PT  12/10/2018

## 2018-12-10 NOTE — PROGRESS NOTES
Inpatient Rehabilitation Functional Measures Assessment and Plan of Care    Plan of Care  Updated Problems/Interventions  Field    Functional Measures  [facility] Eating:  [facility] Grooming: [facility] Bathing:  [facility] Upper Body Dressing:  [facility] Lower Body Dressing:  [facility] Toileting:  [facility] Bladder Management  Level of Assistance:  [place]  Frequency/Number of Accidents this Shift:  [facility] Bowel Management  Level of Assistance: [place]  Frequency/Number of Accidents this Shift: [facility] Bed/Chair/Wheelchair Transfer:  [facility] Toilet Transfer:  [facility] Tub/Shower Transfer:  [place]    Previously Documented Mode of Locomotion at Discharge: Field  [place] Expected Mode of Locomotion at Discharge: [facility] Walk/Wheelchair:  [facility] Stairs:  [facility] Comprehension:  [facility] Expression:  [facility] Social Interaction:  [facility] Problem Solving:  [facility] Memory:  [place]    Therapy Mode Minutes  Occupational Therapy: Individual: 60 minutes.  Physical Therapy: [place]  Speech Language Pathology:  [place]    Signed by: NURIS Lara/REVA

## 2018-12-10 NOTE — PROGRESS NOTES
Inpatient Rehabilitation Functional Measures Assessment    Functional Measures  ROCCO Eating:  [facility] Grooming: [facility] Bathing:  [facility] Upper Body Dressing:  [facility] Lower Body Dressing:  [facility] Toileting:  [facility] Bladder Management  Level of Assistance:  [place]  Frequency/Number of Accidents this Shift:  [facility] Bowel Management  Level of Assistance: [place]  Frequency/Number of Accidents this Shift: [facility] Bed/Chair/Wheelchair Transfer:  [facility] Toilet Transfer:  [facility] Tub/Shower Transfer:  [place]    Previously Documented Mode of Locomotion at Discharge: Field  [place] Expected Mode of Locomotion at Discharge: [facility] Walk/Wheelchair:  [facility] Stairs:  [facility] Comprehension:  Auditory comprehension is the usual mode. Comprehension  Score = 7, Independent.  Patient comprehends complex/abstract information in  their primary language.  Patient is completely independent for auditory  comprehension.  There are no activity limitations.  [place] Expression:  Vocal expression is the usual mode. Expression Score = 7,  Independent.  Patient expresses complex/abstract information in their primary  language.  Patient is completely independent for vocal expression.  There are no  activity limitations.  [place] Social Interaction:  Social Interaction Score = 6, Modified Independent.  Patient is modified independent for social interaction, requiring: Requires  additional time.  [place] Problem Solving:  Problem Solving Score = 7, Independent.  Patient makes  appropriate decisions in order to solve complex problems.  Patient is completely  independent for problem solving.  There are no activity limitations.  [place] Memory:  Memory Score = 6, Modified [place].  Patient is modified  independent for memory, requiring: Requires additional time.    Therapy Mode Minutes  Occupational Therapy: [facility]  Physical Therapy: [facility]  Speech Language Pathology:  [place]    Signed by: Wojciech Lentz  RN

## 2018-12-10 NOTE — PROGRESS NOTES
Inpatient Rehabilitation Plan of Care Note    Plan of Care  Updated Problems/Interventions  Mobility    [PT] Stairs(Active)  Current Status(12/10/2018): TBD  Weekly Goal(12/18/2018): PT only  Discharge Goal: 8, SBA with rails    [PT] Walk(Active)  Current Status(12/10/2018): 80'-160' Min A with RWX  Weekly Goal(12/18/2018): CGA/SBA to BR with RWX  Discharge Goal: 250' Supervision with RWX    [PT] Bed/Chair/Wheelchair(Active)  Current Status(12/10/2018): Min A with RWX  Weekly Goal(12/18/2018): CGA/SBA with RWX  Discharge Goal: Supervision    [PT] Bed Mobility(Active)  Current Status(12/10/2018): Min/CGA  Weekly Goal(12/18/2018): Mod I  Discharge Goal: Mod I    Signed by: JEFFERSON HeartT

## 2018-12-10 NOTE — PROGRESS NOTES
LOS: 10 days   Patient Care Team:  Miladis Colbert APRN as PCP - General (Family Medicine)    Chief Complaint:   Cervical myelopathy    Subjective     History of Present Illness    Subjective     Progressing with rehab. Strength same.     History taken from: patient    Objective     Vital Signs  Temp:  [96.7 °F (35.9 °C)-98 °F (36.7 °C)] 97.9 °F (36.6 °C)  Heart Rate:  [68-84] 78  Resp:  [18-20] 20  BP: (135-157)/(73-89) 135/75    Objective  MENTAL STATUS -  AWAKE / ALERT  HEENT- neck incision intact. No Drainage.   LUNGS - CTA, NO WHEEZES, RALES OR RHONCHI  HEART-S1,S2- IRREGULAR  ABD - NORMOACTIVE BOWEL SOUNDS, SOFT, NT.    EXT - 2+ BLE  NEURO - right hand incoordination.   MOTOR EXAM - takes resistance BUE and BLE.     Results Review:     I reviewed the patient's new clinical results.            Invalid input(s): DIANA DÍAZ    Medication Review: done  Scheduled Meds:    atomoxetine 100 mg Oral Daily   DULoxetine 60 mg Oral Daily   famotidine 20 mg Oral Daily   glipiZIDE 10 mg Oral QAM AC   insulin lispro 0-9 Units Subcutaneous 4x Daily With Meals & Nightly   lidocaine 2 patch Transdermal Q24H   losartan-HCTZ (HYZAAR) 100-12.5 combo dose  Oral Daily   metFORMIN 1,000 mg Oral BID With Meals   metoprolol tartrate 25 mg Oral Q12H   pioglitazone 15 mg Oral Daily   rivaroxaban 20 mg Oral Daily With Dinner     Continuous Infusions:   PRN Meds:.•  acetaminophen  •  albuterol  •  cyclobenzaprine  •  dextrose  •  dextrose  •  docusate sodium  •  HYDROcodone-acetaminophen  •  melatonin  •  ondansetron **OR** ondansetron ODT **OR** ondansetron  •  sennosides-docusate sodium      Assessment/Plan       Cervical myelopathy (CMS/HCC)      Assessment & Plan  Cervical myelopathy    Immobilization syndrome status post C4, C5, 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 BEKAH reviewed. Add lidoderm patch to B scapular area.     history of morbid  obesity.    Type 2 diabetes mellitus. Glipizide/metformin/actos    Hypertension - metoprolol    Atrial fibrillation - metoprolol/ Xarelto    Anxiety/depression -Cymbalta    Attention deficit disorder- Strattera    DVT prophylaxis - SCDs/ Xarelto    Continue rehab program.      Jose Shah MD  12/10/18  9:58 AM    Time:

## 2018-12-10 NOTE — PROGRESS NOTES
Inpatient Rehabilitation Plan of Care Note    Plan of Care  Care Plan Reviewed - No updates at this time.    Safety    Performed Intervention(s)  Safety rounds/ Fall precautions  Items in reach, bed alarm & chair alarms    Signed by: Wojciech Lentz RN

## 2018-12-10 NOTE — THERAPY TREATMENT NOTE
Inpatient Rehabilitation - Occupational Therapy Treatment Note    [facility]     Patient Name: Duane Mark Witten  : 1955  MRN: [id number]    Today's Date: 12/10/2018                 Admit Date: 2018      Visit Dx:    ICD-10-CM ICD-9-CM   1. Cervical stenosis of spinal canal M48.02 723.0   2. Weakness of both lower extremities R29.898 729.89   3. Paresthesia of both upper extremities R20.2 782.0    M79.601 729.5    M79.602    4. Morbid obesity with BMI of 40.0-44.9, adult (CMS/[facility]) E66.01 278.01    Z68.41 V85.41   5. Impaired functional mobility, balance, gait, and endurance Z74.09 V49.89   6. Functional gait abnormality R26.89 781.2       Patient Active Problem List   Diagnosis   • YANELI (generalized anxiety disorder)   • ADD (attention deficit disorder)   • Depression   • Diabetes type 2, controlled (CMS/[facility])   • ED (erectile dysfunction) of non-organic origin   • HLD (hyperlipidemia)   • Essential hypertension   • Arthritis   • Psoriasis   • Testosterone deficiency   • Primary insomnia   • Weakness of both lower extremities   • Paresthesia of both upper extremities   • Morbid obesity with BMI of 40.0-44.9, adult (CMS/[facility])   • Adverse effect of corticosteroids   • Type 2 diabetes mellitus with hyperglycemia (CMS/[facility])   • Cervical stenosis of spinal canal   • Edema of cervical spinal cord (CMS/[facility])   • Postoperative atrial fibrillation (CMS/[facility])   • Cervical myelopathy (CMS/[facility])         Therapy Treatment    IRF Treatment Summary     Row Name 12/10/18 1443 12/10/18 1309 12/10/18 0830       Evaluation/Treatment Time and Intent    Subjective Information  complains of;fatigue  -AF  no complaints  -CC  no complaints  -EE    Existing Precautions/Restrictions  fall;spinal  -AF  fall;spinal  -CC  fall;spinal  -EE    Document Type  therapy note (daily note)  -AF  therapy note (daily note)  -CC  therapy note (daily note)  -EE    Mode of Treatment  occupational therapy  -AF  occupational therapy  -CC  physical  therapy  -EE    Patient/Family Observations  sitting up in w/c in room  -AF  -- supine in bed  -CC  Pt supine in bed in no acute distress  -EE    Recorded by [AF] Lolis Cooley OTR [CC] Amina Snyder OTR [EE] Cathleen Rosario, PT    Row Name 12/10/18 1443 12/10/18 1309 12/10/18 0830       Cognition/Psychosocial- PT/OT    Affect/Mental Status (Cognitive)  WFL  -AF  WFL  -CC  WFL  -EE    Orientation Status (Cognition)  oriented x 4  -AF  oriented x 4  -CC  oriented x 4  -EE    Follows Commands (Cognition)  WFL  -AF  WFL  -CC  WFL  -EE    Personal Safety Interventions  fall prevention program maintained;gait belt;nonskid shoes/slippers when out of bed  -AF  fall prevention program maintained;gait belt;nonskid shoes/slippers when out of bed  -CC  fall prevention program maintained;gait belt;muscle strengthening facilitated;nonskid shoes/slippers when out of bed;supervised activity  -EE    Recorded by [AF] Lolis Cooley OTR [CC] Amina Snyder OTR [EE] Abraham Cathleen, PT    Row Name 12/10/18 1443 12/10/18 1309 12/10/18 0830       Bed Mobility Assessment/Treatment    Rolling Left [place] (Bed Mobility)  --  --  supervision  -EE    Rolling Right [place] (Bed Mobility)  --  contact guard;minimum assist (75% patient effort);verbal cues  -CC  --    Supine-Sit [place] (Bed Mobility)  contact guard;verbal cues  -AF  --  --    Sit-Supine [place] (Bed Mobility)  contact guard;verbal cues  -AF  --  --    Sidelying-Sit [place] (Bed Mobility)  --  contact guard;verbal cues  -CC  contact guard;verbal cues  -EE    Assistive Device (Bed Mobility)  --  --  head of bed elevated;bed rails  -EE    Recorded by [AF] Lolis Cooley OTR [CC] Amina Snyder OTR [EE] Keira Rosarioily, PT    Row Name 12/10/18 1443             Transfer Assessment/Treatment    Comment (Transfers)  EOM <> w/c MIN A  -AF      Recorded by [AF] Lolis Cooley OTR      Row Name 12/10/18 0830             Bed-Chair Transfer     Bed-Chair [place] (Transfers)  minimum assist (75% patient effort);verbal cues  -EE      Assistive Device (Bed-Chair Transfers)  walker, front-wheeled  -EE      Recorded by [EE] Cathleen Rosario, PT      Row Name 12/10/18 1443 12/10/18 1309 12/10/18 0830       Chair-Bed Transfer    Chair-Bed [place] (Transfers)  minimum assist (75% patient effort);verbal cues  -AF  minimum assist (75% patient effort);verbal cues  -CC  minimum assist (75% patient effort);verbal cues  -EE    Assistive Device (Chair-Bed Transfers)  wheelchair  -AF  walker, front-wheeled  -CC  walker, front-wheeled  -EE    Recorded by [AF] Lolis Cooley OTR [CC] Amina Snyder OTR [EE] Cathleen Rosario, PT    Row Name 12/10/18 1309 12/10/18 0830          Sit-Stand Transfer    Sit-Stand [place] (Transfers)  minimum assist (75% patient effort);verbal cues  -CC  minimum assist (75% patient effort);verbal cues  -EE     Assistive Device (Sit-Stand Transfers)  walker, front-wheeled  -CC  walker, front-wheeled  -EE     Recorded by [CC] Amina Snyder OTR [EE] Cathleen Rosario, PT     Row Name 12/10/18 0830             Stand-Sit Transfer    Stand-Sit [place] (Transfers)  contact guard;verbal cues  -EE      Assistive Device (Stand-Sit Transfers)  walker, front-wheeled  -EE      Recorded by [EE] Cathleen Rosario, PT      Row Name 12/10/18 1309 12/10/18 0830          Toilet Transfer    Type (Toilet Transfer)  stand pivot/stand step  -CC  stand pivot/stand step  -EE     [place] Level (Toilet Transfer)  verbal cues;contact guard;minimum assist (75% patient effort)  -CC  minimum assist (75% patient effort);verbal cues  -EE     Assistive Device (Toilet Transfer)  commode;grab bars/safety frame;wheelchair  -CC  commode;grab bars/safety frame;wheelchair  -EE     Recorded by [CC] Amina Snyder OTR [EE] Cathleen Rosario, PT     Row Name 12/10/18 0830             Gait/Stairs Assessment/Training    [place] Level (Gait)  minimum assist (75% patient  effort);verbal cues  -EE      Assistive Device (Gait)  walker, front-wheeled  -EE      Distance in Feet (Gait)  160, 80 x 3  -EE      Pattern (Gait)  step-through  -EE      Deviations/Abnormal Patterns (Gait)  ataxic;base of support, narrow;sarah decreased;stride length decreased  -EE      Bilateral Gait Deviations  forward flexed posture;weight shift ability decreased;heel strike decreased  -EE      Left Sided Gait Deviations  knee buckling, left side  -EE      Right Sided Gait Deviations  --  -EE      Comment (Gait/Stairs)  2 instances of L knee buckling; pt able to recover with min A  -EE      Recorded by [EE] Cathleen Rosario, PT      Row Name 12/10/18 0830             Wheelchair Mobility/Management    Method of Wheelchair Locomotion (Mobility)  bipedal (lower extremity) propulsion;bimanual (upper extremity) propulsion  -EE      Mobility Activities (Wheelchair)  forward propulsion;steering;turning;doorway navigation  -EE      Forward Propulsion [place] (Wheelchair)  supervision  -EE      Steering [place] (Wheelchair)  supervision  -EE      Turning [place] (Wheelchair)  supervision  -EE      Doorway Navigation [place] (Wheelchair)  supervision  -EE      Distance Propelled in Feet (Wheelchair)  160  -EE      Recorded by [EE] Cathleen Rosario, PT      Row Name 12/10/18 7216             Safety Issues, Functional Mobility    Impairments Affecting Function (Mobility)  balance;coordination;endurance/activity tolerance;strength;pain;sensation/sensory awareness  -EE      Recorded by [EE] Cathleen Rosario, PT      Row Name 12/10/18 1301             Bathing Assessment/Treatment    Bathing [place] Level  bathing skills;minimum assist (75% patient effort);verbal cues  -CC      Bathing Position  supported sitting;supported standing  -CC      Bathing Setup Assistance  adjust water temperature;obtain supplies  -CC      Comment (Bathing)  at sink  -CC      Recorded by [CC] Amina Snyder OTR      Row Name  "12/10/18 1309             Upper Body Dressing Assessment/Treatment    Upper Body Dressing Task  upper body dressing skills;doff;don;pull over garment;verbal cues;minimum assist (75% or more patient effort);moderate assist (50-74% patient effort)  -CC      Upper Body Dressing Position  supported sitting  -CC      Set-up Assistance (Upper Body Dressing)  obtain clothing  -CC      Comment (Upper Body Dressing)  -- w/c level  -CC      Recorded by [CC] Amina Snyder OTR      Row Name 12/10/18 1309             Lower Body Dressing Assessment/Treatment    Lower Body Dressing [place] Level  doff;don;pants/bottoms;shoes/slippers;socks;underwear;verbal cues;minimum assist (75% patient effort);moderate assist (50% patient effort)  -CC      Lower Body Dressing Position  supported sitting;supported standing  -CC      Lower Body Dressing Setup Assistance  obtain clothing  -CC      Comment (Lower Body Dressing)  assist w socks  -CC      Recorded by [CC] Amina Snyder OTR      Row Name 12/10/18 1309             Grooming Assessment/Treatment    Grooming [place] Level  grooming skills;supervision;minimum assist (75% patient effort)  -CC      Grooming Position  sink side;supported sitting  -CC      Grooming Setup Assistance  obtain supplies  -CC      Comment (Grooming)  assist to comb hair  -CC      Recorded by [CC] Amina Snyder OTR      Row Name 12/10/18 1309             Toileting Assessment/Treatment    Toileting [place] Level  toileting skills;verbal cues;minimum assist (75% patient effort)  -      Assistive Device Use (Toileting)  grab bar/safety frame;raised toilet seat  -      Toileting Position  supported standing;unsupported sitting  -CC      Recorded by [CC] Amina Snyder OTR      Row Name 12/10/18 1443 12/10/18 1309          Fine Motor Testing & Training    Comment, Fine Motor Coordination  hand gripper #1.5 R hand, #3 L hand  -AF  3 pt grasp w 1\" square pegs min difficulty w placement in " board  -CC     Recorded by [AF] Lolis Cooley OTR [CC] Amina Snyder OTR     Row Name 12/10/18 1443 12/10/18 1309 12/10/18 0830       Pain Scale: Numbers Pre/Post-Treatment    Pain Scale: Numbers, Pretreatment  0/10 - no pain  -AF  0/10 - no pain  -CC  6/10  -EE    Pain Scale: Numbers, Post-Treatment  0/10 - no pain  -AF  0/10 - no pain  -CC  5/10  -EE    Pain Location - Orientation  --  --  incisional  -EE    Pain Location  --  --  neck  -EE    Pain Intervention(s)  --  --  Repositioned;Rest;Medication (See MAR)  -EE    Recorded by [AF] Lolis Cooley OTR [CC] Amina Snyder OTR [EE] Cathleen Rosario PT    Row Name 12/10/18 1309             Standing Balance Activity    Activities Performed (Standing, Balance Training)  -- CGA with ADLs  -CC      Recorded by [CC] Amina Snyder OTR      Row Name 12/10/18 1443             Upper Extremity Seated Therapeutic Exercise    Performed, Seated Upper Extremity (Therapeutic Exercise)  scapular protraction/retraction;elbow flexion/extension;shoulder external/internal rotation  -AF      Device, Seated Upper Extremity (Therapeutic Exercise)  -- non weighted dowel han, in supine  -AF      Exercise Type, Seated Upper Extremity (Therapeutic Exercise)  AAROM (active assistive range of motion);AROM (active range of motion)  -AF      Expected Outcomes, Seated Upper Extremity (Therapeutic Exercise)  improve motor control  -AF      Restrictions, Seated Upper Extremity (Therapeutic Exercise)  cervical precautions  -AF      Sets/Reps Detail, Seated Upper Extremity (Therapeutic Exercise)  2/10 with rest breaks  -AF      Recorded by [AF] Lolis Cooley OTR      Row Name 12/10/18 0830             Lower Extremity Seated Therapeutic Exercise    Performed, Seated Lower Extremity (Therapeutic Exercise)  hip flexion/extension;hip abduction/adduction;ankle dorsiflexion/plantarflexion;LAQ (long arc quad), knee extension;knee flexion/extension  -EE      Device, Seated Lower  Extremity (Therapeutic Exercise)  elastic bands/tubing;free weights, cuff;small ball red tband, 1.5# weights  -EE      Exercise Type, Seated Lower Extremity (Therapeutic Exercise)  resistive exercise  -EE      Sets/Reps Detail, Seated Lower Extremity (Therapeutic Exercise)  2/20  -EE      Recorded by [EE] Cathleen Rosario PT      Row Name 12/10/18 1443 12/10/18 1309          Neuromuscular Re-education    Activities/Techniques Used (Neuromuscular Re-education)  --  -- supine dowel han  no resistance 5 repx2 chest press and flex  -CC     Comment (Neuromuscular Re-education)  seated on EOM, gross grasp with BUEs to decrease compensations with elbows  -AF  table sklides on moderatly inclined table. 10 x2  -CC     Recorded by [AF] Lolis Cooley OTR [CC] Amina Snyder OTR     Row Name 12/10/18 1443 12/10/18 1309 12/10/18 0830       Positioning and Restraints    Pre-Treatment Position  sitting in chair/recliner  -AF  in bed  -CC  in bed  -EE    Post Treatment Position  bed  -AF  chair  -CC  wheelchair  -EE    In Bed  exit alarm on;with family/caregiver;sitting EOB  -AF  --  --    In Chair  --  sitting;call light within reach;with family/caregiver spouse  -CC  --    In Wheelchair  --  --  sitting;call light within reach;encouraged to call for assist;exit alarm on;with family/caregiver  -EE    Recorded by [AF] Lolis Cooley OTR [CC] Amina Snyder OTR [EE] Cathleen Rosario, PT      User Key  (r) = Recorded By, (t) = Taken By, (c) = Cosigned By    Initials Name Effective Dates    CC Amina Snyder OTR 06/08/18 -     EE Cathleen Rosario PT 04/03/18 -     AF Lolis Cooley OTR 04/03/18 -           Wound 11/27/18 1318 Other (See comments) neck incision (Active)   Dressing Appearance open to air 12/9/2018  9:10 PM   Closure Liquid skin adhesive;Adhesive closure strips 12/10/2018  7:06 AM   Periwound dry;intact 12/10/2018  7:06 AM   Drainage Amount none 12/10/2018  7:06 AM   Dressing Care, Wound open to air  12/10/2018  7:06 AM         OT Recommendation and Plan                 OT IRF GOALS     Row Name 12/07/18 1216 12/01/18 1000          Transfer Goal 1 (OT-IRF)    Activity/Assistive Device (Transfer Goal 1, OT-IRF)  toilet;walk-in shower;shower chair;walker, rolling  -AF  toilet  -RP     [place] Level (Transfer Goal 1, OT-IRF)  minimum assist (75% or more patient effort);contact guard assist;verbal cues required  -AF  minimum assist (75% or more patient effort)  -RP     Time Frame (Transfer Goal 1, OT-IRF)  short term goal (STG)  -AF  short term goal (STG)  -RP     Progress/Outcomes (Transfer Goal 1, OT-IRF)  goal ongoing  -AF  goal ongoing  -RP        Transfer Goal 2 (OT-IRF)    Activity/Assistive Device (Transfer Goal 2, OT-IRF)  toilet;shower chair;walk-in shower;walker, rolling  -AF  toilet  -RP     [place] Level (Transfer Goal 2, OT-IRF)  supervision required  -AF  supervision required  -RP     Time Frame (Transfer Goal 2, OT-IRF)  long term goal (LTG)  -AF  long term goal (LTG)  -RP     Progress/Outcomes (Transfer Goal 2, OT-IRF)  goal ongoing  -AF  goal ongoing  -RP        Bathing Goal 1 (OT-IRF)    Activity/Device (Bathing Goal 1, OT-IRF)  bathing skills, all;grab bar/tub rail;hand-held shower spray hose;long-handled sponge;tub bench  -AF  bathing skills, all  -RP     [place] Level (Bathing Goal 1, OT-IRF)  minimum assist (75% or more patient effort);verbal cues required  -AF  moderate assist (50-74% patient effort)  -RP     Time Frame (Bathing Goal 1, OT-IRF)  short term goal (STG)  -AF  short term goal (STG)  -RP     Progress/Outcomes (Bathing Goal 1, OT-IRF)  goal ongoing  -AF  goal ongoing  -RP        Bathing Goal 2 (OT-IRF)    Activity/Device (Bathing Goal 2, OT-IRF)  bathing skills, all;grab bar/tub rail;hand-held shower spray hose;tub bench;long-handled sponge  -AF  bathing skills, all  -RP     [place] Level (Bathing Goal 2, OT-IRF)  supervision required  -AF  supervision  required;standby assist  -RP     Time Frame (Bathing Goal 2, OT-IRF)  long term goal (LTG)  -AF  long term goal (LTG)  -RP     Progress/Outcomes (Bathing Goal 2, OT-IRF)  goal ongoing  -AF  goal ongoing  -RP        UB Dressing Goal 1 (OT-IRF)    Activity/Device (UB Dressing Goal 1, OT-IRF)  upper body dressing  -AF  upper body dressing  -RP     [place] (UB Dress Goal 1, OT-IRF)  minimum assist (75% or more patient effort);verbal cues required  -AF  minimum assist (75% or more patient effort)  -RP     Time Frame (UB Dressing Goal 1, OT-IRF)  short term goal (STG)  -AF  short term goal (STG)  -RP     Progress/Outcomes (UB Dressing Goal 1, OT-IRF)  goal ongoing  -AF  goal ongoing  -RP        UB Dressing Goal 2 (OT-IRF)    Activity/Device (UB Dressing Goal 2, OT-IRF)  upper body dressing  -AF  upper body dressing  -RP     [place] (UB Dress Goal 2, OT-IRF)  supervision required  -AF  supervision required  -RP     Time Frame (UB Dressing Goal 2, OT-IRF)  long term goal (LTG)  -AF  long term goal (LTG)  -RP     Progress/Outcomes (UB Dressing Goal 2, OT-IRF)  goal ongoing  -AF  goal ongoing  -RP        LB Dressing Goal 1 (OT-IRF)    Activity/Device (LB Dressing Goal 1, OT-IRF)  lower body dressing;long handled shoe horn;reacher;elastic laces  -AF  lower body dressing  -RP     [place] (LB Dressing Goal 1, OT-IRF)  moderate assist (50-74% patient effort);verbal cues required;minimum assist (75% or more patient effort)  -AF  maximum assist (25-49% patient effort)  -RP     Time Frame (LB Dressing Goal 1, OT-IRF)  short term goal (STG)  -AF  short term goal (STG)  -RP     Progress/Outcomes (LB Dressing Goal 1, OT-IRF)  goal ongoing  -AF  goal ongoing  -RP        LB Dressing Goal 2 (OT-IRF)    Activity/Device (LB Dressing Goal 2, OT-IRF)  lower body dressing;long handled shoe horn;elastic laces;reacher  -AF  lower body dressing  -RP     [place] (LB Dressing Goal 2, OT-IRF)  standby assist;verbal cues required   -AF  supervision required;standby assist  -RP     Time Frame (LB Dressing Goal 2, OT-IRF)  long term goal (LTG)  -AF  long term goal (LTG)  -RP     Progress/Outcomes (LB Dressing Goal 2, OT-IRF)  goal ongoing  -AF  goal ongoing  -RP        Grooming Goal 1 (OT-IRF)    Activity/Device (Grooming Goal 1, OT-IRF)  grooming skills, all  -AF  grooming skills, all  -RP     [place] (Grooming Goal 1, OT-IRF)  minimum assist (75% or more patient effort);verbal cues required  -AF  minimum assist (75% or more patient effort)  -RP     Time Frame (Grooming Goal 1, OT-IRF)  short term goal (STG)  -AF  short term goal (STG)  -RP     Progress/Outcomes (Grooming Goal 1, OT-IRF)  goal ongoing  -AF  goal ongoing  -RP        Grooming Goal 2 (OT-IRF)    Activity/Device (Grooming Goal 2, OT-IRF)  grooming skills, all  -AF  grooming skills, all  -RP     [place] (Grooming Goal 2, OT-IRF)  supervision required  -AF  supervision required  -RP     Time Frame (Grooming Goal 2, OT-IRF)  long term goal (LTG)  -AF  long term goal (LTG)  -RP     Progress/Outcomes (Grooming Goal 2, OT-IRF)  goal ongoing  -AF  goal ongoing  -RP        Toileting Goal 1 (OT-IRF)    Activity/Device (Toileting Goal 1, OT-IRF)  toileting skills, all;grab bar/safety frame;raised toilet seat  -AF  toileting skills, all  -RP     [place] Level (Toileting Goal 1, OT-IRF)  moderate assist (50-74% patient effort);verbal cues required  -AF  maximum assist (25-49% patient effort)  -RP     Time Frame (Toileting Goal 1, OT-IRF)  short term goal (STG)  -AF  short term goal (STG)  -RP     Progress/Outcomes (Toileting Goal 1, OT-IRF)  goal ongoing  -AF  goal ongoing  -RP        Toileting Goal 2 (OT-IRF)    Activity/Device (Toileting Goal 2, OT-IRF)  toileting skills, all;grab bar/safety frame;raised toilet seat  -AF  toileting skills, all  -RP     [place] Level (Toileting Goal 2, OT-IRF)  supervision required;verbal cues required  -AF  supervision required;standby  assist  -RP     Time Frame (Toileting Goal 2, OT-IRF)  long term goal (LTG)  -AF  long term goal (LTG)  -RP     Progress/Outcomes (Toileting Goal 2, OT-IRF)  goal ongoing  -AF  goal ongoing  -RP        ROM Goal 1 (OT-IRF)    ROM Goal 1 (OT-IRF)  pt will increase B UE ROM to approx. 3/4 shoulder flexion to assist with ADL tasks   -AF  Pt will increase B UE ROM to approx. 3/4 shoulder flexion to assist w/ ADLs.  -RP     Time Frame (ROM Goal 1, OT-IRF)  long term goal (LTG)  -AF  long term goal (LTG)  -RP     Progress/Outcomes (ROM Goal 1, OT-IRF)  goal ongoing  -AF  goal ongoing  -RP       User Key  (r) = Recorded By, (t) = Taken By, (c) = Cosigned By    Initials Name Provider Type    AF Lolis Cooley OTR Occupational Therapist    Precious Hamilton, OT Occupational Therapist                 Time Calculation:     Time Calculation- OT     Row Name 12/10/18 1447 12/10/18 1030          Time Calculation- OT    OT Start Time  1400  -AF  1030  -CC     OT Stop Time  1430  -AF  1130  -CC     OT Time Calculation (min)  30 min  -AF  60 min  -CC       User Key  (r) = Recorded By, (t) = Taken By, (c) = Cosigned By    Initials Name Provider Type    CC Amina Snyder OTJEANINE Occupational Therapist    AF Lolis Cooley OTJEANINE Occupational Therapist          Therapy Suggested Charges     Code   Minutes Charges    None              Therapy Charges for Today     Code Description Service Date Service Provider Modifiers Qty    [id number]  OT NEUROMUSC RE EDUCATION EA 15 MIN 12/10/2018 Lolis Cooley OTR GO 1    [id number]  OT THER PROC EA 15 MIN 12/10/2018 Lolis Cooley OTR GO 1                   NURIS Grimm  12/10/2018

## 2018-12-10 NOTE — PROGRESS NOTES
Inpatient Rehabilitation Functional Measures Assessment and Plan of Care    Plan of Care  Updated Problems/Interventions  Mobility    [OT] Toilet Transfers(Active)  Current Status(12/10/2018): MIN  Weekly Goal(12/17/2018): CGA  Discharge Goal: Sup    [OT] Tub/Shower Transfers(Active)  Current Status(12/10/2018): MIN  Weekly Goal(12/17/2018): CGA  Discharge Goal: Supervision        Self Care    [OT] Bathing(Active)  Current Status(12/10/2018): MOD/MIN  Weekly Goal(12/17/2018): MIN/CGA  Discharge Goal: Sup/SBA    [OT] Dressing (Lower)(Active)  Current Status(12/10/2018): MIN  Weekly Goal(12/17/2018): CGA  Discharge Goal: Sup/SBA    [OT] Dressing (Upper)(Active)  Current Status(12/10/2018): MOD to doff, MIN to don  Weekly Goal(12/17/2018): Min A  Discharge Goal: Sup    [OT] Grooming(Active)  Current Status(12/10/2018): MIN/set up  Weekly Goal(12/17/2018): set up  Discharge Goal: Sup    [OT] Toileting(Active)  Current Status(12/10/2018): MOD  Weekly Goal(12/17/2018): MIN  Discharge Goal: Sup/SBA    Functional Measures  ROCCO Eating:  Branch  ROCCO Grooming: Branch  ROCCO Bathing:  Branch  ROCCO Upper Body Dressing:  Branch  ROCCO Lower Body Dressing:  Branch  ROCCO Toileting:  Branch    ROCCO Bladder Management  Level of Assistance:  Branch  Frequency/Number of Accidents this Shift:  Branch    ROCCO Bowel Management  Level of Assistance: Branch  Frequency/Number of Accidents this Shift: Branch    ROCCO Bed/Chair/Wheelchair Transfer:  Branch  ROCCO Toilet Transfer:  Branch  ROCCO Tub/Shower Transfer:  Branch    Previously Documented Mode of Locomotion at Discharge: Field  ROCCO Expected Mode of Locomotion at Discharge: Branch  ROCCO Walk/Wheelchair:  Branch  ROCCO Stairs:  Branch    ROCCO Comprehension:  Branch  ROCCO Expression:  Branch  ROCCO Social Interaction:  Branch  ROCCO Problem Solving:  Branch  ROCCO Memory:  Branch    Therapy Mode Minutes  Occupational Therapy: Individual: 30 minutes.  Physical Therapy: Branch  Speech Language Pathology:   [place]    Signed by: Lolis Cooley OT

## 2018-12-10 NOTE — PLAN OF CARE
Problem: Fall Risk (Adult)  Goal: Absence of Fall  Outcome: Ongoing (interventions implemented as appropriate)   12/10/18 0437   Fall Risk (Adult)   Absence of Fall making progress toward outcome       Problem: Pain, Acute (Adult)  Goal: Acceptable Pain Control/Comfort Level  Outcome: Ongoing (interventions implemented as appropriate)   12/10/18 0437   Pain, Acute (Adult)   Acceptable Pain Control/Comfort Level making progress toward outcome

## 2018-12-10 NOTE — THERAPY TREATMENT NOTE
Inpatient Rehabilitation - Occupational Therapy Treatment Note    [facility]     Patient Name: Duane Mark Witten  : 1955  MRN: [id number]    Today's Date: 12/10/2018                 Admit Date: 2018      Visit Dx:    ICD-10-CM ICD-9-CM   1. Cervical stenosis of spinal canal M48.02 723.0   2. Weakness of both lower extremities R29.898 729.89   3. Paresthesia of both upper extremities R20.2 782.0    M79.601 729.5    M79.602    4. Morbid obesity with BMI of 40.0-44.9, adult (CMS/[facility]) E66.01 278.01    Z68.41 V85.41   5. Impaired functional mobility, balance, gait, and endurance Z74.09 V49.89   6. Functional gait abnormality R26.89 781.2       Patient Active Problem List   Diagnosis   • YANELI (generalized anxiety disorder)   • ADD (attention deficit disorder)   • Depression   • Diabetes type 2, controlled (CMS/[facility])   • ED (erectile dysfunction) of non-organic origin   • HLD (hyperlipidemia)   • Essential hypertension   • Arthritis   • Psoriasis   • Testosterone deficiency   • Primary insomnia   • Weakness of both lower extremities   • Paresthesia of both upper extremities   • Morbid obesity with BMI of 40.0-44.9, adult (CMS/[facility])   • Adverse effect of corticosteroids   • Type 2 diabetes mellitus with hyperglycemia (CMS/[facility])   • Cervical stenosis of spinal canal   • Edema of cervical spinal cord (CMS/[facility])   • Postoperative atrial fibrillation (CMS/[facility])   • Cervical myelopathy (CMS/[facility])         Therapy Treatment    IRF Treatment Summary     Row Name 12/10/18 1309 12/10/18 4372          Evaluation/Treatment Time and Intent    Subjective Information  no complaints  -CC  no complaints  -EE     Existing Precautions/Restrictions  fall;spinal  -CC  fall;spinal  -EE     Document Type  therapy note (daily note)  -CC  therapy note (daily note)  -EE     Mode of Treatment  occupational therapy  -CC  physical therapy  -EE     Patient/Family Observations  -- supine in bed  -CC  Pt supine in bed in no acute distress  -EE      Recorded by [CC] Amina Snyder OTR [EE] Cathleen Rosario, PT     Row Name 12/10/18 1309 12/10/18 0830          Cognition/Psychosocial- PT/OT    Affect/Mental Status (Cognitive)  WFL  -CC  WFL  -EE     Orientation Status (Cognition)  oriented x 4  -CC  oriented x 4  -EE     Follows Commands (Cognition)  WFL  -CC  WFL  -EE     Personal Safety Interventions  fall prevention program maintained;gait belt;nonskid shoes/slippers when out of bed  -CC  fall prevention program maintained;gait belt;muscle strengthening facilitated;nonskid shoes/slippers when out of bed;supervised activity  -EE     Recorded by [CC] Amina Snyder OTR [EE] Cathleen Rosario, PT     Row Name 12/10/18 1309 12/10/18 0830          Bed Mobility Assessment/Treatment    Rolling Left [place] (Bed Mobility)  --  supervision  -EE     Rolling Right [place] (Bed Mobility)  contact guard;minimum assist (75% patient effort);verbal cues  -CC  --     Sidelying-Sit [place] (Bed Mobility)  contact guard;verbal cues  -CC  contact guard;verbal cues  -EE     Assistive Device (Bed Mobility)  --  head of bed elevated;bed rails  -EE     Recorded by [CC] Amina Snyder OTR [EE] Cathleen Rosario, PT     Row Name 12/10/18 0830             Bed-Chair Transfer    Bed-Chair [place] (Transfers)  minimum assist (75% patient effort);verbal cues  -EE      Assistive Device (Bed-Chair Transfers)  walker, front-wheeled  -EE      Recorded by [EE] Cathleen Rosario, PT      Row Name 12/10/18 1309 12/10/18 0830          Chair-Bed Transfer    Chair-Bed [place] (Transfers)  minimum assist (75% patient effort);verbal cues  -CC  minimum assist (75% patient effort);verbal cues  -EE     Assistive Device (Chair-Bed Transfers)  walker, front-wheeled  -CC  walker, front-wheeled  -EE     Recorded by [CC] Amina Snyder OTR [EE] Cathleen Rosario, PT     Row Name 12/10/18 1309 12/10/18 0830          Sit-Stand Transfer    Sit-Stand [place] (Transfers)  minimum assist (75%  patient effort);verbal cues  -CC  minimum assist (75% patient effort);verbal cues  -EE     Assistive Device (Sit-Stand Transfers)  walker, front-wheeled  -CC  walker, front-wheeled  -EE     Recorded by [CC] Amina Snyder OTR [EE] Cathleen Rosario, PT     Row Name 12/10/18 0830             Stand-Sit Transfer    Stand-Sit [place] (Transfers)  contact guard;verbal cues  -EE      Assistive Device (Stand-Sit Transfers)  walker, front-wheeled  -EE      Recorded by [EE] Cathleen Rosario, PT      Row Name 12/10/18 1309 12/10/18 0830          Toilet Transfer    Type (Toilet Transfer)  stand pivot/stand step  -CC  stand pivot/stand step  -EE     [place] Level (Toilet Transfer)  verbal cues;contact guard;minimum assist (75% patient effort)  -CC  minimum assist (75% patient effort);verbal cues  -EE     Assistive Device (Toilet Transfer)  commode;grab bars/safety frame;wheelchair  -CC  commode;grab bars/safety frame;wheelchair  -EE     Recorded by [CC] Amina Snyder OTR [EE] Cathleen Rosario, PT     Row Name 12/10/18 0830             Gait/Stairs Assessment/Training    [place] Level (Gait)  minimum assist (75% patient effort);verbal cues  -EE      Assistive Device (Gait)  walker, front-wheeled  -EE      Distance in Feet (Gait)  160, 80 x 3  -EE      Pattern (Gait)  step-through  -EE      Deviations/Abnormal Patterns (Gait)  ataxic;base of support, narrow;sarah decreased;stride length decreased  -EE      Bilateral Gait Deviations  forward flexed posture;weight shift ability decreased;heel strike decreased  -EE      Left Sided Gait Deviations  knee buckling, left side  -EE      Right Sided Gait Deviations  --  -EE      Comment (Gait/Stairs)  2 instances of L knee buckling; pt able to recover with min A  -EE      Recorded by [EE] aCthleen Rosario, HAILEY      Row Name 12/10/18 0830             Wheelchair Mobility/Management    Method of Wheelchair Locomotion (Mobility)  bipedal (lower extremity) propulsion;bimanual (upper  extremity) propulsion  -EE      Mobility Activities (Wheelchair)  forward propulsion;steering;turning;doorway navigation  -EE      Forward Propulsion [place] (Wheelchair)  supervision  -EE      Steering [place] (Wheelchair)  supervision  -EE      Turning [place] (Wheelchair)  supervision  -EE      Doorway Navigation [place] (Wheelchair)  supervision  -EE      Distance Propelled in Feet (Wheelchair)  160  -EE      Recorded by [EE] Cathleen Rosario, PT      Row Name 12/10/18 0830             Safety Issues, Functional Mobility    Impairments Affecting Function (Mobility)  balance;coordination;endurance/activity tolerance;strength;pain;sensation/sensory awareness  -EE      Recorded by [EE] Cathleen Rosario, PT      Row Name 12/10/18 1309             Bathing Assessment/Treatment    Bathing [place] Level  bathing skills;minimum assist (75% patient effort);verbal cues  -CC      Bathing Position  supported sitting;supported standing  -CC      Bathing Setup Assistance  adjust water temperature;obtain supplies  -CC      Comment (Bathing)  at sink  -CC      Recorded by [CC] Amina Snyder OTR      Row Name 12/10/18 1309             Upper Body Dressing Assessment/Treatment    Upper Body Dressing Task  upper body dressing skills;doff;don;pull over garment;verbal cues;minimum assist (75% or more patient effort);moderate assist (50-74% patient effort)  -CC      Upper Body Dressing Position  supported sitting  -CC      Set-up Assistance (Upper Body Dressing)  obtain clothing  -CC      Comment (Upper Body Dressing)  -- w/c level  -CC      Recorded by [CC] Amina Snyder OTR      Row Name 12/10/18 1309             Lower Body Dressing Assessment/Treatment    Lower Body Dressing [place] Level  doff;don;pants/bottoms;shoes/slippers;socks;underwear;verbal cues;minimum assist (75% patient effort);moderate assist (50% patient effort)  -CC      Lower Body Dressing Position  supported sitting;supported standing   "-CC      Lower Body Dressing Setup Assistance  obtain clothing  -CC      Comment (Lower Body Dressing)  assist w socks  -CC      Recorded by [CC] Amina Snyder OTR      Row Name 12/10/18 1309             Grooming Assessment/Treatment    Grooming [place] Level  grooming skills;supervision;minimum assist (75% patient effort)  -CC      Grooming Position  sink side;supported sitting  -CC      Grooming Setup Assistance  obtain supplies  -CC      Comment (Grooming)  assist to comb hair  -CC      Recorded by [CC] Amina Snyder OTR      Row Name 12/10/18 1309             Toileting Assessment/Treatment    Toileting [place] Level  toileting skills;verbal cues;minimum assist (75% patient effort)  -CC      Assistive Device Use (Toileting)  grab bar/safety frame;raised toilet seat  -CC      Toileting Position  supported standing;unsupported sitting  -CC      Recorded by [CC] Amina Snyder OTR      Row Name 12/10/18 1309             Fine Motor Testing & Training    Comment, Fine Motor Coordination  3 pt grasp w 1\" square pegs min difficulty w placement in board  -CC      Recorded by [CC] Amina Snyder OTR      Row Name 12/10/18 1309 12/10/18 0830          Pain Scale: Numbers Pre/Post-Treatment    Pain Scale: Numbers, Pretreatment  0/10 - no pain  -CC  6/10  -EE     Pain Scale: Numbers, Post-Treatment  0/10 - no pain  -CC  --     Pain Location - Orientation  --  incisional  -EE     Pain Location  --  neck  -EE     Pain Intervention(s)  --  Repositioned;Rest;Medication (See MAR)  -EE     Recorded by [CC] Amina Snyder OTR [EE] Cathleen Rosario, PT     Row Name 12/10/18 1309             Standing Balance Activity    Activities Performed (Standing, Balance Training)  -- CGA with ADLs  -CC      Recorded by [CC] Amina Snyder OTR      Row Name 12/10/18 0830             Lower Extremity Seated Therapeutic Exercise    Performed, Seated Lower Extremity (Therapeutic Exercise)  hip flexion/extension;hip " abduction/adduction;ankle dorsiflexion/plantarflexion;LAQ (long arc quad), knee extension;knee flexion/extension  -EE      Device, Seated Lower Extremity (Therapeutic Exercise)  elastic bands/tubing;free weights, cuff;small ball red tband, 1.5# weights  -EE      Exercise Type, Seated Lower Extremity (Therapeutic Exercise)  resistive exercise  -EE      Sets/Reps Detail, Seated Lower Extremity (Therapeutic Exercise)  1/20  -EE      Recorded by [EE] Cathleen Rosario, HAILEY      Row Name 12/10/18 1309             Neuromuscular Re-education    Activities/Techniques Used (Neuromuscular Re-education)  -- supine dowel han  no resistance 5 repx2 chest press and flex  -CC      Comment (Neuromuscular Re-education)  table sklides on moderatly inclined table. 10 x2  -CC      Recorded by [CC] Amina Snyder OTR      Row Name 12/10/18 1309 12/10/18 0830          Positioning and Restraints    Pre-Treatment Position  in bed  -CC  in bed  -EE     Post Treatment Position  chair  -CC  --     In Chair  sitting;call light within reach;with family/caregiver spouse  -CC  --     Recorded by [CC] Amina Snyder OTR [EE] Cathleen Rosario, PT       User Key  (r) = Recorded By, (t) = Taken By, (c) = Cosigned By    Initials Name Effective Dates    CC Amina Snyder OTR 06/08/18 -     EE Cathleen Rosario PT 04/03/18 -           Wound 11/27/18 1318 Other (See comments) neck incision (Active)   Dressing Appearance open to air 12/9/2018  9:10 PM   Closure Liquid skin adhesive;Adhesive closure strips 12/10/2018  7:06 AM   Periwound dry;intact 12/10/2018  7:06 AM   Drainage Amount none 12/10/2018  7:06 AM   Dressing Care, Wound open to air 12/10/2018  7:06 AM         OT Recommendation and Plan                 OT IRF GOALS     Row Name 12/07/18 1216 12/01/18 1000          Transfer Goal 1 (OT-IRF)    Activity/Assistive Device (Transfer Goal 1, OT-IRF)  toilet;walk-in shower;shower chair;walker, rolling  -AF  toilet  -RP     [place] Level (Transfer Goal  1, OT-IRF)  minimum assist (75% or more patient effort);contact guard assist;verbal cues required  -AF  minimum assist (75% or more patient effort)  -RP     Time Frame (Transfer Goal 1, OT-IRF)  short term goal (STG)  -AF  short term goal (STG)  -RP     Progress/Outcomes (Transfer Goal 1, OT-IRF)  goal ongoing  -AF  goal ongoing  -RP        Transfer Goal 2 (OT-IRF)    Activity/Assistive Device (Transfer Goal 2, OT-IRF)  toilet;shower chair;walk-in shower;walker, rolling  -AF  toilet  -RP     [place] Level (Transfer Goal 2, OT-IRF)  supervision required  -AF  supervision required  -RP     Time Frame (Transfer Goal 2, OT-IRF)  long term goal (LTG)  -AF  long term goal (LTG)  -RP     Progress/Outcomes (Transfer Goal 2, OT-IRF)  goal ongoing  -AF  goal ongoing  -RP        Bathing Goal 1 (OT-IRF)    Activity/Device (Bathing Goal 1, OT-IRF)  bathing skills, all;grab bar/tub rail;hand-held shower spray hose;long-handled sponge;tub bench  -AF  bathing skills, all  -RP     [place] Level (Bathing Goal 1, OT-IRF)  minimum assist (75% or more patient effort);verbal cues required  -AF  moderate assist (50-74% patient effort)  -RP     Time Frame (Bathing Goal 1, OT-IRF)  short term goal (STG)  -AF  short term goal (STG)  -RP     Progress/Outcomes (Bathing Goal 1, OT-IRF)  goal ongoing  -AF  goal ongoing  -RP        Bathing Goal 2 (OT-IRF)    Activity/Device (Bathing Goal 2, OT-IRF)  bathing skills, all;grab bar/tub rail;hand-held shower spray hose;tub bench;long-handled sponge  -AF  bathing skills, all  -RP     [place] Level (Bathing Goal 2, OT-IRF)  supervision required  -AF  supervision required;standby assist  -RP     Time Frame (Bathing Goal 2, OT-IRF)  long term goal (LTG)  -AF  long term goal (LTG)  -RP     Progress/Outcomes (Bathing Goal 2, OT-IRF)  goal ongoing  -AF  goal ongoing  -RP        UB Dressing Goal 1 (OT-IRF)    Activity/Device (UB Dressing Goal 1, OT-IRF)  upper body dressing  -AF  upper body  dressing  -RP     [place] (UB Dress Goal 1, OT-IRF)  minimum assist (75% or more patient effort);verbal cues required  -AF  minimum assist (75% or more patient effort)  -RP     Time Frame (UB Dressing Goal 1, OT-IRF)  short term goal (STG)  -AF  short term goal (STG)  -RP     Progress/Outcomes (UB Dressing Goal 1, OT-IRF)  goal ongoing  -AF  goal ongoing  -RP        UB Dressing Goal 2 (OT-IRF)    Activity/Device (UB Dressing Goal 2, OT-IRF)  upper body dressing  -AF  upper body dressing  -RP     [place] (UB Dress Goal 2, OT-IRF)  supervision required  -AF  supervision required  -RP     Time Frame (UB Dressing Goal 2, OT-IRF)  long term goal (LTG)  -AF  long term goal (LTG)  -RP     Progress/Outcomes (UB Dressing Goal 2, OT-IRF)  goal ongoing  -AF  goal ongoing  -RP        LB Dressing Goal 1 (OT-IRF)    Activity/Device (LB Dressing Goal 1, OT-IRF)  lower body dressing;long handled shoe horn;reacher;elastic laces  -AF  lower body dressing  -RP     [place] (LB Dressing Goal 1, OT-IRF)  moderate assist (50-74% patient effort);verbal cues required;minimum assist (75% or more patient effort)  -AF  maximum assist (25-49% patient effort)  -RP     Time Frame (LB Dressing Goal 1, OT-IRF)  short term goal (STG)  -AF  short term goal (STG)  -RP     Progress/Outcomes (LB Dressing Goal 1, OT-IRF)  goal ongoing  -AF  goal ongoing  -RP        LB Dressing Goal 2 (OT-IRF)    Activity/Device (LB Dressing Goal 2, OT-IRF)  lower body dressing;long handled shoe horn;elastic laces;reacher  -AF  lower body dressing  -RP     [place] (LB Dressing Goal 2, OT-IRF)  standby assist;verbal cues required  -AF  supervision required;standby assist  -RP     Time Frame (LB Dressing Goal 2, OT-IRF)  long term goal (LTG)  -AF  long term goal (LTG)  -RP     Progress/Outcomes (LB Dressing Goal 2, OT-IRF)  goal ongoing  -AF  goal ongoing  -RP        Grooming Goal 1 (OT-IRF)    Activity/Device (Grooming Goal 1, OT-IRF)  grooming skills,  all  -AF  grooming skills, all  -RP     [place] (Grooming Goal 1, OT-IRF)  minimum assist (75% or more patient effort);verbal cues required  -AF  minimum assist (75% or more patient effort)  -RP     Time Frame (Grooming Goal 1, OT-IRF)  short term goal (STG)  -AF  short term goal (STG)  -RP     Progress/Outcomes (Grooming Goal 1, OT-IRF)  goal ongoing  -AF  goal ongoing  -RP        Grooming Goal 2 (OT-IRF)    Activity/Device (Grooming Goal 2, OT-IRF)  grooming skills, all  -AF  grooming skills, all  -RP     [place] (Grooming Goal 2, OT-IRF)  supervision required  -AF  supervision required  -RP     Time Frame (Grooming Goal 2, OT-IRF)  long term goal (LTG)  -AF  long term goal (LTG)  -RP     Progress/Outcomes (Grooming Goal 2, OT-IRF)  goal ongoing  -AF  goal ongoing  -RP        Toileting Goal 1 (OT-IRF)    Activity/Device (Toileting Goal 1, OT-IRF)  toileting skills, all;grab bar/safety frame;raised toilet seat  -AF  toileting skills, all  -RP     [place] Level (Toileting Goal 1, OT-IRF)  moderate assist (50-74% patient effort);verbal cues required  -AF  maximum assist (25-49% patient effort)  -RP     Time Frame (Toileting Goal 1, OT-IRF)  short term goal (STG)  -AF  short term goal (STG)  -RP     Progress/Outcomes (Toileting Goal 1, OT-IRF)  goal ongoing  -AF  goal ongoing  -RP        Toileting Goal 2 (OT-IRF)    Activity/Device (Toileting Goal 2, OT-IRF)  toileting skills, all;grab bar/safety frame;raised toilet seat  -AF  toileting skills, all  -RP     [place] Level (Toileting Goal 2, OT-IRF)  supervision required;verbal cues required  -AF  supervision required;standby assist  -RP     Time Frame (Toileting Goal 2, OT-IRF)  long term goal (LTG)  -AF  long term goal (LTG)  -RP     Progress/Outcomes (Toileting Goal 2, OT-IRF)  goal ongoing  -AF  goal ongoing  -RP        ROM Goal 1 (OT-IRF)    ROM Goal 1 (OT-IRF)  pt will increase B UE ROM to approx. 3/4 shoulder flexion to assist with ADL tasks    -AF  Pt will increase B UE ROM to approx. 3/4 shoulder flexion to assist w/ ADLs.  -RP     Time Frame (ROM Goal 1, OT-IRF)  long term goal (LTG)  -AF  long term goal (LTG)  -RP     Progress/Outcomes (ROM Goal 1, OT-IRF)  goal ongoing  -AF  goal ongoing  -RP       User Key  (r) = Recorded By, (t) = Taken By, (c) = Cosigned By    Initials Name Provider Type    AF Lolis Cooley OTR Occupational Therapist    RP Precious De Leon, OT Occupational Therapist                 Time Calculation:     Time Calculation- OT     Row Name 12/10/18 1030             Time Calculation- OT    OT Start Time  1030  -CC      OT Stop Time  1130  -CC      OT Time Calculation (min)  60 min  -CC        User Key  (r) = Recorded By, (t) = Taken By, (c) = Cosigned By    Initials Name Provider Type    CC Amina Snyder OTR Occupational Therapist          Therapy Suggested Charges     Code   Minutes Charges    None              Therapy Charges for Today     Code Description Service Date Service Provider Modifiers Qty    59665139436 HC OT NEUROMUSC RE EDUCATION EA 15 MIN 12/10/2018 Amina Snyder OTR GO 2    [id number] HC OT SELF CARE/MGMT/TRAIN EA 15 MIN 12/10/2018 Amina Snyder OTR GO 2                   NURIS Lara  12/10/2018

## 2018-12-10 NOTE — PROGRESS NOTES
Adult Nutrition  Assessment/PES    Patient Name:  Duane Mark Witten  YOB: 1955  MRN: [id number]  Admit Date:  11/30/2018    Assessment Date:  12/10/2018    Comments:  Appetite good, no questions about diabetic diet - says he eats regular foods, watches his carbs, but does not do any diet or diabetic specific foods d/t artificial sweeteners (he does not tolerate them). Glucoses vary 124-208 over past 24 hr.    Reason for Assessment     Row Name 12/10/18 1059          Reason for Assessment    Reason For Assessment  follow-up protocol         Nutrition/Diet History     Row Name 12/10/18 1059          Nutrition/Diet History    Typical Food/Fluid Intake  Intake remains good.      Food Allergies  other (see comments) artificial sweeteners - intolerance; reports joint swelling           Labs/Tests/Procedures/Meds     Row Name 12/10/18 1059          Labs/Procedures/Meds    Lab Results Reviewed  reviewed     Lab Results Comments  Glu 124-208        Diagnostic Tests/Procedures    Diagnostic Test/Procedure Reviewed  reviewed        Medications    Pertinent Medications Reviewed  reviewed         Physical Findings     Row Name 12/10/18 1100          Physical Findings    Overall Physical Appearance  obese           Nutrition Prescription Ordered     Row Name 12/10/18 1100          Nutrition Prescription PO    Current PO Diet  Regular         Evaluation of Received Nutrient/Fluid Intake     Row Name 12/10/18 1101          PO Evaluation    Number of Days PO Intake Evaluated  3 days     % PO Intake  %               Problem/Interventions:            Intervention Goal     Row Name 12/10/18 1101          Intervention Goal    General  Maintain nutrition;Improved nutrition related lab(s)     PO  Maintain intake         Nutrition Intervention     Row Name 12/10/18 1101          Nutrition Intervention    RD/Tech Action  Follow Tx progress;Care plan reviewd;Menu provided         Nutrition Prescription     Row Name  "12/10/18 1101          Nutrition Prescription PO    Common Modifiers  -- did not change diet to CC last week d/t artificial sweeteners being given on this diet         Education/Evaluation     Row Name 12/10/18 1102          Education    Education  Education offered and refused pt & wife report they do not need diet education, that pt \"watches\" his carbs.         Monitor/Evaluation    Monitor  Per protocol           Electronically signed by:  Sally Conti RD  12/10/18 11:02 AM  "

## 2018-12-10 NOTE — PROGRESS NOTES
Inpatient Rehabilitation Functional Measures Assessment    Functional Measures  [facility] Eating:  [facility] Grooming: [facility] Bathing:  [facility] Upper Body Dressing:  [facility] Lower Body Dressing:  [facility] Toileting:  [facility] Bladder Management  Level of Assistance:  [place]  Frequency/Number of Accidents this Shift:  [facility] Bowel Management  Level of Assistance: [place]  Frequency/Number of Accidents this Shift: [facility]    [facility] Bed/Chair/Wheelchair Transfer:  Bed/chair/wheelchair Transfer Score = 4.  Patient performs 75% or more of effort and minimal assistance (little/incidental  help/lifting of one limb/steadying) for transferring to and from the  bed/chair/wheelchair, requiring: Patient requires the following assistive  device(s): Walker.  [place] Toilet Transfer:  [facility] Tub/Shower Transfer:  [place]    Previously Documented Mode of Locomotion at Discharge: Field  [facility] Expected Mode of Locomotion at Discharge: [facility] Walk/Wheelchair:  WHEELCHAIR OBSERVATION   Wheelchair locomotion was observed using a manual wheelchair. Wheelchair  Distance Scale = 3.  Distance traveled in wheelchair is greater than 150 feet.  Wheelchair Score = 5.  Patient is supervision or set up only for propelling  wheelchair, requiring: Patient was able to propel a distance of 160 feet in a  wheelchair. No other assistive devices were required.    WALK OBSERVATION   Walk Distance Scale = 3.  Distance walked is greater than 150 feet. Walk Score  = 4.  Patient performs 75% or more of effort and requires minimal assistance.  Incidental help/contact guard/steadying was provided. Patient walked a distance  of  160 feet. Patient requires the following assistive device(s): Rolling  walker.  [place] Stairs:  Stairs did not occur because activity was unsafe for patient.    [facility] Comprehension:  [facility] Expression:  [facility] Social Interaction:  [facility] Problem Solving:  [facility] Memory:  [place]    Therapy Mode  Minutes  Occupational Therapy: [place]  Physical Therapy: Individual: 90 minutes.  Speech Language Pathology:  [place]    Signed by: Cathleen Rosario DPT

## 2018-12-11 LAB
GLUCOSE BLDC GLUCOMTR-MCNC: 113 MG/DL (ref 70–130)
GLUCOSE BLDC GLUCOMTR-MCNC: 194 MG/DL (ref 70–130)
GLUCOSE BLDC GLUCOMTR-MCNC: 195 MG/DL (ref 70–130)
GLUCOSE BLDC GLUCOMTR-MCNC: 99 MG/DL (ref 70–130)

## 2018-12-11 PROCEDURE — 97535 SELF CARE MNGMENT TRAINING: CPT

## 2018-12-11 PROCEDURE — 97112 NEUROMUSCULAR REEDUCATION: CPT

## 2018-12-11 PROCEDURE — 97110 THERAPEUTIC EXERCISES: CPT

## 2018-12-11 PROCEDURE — 63710000001 INSULIN LISPRO (HUMAN) PER 5 UNITS: Performed by: HOSPITALIST

## 2018-12-11 PROCEDURE — 82962 GLUCOSE BLOOD TEST: CPT

## 2018-12-11 RX ADMIN — PIOGLITAZONE 15 MG: 15 TABLET ORAL at 07:54

## 2018-12-11 RX ADMIN — GLIPIZIDE 10 MG: 10 TABLET ORAL at 06:02

## 2018-12-11 RX ADMIN — HYDROCODONE BITARTRATE AND ACETAMINOPHEN 2 TABLET: 7.5; 325 TABLET ORAL at 11:06

## 2018-12-11 RX ADMIN — LOSARTAN POTASSIUM: 50 TABLET, FILM COATED ORAL at 07:52

## 2018-12-11 RX ADMIN — HYDROCODONE BITARTRATE AND ACETAMINOPHEN 2 TABLET: 7.5; 325 TABLET ORAL at 06:02

## 2018-12-11 RX ADMIN — DULOXETINE HYDROCHLORIDE 60 MG: 60 CAPSULE, DELAYED RELEASE ORAL at 07:51

## 2018-12-11 RX ADMIN — RIVAROXABAN 20 MG: 20 TABLET, FILM COATED ORAL at 17:02

## 2018-12-11 RX ADMIN — HYDROCODONE BITARTRATE AND ACETAMINOPHEN 2 TABLET: 7.5; 325 TABLET ORAL at 16:07

## 2018-12-11 RX ADMIN — HYDROCODONE BITARTRATE AND ACETAMINOPHEN 2 TABLET: 7.5; 325 TABLET ORAL at 20:09

## 2018-12-11 RX ADMIN — METFORMIN HYDROCHLORIDE 1000 MG: 1000 TABLET ORAL at 17:02

## 2018-12-11 RX ADMIN — LIDOCAINE 2 PATCH: 50 PATCH CUTANEOUS at 10:28

## 2018-12-11 RX ADMIN — INSULIN LISPRO 2 UNITS: 100 INJECTION, SOLUTION INTRAVENOUS; SUBCUTANEOUS at 20:09

## 2018-12-11 RX ADMIN — HYDROCODONE BITARTRATE AND ACETAMINOPHEN 2 TABLET: 7.5; 325 TABLET ORAL at 01:58

## 2018-12-11 RX ADMIN — METFORMIN HYDROCHLORIDE 1000 MG: 1000 TABLET ORAL at 07:51

## 2018-12-11 RX ADMIN — INSULIN LISPRO 2 UNITS: 100 INJECTION, SOLUTION INTRAVENOUS; SUBCUTANEOUS at 11:41

## 2018-12-11 RX ADMIN — CYCLOBENZAPRINE 10 MG: 10 TABLET, FILM COATED ORAL at 20:10

## 2018-12-11 RX ADMIN — CYCLOBENZAPRINE 10 MG: 10 TABLET, FILM COATED ORAL at 01:58

## 2018-12-11 RX ADMIN — METOPROLOL TARTRATE 25 MG: 25 TABLET ORAL at 20:09

## 2018-12-11 RX ADMIN — ATOMOXETINE 100 MG: 60 CAPSULE ORAL at 07:53

## 2018-12-11 RX ADMIN — FAMOTIDINE 20 MG: 20 TABLET, FILM COATED ORAL at 07:51

## 2018-12-11 RX ADMIN — CYCLOBENZAPRINE 10 MG: 10 TABLET, FILM COATED ORAL at 11:06

## 2018-12-11 RX ADMIN — METOPROLOL TARTRATE 25 MG: 25 TABLET ORAL at 07:53

## 2018-12-11 NOTE — PROGRESS NOTES
Inpatient Rehabilitation Functional Measures Assessment    Functional Measures  [place]CCO Eating:  [facility] Grooming: [facility] Bathing:  [facility] Upper Body Dressing:  [facility] Lower Body Dressing:  [facility] Toileting:  [facility] Bladder Management  Level of Assistance:  [place]  Frequency/Number of Accidents this Shift:  [facility] Bowel Management  Level of Assistance: [place]  Frequency/Number of Accidents this Shift: [facility] Bed/Chair/Wheelchair Transfer:  [facility] Toilet Transfer:  [facility] Tub/Shower Transfer:  [place]    Previously Documented Mode of Locomotion at Discharge: Field  ROCCO Expected Mode of Locomotion at Discharge: [facility] Walk/Wheelchair:  [facility] Stairs:  [facility] Comprehension:  Auditory comprehension is the usual mode. Comprehension  Score = 6, Modified [place].  Patient comprehends complex/abstract  information in their primary language, requiring:  [place] Expression:  Vocal expression is the usual mode. Expression Score = 6,  Modified Independent.  Patient expresses complex/abstract information in their  primary language, requiring:  [place] Social Interaction:  Social Interaction Score = 6, Modified Independent.  Patient is modified independent for social interaction, requiring:  [place] Problem Solving:  Problem Solving Score = 6, Modified [place].  Patient  makes appropriate decisions in order to solve complex problems, but requires  extra time.  [place] Memory:  Memory Score = 6, Modified [place].  Patient is modified  independent for memory, requiring:    Therapy Mode Minutes  Occupational Therapy: Branch  Physical Therapy: Branch  Speech Language Pathology:  Branch    Signed by: Jojo Bowers RN

## 2018-12-11 NOTE — PROGRESS NOTES
Inpatient Rehabilitation Functional Measures Assessment    Functional Measures  ROCCO Eating:  [facility] Grooming: [facility] Bathing:  [facility] Upper Body Dressing:  [facility] Lower Body Dressing:  [facility] Toileting:  [facility] Bladder Management  Level of Assistance:  [place]  Frequency/Number of Accidents this Shift:  [facility] Bowel Management  Level of Assistance: [place]  Frequency/Number of Accidents this Shift: [facility] Bed/Chair/Wheelchair Transfer:  [facility] Toilet Transfer:  [facility] Tub/Shower Transfer:  [place]    Previously Documented Mode of Locomotion at Discharge: Field  [place] Expected Mode of Locomotion at Discharge: [facility] Walk/Wheelchair:  [facility] Stairs:  [facility] Comprehension:  Auditory comprehension is the usual mode. Comprehension  Score = 7, Independent.  Patient comprehends complex/abstract information in  their primary language.  Patient is completely independent for auditory  comprehension.  There are no activity limitations.  [place]CC[place] Expression:  Vocal expression is the usual mode. Expression Score = 7,  Independent.  Patient expresses complex/abstract information in their primary  language.  Patient is completely independent for vocal expression.  There are no  activity limitations.  [place] Social Interaction:  Social Interaction Score = 7, Independent. Patient is  completely independent for social interaction.  There are no activity  limitations.  [place] Problem Solving:  Problem Solving Score = 7, Independent.  Patient makes  appropriate decisions in order to solve complex problems.  Patient is completely  independent for problem solving.  There are no activity limitations.  [place] Memory:  Memory Score = 7, Independent.  Patient is completely independent  for memory.  There are no activity limitations.    Therapy Mode Minutes  Occupational Therapy: Branch  Physical Therapy: Branch  Speech Language Pathology:  Branch    Signed by: Franc Flood RN

## 2018-12-11 NOTE — THERAPY TREATMENT NOTE
Inpatient Rehabilitation - Physical Therapy Treatment Note  [facility]     Patient Name: Duane Mark Witten  : 1955  MRN: [id number]    Today's Date: 2018                 Admit Date: 2018      Visit Dx:      ICD-10-CM ICD-9-CM   1. Cervical stenosis of spinal canal M48.02 723.0   2. Weakness of both lower extremities R29.898 729.89   3. Paresthesia of both upper extremities R20.2 782.0    M79.601 729.5    M79.602    4. Morbid obesity with BMI of 40.0-44.9, adult (CMS/[facility]) E66.01 278.01    Z68.41 V85.41   5. Impaired functional mobility, balance, gait, and endurance Z74.09 V49.89   6. Functional gait abnormality R26.89 781.2       Patient Active Problem List   Diagnosis   • YANELI (generalized anxiety disorder)   • ADD (attention deficit disorder)   • Depression   • Diabetes type 2, controlled (CMS/[facility])   • ED (erectile dysfunction) of non-organic origin   • HLD (hyperlipidemia)   • Essential hypertension   • Arthritis   • Psoriasis   • Testosterone deficiency   • Primary insomnia   • Weakness of both lower extremities   • Paresthesia of both upper extremities   • Morbid obesity with BMI of 40.0-44.9, adult (CMS/[facility])   • Adverse effect of corticosteroids   • Type 2 diabetes mellitus with hyperglycemia (CMS/[facility])   • Cervical stenosis of spinal canal   • Edema of cervical spinal cord (CMS/[facility])   • Postoperative atrial fibrillation (CMS/[facility])   • Cervical myelopathy (CMS/[facility])       Therapy Treatment    IRF Treatment Summary     Row Name 18 1214 18 0830          Evaluation/Treatment Time and Intent    Subjective Information  no complaints  -AF  no complaints  -EE     Existing Precautions/Restrictions  fall;spinal  -AF  fall;spinal  -EE     Document Type  therapy note (daily note)  -AF  therapy note (daily note)  -EE     Mode of Treatment  occupational therapy  -AF  physical therapy  -EE     Patient/Family Observations  sitting up in w/c in room, wife present and participate in teaching at  end of the session  -AF  Pt sitting up in chair in no acute distress  -EE     Recorded by [AF] Lolis Cooley OTR [EE] Cathleen Rosario, PT     Row Name 12/11/18 1214 12/11/18 0830          Cognition/Psychosocial- PT/OT    Affect/Mental Status (Cognitive)  WFL  -AF  WFL  -EE     Orientation Status (Cognition)  oriented x 4  -AF  oriented x 4  -EE     Follows Commands (Cognition)  WFL  -AF  WFL  -EE     Personal Safety Interventions  fall prevention program maintained;gait belt;nonskid shoes/slippers when out of bed  -AF  fall prevention program maintained;gait belt;muscle strengthening facilitated;nonskid shoes/slippers when out of bed;supervised activity  -EE     Recorded by [AF] Lolis Cooley OTR [EE] Cathleen Rosario, PT     Row Name 12/11/18 0830             Bed Mobility Assessment/Treatment    Supine-Sit [place] (Bed Mobility)  minimum assist (75% patient effort);contact guard;verbal cues  -EE      Comment (Bed Mobility)  performed w/o use of bed rails  -EE      Recorded by [EE] Cathleen Rosario, PT      Row Name 12/11/18 1214             Functional Mobility    Functional Mobility- Comment  walked with wife with RWX to and from bathroom, wife signed off to assist with toileting and commode transfers  -AF      Recorded by [AF] Lolis Cooley, MAYOR      Row Name 12/11/18 0830             Transfer Assessment/Treatment    Comment (Transfers)  Spouse present for teaching to assist pt with transfers, demonstrates good understanding of safety precautions and use of gait belt. Spouse able to safely assist pt with sit <> stand transfer with therapist standing by.   -EE      Recorded by [EE] Cathleen Rosario PT      Row Name 12/11/18 0830             Bed-Chair Transfer    Bed-Chair [place] (Transfers)  minimum assist (75% patient effort);verbal cues  -EE      Assistive Device (Bed-Chair Transfers)  walker, front-wheeled  -EE      Recorded by [EE] Cathleen Rosario, PT      Row Name 12/11/18 0830             Chair-Bed  Transfer    Chair-Bed [place] (Transfers)  minimum assist (75% patient effort);verbal cues  -EE      Assistive Device (Chair-Bed Transfers)  walker, front-wheeled  -EE      Recorded by [EE] Cathleen Rosario, PT      Row Name 12/11/18 0830             Sit-Stand Transfer    Sit-Stand [place] (Transfers)  minimum assist (75% patient effort);contact guard;verbal cues  -EE      Assistive Device (Sit-Stand Transfers)  walker, front-wheeled  -EE      Recorded by [EE] Cathleen Rosario, PT      Row Name 12/11/18 0830             Stand-Sit Transfer    Stand-Sit [place] (Transfers)  contact guard;verbal cues  -EE      Recorded by [EE] Cathleen Rosario, PT      Row Name 12/11/18 1214             Toilet Transfer    Type (Toilet Transfer)  stand-sit;sit-stand  -AF      [place] Level (Toilet Transfer)  verbal cues;minimum assist (75% patient effort)  -AF      Assistive Device (Toilet Transfer)  commode;grab bars/safety frame;walker, front-wheeled  -AF      Recorded by [AF] Lolis Cooley OTR      Row Name 12/11/18 1214             Shower Transfer    Type (Shower Transfer)  stand pivot/stand step  -AF      [place] Level (Shower Transfer)  minimum assist (75% patient effort);verbal cues  -AF      Assistive Device (Shower Transfer)  grab bars/tub rail;tub bench  -AF      Recorded by [AF] Lolis Cooley OTR      Row Name 12/11/18 0830             Gait/Stairs Assessment/Training    [place] Level (Gait)  minimum assist (75% patient effort);contact guard;verbal cues  -EE      Assistive Device (Gait)  walker, front-wheeled  -EE      Distance in Feet (Gait)  160, 80 x 4  -EE      Pattern (Gait)  step-through  -EE      Deviations/Abnormal Patterns (Gait)  ataxic;base of support, narrow;sarah decreased;stride length decreased  -EE      Bilateral Gait Deviations  forward flexed posture;weight shift ability decreased;heel strike decreased  -EE      Right Sided Gait Deviations  foot drop/toe drag  -EE       "[place] Level (Stairs)  minimum assist (75% patient effort);verbal cues;nonverbal cues (demo/gesture)  -EE      Handrail Location (Stairs)  both sides in // bars  -EE      Number of Steps (Stairs)  1  -EE      Ascending Technique (Stairs)  step-to-step  -EE      Descending Technique (Stairs)  step-to-step  -EE      Stairs, Safety Issues  weight-shifting ability decreased;balance decreased during turns  -EE      Stairs, Impairments  strength decreased;impaired balance;coordination impaired  -EE      Comment (Gait/Stairs)  Practiced step ups to 4\" step in // bars 2 x 4 trials. Spouse present for teaching on assisting pt during ambulation; demonstrates correct use of gait belt and positioning while ambulating 80' with pt and therapist standing by.   -EE      Recorded by [EE] Cathleen Rosario, PT      Row Name 12/11/18 0830             Safety Issues, Functional Mobility    Impairments Affecting Function (Mobility)  balance;coordination;endurance/activity tolerance;strength;sensation/sensory awareness  -EE      Recorded by [EE] Cathleen Rosario, PT      Row Name 12/11/18 1214             Bathing Assessment/Treatment    Bathing [place] Level  bathing skills;minimum assist (75% patient effort);verbal cues  -AF      Bathing Position  supported sitting;supported standing  -AF      Recorded by [AF] Lolis Cooley OTR      Row Name 12/11/18 1214             Upper Body Dressing Assessment/Treatment    Upper Body Dressing Task  upper body dressing skills;doff;supervision;don;minimum assist (75% or more patient effort);moderate assist (50-74% patient effort)  -AF      Upper Body Dressing Position  supported sitting  -AF      Comment (Upper Body Dressing)  able to doff shirt I'ly  -AF      Recorded by [AF] Lolis Cooley, OTR      Row Name 12/11/18 1214             Lower Body Dressing Assessment/Treatment    Lower Body Dressing [place] Level  doff;don;shoes/slippers;pants/bottoms;socks;underwear;minimum assist (75% " patient effort);verbal cues  -AF      Lower Body Dressing Position  supported standing;supported sitting  -AF      Lower Body Dressing Setup Assistance  obtain clothing  -AF      Comment (Lower Body Dressing)  pt able to don/doff socks and shoes seated on TTB  -AF      Recorded by [AF] Lolis Cooley OTR      Row Name 12/11/18 1214             Toileting Assessment/Treatment    Toileting [place] Level  toileting skills;minimum assist (75% patient effort);verbal cues  -AF      Assistive Device Use (Toileting)  grab bar/safety frame;raised toilet seat  -AF      Toileting Position  supported standing;supported sitting  -AF      Comment (Toileting)  wife present and assisting as needed  -AF      Recorded by [AF] Lolis Cooley OTR      Row Name 12/11/18 1214 12/11/18 0830          Pain Scale: Numbers Pre/Post-Treatment    Pain Scale: Numbers, Pretreatment  0/10 - no pain  -AF  4/10  -EE     Pain Scale: Numbers, Post-Treatment  0/10 - no pain  -AF  4/10  -EE     Pain Location - Orientation  --  incisional  -EE     Pain Location  --  neck  -EE     Pain Intervention(s)  --  Repositioned;Medication (See MAR);Rest  -EE     Recorded by [AF] Lolis Cooley, MAYOR [EE] Cathleen Rosario PT     Row Name 12/11/18 0830             Dynamic Balance Activity    Therapeutic Training Performed (Dynamic Balance)  side stepping  -EE      Support Needed for Balance (Dynamic Balance Training)  uses both upper extremities for support;CGA // bars  -EE      Comment (Dynamic Balance Training)  2 x 8' in each direction  -EE      Recorded by [EE] Cathleen Rosario PT      Row Name 12/11/18 0830             Lower Extremity Standing Therapeutic Exercise    Performed, Standing Lower Extremity (Therapeutic Exercise)  heel raises;hip/knee flexion/extension;toe raises  -EE      Device, Standing Lower Extremity (Therapeutic Exercise)  parallel bars B UE support  -EE      Exercise Type, Standing Lower Extremity (Therapeutic Exercise)  AROM (active  range of motion)  -EE      Sets/Reps Detail, Standing Lower Extremity (Therapeutic Exercise)  1/10  -EE      Recorded by [EE] Cathleen Rosario PT      Row Name 12/11/18 0830             Lower Extremity Seated Therapeutic Exercise    Performed, Seated Lower Extremity (Therapeutic Exercise)  hip flexion/extension;hip abduction/adduction;ankle dorsiflexion/plantarflexion;SAQ (short arc quad), knee extension;knee flexion/extension  -EE      Device, Seated Lower Extremity (Therapeutic Exercise)  elastic bands/tubing;free weights, cuff;small ball red tband, 1.5# weights  -EE      Exercise Type, Seated Lower Extremity (Therapeutic Exercise)  resistive exercise  -EE      Sets/Reps Detail, Seated Lower Extremity (Therapeutic Exercise)  1/20  -EE      Recorded by [EE] Cathleen Rosario PT      Row Name 12/11/18 1214             Neuromuscular Re-education    Comment (Neuromuscular Re-education)  table slides B;ly on flat and angled surface with increased ability and increased reps. placed with RUE square beads on vertical dowel han with Assist at elbow to decrease compensation. pt then removed with L hand with increased time, pt able to keep L elbow tucked in during task  -AF      Recorded by [AF] Lolis Cooley OTR      Row Name 12/11/18 1214 12/11/18 0830          Positioning and Restraints    Pre-Treatment Position  sitting in chair/recliner  -AF  sitting in chair/recliner  -EE     Post Treatment Position  bathroom  -AF  wheelchair  -EE     In Wheelchair  --  sitting;call light within reach;encouraged to call for assist;exit alarm on  -EE     Bathroom  sitting;call light within reach;encouraged to call for assist;with family/caregiver wife present   -AF  --     Recorded by [AF] Lolis Cooley OTR [EE] Cathleen Rosario PT       User Key  (r) = Recorded By, (t) = Taken By, (c) = Cosigned By    Initials Name Effective Dates    EE Cathleen Rosario PT 04/03/18 -     AF Lolis Cooley OTR 04/03/18 -         Wound 11/27/18 0963  Other (See comments) neck incision (Active)   Dressing Appearance open to air 12/11/2018  7:45 AM   Closure Liquid skin adhesive;Adhesive closure strips 12/11/2018  7:45 AM   Base clean;dry 12/10/2018  7:28 PM   Periwound dry;intact 12/10/2018  7:28 PM   Drainage Amount none 12/11/2018  7:45 AM   Dressing Care, Wound open to air 12/11/2018  7:45 AM     Physical Therapy Education     Title: PT OT SLP Therapies (In Progress)     Topic: Physical Therapy (Done)     Point: Mobility training (Done)     Learning Progress Summary           Patient Acceptance, E,TB,D, VU,NR by EE at 12/11/2018  8:48 AM    Acceptance, E,TB,D, VU,NR by EE at 12/10/2018  8:45 AM    Eager, E,TB,D, VU,DU,NR by MAR at 12/9/2018 10:24 AM    Eager, E,TB,D, VU,DU by MAR at 12/8/2018 12:00 PM    Acceptance, E,TB,D, VU,NR by EE at 12/7/2018  9:22 AM    Acceptance, E,TB, VU,NR by EE at 12/6/2018  9:15 AM    Acceptance, E,TB, VU,NR by EE at 12/5/2018 10:10 AM    Acceptance, E, NR by LB at 12/4/2018 10:12 AM    Acceptance, E,TB, VU,NR by EE at 12/3/2018  9:48 AM    Acceptance, E, VU,NR by IAN at 12/1/2018  9:50 AM   Family Acceptance, E,TB,D, VU,NR by EE at 12/11/2018  8:48 AM   Significant Other Eager, E,TB,D, VU,DU,NR by MAR at 12/9/2018 10:24 AM                   Point: Home exercise program (Done)     Learning Progress Summary           Patient Acceptance, E,TB,D, VU,NR by EE at 12/11/2018  8:48 AM    Acceptance, E,TB,D, VU,NR by EE at 12/10/2018  8:45 AM    Eager, E,TB,D, VU,DU,NR by MAR at 12/9/2018 10:24 AM    Eager, E,TB,D, VU,DU by MAR at 12/8/2018 12:00 PM    Acceptance, E,TB,D, VU,NR by EE at 12/7/2018  9:22 AM    Acceptance, E,TB, VU,NR by EE at 12/6/2018  9:15 AM    Acceptance, E,TB, VU,NR by EE at 12/5/2018 10:10 AM    Acceptance, E,TB, VU,NR by EE at 12/3/2018  9:48 AM   Family Acceptance, E,TB,D, VU,NR by EE at 12/11/2018  8:48 AM   Significant Other Eager, E,TB,D, VU,DU,NR by  at 12/9/2018 10:24 AM                   Point: Body mechanics  (Done)     Learning Progress Summary           Patient Acceptance, E,TB,D, VU,NR by EE at 12/11/2018  8:48 AM    Acceptance, E,TB,D, VU,NR by EE at 12/10/2018  8:45 AM    Acceptance, E,TB,D, VU,NR by EE at 12/7/2018  9:22 AM    Acceptance, E,TB, VU,NR by EE at 12/6/2018  9:15 AM    Acceptance, E,TB, VU,NR by EE at 12/5/2018 10:10 AM    Acceptance, E,TB, VU,NR by EE at 12/3/2018  9:48 AM   Family Acceptance, E,TB,D, VU,NR by EE at 12/11/2018  8:48 AM                   Point: Precautions (Done)     Learning Progress Summary           Patient Acceptance, E,TB,D, VU,NR by EE at 12/11/2018  8:48 AM    Acceptance, E,TB,D, VU,NR by EE at 12/10/2018  8:45 AM    Acceptance, E,TB,D, VU,NR by EE at 12/7/2018  9:22 AM    Acceptance, E,TB, VU,NR by EE at 12/6/2018  9:15 AM    Acceptance, E,TB, VU,NR by EE at 12/5/2018 10:10 AM    Acceptance, E,TB, VU,NR by EE at 12/3/2018  9:48 AM    Acceptance, E, VU,NR by JK at 12/1/2018  9:50 AM   Family Acceptance, E,TB,D, VU,NR by EE at 12/11/2018  8:48 AM                               User Key     Initials Effective Dates Name Provider Type Discipline    LB 03/07/18 -  Odette Orourke, PTA Physical Therapy Assistant PT    EE 04/03/18 -  Cathleen Rosario, PT Physical Therapist PT    JK 04/03/18 -  Janae Trujillo, PT Physical Therapist PT    KP 04/03/18 -  Angelic Bautista, PT Physical Therapist PT                  PT Recommendation and Plan                        Time Calculation:     PT Charges     Row Name 12/11/18 1404 12/11/18 0930          Time Calculation    Start Time  1300  -EE  0830  -EE     Stop Time  1330  -EE  0930  -EE     Time Calculation (min)  30 min  -EE  60 min  -EE     PT Received On  12/11/18  -EE  12/11/18  -EE     PT - Next Appointment  12/12/18  -EE  12/11/18  -EE       User Key  (r) = Recorded By, (t) = Taken By, (c) = Cosigned By    Initials Name Provider Type    Cathleen Jacobson, PT Physical Therapist            Therapy Charges for Today     Code Description Service  Date Service Provider Modifiers Qty    89949076785  PT THER PROC EA 15 MIN 12/10/2018 Cathleen Rosario, PT GP 6    01750878729 HC PT THER PROC EA 15 MIN 12/11/2018 Cathleen Rosario, PT GP 4    9751955 HC PT THER PROC EA 15 MIN 12/11/2018 Cathleen Rosario, PT GP 2                   Cathleen Rosario, PT  12/11/2018

## 2018-12-11 NOTE — THERAPY TREATMENT NOTE
Inpatient Rehabilitation - Occupational Therapy Treatment Note    [facility]     Patient Name: Duane Mark Witten  : 1955  MRN: [id number]    Today's Date: 2018                 Admit Date: 2018      Visit Dx:    ICD-10-CM ICD-9-CM   1. Cervical stenosis of spinal canal M48.02 723.0   2. Weakness of both lower extremities R29.898 729.89   3. Paresthesia of both upper extremities R20.2 782.0    M79.601 729.5    M79.602    4. Morbid obesity with BMI of 40.0-44.9, adult (CMS/[facility]) E66.01 278.01    Z68.41 V85.41   5. Impaired functional mobility, balance, gait, and endurance Z74.09 V49.89   6. Functional gait abnormality R26.89 781.2       Patient Active Problem List   Diagnosis   • YANELI (generalized anxiety disorder)   • ADD (attention deficit disorder)   • Depression   • Diabetes type 2, controlled (CMS/[facility])   • ED (erectile dysfunction) of non-organic origin   • HLD (hyperlipidemia)   • Essential hypertension   • Arthritis   • Psoriasis   • Testosterone deficiency   • Primary insomnia   • Weakness of both lower extremities   • Paresthesia of both upper extremities   • Morbid obesity with BMI of 40.0-44.9, adult (CMS/[facility])   • Adverse effect of corticosteroids   • Type 2 diabetes mellitus with hyperglycemia (CMS/[facility])   • Cervical stenosis of spinal canal   • Edema of cervical spinal cord (CMS/[facility])   • Postoperative atrial fibrillation (CMS/[facility])   • Cervical myelopathy (CMS/[facility])         Therapy Treatment    IRF Treatment Summary     Row Name 18 1214 18 0830          Evaluation/Treatment Time and Intent    Subjective Information  no complaints  -AF  no complaints  -EE     Existing Precautions/Restrictions  fall;spinal  -AF  fall;spinal  -EE     Document Type  therapy note (daily note)  -AF  therapy note (daily note)  -EE     Mode of Treatment  occupational therapy  -AF  physical therapy  -EE     Patient/Family Observations  sitting up in w/c in room, wife present and participate in teaching  at end of the session  -AF  Pt sitting up in chair in no acute distress  -EE     Recorded by [AF] Lolis Cooley, MAYOR [EE] Cathleen Rosario, PT     Row Name 12/11/18 1214 12/11/18 0830          Cognition/Psychosocial- PT/OT    Affect/Mental Status (Cognitive)  WFL  -AF  WFL  -EE     Orientation Status (Cognition)  oriented x 4  -AF  oriented x 4  -EE     Follows Commands (Cognition)  WFL  -AF  WFL  -EE     Personal Safety Interventions  fall prevention program maintained;gait belt;nonskid shoes/slippers when out of bed  -AF  fall prevention program maintained;gait belt;muscle strengthening facilitated;nonskid shoes/slippers when out of bed;supervised activity  -EE     Recorded by [AF] Lolis Cooley OTR [EE] Cathleen Rosario, PT     Row Name 12/11/18 1214             Functional Mobility    Functional Mobility- Comment  walked with wife with RWX to and from bathroom, wife signed off to assist with toileting and commode transfers  -AF      Recorded by [AF] Lolis Cooley OTR      Row Name 12/11/18 0830             Transfer Assessment/Treatment    Comment (Transfers)  Spouse present for teaching to assist pt with transfers, demonstrates good understanding of safety precautions and use of gait belt. Spouse able to safely assist pt with sit <> stand transfer with therapist standing by.   -EE      Recorded by [EE] Cathleen Rosario, PT      Row Name 12/11/18 0830             Chair-Bed Transfer    Chair-Bed [place] (Transfers)  minimum assist (75% patient effort);verbal cues  -EE      Assistive Device (Chair-Bed Transfers)  walker, front-wheeled  -EE      Recorded by [EE] Cathleen Rosario, PT      Row Name 12/11/18 0830             Sit-Stand Transfer    Sit-Stand [place] (Transfers)  minimum assist (75% patient effort);verbal cues  -EE      Assistive Device (Sit-Stand Transfers)  walker, front-wheeled  -EE      Recorded by [EE] Cathleen Rosario, PT      Row Name 12/11/18 0830             Stand-Sit Transfer    Stand-Sit  "[place] (Transfers)  contact guard;verbal cues  -EE      Recorded by [EE] Cathleen Rosario PT      Row Name 12/11/18 1214             Toilet Transfer    Type (Toilet Transfer)  stand-sit;sit-stand  -AF      [place] Level (Toilet Transfer)  verbal cues;minimum assist (75% patient effort)  -AF      Assistive Device (Toilet Transfer)  commode;grab bars/safety frame;walker, front-wheeled  -AF      Recorded by [AF] Lolis Cooley OTR      Row Name 12/11/18 1214             Shower Transfer    Type (Shower Transfer)  stand pivot/stand step  -AF      [place] Level (Shower Transfer)  minimum assist (75% patient effort);verbal cues  -AF      Assistive Device (Shower Transfer)  grab bars/tub rail;tub bench  -AF      Recorded by [AF] Lolis Cooley OTR      Row Name 12/11/18 0830             Gait/Stairs Assessment/Training    [place] Level (Gait)  minimum assist (75% patient effort);verbal cues  -EE      Assistive Device (Gait)  walker, front-wheeled  -EE      Distance in Feet (Gait)  80 x 4  -EE      Pattern (Gait)  step-through  -EE      Deviations/Abnormal Patterns (Gait)  ataxic;base of support, narrow;sarah decreased;stride length decreased  -EE      Bilateral Gait Deviations  forward flexed posture;weight shift ability decreased;heel strike decreased  -EE      Right Sided Gait Deviations  foot drop/toe drag  -EE      [place] Level (Stairs)  minimum assist (75% patient effort);verbal cues;nonverbal cues (demo/gesture)  -EE      Handrail Location (Stairs)  both sides in // bars  -EE      Number of Steps (Stairs)  1  -EE      Ascending Technique (Stairs)  step-to-step  -EE      Descending Technique (Stairs)  step-to-step  -EE      Stairs, Safety Issues  weight-shifting ability decreased;balance decreased during turns  -EE      Stairs, Impairments  strength decreased;impaired balance;coordination impaired  -EE      Comment (Gait/Stairs)  Practiced step ups to 4\" step in // bars 2 x 4 trials. " Spouse present for teaching on assisting pt during ambulation; demonstrates correct use of gait belt and positioning while ambulating 80' with pt and therapist standing by.   -EE      Recorded by [EE] Cathleen Rosario, HAILEY      Row Name 12/11/18 30             Safety Issues, Functional Mobility    Impairments Affecting Function (Mobility)  balance;coordination;endurance/activity tolerance;strength;sensation/sensory awareness  -EE      Recorded by [EE] Cathleen Rosario PT      Row Name 12/11/18 1214             Bathing Assessment/Treatment    Bathing [place] Level  bathing skills;minimum assist (75% patient effort);verbal cues  -AF      Bathing Position  supported sitting;supported standing  -AF      Recorded by [AF] Lolis Cooley OTR      Row Name 12/11/18 1214             Upper Body Dressing Assessment/Treatment    Upper Body Dressing Task  upper body dressing skills;doff;supervision;don;minimum assist (75% or more patient effort);moderate assist (50-74% patient effort)  -AF      Upper Body Dressing Position  supported sitting  -AF      Comment (Upper Body Dressing)  able to doff shirt I'ly  -AF      Recorded by [AF] Lolis Cooley OTR      Row Name 12/11/18 1214             Lower Body Dressing Assessment/Treatment    Lower Body Dressing [place] Level  doff;don;shoes/slippers;pants/bottoms;socks;underwear;minimum assist (75% patient effort);verbal cues  -AF      Lower Body Dressing Position  supported standing;supported sitting  -AF      Lower Body Dressing Setup Assistance  obtain clothing  -AF      Comment (Lower Body Dressing)  pt able to don/doff socks and shoes seated on TTB  -AF      Recorded by [AF] Lolis Cooley OTR      Row Name 12/11/18 1214             Toileting Assessment/Treatment    Toileting [place] Level  toileting skills;minimum assist (75% patient effort);verbal cues  -AF      Assistive Device Use (Toileting)  grab bar/safety frame;raised toilet seat  -AF      Toileting  Position  supported standing;supported sitting  -AF      Comment (Toileting)  wife present and assisting as needed  -AF      Recorded by [AF] Lolis Cooley OTR      Row Name 12/11/18 1214 12/11/18 0830          Pain Scale: Numbers Pre/Post-Treatment    Pain Scale: Numbers, Pretreatment  0/10 - no pain  -AF  4/10  -EE     Pain Scale: Numbers, Post-Treatment  0/10 - no pain  -AF  --     Pain Location - Orientation  --  incisional  -EE     Pain Location  --  neck  -EE     Pain Intervention(s)  --  Repositioned;Medication (See MAR);Rest  -EE     Recorded by [AF] Lolis Cooley OTR [EE] Cathleen Rosario PT     Row Name 12/11/18 0830             Dynamic Balance Activity    Therapeutic Training Performed (Dynamic Balance)  side stepping  -EE      Support Needed for Balance (Dynamic Balance Training)  uses both upper extremities for support;CGA // bars  -EE      Comment (Dynamic Balance Training)  2 x 8' in each direction  -EE      Recorded by [EE] Cathleen Rosario PT      Row Name 12/11/18 0830             Lower Extremity Standing Therapeutic Exercise    Performed, Standing Lower Extremity (Therapeutic Exercise)  heel raises;hip/knee flexion/extension;toe raises  -EE      Device, Standing Lower Extremity (Therapeutic Exercise)  parallel bars B UE support  -EE      Exercise Type, Standing Lower Extremity (Therapeutic Exercise)  AROM (active range of motion)  -EE      Sets/Reps Detail, Standing Lower Extremity (Therapeutic Exercise)  1/10  -EE      Recorded by [EE] Cathleen Rosario PT      Row Name 12/11/18 1214             Neuromuscular Re-education    Comment (Neuromuscular Re-education)  table slides B;ly on flat and angled surface with increased ability and increased reps. placed with RUE square beads on vertical dowel han with Assist at elbow to decrease compensation. pt then removed with L hand with increased time, pt able to keep L elbow tucked in during task  -AF      Recorded by [AF] Lolis Cooley OTR Row  Name 12/11/18 1214 12/11/18 0830          Positioning and Restraints    Pre-Treatment Position  sitting in chair/recliner  -AF  sitting in chair/recliner  -EE     Post Treatment Position  bathroom  -AF  --     Bathroom  sitting;call light within reach;encouraged to call for assist;with family/caregiver wife present   -AF  --     Recorded by [AF] Lolis Cooley, OTR [EE] Cathleen Rosario, PT       User Key  (r) = Recorded By, (t) = Taken By, (c) = Cosigned By    Initials Name Effective Dates    EE Cathleen Rosario, PT 04/03/18 -     AF Lolis Cooley, OTR 04/03/18 -           Wound 11/27/18 1318 Other (See comments) neck incision (Active)   Dressing Appearance open to air 12/11/2018  7:45 AM   Closure Liquid skin adhesive;Adhesive closure strips 12/11/2018  7:45 AM   Base clean;dry 12/10/2018  7:28 PM   Periwound dry;intact 12/10/2018  7:28 PM   Drainage Amount none 12/11/2018  7:45 AM   Dressing Care, Wound open to air 12/11/2018  7:45 AM         OT Recommendation and Plan                 OT IRF GOALS     Row Name 12/07/18 1216 12/01/18 1000          Transfer Goal 1 (OT-IRF)    Activity/Assistive Device (Transfer Goal 1, OT-IRF)  toilet;walk-in shower;shower chair;walker, rolling  -AF  toilet  -RP     [place] Level (Transfer Goal 1, OT-IRF)  minimum assist (75% or more patient effort);contact guard assist;verbal cues required  -AF  minimum assist (75% or more patient effort)  -RP     Time Frame (Transfer Goal 1, OT-IRF)  short term goal (STG)  -AF  short term goal (STG)  -RP     Progress/Outcomes (Transfer Goal 1, OT-IRF)  goal ongoing  -AF  goal ongoing  -RP        Transfer Goal 2 (OT-IRF)    Activity/Assistive Device (Transfer Goal 2, OT-IRF)  toilet;shower chair;walk-in shower;walker, rolling  -AF  toilet  -RP     [place] Level (Transfer Goal 2, OT-IRF)  supervision required  -AF  supervision required  -RP     Time Frame (Transfer Goal 2, OT-IRF)  long term goal (LTG)  -AF  long term goal (LTG)  -RP      Progress/Outcomes (Transfer Goal 2, OT-IRF)  goal ongoing  -AF  goal ongoing  -RP        Bathing Goal 1 (OT-IRF)    Activity/Device (Bathing Goal 1, OT-IRF)  bathing skills, all;grab bar/tub rail;hand-held shower spray hose;long-handled sponge;tub bench  -AF  bathing skills, all  -RP     [place] Level (Bathing Goal 1, OT-IRF)  minimum assist (75% or more patient effort);verbal cues required  -AF  moderate assist (50-74% patient effort)  -RP     Time Frame (Bathing Goal 1, OT-IRF)  short term goal (STG)  -AF  short term goal (STG)  -RP     Progress/Outcomes (Bathing Goal 1, OT-IRF)  goal ongoing  -AF  goal ongoing  -RP        Bathing Goal 2 (OT-IRF)    Activity/Device (Bathing Goal 2, OT-IRF)  bathing skills, all;grab bar/tub rail;hand-held shower spray hose;tub bench;long-handled sponge  -AF  bathing skills, all  -RP     [place] Level (Bathing Goal 2, OT-IRF)  supervision required  -AF  supervision required;standby assist  -RP     Time Frame (Bathing Goal 2, OT-IRF)  long term goal (LTG)  -AF  long term goal (LTG)  -RP     Progress/Outcomes (Bathing Goal 2, OT-IRF)  goal ongoing  -AF  goal ongoing  -RP        UB Dressing Goal 1 (OT-IRF)    Activity/Device (UB Dressing Goal 1, OT-IRF)  upper body dressing  -AF  upper body dressing  -RP     [place] (UB Dress Goal 1, OT-IRF)  minimum assist (75% or more patient effort);verbal cues required  -AF  minimum assist (75% or more patient effort)  -RP     Time Frame (UB Dressing Goal 1, OT-IRF)  short term goal (STG)  -AF  short term goal (STG)  -RP     Progress/Outcomes (UB Dressing Goal 1, OT-IRF)  goal ongoing  -AF  goal ongoing  -RP        UB Dressing Goal 2 (OT-IRF)    Activity/Device (UB Dressing Goal 2, OT-IRF)  upper body dressing  -AF  upper body dressing  -RP     [place] (UB Dress Goal 2, OT-IRF)  supervision required  -AF  supervision required  -RP     Time Frame (UB Dressing Goal 2, OT-IRF)  long term goal (LTG)  -AF  long term goal (LTG)  -RP      Progress/Outcomes (UB Dressing Goal 2, OT-IRF)  goal ongoing  -AF  goal ongoing  -RP        LB Dressing Goal 1 (OT-IRF)    Activity/Device (LB Dressing Goal 1, OT-IRF)  lower body dressing;long handled shoe horn;reacher;elastic laces  -AF  lower body dressing  -RP     [place] (LB Dressing Goal 1, OT-IRF)  moderate assist (50-74% patient effort);verbal cues required;minimum assist (75% or more patient effort)  -AF  maximum assist (25-49% patient effort)  -RP     Time Frame (LB Dressing Goal 1, OT-IRF)  short term goal (STG)  -AF  short term goal (STG)  -RP     Progress/Outcomes (LB Dressing Goal 1, OT-IRF)  goal ongoing  -AF  goal ongoing  -RP        LB Dressing Goal 2 (OT-IRF)    Activity/Device (LB Dressing Goal 2, OT-IRF)  lower body dressing;long handled shoe horn;elastic laces;reacher  -AF  lower body dressing  -RP     [place] (LB Dressing Goal 2, OT-IRF)  standby assist;verbal cues required  -AF  supervision required;standby assist  -RP     Time Frame (LB Dressing Goal 2, OT-IRF)  long term goal (LTG)  -AF  long term goal (LTG)  -RP     Progress/Outcomes (LB Dressing Goal 2, OT-IRF)  goal ongoing  -AF  goal ongoing  -RP        Grooming Goal 1 (OT-IRF)    Activity/Device (Grooming Goal 1, OT-IRF)  grooming skills, all  -AF  grooming skills, all  -RP     [place] (Grooming Goal 1, OT-IRF)  minimum assist (75% or more patient effort);verbal cues required  -AF  minimum assist (75% or more patient effort)  -RP     Time Frame (Grooming Goal 1, OT-IRF)  short term goal (STG)  -AF  short term goal (STG)  -RP     Progress/Outcomes (Grooming Goal 1, OT-IRF)  goal ongoing  -AF  goal ongoing  -RP        Grooming Goal 2 (OT-IRF)    Activity/Device (Grooming Goal 2, OT-IRF)  grooming skills, all  -AF  grooming skills, all  -RP     [place] (Grooming Goal 2, OT-IRF)  supervision required  -AF  supervision required  -RP     Time Frame (Grooming Goal 2, OT-IRF)  long term goal (LTG)  -AF  long term goal  (LTG)  -RP     Progress/Outcomes (Grooming Goal 2, OT-IRF)  goal ongoing  -AF  goal ongoing  -RP        Toileting Goal 1 (OT-IRF)    Activity/Device (Toileting Goal 1, OT-IRF)  toileting skills, all;grab bar/safety frame;raised toilet seat  -AF  toileting skills, all  -RP     [place] Level (Toileting Goal 1, OT-IRF)  moderate assist (50-74% patient effort);verbal cues required  -AF  maximum assist (25-49% patient effort)  -RP     Time Frame (Toileting Goal 1, OT-IRF)  short term goal (STG)  -AF  short term goal (STG)  -RP     Progress/Outcomes (Toileting Goal 1, OT-IRF)  goal ongoing  -AF  goal ongoing  -RP        Toileting Goal 2 (OT-IRF)    Activity/Device (Toileting Goal 2, OT-IRF)  toileting skills, all;grab bar/safety frame;raised toilet seat  -AF  toileting skills, all  -RP     [place] Level (Toileting Goal 2, OT-IRF)  supervision required;verbal cues required  -AF  supervision required;standby assist  -RP     Time Frame (Toileting Goal 2, OT-IRF)  long term goal (LTG)  -AF  long term goal (LTG)  -RP     Progress/Outcomes (Toileting Goal 2, OT-IRF)  goal ongoing  -AF  goal ongoing  -RP        ROM Goal 1 (OT-IRF)    ROM Goal 1 (OT-IRF)  pt will increase B UE ROM to approx. 3/4 shoulder flexion to assist with ADL tasks   -AF  Pt will increase B UE ROM to approx. 3/4 shoulder flexion to assist w/ ADLs.  -RP     Time Frame (ROM Goal 1, OT-IRF)  long term goal (LTG)  -AF  long term goal (LTG)  -RP     Progress/Outcomes (ROM Goal 1, OT-IRF)  goal ongoing  -AF  goal ongoing  -RP       User Key  (r) = Recorded By, (t) = Taken By, (c) = Cosigned By    Initials Name Provider Type    Lolis Horne OTR Occupational Therapist    Precious Hamilton OT Occupational Therapist          Occupational Therapy Education     Title: PT OT SLP Therapies (In Progress)     Topic: Occupational Therapy (In Progress)     Point: ADL training (Done)     Description: Instruct learner(s) on proper safety adaptation and  remediation techniques during self care or transfers.   Instruct in proper use of assistive devices.    Learning Progress Summary           Patient Acceptance, E, VU,DU by AF at 12/11/2018 12:20 PM    Comment:  wife signed off to assist with walking to and from bathroom and with toileting tasks, demo'd and discussed Tub transfer bench in tub/shower combo with pt. pt stated that he will order one  himself    Acceptance, E,D, VU,NR by AF at 12/4/2018 12:07 PM    Comment:  AE during LBD tasks    Acceptance, E,D, VU,NR by AF at 12/3/2018 11:46 AM    Comment:  to maintain cervial precautions during ADL tasks    Acceptance, E, VU by RP at 12/1/2018 11:48 AM    Comment:  OT educ on OT role in therapeutic process and pt's POC.   Family Acceptance, E, VU,DU by AF at 12/11/2018 12:20 PM    Comment:  wife signed off to assist with walking to and from bathroom and with toileting tasks, demo'd and discussed Tub transfer bench in tub/shower combo with pt. pt stated that he will order one  himself    Acceptance, E,D, VU,NR by AF at 12/3/2018 11:46 AM    Comment:  to maintain cervial precautions during ADL tasks                   Point: Home exercise program (Done)     Description: Instruct learner(s) on appropriate technique for monitoring, assisting and/or progressing therapeutic exercises/activities.    Learning Progress Summary           Patient Acceptance, E, VU by AF at 12/7/2018  3:08 PM    Comment:  theraputty and foam blocks to increase sensation and FMC/strength                   Point: Precautions (Done)     Description: Instruct learner(s) on prescribed precautions during self-care and functional transfers.    Learning Progress Summary           Patient Acceptance, E,D, VU,NR by AF at 12/3/2018 11:46 AM    Comment:  to maintain cervial precautions during ADL tasks    Acceptance, E, VU by RP at 12/1/2018 11:48 AM    Comment:  OT educ on OT role in therapeutic process and pt's POC.   Family Acceptance, E,D, VU,NR by AF at  12/3/2018 11:46 AM    Comment:  to maintain cervial precautions during ADL tasks                               User Key     Initials Effective Dates Name Provider Type Discipline    AF 04/03/18 -  Lolis Cooley OTR Occupational Therapist OT    RP 05/03/18 -  Precious De Leon OT Occupational Therapist OT                       Time Calculation:     Time Calculation- OT     Row Name 12/11/18 1221             Time Calculation- OT    OT Start Time  0930  -AF      OT Stop Time  1030  -AF      OT Time Calculation (min)  60 min  -AF        User Key  (r) = Recorded By, (t) = Taken By, (c) = Cosigned By    Initials Name Provider Type    AF Lolis Cooley, OTR Occupational Therapist          Therapy Suggested Charges     Code   Minutes Charges    None              Therapy Charges for Today     Code Description Service Date Service Provider Modifiers Qty    34000466910 HC OT NEUROMUSC RE EDUCATION EA 15 MIN 12/10/2018 Lolis Cooley OTR GO 1    32439014964 HC OT THER PROC EA 15 MIN 12/10/2018 Lolis Cooley OTR GO 1    46718792051 HC OT SELF CARE/MGMT/TRAIN EA 15 MIN 12/11/2018 Lolis Cooley OTR GO 3    73870047706 HC OT NEUROMUSC RE EDUCATION EA 15 MIN 12/11/2018 Lolis Cooley OTJEANINE GO 1                   NURIS Grimm  12/11/2018

## 2018-12-11 NOTE — PLAN OF CARE
Problem: Patient Care Overview  Goal: Plan of Care Review  Outcome: Ongoing (interventions implemented as appropriate)   12/11/18 1747   Patient Care Overview   IRF Plan of Care Review progress ongoing, continue   Coping/Psychosocial   Plan of Care Reviewed With patient   OTHER   Outcome Summary Patient pleasant and cooperative, alert and oriented, and continent of B/B. Lidocaine patches to shoulders, pain medication given twice, and insulin coverage needed at lunch. CPAP worn at night. Team conference held today, discharge scheduled for next week       Problem: Fall Risk (Adult)  Goal: Absence of Fall  Outcome: Ongoing (interventions implemented as appropriate)   12/11/18 1747   Fall Risk (Adult)   Absence of Fall making progress toward outcome       Problem: Pain, Acute (Adult)  Goal: Acceptable Pain Control/Comfort Level  Outcome: Ongoing (interventions implemented as appropriate)   12/11/18 1747   Pain, Acute (Adult)   Acceptable Pain Control/Comfort Level making progress toward outcome       Problem: Skin Injury Risk (Adult)  Goal: Skin Health and Integrity  Outcome: Ongoing (interventions implemented as appropriate)   12/11/18 1747   Skin Injury Risk (Adult)   Skin Health and Integrity making progress toward outcome

## 2018-12-11 NOTE — PROGRESS NOTES
Case Management  Inpatient Rehabilitation Team Conference    Conference Date/Time: 12/11/2018 7:35:55 AM    Team Conference Attendees:  Dr. Mike Ashley, [facility]  Cathleen Rosario, PT  Lolis Cooley, OT  Rosibel Snell, [facility]  Buck Gan, VENKATESH Jang, Chaplain Jojo Bowers RN    Demographics            Age: 63Y            Gender: Male    Admission Date: 11/30/2018 3:13:00 PM  Rehabilitation Diagnosis:  Incomplete quad - cervical myelopathy  Past Medical History: Lasik; HTN, hyperlipid; morbid obesity; DM; ADD,  genralized anxiety disorder, depression      Plan of Care  Anticipated Discharge Date/Estimated Length of Stay: ELOS: 2 weeks  Anticipated Discharge Destination: Community discharge with assistance  Discharge Plan : Pt lives in a bi-level home, 2 steps to enter, then 5 steps to  the living area. Pt will have 24 hour assistance from his wife at discharge.  Medical Necessity Expected Level Rationale: Supervision  Intensity and Duration: an average of 3 hours/5 days per week  Medical Supervision and 24 Hour Rehab Nursing: x  Physical Therapy: x  PT Intensity/Duration: 90 minutes/day, 5 days/week  Occupational Therapy: x  OT Intensity/Duration: 90 minutes/day, 5 days/week  Social Work: x  Therapeutic Recreation: x  Updated (if changes indicated)    Anticipated Discharge Date/Estimated Length of Stay:   ELOS: DC 12/19    Based on the patient's medical and functional status, their prognosis and  expected level of functional improvement is: Supervision      Interdisciplinary Problem/Goals/Status    All Rehab Problems:  Body Systems    [RN] Endocrine(Active)  Current Status(12/08/2018): Blood sugar not controlled  Weekly Goal(12/15/2018): Blood sugar will be within acceptable limits  Discharge Goal: Independent with diabetes regimen        Mobility    [OT] Bed/Chair/Wheelchair(Active)  Current Status(01/01/1753):  Weekly Goal(01/01/1753):  Discharge Goal:    [OT] Toilet Transfers(Active)  Current  Status(12/10/2018): MIN  Weekly Goal(12/17/2018): CGA  Discharge Goal: Sup    [PT] Stairs(Active)  Current Status(12/10/2018): TBD  Weekly Goal(12/18/2018): PT only  Discharge Goal: 8, SBA with rails    [PT] Walk(Active)  Current Status(12/10/2018): 80'-160' Min A with RWX  Weekly Goal(12/18/2018): CGA/SBA to BR with RWX  Discharge Goal: 250' Supervision with RWX    [PT] Bed/Chair/Wheelchair(Active)  Current Status(12/10/2018): Min A with RWX  Weekly Goal(12/18/2018): CGA/SBA with RWX  Discharge Goal: Supervision    [PT] Bed Mobility(Active)  Current Status(12/10/2018): Min/CGA  Weekly Goal(12/18/2018): Mod I  Discharge Goal: Mod I    [OT] Tub/Shower Transfers(Active)  Current Status(12/10/2018): MIN  Weekly Goal(12/17/2018): CGA  Discharge Goal: Supervision        Pain    [RN] Pain Management(Active)  Current Status(12/08/2018): Pain related to surgery  Weekly Goal(12/15/2018): Able to tolerate therapies, & pain controlled  Discharge Goal: Pain under control        Psychosocial    [RN] Coping/Adjustment(Active)  Current Status(12/08/2018): Expresses appropriate coping  Weekly Goal(12/15/2018): Allow opportunity to express concerns regarding current  status  Discharge Goal: Adequate coping regarding life changes with ongoing support.        Safety    [RN] Potential for Injury(Active)  Current Status(12/08/2018): No unsafe behaviors, uses call light appropriately  Weekly Goal(12/15/2018): Instruct family regarding safety precautions & need for  close supervision  Discharge Goal: Family will be knowledgeable of need for cueing & supervision to  avoid falls.        Self Care    [OT] Bathing(Active)  Current Status(12/10/2018): MOD/MIN  Weekly Goal(12/17/2018): MIN/CGA  Discharge Goal: Sup/SBA    [OT] Dressing (Lower)(Active)  Current Status(12/10/2018): MIN  Weekly Goal(12/17/2018): CGA  Discharge Goal: Sup/SBA    [OT] Dressing (Upper)(Active)  Current Status(12/10/2018): MOD to doff, MIN to don  Weekly  Goal(12/17/2018): Min A  Discharge Goal: Sup    [OT] Grooming(Active)  Current Status(12/10/2018): MIN/set up  Weekly Goal(12/17/2018): set up  Discharge Goal: Sup    [OT] Toileting(Active)  Current Status(12/10/2018): MOD  Weekly Goal(12/17/2018): MIN  Discharge Goal: Sup/SBA        Sphincter Control    [RN] bowel & bladder management(Active)  Current Status(12/08/2018): Continent 100%  Weekly Goal(12/15/2018): Remains continent 100%  Discharge Goal: Remains 100% continent        Comments: 12/4: poor coordination/strength in hands; to resume trulicity upon  discharge;    12/11: endurance imrproving; limited ROM BROOKE shoulders; numbness/tingling  arms/hands; impulsive at times;    Signed by: Buck Gan RN    Physician CoSigned By: Mike Melissa 12/11/2018 07:41:37

## 2018-12-11 NOTE — THERAPY TREATMENT NOTE
Inpatient Rehabilitation - Occupational Therapy Treatment Note    [facility]     Patient Name: Duane Mark Witten  : 1955  MRN: [id number]    Today's Date: 2018                 Admit Date: 2018      Visit Dx:    ICD-10-CM ICD-9-CM   1. Cervical stenosis of spinal canal M48.02 723.0   2. Weakness of both lower extremities R29.898 729.89   3. Paresthesia of both upper extremities R20.2 782.0    M79.601 729.5    M79.602    4. Morbid obesity with BMI of 40.0-44.9, adult (CMS/Formerly [facility]) E66.01 278.01    Z68.41 V85.41   5. Impaired functional mobility, balance, gait, and endurance Z74.09 V49.89   6. Functional gait abnormality R26.89 781.2       Patient Active Problem List   Diagnosis   • YANELI (generalized anxiety disorder)   • ADD (attention deficit disorder)   • Depression   • Diabetes type 2, controlled (CMS/Formerly [facility])   • ED (erectile dysfunction) of non-organic origin   • HLD (hyperlipidemia)   • Essential hypertension   • Arthritis   • Psoriasis   • Testosterone deficiency   • Primary insomnia   • Weakness of both lower extremities   • Paresthesia of both upper extremities   • Morbid obesity with BMI of 40.0-44.9, adult (CMS/Formerly [facility])   • Adverse effect of corticosteroids   • Type 2 diabetes mellitus with hyperglycemia (CMS/Formerly [facility])   • Cervical stenosis of spinal canal   • Edema of cervical spinal cord (CMS/Formerly [facility])   • Postoperative atrial fibrillation (CMS/Formerly [facility])   • Cervical myelopathy (CMS/Formerly [facility])         Therapy Treatment    IRF Treatment Summary     Row Name 18 1504 18 1214 18 0830       Evaluation/Treatment Time and Intent    Subjective Information  no complaints  -AF  no complaints  -AF  no complaints  -EE    Existing Precautions/Restrictions  fall;spinal  -AF  fall;spinal  -AF  fall;spinal  -EE    Document Type  therapy note (daily note)  -AF  therapy note (daily note)  -AF  therapy note (daily note)  -EE    Mode of Treatment  occupational therapy  -AF  occupational therapy  -AF  physical therapy   -EE    Patient/Family Observations  sitting up in w/c in room  -AF  sitting up in w/c in room, wife present and participate in teaching at end of the session  -AF  Pt sitting up in chair in no acute distress  -EE    Recorded by [AF] Lolis Cooley OTR [AF] Lolis Cooley OTR [EE] Cathleen Rosario, PT    Row Name 12/11/18 1504 12/11/18 1214 12/11/18 0830       Cognition/Psychosocial- PT/OT    Affect/Mental Status (Cognitive)  WFL  -AF  WFL  -AF  WFL  -EE    Orientation Status (Cognition)  oriented x 4  -AF  oriented x 4  -AF  oriented x 4  -EE    Follows Commands (Cognition)  WFL  -AF  WFL  -AF  WFL  -EE    Personal Safety Interventions  fall prevention program maintained;gait belt;nonskid shoes/slippers when out of bed  -AF  fall prevention program maintained;gait belt;nonskid shoes/slippers when out of bed  -AF  fall prevention program maintained;gait belt;muscle strengthening facilitated;nonskid shoes/slippers when out of bed;supervised activity  -EE    Recorded by [AF] Lolis Cooley, NURIS [AF] Lolis Cooley, NURIS [EE] Cathleen Rosario, PT    Row Name 12/11/18 1504 12/11/18 0830          Bed Mobility Assessment/Treatment    Supine-Sit [place] (Bed Mobility)  minimum assist (75% patient effort);verbal cues  -AF  minimum assist (75% patient effort);contact guard;verbal cues  -EE     Sit-Supine [place] (Bed Mobility)  contact guard;verbal cues  -AF  --     Comment (Bed Mobility)  mat mobility  -AF  performed w/o use of bed rails  -EE     Recorded by [AF] Lolis Cooley OTR [EE] Cathleen Rosario, PT     Row Name 12/11/18 1214             Functional Mobility    Functional Mobility- Comment  walked with wife with RWX to and from bathroom, wife signed off to assist with toileting and commode transfers  -AF      Recorded by [AF] Lolis Cooley OTR      Row Name 12/11/18 0830             Transfer Assessment/Treatment    Comment (Transfers)  Spouse present for teaching to assist pt with transfers,  demonstrates good understanding of safety precautions and use of gait belt. Spouse able to safely assist pt with sit <> stand transfer with therapist standing by.   -EE      Recorded by [EE] Cathleen Rosario, PT      Row Name 12/11/18 0830             Bed-Chair Transfer    Bed-Chair [place] (Transfers)  minimum assist (75% patient effort);verbal cues  -EE      Assistive Device (Bed-Chair Transfers)  walker, front-wheeled  -EE      Recorded by [EE] AbrahamCathleen, PT      Row Name 12/11/18 1504 12/11/18 0830          Chair-Bed Transfer    Chair-Bed [place] (Transfers)  minimum assist (75% patient effort);verbal cues  -AF  minimum assist (75% patient effort);verbal cues  -EE     Assistive Device (Chair-Bed Transfers)  --  walker, front-wheeled  -EE     Recorded by [AF] Lolis Cooley OTR [EE] Cathleen Rosario, PT     Row Name 12/11/18 1504 12/11/18 0830          Sit-Stand Transfer    Sit-Stand [place] (Transfers)  minimum assist (75% patient effort)  -AF  minimum assist (75% patient effort);contact guard;verbal cues  -EE     Assistive Device (Sit-Stand Transfers)  --  walker, front-wheeled  -EE     Recorded by [AF] Lolis Cooley OTR [EE] Cathleen Rosario, PT     Row Name 12/11/18 1504 12/11/18 0830          Stand-Sit Transfer    Stand-Sit [place] (Transfers)  minimum assist (75% patient effort)  -AF  contact guard;verbal cues  -EE     Recorded by [AF] Lolis Cooley OTR [EE] Cathleen Rosario, PT     Row Name 12/11/18 1214             Toilet Transfer    Type (Toilet Transfer)  stand-sit;sit-stand  -AF      [place] Level (Toilet Transfer)  verbal cues;minimum assist (75% patient effort)  -AF      Assistive Device (Toilet Transfer)  commode;grab bars/safety frame;walker, front-wheeled  -AF      Recorded by [AF] Lolis Cooley OTR      Row Name 12/11/18 1214             Shower Transfer    Type (Shower Transfer)  stand pivot/stand step  -AF      [place] Level (Shower Transfer)  minimum assist  "(75% patient effort);verbal cues  -AF      Assistive Device (Shower Transfer)  grab bars/tub rail;tub bench  -AF      Recorded by [AF] Lolis Cooley OTR      Row Name 12/11/18 0830             Gait/Stairs Assessment/Training    [place] Level (Gait)  minimum assist (75% patient effort);contact guard;verbal cues  -EE      Assistive Device (Gait)  walker, front-wheeled  -EE      Distance in Feet (Gait)  160, 80 x 4  -EE      Pattern (Gait)  step-through  -EE      Deviations/Abnormal Patterns (Gait)  ataxic;base of support, narrow;sarah decreased;stride length decreased  -EE      Bilateral Gait Deviations  forward flexed posture;weight shift ability decreased;heel strike decreased  -EE      Right Sided Gait Deviations  foot drop/toe drag  -EE      [place] Level (Stairs)  minimum assist (75% patient effort);verbal cues;nonverbal cues (demo/gesture)  -EE      Handrail Location (Stairs)  both sides in // bars  -EE      Number of Steps (Stairs)  1  -EE      Ascending Technique (Stairs)  step-to-step  -EE      Descending Technique (Stairs)  step-to-step  -EE      Stairs, Safety Issues  weight-shifting ability decreased;balance decreased during turns  -EE      Stairs, Impairments  strength decreased;impaired balance;coordination impaired  -EE      Comment (Gait/Stairs)  Practiced step ups to 4\" step in // bars 2 x 4 trials. Spouse present for teaching on assisting pt during ambulation; demonstrates correct use of gait belt and positioning while ambulating 80' with pt and therapist standing by.   -EE      Recorded by [EE] Cathleen Rosario PT      Row Name 12/11/18 0851             Safety Issues, Functional Mobility    Impairments Affecting Function (Mobility)  balance;coordination;endurance/activity tolerance;strength;sensation/sensory awareness  -EE      Recorded by [EE] Cathleen Rosario PT      Row Name 12/11/18 1214             Bathing Assessment/Treatment    Bathing [place] Level  bathing skills;minimum " assist (75% patient effort);verbal cues  -AF      Bathing Position  supported sitting;supported standing  -AF      Recorded by [AF] Lolis Cooley OTR      Row Name 12/11/18 1214             Upper Body Dressing Assessment/Treatment    Upper Body Dressing Task  upper body dressing skills;doff;supervision;don;minimum assist (75% or more patient effort);moderate assist (50-74% patient effort)  -AF      Upper Body Dressing Position  supported sitting  -AF      Comment (Upper Body Dressing)  able to doff shirt I'ly  -AF      Recorded by [AF] Lolis Cooley, OTR      Row Name 12/11/18 1214             Lower Body Dressing Assessment/Treatment    Lower Body Dressing [place] Level  doff;don;shoes/slippers;pants/bottoms;socks;underwear;minimum assist (75% patient effort);verbal cues  -AF      Lower Body Dressing Position  supported standing;supported sitting  -AF      Lower Body Dressing Setup Assistance  obtain clothing  -AF      Comment (Lower Body Dressing)  pt able to don/doff socks and shoes seated on TTB  -AF      Recorded by [AF] Lolis Cooley OTR      Row Name 12/11/18 1214             Toileting Assessment/Treatment    Toileting [place] Level  toileting skills;minimum assist (75% patient effort);verbal cues  -AF      Assistive Device Use (Toileting)  grab bar/safety frame;raised toilet seat  -AF      Toileting Position  supported standing;supported sitting  -AF      Comment (Toileting)  wife present and assisting as needed  -AF      Recorded by [AF] Lolis Cooley OTR      Row Name 12/11/18 1504             Fine Motor Testing & Training    Comment, Fine Motor Coordination  hand gripper B;ly 15 reps   -AF      Recorded by [AF] Lolis Cooley OTR      Row Name 12/11/18 1504 12/11/18 1214 12/11/18 0830       Pain Scale: Numbers Pre/Post-Treatment    Pain Scale: Numbers, Pretreatment  0/10 - no pain  -AF  0/10 - no pain  -AF  4/10  -EE    Pain Scale: Numbers, Post-Treatment  0/10 - no pain   -AF  0/10 - no pain  -AF  4/10  -EE    Pain Location - Orientation  --  --  incisional  -EE    Pain Location  --  --  neck  -EE    Pain Intervention(s)  --  --  Repositioned;Medication (See MAR);Rest  -EE    Recorded by [AF] Lolis Cooley OTR [AF] Lolis Cooley OTR [EE] Abraham, Cathleen, PT    Row Name 12/11/18 0830             Dynamic Balance Activity    Therapeutic Training Performed (Dynamic Balance)  side stepping  -EE      Support Needed for Balance (Dynamic Balance Training)  uses both upper extremities for support;CGA // bars  -EE      Comment (Dynamic Balance Training)  2 x 8' in each direction  -EE      Recorded by [EE] Cathleen Rosario, HAILEY      Row Name 12/11/18 1504             Upper Extremity Seated Therapeutic Exercise    Performed, Seated Upper Extremity (Therapeutic Exercise)  scapular protraction/retraction;shoulder flexion/extension;shoulder external/internal rotation  -AF      Device, Seated Upper Extremity (Therapeutic Exercise)  -- non weighted dowel han  -AF      Exercise Type, Seated Upper Extremity (Therapeutic Exercise)  AAROM (active assistive range of motion);AROM (active range of motion)  -AF      Expected Outcomes, Seated Upper Extremity (Therapeutic Exercise)  improve motor control  -AF      Restrictions, Seated Upper Extremity (Therapeutic Exercise)  cervical precautions  -AF      Sets/Reps Detail, Seated Upper Extremity (Therapeutic Exercise)  2/10  -AF      Comment, Seated Upper Extremity (Therapeutic Exercise)  supine on mat  -AF      Recorded by [AF] Lolis Cooley OTR      Row Name 12/11/18 0830             Lower Extremity Standing Therapeutic Exercise    Performed, Standing Lower Extremity (Therapeutic Exercise)  heel raises;hip/knee flexion/extension;toe raises  -EE      Device, Standing Lower Extremity (Therapeutic Exercise)  parallel bars B UE support  -EE      Exercise Type, Standing Lower Extremity (Therapeutic Exercise)  AROM (active range of motion)  -EE       Sets/Reps Detail, Standing Lower Extremity (Therapeutic Exercise)  1/10  -EE      Recorded by [EE] Cathleen Rosario PT      Row Name 12/11/18 0830             Lower Extremity Seated Therapeutic Exercise    Performed, Seated Lower Extremity (Therapeutic Exercise)  hip flexion/extension;hip abduction/adduction;ankle dorsiflexion/plantarflexion;SAQ (short arc quad), knee extension;knee flexion/extension  -EE      Device, Seated Lower Extremity (Therapeutic Exercise)  elastic bands/tubing;free weights, cuff;small ball red tband, 1.5# weights  -EE      Exercise Type, Seated Lower Extremity (Therapeutic Exercise)  resistive exercise  -EE      Sets/Reps Detail, Seated Lower Extremity (Therapeutic Exercise)  1/20  -EE      Recorded by [EE] Cathleen Rosario PT      Row Name 12/11/18 1504 12/11/18 1214          Neuromuscular Re-education    Comment (Neuromuscular Re-education)  shouler flexion exs seated on EOM with rolling cart 10 reps x 2 sets   -AF  table slides B;ly on flat and angled surface with increased ability and increased reps. placed with RUE square beads on vertical dowel han with Assist at elbow to decrease compensation. pt then removed with L hand with increased time, pt able to keep L elbow tucked in during task  -AF     Recorded by [AF] Lolis Cooley OTR [AF] Lolis Cooley OTR     Row Name 12/11/18 1504 12/11/18 1214 12/11/18 0830       Positioning and Restraints    Pre-Treatment Position  sitting in chair/recliner  -AF  sitting in chair/recliner  -AF  sitting in chair/recliner  -EE    Post Treatment Position  bed  -AF  bathroom  -AF  wheelchair  -EE    In Bed  exit alarm on;sitting EOB;notified nsg  -AF  --  --    In Wheelchair  --  --  sitting;call light within reach;encouraged to call for assist;exit alarm on  -EE    Bathroom  --  sitting;call light within reach;encouraged to call for assist;with family/caregiver wife present   -AF  --    Recorded by [AF] Lolis Cooley OTR [AF] Lolis Cooley,  OTR [EE] Cathleen Rosario, PT      User Key  (r) = Recorded By, (t) = Taken By, (c) = Cosigned By    Initials Name Effective Dates    EE Cathleen Rosario, PT 04/03/18 -     AF Lolis Cooley, OTR 04/03/18 -           Wound 11/27/18 1318 Other (See comments) neck incision (Active)   Dressing Appearance open to air 12/11/2018  7:45 AM   Closure Liquid skin adhesive;Adhesive closure strips 12/11/2018  7:45 AM   Base clean;dry 12/10/2018  7:28 PM   Periwound dry;intact 12/10/2018  7:28 PM   Drainage Amount none 12/11/2018  7:45 AM   Dressing Care, Wound open to air 12/11/2018  7:45 AM         OT Recommendation and Plan                 OT IRF GOALS     Row Name 12/07/18 1216 12/01/18 1000          Transfer Goal 1 (OT-IRF)    Activity/Assistive Device (Transfer Goal 1, OT-IRF)  toilet;walk-in shower;shower chair;walker, rolling  -AF  toilet  -RP     [place] Level (Transfer Goal 1, OT-IRF)  minimum assist (75% or more patient effort);contact guard assist;verbal cues required  -AF  minimum assist (75% or more patient effort)  -RP     Time Frame (Transfer Goal 1, OT-IRF)  short term goal (STG)  -AF  short term goal (STG)  -RP     Progress/Outcomes (Transfer Goal 1, OT-IRF)  goal ongoing  -AF  goal ongoing  -RP        Transfer Goal 2 (OT-IRF)    Activity/Assistive Device (Transfer Goal 2, OT-IRF)  toilet;shower chair;walk-in shower;walker, rolling  -AF  toilet  -RP     [place] Level (Transfer Goal 2, OT-IRF)  supervision required  -AF  supervision required  -RP     Time Frame (Transfer Goal 2, OT-IRF)  long term goal (LTG)  -AF  long term goal (LTG)  -RP     Progress/Outcomes (Transfer Goal 2, OT-IRF)  goal ongoing  -AF  goal ongoing  -RP        Bathing Goal 1 (OT-IRF)    Activity/Device (Bathing Goal 1, OT-IRF)  bathing skills, all;grab bar/tub rail;hand-held shower spray hose;long-handled sponge;tub bench  -AF  bathing skills, all  -RP     [place] Level (Bathing Goal 1, OT-IRF)  minimum assist (75% or more patient  effort);verbal cues required  -AF  moderate assist (50-74% patient effort)  -RP     Time Frame (Bathing Goal 1, OT-IRF)  short term goal (STG)  -AF  short term goal (STG)  -RP     Progress/Outcomes (Bathing Goal 1, OT-IRF)  goal ongoing  -AF  goal ongoing  -RP        Bathing Goal 2 (OT-IRF)    Activity/Device (Bathing Goal 2, OT-IRF)  bathing skills, all;grab bar/tub rail;hand-held shower spray hose;tub bench;long-handled sponge  -AF  bathing skills, all  -RP     [place] Level (Bathing Goal 2, OT-IRF)  supervision required  -AF  supervision required;standby assist  -RP     Time Frame (Bathing Goal 2, OT-IRF)  long term goal (LTG)  -AF  long term goal (LTG)  -RP     Progress/Outcomes (Bathing Goal 2, OT-IRF)  goal ongoing  -AF  goal ongoing  -RP        UB Dressing Goal 1 (OT-IRF)    Activity/Device (UB Dressing Goal 1, OT-IRF)  upper body dressing  -AF  upper body dressing  -RP     [place] (UB Dress Goal 1, OT-IRF)  minimum assist (75% or more patient effort);verbal cues required  -AF  minimum assist (75% or more patient effort)  -RP     Time Frame (UB Dressing Goal 1, OT-IRF)  short term goal (STG)  -AF  short term goal (STG)  -RP     Progress/Outcomes (UB Dressing Goal 1, OT-IRF)  goal ongoing  -AF  goal ongoing  -RP        UB Dressing Goal 2 (OT-IRF)    Activity/Device (UB Dressing Goal 2, OT-IRF)  upper body dressing  -AF  upper body dressing  -RP     [place] (UB Dress Goal 2, OT-IRF)  supervision required  -AF  supervision required  -RP     Time Frame (UB Dressing Goal 2, OT-IRF)  long term goal (LTG)  -AF  long term goal (LTG)  -RP     Progress/Outcomes (UB Dressing Goal 2, OT-IRF)  goal ongoing  -AF  goal ongoing  -RP        LB Dressing Goal 1 (OT-IRF)    Activity/Device (LB Dressing Goal 1, OT-IRF)  lower body dressing;long handled shoe horn;reacher;elastic laces  -AF  lower body dressing  -RP     [place] (LB Dressing Goal 1, OT-IRF)  moderate assist (50-74% patient effort);verbal cues  required;minimum assist (75% or more patient effort)  -AF  maximum assist (25-49% patient effort)  -RP     Time Frame (LB Dressing Goal 1, OT-IRF)  short term goal (STG)  -AF  short term goal (STG)  -RP     Progress/Outcomes (LB Dressing Goal 1, OT-IRF)  goal ongoing  -AF  goal ongoing  -RP        LB Dressing Goal 2 (OT-IRF)    Activity/Device (LB Dressing Goal 2, OT-IRF)  lower body dressing;long handled shoe horn;elastic laces;reacher  -AF  lower body dressing  -RP     [place] (LB Dressing Goal 2, OT-IRF)  standby assist;verbal cues required  -AF  supervision required;standby assist  -RP     Time Frame (LB Dressing Goal 2, OT-IRF)  long term goal (LTG)  -AF  long term goal (LTG)  -RP     Progress/Outcomes (LB Dressing Goal 2, OT-IRF)  goal ongoing  -AF  goal ongoing  -RP        Grooming Goal 1 (OT-IRF)    Activity/Device (Grooming Goal 1, OT-IRF)  grooming skills, all  -AF  grooming skills, all  -RP     [place] (Grooming Goal 1, OT-IRF)  minimum assist (75% or more patient effort);verbal cues required  -AF  minimum assist (75% or more patient effort)  -RP     Time Frame (Grooming Goal 1, OT-IRF)  short term goal (STG)  -AF  short term goal (STG)  -RP     Progress/Outcomes (Grooming Goal 1, OT-IRF)  goal ongoing  -AF  goal ongoing  -RP        Grooming Goal 2 (OT-IRF)    Activity/Device (Grooming Goal 2, OT-IRF)  grooming skills, all  -AF  grooming skills, all  -RP     [place] (Grooming Goal 2, OT-IRF)  supervision required  -AF  supervision required  -RP     Time Frame (Grooming Goal 2, OT-IRF)  long term goal (LTG)  -AF  long term goal (LTG)  -RP     Progress/Outcomes (Grooming Goal 2, OT-IRF)  goal ongoing  -AF  goal ongoing  -RP        Toileting Goal 1 (OT-IRF)    Activity/Device (Toileting Goal 1, OT-IRF)  toileting skills, all;grab bar/safety frame;raised toilet seat  -AF  toileting skills, all  -RP     [place] Level (Toileting Goal 1, OT-IRF)  moderate assist (50-74% patient effort);verbal  cues required  -AF  maximum assist (25-49% patient effort)  -RP     Time Frame (Toileting Goal 1, OT-IRF)  short term goal (STG)  -AF  short term goal (STG)  -RP     Progress/Outcomes (Toileting Goal 1, OT-IRF)  goal ongoing  -AF  goal ongoing  -RP        Toileting Goal 2 (OT-IRF)    Activity/Device (Toileting Goal 2, OT-IRF)  toileting skills, all;grab bar/safety frame;raised toilet seat  -AF  toileting skills, all  -RP     [place] Level (Toileting Goal 2, OT-IRF)  supervision required;verbal cues required  -AF  supervision required;standby assist  -RP     Time Frame (Toileting Goal 2, OT-IRF)  long term goal (LTG)  -AF  long term goal (LTG)  -RP     Progress/Outcomes (Toileting Goal 2, OT-IRF)  goal ongoing  -AF  goal ongoing  -RP        ROM Goal 1 (OT-IRF)    ROM Goal 1 (OT-IRF)  pt will increase B UE ROM to approx. 3/4 shoulder flexion to assist with ADL tasks   -AF  Pt will increase B UE ROM to approx. 3/4 shoulder flexion to assist w/ ADLs.  -RP     Time Frame (ROM Goal 1, OT-IRF)  long term goal (LTG)  -AF  long term goal (LTG)  -RP     Progress/Outcomes (ROM Goal 1, OT-IRF)  goal ongoing  -AF  goal ongoing  -RP       User Key  (r) = Recorded By, (t) = Taken By, (c) = Cosigned By    Initials Name Provider Type    Lolis Horne OTR Occupational Therapist    Precious Hamilton OT Occupational Therapist          Occupational Therapy Education     Title: PT OT SLP Therapies (In Progress)     Topic: Occupational Therapy (In Progress)     Point: ADL training (Done)     Description: Instruct learner(s) on proper safety adaptation and remediation techniques during self care or transfers.   Instruct in proper use of assistive devices.    Learning Progress Summary           Patient Acceptance, CLINTON KOTHARI DU by AF at 12/11/2018 12:20 PM    Comment:  wife signed off to assist with walking to and from bathroom and with toileting tasks, demo'd and discussed Tub transfer bench in tub/shower combo with pt. pt stated  that he will order one  himself    Acceptance, E,D, VU,NR by AF at 12/4/2018 12:07 PM    Comment:  AE during LBD tasks    Acceptance, E,D, VU,NR by AF at 12/3/2018 11:46 AM    Comment:  to maintain cervial precautions during ADL tasks    Acceptance, E, VU by RP at 12/1/2018 11:48 AM    Comment:  OT educ on OT role in therapeutic process and pt's POC.   Family Acceptance, E, VU,DU by AF at 12/11/2018 12:20 PM    Comment:  wife signed off to assist with walking to and from bathroom and with toileting tasks, demo'd and discussed Tub transfer bench in tub/shower combo with pt. pt stated that he will order one  himself    Acceptance, E,D, VU,NR by AF at 12/3/2018 11:46 AM    Comment:  to maintain cervial precautions during ADL tasks                   Point: Home exercise program (Done)     Description: Instruct learner(s) on appropriate technique for monitoring, assisting and/or progressing therapeutic exercises/activities.    Learning Progress Summary           Patient Acceptance, E, VU by AF at 12/7/2018  3:08 PM    Comment:  theraputty and foam blocks to increase sensation and FMC/strength                   Point: Precautions (Done)     Description: Instruct learner(s) on prescribed precautions during self-care and functional transfers.    Learning Progress Summary           Patient Acceptance, E,D, VU,NR by AF at 12/3/2018 11:46 AM    Comment:  to maintain cervial precautions during ADL tasks    Acceptance, E, VU by RP at 12/1/2018 11:48 AM    Comment:  OT educ on OT role in therapeutic process and pt's POC.   Family Acceptance, E,D, VU,NR by AF at 12/3/2018 11:46 AM    Comment:  to maintain cervial precautions during ADL tasks                               User Key     Initials Effective Dates Name Provider Type Discipline     04/03/18 -  Lolis Cooley OTR Occupational Therapist OT    RP 05/03/18 -  Precious De Leon, OT Occupational Therapist OT                       Time Calculation:     Time Calculation- OT      Row Name 12/11/18 1507 12/11/18 1221          Time Calculation- OT    OT Start Time  1400  -AF  0930  -AF     OT Stop Time  1430  -AF  1030  -AF     OT Time Calculation (min)  30 min  -AF  60 min  -AF       User Key  (r) = Recorded By, (t) = Taken By, (c) = Cosigned By    Initials Name Provider Type    AF Lolis Cooley, OTJEANINE Occupational Therapist          Therapy Suggested Charges     Code   Minutes Charges    None              Therapy Charges for Today     Code Description Service Date Service Provider Modifiers Qty    01447313369 HC OT NEUROMUSC RE EDUCATION EA 15 MIN 12/10/2018 Lolis Cooley, OTR GO 1    35370089762 HC OT THER PROC EA 15 MIN 12/10/2018 Lolis Cooley, OTR GO 1    58446868977 HC OT SELF CARE/MGMT/TRAIN EA 15 MIN 12/11/2018 Lolis Cooley, OTR GO 3    67952863424 HC OT NEUROMUSC RE EDUCATION EA 15 MIN 12/11/2018 Lolis Cooley, OTR GO 1    15948964818 HC OT NEUROMUSC RE EDUCATION EA 15 MIN 12/11/2018 Lolis Cooley, OTR GO 1    99586139057 HC OT THER PROC EA 15 MIN 12/11/2018 Lolis Cooley, OTR GO 1                   NURIS Grimm  12/11/2018

## 2018-12-11 NOTE — PROGRESS NOTES
Inpatient Rehabilitation Plan of Care Note    Plan of Care  Care Plan Reviewed - No updates at this time.    Psychosocial    Performed Intervention(s)  Verbalizes needs & concerns      Safety    Performed Intervention(s)  Safety rounds/ Fall precautions  Items in reach, bed alarm & chair alarms      Sphincter Control    Performed Intervention(s)  Monitor intake, output, & bowel movements  Encourage appropriate diet & fluids      Body Systems    Performed Intervention(s)  Blood glucose AC & HS & insulin as ordered      Pain    Performed Intervention(s)  Medication prn & evaluate effectiveness  Monitor neck incision q shift    Signed by: Jojo Bowers RN

## 2018-12-11 NOTE — PROGRESS NOTES
LOS: 11 days   Patient Care Team:  Miladis Colbert APRN as PCP - General (Family Medicine)    Chief Complaint:same    Subjective     History of Present Illness    Subjective Pt is awake and alert. No c/o.     History taken from: patient    Objective     Vital Signs  Temp:  [97.8 °F (36.6 °C)-98.2 °F (36.8 °C)] 97.8 °F (36.6 °C)  Heart Rate:  [72-99] 72  Resp:  [18] 18  BP: (127-140)/(67-73) 140/70    Objective exam unchanged    Results Review:     I reviewed the patient's new clinical results.    Medication Review:     Assessment/Plan       Cervical myelopathy (CMS/HCC)      Assessment & Plan Continue to prepare for dc.I returned to pt's room to discuss dc plan that was finalized in the care coordination conference this am.      Mike Melissa MD  12/11/18  7:13 AM    Time:

## 2018-12-11 NOTE — PROGRESS NOTES
Inpatient Rehabilitation Functional Measures Assessment    Functional Measures  [facility] Eating:  Branch  [facility] Grooming: Branch  [facility] Bathing:  Branch  [facility] Upper Body Dressing:  [place]anch  [facility] Lower Body Dressing:  [facility] Toileting:  [facility] Bladder Management  Level of Assistance:  [place]  Frequency/Number of Accidents this Shift:  [facility] Bowel Management  Level of Assistance: [place]  Frequency/Number of Accidents this Shift: Branch    [facility] Bed/Chair/Wheelchair Transfer:  Bed/chair/wheelchair Transfer Score = 4.  Patient performs 75% or more of effort and minimal assistance (little/incidental  help/lifting of one limb/steadying) for transferring to and from the  bed/chair/wheelchair, requiring: Patient requires the following assistive  device(s): Walker.  [facility] Toilet Transfer:  [facility] Tub/Shower Transfer:  [place]    Previously Documented Mode of Locomotion at Discharge: Field  [facility] Expected Mode of Locomotion at Discharge: [facility] Walk/Wheelchair:  WHEELCHAIR OBSERVATION   Wheelchair did not occur.    WALK OBSERVATION   Walk Distance Scale = 3.  Distance walked is greater than 150 feet. Walk Score  = 4.  Patient performs 75% or more of effort and requires minimal assistance.  Incidental help/contact guard/steadying was provided. Patient walked a distance  of  160 feet. Patient requires the following assistive device(s): Rolling  walker.  [facility] Stairs:  Stairs did not occur because activity was unsafe for patient.    [facility]O Comprehension:  [facility] Expression:  [facility] Social Interaction:  [facility] Problem Solving:  [facility] Memory:  [place]    Therapy Mode Minutes  Occupational Therapy: [place]  Physical Therapy: Individual: 90 minutes.  Speech Language Pathology:  [place]    Signed by: Cathleen Rosario DPT

## 2018-12-11 NOTE — PROGRESS NOTES
Treatment goals and discharge date discussed with pt and pt's wife. Hands on teaching with pt's wife has been initiated.  Will plan family conference early next week.

## 2018-12-11 NOTE — PLAN OF CARE
Problem: Patient Care Overview  Goal: Plan of Care Review  Outcome: Ongoing (interventions implemented as appropriate)   12/11/18 0317   Patient Care Overview   IRF Plan of Care Review progress ongoing, continue   Progress, Functional Goals demonstrating adequate progress   Coping/Psychosocial   Plan of Care Reviewed With patient;spouse   OTHER   Outcome Summary Pt. is pleasant and cooperative. Complained of any pain to bilateral shoulders and back. Norco 2 tabs PO PRN given and relieved. Flexeril 10mg PO PRN given per pt. required. Spouse at bedside and helped him more. BG is 221. Insulin 4 units given at night. Uses CPAP. Pt. is doing well. No unsafe behavior to note at this time.      Goal: Coping Plan  Outcome: Ongoing (interventions implemented as appropriate)   12/11/18 0317   Coping Plan   Demonstration of Effective Coping Strategies demonstrating adequate progress       Problem: Fall Risk (Adult)  Goal: Absence of Fall  Outcome: Ongoing (interventions implemented as appropriate)   12/11/18 0317   Fall Risk (Adult)   Absence of Fall making progress toward outcome       Problem: Pain, Acute (Adult)  Goal: Acceptable Pain Control/Comfort Level  Outcome: Ongoing (interventions implemented as appropriate)   12/11/18 0317   Pain, Acute (Adult)   Acceptable Pain Control/Comfort Level making progress toward outcome       Problem: Skin Injury Risk (Adult)  Goal: Skin Health and Integrity  Outcome: Ongoing (interventions implemented as appropriate)   12/11/18 0317   Skin Injury Risk (Adult)   Skin Health and Integrity making progress toward outcome

## 2018-12-12 LAB
GLUCOSE BLDC GLUCOMTR-MCNC: 166 MG/DL (ref 70–130)
GLUCOSE BLDC GLUCOMTR-MCNC: 168 MG/DL (ref 70–130)
GLUCOSE BLDC GLUCOMTR-MCNC: 188 MG/DL (ref 70–130)
GLUCOSE BLDC GLUCOMTR-MCNC: 192 MG/DL (ref 70–130)

## 2018-12-12 PROCEDURE — 97112 NEUROMUSCULAR REEDUCATION: CPT

## 2018-12-12 PROCEDURE — 97110 THERAPEUTIC EXERCISES: CPT

## 2018-12-12 PROCEDURE — 82962 GLUCOSE BLOOD TEST: CPT

## 2018-12-12 PROCEDURE — 97535 SELF CARE MNGMENT TRAINING: CPT

## 2018-12-12 PROCEDURE — 63710000001 INSULIN LISPRO (HUMAN) PER 5 UNITS: Performed by: HOSPITALIST

## 2018-12-12 RX ORDER — HYDROCHLOROTHIAZIDE 25 MG/1
25 TABLET ORAL DAILY
Status: DISCONTINUED | OUTPATIENT
Start: 2018-12-12 | End: 2018-12-13

## 2018-12-12 RX ORDER — POLYETHYLENE GLYCOL 3350 17 G/17G
17 POWDER, FOR SOLUTION ORAL DAILY PRN
Status: DISCONTINUED | OUTPATIENT
Start: 2018-12-12 | End: 2018-12-19 | Stop reason: HOSPADM

## 2018-12-12 RX ADMIN — METFORMIN HYDROCHLORIDE 1000 MG: 1000 TABLET ORAL at 16:35

## 2018-12-12 RX ADMIN — HYDROCHLOROTHIAZIDE 25 MG: 25 TABLET ORAL at 07:49

## 2018-12-12 RX ADMIN — HYDROCODONE BITARTRATE AND ACETAMINOPHEN 2 TABLET: 7.5; 325 TABLET ORAL at 19:30

## 2018-12-12 RX ADMIN — INSULIN LISPRO 2 UNITS: 100 INJECTION, SOLUTION INTRAVENOUS; SUBCUTANEOUS at 07:52

## 2018-12-12 RX ADMIN — LIDOCAINE 2 PATCH: 50 PATCH CUTANEOUS at 07:48

## 2018-12-12 RX ADMIN — INSULIN LISPRO 2 UNITS: 100 INJECTION, SOLUTION INTRAVENOUS; SUBCUTANEOUS at 16:35

## 2018-12-12 RX ADMIN — SENNOSIDES AND DOCUSATE SODIUM 2 TABLET: 8.6; 5 TABLET ORAL at 21:29

## 2018-12-12 RX ADMIN — HYDROCODONE BITARTRATE AND ACETAMINOPHEN 2 TABLET: 7.5; 325 TABLET ORAL at 15:34

## 2018-12-12 RX ADMIN — FAMOTIDINE 20 MG: 20 TABLET, FILM COATED ORAL at 07:50

## 2018-12-12 RX ADMIN — DULOXETINE HYDROCHLORIDE 60 MG: 60 CAPSULE, DELAYED RELEASE ORAL at 07:50

## 2018-12-12 RX ADMIN — HYDROCODONE BITARTRATE AND ACETAMINOPHEN 2 TABLET: 7.5; 325 TABLET ORAL at 06:13

## 2018-12-12 RX ADMIN — METFORMIN HYDROCHLORIDE 1000 MG: 1000 TABLET ORAL at 07:53

## 2018-12-12 RX ADMIN — HYDROCODONE BITARTRATE AND ACETAMINOPHEN 2 TABLET: 7.5; 325 TABLET ORAL at 11:35

## 2018-12-12 RX ADMIN — LOSARTAN POTASSIUM: 50 TABLET, FILM COATED ORAL at 07:50

## 2018-12-12 RX ADMIN — METOPROLOL TARTRATE 25 MG: 25 TABLET ORAL at 07:49

## 2018-12-12 RX ADMIN — INSULIN LISPRO 2 UNITS: 100 INJECTION, SOLUTION INTRAVENOUS; SUBCUTANEOUS at 21:30

## 2018-12-12 RX ADMIN — CYCLOBENZAPRINE 10 MG: 10 TABLET, FILM COATED ORAL at 15:00

## 2018-12-12 RX ADMIN — PIOGLITAZONE 15 MG: 15 TABLET ORAL at 07:49

## 2018-12-12 RX ADMIN — DOCUSATE SODIUM 100 MG: 100 CAPSULE, LIQUID FILLED ORAL at 07:50

## 2018-12-12 RX ADMIN — INSULIN LISPRO 2 UNITS: 100 INJECTION, SOLUTION INTRAVENOUS; SUBCUTANEOUS at 11:35

## 2018-12-12 RX ADMIN — CYCLOBENZAPRINE 10 MG: 10 TABLET, FILM COATED ORAL at 06:13

## 2018-12-12 RX ADMIN — CYCLOBENZAPRINE 10 MG: 10 TABLET, FILM COATED ORAL at 23:28

## 2018-12-12 RX ADMIN — RIVAROXABAN 20 MG: 20 TABLET, FILM COATED ORAL at 16:35

## 2018-12-12 RX ADMIN — ATOMOXETINE 100 MG: 60 CAPSULE ORAL at 07:49

## 2018-12-12 RX ADMIN — METOPROLOL TARTRATE 25 MG: 25 TABLET ORAL at 21:29

## 2018-12-12 RX ADMIN — HYDROCODONE BITARTRATE AND ACETAMINOPHEN 2 TABLET: 7.5; 325 TABLET ORAL at 23:29

## 2018-12-12 RX ADMIN — HYDROCODONE BITARTRATE AND ACETAMINOPHEN 2 TABLET: 7.5; 325 TABLET ORAL at 00:05

## 2018-12-12 RX ADMIN — GLIPIZIDE 10 MG: 10 TABLET ORAL at 06:12

## 2018-12-12 NOTE — PLAN OF CARE
Problem: Patient Care Overview  Goal: Plan of Care Review  Outcome: Ongoing (interventions implemented as appropriate)   12/12/18 0120   Patient Care Overview   IRF Plan of Care Review progress ongoing, continue   Progress, Functional Goals demonstrating adequate progress   Coping/Psychosocial   Plan of Care Reviewed With patient;spouse   OTHER   Outcome Summary Pt. is pleasant and cooperative with staff. A&OX4. Spouse at bedside. Complained of any pain to bilateral shoulders and back. Norco 2 tabs PO PRN given and relieved. Flexerile 10 mg PO PRN given per pt. required. Takes whole Meds with water. BG is 194. Insulin 2 units given at night. Will discharged next week. No unsafe behavior to note at this time.      Goal: Coping Plan  Outcome: Ongoing (interventions implemented as appropriate)   12/12/18 0120   Coping Plan   Demonstration of Effective Coping Strategies demonstrating adequate progress       Problem: Fall Risk (Adult)  Goal: Absence of Fall  Outcome: Ongoing (interventions implemented as appropriate)   12/12/18 0120   Fall Risk (Adult)   Absence of Fall making progress toward outcome       Problem: Pain, Acute (Adult)  Goal: Acceptable Pain Control/Comfort Level  Outcome: Ongoing (interventions implemented as appropriate)   12/12/18 0120   Pain, Acute (Adult)   Acceptable Pain Control/Comfort Level making progress toward outcome       Problem: Skin Injury Risk (Adult)  Goal: Skin Health and Integrity  Outcome: Ongoing (interventions implemented as appropriate)   12/12/18 0120   Skin Injury Risk (Adult)   Skin Health and Integrity making progress toward outcome

## 2018-12-12 NOTE — PROGRESS NOTES
Inpatient Rehabilitation Functional Measures Assessment    Functional Measures  ROCCO Eating:  [facility] Grooming: Branch  [facility] Bathing:  Branch  [facility] Upper Body Dressing:  [facility] Lower Body Dressing:  [facility] Toileting:  [facility] Bladder Management  Level of Assistance:  [place]  Frequency/Number of Accidents this Shift:  [facility] Bowel Management  Level of Assistance: [place]  Frequency/Number of Accidents this Shift: [facility] Bed/Chair/Wheelchair Transfer:  Bed/chair/wheelchair Transfer Score = 4.  Patient performs 75% or more of effort and minimal assistance (little/incidental  help/lifting of one limb/steadying) for transferring to and from the  bed/chair/wheelchair, requiring: Patient requires the following assistive  device(s): Walker.  [facility] Toilet Transfer:  [facility] Tub/Shower Transfer:  [place]    Previously Documented Mode of Locomotion at Discharge: Field  [facility] Expected Mode of Locomotion at Discharge: [facility] Walk/Wheelchair:  WHEELCHAIR OBSERVATION   Wheelchair did not occur.    WALK OBSERVATION   Walk Distance Scale = 3.  Distance walked is greater than 150 feet. Walk Score  = 4.  Patient performs 75% or more of effort and requires minimal assistance.  Incidental help/contact guard/steadying was provided. Patient walked a distance  of  160 feet. Patient requires the following assistive device(s): Rolling  walker.  [facility] Stairs:  Stairs Score = 1.  Patient performs 75% or more of effort and  requires minimal assistance of two or more people for negotiating stairs. Safety  . Patient negotiated  4 stairs. Patient requires the following assistive  device(s): Handrail(s).    [facility] Comprehension:  [place]  [facility] Expression:  [facility] Social Interaction:  [facility] Problem Solving:  [facility] Memory:  [place]    Therapy Mode Minutes  Occupational Therapy: [place]  Physical Therapy: Individual: 90 minutes.  Speech Language Pathology:  [place]    Signed by: Cathleen Rosario DPT

## 2018-12-12 NOTE — THERAPY TREATMENT NOTE
Inpatient Rehabilitation - Physical Therapy Treatment Note  [facility]     Patient Name: Duane Mark Witten  : 1955  MRN: [id number]    Today's Date: 2018                 Admit Date: 2018      Visit Dx:      ICD-10-CM ICD-9-CM   1. Cervical stenosis of spinal canal M48.02 723.0   2. Weakness of both lower extremities R29.898 729.89   3. Paresthesia of both upper extremities R20.2 782.0    M79.601 729.5    M79.602    4. Morbid obesity with BMI of 40.0-44.9, adult (CMS/[facility]) E66.01 278.01    Z68.41 V85.41   5. Impaired functional mobility, balance, gait, and endurance Z74.09 V49.89   6. Functional gait abnormality R26.89 781.2       Patient Active Problem List   Diagnosis   • YANELI (generalized anxiety disorder)   • ADD (attention deficit disorder)   • Depression   • Diabetes type 2, controlled (CMS/[facility])   • ED (erectile dysfunction) of non-organic origin   • HLD (hyperlipidemia)   • Essential hypertension   • Arthritis   • Psoriasis   • Testosterone deficiency   • Primary insomnia   • Weakness of both lower extremities   • Paresthesia of both upper extremities   • Morbid obesity with BMI of 40.0-44.9, adult (CMS/[facility])   • Adverse effect of corticosteroids   • Type 2 diabetes mellitus with hyperglycemia (CMS/[facility])   • Cervical stenosis of spinal canal   • Edema of cervical spinal cord (CMS/[facility])   • Postoperative atrial fibrillation (CMS/[facility])   • Cervical myelopathy (CMS/[facility])       Therapy Treatment    IRF Treatment Summary     Row Name 18             Evaluation/Treatment Time and Intent    Subjective Information  no complaints  -EE      Existing Precautions/Restrictions  fall;spinal  -EE      Document Type  therapy note (daily note)  -EE      Mode of Treatment  physical therapy  -EE      Patient/Family Observations  Pt sitting up in WC in no acute distress  -EE      Recorded by [EE] Cathleen Rosario, PT      Row Name 18             Cognition/Psychosocial- PT/OT    Affect/Mental  Status (Cognitive)  WFL  -EE      Orientation Status (Cognition)  oriented x 4  -EE      Follows Commands (Cognition)  WFL  -EE      Personal Safety Interventions  fall prevention program maintained;gait belt;muscle strengthening facilitated;nonskid shoes/slippers when out of bed;supervised activity  -EE      Recorded by [EE] Cathleen Rosario, HAILEY      Row Name 12/12/18 0830             Bed-Chair Transfer    Bed-Chair [place] (Transfers)  minimum assist (75% patient effort);verbal cues  -EE      Assistive Device (Bed-Chair Transfers)  walker, front-wheeled  -EE      Recorded by [EE] Cathleen Rosario, PT      Row Name 12/12/18 0830             Sit-Stand Transfer    Sit-Stand [place] (Transfers)  minimum assist (75% patient effort);contact guard  -EE      Assistive Device (Sit-Stand Transfers)  walker, front-wheeled  -EE      Recorded by [EE] Cathleen Rosario, PT      Row Name 12/12/18 0830             Stand-Sit Transfer    Stand-Sit [place] (Transfers)  minimum assist (75% patient effort);contact guard  -EE      Assistive Device (Stand-Sit Transfers)  walker, front-wheeled  -EE      Recorded by [EE] Cathleen Rosario, PT      Row Name 12/12/18 0830             Gait/Stairs Assessment/Training    [place] Level (Gait)  minimum assist (75% patient effort);contact guard;verbal cues  -EE      Assistive Device (Gait)  walker, front-wheeled  -EE      Distance in Feet (Gait)  160, 80 x 3  -EE      Pattern (Gait)  step-through  -EE      Deviations/Abnormal Patterns (Gait)  ataxic;base of support, narrow;sarah decreased;stride length decreased  -EE      Bilateral Gait Deviations  forward flexed posture;weight shift ability decreased;heel strike decreased  -EE      [place] Level (Stairs)  minimum assist (75% patient effort);2 person assist;verbal cues;nonverbal cues (demo/gesture)  -EE      Handrail Location (Stairs)  both sides  -EE      Number of Steps (Stairs)  4  -EE      Ascending Technique (Stairs)  step-to-step   -EE      Descending Technique (Stairs)  step-to-step  -EE      Stairs, Safety Issues  weight-shifting ability decreased;balance decreased during turns  -EE      Stairs, Impairments  strength decreased;impaired balance;coordination impaired  -EE      Comment (Gait/Stairs)  1 instance R knee buckling during descent; pt recognized buckling and paused before continuing to descend stairs.  -EE      Recorded by [EE] Cathleen Rosario, HAILEY      Row Name 12/12/18 0830             Pain Scale: Numbers Pre/Post-Treatment    Pain Scale: Numbers, Pretreatment  3/10  -EE      Pain Scale: Numbers, Post-Treatment  3/10  -EE      Pain Location - Orientation  incisional  -EE      Pain Location  neck  -EE      Pain Intervention(s)  Repositioned;Rest;Medication (See MAR)  -EE      Recorded by [EE] Cathleen Rosario, PT      Row Name 12/12/18 0830             Lower Extremity Seated Therapeutic Exercise    Performed, Seated Lower Extremity (Therapeutic Exercise)  hip flexion/extension;hip abduction/adduction;knee flexion/extension;ankle dorsiflexion/plantarflexion;LAQ (long arc quad), knee extension  -EE      Device, Seated Lower Extremity (Therapeutic Exercise)  elastic bands/tubing;free weights, cuff;small ball  -EE      Exercise Type, Seated Lower Extremity (Therapeutic Exercise)  resistive exercise  -EE      Sets/Reps Detail, Seated Lower Extremity (Therapeutic Exercise)  2/20  -EE      Recorded by [EE] Cathleen Rosario, PT      Row Name 12/12/18 0830             Positioning and Restraints    Pre-Treatment Position  sitting in chair/recliner  -EE      Post Treatment Position  wheelchair  -EE      In Wheelchair  sitting;call light within reach;encouraged to call for assist;exit alarm on  -EE      Recorded by [EE] Cathleen Rosario, PT      Row Name 12/12/18 0800             Weekly Summary of Progress (PT)    Weekly Outcome Summary: Physical Therapy  Pt continues to demonstrate steady progress with mobility during PT sessions. He demonstrates improved  strength and endurance, as evidenced by tolerance of increased ambulation distance and standing activities. Also continues to demonstrate increased independence with bed mobility. Pt still requires CGA to Min A for transfers and gait due to ataxia and balance impairments, and would benefit from continued PT to address these impairments and maximize independence prior to DC home.   -EE      Recorded by [EE] Cathleen Rosario PT        User Key  (r) = Recorded By, (t) = Taken By, (c) = Cosigned By    Initials Name Effective Dates    EE Cathleen Rosario PT 04/03/18 -         Wound 11/27/18 1318 Other (See comments) neck incision (Active)   Dressing Appearance open to air;other (see comments) 12/11/2018  8:10 PM   Closure Liquid skin adhesive;Adhesive closure strips 12/12/2018  8:15 AM   Base clean;dry 12/11/2018  8:10 PM   Periwound dry;intact 12/12/2018  8:15 AM   Drainage Amount none 12/12/2018  8:15 AM   Dressing Care, Wound open to air 12/12/2018  8:15 AM     Physical Therapy Education     Title: PT OT SLP Therapies (In Progress)     Topic: Physical Therapy (Done)     Point: Mobility training (Done)     Learning Progress Summary           Patient Acceptance, E,TB, VU,NR by EE at 12/12/2018  8:48 AM    Acceptance, E,TB,D, VU,NR by EE at 12/11/2018  8:48 AM    Acceptance, E,TB,D, VU,NR by EE at 12/10/2018  8:45 AM    Eager, E,TB,D, VU,DU,NR by MAR at 12/9/2018 10:24 AM    Eager, E,TB,D, VU,DU by KP at 12/8/2018 12:00 PM    Acceptance, E,TB,D, VU,NR by EE at 12/7/2018  9:22 AM    Acceptance, E,TB, VU,NR by EE at 12/6/2018  9:15 AM    Acceptance, E,TB, VU,NR by EE at 12/5/2018 10:10 AM    Acceptance, E, NR by SIMÓN at 12/4/2018 10:12 AM    Acceptance, E,TB, VU,NR by EE at 12/3/2018  9:48 AM    Acceptance, E, VU,NR by IAN at 12/1/2018  9:50 AM   Family Acceptance, E,TB,D, [place],NR by JAMARCUS at 12/11/2018  8:48 AM   Significant Other EagerTAYE TB, D, CLINTON,LANDY,NR by MAR at 12/9/2018 10:24 AM                   Point: Home exercise program (Done)      Learning Progress Summary           Patient Acceptance, E,TB, VU,NR by EE at 12/12/2018  8:48 AM    Acceptance, E,TB,D, VU,NR by  at 12/11/2018  8:48 AM    Acceptance, E,TB,D, VU,NR by  at 12/10/2018  8:45 AM    Eager, E,TB,D, VU,DU,NR by  at 12/9/2018 10:24 AM    Eager, E,TB,D, VU,DU by  at 12/8/2018 12:00 PM    Acceptance, E,TB,D, VU,NR by  at 12/7/2018  9:22 AM    Acceptance, E,TB, VU,NR by  at 12/6/2018  9:15 AM    Acceptance, E,TB, VU,NR by  at 12/5/2018 10:10 AM    Acceptance, E,TB, VU,NR by  at 12/3/2018  9:48 AM   Family Acceptance, E,TB,D, VU,NR by  at 12/11/2018  8:48 AM   Significant Other Eager, E,TB,D, VU,DU,NR by  at 12/9/2018 10:24 AM                   Point: Body mechanics (Done)     Learning Progress Summary           Patient Acceptance, E,TB, VU,NR by EE at 12/12/2018  8:48 AM    Acceptance, E,TB,D, VU,NR by  at 12/11/2018  8:48 AM    Acceptance, E,TB,D, VU,NR by  at 12/10/2018  8:45 AM    Acceptance, E,TB,D, VU,NR by  at 12/7/2018  9:22 AM    Acceptance, E,TB, VU,NR by  at 12/6/2018  9:15 AM    Acceptance, E,TB, VU,NR by  at 12/5/2018 10:10 AM    Acceptance, E,TB, VU,NR by  at 12/3/2018  9:48 AM   Family Acceptance, E,TB,D, VU,NR by  at 12/11/2018  8:48 AM                   Point: Precautions (Done)     Learning Progress Summary           Patient Acceptance, E,TB, VU,NR by EE at 12/12/2018  8:48 AM    Acceptance, E,TB,D, VU,NR by  at 12/11/2018  8:48 AM    Acceptance, E,TB,D, VU,NR by EE at 12/10/2018  8:45 AM    Acceptance, E,TB,D, VU,NR by EE at 12/7/2018  9:22 AM    Acceptance, E,TB, VU,NR by EE at 12/6/2018  9:15 AM    Acceptance, E,TB, VU,NR by EE at 12/5/2018 10:10 AM    Acceptance, E,TB, VU,NR by EE at 12/3/2018  9:48 AM    Acceptance, E, VU,NR by IAN at 12/1/2018  9:50 AM   Family Acceptance, E,TB,D, VU,NR by EE at 12/11/2018  8:48 AM                               User Key     Initials Effective Dates Name Provider Type Discipline    LB 03/07/18 -   Odette Orourke, PTA Physical Therapy Assistant PT    EE 04/03/18 -  Cathleen Rosario, PT Physical Therapist PT    ASHLEYK 04/03/18 -  Janae Trujillo, PT Physical Therapist PT    KP 04/03/18 -  Angelic Bautista, PT Physical Therapist PT                  PT Recommendation and Plan               PT IRF GOALS     Row Name 12/12/18 0700             Bed Mobility Goal 1 (PT-IRF)    Time Frame (Bed Mobility Goal 1, PT-IRF)  1 week;long term goal (LTG)  -EE      Progress/Outcomes (Bed Mobility Goal 1, PT-IRF)  continuing progress toward goal;goal ongoing  -EE         Transfer Goal 1 (PT-IRF)    Time Frame (Transfer Goal 1, PT-IRF)  1 week;long term goal (LTG)  -EE      Progress/Outcomes (Transfer Goal 1, PT-IRF)  continuing progress toward goal;goal ongoing  -EE         Transfer Goal 2 (PT-IRF)    Time Frame (Transfer Goal 2, PT-IRF)  1 week;long term goal (LTG)  -EE      Progress/Outcomes (Transfer Goal 2, PT-IRF)  goal ongoing;continuing progress toward goal  -EE         Gait/Walking Locomotion Goal 1 (PT-IRF)    Time Frame (Gait/Walking Locomotion Goal 1, PT-IRF)  1 week;long term goal (LTG)  -EE      Progress/Outcomes (Gait/Walking Locomotion Goal 1, PT-IRF)  goal ongoing;continuing progress toward goal  -EE         Stairs Goal 1 (PT-IRF)    Time Frame (Stairs Goal 1, PT-IRF)  1 week;long term goal (LTG)  -EE      Progress/Outcomes (Stairs Goal 1, PT-IRF)  goal ongoing;continuing progress toward goal  -EE         Stairs Goal 2 (PT-IRF)    Time Frame (Stairs Goal 2, PT-IRF)  1 week;long term goal (LTG)  -EE      Progress/Outcomes (Stairs Goal 2, PT-IRF)  goal ongoing;continuing progress toward goal  -EE        User Key  (r) = Recorded By, (t) = Taken By, (c) = Cosigned By    Initials Name Provider Type    EE Cathleen Rosario, PT Physical Therapist               Time Calculation:     PT Charges     Row Name 12/12/18 1445 12/12/18 0931 12/12/18 0803       Time Calculation    Start Time  1300  -EE  0830  -EE  --    Stop Time  1330   -EE  0930  -EE  --    Time Calculation (min)  30 min  -EE  60 min  -EE  --    PT Received On  12/12/18  -EE  12/12/18  -EE  --    PT - Next Appointment  12/13/18  -EE  12/12/18  -EE  --    PT Goal Re-Cert Due Date  --  --  12/19/18  -EE      User Key  (r) = Recorded By, (t) = Taken By, (c) = Cosigned By    Initials Name Provider Type    EE Cathleen Rosario, PT Physical Therapist            Therapy Charges for Today     Code Description Service Date Service Provider Modifiers Qty    54688334151 HC PT THER PROC EA 15 MIN 12/11/2018 Cathleen Rsoario, PT GP 4    18228258057 HC PT THER PROC EA 15 MIN 12/11/2018 Cathleen Rosario, PT GP 2    04925062141 HC PT THER PROC EA 15 MIN 12/12/2018 Cathleen Rosario, PT GP 6                   Cathleen Rosario, PT  12/12/2018

## 2018-12-12 NOTE — PLAN OF CARE
Problem: Patient Care Overview  Goal: Plan of Care Review  Outcome: Ongoing (interventions implemented as appropriate)   12/12/18 1407   Patient Care Overview   IRF Plan of Care Review progress ongoing, continue   Progress, Functional Goals demonstrating adequate progress   Coping/Psychosocial   Plan of Care Reviewed With patient   OTHER   Outcome Summary Advised Dr. Melissa about steadily increasing SBP. He increased HCTZ.       Problem: Pain, Acute (Adult)  Goal: Acceptable Pain Control/Comfort Level  Outcome: Ongoing (interventions implemented as appropriate)   12/12/18 1407   Pain, Acute (Adult)   Acceptable Pain Control/Comfort Level making progress toward outcome

## 2018-12-12 NOTE — PROGRESS NOTES
Inpatient Rehabilitation Plan of Care Note    Plan of Care  Care Plan Reviewed - No updates at this time.    Sphincter Control    Performed Intervention(s)  Encourage appropriate diet & fluids    Signed by: Franc Flood RN

## 2018-12-12 NOTE — PROGRESS NOTES
Inpatient Rehabilitation Functional Measures Assessment    Functional Measures  [facility] Eating:  [facility] Grooming: [facility] Bathing:  [facility] Upper Body Dressing:  [facility] Lower Body Dressing:  [facility] Toileting:  [facility] Bladder Management  Level of Assistance:  [place]  Frequency/Number of Accidents this Shift:  [facility] Bowel Management  Level of Assistance: [place]  Frequency/Number of Accidents this Shift: [facility] Bed/Chair/Wheelchair Transfer:  [facility] Toilet Transfer:  [facility] Tub/Shower Transfer:  [place]    Previously Documented Mode of Locomotion at Discharge: Field  [facility] Expected Mode of Locomotion at Discharge: [facility] Walk/Wheelchair:  [facility] Stairs:  [facility] Comprehension:  Auditory comprehension is the usual mode. Comprehension  Score = 7, Independent.  Patient comprehends complex/abstract information in  their primary language.  Patient is completely independent for auditory  comprehension.  There are no activity limitations.  [facility] Expression:  Vocal expression is the usual mode. Expression Score = 7,  Independent.  Patient expresses complex/abstract information in their primary  language.  Patient is completely independent for vocal expression.  There are no  activity limitations.  [facility] Social Interaction:  Social Interaction Score = 7, Independent. Patient is  completely independent for social interaction.  There are no activity  limitations.  [facility] Problem Solving:  Problem Solving Score = 7, Independent.  Patient makes  appropriate decisions in order to solve complex problems.  Patient is completely  independent for problem solving.  There are no activity limitations.  [facility] Memory:  Memory Score = 6, Modified [place].  Patient is modified  independent for memory, requiring: Requires additional time.    Therapy Mode Minutes  Occupational Therapy: Branch  Physical Therapy: Branch  Speech Language Pathology:  Branch    Signed by: Wojciech Lentz RN

## 2018-12-12 NOTE — THERAPY TREATMENT NOTE
Inpatient Rehabilitation - Physical Therapy Progress Note  [facility]     Patient Name: Duane Mark Witten  : 1955  MRN: [id number]    Today's Date: 2018                 Admit Date: 2018      Visit Dx:      ICD-10-CM ICD-9-CM   1. Cervical stenosis of spinal canal M48.02 723.0   2. Weakness of both lower extremities R29.898 729.89   3. Paresthesia of both upper extremities R20.2 782.0    M79.601 729.5    M79.602    4. Morbid obesity with BMI of 40.0-44.9, adult (CMS/Formerly [facility]) E66.01 278.01    Z68.41 V85.41   5. Impaired functional mobility, balance, gait, and endurance Z74.09 V49.89   6. Functional gait abnormality R26.89 781.2       Patient Active Problem List   Diagnosis   • YANELI (generalized anxiety disorder)   • ADD (attention deficit disorder)   • Depression   • Diabetes type 2, controlled (CMS/[facility]ly [facility])   • ED (erectile dysfunction) of non-organic origin   • HLD (hyperlipidemia)   • Essential hypertension   • Arthritis   • Psoriasis   • Testosterone deficiency   • Primary insomnia   • Weakness of both lower extremities   • Paresthesia of both upper extremities   • Morbid obesity with BMI of 40.0-44.9, adult (CMS/[facility])   • Adverse effect of corticosteroids   • Type 2 diabetes mellitus with hyperglycemia (CMS/[facility])   • Cervical stenosis of spinal canal   • Edema of cervical spinal cord (CMS/[facility])   • Postoperative atrial fibrillation (CMS/[facility])   • Cervical myelopathy (CMS/[facility])       Therapy Treatment    IRF Treatment Summary     Row Name 18 0800             Weekly Summary of Progress (PT)    Weekly Outcome Summary: Physical Therapy  Pt continues to demonstrate steady progress with mobility during PT sessions. He demonstrates improved strength and endurance, as evidenced by tolerance of increased ambulation distance and standing activities. Also continues to demonstrate increased independence with bed mobility. Pt still requires CGA to Min A for transfers and gait due to ataxia and balance  impairments, and would benefit from continued PT to address these impairments and maximize independence prior to DC home.   -EE      Recorded by [EE] Cathleen Rosario PT        User Key  (r) = Recorded By, (t) = Taken By, (c) = Cosigned By    Initials Name Effective Dates    EE Cathleen Rosario, PT 04/03/18 -         Wound 11/27/18 1318 Other (See comments) neck incision (Active)   Dressing Appearance open to air;other (see comments) 12/11/2018  8:10 PM   Closure Liquid skin adhesive;Adhesive closure strips 12/11/2018  8:10 PM   Base clean;dry 12/11/2018  8:10 PM   Periwound dry;intact 12/11/2018  8:10 PM   Drainage Amount none 12/11/2018  8:10 PM   Dressing Care, Wound open to air 12/11/2018  8:10 PM     Physical Therapy Education     Title: PT OT SLP Therapies (In Progress)     Topic: Physical Therapy (Done)     Point: Mobility training (Done)     Learning Progress Summary           Patient Acceptance, E,TB,D, VU,NR by EE at 12/11/2018  8:48 AM    Acceptance, E,TB,D, VU,NR by EE at 12/10/2018  8:45 AM    Eager, E,TB,D, VU,DU,NR by MAR at 12/9/2018 10:24 AM    Eager, E,TB,D, VU,DU by MAR at 12/8/2018 12:00 PM    Acceptance, E,TB,D, VU,NR by EE at 12/7/2018  9:22 AM    Acceptance, E,TB, VU,NR by EE at 12/6/2018  9:15 AM    Acceptance, E,TB, VU,NR by EE at 12/5/2018 10:10 AM    Acceptance, E, NR by SIMÓN at 12/4/2018 10:12 AM    Acceptance, E,TB, VU,NR by EE at 12/3/2018  9:48 AM    Acceptance, E, VU,NR by IAN at 12/1/2018  9:50 AM   Family Acceptance, E,TB,D, VU,NR by EE at 12/11/2018  8:48 AM   Significant Other Eager, E,TB,D, VU,DU,NR by MAR at 12/9/2018 10:24 AM                   Point: Home exercise program (Done)     Learning Progress Summary           Patient Acceptance, E,TB,D, VU,NR by  at 12/11/2018  8:48 AM    Acceptance, E,TB,D, VU,NR by EE at 12/10/2018  8:45 AM    Eager, E,TB,D, VU,DU,NR by  at 12/9/2018 10:24 AM    Eager, E,TB,D, VU,DU by  at 12/8/2018 12:00 PM    Acceptance, E,TB,D, VU,NR by  at 12/7/2018   9:22 AM    Acceptance, E,TB, VU,NR by  at 12/6/2018  9:15 AM    Acceptance, E,TB, VU,NR by EE at 12/5/2018 10:10 AM    Acceptance, E,TB, VU,NR by  at 12/3/2018  9:48 AM   Family Acceptance, E,TB,D, VU,NR by  at 12/11/2018  8:48 AM   Significant Other Eager, E,TB,D, VU,DU,NR by  at 12/9/2018 10:24 AM                   Point: Body mechanics (Done)     Learning Progress Summary           Patient Acceptance, E,TB,D, VU,NR by EE at 12/11/2018  8:48 AM    Acceptance, E,TB,D, VU,NR by  at 12/10/2018  8:45 AM    Acceptance, E,TB,D, VU,NR by  at 12/7/2018  9:22 AM    Acceptance, E,TB, VU,NR by  at 12/6/2018  9:15 AM    Acceptance, E,TB, VU,NR by  at 12/5/2018 10:10 AM    Acceptance, E,TB, VU,NR by  at 12/3/2018  9:48 AM   Family Acceptance, E,TB,D, VU,NR by  at 12/11/2018  8:48 AM                   Point: Precautions (Done)     Learning Progress Summary           Patient Acceptance, E,TB,D, VU,NR by  at 12/11/2018  8:48 AM    Acceptance, E,TB,D, VU,NR by  at 12/10/2018  8:45 AM    Acceptance, E,TB,D, VU,NR by  at 12/7/2018  9:22 AM    Acceptance, E,TB, VU,NR by  at 12/6/2018  9:15 AM    Acceptance, E,TB, VU,NR by  at 12/5/2018 10:10 AM    Acceptance, E,TB, VU,NR by  at 12/3/2018  9:48 AM    Acceptance, E, VU,NR by IAN at 12/1/2018  9:50 AM   Family Acceptance, E,TB,D, VU,NR by  at 12/11/2018  8:48 AM                               User Key     Initials Effective Dates Name Provider Type Discipline    LB 03/07/18 -  Odette Orourke PTA Physical Therapy Assistant PT    EE 04/03/18 -  Cathleen Rosario, PT Physical Therapist PT    JK 04/03/18 -  Janae Trujillo, PT Physical Therapist PT    KP 04/03/18 -  Angelic Bautista, PT Physical Therapist PT                  PT Recommendation and Plan               PT IRF GOALS     Row Name 12/12/18 0700             Bed Mobility Goal 1 (PT-IRF)    Time Frame (Bed Mobility Goal 1, PT-IRF)  1 week;long term goal (LTG)  -EE      Progress/Outcomes (Bed Mobility  Goal 1, PT-IRF)  continuing progress toward goal;goal ongoing  -EE         Transfer Goal 1 (PT-IRF)    Time Frame (Transfer Goal 1, PT-IRF)  1 week;long term goal (LTG)  -EE      Progress/Outcomes (Transfer Goal 1, PT-IRF)  continuing progress toward goal;goal ongoing  -EE         Transfer Goal 2 (PT-IRF)    Time Frame (Transfer Goal 2, PT-IRF)  1 week;long term goal (LTG)  -EE      Progress/Outcomes (Transfer Goal 2, PT-IRF)  goal ongoing;continuing progress toward goal  -EE         Gait/Walking Locomotion Goal 1 (PT-IRF)    Time Frame (Gait/Walking Locomotion Goal 1, PT-IRF)  1 week;long term goal (LTG)  -EE      Progress/Outcomes (Gait/Walking Locomotion Goal 1, PT-IRF)  goal ongoing;continuing progress toward goal  -EE         Stairs Goal 1 (PT-IRF)    Time Frame (Stairs Goal 1, PT-IRF)  1 week;long term goal (LTG)  -EE      Progress/Outcomes (Stairs Goal 1, PT-IRF)  goal ongoing;continuing progress toward goal  -EE         Stairs Goal 2 (PT-IRF)    Time Frame (Stairs Goal 2, PT-IRF)  1 week;long term goal (LTG)  -EE      Progress/Outcomes (Stairs Goal 2, PT-IRF)  goal ongoing;continuing progress toward goal  -EE        User Key  (r) = Recorded By, (t) = Taken By, (c) = Cosigned By    Initials Name Provider Type    EE Cathleen Rosario, PT Physical Therapist               Time Calculation:     PT Charges     Row Name 12/12/18 0803             Time Calculation    PT Goal Re-Cert Due Date  12/19/18  -EE        User Key  (r) = Recorded By, (t) = Taken By, (c) = Cosigned By    Initials Name Provider Type    EE Cathleen Rosario PT Physical Therapist            Therapy Charges for Today     Code Description Service Date Service Provider Modifiers Qty    88913219949 HC PT THER PROC EA 15 MIN 12/11/2018 Cathleen Rosario, PT GP 4    58685336768 HC PT THER PROC EA 15 MIN 12/11/2018 Cathleen Rosario PT GP 2                   Cathleen Rosario PT  12/12/2018

## 2018-12-12 NOTE — THERAPY TREATMENT NOTE
Inpatient Rehabilitation - Occupational Therapy Treatment Note    [facility]     Patient Name: Duane Mark Witten  : 1955  MRN: [id number]    Today's Date: 2018                 Admit Date: 2018      Visit Dx:    ICD-10-CM ICD-9-CM   1. Cervical stenosis of spinal canal M48.02 723.0   2. Weakness of both lower extremities R29.898 729.89   3. Paresthesia of both upper extremities R20.2 782.0    M79.601 729.5    M79.602    4. Morbid obesity with BMI of 40.0-44.9, adult (CMS/[facility]) E66.01 278.01    Z68.41 V85.41   5. Impaired functional mobility, balance, gait, and endurance Z74.09 V49.89   6. Functional gait abnormality R26.89 781.2       Patient Active Problem List   Diagnosis   • YANELI (generalized anxiety disorder)   • ADD (attention deficit disorder)   • Depression   • Diabetes type 2, controlled (CMS/[facility])   • ED (erectile dysfunction) of non-organic origin   • HLD (hyperlipidemia)   • Essential hypertension   • Arthritis   • Psoriasis   • Testosterone deficiency   • Primary insomnia   • Weakness of both lower extremities   • Paresthesia of both upper extremities   • Morbid obesity with BMI of 40.0-44.9, adult (CMS/[facility])   • Adverse effect of corticosteroids   • Type 2 diabetes mellitus with hyperglycemia (CMS/[facility])   • Cervical stenosis of spinal canal   • Edema of cervical spinal cord (CMS/[facility])   • Postoperative atrial fibrillation (CMS/[facility])   • Cervical myelopathy (CMS/[facility])         Therapy Treatment    IRF Treatment Summary     Row Name 18 1505 18 0830          Evaluation/Treatment Time and Intent    Subjective Information  no complaints  -AF  no complaints  -EE     Existing Precautions/Restrictions  fall;spinal  -AF  fall;spinal  -EE     Document Type  therapy note (daily note)  -AF  therapy note (daily note)  -EE     Mode of Treatment  occupational therapy  -AF  physical therapy  -EE     Patient/Family Observations  sitting up in w/c in room in AM and Pm sessions   -AF  Pt sitting  up in WC in no acute distress  -EE     Recorded by [AF] Lolis Cooley OTR [EE] Cathleen Rosario, HAILEY     Row Name 12/12/18 1505 12/12/18 0830          Cognition/Psychosocial- PT/OT    Affect/Mental Status (Cognitive)  WFL  -AF  WFL  -EE     Orientation Status (Cognition)  oriented x 4  -AF  oriented x 4  -EE     Follows Commands (Cognition)  WFL  -AF  WFL  -EE     Personal Safety Interventions  fall prevention program maintained;gait belt;nonskid shoes/slippers when out of bed  -AF  fall prevention program maintained;gait belt;muscle strengthening facilitated;nonskid shoes/slippers when out of bed;supervised activity  -EE     Recorded by [AF] Lolis Cooley OTR [EE] Cathleen Rosario, HAILEY     Row Name 12/12/18 1505             Transfer Assessment/Treatment    Comment (Transfers)  sit to stand transfer EOM <> w/c MIN A  -AF      Recorded by [AF] Lolis Cooley OTR      Row Name 12/12/18 0830             Bed-Chair Transfer    Bed-Chair [place] (Transfers)  minimum assist (75% patient effort);verbal cues  -EE      Assistive Device (Bed-Chair Transfers)  walker, front-wheeled  -EE      Recorded by [EE] Cathleen Rosario PT      Row Name 12/12/18 0830             Sit-Stand Transfer    Sit-Stand [place] (Transfers)  minimum assist (75% patient effort);contact guard  -EE      Assistive Device (Sit-Stand Transfers)  walker, front-wheeled  -EE      Recorded by [EE] Cathleen Rosario PT      Row Name 12/12/18 0830             Stand-Sit Transfer    Stand-Sit [place] (Transfers)  minimum assist (75% patient effort);contact guard  -EE      Assistive Device (Stand-Sit Transfers)  walker, front-wheeled  -EE      Recorded by [EE] Cathleen Rosario, HAILEY      Row Name 12/12/18 0830             Gait/Stairs Assessment/Training    [place] Level (Gait)  minimum assist (75% patient effort);contact guard;verbal cues  -EE      Assistive Device (Gait)  walker, front-wheeled  -EE      Distance in Feet (Gait)  160, 80 x 3  -EE       Pattern (Gait)  step-through  -EE      Deviations/Abnormal Patterns (Gait)  ataxic;base of support, narrow;sarah decreased;stride length decreased  -EE      Bilateral Gait Deviations  forward flexed posture;weight shift ability decreased;heel strike decreased  -EE      [place] Level (Stairs)  minimum assist (75% patient effort);2 person assist;verbal cues;nonverbal cues (demo/gesture)  -EE      Handrail Location (Stairs)  both sides  -EE      Number of Steps (Stairs)  4  -EE      Ascending Technique (Stairs)  step-to-step  -EE      Descending Technique (Stairs)  step-to-step  -EE      Stairs, Safety Issues  weight-shifting ability decreased;balance decreased during turns  -EE      Stairs, Impairments  strength decreased;impaired balance;coordination impaired  -EE      Comment (Gait/Stairs)  1 instance R knee buckling during descent; pt recognized buckling and paused before continuing to descend stairs.  -EE      Recorded by [EE] Cathleen Rosario, HAILEY      Row Name 12/12/18 1505             Bathing Assessment/Treatment    Bathing [place] Level  upper body;set up  -AF      Bathing Position  supported sitting  -AF      Bathing Setup Assistance  adjust water temperature;obtain supplies  -AF      Recorded by [AF] Lolis Cooley OTR      Row Name 12/12/18 1505             Upper Body Dressing Assessment/Treatment    Upper Body Dressing Task  upper body dressing skills;minimum assist (75% or more patient effort);verbal cues  -AF      Upper Body Dressing Position  supported sitting  -AF      Set-up Assistance (Upper Body Dressing)  obtain clothing  -AF      Recorded by [AF] Lolis Cooley OTR      Row Name 12/12/18 1505             Lower Body Dressing Assessment/Treatment    Lower Body Dressing [place] Level  doff;don;socks;shoes/slippers;minimum assist (75% patient effort);verbal cues  -AF      Lower Body Dressing Position  edge of bed sitting  -AF      Lower Body Dressing Setup Assistance  obtain  clothing  -AF      Recorded by [AF] Lolis Cooley OTR      Row Name 12/12/18 1505             Grooming Assessment/Treatment    Grooming [place] Level  grooming skills;supervision;verbal cues  -AF      Grooming Position  sink side;supported sitting  -AF      Comment (Grooming)  able to manipulate deoderant with increased time   -AF      Recorded by [AF] Lolis Cooley OTR      Row Name 12/12/18 1505 12/12/18 0830          Pain Scale: Numbers Pre/Post-Treatment    Pain Scale: Numbers, Pretreatment  3/10  -AF  3/10  -EE     Pain Scale: Numbers, Post-Treatment  3/10  -AF  3/10  -EE     Pain Location - Orientation  --  incisional  -EE     Pain Location  neck  -AF  neck  -EE     Pain Intervention(s)  --  Repositioned;Rest;Medication (See MAR)  -EE     Recorded by [AF] Lolis Cooley OTJEANINE [EE] Abraham CathleenHAILEY     Row Name 12/12/18 1505             Upper Extremity Seated Therapeutic Exercise    Performed, Seated Upper Extremity (Therapeutic Exercise)  scapular protraction/retraction;shoulder external/internal rotation;forearm supination/pronation;elbow flexion/extension  -AF      Device, Seated Upper Extremity (Therapeutic Exercise)  -- non weighted dowel han, #1 hand weight   -AF      Exercise Type, Seated Upper Extremity (Therapeutic Exercise)  AROM (active range of motion);AAROM (active assistive range of motion)  -AF      Expected Outcomes, Seated Upper Extremity (Therapeutic Exercise)  improve motor control  -AF      Restrictions, Seated Upper Extremity (Therapeutic Exercise)  cervical precautions  -AF      Sets/Reps Detail, Seated Upper Extremity (Therapeutic Exercise)  2/10  -AF      Comment, Seated Upper Extremity (Therapeutic Exercise)  supine on mat with non weighted dowel han seated on EOM with hand weight   -AF      Recorded by [AF] Lolis Cooley OTR      Row Name 12/12/18 0830             Lower Extremity Seated Therapeutic Exercise    Performed, Seated Lower Extremity (Therapeutic  Exercise)  hip flexion/extension;hip abduction/adduction;knee flexion/extension;ankle dorsiflexion/plantarflexion;LAQ (long arc quad), knee extension  -EE      Device, Seated Lower Extremity (Therapeutic Exercise)  elastic bands/tubing;free weights, cuff;small ball  -EE      Exercise Type, Seated Lower Extremity (Therapeutic Exercise)  resistive exercise  -EE      Sets/Reps Detail, Seated Lower Extremity (Therapeutic Exercise)  2/20  -EE      Recorded by [EE] Cathleen Rosario PT      Row Name 12/12/18 1505             Neuromuscular Re-education    Comment (Neuromuscular Re-education)  table slides, manipuilation of 1inch square wooden pegs and golf tees with moderate difficulty with in hand manipulation  -AF      Recorded by [AF] Lolis Cooley OTR      Row Name 12/12/18 1505 12/12/18 0830          Positioning and Restraints    Pre-Treatment Position  sitting in chair/recliner  -AF  sitting in chair/recliner  -EE     Post Treatment Position  wheelchair  -AF  wheelchair  -EE     In Wheelchair  sitting;call light within reach;encouraged to call for assist;exit alarm on in AM and PM sessions  -AF  sitting;call light within reach;encouraged to call for assist;exit alarm on  -EE     Recorded by [AF] Lolis Cooley OTR [EE] Cathleen Rosario PT     Row Name 12/12/18 0800             Weekly Summary of Progress (PT)    Weekly Outcome Summary: Physical Therapy  Pt continues to demonstrate steady progress with mobility during PT sessions. He demonstrates improved strength and endurance, as evidenced by tolerance of increased ambulation distance and standing activities. Also continues to demonstrate increased independence with bed mobility. Pt still requires CGA to Min A for transfers and gait due to ataxia and balance impairments, and would benefit from continued PT to address these impairments and maximize independence prior to DC home.   -EE      Recorded by [EE] Cathleen Rosario PT        User Key  (r) = Recorded By, (t) =  Taken By, (c) = Cosigned By    Initials Name Effective Dates    EE Cathleen Rosario, PT 04/03/18 -     AF Lolis Cooley, OTR 04/03/18 -           Wound 11/27/18 1318 Other (See comments) neck incision (Active)   Dressing Appearance open to air;other (see comments) 12/11/2018  8:10 PM   Closure Liquid skin adhesive;Adhesive closure strips 12/12/2018  8:15 AM   Base clean;dry 12/11/2018  8:10 PM   Periwound dry;intact 12/12/2018  8:15 AM   Drainage Amount none 12/12/2018  8:15 AM   Dressing Care, Wound open to air 12/12/2018  8:15 AM         OT Recommendation and Plan                 OT IRF GOALS     Row Name 12/07/18 1216 12/01/18 1000          Transfer Goal 1 (OT-IRF)    Activity/Assistive Device (Transfer Goal 1, OT-IRF)  toilet;walk-in shower;shower chair;walker, rolling  -AF  toilet  -RP     [place] Level (Transfer Goal 1, OT-IRF)  minimum assist (75% or more patient effort);contact guard assist;verbal cues required  -AF  minimum assist (75% or more patient effort)  -RP     Time Frame (Transfer Goal 1, OT-IRF)  short term goal (STG)  -AF  short term goal (STG)  -RP     Progress/Outcomes (Transfer Goal 1, OT-IRF)  goal ongoing  -AF  goal ongoing  -RP        Transfer Goal 2 (OT-IRF)    Activity/Assistive Device (Transfer Goal 2, OT-IRF)  toilet;shower chair;walk-in shower;walker, rolling  -AF  toilet  -RP     [place] Level (Transfer Goal 2, OT-IRF)  supervision required  -AF  supervision required  -RP     Time Frame (Transfer Goal 2, OT-IRF)  long term goal (LTG)  -AF  long term goal (LTG)  -RP     Progress/Outcomes (Transfer Goal 2, OT-IRF)  goal ongoing  -AF  goal ongoing  -RP        Bathing Goal 1 (OT-IRF)    Activity/Device (Bathing Goal 1, OT-IRF)  bathing skills, all;grab bar/tub rail;hand-held shower spray hose;long-handled sponge;tub bench  -AF  bathing skills, all  -RP     [place] Level (Bathing Goal 1, OT-IRF)  minimum assist (75% or more patient effort);verbal cues required  -AF  moderate  assist (50-74% patient effort)  -RP     Time Frame (Bathing Goal 1, OT-IRF)  short term goal (STG)  -AF  short term goal (STG)  -RP     Progress/Outcomes (Bathing Goal 1, OT-IRF)  goal ongoing  -AF  goal ongoing  -RP        Bathing Goal 2 (OT-IRF)    Activity/Device (Bathing Goal 2, OT-IRF)  bathing skills, all;grab bar/tub rail;hand-held shower spray hose;tub bench;long-handled sponge  -AF  bathing skills, all  -RP     [place] Level (Bathing Goal 2, OT-IRF)  supervision required  -AF  supervision required;standby assist  -RP     Time Frame (Bathing Goal 2, OT-IRF)  long term goal (LTG)  -AF  long term goal (LTG)  -RP     Progress/Outcomes (Bathing Goal 2, OT-IRF)  goal ongoing  -AF  goal ongoing  -RP        UB Dressing Goal 1 (OT-IRF)    Activity/Device (UB Dressing Goal 1, OT-IRF)  upper body dressing  -AF  upper body dressing  -RP     [place] (UB Dress Goal 1, OT-IRF)  minimum assist (75% or more patient effort);verbal cues required  -AF  minimum assist (75% or more patient effort)  -RP     Time Frame (UB Dressing Goal 1, OT-IRF)  short term goal (STG)  -AF  short term goal (STG)  -RP     Progress/Outcomes (UB Dressing Goal 1, OT-IRF)  goal ongoing  -AF  goal ongoing  -RP        UB Dressing Goal 2 (OT-IRF)    Activity/Device (UB Dressing Goal 2, OT-IRF)  upper body dressing  -AF  upper body dressing  -RP     [place] (UB Dress Goal 2, OT-IRF)  supervision required  -AF  supervision required  -RP     Time Frame (UB Dressing Goal 2, OT-IRF)  long term goal (LTG)  -AF  long term goal (LTG)  -RP     Progress/Outcomes (UB Dressing Goal 2, OT-IRF)  goal ongoing  -AF  goal ongoing  -RP        LB Dressing Goal 1 (OT-IRF)    Activity/Device (LB Dressing Goal 1, OT-IRF)  lower body dressing;long handled shoe horn;reacher;elastic laces  -AF  lower body dressing  -RP     [place] (LB Dressing Goal 1, OT-IRF)  moderate assist (50-74% patient effort);verbal cues required;minimum assist (75% or more patient  effort)  -AF  maximum assist (25-49% patient effort)  -RP     Time Frame (LB Dressing Goal 1, OT-IRF)  short term goal (STG)  -AF  short term goal (STG)  -RP     Progress/Outcomes (LB Dressing Goal 1, OT-IRF)  goal ongoing  -AF  goal ongoing  -RP        LB Dressing Goal 2 (OT-IRF)    Activity/Device (LB Dressing Goal 2, OT-IRF)  lower body dressing;long handled shoe horn;elastic laces;reacher  -AF  lower body dressing  -RP     [place] (LB Dressing Goal 2, OT-IRF)  standby assist;verbal cues required  -AF  supervision required;standby assist  -RP     Time Frame (LB Dressing Goal 2, OT-IRF)  long term goal (LTG)  -AF  long term goal (LTG)  -RP     Progress/Outcomes (LB Dressing Goal 2, OT-IRF)  goal ongoing  -AF  goal ongoing  -RP        Grooming Goal 1 (OT-IRF)    Activity/Device (Grooming Goal 1, OT-IRF)  grooming skills, all  -AF  grooming skills, all  -RP     [place] (Grooming Goal 1, OT-IRF)  minimum assist (75% or more patient effort);verbal cues required  -AF  minimum assist (75% or more patient effort)  -RP     Time Frame (Grooming Goal 1, OT-IRF)  short term goal (STG)  -AF  short term goal (STG)  -RP     Progress/Outcomes (Grooming Goal 1, OT-IRF)  goal ongoing  -AF  goal ongoing  -RP        Grooming Goal 2 (OT-IRF)    Activity/Device (Grooming Goal 2, OT-IRF)  grooming skills, all  -AF  grooming skills, all  -RP     [place] (Grooming Goal 2, OT-IRF)  supervision required  -AF  supervision required  -RP     Time Frame (Grooming Goal 2, OT-IRF)  long term goal (LTG)  -AF  long term goal (LTG)  -RP     Progress/Outcomes (Grooming Goal 2, OT-IRF)  goal ongoing  -AF  goal ongoing  -RP        Toileting Goal 1 (OT-IRF)    Activity/Device (Toileting Goal 1, OT-IRF)  toileting skills, all;grab bar/safety frame;raised toilet seat  -AF  toileting skills, all  -RP     [place] Level (Toileting Goal 1, OT-IRF)  moderate assist (50-74% patient effort);verbal cues required  -AF  maximum assist (25-49%  patient effort)  -RP     Time Frame (Toileting Goal 1, OT-IRF)  short term goal (STG)  -AF  short term goal (STG)  -RP     Progress/Outcomes (Toileting Goal 1, OT-IRF)  goal ongoing  -AF  goal ongoing  -RP        Toileting Goal 2 (OT-IRF)    Activity/Device (Toileting Goal 2, OT-IRF)  toileting skills, all;grab bar/safety frame;raised toilet seat  -AF  toileting skills, all  -RP     [place] Level (Toileting Goal 2, OT-IRF)  supervision required;verbal cues required  -AF  supervision required;standby assist  -RP     Time Frame (Toileting Goal 2, OT-IRF)  long term goal (LTG)  -AF  long term goal (LTG)  -RP     Progress/Outcomes (Toileting Goal 2, OT-IRF)  goal ongoing  -AF  goal ongoing  -RP        ROM Goal 1 (OT-IRF)    ROM Goal 1 (OT-IRF)  pt will increase B UE ROM to approx. 3/4 shoulder flexion to assist with ADL tasks   -AF  Pt will increase B UE ROM to approx. 3/4 shoulder flexion to assist w/ ADLs.  -RP     Time Frame (ROM Goal 1, OT-IRF)  long term goal (LTG)  -AF  long term goal (LTG)  -RP     Progress/Outcomes (ROM Goal 1, OT-IRF)  goal ongoing  -AF  goal ongoing  -RP       User Key  (r) = Recorded By, (t) = Taken By, (c) = Cosigned By    Initials Name Provider Type    Lolis Horne OTR Occupational Therapist    Precious Hamilton OT Occupational Therapist          Occupational Therapy Education     Title: PT OT SLP Therapies (In Progress)     Topic: Occupational Therapy (In Progress)     Point: ADL training (Done)     Description: Instruct learner(s) on proper safety adaptation and remediation techniques during self care or transfers.   Instruct in proper use of assistive devices.    Learning Progress Summary           Patient Acceptance, CLINTON KOTHARI DU by AF at 12/11/2018 12:20 PM    Comment:  wife signed off to assist with walking to and from bathroom and with toileting tasks, demo'd and discussed Tub transfer bench in tub/shower combo with pt. pt stated that he will order one  himself     Acceptance, E,D, VU,NR by AF at 12/4/2018 12:07 PM    Comment:  AE during LBD tasks    Acceptance, E,D, VU,NR by AF at 12/3/2018 11:46 AM    Comment:  to maintain cervial precautions during ADL tasks    Acceptance, E, VU by RP at 12/1/2018 11:48 AM    Comment:  OT educ on OT role in therapeutic process and pt's POC.   Family Acceptance, E, VU,DU by AF at 12/11/2018 12:20 PM    Comment:  wife signed off to assist with walking to and from bathroom and with toileting tasks, demo'd and discussed Tub transfer bench in tub/shower combo with pt. pt stated that he will order one  himself    Acceptance, E,D, VU,NR by AF at 12/3/2018 11:46 AM    Comment:  to maintain cervial precautions during ADL tasks                   Point: Home exercise program (Done)     Description: Instruct learner(s) on appropriate technique for monitoring, assisting and/or progressing therapeutic exercises/activities.    Learning Progress Summary           Patient Acceptance, E, VU by AF at 12/7/2018  3:08 PM    Comment:  theraputty and foam blocks to increase sensation and FMC/strength                   Point: Precautions (Done)     Description: Instruct learner(s) on prescribed precautions during self-care and functional transfers.    Learning Progress Summary           Patient Acceptance, E,D, VU,NR by AF at 12/3/2018 11:46 AM    Comment:  to maintain cervial precautions during ADL tasks    Acceptance, E, VU by RP at 12/1/2018 11:48 AM    Comment:  OT educ on OT role in therapeutic process and pt's POC.   Family Acceptance, E,D, VU,NR by AF at 12/3/2018 11:46 AM    Comment:  to maintain cervial precautions during ADL tasks                               User Key     Initials Effective Dates Name Provider Type Discipline    AF 04/03/18 -  Lolis Cooley OTR Occupational Therapist OT    RP 05/03/18 -  Precious De Leon OT Occupational Therapist OT                       Time Calculation:     Time Calculation- OT     Row Name 12/12/18 1514 12/12/18  1513          Time Calculation- OT    OT Start Time  1400  -AF  1030  -AF     OT Stop Time  1430  -AF  1130  -AF     OT Time Calculation (min)  30 min  -AF  60 min  -AF       User Key  (r) = Recorded By, (t) = Taken By, (c) = Cosigned By    Initials Name Provider Type    AF Lolis Cooley OTJEANINE Occupational Therapist          Therapy Suggested Charges     Code   Minutes Charges    None              Therapy Charges for Today     Code Description Service Date Service Provider Modifiers Qty    15343087848 HC OT SELF CARE/MGMT/TRAIN EA 15 MIN 12/11/2018 Lolis Cooley, OTR GO 3    81200639974 HC OT NEUROMUSC RE EDUCATION EA 15 MIN 12/11/2018 Lolis Cooley, OTR GO 1    73633333658 HC OT NEUROMUSC RE EDUCATION EA 15 MIN 12/11/2018 Lolis Cooley, OTR GO 1    63663657247 HC OT THER PROC EA 15 MIN 12/11/2018 Lolis Cooley OTR GO 1    02173877402 HC OT SELF CARE/MGMT/TRAIN EA 15 MIN 12/12/2018 Lolis Cooley, OTR GO 2    51347978750 HC OT NEUROMUSC RE EDUCATION EA 15 MIN 12/12/2018 Lolis Cooley, OTR GO 2    73861399250 HC OT THER PROC EA 15 MIN 12/12/2018 Lolis Cooley OTR GO 2                   Lolis Cooley OTJEANINE  12/12/2018

## 2018-12-12 NOTE — PROGRESS NOTES
Inpatient Rehabilitation Plan of Care Note    Plan of Care  Care Plan Reviewed - No updates at this time.    Body Systems    [RN] Endocrine(Active)  Current Status(12/08/2018): Blood sugar not controlled  Weekly Goal(12/15/2018): Blood sugar will be within acceptable limits  Discharge Goal: Independent with diabetes regimen        Safety    Performed Intervention(s)  Safety rounds/ Fall precautions  Items in reach, bed alarm & chair alarms      Pain    Performed Intervention(s)  Medication prn & evaluate effectiveness  Monitor neck incision q shift    Signed by: Wojciech Lentz RN

## 2018-12-12 NOTE — PROGRESS NOTES
LOS: 12 days   Patient Care Team:  Miladis Colbert APRN as PCP - General (Family Medicine)    Chief Complaint: same    Subjective     History of Present Illness    Subjective Pt is awake and alert. No new issues. Pt and wife c/w elevated SBP overnight and episodically.     History taken from: patient    Objective     Vital Signs  Temp:  [97.7 °F (36.5 °C)-97.9 °F (36.6 °C)] 97.9 °F (36.6 °C)  Heart Rate:  [71-74] 71  Resp:  [18] 18  BP: (132-188)/(67-97) 160/80    Objective exam limited due to pt being in bathroom.     Results Review:     I reviewed the patient's new clinical results.    Medication Review:     Assessment/Plan       Cervical myelopathy (CMS/HCC)      Assessment & Plan Continue to prepare for dc 12/19. Action as ordered. Will continue to monitor BP readings. I discussed with pt and his wife.     Mike Melissa MD  12/12/18  7:44 AM    Time:

## 2018-12-12 NOTE — PROGRESS NOTES
Inpatient Rehabilitation Functional Measures Assessment    Functional Measures  [facility] Eating:  [facility] Grooming: [facility] Bathing:  [facility] Upper Body Dressing:  [facility] Lower Body Dressing:  [facility] Toileting:  [facility] Bladder Management  Level of Assistance:  [place]  Frequency/Number of Accidents this Shift:  [facility] Bowel Management  Level of Assistance: [place]  Frequency/Number of Accidents this Shift: [facility] Bed/Chair/Wheelchair Transfer:  [facility] Toilet Transfer:  [facility] Tub/Shower Transfer:  [place]    Previously Documented Mode of Locomotion at Discharge: Field  [facility] Expected Mode of Locomotion at Discharge: [facility] Walk/Wheelchair:  [facility] Stairs:  [facility] Comprehension:  Auditory comprehension is the usual mode. Comprehension  Score = 7, Independent.  Patient comprehends complex/abstract information in  their primary language.  Patient is completely independent for auditory  comprehension.  There are no activity limitations.  [facility] Expression:  Vocal expression is the usual mode. Expression Score = 7,  Independent.  Patient expresses complex/abstract information in their primary  language.  Patient is completely independent for vocal expression.  There are no  activity limitations.  [facility] Social Interaction:  Social Interaction Score = 7, Independent. Patient is  completely independent for social interaction.  There are no activity  limitations.  [facility] Problem Solving:  Problem Solving Score = 7, Independent.  Patient makes  appropriate decisions in order to solve complex problems.  Patient is completely  independent for problem solving.  There are no activity limitations.  [facility] Memory:  Memory Score = 7, Independent.  Patient is completely independent  for memory.  There are no activity limitations.    Therapy Mode Minutes  Occupational Therapy: Branch  Physical Therapy: Branch  Speech Language Pathology:  Branch    Signed by: Franc Flood RN

## 2018-12-12 NOTE — PROGRESS NOTES
Inpatient Rehabilitation Functional Measures Assessment    Functional Measures  [facility] Eating:  Branch  [facility] Grooming: Branch  [facility] Bathing:  Branch  [facility] Upper Body Dressing:  Branch  [facility] Lower Body Dressing:  Branch  [facility] Toileting:  Toileting Score = 4.  Patient requires minimal assistance for  toileting, such as steadying for balance while cleansing or adjusting clothes.  Patient requires the following assistive device(s): Adaptive device to maintain  balance.    [facility] Bladder Management  Level of Assistance:  Bladder Score = 5.  Patient is supervision/set-up for  bladder management, requiring: Verbal cuing, prompting, or instructing. Stand by  assistance. Patient requires the following assistive device(s):  Urinal.  Frequency/Number of Accidents this Shift:  Bladder accidents this shift:  0 .  Patient has not had an accident this shift.    [facility] Bowel Management  Level of Assistance: Activity was not observed.  Frequency/Number of Accidents this Shift: Bowel accidents this shift: 0 .  Patient has not had an accident this shift.    [facility] Bed/Chair/Wheelchair Transfer:  Bed/chair/wheelchair Transfer Score = 5.  Patient is supervision/set-up for transferring to and from the  bed/chair/wheelchair, requiring: Verbal cuing, prompting, or instructing. Stand  by assistance. Patient requires the following assistive device(s): Elevated head  of bed. Elevated bed/surface. Bed rails.  [facility] Toilet Transfer:  Toilet Transfer Score = 5.  Patient is supervision/set-up  for transferring to and from the toilet/commode, requiring: Verbal cuing,  prompting, or instructing. Stand by assistance. Patient requires the following  assistive device(s): Grab bars.  [facility] Tub/Shower Transfer:  [place]    Previously Documented Mode of Locomotion at Discharge: Field  [facility] Expected Mode of Locomotion at Discharge: [facility] Walk/Wheelchair:  [facility] Stairs:  [facility] Comprehension:  [facility] Expression:  [facility] Social Interaction:   Branch  [facility] Problem Solving:  Branch  [facility] Memory:  Branch    Therapy Mode Minutes  Occupational Therapy: Branch  Physical Therapy: Branch  Speech Language Pathology:  Branch    Signed by: ALEJO Gutierrez

## 2018-12-13 LAB
GLUCOSE BLDC GLUCOMTR-MCNC: 167 MG/DL (ref 70–130)
GLUCOSE BLDC GLUCOMTR-MCNC: 170 MG/DL (ref 70–130)
GLUCOSE BLDC GLUCOMTR-MCNC: 182 MG/DL (ref 70–130)
GLUCOSE BLDC GLUCOMTR-MCNC: 185 MG/DL (ref 70–130)

## 2018-12-13 PROCEDURE — 97110 THERAPEUTIC EXERCISES: CPT

## 2018-12-13 PROCEDURE — 97112 NEUROMUSCULAR REEDUCATION: CPT

## 2018-12-13 PROCEDURE — 82962 GLUCOSE BLOOD TEST: CPT

## 2018-12-13 PROCEDURE — 97535 SELF CARE MNGMENT TRAINING: CPT

## 2018-12-13 PROCEDURE — 63710000001 INSULIN LISPRO (HUMAN) PER 5 UNITS: Performed by: HOSPITALIST

## 2018-12-13 RX ORDER — HYDROCODONE BITARTRATE AND ACETAMINOPHEN 5; 325 MG/1; MG/1
1 TABLET ORAL EVERY 4 HOURS PRN
Status: DISCONTINUED | OUTPATIENT
Start: 2018-12-13 | End: 2018-12-13

## 2018-12-13 RX ORDER — HYDROCODONE BITARTRATE AND ACETAMINOPHEN 5; 325 MG/1; MG/1
2 TABLET ORAL EVERY 4 HOURS PRN
Status: DISCONTINUED | OUTPATIENT
Start: 2018-12-13 | End: 2018-12-19 | Stop reason: HOSPADM

## 2018-12-13 RX ORDER — PIOGLITAZONEHYDROCHLORIDE 30 MG/1
30 TABLET ORAL DAILY
Status: DISCONTINUED | OUTPATIENT
Start: 2018-12-13 | End: 2018-12-19 | Stop reason: HOSPADM

## 2018-12-13 RX ORDER — HYDROCODONE BITARTRATE AND ACETAMINOPHEN 5; 325 MG/1; MG/1
1 TABLET ORAL EVERY 4 HOURS PRN
Status: DISCONTINUED | OUTPATIENT
Start: 2018-12-13 | End: 2018-12-19 | Stop reason: HOSPADM

## 2018-12-13 RX ORDER — HYDROCHLOROTHIAZIDE 25 MG/1
25 TABLET ORAL DAILY
Status: DISCONTINUED | OUTPATIENT
Start: 2018-12-13 | End: 2018-12-19 | Stop reason: HOSPADM

## 2018-12-13 RX ORDER — LOSARTAN POTASSIUM 100 MG/1
100 TABLET ORAL
Status: DISCONTINUED | OUTPATIENT
Start: 2018-12-13 | End: 2018-12-19 | Stop reason: HOSPADM

## 2018-12-13 RX ADMIN — METFORMIN HYDROCHLORIDE 1000 MG: 1000 TABLET ORAL at 17:22

## 2018-12-13 RX ADMIN — HYDROCODONE BITARTRATE AND ACETAMINOPHEN 2 TABLET: 7.5; 325 TABLET ORAL at 06:01

## 2018-12-13 RX ADMIN — HYDROCHLOROTHIAZIDE 25 MG: 25 TABLET ORAL at 08:20

## 2018-12-13 RX ADMIN — CYCLOBENZAPRINE 10 MG: 10 TABLET, FILM COATED ORAL at 16:18

## 2018-12-13 RX ADMIN — METOPROLOL TARTRATE 25 MG: 25 TABLET ORAL at 08:20

## 2018-12-13 RX ADMIN — METOPROLOL TARTRATE 25 MG: 25 TABLET ORAL at 21:25

## 2018-12-13 RX ADMIN — HYDROCODONE BITARTRATE AND ACETAMINOPHEN 2 TABLET: 5; 325 TABLET ORAL at 10:32

## 2018-12-13 RX ADMIN — PIOGLITAZONE 30 MG: 30 TABLET ORAL at 10:32

## 2018-12-13 RX ADMIN — FAMOTIDINE 20 MG: 20 TABLET, FILM COATED ORAL at 08:20

## 2018-12-13 RX ADMIN — DULOXETINE HYDROCHLORIDE 60 MG: 60 CAPSULE, DELAYED RELEASE ORAL at 08:21

## 2018-12-13 RX ADMIN — GLIPIZIDE 10 MG: 10 TABLET ORAL at 08:21

## 2018-12-13 RX ADMIN — INSULIN LISPRO 2 UNITS: 100 INJECTION, SOLUTION INTRAVENOUS; SUBCUTANEOUS at 08:21

## 2018-12-13 RX ADMIN — RIVAROXABAN 20 MG: 20 TABLET, FILM COATED ORAL at 17:22

## 2018-12-13 RX ADMIN — HYDROCODONE BITARTRATE AND ACETAMINOPHEN 2 TABLET: 5; 325 TABLET ORAL at 16:18

## 2018-12-13 RX ADMIN — HYDROCODONE BITARTRATE AND ACETAMINOPHEN 2 TABLET: 5; 325 TABLET ORAL at 20:42

## 2018-12-13 RX ADMIN — LIDOCAINE 2 PATCH: 50 PATCH CUTANEOUS at 10:32

## 2018-12-13 RX ADMIN — METFORMIN HYDROCHLORIDE 1000 MG: 1000 TABLET ORAL at 08:21

## 2018-12-13 RX ADMIN — INSULIN LISPRO 2 UNITS: 100 INJECTION, SOLUTION INTRAVENOUS; SUBCUTANEOUS at 17:22

## 2018-12-13 RX ADMIN — INSULIN LISPRO 2 UNITS: 100 INJECTION, SOLUTION INTRAVENOUS; SUBCUTANEOUS at 12:02

## 2018-12-13 RX ADMIN — LOSARTAN POTASSIUM 100 MG: 100 TABLET, FILM COATED ORAL at 08:20

## 2018-12-13 RX ADMIN — INSULIN LISPRO 2 UNITS: 100 INJECTION, SOLUTION INTRAVENOUS; SUBCUTANEOUS at 21:25

## 2018-12-13 RX ADMIN — CYCLOBENZAPRINE 10 MG: 10 TABLET, FILM COATED ORAL at 08:20

## 2018-12-13 RX ADMIN — ATOMOXETINE 100 MG: 60 CAPSULE ORAL at 12:03

## 2018-12-13 NOTE — PROGRESS NOTES
LOS: 13 days   Patient Care Team:  Miladis Colbert APRN as PCP - General (Family Medicine)    Chief Complaint: same    Subjective     History of Present Illness    Subjective Pt is easily awakened. No c/o. Pt feels that his swelling is improving.     History taken from: patient    Objective     Vital Signs  Temp:  [97.5 °F (36.4 °C)-98.2 °F (36.8 °C)] 97.8 °F (36.6 °C)  Heart Rate:  [63-76] 63  Resp:  [18] 18  BP: (131-140)/(77-79) 133/78    Objective exam unchanged calves soft and NT. BLE edema improved.     Results Review:     I reviewed the patient's new clinical results.    Medication Review:     Assessment/Plan       Cervical myelopathy (CMS/HCC)      Assessment & Plan Continue to prepare for dc 12/19. I am going to check BMP in am.     Mike Melissa MD  12/13/18  6:43 AM    Time:

## 2018-12-13 NOTE — PLAN OF CARE
Problem: Patient Care Overview  Goal: Plan of Care Review  Outcome: Ongoing (interventions implemented as appropriate)   12/13/18 0032   Patient Care Overview   IRF Plan of Care Review progress ongoing, continue   Progress, Functional Goals demonstrating adequate progress   Coping/Psychosocial   Plan of Care Reviewed With patient   OTHER   Outcome Summary Patient is calm and cooperative. PRN pain medication given for neck and bilateral shoulder pain. No safety issues observed. Using the call light for assistance. Wife at bedside.       Problem: Fall Risk (Adult)  Goal: Absence of Fall  Outcome: Ongoing (interventions implemented as appropriate)      Problem: Pain, Acute (Adult)  Goal: Acceptable Pain Control/Comfort Level  Outcome: Ongoing (interventions implemented as appropriate)      Problem: Skin Injury Risk (Adult)  Goal: Skin Health and Integrity  Outcome: Ongoing (interventions implemented as appropriate)

## 2018-12-13 NOTE — THERAPY TREATMENT NOTE
Inpatient Rehabilitation - Physical Therapy Treatment Note  [facility]     Patient Name: Duane Mark Witten  : 1955  MRN: [id number]    Today's Date: 2018                 Admit Date: 2018      Visit Dx:      ICD-10-CM ICD-9-CM   1. Cervical stenosis of spinal canal M48.02 723.0   2. Weakness of both lower extremities R29.898 729.89   3. Paresthesia of both upper extremities R20.2 782.0    M79.601 729.5    M79.602    4. Morbid obesity with BMI of 40.0-44.9, adult (CMS/[facility]) E66.01 278.01    Z68.41 V85.41   5. Impaired functional mobility, balance, gait, and endurance Z74.09 V49.89   6. Functional gait abnormality R26.89 781.2       Patient Active Problem List   Diagnosis   • YANELI (generalized anxiety disorder)   • ADD (attention deficit disorder)   • Depression   • Diabetes type 2, controlled (CMS/[facility])   • ED (erectile dysfunction) of non-organic origin   • HLD (hyperlipidemia)   • Essential hypertension   • Arthritis   • Psoriasis   • Testosterone deficiency   • Primary insomnia   • Weakness of both lower extremities   • Paresthesia of both upper extremities   • Morbid obesity with BMI of 40.0-44.9, adult (CMS/[facility])   • Adverse effect of corticosteroids   • Type 2 diabetes mellitus with hyperglycemia (CMS/[facility])   • Cervical stenosis of spinal canal   • Edema of cervical spinal cord (CMS/[facility])   • Postoperative atrial fibrillation (CMS/[facility])   • Cervical myelopathy (CMS/[facility])       Therapy Treatment    IRF Treatment Summary     Row Name 18             Evaluation/Treatment Time and Intent    Subjective Information  no complaints  -EE      Existing Precautions/Restrictions  fall;spinal  -EE      Document Type  therapy note (daily note)  -EE      Mode of Treatment  physical therapy  -EE      Patient/Family Observations  Pt supine in bed in no acute distress.  -EE      Recorded by [EE] Cathleen Rosario, PT      Row Name 1874             Cognition/Psychosocial- PT/OT    Affect/Mental Status  (Cognitive)  WFL  -EE      Orientation Status (Cognition)  oriented x 4  -EE      Follows Commands (Cognition)  WFL  -EE      Personal Safety Interventions  fall prevention program maintained;gait belt;muscle strengthening facilitated;nonskid shoes/slippers when out of bed;supervised activity  -EE      Recorded by [EE] Cathleen Rosario, PT      Row Name 12/13/18 0830             Bed Mobility Assessment/Treatment    Rolling Left [place] (Bed Mobility)  supervision  -EE      Sidelying-Sit [place] (Bed Mobility)  contact guard;verbal cues  -EE      Assistive Device (Bed Mobility)  bed rails  -EE      Recorded by [EE] Cathleen Rosario, PT      Row Name 12/13/18 0830             Bed-Chair Transfer    Bed-Chair [place] (Transfers)  minimum assist (75% patient effort);verbal cues  -EE      Assistive Device (Bed-Chair Transfers)  walker, front-wheeled  -EE      Recorded by [EE] Cathleen Rosario, PT      Row Name 12/13/18 0830             Sit-Stand Transfer    Sit-Stand [place] (Transfers)  minimum assist (75% patient effort);contact guard  -EE      Assistive Device (Sit-Stand Transfers)  walker, front-wheeled  -EE      Recorded by [EE] Cathleen Rosario, PT      Row Name 12/13/18 0830             Stand-Sit Transfer    Stand-Sit [place] (Transfers)  contact guard  -EE      Assistive Device (Stand-Sit Transfers)  walker, front-wheeled  -EE      Recorded by [EE] Cathleen Rosario, PT      Row Name 12/13/18 0830             Gait/Stairs Assessment/Training    [place] Level (Gait)  minimum assist (75% patient effort);contact guard;verbal cues  -EE      Assistive Device (Gait)  walker, front-wheeled  -EE      Distance in Feet (Gait)  160, 80 x 3  -EE      Pattern (Gait)  step-through  -EE      Deviations/Abnormal Patterns (Gait)  ataxic;base of support, narrow;sarah decreased;stride length decreased  -EE      Bilateral Gait Deviations  forward flexed posture;weight shift ability decreased;heel strike decreased  -EE       [place] Level (Stairs)  minimum assist (75% patient effort);2 person assist;verbal cues;nonverbal cues (demo/gesture)  -EE      Handrail Location (Stairs)  both sides  -EE      Number of Steps (Stairs)  4  -EE      Ascending Technique (Stairs)  step-to-step  -EE      Descending Technique (Stairs)  step-to-step  -EE      Stairs, Safety Issues  weight-shifting ability decreased;balance decreased during turns  -EE      Stairs, Impairments  strength decreased;impaired balance  -EE      Comment (Gait/Stairs)  R knee buckling on first attempt to descend stairs; verbal cues to get closer to edge prior to attempting descent. No further issues on remaining stairs. Ascending with R LE first, descending with L LE first.   -EE      Recorded by [EE] Cathleen Rosario PT      Row Name 12/13/18 0886             Safety Issues, Functional Mobility    Impairments Affecting Function (Mobility)  balance;coordination;endurance/activity tolerance;strength  -EE      Recorded by [EE] Cathleen Rosario PT      Row Name 12/13/18 0830             Pain Scale: Numbers Pre/Post-Treatment    Pain Scale: Numbers, Pretreatment  6/10  -EE      Pain Scale: Numbers, Post-Treatment  4/10  -EE      Pain Location  neck  -EE      Pre/Post Treatment Pain Comment  tolerated tx  -EE      Pain Intervention(s)  Repositioned;Rest;Medication (See MAR)  -EE      Recorded by [EE] Cathleen Rosario, HAILEY      Row Name 12/13/18 0854             Dynamic Balance Activity    Therapeutic Training Performed (Dynamic Balance)  side stepping  -EE      Support Needed for Balance (Dynamic Balance Training)  uses both upper extremities for support;CGA  -EE      Comment (Dynamic Balance Training)  2 x 8' each direction; B UE support on hemibars  -EE      Recorded by [EE] Cathleen Rosario PT      Row Name 12/13/18 0896             Lower Extremity Standing Therapeutic Exercise    Performed, Standing Lower Extremity (Therapeutic Exercise)  heel raises;toe raises;hip/knee flexion/extension   -EE      Device, Standing Lower Extremity (Therapeutic Exercise)  jacob bars  -EE      Exercise Type, Standing Lower Extremity (Therapeutic Exercise)  AROM (active range of motion)  -EE      Sets/Reps Detail, Standing Lower Extremity (Therapeutic Exercise)  1/10  -EE      Recorded by [EE] Cathleen Rosario PT      Row Name 12/13/18 0830             Lower Extremity Seated Therapeutic Exercise    Performed, Seated Lower Extremity (Therapeutic Exercise)  hip flexion/extension;hip abduction/adduction;ankle dorsiflexion/plantarflexion;knee flexion/extension;LAQ (long arc quad), knee extension  -EE      Device, Seated Lower Extremity (Therapeutic Exercise)  elastic bands/tubing;free weights, cuff;small ball  -EE      Sets/Reps Detail, Seated Lower Extremity (Therapeutic Exercise)  1/20  -EE      Recorded by [EE] Cathleen Rosario PT      Row Name 12/13/18 0830             Positioning and Restraints    Pre-Treatment Position  in bed  -EE      Post Treatment Position  chair  -EE      In Chair  sitting;call light within reach;encouraged to call for assist;exit alarm on;with family/caregiver  -EE      Recorded by [EE] Cathleen Rosario PT        User Key  (r) = Recorded By, (t) = Taken By, (c) = Cosigned By    Initials Name Effective Dates    EE Cathleen Rosario PT 04/03/18 -         Wound 11/27/18 1318 Other (See comments) neck incision (Active)   Dressing Appearance open to air 12/13/2018  8:19 AM   Closure Adhesive closure strips 12/13/2018  8:19 AM   Base clean;dry 12/13/2018  8:19 AM   Periwound dry;intact 12/12/2018  7:06 PM   [place] Temperature warm 12/12/2018  7:06 PM   [place] Skin Turgor soft 12/12/2018  7:06 PM   Drainage Amount none 12/13/2018  8:19 AM   Dressing Care, Wound open to air 12/13/2018  8:19 AM     Physical Therapy Education     Title: PT OT SLP Therapies (In Progress)     Topic: Physical Therapy (Done)     Point: Mobility training (Done)     Learning Progress Summary           Patient Acceptance, E,TB,D, VU,NR  by EE at 12/13/2018  9:11 AM    Acceptance, E,TB, VU,NR by EE at 12/12/2018  8:48 AM    Acceptance, E,TB,D, VU,NR by EE at 12/11/2018  8:48 AM    Acceptance, E,TB,D, VU,NR by EE at 12/10/2018  8:45 AM    Eager, E,TB,D, VU,DU,NR by MAR at 12/9/2018 10:24 AM    Eager, E,TB,D, VU,DU by KP at 12/8/2018 12:00 PM    Acceptance, E,TB,D, VU,NR by EE at 12/7/2018  9:22 AM    Acceptance, E,TB, VU,NR by EE at 12/6/2018  9:15 AM    Acceptance, E,TB, VU,NR by EE at 12/5/2018 10:10 AM    Acceptance, E, NR by SIMÓN at 12/4/2018 10:12 AM    Acceptance, E,TB, VU,NR by EE at 12/3/2018  9:48 AM    Acceptance, E, VU,NR by IAN at 12/1/2018  9:50 AM   Family Acceptance, E,TB,D, VU,NR by EE at 12/11/2018  8:48 AM   Significant Other Eager, E,TB,D, VU,DU,NR by MAR at 12/9/2018 10:24 AM                   Point: Home exercise program (Done)     Learning Progress Summary           Patient Acceptance, E,TB,D, VU,NR by EE at 12/13/2018  9:11 AM    Acceptance, E,TB, VU,NR by EE at 12/12/2018  8:48 AM    Acceptance, E,TB,D, VU,NR by EE at 12/11/2018  8:48 AM    Acceptance, E,TB,D, VU,NR by EE at 12/10/2018  8:45 AM    Eager, E,TB,D, VU,DU,NR by MAR at 12/9/2018 10:24 AM    Eager, E,TB,D, VU,DU by MAR at 12/8/2018 12:00 PM    Acceptance, E,TB,D, VU,NR by EE at 12/7/2018  9:22 AM    Acceptance, E,TB, VU,NR by EE at 12/6/2018  9:15 AM    Acceptance, E,TB, VU,NR by EE at 12/5/2018 10:10 AM    Acceptance, E,TB, VU,NR by EE at 12/3/2018  9:48 AM   Family Acceptance, E,TB,D, VU,NR by EE at 12/11/2018  8:48 AM   Significant Other Eager, E,TB,D, VU,DU,NR by  at 12/9/2018 10:24 AM                   Point: Body mechanics (Done)     Learning Progress Summary           Patient Acceptance, E,TB,D, VU,NR by EE at 12/13/2018  9:11 AM    Acceptance, E,TB, VU,NR by EE at 12/12/2018  8:48 AM    Acceptance, E,TB,D, VU,NR by EE at 12/11/2018  8:48 AM    Acceptance, E,TB,D, VU,NR by EE at 12/10/2018  8:45 AM    Acceptance, E,TB,D, VU,NR by EE at 12/7/2018  9:22 AM     Acceptance, E,TB, VU,NR by EE at 12/6/2018  9:15 AM    Acceptance, E,TB, VU,NR by EE at 12/5/2018 10:10 AM    Acceptance, E,TB, VU,NR by EE at 12/3/2018  9:48 AM   Family Acceptance, E,TB,D, VU,NR by EE at 12/11/2018  8:48 AM                   Point: Precautions (Done)     Learning Progress Summary           Patient Acceptance, E,TB,D, VU,NR by EE at 12/13/2018  9:11 AM    Acceptance, E,TB, VU,NR by EE at 12/12/2018  8:48 AM    Acceptance, E,TB,D, VU,NR by EE at 12/11/2018  8:48 AM    Acceptance, E,TB,D, VU,NR by EE at 12/10/2018  8:45 AM    Acceptance, E,TB,D, VU,NR by EE at 12/7/2018  9:22 AM    Acceptance, E,TB, VU,NR by EE at 12/6/2018  9:15 AM    Acceptance, E,TB, VU,NR by EE at 12/5/2018 10:10 AM    Acceptance, E,TB, VU,NR by EE at 12/3/2018  9:48 AM    Acceptance, E, VU,NR by JK at 12/1/2018  9:50 AM   Family Acceptance, E,TB,D, VU,NR by EE at 12/11/2018  8:48 AM                               User Key     Initials Effective Dates Name Provider Type Discipline    LB 03/07/18 -  Odette Orourke, PTA Physical Therapy Assistant PT    EE 04/03/18 -  Cathleen Rosario, PT Physical Therapist PT    JK 04/03/18 -  Janae Trujillo, PT Physical Therapist PT     04/03/18 -  Angelic Bautista, PT Physical Therapist PT                  PT Recommendation and Plan                        Time Calculation:     PT Charges     Row Name 12/13/18 1452 12/13/18 1142          Time Calculation    Start Time  1300  -EE  0830  -EE     Stop Time  1330  -EE  0930  -EE     Time Calculation (min)  30 min  -EE  60 min  -EE     PT Received On  12/13/18  -EE  12/13/18  -EE     PT - Next Appointment  12/14/18  -EE  12/13/18  -EE       User Key  (r) = Recorded By, (t) = Taken By, (c) = Cosigned By    Initials Name Provider Type     Cathleen Rosario, PT Physical Therapist            Therapy Charges for Today     Code Description Service Date Service Provider Modifiers Qty    [id number]  PT THER PROC EA 15 MIN 12/12/2018 Cathleen Rosario, PT GP 6     [id number]  PT THER PROC EA 15 MIN 12/13/2018 Cathleen Rosario, PT GP 6    [id number]  PT THER SUPP EA 15 MIN 12/13/2018 Cathleen Rosario, PT GP 1                   Cathleen Rosario, PT  12/13/2018

## 2018-12-13 NOTE — SIGNIFICANT NOTE
12/13/18 0809   Provider Notification   Reason for Communication Other (Comment)  (clarification of Hydrochlorothiazide)   Provider Name Dr. Melissa   Notification Route Phone call   Response See orders   there was a change in patient's medication orders

## 2018-12-13 NOTE — THERAPY TREATMENT NOTE
Inpatient Rehabilitation - Occupational Therapy Treatment Note    [facility]     Patient Name: Duane Mark Witten  : 1955  MRN: [id number]    Today's Date: 2018                 Admit Date: 2018      Visit Dx:    ICD-10-CM ICD-9-CM   1. Cervical stenosis of spinal canal M48.02 723.0   2. Weakness of both lower extremities R29.898 729.89   3. Paresthesia of both upper extremities R20.2 782.0    M79.601 729.5    M79.602    4. Morbid obesity with BMI of 40.0-44.9, adult (CMS/HCC) E66.01 278.01    Z68.41 V85.41   5. Impaired functional mobility, balance, gait, and endurance Z74.09 V49.89   6. Functional gait abnormality R26.89 781.2       Patient Active Problem List   Diagnosis   • YANELI (generalized anxiety disorder)   • ADD (attention deficit disorder)   • Depression   • Diabetes type 2, controlled (CMS/[facility])   • ED (erectile dysfunction) of non-organic origin   • HLD (hyperlipidemia)   • Essential hypertension   • Arthritis   • Psoriasis   • Testosterone deficiency   • Primary insomnia   • Weakness of both lower extremities   • Paresthesia of both upper extremities   • Morbid obesity with BMI of 40.0-44.9, adult (CMS/[facility])   • Adverse effect of corticosteroids   • Type 2 diabetes mellitus with hyperglycemia (CMS/[facility])   • Cervical stenosis of spinal canal   • Edema of cervical spinal cord (CMS/[facility])   • Postoperative atrial fibrillation (CMS/[facility])   • Cervical myelopathy (CMS/[facility])         Therapy Treatment    IRF Treatment Summary     Row Name 18 1530 18 0830          Evaluation/Treatment Time and Intent    Subjective Information  no complaints  -AF  no complaints  -EE     Existing Precautions/Restrictions  fall;sternal  -AF  fall;spinal  -EE     Document Type  therapy note (daily note)  -AF  therapy note (daily note)  -EE     Mode of Treatment  occupational therapy  -AF  physical therapy  -EE     Patient/Family Observations  pt sitting up in w/c in AM and PM sessions, wife present  -AF  Pt  supine in bed in no acute distress.  -EE     Recorded by [AF] Lolis Cooley, OTR [EE] Cathleen Rosario, HAILEY     Row Name 12/13/18 1530 12/13/18 0830          Cognition/Psychosocial- PT/OT    Affect/Mental Status (Cognitive)  WFL  -AF  WFL  -EE     Orientation Status (Cognition)  oriented x 4  -AF  oriented x 4  -EE     Follows Commands (Cognition)  WFL  -AF  WFL  -EE     Personal Safety Interventions  fall prevention program maintained;gait belt;nonskid shoes/slippers when out of bed  -AF  fall prevention program maintained;gait belt;muscle strengthening facilitated;nonskid shoes/slippers when out of bed;supervised activity  -EE     Recorded by [AF] Lolis Cooley, OTR [EE] Cathleen Rosario, PT     Row Name 12/13/18 0830             Bed Mobility Assessment/Treatment    Rolling Left [place] (Bed Mobility)  supervision  -EE      Sidelying-Sit [place] (Bed Mobility)  contact guard;verbal cues  -EE      Assistive Device (Bed Mobility)  bed rails  -EE      Recorded by [EE] Cathleen Rosario, PT      Row Name 12/13/18 1530             Transfer Assessment/Treatment    Comment (Transfers)  sit to stand with grab bars MIN/CGA  -AF      Recorded by [AF] Lolis Cooley OTR      Row Name 12/13/18 0830             Bed-Chair Transfer    Bed-Chair [place] (Transfers)  minimum assist (75% patient effort);verbal cues  -EE      Assistive Device (Bed-Chair Transfers)  walker, front-wheeled  -EE      Recorded by [EE] Cathleen Rosario, PT      Row Name 12/13/18 0830             Sit-Stand Transfer    Sit-Stand [place] (Transfers)  minimum assist (75% patient effort);contact guard  -EE      Assistive Device (Sit-Stand Transfers)  walker, front-wheeled  -EE      Recorded by [EE] Cathleen Rosario PT      Row Name 12/13/18 0830             Stand-Sit Transfer    Stand-Sit [place] (Transfers)  contact guard  -EE      Assistive Device (Stand-Sit Transfers)  walker, front-wheeled  -EE      Recorded by [EE] Cathleen Rosario PT       Row Name 12/13/18 1530             Toilet Transfer    Type (Toilet Transfer)  sit-stand;stand-sit  -AF      [place] Level (Toilet Transfer)  contact guard;stand by assist;verbal cues  -AF      Assistive Device (Toilet Transfer)  commode;grab bars/safety frame;wheelchair  -AF      Recorded by [AF] Lolis Cooley OTR      Row Name 12/13/18 1530             Shower Transfer    Type (Shower Transfer)  stand pivot/stand step  -AF      [place] Level (Shower Transfer)  minimum assist (75% patient effort);verbal cues  -AF      Assistive Device (Shower Transfer)  grab bars/tub rail;tub bench;wheelchair  -AF      Recorded by [AF] Lolis Cooley OTR      Row Name 12/13/18 0830             Gait/Stairs Assessment/Training    [place] Level (Gait)  minimum assist (75% patient effort);contact guard;verbal cues  -EE      Assistive Device (Gait)  walker, front-wheeled  -EE      Distance in Feet (Gait)  160, 80 x 3  -EE      Pattern (Gait)  step-through  -EE      Deviations/Abnormal Patterns (Gait)  ataxic;base of support, narrow;sarah decreased;stride length decreased  -EE      Bilateral Gait Deviations  forward flexed posture;weight shift ability decreased;heel strike decreased  -EE      [place] Level (Stairs)  minimum assist (75% patient effort);2 person assist;verbal cues;nonverbal cues (demo/gesture)  -EE      Handrail Location (Stairs)  both sides  -EE      Number of Steps (Stairs)  4  -EE      Ascending Technique (Stairs)  step-to-step  -EE      Descending Technique (Stairs)  step-to-step  -EE      Stairs, Safety Issues  weight-shifting ability decreased;balance decreased during turns  -EE      Stairs, Impairments  strength decreased;impaired balance  -EE      Comment (Gait/Stairs)  R knee buckling on first attempt to descend stairs; verbal cues to get closer to edge prior to attempting descent. No further issues on remaining stairs. Ascending with R LE first, descending with L LE first.   -EE       Recorded by [EE] Cathleen Rosario, HAILEY      Row Name 12/13/18 0830             Safety Issues, Functional Mobility    Impairments Affecting Function (Mobility)  balance;coordination;endurance/activity tolerance;strength  -EE      Recorded by [EE] Cathleen Rosario, HAILEY      Row Name 12/13/18 1530             Bathing Assessment/Treatment    Bathing [place] Level  bathing skills;minimum assist (75% patient effort);verbal cues  -AF      Bathing Position  supported sitting;supported standing  -AF      Comment (Bathing)  assist with hair  -AF      Recorded by [AF] Lolis Cooley OTR      Row Name 12/13/18 1530             Upper Body Dressing Assessment/Treatment    Upper Body Dressing Task  upper body dressing skills;minimum assist (75% or more patient effort);verbal cues  -AF      Upper Body Dressing Position  supported sitting  -AF      Set-up Assistance (Upper Body Dressing)  obtain clothing  -AF      Recorded by [AF] Lolis Cooley OTR      Row Name 12/13/18 1530             Lower Body Dressing Assessment/Treatment    Lower Body Dressing [place] Level  doff;don;pants/bottoms;shoes/slippers;socks;underwear;minimum assist (75% patient effort);verbal cues  -AF      Lower Body Dressing Position  supported standing;unsupported sitting  -AF      Comment (Lower Body Dressing)  seated on TTB  -AF      Recorded by [AF] Lolis Cooley OTR      Row Name 12/13/18 1530             Grooming Assessment/Treatment    Grooming [place] Level  grooming skills;minimum assist (75% patient effort)  -AF      Grooming Position  sink side;supported sitting  -AF      Comment (Grooming)  MIN to comb hair   -AF      Recorded by [AF] Lolis Cooley OTR      Row Name 12/13/18 1530             Toileting Assessment/Treatment    Toileting [place] Level  toileting skills;standby assist;contact guard assist;verbal cues  -AF      Assistive Device Use (Toileting)  grab bar/safety frame;raised toilet seat  -AF      Recorded by [AF]  Lolis Cooley OTR      Row Name 12/13/18 1530             Fine Motor Testing & Training    Comment, Fine Motor Coordination  FMC tasks with simple hand writing tasks, manipulating foam beads on string, tying knots,using modified chopsticks to  small items with R hand. alternated hands to use spoon to scoop beans from bowl to cup with increased time.   -AF      Recorded by [AF] Lolis Cooley OTR      Row Name 12/13/18 1530 12/13/18 0830          Pain Scale: Numbers Pre/Post-Treatment    Pain Scale: Numbers, Pretreatment  4/10  -AF  6/10  -EE     Pain Scale: Numbers, Post-Treatment  4/10  -AF  4/10  -EE     Pain Location - Orientation  incisional  -AF  --     Pain Location  neck  -AF  neck  -EE     Pre/Post Treatment Pain Comment  tolerated  -AF  tolerated tx  -EE     Pain Intervention(s)  --  Repositioned;Rest;Medication (See MAR)  -EE     Recorded by [AF] Lolis Cooley, MAYOR [EE] Cathleen Rosario, PT     Row Name 12/13/18 0830             Dynamic Balance Activity    Therapeutic Training Performed (Dynamic Balance)  side stepping  -EE      Support Needed for Balance (Dynamic Balance Training)  uses both upper extremities for support;CGA  -EE      Comment (Dynamic Balance Training)  2 x 8' each direction; B UE support on hemibars  -EE      Recorded by [EE] Cathleen Rosario, PT      Row Name 12/13/18 1530             Upper Extremity Seated Therapeutic Exercise    Comment, Seated Upper Extremity (Therapeutic Exercise)  table slides on flat and angled surface with increased angle to increase AAROM in shoudlers decreased compensation noted with shoudler flexion without shoulder abduction  -AF      Recorded by [AF] Lolis Cooley OTR      Row Name 12/13/18 0830             Lower Extremity Standing Therapeutic Exercise    Performed, Standing Lower Extremity (Therapeutic Exercise)  heel raises;toe raises;hip/knee flexion/extension  -EE      Device, Standing Lower Extremity (Therapeutic Exercise)  jacob bars   -EE      Exercise Type, Standing Lower Extremity (Therapeutic Exercise)  AROM (active range of motion)  -EE      Sets/Reps Detail, Standing Lower Extremity (Therapeutic Exercise)  1/10  -EE      Recorded by [EE] Cathleen Rosario PT      Row Name 12/13/18 0830             Lower Extremity Seated Therapeutic Exercise    Performed, Seated Lower Extremity (Therapeutic Exercise)  hip flexion/extension;hip abduction/adduction;ankle dorsiflexion/plantarflexion;knee flexion/extension;LAQ (long arc quad), knee extension  -EE      Device, Seated Lower Extremity (Therapeutic Exercise)  elastic bands/tubing;free weights, cuff;small ball  -EE      Sets/Reps Detail, Seated Lower Extremity (Therapeutic Exercise)  1/20  -EE      Recorded by [EE] Cathleen Rosario PT      Row Name 12/13/18 1530 12/13/18 0830          Positioning and Restraints    Pre-Treatment Position  sitting in chair/recliner  -AF  in bed  -EE     Post Treatment Position  wheelchair  -AF  chair  -EE     In Chair  --  sitting;call light within reach;encouraged to call for assist;exit alarm on;with family/caregiver  -EE     In Wheelchair  sitting;call light within reach;encouraged to call for assist;exit alarm on;with family/caregiver wife present in AM and PM sessions  -AF  --     Recorded by [AF] Lolis Cooley, OTR [EE] Cathleen Rosario PT       User Key  (r) = Recorded By, (t) = Taken By, (c) = Cosigned By    Initials Name Effective Dates    EE Cathleen Rosario PT 04/03/18 -     AF Lolis Cooley, OTR 04/03/18 -           Wound 11/27/18 1318 Other (See comments) neck incision (Active)   Dressing Appearance open to air 12/13/2018  8:19 AM   Closure Adhesive closure strips 12/13/2018  8:19 AM   Base clean;dry 12/13/2018  8:19 AM   Periwound dry;intact 12/12/2018  7:06 PM   [place] Temperature warm 12/12/2018  7:06 PM   [place] Skin Turgor soft 12/12/2018  7:06 PM   Drainage Amount none 12/13/2018  8:19 AM   Dressing Care, Wound open to air 12/13/2018  8:19 AM          OT Recommendation and Plan                 OT IRF GOALS     Row Name 12/07/18 1216 12/01/18 1000          Transfer Goal 1 (OT-IRF)    Activity/Assistive Device (Transfer Goal 1, OT-IRF)  toilet;walk-in shower;shower chair;walker, rolling  -AF  toilet  -RP     [place] Level (Transfer Goal 1, OT-IRF)  minimum assist (75% or more patient effort);contact guard assist;verbal cues required  -AF  minimum assist (75% or more patient effort)  -RP     Time Frame (Transfer Goal 1, OT-IRF)  short term goal (STG)  -AF  short term goal (STG)  -RP     Progress/Outcomes (Transfer Goal 1, OT-IRF)  goal ongoing  -AF  goal ongoing  -RP        Transfer Goal 2 (OT-IRF)    Activity/Assistive Device (Transfer Goal 2, OT-IRF)  toilet;shower chair;walk-in shower;walker, rolling  -AF  toilet  -RP     [place] Level (Transfer Goal 2, OT-IRF)  supervision required  -AF  supervision required  -RP     Time Frame (Transfer Goal 2, OT-IRF)  long term goal (LTG)  -AF  long term goal (LTG)  -RP     Progress/Outcomes (Transfer Goal 2, OT-IRF)  goal ongoing  -AF  goal ongoing  -RP        Bathing Goal 1 (OT-IRF)    Activity/Device (Bathing Goal 1, OT-IRF)  bathing skills, all;grab bar/tub rail;hand-held shower spray hose;long-handled sponge;tub bench  -AF  bathing skills, all  -RP     [place] Level (Bathing Goal 1, OT-IRF)  minimum assist (75% or more patient effort);verbal cues required  -AF  moderate assist (50-74% patient effort)  -RP     Time Frame (Bathing Goal 1, OT-IRF)  short term goal (STG)  -AF  short term goal (STG)  -RP     Progress/Outcomes (Bathing Goal 1, OT-IRF)  goal ongoing  -AF  goal ongoing  -RP        Bathing Goal 2 (OT-IRF)    Activity/Device (Bathing Goal 2, OT-IRF)  bathing skills, all;grab bar/tub rail;hand-held shower spray hose;tub bench;long-handled sponge  -AF  bathing skills, all  -RP     [place] Level (Bathing Goal 2, OT-IRF)  supervision required  -AF  supervision required;standby assist  -RP      Time Frame (Bathing Goal 2, OT-IRF)  long term goal (LTG)  -AF  long term goal (LTG)  -RP     Progress/Outcomes (Bathing Goal 2, OT-IRF)  goal ongoing  -AF  goal ongoing  -RP        UB Dressing Goal 1 (OT-IRF)    Activity/Device (UB Dressing Goal 1, OT-IRF)  upper body dressing  -AF  upper body dressing  -RP     [place] (UB Dress Goal 1, OT-IRF)  minimum assist (75% or more patient effort);verbal cues required  -AF  minimum assist (75% or more patient effort)  -RP     Time Frame (UB Dressing Goal 1, OT-IRF)  short term goal (STG)  -AF  short term goal (STG)  -RP     Progress/Outcomes (UB Dressing Goal 1, OT-IRF)  goal ongoing  -AF  goal ongoing  -RP        UB Dressing Goal 2 (OT-IRF)    Activity/Device (UB Dressing Goal 2, OT-IRF)  upper body dressing  -AF  upper body dressing  -RP     [place] (UB Dress Goal 2, OT-IRF)  supervision required  -AF  supervision required  -RP     Time Frame (UB Dressing Goal 2, OT-IRF)  long term goal (LTG)  -AF  long term goal (LTG)  -RP     Progress/Outcomes (UB Dressing Goal 2, OT-IRF)  goal ongoing  -AF  goal ongoing  -RP        LB Dressing Goal 1 (OT-IRF)    Activity/Device (LB Dressing Goal 1, OT-IRF)  lower body dressing;long handled shoe horn;reacher;elastic laces  -AF  lower body dressing  -RP     [place] (LB Dressing Goal 1, OT-IRF)  moderate assist (50-74% patient effort);verbal cues required;minimum assist (75% or more patient effort)  -AF  maximum assist (25-49% patient effort)  -RP     Time Frame (LB Dressing Goal 1, OT-IRF)  short term goal (STG)  -AF  short term goal (STG)  -RP     Progress/Outcomes (LB Dressing Goal 1, OT-IRF)  goal ongoing  -AF  goal ongoing  -RP        LB Dressing Goal 2 (OT-IRF)    Activity/Device (LB Dressing Goal 2, OT-IRF)  lower body dressing;long handled shoe horn;elastic laces;reacher  -AF  lower body dressing  -RP     [place] (LB Dressing Goal 2, OT-IRF)  standby assist;verbal cues required  -AF  supervision  required;standby assist  -RP     Time Frame (LB Dressing Goal 2, OT-IRF)  long term goal (LTG)  -AF  long term goal (LTG)  -RP     Progress/Outcomes (LB Dressing Goal 2, OT-IRF)  goal ongoing  -AF  goal ongoing  -RP        Grooming Goal 1 (OT-IRF)    Activity/Device (Grooming Goal 1, OT-IRF)  grooming skills, all  -AF  grooming skills, all  -RP     [place] (Grooming Goal 1, OT-IRF)  minimum assist (75% or more patient effort);verbal cues required  -AF  minimum assist (75% or more patient effort)  -RP     Time Frame (Grooming Goal 1, OT-IRF)  short term goal (STG)  -AF  short term goal (STG)  -RP     Progress/Outcomes (Grooming Goal 1, OT-IRF)  goal ongoing  -AF  goal ongoing  -RP        Grooming Goal 2 (OT-IRF)    Activity/Device (Grooming Goal 2, OT-IRF)  grooming skills, all  -AF  grooming skills, all  -RP     [place] (Grooming Goal 2, OT-IRF)  supervision required  -AF  supervision required  -RP     Time Frame (Grooming Goal 2, OT-IRF)  long term goal (LTG)  -AF  long term goal (LTG)  -RP     Progress/Outcomes (Grooming Goal 2, OT-IRF)  goal ongoing  -AF  goal ongoing  -RP        Toileting Goal 1 (OT-IRF)    Activity/Device (Toileting Goal 1, OT-IRF)  toileting skills, all;grab bar/safety frame;raised toilet seat  -AF  toileting skills, all  -RP     [place] Level (Toileting Goal 1, OT-IRF)  moderate assist (50-74% patient effort);verbal cues required  -AF  maximum assist (25-49% patient effort)  -RP     Time Frame (Toileting Goal 1, OT-IRF)  short term goal (STG)  -AF  short term goal (STG)  -RP     Progress/Outcomes (Toileting Goal 1, OT-IRF)  goal ongoing  -AF  goal ongoing  -RP        Toileting Goal 2 (OT-IRF)    Activity/Device (Toileting Goal 2, OT-IRF)  toileting skills, all;grab bar/safety frame;raised toilet seat  -AF  toileting skills, all  -RP     [place] Level (Toileting Goal 2, OT-IRF)  supervision required;verbal cues required  -AF  supervision required;standby assist  -RP      Time Frame (Toileting Goal 2, OT-IRF)  long term goal (LTG)  -AF  long term goal (LTG)  -RP     Progress/Outcomes (Toileting Goal 2, OT-IRF)  goal ongoing  -AF  goal ongoing  -RP        ROM Goal 1 (OT-IRF)    ROM Goal 1 (OT-IRF)  pt will increase B UE ROM to approx. 3/4 shoulder flexion to assist with ADL tasks   -AF  Pt will increase B UE ROM to approx. 3/4 shoulder flexion to assist w/ ADLs.  -RP     Time Frame (ROM Goal 1, OT-IRF)  long term goal (LTG)  -AF  long term goal (LTG)  -RP     Progress/Outcomes (ROM Goal 1, OT-IRF)  goal ongoing  -AF  goal ongoing  -RP       User Key  (r) = Recorded By, (t) = Taken By, (c) = Cosigned By    Initials Name Provider Type    Lolis Horne, OTR Occupational Therapist    Precious Hamilton OT Occupational Therapist          Occupational Therapy Education     Title: PT OT SLP Therapies (In Progress)     Topic: Occupational Therapy (In Progress)     Point: ADL training (Done)     Description: Instruct learner(s) on proper safety adaptation and remediation techniques during self care or transfers.   Instruct in proper use of assistive devices.    Learning Progress Summary           Patient Acceptance, TAYE VU,DU by AF at 12/11/2018 12:20 PM    Comment:  wife signed off to assist with walking to and from bathroom and with toileting tasks, demo'd and discussed Tub transfer bench in tub/shower combo with pt. pt stated that he will order one  himself    Acceptance, E,D, VU,NR by AF at 12/4/2018 12:07 PM    Comment:  AE during LBD tasks    Acceptance, E,D, VU,NR by AF at 12/3/2018 11:46 AM    Comment:  to maintain cervial precautions during ADL tasks    Acceptance, TAYE VU by RP at 12/1/2018 11:48 AM    Comment:  OT educ on OT role in therapeutic process and pt's POC.   Family Acceptance, E, VU,DU by AF at 12/11/2018 12:20 PM    Comment:  wife signed off to assist with walking to and from bathroom and with toileting tasks, demo'd and discussed Tub transfer bench in tub/shower  combo with pt. pt stated that he will order one  himself    Acceptance, E,D, VU,NR by AF at 12/3/2018 11:46 AM    Comment:  to maintain cervial precautions during ADL tasks                   Point: Home exercise program (Done)     Description: Instruct learner(s) on appropriate technique for monitoring, assisting and/or progressing therapeutic exercises/activities.    Learning Progress Summary           Patient Acceptance, E, VU by AF at 12/7/2018  3:08 PM    Comment:  theraputty and foam blocks to increase sensation and FMC/strength                   Point: Precautions (Done)     Description: Instruct learner(s) on prescribed precautions during self-care and functional transfers.    Learning Progress Summary           Patient Acceptance, E,D, VU,NR by AF at 12/3/2018 11:46 AM    Comment:  to maintain cervial precautions during ADL tasks    Acceptance, E, VU by RP at 12/1/2018 11:48 AM    Comment:  OT educ on OT role in therapeutic process and pt's POC.   Family Acceptance, E,D, VU,NR by AF at 12/3/2018 11:46 AM    Comment:  to maintain cervial precautions during ADL tasks                               User Key     Initials Effective Dates Name Provider Type Discipline    AF 04/03/18 -  Lolis Cooley OTR Occupational Therapist OT    RP 05/03/18 -  Precious De Leon, MAYO Occupational Therapist OT                       Time Calculation:     Time Calculation- OT     Row Name 12/13/18 1536 12/13/18 1533          Time Calculation- OT    OT Start Time  1430  -AF  0930  -AF     OT Stop Time  1500  -AF  1030  -AF     OT Time Calculation (min)  30 min  -AF  60 min  -AF       User Key  (r) = Recorded By, (t) = Taken By, (c) = Cosigned By    Initials Name Provider Type    AF Lolis Cooley OTR Occupational Therapist          Therapy Suggested Charges     Code   Minutes Charges    None              Therapy Charges for Today     Code Description Service Date Service Provider Modifiers Qty    33078653764 HC OT SELF  CARE/MGMT/TRAIN EA 15 MIN 12/12/2018 Lolis Cooley, OTR GO 2    71162366274 HC OT NEUROMUSC RE EDUCATION EA 15 MIN 12/12/2018 Lolis Cooley, OTR GO 2    13397452119 HC OT THER PROC EA 15 MIN 12/12/2018 Lolis Cooley, OTR GO 2    23855052786 HC OT SELF CARE/MGMT/TRAIN EA 15 MIN 12/13/2018 Lolis Cooley, OTR GO 3    91035321712 HC OT NEUROMUSC RE EDUCATION EA 15 MIN 12/13/2018 Lolis Cooley, OTR GO 2    76551135404 HC OT THER PROC EA 15 MIN 12/13/2018 Lolis Cooley, OTR GO 1                   Lolis Cooley OTJEANINE  12/13/2018

## 2018-12-13 NOTE — PLAN OF CARE
Problem: Patient Care Overview  Goal: Plan of Care Review  Outcome: Ongoing (interventions implemented as appropriate)   12/13/18 8296   Patient Care Overview   IRF Plan of Care Review progress ongoing, continue   Progress, Functional Goals demonstrating adequate progress   Coping/Psychosocial   Plan of Care Reviewed With patient;spouse   OTHER   Outcome Summary Mr. Ramirez has done well. VS stable, pain well controlled with oral pain medicine. Pain medicine was decreased today and he seems to be tolerating well. He's delayed his pain medicine this afternoon to get back on track with taking it with his Flexeril. Incision clean and dry, no signs or symptoms of infection. He has partcipated in all therapies. Swelling to bilateral lower extremities still present; dieuretic increased to 25mg yesterday. No further concerns at this time.      Goal: Coping Plan  Outcome: Ongoing (interventions implemented as appropriate)      Problem: Fall Risk (Adult)  Goal: Absence of Fall  Outcome: Ongoing (interventions implemented as appropriate)      Problem: Pain, Acute (Adult)  Goal: Acceptable Pain Control/Comfort Level  Outcome: Ongoing (interventions implemented as appropriate)      Problem: Skin Injury Risk (Adult)  Goal: Skin Health and Integrity  Outcome: Ongoing (interventions implemented as appropriate)

## 2018-12-13 NOTE — PROGRESS NOTES
Inpatient Rehabilitation Functional Measures Assessment    Functional Measures  ROCCO Eating:  [facility] Grooming: [facility] Bathing:  [facility] Upper Body Dressing:  [facility] Lower Body Dressing:  [facility] Toileting:  [facility] Bladder Management  Level of Assistance:  [place]  Frequency/Number of Accidents this Shift:  [facility] Bowel Management  Level of Assistance: [place]  Frequency/Number of Accidents this Shift: [facility] Bed/Chair/Wheelchair Transfer:  [facility] Toilet Transfer:  [facility] Tub/Shower Transfer:  [place]    Previously Documented Mode of Locomotion at Discharge: Field  ROCCO Expected Mode of Locomotion at Discharge: [facility] Walk/Wheelchair:  [facility] Stairs:  [facility] Comprehension:  Auditory comprehension is the usual mode. Comprehension  Score = 6, Modified [place].  Patient comprehends complex/abstract  information in their primary language with only mild difficulty.  [facility]CCO Expression:  Vocal expression is the usual mode. Expression Score = 7,  Independent.  Patient expresses complex/abstract information in their primary  language.  Patient is completely independent for vocal expression.  There are no  activity limitations.  [facility]CCO Social Interaction:  Social Interaction Score = 7, Independent. Patient is  completely independent for social interaction.  There are no activity  limitations.  [facility] Problem Solving:  Problem Solving Score = 6, Modified [place].  Patient  makes appropriate decisions in order to solve complex problems with mild  difficulty but self-corrects.  [facility]O Memory:  Memory Score = 6, Modified [place].  Patient is modified  independent for memory, having only mild difficulty and using self-initiated or  environmental cues to remember.    Therapy Mode Minutes  Occupational Therapy: Branch  Physical Therapy: [place]  Speech Language Pathology:  [place]    Signed by: Stephanie Espana RN

## 2018-12-13 NOTE — PROGRESS NOTES
Inpatient Rehabilitation Plan of Care Note    Plan of Care  Care Plan Reviewed - No updates at this time.    Psychosocial    Performed Intervention(s)  Verbalizes needs & concerns      Safety    Performed Intervention(s)  Safety rounds/ Fall precautions  Items in reach, bed alarm & chair alarms      Sphincter Control    Performed Intervention(s)  Monitor intake, output, & bowel movements  Encourage appropriate diet & fluids      Body Systems    Performed Intervention(s)  Blood glucose AC & HS & insulin as ordered      Pain    Performed Intervention(s)  Medication prn & evaluate effectiveness  Monitor neck incision q shift    Signed by: Stephanie Espana RN

## 2018-12-13 NOTE — PROGRESS NOTES
Inpatient Rehabilitation Functional Measures Assessment    Functional Measures  ROCCO Eating:  [facility] Grooming: [facility] Bathing:  [facility] Upper Body Dressing:  [facility] Lower Body Dressing:  [facility] Toileting:  [facility] Bladder Management  Level of Assistance:  [place]  Frequency/Number of Accidents this Shift:  [facility] Bowel Management  Level of Assistance: [place]  Frequency/Number of Accidents this Shift: [facility] Bed/Chair/Wheelchair Transfer:  [facility] Toilet Transfer:  [facility] Tub/Shower Transfer:  [place]    Previously Documented Mode of Locomotion at Discharge: Field  [facility]CCO Expected Mode of Locomotion at Discharge: [facility] Walk/Wheelchair:  [facility] Stairs:  [facility] Comprehension:  Auditory comprehension is the usual mode. Comprehension  Score = 7, Independent.  Patient comprehends complex/abstract information in  their primary language.  Patient is completely independent for auditory  comprehension.  There are no activity limitations.  ROCCO Expression:  Vocal expression is the usual mode. Expression Score = 7,  Independent.  Patient expresses complex/abstract information in their primary  language.  Patient is completely independent for vocal expression.  There are no  activity limitations.  [facility]CCO Social Interaction:  Social Interaction Score = 7, Independent. Patient is  completely independent for social interaction.  There are no activity  limitations.  [facility]CCO Problem Solving:  Problem Solving Score = 6, Modified [place].  Patient  makes appropriate decisions in order to solve complex problems, but requires  extra time.  [facility]CCO Memory:  Memory Score = 7, Independent.  Patient is completely independent  for memory.  There are no activity limitations.    Therapy Mode Minutes  Occupational Therapy: Branch  Physical Therapy: [place]  Speech Language Pathology:  [place]    Signed by: Marissa Torres RN

## 2018-12-13 NOTE — PROGRESS NOTES
Inpatient Rehabilitation Functional Measures Assessment    Functional Measures  ROCCO Eating:  [facility] Grooming: [place]  [facility] Bathing:  [place]  [facility] Upper Body Dressing:  [facility] Lower Body Dressing:  [facility] Toileting:  [facility] Bladder Management  Level of Assistance:  [place]  Frequency/Number of Accidents this Shift:  [facility] Bowel Management  Level of Assistance: [place]  Frequency/Number of Accidents this Shift: [facility] Bed/Chair/Wheelchair Transfer:  Bed/chair/wheelchair Transfer Score = 4.  Patient performs 75% or more of effort and minimal assistance (little/incidental  help/lifting of one limb/steadying) for transferring to and from the  bed/chair/wheelchair, requiring: Patient requires the following assistive  device(s): Walker.  [facility]CCO Toilet Transfer:  [facility] Tub/Shower Transfer:  [place]    Previously Documented Mode of Locomotion at Discharge: [place]  [facility]CCO Expected Mode of Locomotion at Discharge: [facility] Walk/Wheelchair:  WHEELCHAIR OBSERVATION   Wheelchair did not occur.    WALK OBSERVATION   Walk Distance Scale = 3.  Distance walked is greater than 150 feet. Walk Score  = 4.  Patient performs 75% or more of effort and requires minimal assistance.  Incidental help/contact guard/steadying was provided. Patient walked a distance  of  160 feet. Patient requires the following assistive device(s): Rolling  walker.  [facility]CCO Stairs:  Stairs Score = 1.  Patient performs 75% or more of effort and  requires minimal assistance of two or more people for negotiating stairs. Safety  . Patient negotiated  4 stairs. Patient requires the following assistive  device(s): Handrail(s).    ROCCO Comprehension:  [place]  ROCCO Expression:  [facility] Social Interaction:  [facility] Problem Solving:  [facility] Memory:  [place]    Therapy Mode Minutes  Occupational Therapy: [place]  Physical Therapy: Individual: 90 minutes.  Speech Language Pathology:  [place]    Signed by: Cathleen Rosario DPT

## 2018-12-14 LAB
ANION GAP SERPL CALCULATED.3IONS-SCNC: 13.5 MMOL/L
BUN BLD-MCNC: 14 MG/DL (ref 8–23)
BUN/CREAT SERPL: 17.3 (ref 7–25)
CALCIUM SPEC-SCNC: 9.2 MG/DL (ref 8.6–10.5)
CHLORIDE SERPL-SCNC: 97 MMOL/L (ref 98–107)
CO2 SERPL-SCNC: 27.5 MMOL/L (ref 22–29)
CREAT BLD-MCNC: 0.81 MG/DL (ref 0.76–1.27)
GFR SERPL CREATININE-BSD FRML MDRD: 96 ML/MIN/1.73
GLUCOSE BLD-MCNC: 124 MG/DL (ref 65–99)
GLUCOSE BLDC GLUCOMTR-MCNC: 125 MG/DL (ref 70–130)
GLUCOSE BLDC GLUCOMTR-MCNC: 138 MG/DL (ref 70–130)
GLUCOSE BLDC GLUCOMTR-MCNC: 147 MG/DL (ref 70–130)
GLUCOSE BLDC GLUCOMTR-MCNC: 221 MG/DL (ref 70–130)
POTASSIUM BLD-SCNC: 4 MMOL/L (ref 3.5–5.2)
SODIUM BLD-SCNC: 138 MMOL/L (ref 136–145)

## 2018-12-14 PROCEDURE — 97112 NEUROMUSCULAR REEDUCATION: CPT

## 2018-12-14 PROCEDURE — 63710000001 INSULIN LISPRO (HUMAN) PER 5 UNITS: Performed by: HOSPITALIST

## 2018-12-14 PROCEDURE — 97110 THERAPEUTIC EXERCISES: CPT

## 2018-12-14 PROCEDURE — 82962 GLUCOSE BLOOD TEST: CPT

## 2018-12-14 PROCEDURE — 80048 BASIC METABOLIC PNL TOTAL CA: CPT | Performed by: PHYSICAL MEDICINE & REHABILITATION

## 2018-12-14 PROCEDURE — 97112 NEUROMUSCULAR REEDUCATION: CPT | Performed by: OCCUPATIONAL THERAPIST

## 2018-12-14 PROCEDURE — 97535 SELF CARE MNGMENT TRAINING: CPT

## 2018-12-14 RX ADMIN — GLIPIZIDE 10 MG: 10 TABLET ORAL at 07:57

## 2018-12-14 RX ADMIN — METOPROLOL TARTRATE 25 MG: 25 TABLET ORAL at 20:04

## 2018-12-14 RX ADMIN — LOSARTAN POTASSIUM 100 MG: 100 TABLET, FILM COATED ORAL at 07:56

## 2018-12-14 RX ADMIN — PIOGLITAZONE 30 MG: 30 TABLET ORAL at 07:56

## 2018-12-14 RX ADMIN — CYCLOBENZAPRINE 10 MG: 10 TABLET, FILM COATED ORAL at 11:58

## 2018-12-14 RX ADMIN — METFORMIN HYDROCHLORIDE 1000 MG: 1000 TABLET ORAL at 17:43

## 2018-12-14 RX ADMIN — LIDOCAINE 2 PATCH: 50 PATCH CUTANEOUS at 09:13

## 2018-12-14 RX ADMIN — HYDROCODONE BITARTRATE AND ACETAMINOPHEN 2 TABLET: 5; 325 TABLET ORAL at 16:04

## 2018-12-14 RX ADMIN — CYCLOBENZAPRINE 10 MG: 10 TABLET, FILM COATED ORAL at 00:38

## 2018-12-14 RX ADMIN — HYDROCODONE BITARTRATE AND ACETAMINOPHEN 2 TABLET: 5; 325 TABLET ORAL at 11:58

## 2018-12-14 RX ADMIN — HYDROCHLOROTHIAZIDE 25 MG: 25 TABLET ORAL at 07:56

## 2018-12-14 RX ADMIN — FAMOTIDINE 20 MG: 20 TABLET, FILM COATED ORAL at 07:56

## 2018-12-14 RX ADMIN — SENNOSIDES AND DOCUSATE SODIUM 2 TABLET: 8.6; 5 TABLET ORAL at 07:55

## 2018-12-14 RX ADMIN — RIVAROXABAN 20 MG: 20 TABLET, FILM COATED ORAL at 17:43

## 2018-12-14 RX ADMIN — METFORMIN HYDROCHLORIDE 1000 MG: 1000 TABLET ORAL at 07:57

## 2018-12-14 RX ADMIN — CYCLOBENZAPRINE 10 MG: 10 TABLET, FILM COATED ORAL at 20:03

## 2018-12-14 RX ADMIN — METOPROLOL TARTRATE 25 MG: 25 TABLET ORAL at 07:56

## 2018-12-14 RX ADMIN — HYDROCODONE BITARTRATE AND ACETAMINOPHEN 2 TABLET: 5; 325 TABLET ORAL at 00:38

## 2018-12-14 RX ADMIN — HYDROCODONE BITARTRATE AND ACETAMINOPHEN 2 TABLET: 5; 325 TABLET ORAL at 20:03

## 2018-12-14 RX ADMIN — DULOXETINE HYDROCHLORIDE 60 MG: 60 CAPSULE, DELAYED RELEASE ORAL at 07:57

## 2018-12-14 RX ADMIN — ATOMOXETINE 100 MG: 60 CAPSULE ORAL at 07:55

## 2018-12-14 RX ADMIN — INSULIN LISPRO 4 UNITS: 100 INJECTION, SOLUTION INTRAVENOUS; SUBCUTANEOUS at 20:10

## 2018-12-14 RX ADMIN — HYDROCODONE BITARTRATE AND ACETAMINOPHEN 2 TABLET: 5; 325 TABLET ORAL at 06:20

## 2018-12-14 NOTE — PROGRESS NOTES
Inpatient Rehabilitation Functional Measures Assessment and Plan of Care    Plan of Care  Updated Problems/Interventions  Field    Functional Measures  ROCCO Eating:  [facility] Grooming: [facility] Bathing:  [facility] Upper Body Dressing:  [facility] Lower Body Dressing:  [facility] Toileting:  [facility] Bladder Management  Level of Assistance:  [place]  Frequency/Number of Accidents this Shift:  [facility] Bowel Management  Level of Assistance: [place]  Frequency/Number of Accidents this Shift: [facility] Bed/Chair/Wheelchair Transfer:  Activity was not observed.  [facility]CCO Toilet Transfer:  [facility] Tub/Shower Transfer:  [place]    Previously Documented Mode of Locomotion at Discharge: [place]  [facility]CCO Expected Mode of Locomotion at Discharge: [facility] Walk/Wheelchair:  WHEELCHAIR OBSERVATION   Activity was not observed.    WALK OBSERVATION   Walk Distance Scale = 2.  Distance walked is 50 -149 feet. Walk Score = 2.  Patient performs 75% or more of effort and requires minimal assistance.  Incidental assistance, contact guard or steadying was provided. Patient walked a  distance of 80 feet. Patient requires the following assistive device(s): Rolling  walker.  [facility]CCO Stairs:  Stairs did not occur.    ROCCO Comprehension:  [facility] Expression:  [facility] Social Interaction:  [facility] Problem Solving:  [facility] Memory:  [place]    Therapy Mode Minutes  Occupational Therapy: [place]  Physical Therapy: Individual: 90 minutes.  Speech Language Pathology:  [place]    Signed by: Odette Orourke PTA

## 2018-12-14 NOTE — THERAPY TREATMENT NOTE
Inpatient Rehabilitation - Physical Therapy Treatment Note  [facility]     Patient Name: Duane Mark Witten  : 1955  MRN: [id number]    Today's Date: 2018                 Admit Date: 2018      Visit Dx:      ICD-10-CM ICD-9-CM   1. Cervical stenosis of spinal canal M48.02 723.0   2. Weakness of both lower extremities R29.898 729.89   3. Paresthesia of both upper extremities R20.2 782.0    M79.601 729.5    M79.602    4. Morbid obesity with BMI of 40.0-44.9, adult (CMS/Formerly [facility]) E66.01 278.01    Z68.41 V85.41   5. Impaired functional mobility, balance, gait, and endurance Z74.09 V49.89   6. Functional gait abnormality R26.89 781.2       Patient Active Problem List   Diagnosis   • YANELI (generalized anxiety disorder)   • ADD (attention deficit disorder)   • Depression   • Diabetes type 2, controlled (CMS/[facility])   • ED (erectile dysfunction) of non-organic origin   • HLD (hyperlipidemia)   • Essential hypertension   • Arthritis   • Psoriasis   • Testosterone deficiency   • Primary insomnia   • Weakness of both lower extremities   • Paresthesia of both upper extremities   • Morbid obesity with BMI of 40.0-44.9, adult (CMS/[facility])   • Adverse effect of corticosteroids   • Type 2 diabetes mellitus with hyperglycemia (CMS/[facility])   • Cervical stenosis of spinal canal   • Edema of cervical spinal cord (CMS/[facility])   • Postoperative atrial fibrillation (CMS/[facility])   • Cervical myelopathy (CMS/[facility])       Therapy Treatment    IRF Treatment Summary     Row Name 18 1113 18 1109 18 0918       Evaluation/Treatment Time and Intent    Subjective Information  complains of;pain  -KD  complains of;fatigue;pain  -AF  complains of;pain  -LB    Existing Precautions/Restrictions  fall;spinal  -KD  fall;spinal  -AF  fall;spinal  -LB    Document Type  therapy note (daily note)  -KD  therapy note (daily note)  -AF  therapy note (daily note)  -LB    Mode of Treatment  individual therapy;physical therapy  -KD  occupational  therapy  -AF  physical therapy  -LB    Patient/Family Observations  --  seated in w/c in therapy gym, wife assisted this AM with washing up stated that he felt itchy this morning   -AF  seated in w/c. wife present  -LB    Recorded by [KD] Daphne Quinteros, PT [AF] Lolis Cooley OTR [LB] Odette Orourke SABINA, PTA    Row Name 12/14/18 1113 12/14/18 1109          Cognition/Psychosocial- PT/OT    Affect/Mental Status (Cognitive)  WFL  -KD  WFL  -AF     Orientation Status (Cognition)  oriented x 4  -KD  oriented x 4  -AF     Follows Commands (Cognition)  WFL  -KD  WFL  -AF     Personal Safety Interventions  fall prevention program maintained;gait belt;muscle strengthening facilitated;nonskid shoes/slippers when out of bed;supervised activity  -KD  fall prevention program maintained;gait belt;nonskid shoes/slippers when out of bed  -AF     Recorded by [KD] Daphne Quinteros, PT [AF] Lolis Cooley, MAYOR     Row Name 12/14/18 1109             Bed Mobility Assessment/Treatment    Supine-Sit [place] (Bed Mobility)  minimum assist (75% patient effort);verbal cues  -AF      Sit-Supine [place] (Bed Mobility)  contact guard;verbal cues  -AF      Comment (Bed Mobility)  mat mobility  -AF      Recorded by [AF] Lolis Cooley OTR      Row Name 12/14/18 1109             Functional Mobility    Functional Mobility- Comment  walked to and from bathroom with RWX with CGA  -AF      Recorded by [AF] Lolis Cooley OTR      Row Name 12/14/18 1109             Transfer Assessment/Treatment    Comment (Transfers)  EOM <> w/c stand pivot wtih RWX CGA/MIN  -AF      Recorded by [AF] Lolis Cooley OTR      Row Name 12/14/18 1113             Sit-Stand Transfer    Sit-Stand [place] (Transfers)  minimum assist (75% patient effort);contact guard  -KD      Assistive Device (Sit-Stand Transfers)  walker, front-wheeled  -KD      Recorded by [KD] Daphne Quinteros PT      Row Name 12/14/18 1113             Stand-Sit  Transfer    Stand-Sit [place] (Transfers)  contact guard  -KD      Assistive Device (Stand-Sit Transfers)  walker, front-wheeled  -KD      Recorded by [KD] Daphne Quinteros PT      Row Name 12/14/18 1109             Toilet Transfer    Type (Toilet Transfer)  stand-sit;sit-stand  -AF      [place] Level (Toilet Transfer)  contact guard;verbal cues  -AF      Assistive Device (Toilet Transfer)  commode;grab bars/safety frame;walker, front-wheeled  -AF      Recorded by [AF] Lolis Cooley, OTR      Row Name 12/14/18 1113 12/14/18 0918          Gait/Stairs Assessment/Training    [place] Level (Gait)  minimum assist (75% patient effort);contact guard;verbal cues  -KD  --     Assistive Device (Gait)  walker, front-wheeled  -KD  --     Distance in Feet (Gait)  80 X 3  -KD  80 as far as 160 ft  -LB     Pattern (Gait)  step-through  -KD  --     Deviations/Abnormal Patterns (Gait)  ataxic;base of support, narrow;sarah decreased;stride length decreased  -KD  --     Bilateral Gait Deviations  forward flexed posture;weight shift ability decreased;heel strike decreased  -KD  --     Left Sided Gait Deviations  knee buckling, left side;knee buckling, right side knees felt weaker this session  -KD  --     Comment (Gait/Stairs)  Per patient, knees felt stronger by 3rd walk.  -KD  --     Recorded by [KD] Daphne Quinteros, PT [LB] Odette Orourke, PTA     Row Name 12/14/18 1109             Bathing Assessment/Treatment    Comment (Bathing)  deferred completed with wife this AM  -AF      Recorded by [AF] Lolis Cooley, OTR      Row Name 12/14/18 1109             Toileting Assessment/Treatment    Toileting [place] Level  toileting skills;contact guard assist;verbal cues  -AF      Assistive Device Use (Toileting)  grab bar/safety frame;raised toilet seat  -AF      Comment (Toileting)  with increased time pt able to complete clothing management with steadying assist   -AF      Recorded by [AF] Lolis Cooley  NURIS Burns      Row Name 12/14/18 1109             Hand  Strength Testing    Right Hand, Setting 1 (Dynamometer Testing)  45`  -AF      Left Hand, Setting 1 (Dynamometer Testing)  40  -AF      Recorded by [AF] Lolis Cooley OTR      Row Name 12/14/18 1109             Fine Motor Testing & Training    Results, 9 Hole Peg Test of Fine Motor Coordination-Right  96 seconds  -AF      Results, 9 Hole Peg Test of Fine Motor Coordination-Left  68 seconds  -AF      Comment, Fine Motor Coordination  FMC with manipualting coins into slotted container with moderate difficulty, stacking pennies 5-10 tall  -AF      Recorded by [AF] Lolis Cooley OTR      Row Name 12/14/18 1113 12/14/18 1109 12/14/18 0918       Pain Scale: Numbers Pre/Post-Treatment    Pain Scale: Numbers, Pretreatment  5/10  -KD  4/10  -AF  -- varies from a 5 to an 8  -LB    Pain Scale: Numbers, Post-Treatment  --  4/10  -AF  --    Pain Location - Side  Left  -KD  --  Left  -LB    Pain Location  shoulder  -KD  --  shoulder  -LB    Pre/Post Treatment Pain Comment  tolerated treatment session  -KD  referred pain in shoulders, pain patches applies  -AF  --    Pain Intervention(s)  Medication (See MAR)  -KD  --  Medication (See MAR);Ambulation/increased activity  -LB    Recorded by [KD] Daphne Quinteros, PT [AF] Lolis Cooley, OTR [LB] Odette Orourke PTA    Row Name 12/14/18 0918             Lower Extremity Seated Therapeutic Exercise    Performed, Seated Lower Extremity (Therapeutic Exercise)  hip flexion/extension;knee flexion/extension  -LB      Device, Seated Lower Extremity (Therapeutic Exercise)  elastic bands/tubing;free weights, cuff red T-band, 1 1/2# cuff wgts  -LB      Exercise Type, Seated Lower Extremity (Therapeutic Exercise)  resistive exercise  -LB      Sets/Reps Detail, Seated Lower Extremity (Therapeutic Exercise)  1/20  -LB      Recorded by [LB] Odette Orourke PTA      Row Name 12/14/18 1109             Neuromuscular  Re-education    Comment (Neuromuscular Re-education)  table slides on angled surface 10 reps x 3 sets with rest breaks, in supine #1 hand shae wth elbow flex/ext, sup/pro, wrist flex/ext, with non weighted dowel han shoulder pro/rectraction and shoulder flexion 10 reps x 3 sets with rest breaks   -AF      Recorded by [AF] Lolis Cooley, NURIS      Row Name 12/14/18 1113 12/14/18 1109          Positioning and Restraints    Pre-Treatment Position  -- in wheelchair  -KD  sitting in chair/recliner  -AF     Post Treatment Position  --  wheelchair  -AF     In Wheelchair  call light within reach;with nsg  -KD  sitting;call light within reach;encouraged to call for assist;exit alarm on  -AF     Recorded by [KD] Daphne Quinteros PT [AF] Lolis Cooley OTJEANINE       User Key  (r) = Recorded By, (t) = Taken By, (c) = Cosigned By    Initials Name Effective Dates    Daphne Khan, PT 04/03/18 -     LB Odette Orourke, PTA 03/07/18 -     Lolis Horne OTR 04/03/18 -         Wound 11/27/18 1318 Other (See comments) neck incision (Active)   Dressing Appearance open to air 12/14/2018  7:35 AM   Closure Adhesive closure strips 12/14/2018  7:35 AM   Base clean;dry 12/14/2018  7:35 AM   Periwound dry;intact 12/13/2018  7:35 PM   Drainage Amount none 12/14/2018  7:35 AM   Dressing Care, Wound open to air 12/14/2018  7:35 AM     Physical Therapy Education     Title: PT OT SLP Therapies (In Progress)     Topic: Physical Therapy (Done)     Point: Mobility training (Done)     Learning Progress Summary           Patient Acceptance, E,D, VU by JOVANNI at 12/14/2018 11:40 AM    Comment:  Safety with ambulation    Acceptance, E,TB,D, VU,NR by EE at 12/13/2018  9:11 AM    Acceptance, E,TB, VU,NR by EE at 12/12/2018  8:48 AM    Acceptance, E,TB,D, VU,NR by EE at 12/11/2018  8:48 AM    Acceptance, E,TB,D, VU,NR by EE at 12/10/2018  8:45 AM    TAYE [place] TB, D, LANDY ALONZO,NR by MAR at 12/9/2018 10:24 AM    TAYE [place] TB, D, [place],LANDY by MAR at  12/8/2018 12:00 PM    Acceptance, E,TB,D, VU,NR by EE at 12/7/2018  9:22 AM    Acceptance, E,TB, VU,NR by EE at 12/6/2018  9:15 AM    Acceptance, E,TB, VU,NR by EE at 12/5/2018 10:10 AM    Acceptance, E, NR by LB at 12/4/2018 10:12 AM    Acceptance, E,TB, VU,NR by EE at 12/3/2018  9:48 AM    Acceptance, E, VU,NR by ASHLEYK at 12/1/2018  9:50 AM   Family Acceptance, E,TB,D, VU,NR by EE at 12/11/2018  8:48 AM   Significant Other Eager, E,TB,D, VU,DU,NR by KP at 12/9/2018 10:24 AM                   Point: Home exercise program (Done)     Learning Progress Summary           Patient Acceptance, E,TB,D, VU,NR by EE at 12/13/2018  9:11 AM    Acceptance, E,TB, VU,NR by EE at 12/12/2018  8:48 AM    Acceptance, E,TB,D, VU,NR by EE at 12/11/2018  8:48 AM    Acceptance, E,TB,D, VU,NR by EE at 12/10/2018  8:45 AM    Eager, E,TB,D, VU,DU,NR by KP at 12/9/2018 10:24 AM    Eager, E,TB,D, VU,DU by KP at 12/8/2018 12:00 PM    Acceptance, E,TB,D, VU,NR by EE at 12/7/2018  9:22 AM    Acceptance, E,TB, VU,NR by EE at 12/6/2018  9:15 AM    Acceptance, E,TB, VU,NR by EE at 12/5/2018 10:10 AM    Acceptance, E,TB, VU,NR by EE at 12/3/2018  9:48 AM   Family Acceptance, E,TB,D, VU,NR by EE at 12/11/2018  8:48 AM   Significant Other Eager, E,TB,D, VU,DU,NR by KP at 12/9/2018 10:24 AM                   Point: Body mechanics (Done)     Learning Progress Summary           Patient Acceptance, E,TB,D, VU,NR by EE at 12/13/2018  9:11 AM    Acceptance, E,TB, VU,NR by EE at 12/12/2018  8:48 AM    Acceptance, E,TB,D, VU,NR by EE at 12/11/2018  8:48 AM    Acceptance, E,TB,D, VU,NR by EE at 12/10/2018  8:45 AM    Acceptance, E,TB,D, VU,NR by EE at 12/7/2018  9:22 AM    Acceptance, E,TB, VU,NR by EE at 12/6/2018  9:15 AM    Acceptance, E,TB, VU,NR by EE at 12/5/2018 10:10 AM    Acceptance, E,TB, VU,NR by EE at 12/3/2018  9:48 AM   Family Acceptance, E,TB,D, VU,NR by EE at 12/11/2018  8:48 AM                   Point: Precautions (Done)     Learning Progress  Summary           Patient Acceptance, E,TB,D, VU,NR by EE at 12/13/2018  9:11 AM    Acceptance, E,TB, VU,NR by EE at 12/12/2018  8:48 AM    Acceptance, E,TB,D, VU,NR by EE at 12/11/2018  8:48 AM    Acceptance, E,TB,D, VU,NR by  at 12/10/2018  8:45 AM    Acceptance, E,TB,D, VU,NR by  at 12/7/2018  9:22 AM    Acceptance, E,TB, VU,NR by EE at 12/6/2018  9:15 AM    Acceptance, E,TB, VU,NR by  at 12/5/2018 10:10 AM    Acceptance, E,TB, VU,NR by  at 12/3/2018  9:48 AM    Acceptance, E, VU,NR by  at 12/1/2018  9:50 AM   Family Acceptance, E,TB,D, VU,NR by  at 12/11/2018  8:48 AM                               User Key     Initials Effective Dates Name Provider Type Discipline    KD 04/03/18 -  Daphne Quinteros, PT Physical Therapist PT    LB 03/07/18 -  Odette Orourke, PTA Physical Therapy Assistant PT    EE 04/03/18 -  Cathleen Rosario, PT Physical Therapist PT    JK 04/03/18 -  Janae Trujillo, PT Physical Therapist PT     04/03/18 -  Angelic Bautista, PT Physical Therapist PT                  PT Recommendation and Plan                        Time Calculation:     PT Charges     Row Name 12/14/18 0918             Time Calculation    Start Time  0900  -LB      Stop Time  0930  -LB      Time Calculation (min)  30 min  -LB        User Key  (r) = Recorded By, (t) = Taken By, (c) = Cosigned By    Initials Name Provider Type    LB Odette Orourke, PTA Physical Therapy Assistant            Therapy Charges for Today     Code Description Service Date Service Provider Modifiers Qty    [id number] HC PT THER PROC EA 15 MIN 12/14/2018 Daphne Quinteros, PT GP 2                   Daphne Quinteros, PT  12/14/2018

## 2018-12-14 NOTE — PROGRESS NOTES
Occupational Therapy: Individual: 60 minutes.    Physical Therapy: [place]    Speech Language Pathology:  [place]    Signed by: Lolis Cooley OT

## 2018-12-14 NOTE — PLAN OF CARE
Problem: Patient Care Overview  Goal: Plan of Care Review  Outcome: Ongoing (interventions implemented as appropriate)   12/14/18 0330   Patient Care Overview   IRF Plan of Care Review progress ongoing, continue   Progress, Functional Goals demonstrating adequate progress   Coping/Psychosocial   Plan of Care Reviewed With patient   OTHER   Outcome Summary Pt. is pleasant and cooperative. Complained of any pain to bilateral shoulders. Norco 2 tabs PO PRN given and relieved well. Flexeril 10 mg PO PRN given per pt. required. BG is 182. Insulin 2 units given. Spouse at bedside. Uses CPAP at night. No unsafe behavior to note at this time.      Goal: Coping Plan  Outcome: Ongoing (interventions implemented as appropriate)   12/14/18 0330   Coping Plan   Demonstration of Effective Coping Strategies demonstrating adequate progress       Problem: Fall Risk (Adult)  Goal: Absence of Fall  Outcome: Ongoing (interventions implemented as appropriate)   12/14/18 0330   Fall Risk (Adult)   Absence of Fall making progress toward outcome       Problem: Pain, Acute (Adult)  Goal: Acceptable Pain Control/Comfort Level  Outcome: Ongoing (interventions implemented as appropriate)   12/14/18 0330   Pain, Acute (Adult)   Acceptable Pain Control/Comfort Level making progress toward outcome       Problem: Skin Injury Risk (Adult)  Goal: Skin Health and Integrity  Outcome: Ongoing (interventions implemented as appropriate)   12/14/18 0330   Skin Injury Risk (Adult)   Skin Health and Integrity making progress toward outcome

## 2018-12-14 NOTE — PROGRESS NOTES
Inpatient Rehabilitation Functional Measures Assessment    Functional Measures  [facility]CC[place] Eating:  [facility] Grooming: [facility] Bathing:  [facility] Upper Body Dressing:  [facility] Lower Body Dressing:  [facility] Toileting:  [facility] Bladder Management  Level of Assistance:  [place]  Frequency/Number of Accidents this Shift:  [facility] Bowel Management  Level of Assistance: [place]  Frequency/Number of Accidents this Shift: [facility] Bed/Chair/Wheelchair Transfer:  [facility] Toilet Transfer:  [facility] Tub/Shower Transfer:  [place]    Previously Documented Mode of Locomotion at Discharge: Field  [place]CCO Expected Mode of Locomotion at Discharge: [facility] Walk/Wheelchair:  [facility] Stairs:  [facility] Comprehension:  Auditory comprehension is the usual mode. Comprehension  Score = 7, Independent.  Patient comprehends complex/abstract information in  their primary language.  Patient is completely independent for auditory  comprehension.  There are no activity limitations.  [place] Expression:  Vocal expression is the usual mode. Expression Score = 7,  Independent.  Patient expresses complex/abstract information in their primary  language.  Patient is completely independent for vocal expression.  There are no  activity limitations.  [place] Social Interaction:  Social Interaction Score = 7, Independent. Patient is  completely independent for social interaction.  There are no activity  limitations.  [place] Problem Solving:  Problem Solving Score = 7, Independent.  Patient makes  appropriate decisions in order to solve complex problems.  Patient is completely  independent for problem solving.  There are no activity limitations.  [place] Memory:  Memory Score = 7, Independent.  Patient is completely independent  for memory.  There are no activity limitations.    Therapy Mode Minutes  Occupational Therapy: Branch  Physical Therapy: Branch  Speech Language Pathology:  Branch    Signed by: Franc Flood RN

## 2018-12-14 NOTE — PROGRESS NOTES
Occupational Therapy: Individual: 30 minutes.    Physical Therapy: Branch    Speech Language Pathology:  Branch    Signed by: Precious De Leon OT

## 2018-12-14 NOTE — PLAN OF CARE
Problem: Patient Care Overview  Goal: Plan of Care Review  Outcome: Ongoing (interventions implemented as appropriate)   12/14/18 0069   Patient Care Overview   IRF Plan of Care Review progress ongoing, continue   Progress, Functional Goals demonstrating adequate progress   Coping/Psychosocial   Plan of Care Reviewed With patient   OTHER   Outcome Summary Mr. Ramirez has done fair today. He states his pain is higher than usual although he rates it lower. He has taken his pain medication, flexeril and lidoderm patches. He says he just didnt feel good when he woke up today. He has participated in all therapies. His blood sugars have been good, no insulin coverage needed today. Skin intact. Incision looks good, no signs or symptoms of infection. [place] given today per pt request. No further concerns at this time.     Goal: Coping Plan  Outcome: Ongoing (interventions implemented as appropriate)      Problem: Fall Risk (Adult)  Goal: Absence of Fall  Outcome: Ongoing (interventions implemented as appropriate)      Problem: Pain, Acute (Adult)  Goal: Acceptable Pain Control/Comfort Level  Outcome: Ongoing (interventions implemented as appropriate)      Problem: Skin Injury Risk (Adult)  Goal: Skin Health and Integrity  Outcome: Ongoing (interventions implemented as appropriate)

## 2018-12-14 NOTE — THERAPY TREATMENT NOTE
Inpatient Rehabilitation - Physical Therapy Treatment Note  [facility]     Patient Name: Duane Mark Witten  : 1955  MRN: [id number]    Today's Date: 2018                 Admit Date: 2018      Visit Dx:      ICD-10-CM ICD-9-CM   1. Cervical stenosis of spinal canal M48.02 723.0   2. Weakness of both lower extremities R29.898 729.89   3. Paresthesia of both upper extremities R20.2 782.0    M79.601 729.5    M79.602    4. Morbid obesity with BMI of 40.0-44.9, adult (CMS/[facility]) E66.01 278.01    Z68.41 V85.41   5. Impaired functional mobility, balance, gait, and endurance Z74.09 V49.89   6. Functional gait abnormality R26.89 781.2       Patient Active Problem List   Diagnosis   • YANELI (generalized anxiety disorder)   • ADD (attention deficit disorder)   • Depression   • Diabetes type 2, controlled (CMS/[facility])   • ED (erectile dysfunction) of non-organic origin   • HLD (hyperlipidemia)   • Essential hypertension   • Arthritis   • Psoriasis   • Testosterone deficiency   • Primary insomnia   • Weakness of both lower extremities   • Paresthesia of both upper extremities   • Morbid obesity with BMI of 40.0-44.9, adult (CMS/[facility])   • Adverse effect of corticosteroids   • Type 2 diabetes mellitus with hyperglycemia (CMS/[facility])   • Cervical stenosis of spinal canal   • Edema of cervical spinal cord (CMS/[facility])   • Postoperative atrial fibrillation (CMS/[facility])   • Cervical myelopathy (CMS/[facility])       Therapy Treatment    IRF Treatment Summary     Row Name 18 1518 18 1113 18 1109       Evaluation/Treatment Time and Intent    Subjective Information  no complaints  -SO  complains of;pain  -KD  complains of;fatigue;pain  -AF    Existing Precautions/Restrictions  fall;spinal  -SO  fall;spinal  -KD  fall;spinal  -AF    Document Type  therapy note (daily note)  -SO  therapy note (daily note)  -KD  therapy note (daily note)  -AF    Mode of Treatment  occupational therapy  -SO  individual therapy;physical  therapy  -KD  occupational therapy  -AF    Patient/Family Observations  Pt up in w/c  -SO  --  seated in w/c in therapy gym, wife assisted this AM with washing up stated that he felt itchy this morning   -AF    Recorded by [SO] Sherrie Nguyen, OTR [KD] Daphne Quinteros, PT [AF] Lolis Cooley, OTR    Row Name 12/14/18 0918             Evaluation/Treatment Time and Intent    Subjective Information  complains of;pain  -LB      Existing Precautions/Restrictions  fall;spinal  -LB      Document Type  therapy note (daily note)  -LB      Mode of Treatment  physical therapy  -LB      Patient/Family Observations  seated in w/c. wife present  -LB      Recorded by [LB] Odette Orourke PTA      Row Name 12/14/18 1518 12/14/18 1113 12/14/18 1109       Cognition/Psychosocial- PT/OT    Affect/Mental Status (Cognitive)  WFL  -SO  WFL  -KD  WFL  -AF    Orientation Status (Cognition)  oriented x 4  -SO  oriented x 4  -KD  oriented x 4  -AF    Follows Commands (Cognition)  WFL  -SO  WFL  -KD  WFL  -AF    Personal Safety Interventions  gait belt;fall prevention program maintained  -SO  fall prevention program maintained;gait belt;muscle strengthening facilitated;nonskid shoes/slippers when out of bed;supervised activity  -KD  fall prevention program maintained;gait belt;nonskid shoes/slippers when out of bed  -AF    Recorded by [SO] Sherrie Nguyen, MAYOR [KD] Daphne Quinteros, PT [AF] Lolis Cooley, OTR    Row Name 12/14/18 1109 12/14/18 0918          Bed Mobility Assessment/Treatment    Supine-Sit [place] (Bed Mobility)  minimum assist (75% patient effort);verbal cues  -AF  --     Sit-Supine [place] (Bed Mobility)  contact guard;verbal cues  -AF  --     Comment (Bed Mobility)  mat mobility  -AF  up in w/c  -LB     Recorded by [AF] Lolis Cooley, NURIS [LB] Odette Orourke PTA     Row Name 12/14/18 1109             Functional Mobility    Functional Mobility- Comment  walked to and from  bathroom with RWX with CGA  -AF      Recorded by [AF] Lolis Cooley, OTR      Row Name 12/14/18 1109             Transfer Assessment/Treatment    Comment (Transfers)  EOM <> w/c stand pivot wtih RWX CGA/MIN  -AF      Recorded by [AF] Lolis Cooley, OTR      Row Name 12/14/18 1113 12/14/18 0918          Sit-Stand Transfer    Sit-Stand [place] (Transfers)  minimum assist (75% patient effort);contact guard  -KD  minimum assist (75% patient effort);contact guard  -LB     Assistive Device (Sit-Stand Transfers)  walker, front-wheeled  -KD  walker, front-wheeled  -LB     Recorded by [KD] Daphne Quinteros, PT [LB] Odette Orourke, PTA     Row Name 12/14/18 1113 12/14/18 0918          Stand-Sit Transfer    Stand-Sit [place] (Transfers)  contact guard  -KD  contact guard  -LB     Assistive Device (Stand-Sit Transfers)  walker, front-wheeled  -KD  walker, front-wheeled  -LB     Recorded by [KD] Daphne Quinteros, PT [LB] Odette Orourke, PTA     Row Name 12/14/18 1109             Toilet Transfer    Type (Toilet Transfer)  stand-sit;sit-stand  -AF      [place] Level (Toilet Transfer)  contact guard;verbal cues  -AF      Assistive Device (Toilet Transfer)  commode;grab bars/safety frame;walker, front-wheeled  -AF      Recorded by [AF] Lolis Cooley OTR      Row Name 12/14/18 1113 12/14/18 0918          Gait/Stairs Assessment/Training    [place] Level (Gait)  minimum assist (75% patient effort);contact guard;verbal cues  -KD  minimum assist (75% patient effort);contact guard  -LB     Assistive Device (Gait)  walker, front-wheeled  -KD  walker, front-wheeled  -LB     Distance in Feet (Gait)  80 X 3  -KD  80 as far as 160 ft  -LB     Pattern (Gait)  step-through  -KD  step-through  -LB     Deviations/Abnormal Patterns (Gait)  ataxic;base of support, narrow;sarah decreased;stride length decreased  -KD  ataxic;base of support, narrow;sarah decreased;stride length decreased  -LB     Bilateral  Gait Deviations  forward flexed posture;weight shift ability decreased;heel strike decreased  -KD  --     Left Sided Gait Deviations  knee buckling, left side;knee buckling, right side knees felt weaker this session  -KD  knee buckling, left side;knee buckling, right side  -LB     Right Sided Gait Deviations  --  foot drop/toe drag  -LB     Comment (Gait/Stairs)  Per patient, knees felt stronger by 3rd walk.  -KD  --     Recorded by [KD] Daphne Quinteros, PT [LB] Odette Orourke, LUCINA     Row Name 12/14/18 1109             Bathing Assessment/Treatment    Comment (Bathing)  deferred completed with wife this AM  -AF      Recorded by [AF] Lolis Cooley OTR      Row Name 12/14/18 1109             Toileting Assessment/Treatment    Toileting [place] Level  toileting skills;contact guard assist;verbal cues  -AF      Assistive Device Use (Toileting)  grab bar/safety frame;raised toilet seat  -AF      Comment (Toileting)  with increased time pt able to complete clothing management with steadying assist   -AF      Recorded by [AF] Lolis Cooley OTR      Row Name 12/14/18 1109             Hand  Strength Testing    Right Hand, Setting 1 (Dynamometer Testing)  45`  -AF      Left Hand, Setting 1 (Dynamometer Testing)  40  -AF      Recorded by [AF] Lolis Cooley OTR      Row Name 12/14/18 1518 12/14/18 1109          Fine Motor Testing & Training    Results, 9 Hole Peg Test of Fine Motor Coordination-Right  --  96 seconds  -AF     Results, 9 Hole Peg Test of Fine Motor Coordination-Left  --  68 seconds  -AF     Comment, Fine Motor Coordination  Pt performs FMC task locating small beads in yellow putty and threading on cord.Pt showing improvement in 2pt pinch of bilat hands to grasp beads.  -SO  FMC with manipualting coins into slotted container with moderate difficulty, stacking pennies 5-10 tall  -AF     Recorded by [SO] Sherrie Nguyen, OTR [AF] Lolis Cooley OTR     Row Name 12/14/18 1112  12/14/18 1109 12/14/18 0918       Pain Scale: Numbers Pre/Post-Treatment    Pain Scale: Numbers, Pretreatment  5/10  -KD  4/10  -AF  -- varies from a 5 to an 8  -LB    Pain Scale: Numbers, Post-Treatment  --  4/10  -AF  --    Pain Location - Side  Left  -KD  --  Left  -LB    Pain Location  shoulder  -KD  --  shoulder  -LB    Pre/Post Treatment Pain Comment  tolerated treatment session  -KD  referred pain in shoulders, pain patches applies  -AF  --    Pain Intervention(s)  Medication (See MAR)  -KD  --  Medication (See MAR);Ambulation/increased activity  -LB    Recorded by [KD] Daphne Quinteros, PT [AF] Lolis Cooley, OTR [LB] Odette Orourke PTA    Row Name 12/14/18 1518             Upper Extremity Seated Therapeutic Exercise    Performed, Seated Upper Extremity (Therapeutic Exercise)  digit flexion/extension  -SO      Device, Seated Upper Extremity (Therapeutic Exercise)  -- Yellow putty, hand exercisor  -SO      Recorded by [SO] Sherrie Nguyen OTR      Row Name 12/14/18 0918             Lower Extremity Seated Therapeutic Exercise    Performed, Seated Lower Extremity (Therapeutic Exercise)  hip flexion/extension;knee flexion/extension  -LB      Device, Seated Lower Extremity (Therapeutic Exercise)  elastic bands/tubing;free weights, cuff red T-band, 1 1/2# cuff wgts  -LB      Exercise Type, Seated Lower Extremity (Therapeutic Exercise)  resistive exercise  -LB      Sets/Reps Detail, Seated Lower Extremity (Therapeutic Exercise)  1/20  -LB      Recorded by [LB] Odette Orourke PTA      Row Name 12/14/18 1109             Neuromuscular Re-education    Comment (Neuromuscular Re-education)  table slides on angled surface 10 reps x 3 sets with rest breaks, in supine #1 hand shae wth elbow flex/ext, sup/pro, wrist flex/ext, with non weighted dowel han shoulder pro/rectraction and shoulder flexion 10 reps x 3 sets with rest breaks   -AF      Recorded by [AF] Lolis Cooley, NURIS      Row Name  12/14/18 1518 12/14/18 1113 12/14/18 1109       Positioning and Restraints    Pre-Treatment Position  sitting in chair/recliner  -SO  -- in wheelchair  -KD  sitting in chair/recliner  -AF    Post Treatment Position  wheelchair  -SO  --  wheelchair  -AF    In Wheelchair  sitting;call light within reach;encouraged to call for assist;exit alarm on;with family/caregiver  -SO  call light within reach;with nsg  -KD  sitting;call light within reach;encouraged to call for assist;exit alarm on  -AF    Recorded by [SO] Sherrie Nguyen, OTR [KD] Daphne Quinteros, PT [AF] Lolis Cooley, OTR    Row Name 12/14/18 0918             Positioning and Restraints    Post Treatment Position  wheelchair  -LB      In Wheelchair  sitting;with OT  -LB      Recorded by [LB] Odette Oruorke PTA        User Key  (r) = Recorded By, (t) = Taken By, (c) = Cosigned By    Initials Name Effective Dates    SO Sherrie Nguyen, OTR 04/13/15 -     Daphne Khan PT 04/03/18 -     LB Odette Orourke PTA 03/07/18 -     AF Lolis Cooley, OTR 04/03/18 -         Wound 11/27/18 1318 Other (See comments) neck incision (Active)   Dressing Appearance open to air 12/14/2018  7:35 AM   Closure Adhesive closure strips 12/14/2018  7:35 AM   Base clean;dry 12/14/2018  7:35 AM   Periwound dry;intact 12/13/2018  7:35 PM   Drainage Amount none 12/14/2018  7:35 AM   Dressing Care, Wound open to air 12/14/2018  7:35 AM     Physical Therapy Education     Title: PT OT SLP Therapies (In Progress)     Topic: Physical Therapy (Done)     Point: Mobility training (Done)     Learning Progress Summary           Patient Acceptance, E, VU,NR by SIMÓN at 12/14/2018  4:14 PM    Acceptance, E,D, VU by JOVANNI at 12/14/2018 11:40 AM    Comment:  Safety with ambulation    Acceptance, E,TB,D, VU,NR by JAMARCUS at 12/13/2018  9:11 AM    Acceptance, E,TB, VU,NR by EE at 12/12/2018  8:48 AM    Acceptance, ANGELIQUE KOTHARI D, VU,NR by EE at 12/11/2018  8:48 AM    Acceptance,  E,TB,D, VU,NR by EE at 12/10/2018  8:45 AM    Eager, E,TB,D, VU,DU,NR by MAR at 12/9/2018 10:24 AM    Eager, E,TB,D, VU,DU by MAR at 12/8/2018 12:00 PM    Acceptance, E,TB,D, VU,NR by EE at 12/7/2018  9:22 AM    Acceptance, E,TB, VU,NR by EE at 12/6/2018  9:15 AM    Acceptance, E,TB, VU,NR by EE at 12/5/2018 10:10 AM    Acceptance, E, NR by SIMÓN at 12/4/2018 10:12 AM    Acceptance, E,TB, VU,NR by EE at 12/3/2018  9:48 AM    Acceptance, E, VU,NR by IAN at 12/1/2018  9:50 AM   Family Acceptance, E,TB,D, VU,NR by EE at 12/11/2018  8:48 AM   Significant Other Eager, E,TB,D, VU,DU,NR by MAR at 12/9/2018 10:24 AM                   Point: Home exercise program (Done)     Learning Progress Summary           Patient Acceptance, E,TB,D, VU,NR by EE at 12/13/2018  9:11 AM    Acceptance, E,TB, VU,NR by EE at 12/12/2018  8:48 AM    Acceptance, E,TB,D, VU,NR by EE at 12/11/2018  8:48 AM    Acceptance, E,TB,D, VU,NR by EE at 12/10/2018  8:45 AM    Eager, E,TB,D, VU,DU,NR by MAR at 12/9/2018 10:24 AM    Eager, E,TB,D, VU,DU by MAR at 12/8/2018 12:00 PM    Acceptance, E,TB,D, VU,NR by EE at 12/7/2018  9:22 AM    Acceptance, E,TB, VU,NR by EE at 12/6/2018  9:15 AM    Acceptance, E,TB, VU,NR by EE at 12/5/2018 10:10 AM    Acceptance, E,TB, VU,NR by EE at 12/3/2018  9:48 AM   Family Acceptance, E,TB,D, VU,NR by EE at 12/11/2018  8:48 AM   Significant Other Eager, E,TB,D, VU,DU,NR by  at 12/9/2018 10:24 AM                   Point: Body mechanics (Done)     Learning Progress Summary           Patient Acceptance, E,TB,D, VU,NR by EE at 12/13/2018  9:11 AM    Acceptance, E,TB, VU,NR by EE at 12/12/2018  8:48 AM    Acceptance, E,TB,D, VU,NR by EE at 12/11/2018  8:48 AM    Acceptance, E,TB,D, VU,NR by EE at 12/10/2018  8:45 AM    Acceptance, E,TB,D, VU,NR by EE at 12/7/2018  9:22 AM    Acceptance, E,TB, VU,NR by EE at 12/6/2018  9:15 AM    Acceptance, E,TB, VU,NR by EE at 12/5/2018 10:10 AM    Acceptance, E,TB, VU,NR by EE at 12/3/2018  9:48 AM    Family Acceptance, E,TB,D, VU,NR by EE at 12/11/2018  8:48 AM                   Point: Precautions (Done)     Learning Progress Summary           Patient Acceptance, E,TB,D, VU,NR by EE at 12/13/2018  9:11 AM    Acceptance, E,TB, VU,NR by EE at 12/12/2018  8:48 AM    Acceptance, E,TB,D, VU,NR by EE at 12/11/2018  8:48 AM    Acceptance, E,TB,D, VU,NR by EE at 12/10/2018  8:45 AM    Acceptance, E,TB,D, VU,NR by EE at 12/7/2018  9:22 AM    Acceptance, E,TB, VU,NR by EE at 12/6/2018  9:15 AM    Acceptance, E,TB, VU,NR by EE at 12/5/2018 10:10 AM    Acceptance, E,TB, VU,NR by EE at 12/3/2018  9:48 AM    Acceptance, E, VU,NR by  at 12/1/2018  9:50 AM   Family Acceptance, E,TB,D, VU,NR by EE at 12/11/2018  8:48 AM                               User Key     Initials Effective Dates Name Provider Type Discipline    KD 04/03/18 -  Daphne Quinteros, PT Physical Therapist PT    LB 03/07/18 -  Odette Orourke, PTA Physical Therapy Assistant PT    EE 04/03/18 -  Cathleen Rosario, PT Physical Therapist PT    JK 04/03/18 -  Janae Trujillo, PT Physical Therapist PT     04/03/18 -  Angelic Bautista, PT Physical Therapist PT                  PT Recommendation and Plan                        Time Calculation:     PT Charges     Row Name 12/14/18 1612 12/14/18 1142 12/14/18 0918       Time Calculation    Start Time  1300  -LB  1100  -KD  0900  -LB    Stop Time  1330  -LB  1130  -KD  0930  -LB    Time Calculation (min)  30 min  -LB  30 min  -KD  30 min  -LB      User Key  (r) = Recorded By, (t) = Taken By, (c) = Cosigned By    Initials Name Provider Type    Daphne Khan, PT Physical Therapist    LB Odette Orourke, PTA Physical Therapy Assistant            Therapy Charges for Today     Code Description Service Date Service Provider Modifiers Qty    [id number]  PT THER PROC EA 15 MIN 12/14/2018 Odette Orourke, PTA GP 4                   Odette Orourke, PTA  12/14/2018

## 2018-12-14 NOTE — THERAPY TREATMENT NOTE
Inpatient Rehabilitation - Occupational Therapy Treatment Note    [facility]     Patient Name: Duane Mark Witten  : 1955  MRN: [id number]    Today's Date: 2018                 Admit Date: 2018      Visit Dx:    ICD-10-CM ICD-9-CM   1. Cervical stenosis of spinal canal M48.02 723.0   2. Weakness of both lower extremities R29.898 729.89   3. Paresthesia of both upper extremities R20.2 782.0    M79.601 729.5    M79.602    4. Morbid obesity with BMI of 40.0-44.9, adult (CMS/[facility]) E66.01 278.01    Z68.41 V85.41   5. Impaired functional mobility, balance, gait, and endurance Z74.09 V49.89   6. Functional gait abnormality R26.89 781.2       Patient Active Problem List   Diagnosis   • YANELI (generalized anxiety disorder)   • ADD (attention deficit disorder)   • Depression   • Diabetes type 2, controlled (CMS/[facility])   • ED (erectile dysfunction) of non-organic origin   • HLD (hyperlipidemia)   • Essential hypertension   • Arthritis   • Psoriasis   • Testosterone deficiency   • Primary insomnia   • Weakness of both lower extremities   • Paresthesia of both upper extremities   • Morbid obesity with BMI of 40.0-44.9, adult (CMS/[facility])   • Adverse effect of corticosteroids   • Type 2 diabetes mellitus with hyperglycemia (CMS/[facility])   • Cervical stenosis of spinal canal   • Edema of cervical spinal cord (CMS/[facility])   • Postoperative atrial fibrillation (CMS/[facility])   • Cervical myelopathy (CMS/[facility])         Therapy Treatment    IRF Treatment Summary     Row Name 18 1518 18 1113 18 1109       Evaluation/Treatment Time and Intent    Subjective Information  no complaints  -SO  complains of;pain  -KD  complains of;fatigue;pain  -AF    Existing Precautions/Restrictions  fall;spinal  -SO  fall;spinal  -KD  fall;spinal  -AF    Document Type  therapy note (daily note)  -SO  therapy note (daily note)  -KD  therapy note (daily note)  -AF    Mode of Treatment  occupational therapy  -SO  individual therapy;physical  therapy  -KD  occupational therapy  -AF    Patient/Family Observations  Pt up in w/c  -SO  --  seated in w/c in therapy gym, wife assisted this AM with washing up stated that he felt itchy this morning   -AF    Recorded by [SO] Sherrie Nguyen, OTR [KD] Daphne Quinteros, PT [AF] Lolis Cooley, NURIS    Row Name 12/14/18 0918             Evaluation/Treatment Time and Intent    Subjective Information  complains of;pain  -LB      Existing Precautions/Restrictions  fall;spinal  -LB      Document Type  therapy note (daily note)  -LB      Mode of Treatment  physical therapy  -LB      Patient/Family Observations  seated in w/c. wife present  -LB      Recorded by [LB] Odette Orourke PTA      Row Name 12/14/18 1518 12/14/18 1113 12/14/18 1109       Cognition/Psychosocial- PT/OT    Affect/Mental Status (Cognitive)  WFL  -SO  WFL  -KD  WFL  -AF    Orientation Status (Cognition)  oriented x 4  -SO  oriented x 4  -KD  oriented x 4  -AF    Follows Commands (Cognition)  WFL  -SO  WFL  -KD  WFL  -AF    Personal Safety Interventions  gait belt;fall prevention program maintained  -SO  fall prevention program maintained;gait belt;muscle strengthening facilitated;nonskid shoes/slippers when out of bed;supervised activity  -KD  fall prevention program maintained;gait belt;nonskid shoes/slippers when out of bed  -AF    Recorded by [SO] Sherrie Nguyen, MAYOR [KD] Daphne Quinteros, PT [AF] Lolis Cooley, OTR    Row Name 12/14/18 1109             Bed Mobility Assessment/Treatment    Supine-Sit [place] (Bed Mobility)  minimum assist (75% patient effort);verbal cues  -AF      Sit-Supine [place] (Bed Mobility)  contact guard;verbal cues  -AF      Comment (Bed Mobility)  mat mobility  -AF      Recorded by [AF] Lolis Cooley OTR      Row Name 12/14/18 1109             Functional Mobility    Functional Mobility- Comment  walked to and from bathroom with RWX with CGA  -AF      Recorded by [AF] Lolis Cooley  Katy OTR      Row Name 12/14/18 1109             Transfer Assessment/Treatment    Comment (Transfers)  EOM <> w/c stand pivot wtih RWX CGA/MIN  -AF      Recorded by [AF] Lolis Cooley OTR      Row Name 12/14/18 1113             Sit-Stand Transfer    Sit-Stand [place] (Transfers)  minimum assist (75% patient effort);contact guard  -KD      Assistive Device (Sit-Stand Transfers)  walker, front-wheeled  -KD      Recorded by [KD] Daphne Quinteros PT      Row Name 12/14/18 1113             Stand-Sit Transfer    Stand-Sit [place] (Transfers)  contact guard  -KD      Assistive Device (Stand-Sit Transfers)  walker, front-wheeled  -KD      Recorded by [KD] Daphne Quinteros PT      Row Name 12/14/18 1109             Toilet Transfer    Type (Toilet Transfer)  stand-sit;sit-stand  -AF      [place] Level (Toilet Transfer)  contact guard;verbal cues  -AF      Assistive Device (Toilet Transfer)  commode;grab bars/safety frame;walker, front-wheeled  -AF      Recorded by [AF] Lolis Cooley, OTR      Row Name 12/14/18 1113 12/14/18 0918          Gait/Stairs Assessment/Training    [place] Level (Gait)  minimum assist (75% patient effort);contact guard;verbal cues  -KD  --     Assistive Device (Gait)  walker, front-wheeled  -KD  --     Distance in Feet (Gait)  80 X 3  -KD  80 as far as 160 ft  -LB     Pattern (Gait)  step-through  -KD  --     Deviations/Abnormal Patterns (Gait)  ataxic;base of support, narrow;sarah decreased;stride length decreased  -KD  --     Bilateral Gait Deviations  forward flexed posture;weight shift ability decreased;heel strike decreased  -KD  --     Left Sided Gait Deviations  knee buckling, left side;knee buckling, right side knees felt weaker this session  -KD  --     Comment (Gait/Stairs)  Per patient, knees felt stronger by 3rd walk.  -KD  --     Recorded by [KD] Daphne Quinteros, PT [LB] Odette Orourke, PTA     Row Name 12/14/18 1109             Bathing  Assessment/Treatment    Comment (Bathing)  deferred completed with wife this AM  -AF      Recorded by [AF] Lolis Cooley OTR      Row Name 12/14/18 1109             Toileting Assessment/Treatment    Toileting [place] Level  toileting skills;contact guard assist;verbal cues  -AF      Assistive Device Use (Toileting)  grab bar/safety frame;raised toilet seat  -AF      Comment (Toileting)  with increased time pt able to complete clothing management with steadying assist   -AF      Recorded by [AF] Lolis Cooley OTR      Row Name 12/14/18 1109             Hand  Strength Testing    Right Hand, Setting 1 (Dynamometer Testing)  45`  -AF      Left Hand, Setting 1 (Dynamometer Testing)  40  -AF      Recorded by [AF] Lolis Cooley OTR      Row Name 12/14/18 1518 12/14/18 1109          Fine Motor Testing & Training    Results, 9 Hole Peg Test of Fine Motor Coordination-Right  --  96 seconds  -AF     Results, 9 Hole Peg Test of Fine Motor Coordination-Left  --  68 seconds  -AF     Comment, Fine Motor Coordination  Pt performs FMC task locating small beads in yellow putty and threading on cord.Pt showing improvement in 2pt pinch of bilat hands to grasp beads.  -SO  FMC with manipualting coins into slotted container with moderate difficulty, stacking pennies 5-10 tall  -AF     Recorded by [SO] Sherrie Nguyen, OTR [AF] Lolis Cooley OTR     Row Name 12/14/18 1113 12/14/18 1109 12/14/18 0918       Pain Scale: Numbers Pre/Post-Treatment    Pain Scale: Numbers, Pretreatment  5/10  -KD  4/10  -AF  -- varies from a 5 to an 8  -LB    Pain Scale: Numbers, Post-Treatment  --  4/10  -AF  --    Pain Location - Side  Left  -KD  --  Left  -LB    Pain Location  shoulder  -KD  --  shoulder  -LB    Pre/Post Treatment Pain Comment  tolerated treatment session  -KD  referred pain in shoulders, pain patches applies  -AF  --    Pain Intervention(s)  Medication (See MAR)  -KD  --  Medication (See  MAR);Ambulation/increased activity  -LB    Recorded by [KD] Dahpne Quinteros, PT [AF] Lolis Cooley, OTR [LB] Odette Orourke PTA    Row Name 12/14/18 1518             Upper Extremity Seated Therapeutic Exercise    Performed, Seated Upper Extremity (Therapeutic Exercise)  digit flexion/extension  -SO      Device, Seated Upper Extremity (Therapeutic Exercise)  -- Yellow putty, hand exercisor  -SO      Recorded by [SO] Sherrie Nguyen OTR      Row Name 12/14/18 0918             Lower Extremity Seated Therapeutic Exercise    Performed, Seated Lower Extremity (Therapeutic Exercise)  hip flexion/extension;knee flexion/extension  -LB      Device, Seated Lower Extremity (Therapeutic Exercise)  elastic bands/tubing;free weights, cuff red T-band, 1 1/2# cuff wgts  -LB      Exercise Type, Seated Lower Extremity (Therapeutic Exercise)  resistive exercise  -LB      Sets/Reps Detail, Seated Lower Extremity (Therapeutic Exercise)  1/20  -LB      Recorded by [LB] Odette Orourke PTA      Row Name 12/14/18 1109             Neuromuscular Re-education    Comment (Neuromuscular Re-education)  table slides on angled surface 10 reps x 3 sets with rest breaks, in supine #1 hand shae wth elbow flex/ext, sup/pro, wrist flex/ext, with non weighted dowel han shoulder pro/rectraction and shoulder flexion 10 reps x 3 sets with rest breaks   -AF      Recorded by [AF] Lolis Cooley, NURIS      Row Name 12/14/18 1518 12/14/18 1113 12/14/18 1109       Positioning and Restraints    Pre-Treatment Position  sitting in chair/recliner  -SO  -- in wheelchair  -KD  sitting in chair/recliner  -AF    Post Treatment Position  wheelchair  -SO  --  wheelchair  -AF    In Wheelchair  sitting;call light within reach;encouraged to call for assist;exit alarm on;with family/caregiver  -SO  call light within reach;with nsg  -KD  sitting;call light within reach;encouraged to call for assist;exit alarm on  -AF    Recorded by [SO] Wendy  Sherrie Ava, OTR [KD] Daphne Quinteros, PT [AF] Lolis Cooley, OTR      User Key  (r) = Recorded By, (t) = Taken By, (c) = Cosigned By    Initials Name Effective Dates    Sherrie Corado, OTR 04/13/15 -     KD Daphne Quinteros, PT 04/03/18 -     LB BruOdette campos SABINA, PTA 03/07/18 -     AF Lolis Cooley, OTR 04/03/18 -           Wound 11/27/18 1318 Other (See comments) neck incision (Active)   Dressing Appearance open to air 12/14/2018  7:35 AM   Closure Adhesive closure strips 12/14/2018  7:35 AM   Base clean;dry 12/14/2018  7:35 AM   Periwound dry;intact 12/13/2018  7:35 PM   Drainage Amount none 12/14/2018  7:35 AM   Dressing Care, Wound open to air 12/14/2018  7:35 AM         OT Recommendation and Plan                 OT IRF GOALS     Row Name 12/07/18 1216 12/01/18 1000          Transfer Goal 1 (OT-IRF)    Activity/Assistive Device (Transfer Goal 1, OT-IRF)  toilet;walk-in shower;shower chair;walker, rolling  -AF  toilet  -RP     [place] Level (Transfer Goal 1, OT-IRF)  minimum assist (75% or more patient effort);contact guard assist;verbal cues required  -AF  minimum assist (75% or more patient effort)  -RP     Time Frame (Transfer Goal 1, OT-IRF)  short term goal (STG)  -AF  short term goal (STG)  -RP     Progress/Outcomes (Transfer Goal 1, OT-IRF)  goal ongoing  -AF  goal ongoing  -RP        Transfer Goal 2 (OT-IRF)    Activity/Assistive Device (Transfer Goal 2, OT-IRF)  toilet;shower chair;walk-in shower;walker, rolling  -AF  toilet  -RP     [place] Level (Transfer Goal 2, OT-IRF)  supervision required  -AF  supervision required  -RP     Time Frame (Transfer Goal 2, OT-IRF)  long term goal (LTG)  -AF  long term goal (LTG)  -RP     Progress/Outcomes (Transfer Goal 2, OT-IRF)  goal ongoing  -AF  goal ongoing  -RP        Bathing Goal 1 (OT-IRF)    Activity/Device (Bathing Goal 1, OT-IRF)  bathing skills, all;grab bar/tub rail;hand-held shower spray hose;long-handled sponge;tub  bench  -AF  bathing skills, all  -RP     [place] Level (Bathing Goal 1, OT-IRF)  minimum assist (75% or more patient effort);verbal cues required  -AF  moderate assist (50-74% patient effort)  -RP     Time Frame (Bathing Goal 1, OT-IRF)  short term goal (STG)  -AF  short term goal (STG)  -RP     Progress/Outcomes (Bathing Goal 1, OT-IRF)  goal ongoing  -AF  goal ongoing  -RP        Bathing Goal 2 (OT-IRF)    Activity/Device (Bathing Goal 2, OT-IRF)  bathing skills, all;grab bar/tub rail;hand-held shower spray hose;tub bench;long-handled sponge  -AF  bathing skills, all  -RP     [place] Level (Bathing Goal 2, OT-IRF)  supervision required  -AF  supervision required;standby assist  -RP     Time Frame (Bathing Goal 2, OT-IRF)  long term goal (LTG)  -AF  long term goal (LTG)  -RP     Progress/Outcomes (Bathing Goal 2, OT-IRF)  goal ongoing  -AF  goal ongoing  -RP        UB Dressing Goal 1 (OT-IRF)    Activity/Device (UB Dressing Goal 1, OT-IRF)  upper body dressing  -AF  upper body dressing  -RP     [place] (UB Dress Goal 1, OT-IRF)  minimum assist (75% or more patient effort);verbal cues required  -AF  minimum assist (75% or more patient effort)  -RP     Time Frame (UB Dressing Goal 1, OT-IRF)  short term goal (STG)  -AF  short term goal (STG)  -RP     Progress/Outcomes (UB Dressing Goal 1, OT-IRF)  goal ongoing  -AF  goal ongoing  -RP        UB Dressing Goal 2 (OT-IRF)    Activity/Device (UB Dressing Goal 2, OT-IRF)  upper body dressing  -AF  upper body dressing  -RP     [place] (UB Dress Goal 2, OT-IRF)  supervision required  -AF  supervision required  -RP     Time Frame (UB Dressing Goal 2, OT-IRF)  long term goal (LTG)  -AF  long term goal (LTG)  -RP     Progress/Outcomes (UB Dressing Goal 2, OT-IRF)  goal ongoing  -AF  goal ongoing  -RP        LB Dressing Goal 1 (OT-IRF)    Activity/Device (LB Dressing Goal 1, OT-IRF)  lower body dressing;long handled shoe horn;reacher;elastic laces  -AF  lower  body dressing  -RP     [place] (LB Dressing Goal 1, OT-IRF)  moderate assist (50-74% patient effort);verbal cues required;minimum assist (75% or more patient effort)  -AF  maximum assist (25-49% patient effort)  -RP     Time Frame (LB Dressing Goal 1, OT-IRF)  short term goal (STG)  -AF  short term goal (STG)  -RP     Progress/Outcomes (LB Dressing Goal 1, OT-IRF)  goal ongoing  -AF  goal ongoing  -RP        LB Dressing Goal 2 (OT-IRF)    Activity/Device (LB Dressing Goal 2, OT-IRF)  lower body dressing;long handled shoe horn;elastic laces;reacher  -AF  lower body dressing  -RP     [place] (LB Dressing Goal 2, OT-IRF)  standby assist;verbal cues required  -AF  supervision required;standby assist  -RP     Time Frame (LB Dressing Goal 2, OT-IRF)  long term goal (LTG)  -AF  long term goal (LTG)  -RP     Progress/Outcomes (LB Dressing Goal 2, OT-IRF)  goal ongoing  -AF  goal ongoing  -RP        Grooming Goal 1 (OT-IRF)    Activity/Device (Grooming Goal 1, OT-IRF)  grooming skills, all  -AF  grooming skills, all  -RP     [place] (Grooming Goal 1, OT-IRF)  minimum assist (75% or more patient effort);verbal cues required  -AF  minimum assist (75% or more patient effort)  -RP     Time Frame (Grooming Goal 1, OT-IRF)  short term goal (STG)  -AF  short term goal (STG)  -RP     Progress/Outcomes (Grooming Goal 1, OT-IRF)  goal ongoing  -AF  goal ongoing  -RP        Grooming Goal 2 (OT-IRF)    Activity/Device (Grooming Goal 2, OT-IRF)  grooming skills, all  -AF  grooming skills, all  -RP     [place] (Grooming Goal 2, OT-IRF)  supervision required  -AF  supervision required  -RP     Time Frame (Grooming Goal 2, OT-IRF)  long term goal (LTG)  -AF  long term goal (LTG)  -RP     Progress/Outcomes (Grooming Goal 2, OT-IRF)  goal ongoing  -AF  goal ongoing  -RP        Toileting Goal 1 (OT-IRF)    Activity/Device (Toileting Goal 1, OT-IRF)  toileting skills, all;grab bar/safety frame;raised toilet seat  -AF   toileting skills, all  -RP     [place] Level (Toileting Goal 1, OT-IRF)  moderate assist (50-74% patient effort);verbal cues required  -AF  maximum assist (25-49% patient effort)  -RP     Time Frame (Toileting Goal 1, OT-IRF)  short term goal (STG)  -AF  short term goal (STG)  -RP     Progress/Outcomes (Toileting Goal 1, OT-IRF)  goal ongoing  -AF  goal ongoing  -RP        Toileting Goal 2 (OT-IRF)    Activity/Device (Toileting Goal 2, OT-IRF)  toileting skills, all;grab bar/safety frame;raised toilet seat  -AF  toileting skills, all  -RP     [place] Level (Toileting Goal 2, OT-IRF)  supervision required;verbal cues required  -AF  supervision required;standby assist  -RP     Time Frame (Toileting Goal 2, OT-IRF)  long term goal (LTG)  -AF  long term goal (LTG)  -RP     Progress/Outcomes (Toileting Goal 2, OT-IRF)  goal ongoing  -AF  goal ongoing  -RP        ROM Goal 1 (OT-IRF)    ROM Goal 1 (OT-IRF)  pt will increase B UE ROM to approx. 3/4 shoulder flexion to assist with ADL tasks   -AF  Pt will increase B UE ROM to approx. 3/4 shoulder flexion to assist w/ ADLs.  -RP     Time Frame (ROM Goal 1, OT-IRF)  long term goal (LTG)  -AF  long term goal (LTG)  -RP     Progress/Outcomes (ROM Goal 1, OT-IRF)  goal ongoing  -AF  goal ongoing  -RP       User Key  (r) = Recorded By, (t) = Taken By, (c) = Cosigned By    Initials Name Provider Type    Lolis Horne OTR Occupational Therapist    Precious Hamilton OT Occupational Therapist          Occupational Therapy Education     Title: PT OT SLP Therapies (In Progress)     Topic: Occupational Therapy (In Progress)     Point: ADL training (Done)     Description: Instruct learner(s) on proper safety adaptation and remediation techniques during self care or transfers.   Instruct in proper use of assistive devices.    Learning Progress Summary           Patient Acceptance, ECLINTON,DU by AF at 12/11/2018 12:20 PM    Comment:  wife signed off to assist with walking  to and from bathroom and with toileting tasks, demo'd and discussed Tub transfer bench in tub/shower combo with pt. pt stated that he will order one  himself    Acceptance, E,D, VU,NR by AF at 12/4/2018 12:07 PM    Comment:  AE during LBD tasks    Acceptance, E,D, VU,NR by AF at 12/3/2018 11:46 AM    Comment:  to maintain cervial precautions during ADL tasks    Acceptance, E, VU by RP at 12/1/2018 11:48 AM    Comment:  OT educ on OT role in therapeutic process and pt's POC.   Family Acceptance, E, VU,DU by AF at 12/11/2018 12:20 PM    Comment:  wife signed off to assist with walking to and from bathroom and with toileting tasks, demo'd and discussed Tub transfer bench in tub/shower combo with pt. pt stated that he will order one  himself    Acceptance, E,D, VU,NR by AF at 12/3/2018 11:46 AM    Comment:  to maintain cervial precautions during ADL tasks                   Point: Home exercise program (Done)     Description: Instruct learner(s) on appropriate technique for monitoring, assisting and/or progressing therapeutic exercises/activities.    Learning Progress Summary           Patient Acceptance, E, VU by AF at 12/7/2018  3:08 PM    Comment:  theraputty and foam blocks to increase sensation and FMC/strength                   Point: Precautions (Done)     Description: Instruct learner(s) on prescribed precautions during self-care and functional transfers.    Learning Progress Summary           Patient Acceptance, E,D, VU,NR by AF at 12/3/2018 11:46 AM    Comment:  to maintain cervial precautions during ADL tasks    Acceptance, E, VU by RP at 12/1/2018 11:48 AM    Comment:  OT educ on OT role in therapeutic process and pt's POC.   Family Acceptance, E,D, VU,NR by AF at 12/3/2018 11:46 AM    Comment:  to maintain cervial precautions during ADL tasks                               User Key     Initials Effective Dates Name Provider Type Discipline     04/03/18 -  Lolis Cooley, OTR Occupational Therapist OT     RP 05/03/18 -  Precious De Leon, OT Occupational Therapist OT                       Time Calculation:     Time Calculation- OT     Row Name 12/14/18 1522 12/14/18 1116          Time Calculation- OT    OT Start Time  1430  -SO  0930  -AF     OT Stop Time  1500  -SO  1030  -AF     OT Time Calculation (min)  30 min  -SO  60 min  -AF     OT Received On  12/14/18  -SO  --       User Key  (r) = Recorded By, (t) = Taken By, (c) = Cosigned By    Initials Name Provider Type    Sherrie Corado, OTR Occupational Therapist    AF Lolis Cooley, OTR Occupational Therapist          Therapy Suggested Charges     Code   Minutes Charges    None              Therapy Charges for Today     Code Description Service Date Service Provider Modifiers Qty    18498707719 HC OT NEUROMUSC RE EDUCATION EA 15 MIN 12/14/2018 Sherrie Nguyen OTR GO 2                   NURIS Ashley  12/14/2018

## 2018-12-14 NOTE — PROGRESS NOTES
LOS: 14 days   Patient Care Team:  Miladis Colbert APRN as PCP - General (Family Medicine)    Chief Complaint: same    Subjective     History of Present Illness    Subjective Pt is awake and alert. No c/o    History taken from: patient    Objective     Vital Signs  Temp:  [97.8 °F (36.6 °C)-98.2 °F (36.8 °C)] 98.2 °F (36.8 °C)  Heart Rate:  [68-81] 76  Resp:  [18-20] 20  BP: (130-142)/(73-84) 142/84    Objective exam unchanged    Results Review:     I reviewed the patient's new clinical results.    Medication Review:     Assessment/Plan       Cervical myelopathy (CMS/HCC)      Assessment & Plan Continue to prepare for dc 12/19    Mike Melissa MD  12/14/18  7:36 AM    Time:

## 2018-12-14 NOTE — PROGRESS NOTES
Inpatient Rehabilitation Functional Measures Assessment    Functional Measures  [facility] Eating:  [facility] Grooming: [facility] Bathing:  [facility] Upper Body Dressing:  [facility] Lower Body Dressing:  [facility] Toileting:  [facility] Bladder Management  Level of Assistance:  [place]  Frequency/Number of Accidents this Shift:  [facility] Bowel Management  Level of Assistance: [place]  Frequency/Number of Accidents this Shift: [facility] Bed/Chair/Wheelchair Transfer:  [facility] Toilet Transfer:  [facility] Tub/Shower Transfer:  [place]    Previously Documented Mode of Locomotion at Discharge: Field  [facility] Expected Mode of Locomotion at Discharge: [facility] Walk/Wheelchair:  [facility] Stairs:  [facility] Comprehension:  Auditory comprehension is the usual mode. Comprehension  Score = 7, Independent.  Patient comprehends complex/abstract information in  their primary language.  Patient is completely independent for auditory  comprehension.  There are no activity limitations.  [facility] Expression:  Vocal expression is the usual mode. Expression Score = 7,  Independent.  Patient expresses complex/abstract information in their primary  language.  Patient is completely independent for vocal expression.  There are no  activity limitations.  [facility] Social Interaction:  Social Interaction Score = 6, Modified Independent.  Patient is modified independent for social interaction, requiring: Medications.    [facility] Problem Solving:  Problem Solving Score = 6, Modified [place].  Patient  makes appropriate decisions in order to solve complex problems, but requires  extra time.  [facility] Memory:  Memory Score = 7, Independent.  Patient is completely independent  for memory.  There are no activity limitations.    Therapy Mode Minutes  Occupational Therapy: [place]  Physical Therapy: [place]  Speech Language Pathology:  [place]    Signed by: Marissa Torres RN

## 2018-12-14 NOTE — THERAPY TREATMENT NOTE
Inpatient Rehabilitation - Occupational Therapy Treatment Note    [facility]     Patient Name: Duane Mark Witten  : 1955  MRN: [id number]    Today's Date: 2018                 Admit Date: 2018      Visit Dx:    ICD-10-CM ICD-9-CM   1. Cervical stenosis of spinal canal M48.02 723.0   2. Weakness of both lower extremities R29.898 729.89   3. Paresthesia of both upper extremities R20.2 782.0    M79.601 729.5    M79.602    4. Morbid obesity with BMI of 40.0-44.9, adult (CMS/[facility]) E66.01 278.01    Z68.41 V85.41   5. Impaired functional mobility, balance, gait, and endurance Z74.09 V49.89   6. Functional gait abnormality R26.89 781.2       Patient Active Problem List   Diagnosis   • YANELI (generalized anxiety disorder)   • ADD (attention deficit disorder)   • Depression   • Diabetes type 2, controlled (CMS/[facility])   • ED (erectile dysfunction) of non-organic origin   • HLD (hyperlipidemia)   • Essential hypertension   • Arthritis   • Psoriasis   • Testosterone deficiency   • Primary insomnia   • Weakness of both lower extremities   • Paresthesia of both upper extremities   • Morbid obesity with BMI of 40.0-44.9, adult (CMS/[facility])   • Adverse effect of corticosteroids   • Type 2 diabetes mellitus with hyperglycemia (CMS/[facility])   • Cervical stenosis of spinal canal   • Edema of cervical spinal cord (CMS/[facility])   • Postoperative atrial fibrillation (CMS/[facility])   • Cervical myelopathy (CMS/[facility])         Therapy Treatment    IRF Treatment Summary     Row Name 18 1109 18 0918          Evaluation/Treatment Time and Intent    Subjective Information  complains of;fatigue;pain  -AF  complains of;pain  -LB     Existing Precautions/Restrictions  fall;spinal  -AF  fall;spinal  -LB     Document Type  therapy note (daily note)  -AF  therapy note (daily note)  -LB     Mode of Treatment  occupational therapy  -AF  physical therapy  -LB     Patient/Family Observations  seated in w/c in therapy gym, wife assisted this AM  with washing up stated that he felt itchy this morning   -AF  seated in w/c. wife present  -LB     Recorded by [AF] Lolis Cooley OTR [LB] Odette Orourke PTA     Row Name 12/14/18 1109             Cognition/Psychosocial- PT/OT    Affect/Mental Status (Cognitive)  WFL  -AF      Orientation Status (Cognition)  oriented x 4  -AF      Follows Commands (Cognition)  WFL  -AF      Personal Safety Interventions  fall prevention program maintained;gait belt;nonskid shoes/slippers when out of bed  -AF      Recorded by [AF] Lolis Cooley OTR      Row Name 12/14/18 1109             Bed Mobility Assessment/Treatment    Supine-Sit [place] (Bed Mobility)  minimum assist (75% patient effort);verbal cues  -AF      Sit-Supine [place] (Bed Mobility)  contact guard;verbal cues  -AF      Comment (Bed Mobility)  mat mobility  -AF      Recorded by [AF] Lolis Cooley OTR      Row Name 12/14/18 1109             Functional Mobility    Functional Mobility- Comment  walked to and from bathroom with RWX with CGA  -AF      Recorded by [AF] Lolis Cooley OTR      Row Name 12/14/18 1109             Transfer Assessment/Treatment    Comment (Transfers)  EOM <> w/c stand pivot wtih RWX CGA/MIN  -AF      Recorded by [AF] Lolis Cooley OTR      Row Name 12/14/18 1109             Toilet Transfer    Type (Toilet Transfer)  stand-sit;sit-stand  -AF      [place] Level (Toilet Transfer)  contact guard;verbal cues  -AF      Assistive Device (Toilet Transfer)  commode;grab bars/safety frame;walker, front-wheeled  -AF      Recorded by [AF] Lolis Cooley OTR      Row Name 12/14/18 0918             Gait/Stairs Assessment/Training    Distance in Feet (Gait)  80 as far as 160 ft  -LB      Recorded by [LB] Odette Orourke PTA      Row Name 12/14/18 1109             Bathing Assessment/Treatment    Comment (Bathing)  deferred completed with wife this AM  -AF      Recorded by [AF] Lolis Cooley OTR Row  Name 12/14/18 1109             Toileting Assessment/Treatment    Toileting [place] Level  toileting skills;contact guard assist;verbal cues  -AF      Assistive Device Use (Toileting)  grab bar/safety frame;raised toilet seat  -AF      Comment (Toileting)  with increased time pt able to complete clothing management with steadying assist   -AF      Recorded by [AF] Lolis Cooley OTR      Row Name 12/14/18 1109             Hand  Strength Testing    Right Hand, Setting 1 (Dynamometer Testing)  45`  -AF      Left Hand, Setting 1 (Dynamometer Testing)  40  -AF      Recorded by [AF] Lolis Cooley OTR      Row Name 12/14/18 1109             Fine Motor Testing & Training    Results, 9 Hole Peg Test of Fine Motor Coordination-Right  96 seconds  -AF      Results, 9 Hole Peg Test of Fine Motor Coordination-Left  68 seconds  -AF      Comment, Fine Motor Coordination  FMC with manipualting coins into slotted container with moderate difficulty, stacking pennies 5-10 tall  -AF      Recorded by [AF] Lolis Cooley OTR      Row Name 12/14/18 1109 12/14/18 0918          Pain Scale: Numbers Pre/Post-Treatment    Pain Scale: Numbers, Pretreatment  4/10  -AF  -- varies from a 5 to an 8  -LB     Pain Scale: Numbers, Post-Treatment  4/10  -AF  --     Pain Location - Side  --  Left  -LB     Pain Location  --  shoulder  -LB     Pre/Post Treatment Pain Comment  referred pain in shoulders, pain patches applies  -AF  --     Pain Intervention(s)  --  Medication (See MAR);Ambulation/increased activity  -LB     Recorded by [AF] Lolis Cooley OTR [LB] Odette Orourke PTA     Row Name 12/14/18 0918             Lower Extremity Seated Therapeutic Exercise    Performed, Seated Lower Extremity (Therapeutic Exercise)  hip flexion/extension;knee flexion/extension  -LB      Device, Seated Lower Extremity (Therapeutic Exercise)  elastic bands/tubing;free weights, cuff red T-band, 1 1/2# cuff wgts  -LB      Exercise Type,  Seated Lower Extremity (Therapeutic Exercise)  resistive exercise  -LB      Sets/Reps Detail, Seated Lower Extremity (Therapeutic Exercise)  1/20  -LB      Recorded by [LB] Odette Orourke PTA      Row Name 12/14/18 1109             Neuromuscular Re-education    Comment (Neuromuscular Re-education)  table slides on angled surface 10 reps x 3 sets with rest breaks, in supine #1 hand shae wth elbow flex/ext, sup/pro, wrist flex/ext, with non weighted dowel han shoulder pro/rectraction and shoulder flexion 10 reps x 3 sets with rest breaks   -AF      Recorded by [AF] Lolis Cooley OTR      Row Name 12/14/18 1109             Positioning and Restraints    Pre-Treatment Position  sitting in chair/recliner  -AF      Post Treatment Position  wheelchair  -AF      In Wheelchair  sitting;call light within reach;encouraged to call for assist;exit alarm on  -AF      Recorded by [AF] Lolis Cooley OTR        User Key  (r) = Recorded By, (t) = Taken By, (c) = Cosigned By    Initials Name Effective Dates    LB Odette Orourke PTA 03/07/18 -     AF Lolis Cooley OTR 04/03/18 -           Wound 11/27/18 1318 Other (See comments) neck incision (Active)   Dressing Appearance open to air 12/14/2018  7:35 AM   Closure Adhesive closure strips 12/14/2018  7:35 AM   Base clean;dry 12/14/2018  7:35 AM   Periwound dry;intact 12/13/2018  7:35 PM   Drainage Amount none 12/14/2018  7:35 AM   Dressing Care, Wound open to air 12/14/2018  7:35 AM         OT Recommendation and Plan                 OT IRF GOALS     Row Name 12/07/18 1216 12/01/18 1000          Transfer Goal 1 (OT-IRF)    Activity/Assistive Device (Transfer Goal 1, OT-IRF)  toilet;walk-in shower;shower chair;walker, rolling  -AF  toilet  -RP     [place] Level (Transfer Goal 1, OT-IRF)  minimum assist (75% or more patient effort);contact guard assist;verbal cues required  -AF  minimum assist (75% or more patient effort)  -RP     Time Frame (Transfer Goal 1,  OT-IRF)  short term goal (STG)  -AF  short term goal (STG)  -RP     Progress/Outcomes (Transfer Goal 1, OT-IRF)  goal ongoing  -AF  goal ongoing  -RP        Transfer Goal 2 (OT-IRF)    Activity/Assistive Device (Transfer Goal 2, OT-IRF)  toilet;shower chair;walk-in shower;walker, rolling  -AF  toilet  -RP     [place] Level (Transfer Goal 2, OT-IRF)  supervision required  -AF  supervision required  -RP     Time Frame (Transfer Goal 2, OT-IRF)  long term goal (LTG)  -AF  long term goal (LTG)  -RP     Progress/Outcomes (Transfer Goal 2, OT-IRF)  goal ongoing  -AF  goal ongoing  -RP        Bathing Goal 1 (OT-IRF)    Activity/Device (Bathing Goal 1, OT-IRF)  bathing skills, all;grab bar/tub rail;hand-held shower spray hose;long-handled sponge;tub bench  -AF  bathing skills, all  -RP     [place] Level (Bathing Goal 1, OT-IRF)  minimum assist (75% or more patient effort);verbal cues required  -AF  moderate assist (50-74% patient effort)  -RP     Time Frame (Bathing Goal 1, OT-IRF)  short term goal (STG)  -AF  short term goal (STG)  -RP     Progress/Outcomes (Bathing Goal 1, OT-IRF)  goal ongoing  -AF  goal ongoing  -RP        Bathing Goal 2 (OT-IRF)    Activity/Device (Bathing Goal 2, OT-IRF)  bathing skills, all;grab bar/tub rail;hand-held shower spray hose;tub bench;long-handled sponge  -AF  bathing skills, all  -RP     [place] Level (Bathing Goal 2, OT-IRF)  supervision required  -AF  supervision required;standby assist  -RP     Time Frame (Bathing Goal 2, OT-IRF)  long term goal (LTG)  -AF  long term goal (LTG)  -RP     Progress/Outcomes (Bathing Goal 2, OT-IRF)  goal ongoing  -AF  goal ongoing  -RP        UB Dressing Goal 1 (OT-IRF)    Activity/Device (UB Dressing Goal 1, OT-IRF)  upper body dressing  -AF  upper body dressing  -RP     [place] (UB Dress Goal 1, OT-IRF)  minimum assist (75% or more patient effort);verbal cues required  -AF  minimum assist (75% or more patient effort)  -RP     Time  Frame (UB Dressing Goal 1, OT-IRF)  short term goal (STG)  -AF  short term goal (STG)  -RP     Progress/Outcomes (UB Dressing Goal 1, OT-IRF)  goal ongoing  -AF  goal ongoing  -RP        UB Dressing Goal 2 (OT-IRF)    Activity/Device (UB Dressing Goal 2, OT-IRF)  upper body dressing  -AF  upper body dressing  -RP     [place] (UB Dress Goal 2, OT-IRF)  supervision required  -AF  supervision required  -RP     Time Frame (UB Dressing Goal 2, OT-IRF)  long term goal (LTG)  -AF  long term goal (LTG)  -RP     Progress/Outcomes (UB Dressing Goal 2, OT-IRF)  goal ongoing  -AF  goal ongoing  -RP        LB Dressing Goal 1 (OT-IRF)    Activity/Device (LB Dressing Goal 1, OT-IRF)  lower body dressing;long handled shoe horn;reacher;elastic laces  -AF  lower body dressing  -RP     [place] (LB Dressing Goal 1, OT-IRF)  moderate assist (50-74% patient effort);verbal cues required;minimum assist (75% or more patient effort)  -AF  maximum assist (25-49% patient effort)  -RP     Time Frame (LB Dressing Goal 1, OT-IRF)  short term goal (STG)  -AF  short term goal (STG)  -RP     Progress/Outcomes (LB Dressing Goal 1, OT-IRF)  goal ongoing  -AF  goal ongoing  -RP        LB Dressing Goal 2 (OT-IRF)    Activity/Device (LB Dressing Goal 2, OT-IRF)  lower body dressing;long handled shoe horn;elastic laces;reacher  -AF  lower body dressing  -RP     [place] (LB Dressing Goal 2, OT-IRF)  standby assist;verbal cues required  -AF  supervision required;standby assist  -RP     Time Frame (LB Dressing Goal 2, OT-IRF)  long term goal (LTG)  -AF  long term goal (LTG)  -RP     Progress/Outcomes (LB Dressing Goal 2, OT-IRF)  goal ongoing  -AF  goal ongoing  -RP        Grooming Goal 1 (OT-IRF)    Activity/Device (Grooming Goal 1, OT-IRF)  grooming skills, all  -AF  grooming skills, all  -RP     [place] (Grooming Goal 1, OT-IRF)  minimum assist (75% or more patient effort);verbal cues required  -AF  minimum assist (75% or more patient  effort)  -RP     Time Frame (Grooming Goal 1, OT-IRF)  short term goal (STG)  -AF  short term goal (STG)  -RP     Progress/Outcomes (Grooming Goal 1, OT-IRF)  goal ongoing  -AF  goal ongoing  -RP        Grooming Goal 2 (OT-IRF)    Activity/Device (Grooming Goal 2, OT-IRF)  grooming skills, all  -AF  grooming skills, all  -RP     [place] (Grooming Goal 2, OT-IRF)  supervision required  -AF  supervision required  -RP     Time Frame (Grooming Goal 2, OT-IRF)  long term goal (LTG)  -AF  long term goal (LTG)  -RP     Progress/Outcomes (Grooming Goal 2, OT-IRF)  goal ongoing  -AF  goal ongoing  -RP        Toileting Goal 1 (OT-IRF)    Activity/Device (Toileting Goal 1, OT-IRF)  toileting skills, all;grab bar/safety frame;raised toilet seat  -AF  toileting skills, all  -RP     [place] Level (Toileting Goal 1, OT-IRF)  moderate assist (50-74% patient effort);verbal cues required  -AF  maximum assist (25-49% patient effort)  -RP     Time Frame (Toileting Goal 1, OT-IRF)  short term goal (STG)  -AF  short term goal (STG)  -RP     Progress/Outcomes (Toileting Goal 1, OT-IRF)  goal ongoing  -AF  goal ongoing  -RP        Toileting Goal 2 (OT-IRF)    Activity/Device (Toileting Goal 2, OT-IRF)  toileting skills, all;grab bar/safety frame;raised toilet seat  -AF  toileting skills, all  -RP     [place] Level (Toileting Goal 2, OT-IRF)  supervision required;verbal cues required  -AF  supervision required;standby assist  -RP     Time Frame (Toileting Goal 2, OT-IRF)  long term goal (LTG)  -AF  long term goal (LTG)  -RP     Progress/Outcomes (Toileting Goal 2, OT-IRF)  goal ongoing  -AF  goal ongoing  -RP        ROM Goal 1 (OT-IRF)    ROM Goal 1 (OT-IRF)  pt will increase B UE ROM to approx. 3/4 shoulder flexion to assist with ADL tasks   -AF  Pt will increase B UE ROM to approx. 3/4 shoulder flexion to assist w/ ADLs.  -RP     Time Frame (ROM Goal 1, OT-IRF)  long term goal (LTG)  -AF  long term goal (LTG)  -RP      Progress/Outcomes (ROM Goal 1, OT-IRF)  goal ongoing  -AF  goal ongoing  -RP       User Key  (r) = Recorded By, (t) = Taken By, (c) = Cosigned By    Initials Name Provider Type    AF Lolis Cooley, OTR Occupational Therapist    Precious Hamilton OT Occupational Therapist          Occupational Therapy Education     Title: PT OT SLP Therapies (In Progress)     Topic: Occupational Therapy (In Progress)     Point: ADL training (Done)     Description: Instruct learner(s) on proper safety adaptation and remediation techniques during self care or transfers.   Instruct in proper use of assistive devices.    Learning Progress Summary           Patient Acceptance, E, VU,DU by AF at 12/11/2018 12:20 PM    Comment:  wife signed off to assist with walking to and from bathroom and with toileting tasks, demo'd and discussed Tub transfer bench in tub/shower combo with pt. pt stated that he will order one  himself    Acceptance, E,D, VU,NR by AF at 12/4/2018 12:07 PM    Comment:  AE during LBD tasks    Acceptance, E,D, VU,NR by AF at 12/3/2018 11:46 AM    Comment:  to maintain cervial precautions during ADL tasks    Acceptance, E, VU by RP at 12/1/2018 11:48 AM    Comment:  OT educ on OT role in therapeutic process and pt's POC.   Family Acceptance, E, VU,DU by AF at 12/11/2018 12:20 PM    Comment:  wife signed off to assist with walking to and from bathroom and with toileting tasks, demo'd and discussed Tub transfer bench in tub/shower combo with pt. pt stated that he will order one  himself    Acceptance, E,D, VU,NR by AF at 12/3/2018 11:46 AM    Comment:  to maintain cervial precautions during ADL tasks                   Point: Home exercise program (Done)     Description: Instruct learner(s) on appropriate technique for monitoring, assisting and/or progressing therapeutic exercises/activities.    Learning Progress Summary           Patient Acceptance, E, VU by AF at 12/7/2018  3:08 PM    Comment:  theraputty and foam  blocks to increase sensation and FMC/strength                   Point: Precautions (Done)     Description: Instruct learner(s) on prescribed precautions during self-care and functional transfers.    Learning Progress Summary           Patient Acceptance, TAYE,EULA, [place],NR by AF at 12/3/2018 11:46 AM    Comment:  to maintain cervial precautions during ADL tasks    Acceptance, TAYE, VU by RP at 12/1/2018 11:48 AM    Comment:  OT educ on OT role in therapeutic process and pt's POC.   Family Acceptance, EULA KOTHARI, [place],NR by AF at 12/3/2018 11:46 AM    Comment:  to maintain cervial precautions during ADL tasks                               User Key     Initials Effective Dates Name Provider Type Discipline    AF 04/03/18 -  Lolis Cooley OTJEANINE Occupational Therapist OT    RP 05/03/18 -  Precious DeL eon OT Occupational Therapist OT                       Time Calculation:     Time Calculation- OT     Row Name 12/14/18 1116             Time Calculation- OT    OT Start Time  0930  -AF      OT Stop Time  1030  -AF      OT Time Calculation (min)  60 min  -AF        User Key  (r) = Recorded By, (t) = Taken By, (c) = Cosigned By    Initials Name Provider Type    AF Lolis Cooley, OTR Occupational Therapist          Therapy Suggested Charges     Code   Minutes Charges    None              Therapy Charges for Today     Code Description Service Date Service Provider Modifiers Qty    09431941456 HC OT SELF CARE/MGMT/TRAIN EA 15 MIN 12/13/2018 Lolis Cooley, OTR GO 3    84828163113 HC OT NEUROMUSC RE EDUCATION EA 15 MIN 12/13/2018 Lolis Cooley OTR GO 2    44832807885 HC OT THER PROC EA 15 MIN 12/13/2018 Lolis Cooley, OTR GO 1    54739280465 HC OT SELF CARE/MGMT/TRAIN EA 15 MIN 12/14/2018 Lolis Cooley, OTR GO 1    58535876910 HC OT NEUROMUSC RE EDUCATION EA 15 MIN 12/14/2018 Lolis Cooley, OTR GO 2    41111222003 HC OT THER PROC EA 15 MIN 12/14/2018 Lolis Coolye, OTR GO 1                   Lolis Cooley  OTR  12/14/2018

## 2018-12-15 LAB
GLUCOSE BLDC GLUCOMTR-MCNC: 129 MG/DL (ref 70–130)
GLUCOSE BLDC GLUCOMTR-MCNC: 135 MG/DL (ref 70–130)
GLUCOSE BLDC GLUCOMTR-MCNC: 156 MG/DL (ref 70–130)
GLUCOSE BLDC GLUCOMTR-MCNC: 161 MG/DL (ref 70–130)

## 2018-12-15 PROCEDURE — 97110 THERAPEUTIC EXERCISES: CPT | Performed by: PHYSICAL THERAPIST

## 2018-12-15 PROCEDURE — 82962 GLUCOSE BLOOD TEST: CPT

## 2018-12-15 PROCEDURE — 63710000001 INSULIN LISPRO (HUMAN) PER 5 UNITS: Performed by: HOSPITALIST

## 2018-12-15 RX ADMIN — INSULIN LISPRO 2 UNITS: 100 INJECTION, SOLUTION INTRAVENOUS; SUBCUTANEOUS at 09:11

## 2018-12-15 RX ADMIN — INSULIN LISPRO 2 UNITS: 100 INJECTION, SOLUTION INTRAVENOUS; SUBCUTANEOUS at 20:37

## 2018-12-15 RX ADMIN — CYCLOBENZAPRINE 10 MG: 10 TABLET, FILM COATED ORAL at 07:40

## 2018-12-15 RX ADMIN — METFORMIN HYDROCHLORIDE 1000 MG: 1000 TABLET ORAL at 07:40

## 2018-12-15 RX ADMIN — PIOGLITAZONE 30 MG: 30 TABLET ORAL at 07:42

## 2018-12-15 RX ADMIN — METFORMIN HYDROCHLORIDE 1000 MG: 1000 TABLET ORAL at 17:08

## 2018-12-15 RX ADMIN — HYDROCHLOROTHIAZIDE 25 MG: 25 TABLET ORAL at 07:41

## 2018-12-15 RX ADMIN — METOPROLOL TARTRATE 25 MG: 25 TABLET ORAL at 20:32

## 2018-12-15 RX ADMIN — DULOXETINE HYDROCHLORIDE 60 MG: 60 CAPSULE, DELAYED RELEASE ORAL at 07:41

## 2018-12-15 RX ADMIN — ATOMOXETINE 100 MG: 60 CAPSULE ORAL at 07:42

## 2018-12-15 RX ADMIN — CYCLOBENZAPRINE 10 MG: 10 TABLET, FILM COATED ORAL at 23:34

## 2018-12-15 RX ADMIN — HYDROCODONE BITARTRATE AND ACETAMINOPHEN 2 TABLET: 5; 325 TABLET ORAL at 20:33

## 2018-12-15 RX ADMIN — GLIPIZIDE 10 MG: 10 TABLET ORAL at 07:40

## 2018-12-15 RX ADMIN — METOPROLOL TARTRATE 25 MG: 25 TABLET ORAL at 07:41

## 2018-12-15 RX ADMIN — FAMOTIDINE 20 MG: 20 TABLET, FILM COATED ORAL at 07:41

## 2018-12-15 RX ADMIN — HYDROCODONE BITARTRATE AND ACETAMINOPHEN 2 TABLET: 5; 325 TABLET ORAL at 12:17

## 2018-12-15 RX ADMIN — HYDROCODONE BITARTRATE AND ACETAMINOPHEN 2 TABLET: 5; 325 TABLET ORAL at 16:13

## 2018-12-15 RX ADMIN — CYCLOBENZAPRINE 10 MG: 10 TABLET, FILM COATED ORAL at 16:12

## 2018-12-15 RX ADMIN — LIDOCAINE 2 PATCH: 50 PATCH CUTANEOUS at 09:12

## 2018-12-15 RX ADMIN — LOSARTAN POTASSIUM 100 MG: 100 TABLET, FILM COATED ORAL at 07:41

## 2018-12-15 RX ADMIN — RIVAROXABAN 20 MG: 20 TABLET, FILM COATED ORAL at 17:08

## 2018-12-15 RX ADMIN — HYDROCODONE BITARTRATE AND ACETAMINOPHEN 2 TABLET: 5; 325 TABLET ORAL at 07:40

## 2018-12-15 NOTE — PLAN OF CARE
Problem: Patient Care Overview  Goal: Plan of Care Review  Outcome: Ongoing (interventions implemented as appropriate)   12/15/18 1628   Patient Care Overview   IRF Plan of Care Review progress ongoing, continue   Progress, Functional Goals demonstrating adequate progress   Coping/Psychosocial   Plan of Care Reviewed With patient   OTHER   Outcome Summary Patient had a good day, tolerated therapy and no unsafe behavior. Pain medication given three times today, ambulates assist x1. A & O x 4, continent of B/B and wears a CPAP at night. Wife at bedside, very attentive and helpful with his care.        Problem: Fall Risk (Adult)  Goal: Absence of Fall  Outcome: Ongoing (interventions implemented as appropriate)   12/15/18 1628   Fall Risk (Adult)   Absence of Fall making progress toward outcome       Problem: Pain, Acute (Adult)  Goal: Acceptable Pain Control/Comfort Level  Outcome: Ongoing (interventions implemented as appropriate)   12/15/18 1628   Pain, Acute (Adult)   Acceptable Pain Control/Comfort Level making progress toward outcome       Problem: Skin Injury Risk (Adult)  Goal: Skin Health and Integrity  Outcome: Ongoing (interventions implemented as appropriate)   12/15/18 1628   Skin Injury Risk (Adult)   Skin Health and Integrity making progress toward outcome

## 2018-12-15 NOTE — PROGRESS NOTES
Inpatient Rehabilitation Functional Measures Assessment    Functional Measures  [facility] Eating:  [facility] Grooming: [facility] Bathing:  [facility] Upper Body Dressing:  [facility] Lower Body Dressing:  [facility] Toileting:  [facility] Bladder Management  Level of Assistance:  [place]  Frequency/Number of Accidents this Shift:  [facility] Bowel Management  Level of Assistance: [place]  Frequency/Number of Accidents this Shift: [facility] Bed/Chair/Wheelchair Transfer:  [facility] Toilet Transfer:  [facility] Tub/Shower Transfer:  [place]    Previously Documented Mode of Locomotion at Discharge: [place]  [facility] Expected Mode of Locomotion at Discharge: [facility] Walk/Wheelchair:  [facility] Stairs:  [facility] Comprehension:  Auditory comprehension is the usual mode. Comprehension  Score = 7, Independent.  Patient comprehends complex/abstract information in  their primary language.  Patient is completely independent for auditory  comprehension.  There are no activity limitations.  [facility] Expression:  Vocal expression is the usual mode. Expression Score = 7,  Independent.  Patient expresses complex/abstract information in their primary  language.  Patient is completely independent for vocal expression.  There are no  activity limitations.  [facility] Social Interaction:  Social Interaction Score = 7, Independent. Patient is  completely independent for social interaction.  There are no activity  limitations.  [facility] Problem Solving:  Problem Solving Score = 6, Modified [place].  Patient  makes appropriate decisions in order to solve complex problems with mild  difficulty but self-corrects.  [facility] Memory:  Memory Score = 6, Modified [place].  Patient is modified  independent for memory, having only mild difficulty and using self-initiated or  environmental cues to remember.    Therapy Mode Minutes  Occupational Therapy: [place]  Physical Therapy: [place]  Speech Language Pathology:  [place]    Signed by: Stephanie  Jovi, RN

## 2018-12-15 NOTE — PLAN OF CARE
Problem: Patient Care Overview  Goal: Plan of Care Review  Outcome: Ongoing (interventions implemented as appropriate)   12/15/18 0108   Patient Care Overview   IRF Plan of Care Review progress ongoing, continue   Progress, Functional Goals demonstrating adequate progress   Coping/Psychosocial   Plan of Care Reviewed With patient   OTHER   Outcome Summary Patient is calm and cooperative. PRN pain medication given at HS for bilateral shoulder pain radiating to his neck and left arm. Rested well during the night. Using the call light for assistance. Wife at bedside.       Problem: Fall Risk (Adult)  Goal: Absence of Fall  Outcome: Ongoing (interventions implemented as appropriate)      Problem: Pain, Acute (Adult)  Goal: Acceptable Pain Control/Comfort Level  Outcome: Ongoing (interventions implemented as appropriate)      Problem: Skin Injury Risk (Adult)  Goal: Skin Health and Integrity  Outcome: Ongoing (interventions implemented as appropriate)

## 2018-12-15 NOTE — THERAPY TREATMENT NOTE
Inpatient Rehabilitation - Physical Therapy Treatment Note  [facility]     Patient Name: Duane Mark Witten  : 1955  MRN: [id number]    Today's Date: 12/15/2018                 Admit Date: 2018      Visit Dx:      ICD-10-CM ICD-9-CM   1. Cervical stenosis of spinal canal M48.02 723.0   2. Weakness of both lower extremities R29.898 729.89   3. Paresthesia of both upper extremities R20.2 782.0    M79.601 729.5    M79.602    4. Morbid obesity with BMI of 40.0-44.9, adult (CMS/HCC) E66.01 278.01    Z68.41 V85.41   5. Impaired functional mobility, balance, gait, and endurance Z74.09 V49.89   6. Functional gait abnormality R26.89 781.2       Patient Active Problem List   Diagnosis   • YANELI (generalized anxiety disorder)   • ADD (attention deficit disorder)   • Depression   • Diabetes type 2, controlled (CMS/[facility])   • ED (erectile dysfunction) of non-organic origin   • HLD (hyperlipidemia)   • Essential hypertension   • Arthritis   • Psoriasis   • Testosterone deficiency   • Primary insomnia   • Weakness of both lower extremities   • Paresthesia of both upper extremities   • Morbid obesity with BMI of 40.0-44.9, adult (CMS/[facility])   • Adverse effect of corticosteroids   • Type 2 diabetes mellitus with hyperglycemia (CMS/[facility])   • Cervical stenosis of spinal canal   • Edema of cervical spinal cord (CMS/[facility])   • Postoperative atrial fibrillation (CMS/[facility])   • Cervical myelopathy (CMS/[facility])       Therapy Treatment    IRF Treatment Summary     Row Name 12/15/18 7393             Evaluation/Treatment Time and Intent    Subjective Information  no complaints  -JS      Existing Precautions/Restrictions  fall;spinal  -JS      Document Type  therapy note (daily note)  -JS      Mode of Treatment  physical therapy  -JS      Patient/Family Observations  Pt up in w/c  -JS      Recorded by [JS] Lo Sánchez, PT      Row Name 12/15/18 4086             Cognition/Psychosocial- PT/OT    Affect/Mental Status (Cognitive)  WFL  -JS       Orientation Status (Cognition)  oriented x 4  -JS      Follows Commands (Cognition)  WFL  -JS      Personal Safety Interventions  fall prevention program maintained;gait belt;muscle strengthening facilitated;nonskid shoes/slippers when out of bed  -JS      Recorded by [JS] Lo Sánchez, HAILEY      Row Name 12/15/18 1330             Bed Mobility Assessment/Treatment    Comment (Bed Mobility)  Up in w/c  -JS      Recorded by [JS] Lo Sánchez, PT      Row Name 12/15/18 1330             Sit-Stand Transfer    Sit-Stand [place] (Transfers)  contact guard;verbal cues  -JS      Assistive Device (Sit-Stand Transfers)  walker, front-wheeled  -JS      Recorded by [JS] Lo Sánchez, PT      Row Name 12/15/18 1330             Stand-Sit Transfer    Stand-Sit [place] (Transfers)  contact guard;verbal cues  -JS      Assistive Device (Stand-Sit Transfers)  walker, front-wheeled  -JS      Recorded by [JS] Lo Sánchez, PT      Row Name 12/15/18 1330             Gait/Stairs Assessment/Training    [place] Level (Gait)  minimum assist (75% patient effort);contact guard;verbal cues  -JS      Assistive Device (Gait)  walker, front-wheeled  -JS      Distance in Feet (Gait)  160x2  -JS      Pattern (Gait)  step-through  -JS      Deviations/Abnormal Patterns (Gait)  ataxic;base of support, narrow;sarah decreased;stride length decreased  -JS      Bilateral Gait Deviations  forward flexed posture;weight shift ability decreased;heel strike decreased  -JS      Right Sided Gait Deviations  --  -JS      Comment (Gait/Stairs)  No knee buckling during today's ambulation  -JS      Recorded by [JS] Lo Sánchez, PT      Row Name 12/15/18 4260             Pain Scale: Numbers Pre/Post-Treatment    Pain Scale: Numbers, Pretreatment  0/10 - no pain  -JS      Pain Scale: Numbers, Post-Treatment  0/10 - no pain  -JS      Recorded by [JS] Lo Sánchez, PT      Row Name 12/15/18 1330             Dynamic Balance Activity    Therapeutic Training  Performed (Dynamic Balance)  side stepping  -JS      Support Needed for Balance (Dynamic Balance Training)  uses both upper extremities for support;CGA  -JS      Comment (Dynamic Balance Training)  3x8' at hemibar  -JS      Recorded by [JS] Lo Sánchez PT      Row Name 12/15/18 1330             Lower Extremity Standing Therapeutic Exercise    Performed, Standing Lower Extremity (Therapeutic Exercise)  heel raises;toe raises;hip flexion/extension  -JS      Device, Standing Lower Extremity (Therapeutic Exercise)  jacob bars  -JS      Exercise Type, Standing Lower Extremity (Therapeutic Exercise)  AROM (active range of motion)  -JS      Sets/Reps Detail, Standing Lower Extremity (Therapeutic Exercise)  1/10  -JS      Recorded by [JS] Lo Sánchez PT      Row Name 12/15/18 1330             Lower Extremity Seated Therapeutic Exercise    Performed, Seated Lower Extremity (Therapeutic Exercise)  hip flexion/extension;ankle dorsiflexion/plantarflexion;knee flexion/extension  -JS      Device, Seated Lower Extremity (Therapeutic Exercise)  elastic bands/tubing;free weights, cuff red bend, 1.5# ankle cuff  -JS      Exercise Type, Seated Lower Extremity (Therapeutic Exercise)  resistive exercise  -JS      Sets/Reps Detail, Seated Lower Extremity (Therapeutic Exercise)  1/20  -JS      Recorded by [JS] Lo Sánchez PT      Row Name 12/15/18 1330             Lower Extremity Supine Therapeutic Exercise    Performed, Supine Lower Extremity (Therapeutic Exercise)  hip abduction/adduction  -JS      Device, Supine Lower Extremity (Therapeutic Exercise)  elastic bands/tubing red  -JS      Exercise Type, Supine Lower Extremity (Therapeutic Exercise)  resistive exercise  -JS      Sets/Reps Detail, Supine Lower Extremity (Therapeutic Exercise)  1/20  -JS      Recorded by [JS] Lo Sánchez PT      Row Name 12/15/18 1330             Positioning and Restraints    Pre-Treatment Position  sitting in chair/recliner  -JS      Post Treatment  Position  wheelchair  -JS      In Wheelchair  sitting;call light within reach;encouraged to call for assist;exit alarm on  -JS      Recorded by [MAURICIO] Lo Sánchez PT        User Key  (r) = Recorded By, (t) = Taken By, (c) = Cosigned By    Initials Name Effective Dates    Lo Toro PT 04/06/17 -         Wound 11/27/18 1318 Other (See comments) neck incision (Active)   Dressing Appearance open to air 12/15/2018  7:40 AM   Closure Adhesive closure strips;Liquid skin adhesive 12/15/2018  7:40 AM   Base clean;dry 12/14/2018  8:04 PM   Periwound dry;intact 12/14/2018  8:04 PM   [place]ound Temperature warm 12/14/2018  8:04 PM   Periwound Skin Turgor soft 12/14/2018  8:04 PM   Drainage Amount none 12/15/2018  7:40 AM   Dressing Care, Wound open to air 12/15/2018  7:40 AM     Physical Therapy Education     Title: PT OT SLP Therapies (In Progress)     Topic: Physical Therapy (Done)     Point: Mobility training (Done)     Learning Progress Summary           Patient Acceptance, E,TB, VU,NR by MAURICIO at 12/15/2018  3:33 PM    Acceptance, E, VU,NR by SIMÓN at 12/14/2018  4:14 PM    Acceptance, E,D, VU by JOVANNI at 12/14/2018 11:40 AM    Comment:  Safety with ambulation    Acceptance, E,TB,D, VU,NR by EE at 12/13/2018  9:11 AM    Acceptance, E,TB, VU,NR by EE at 12/12/2018  8:48 AM    Acceptance, E,TB,D, VU,NR by EE at 12/11/2018  8:48 AM    Acceptance, E,TB,D, VU,NR by EE at 12/10/2018  8:45 AM    Eager, E,TB,D, VU,DU,NR by MAR at 12/9/2018 10:24 AM    Eager, E,TB,D, VU,DU by KP at 12/8/2018 12:00 PM    Acceptance, E,TB,D, VU,NR by EE at 12/7/2018  9:22 AM    Acceptance, E,TB, VU,NR by EE at 12/6/2018  9:15 AM    Acceptance, E,TB, VU,NR by EE at 12/5/2018 10:10 AM    Acceptance, E, NR by LB at 12/4/2018 10:12 AM    Acceptance, E,ANGELIQUE, [place],NR by JAMARCUS at 12/3/2018  9:48 AM    Acceptance, E, VU,NR by IAN at 12/1/2018  9:50 AM   Family Acceptance, TAYE,ANGELIQUE,EULA, VU,NR by JAMARCUS at 12/11/2018  8:48 AM   Significant Other Eager, TAYE,ANGELIQUED, VU,DU,NR by  at  12/9/2018 10:24 AM                   Point: Home exercise program (Done)     Learning Progress Summary           Patient Acceptance, E,TB, VU,NR by MAURICIO at 12/15/2018  3:33 PM    Acceptance, E,TB,D, VU,NR by EE at 12/13/2018  9:11 AM    Acceptance, E,TB, VU,NR by EE at 12/12/2018  8:48 AM    Acceptance, E,TB,D, VU,NR by EE at 12/11/2018  8:48 AM    Acceptance, E,TB,D, VU,NR by EE at 12/10/2018  8:45 AM    Eager, E,TB,D, VU,DU,NR by KP at 12/9/2018 10:24 AM    Eager, E,TB,D, VU,DU by KP at 12/8/2018 12:00 PM    Acceptance, E,TB,D, VU,NR by EE at 12/7/2018  9:22 AM    Acceptance, E,TB, VU,NR by EE at 12/6/2018  9:15 AM    Acceptance, E,TB, VU,NR by EE at 12/5/2018 10:10 AM    Acceptance, E,TB, VU,NR by EE at 12/3/2018  9:48 AM   Family Acceptance, E,TB,D, VU,NR by EE at 12/11/2018  8:48 AM   Significant Other Eager, E,TB,D, VU,DU,NR by  at 12/9/2018 10:24 AM                   Point: Body mechanics (Done)     Learning Progress Summary           Patient Acceptance, E,TB, VU,NR by MAURICIO at 12/15/2018  3:33 PM    Acceptance, E,TB,D, VU,NR by EE at 12/13/2018  9:11 AM    Acceptance, E,TB, VU,NR by EE at 12/12/2018  8:48 AM    Acceptance, E,TB,D, VU,NR by EE at 12/11/2018  8:48 AM    Acceptance, E,TB,D, VU,NR by EE at 12/10/2018  8:45 AM    Acceptance, E,TB,D, VU,NR by EE at 12/7/2018  9:22 AM    Acceptance, E,TB, VU,NR by EE at 12/6/2018  9:15 AM    Acceptance, E,TB, VU,NR by EE at 12/5/2018 10:10 AM    Acceptance, E,TB, VU,NR by EE at 12/3/2018  9:48 AM   Family Acceptance, E,TB,D, VU,NR by EE at 12/11/2018  8:48 AM                   Point: Precautions (Done)     Learning Progress Summary           Patient Acceptance, E,TB, VU,NR by JS at 12/15/2018  3:33 PM    Acceptance, E,TB,D, VU,NR by EE at 12/13/2018  9:11 AM    Acceptance, E,TB, VU,NR by EE at 12/12/2018  8:48 AM    Acceptance, E,TB,D, VU,NR by EE at 12/11/2018  8:48 AM    Acceptance, E,TB,D, VU,NR by EE at 12/10/2018  8:45 AM    Acceptance, E,TB,D, VU,NR by EE at  12/7/2018  9:22 AM    Acceptance, E,TB, VU,NR by EE at 12/6/2018  9:15 AM    Acceptance, E,TB, VU,NR by EE at 12/5/2018 10:10 AM    Acceptance, E,TB, VU,NR by EE at 12/3/2018  9:48 AM    Acceptance, E, VU,NR by JK at 12/1/2018  9:50 AM   Family Acceptance, E,TB,D, VU,NR by  at 12/11/2018  8:48 AM                               User Key     Initials Effective Dates Name Provider Type Discipline     04/06/17 -  Lo Sánchez, PT Physical Therapist PT    KD 04/03/18 -  Daphne Quinteros, PT Physical Therapist PT    LB 03/07/18 -  Odette Orourke, PTA Physical Therapy Assistant PT    EE 04/03/18 -  Cathleen Rosario, PT Physical Therapist PT    JK 04/03/18 -  Janae Trujillo, PT Physical Therapist PT    KP 04/03/18 -  Angelic Bautista, PT Physical Therapist PT                  PT Recommendation and Plan                        Time Calculation:     PT Charges     Row Name 12/15/18 1330             Time Calculation    Start Time  1330  -JS      Stop Time  1400  -JS      Time Calculation (min)  30 min  -JS      PT Received On  12/15/18  -JS      PT - Next Appointment  12/16/18  -JS        User Key  (r) = Recorded By, (t) = Taken By, (c) = Cosigned By    Initials Name Provider Type    JS Lo Sánchez, PT Physical Therapist            Therapy Charges for Today     Code Description Service Date Service Provider Modifiers Qty    23904263759 HC PT THER PROC EA 15 MIN 12/15/2018 Lo Sánchez, PT GP 2                   Lo Sánchez, PT  12/15/2018

## 2018-12-15 NOTE — PROGRESS NOTES
LOS: 15 days   Patient Care Team:  Miladis Colbert APRN as PCP - General (Family Medicine)    Chief Complaint: same    Subjective     History of Present Illness    Subjective Pt is awake and alert. No new issues. Wife present.     History taken from: patient    Objective     Vital Signs  Temp:  [97.6 °F (36.4 °C)-98.5 °F (36.9 °C)] 98.5 °F (36.9 °C)  Heart Rate:  [77-80] 77  Resp:  [18-20] 18  BP: (132-135)/(63-86) 135/78    Objective exam unchanged calves remain swollen.     Results Review:     I reviewed the patient's new clinical results.    Medication Review:     Assessment/Plan       Cervical myelopathy (CMS/HCC)      Assessment & Plan Continue to prepare for dc.     Mike Melissa MD  12/15/18  8:23 AM    Time:

## 2018-12-15 NOTE — PROGRESS NOTES
Inpatient Rehabilitation Functional Measures Assessment    Functional Measures  [facility] Eating:  Branch  [facility] Grooming: Branch  [facility] Bathing:  Branch  [facility] Upper Body Dressing:  Branch  [facility] Lower Body Dressing:  Branch  [facility] Toileting:  Toileting Score = 4.  Patient requires minimal assistance for  toileting, such as steadying for balance while cleansing or adjusting clothes.  Patient requires the following assistive device(s): Adaptive device to maintain  balance.    [facility] Bladder Management  Level of Assistance:  Bladder Score = 5.  Patient is supervision/set-up for  bladder management, requiring: Verbal cuing, prompting, or instructing. Stand by  assistance. Patient requires the following assistive device(s):  Urinal.  Frequency/Number of Accidents this Shift:  Bladder accidents this shift:  0 .  Patient has not had an accident this shift.    [facility] Bowel Management  Level of Assistance: Activity was not observed.  Frequency/Number of Accidents this Shift: Bowel accidents this shift: 0 .  Patient has not had an accident this shift.    [facility] Bed/Chair/Wheelchair Transfer:  Bed/chair/wheelchair Transfer Score = 2.  Patient performs 25-49% of effort and requires maximal assistance (most of the  lifting) for transferring to and from the bed/chair/wheelchair. Patient requires  the following assistive device(s): Elevated head of bed. Elevated bed/surface.  Bed rails.  [facility] Toilet Transfer:  Toilet Transfer Score = 2.  Patient performs 25-49% of  effort and requires maximal assistance (most of the lifting) for transferring to  and from the toilet/commode. Patient requires the following assistive device(s):  Grab bars.  [facility] Tub/Shower Transfer:  Branch    Previously Documented Mode of Locomotion at Discharge: Field  [facility] Expected Mode of Locomotion at Discharge: [facility] Walk/Wheelchair:  Branch  [facility] Stairs:  [facility] Comprehension:  Branch  [facility] Expression:  Branch  [facility] Social Interaction:  Branch  [facility] Problem Solving:   Branch  [facility] Memory:  Branch    Therapy Mode Minutes  Occupational Therapy: Branch  Physical Therapy: Branch  Speech Language Pathology:  Branch    Signed by: ALEJO Gutierrez

## 2018-12-15 NOTE — PROGRESS NOTES
Inpatient Rehabilitation Functional Measures Assessment    Functional Measures  [facility]CCO Eating:  Branch  [facility] Grooming: Branch  [facility] Bathing:  Branch  [facility] Upper Body Dressing:  Branch  [facility] Lower Body Dressing:  [facility] Toileting:  [facility] Bladder Management  Level of Assistance:  [place]  Frequency/Number of Accidents this Shift:  [facility] Bowel Management  Level of Assistance: [place]  Frequency/Number of Accidents this Shift: Branch    [facility] Bed/Chair/Wheelchair Transfer:  Activity was not observed.  [facility] Toilet Transfer:  [facility] Tub/Shower Transfer:  [place]    Previously Documented Mode of Locomotion at Discharge: Field  [facility] Expected Mode of Locomotion at Discharge: [facility] Walk/Wheelchair:  WHEELCHAIR OBSERVATION   Activity was not observed.    WALK OBSERVATION   Walk Distance Scale = 3.  Distance walked is greater than 150 feet. Walk Score  = 4.  Patient performs 75% or more of effort and requires minimal assistance.  Incidental help/contact guard/steadying was provided. Patient walked a distance  of  160 feet. Patient requires the following assistive device(s): Rolling  walker.  [facility] Stairs:  Activity was not observed.    [facility] Comprehension:  [facility] Expression:  [facility] Social Interaction:  [facility] Problem Solving:  [facility] Memory:  [place]    Therapy Mode Minutes  Occupational Therapy: Branch  Physical Therapy: Individual: 30 minutes.  Speech Language Pathology:  Branch    Signed by: Lo Sánchez PT

## 2018-12-16 LAB
GLUCOSE BLDC GLUCOMTR-MCNC: 121 MG/DL (ref 70–130)
GLUCOSE BLDC GLUCOMTR-MCNC: 147 MG/DL (ref 70–130)
GLUCOSE BLDC GLUCOMTR-MCNC: 162 MG/DL (ref 70–130)
GLUCOSE BLDC GLUCOMTR-MCNC: 175 MG/DL (ref 70–130)

## 2018-12-16 PROCEDURE — 82962 GLUCOSE BLOOD TEST: CPT

## 2018-12-16 PROCEDURE — 63710000001 INSULIN LISPRO (HUMAN) PER 5 UNITS: Performed by: HOSPITALIST

## 2018-12-16 PROCEDURE — 97110 THERAPEUTIC EXERCISES: CPT | Performed by: PHYSICAL THERAPIST

## 2018-12-16 RX ADMIN — HYDROCODONE BITARTRATE AND ACETAMINOPHEN 2 TABLET: 5; 325 TABLET ORAL at 00:31

## 2018-12-16 RX ADMIN — LIDOCAINE 2 PATCH: 50 PATCH CUTANEOUS at 07:59

## 2018-12-16 RX ADMIN — FAMOTIDINE 20 MG: 20 TABLET, FILM COATED ORAL at 07:59

## 2018-12-16 RX ADMIN — GLIPIZIDE 10 MG: 10 TABLET ORAL at 07:57

## 2018-12-16 RX ADMIN — DULOXETINE HYDROCHLORIDE 60 MG: 60 CAPSULE, DELAYED RELEASE ORAL at 07:58

## 2018-12-16 RX ADMIN — METFORMIN HYDROCHLORIDE 1000 MG: 1000 TABLET ORAL at 17:32

## 2018-12-16 RX ADMIN — ATOMOXETINE 100 MG: 60 CAPSULE ORAL at 07:58

## 2018-12-16 RX ADMIN — LOSARTAN POTASSIUM 100 MG: 100 TABLET, FILM COATED ORAL at 08:04

## 2018-12-16 RX ADMIN — CYCLOBENZAPRINE 10 MG: 10 TABLET, FILM COATED ORAL at 17:27

## 2018-12-16 RX ADMIN — HYDROCODONE BITARTRATE AND ACETAMINOPHEN 2 TABLET: 5; 325 TABLET ORAL at 13:29

## 2018-12-16 RX ADMIN — INSULIN LISPRO 2 UNITS: 100 INJECTION, SOLUTION INTRAVENOUS; SUBCUTANEOUS at 11:43

## 2018-12-16 RX ADMIN — METOPROLOL TARTRATE 25 MG: 25 TABLET ORAL at 08:04

## 2018-12-16 RX ADMIN — HYDROCHLOROTHIAZIDE 25 MG: 25 TABLET ORAL at 08:04

## 2018-12-16 RX ADMIN — CYCLOBENZAPRINE 10 MG: 10 TABLET, FILM COATED ORAL at 09:11

## 2018-12-16 RX ADMIN — PIOGLITAZONE 30 MG: 30 TABLET ORAL at 07:58

## 2018-12-16 RX ADMIN — METOPROLOL TARTRATE 25 MG: 25 TABLET ORAL at 21:43

## 2018-12-16 RX ADMIN — HYDROCODONE BITARTRATE AND ACETAMINOPHEN 2 TABLET: 5; 325 TABLET ORAL at 17:28

## 2018-12-16 RX ADMIN — HYDROCODONE BITARTRATE AND ACETAMINOPHEN 2 TABLET: 5; 325 TABLET ORAL at 09:11

## 2018-12-16 RX ADMIN — RIVAROXABAN 20 MG: 20 TABLET, FILM COATED ORAL at 17:32

## 2018-12-16 RX ADMIN — METFORMIN HYDROCHLORIDE 1000 MG: 1000 TABLET ORAL at 07:57

## 2018-12-16 RX ADMIN — HYDROCODONE BITARTRATE AND ACETAMINOPHEN 2 TABLET: 5; 325 TABLET ORAL at 21:43

## 2018-12-16 RX ADMIN — HYDROCODONE BITARTRATE AND ACETAMINOPHEN 2 TABLET: 5; 325 TABLET ORAL at 04:41

## 2018-12-16 RX ADMIN — INSULIN LISPRO 2 UNITS: 100 INJECTION, SOLUTION INTRAVENOUS; SUBCUTANEOUS at 07:57

## 2018-12-16 NOTE — PLAN OF CARE
Problem: Patient Care Overview  Goal: Plan of Care Review   12/16/18 0145   Patient Care Overview   IRF Plan of Care Review progress ongoing, continue   Progress, Functional Goals demonstrating adequate progress;functional independence achieved   Coping/Psychosocial   Plan of Care Reviewed With patient   OTHER   Outcome Summary no collar when i room, inc with ster trip, wife transfers, a/ox4, cont b/b

## 2018-12-16 NOTE — PROGRESS NOTES
Inpatient Rehabilitation Functional Measures Assessment    Functional Measures  [facility]CCO Eating:  [facility] Grooming: [facility] Bathing:  [facility] Upper Body Dressing:  [facility] Lower Body Dressing:  [facility] Toileting:  [facility] Bladder Management  Level of Assistance:  [place]  Frequency/Number of Accidents this Shift:  [facility] Bowel Management  Level of Assistance: [place]  Frequency/Number of Accidents this Shift: [facility] Bed/Chair/Wheelchair Transfer:  [facility] Toilet Transfer:  [facility] Tub/Shower Transfer:  [place]    Previously Documented Mode of Locomotion at Discharge: Field  [facility] Expected Mode of Locomotion at Discharge: [facility] Walk/Wheelchair:  [facility] Stairs:  [facility] Comprehension:  Auditory comprehension is the usual mode. Comprehension  Score = 6, Modified [place].  Patient comprehends complex/abstract  information in their primary language, requiring:  [facility] Expression:  Vocal expression is the usual mode. Expression Score = 7,  Independent.  Patient expresses complex/abstract information in their primary  language.  Patient is completely independent for vocal expression.  There are no  activity limitations.  [facility] Social Interaction:  Social Interaction Score = 7, Independent. Patient is  completely independent for social interaction.  There are no activity  limitations.  [facility] Problem Solving:  Problem Solving Score = 7, Independent.  Patient makes  appropriate decisions in order to solve complex problems.  Patient is completely  independent for problem solving.  There are no activity limitations.  [facility] Memory:  Memory Score = 7, Independent.  Patient is completely independent  for memory.  There are no activity limitations.    Therapy Mode Minutes  Occupational Therapy: Branch  Physical Therapy: [place]  Speech Language Pathology:  [place]    Signed by: Susan Cotto RN

## 2018-12-16 NOTE — PROGRESS NOTES
Inpatient Rehabilitation Functional Measures Assessment    Functional Measures  [facility]CCO Eating:  Branch  [facility] Grooming: Branch  [facility] Bathing:  Branch  [facility] Upper Body Dressing:  Branch  [facility] Lower Body Dressing:  Branch  [facility] Toileting:  Toileting Score = 4.  Patient requires minimal assistance for  toileting, such as steadying for balance while cleansing or adjusting clothes.  Patient requires the following assistive device(s): Adaptive device to maintain  balance.    [facility] Bladder Management  Level of Assistance:  Bladder Score = 3. Patient performs 50-74% of tasks and  requires moderate assistance for bladder management. [place] provides some assist  to position the urinal, holds it in place, or empties the urinal.  Frequency/Number of Accidents this Shift:  Bladder accidents this shift:  0 .  Patient has not had an accident this shift.    [facility] Bowel Management  Level of Assistance: Activity was not observed.  Frequency/Number of Accidents this Shift: Bowel accidents this shift: 0 .  Patient has not had an accident this shift.    [facility] Bed/Chair/Wheelchair Transfer:  Bed/chair/wheelchair Transfer Score = 2.  Patient performs 25-49% of effort and requires maximal assistance (most of the  lifting) for transferring to and from the bed/chair/wheelchair. Patient requires  the following assistive device(s): Elevated bed/surface. Elevated head of bed.  Grab bars.  [facility] Toilet Transfer:  Toilet Transfer Score = 2.  Patient performs 25-49% of  effort and requires maximal assistance (most of the lifting) for transferring to  and from the toilet/commode. Patient requires the following assistive device(s):  Grab bars.  [facility] Tub/Shower Transfer:  Branch    Previously Documented Mode of Locomotion at Discharge: Field  ROCCO Expected Mode of Locomotion at Discharge: [facility] Walk/Wheelchair:  Branch  [facility] Stairs:  Branch    [facility] Comprehension:  Branch  [facility] Expression:  Branch  [facility] Social Interaction:  Branch  [facility] Problem Solving:  Branch  [facility]  Memory:  Branch    Therapy Mode Minutes  Occupational Therapy: Branch  Physical Therapy: Branch  Speech Language Pathology:  Branch    Signed by: ALEJO Gutierrez

## 2018-12-16 NOTE — PLAN OF CARE
Problem: Skin Injury Risk (Adult)  Goal: Skin Health and Integrity   12/16/18 0145   Skin Injury Risk (Adult)   Skin Health and Integrity making progress toward outcome

## 2018-12-16 NOTE — PLAN OF CARE
Problem: Patient Care Overview  Goal: Plan of Care Review  Outcome: Ongoing (interventions implemented as appropriate)   12/16/18 1630   Patient Care Overview   IRF Plan of Care Review progress ongoing, continue   Progress, Functional Goals demonstrating adequate progress   Coping/Psychosocial   Plan of Care Reviewed With patient   OTHER   Outcome Summary Patient pleasant and cooperative, alert and oriented and continent of B/B. He likes his pain medication every 4 hours, takes medication with water and has required insulin coverage twice today. Incision to neck is KARLIE and wife at bedside, very helpful.       Problem: Fall Risk (Adult)  Goal: Absence of Fall  Outcome: Ongoing (interventions implemented as appropriate)   12/16/18 1630   Fall Risk (Adult)   Absence of Fall making progress toward outcome       Problem: Pain, Acute (Adult)  Goal: Acceptable Pain Control/Comfort Level  Outcome: Ongoing (interventions implemented as appropriate)   12/16/18 1630   Pain, Acute (Adult)   Acceptable Pain Control/Comfort Level making progress toward outcome       Problem: Skin Injury Risk (Adult)  Goal: Skin Health and Integrity  Outcome: Ongoing (interventions implemented as appropriate)   12/16/18 1630   Skin Injury Risk (Adult)   Skin Health and Integrity making progress toward outcome

## 2018-12-16 NOTE — PROGRESS NOTES
Inpatient Rehabilitation Functional Measures Assessment    Functional Measures  [facility] Eating:  Branch  [facility] Grooming: Branch  [facility] Bathing:  Branch  [facility] Upper Body Dressing:  Branch  [facility] Lower Body Dressing:  Branch  [facility] Toileting:  [facility] Bladder Management  Level of Assistance:  Branch  Frequency/Number of Accidents this Shift:  [facility] Bowel Management  Level of Assistance: [place]  Frequency/Number of Accidents this Shift: Branch    [facility] Bed/Chair/Wheelchair Transfer:  Bed/chair/wheelchair Transfer Score = 4.  Patient performs 75% or more of effort and minimal assistance (little/incidental  help/lifting of one limb/steadying) for transferring to and from the  bed/chair/wheelchair, requiring: Patient requires the following assistive  device(s): Bed rails. Seating system of wheelchair.  [facility] Toilet Transfer:  [facility] Tub/Shower Transfer:  [place]    Previously Documented Mode of Locomotion at Discharge: Field  [facility] Expected Mode of Locomotion at Discharge: [facility] Walk/Wheelchair:  WHEELCHAIR OBSERVATION   Wheelchair locomotion was observed using a manual wheelchair. Wheelchair  Distance Scale = 1.  Distance traveled in wheelchair is less than 50 feet.  Wheelchair Score = 1.  Patient requires supervision or set up only with  propelling wheelchair. Patient was able to propel a distance of  20 feet in a  wheelchair.  No other assistive devices were required.    WALK OBSERVATION   Walk Distance Scale = 3.  Distance walked is greater than 150 feet. Walk Score  = 4.  Patient performs 75% or more of effort and requires minimal assistance.  Incidental help/contact guard/steadying was provided. Patient walked a distance  of  160 feet. Patient requires the following assistive device(s): Rolling  walker.  [facility] Stairs:  Activity was not observed.    [facility] Comprehension:  Branch  [facility] Expression:  Branch  [facility] Social Interaction:  [facility] Problem Solving:  [facility] Memory:  [place]    Therapy Mode  Minutes  Occupational Therapy: Branch  Physical Therapy: Individual: 30 minutes.  Speech Language Pathology:  Branch    Signed by: Lo Sánchez PT

## 2018-12-16 NOTE — PLAN OF CARE
Problem: Pain, Acute (Adult)  Goal: Acceptable Pain Control/Comfort Level   12/16/18 0145   Pain, Acute (Adult)   Acceptable Pain Control/Comfort Level making progress toward outcome

## 2018-12-16 NOTE — PROGRESS NOTES
Inpatient Rehabilitation Functional Measures Assessment    Functional Measures  [facility] Eating:  [facility] Grooming: [facility] Bathing:  [facility] Upper Body Dressing:  [facility] Lower Body Dressing:  [facility] Toileting:  [facility] Bladder Management  Level of Assistance:  [place]  Frequency/Number of Accidents this Shift:  [facility] Bowel Management  Level of Assistance: [place]  Frequency/Number of Accidents this Shift: [facility] Bed/Chair/Wheelchair Transfer:  [facility] Toilet Transfer:  [facility] Tub/Shower Transfer:  [place]    Previously Documented Mode of Locomotion at Discharge: Field  [facility] Expected Mode of Locomotion at Discharge: [facility] Walk/Wheelchair:  [facility] Stairs:  [facility] Comprehension:  Auditory comprehension is the usual mode. Comprehension  Score = 6, Modified [place].  Patient comprehends complex/abstract  information in their primary language, requiring:  [facility] Expression:  Vocal expression is the usual mode. Expression Score = 6,  Modified Independent.  Patient expresses complex/abstract information in their  primary language, requiring:  [facility] Social Interaction:  Social Interaction Score = 6, Modified Independent.  Patient is modified independent for social interaction, requiring:  [facility] Problem Solving:  Problem Solving Score = 6, Modified [place].  Patient  makes appropriate decisions in order to solve complex problems, but requires  extra time.  [facility] Memory:  Memory Score = 6, Modified [place].  Patient is modified  independent for memory, requiring:    Therapy Mode Minutes  Occupational Therapy: Branch  Physical Therapy: Branch  Speech Language Pathology:  Branch    Signed by: Jojo Bowers RN

## 2018-12-16 NOTE — PROGRESS NOTES
LOS: 16 days   Patient Care Team:  Miladis Colbert APRN as PCP - General (Family Medicine)    Chief Complaint: same    Subjective     History of Present Illness    SubjectivePt is awake and alert No new issues. Pt is pleased with his progress and dc plan.     History taken from: patient    Objective     Vital Signs  Temp:  [97.1 °F (36.2 °C)-98.3 °F (36.8 °C)] 97.1 °F (36.2 °C)  Heart Rate:  [74-84] 81  Resp:  [18] 18  BP: (127-144)/(59-78) 127/59    Objective exam unchanged    Results Review:     I reviewed the patient's new clinical results.    Medication Review:     Assessment/Plan       Cervical myelopathy (CMS/HCC)      Assessment & Plan Continue to prepare for dc 12/19.    Mike Melissa MD  12/16/18  8:44 AM    Time:

## 2018-12-16 NOTE — THERAPY TREATMENT NOTE
Inpatient Rehabilitation - Physical Therapy Treatment Note  [facility]     Patient Name: Duane Mark Witten  : 1955  MRN: [id number]    Today's Date: 2018                 Admit Date: 2018      Visit Dx:      ICD-10-CM ICD-9-CM   1. Cervical stenosis of spinal canal M48.02 723.0   2. Weakness of both lower extremities R29.898 729.89   3. Paresthesia of both upper extremities R20.2 782.0    M79.601 729.5    M79.602    4. Morbid obesity with BMI of 40.0-44.9, adult (CMS/[facility]) E66.01 278.01    Z68.41 V85.41   5. Impaired functional mobility, balance, gait, and endurance Z74.09 V49.89   6. Functional gait abnormality R26.89 781.2       Patient Active Problem List   Diagnosis   • YANELI (generalized anxiety disorder)   • ADD (attention deficit disorder)   • Depression   • Diabetes type 2, controlled (CMS/[facility])   • ED (erectile dysfunction) of non-organic origin   • HLD (hyperlipidemia)   • Essential hypertension   • Arthritis   • Psoriasis   • Testosterone deficiency   • Primary insomnia   • Weakness of both lower extremities   • Paresthesia of both upper extremities   • Morbid obesity with BMI of 40.0-44.9, adult (CMS/[facility])   • Adverse effect of corticosteroids   • Type 2 diabetes mellitus with hyperglycemia (CMS/[facility])   • Cervical stenosis of spinal canal   • Edema of cervical spinal cord (CMS/[facility])   • Postoperative atrial fibrillation (CMS/[facility])   • Cervical myelopathy (CMS/[facility])       Therapy Treatment    IRF Treatment Summary     Row Name 1876             Evaluation/Treatment Time and Intent    Subjective Information  no complaints  -JS      Existing Precautions/Restrictions  fall;spinal  -JS      Document Type  therapy note (daily note)  -JS      Mode of Treatment  physical therapy  -JS      Patient/Family Observations  Pt lying in bed upon arrival.  Awake & alert.  -JS      Recorded by [JS] Lo Sánchez, PT      Row Name 18 8188             Cognition/Psychosocial- PT/OT    Affect/Mental  Status (Cognitive)  WFL  -JS      Orientation Status (Cognition)  oriented x 4  -JS      Follows Commands (Cognition)  WFL  -JS      Personal Safety Interventions  fall prevention program maintained;gait belt;muscle strengthening facilitated;nonskid shoes/slippers when out of bed  -JS      Recorded by [JS] Lo Sánchez, PT      Row Name 12/16/18 0925             Bed Mobility Assessment/Treatment    Rolling Left [place] (Bed Mobility)  supervision;verbal cues  -JS      Supine-Sit [place] (Bed Mobility)  verbal cues;minimum assist (75% patient effort)  -JS      Sit-Supine [place] (Bed Mobility)  verbal cues;minimum assist (75% patient effort)  -JS      Bed Mobility, Safety Issues  decreased use of arms for pushing/pulling;decreased use of legs for bridging/pushing  -JS      Assistive Device (Bed Mobility)  bed rails  -JS      Recorded by [JS] Lo Sánchez, PT      Row Name 12/16/18 0925             Bed-Chair Transfer    Bed-Chair [place] (Transfers)  verbal cues;nonverbal cues (demo/gesture);minimum assist (75% patient effort) stand pivot transfer  -JS      Assistive Device (Bed-Chair Transfers)  wheelchair  -JS      Recorded by [JS] Lo Sánchez, PT      Row Name 12/16/18 0925             Chair-Bed Transfer    Chair-Bed [place] (Transfers)  minimum assist (75% patient effort);verbal cues  -JS      Assistive Device (Chair-Bed Transfers)  walker, front-wheeled  -JS      Recorded by [JS] Lo Sánchez, PT      Row Name 12/16/18 0925             Sit-Stand Transfer    Sit-Stand [place] (Transfers)  contact guard;verbal cues  -JS      Assistive Device (Sit-Stand Transfers)  walker, front-wheeled  -JS      Recorded by [JS] Lo Sánchez, PT      Row Name 12/16/18 0925             Stand-Sit Transfer    Stand-Sit [place] (Transfers)  contact guard;verbal cues  -JS      Assistive Device (Stand-Sit Transfers)  walker, front-wheeled  -JS      Recorded by [JS] Lo Sánchez, PT      Row Name 12/16/18  0925             Gait/Stairs Assessment/Training    [place] Level (Gait)  minimum assist (75% patient effort);contact guard;verbal cues  -JS      Assistive Device (Gait)  walker, front-wheeled  -JS      Distance in Feet (Gait)  160'x3  -JS      Pattern (Gait)  step-through  -JS      Deviations/Abnormal Patterns (Gait)  ataxic;base of support, narrow;sarah decreased;stride length decreased  -JS      Bilateral Gait Deviations  forward flexed posture;weight shift ability decreased;heel strike decreased  -JS      Right Sided Gait Deviations  -- decreased ankle DF during swing thru  -JS      Comment (Gait/Stairs)  No buckling of knees during today's treatment  -JS      Recorded by [JS] Lo Sánchez PT      Row Name 12/16/18 0925             Wheelchair Mobility/Management    Method of Wheelchair Locomotion (Mobility)  bimanual (upper extremity) propulsion  -JS      Mobility Activities (Wheelchair)  forward propulsion;steering;turning;doorway navigation  -JS      Forward Propulsion [place] (Wheelchair)  supervision  -JS      Steering [place] (Wheelchair)  supervision  -JS      Turning [place] (Wheelchair)  supervision  -JS      Doorway Navigation [place] (Wheelchair)  supervision  -JS      Distance Propelled in Feet (Wheelchair)  20 feet  -JS      Comment, Mobility Activities (Wheelchair)  No pain during propulsion. C/o L shoulder pain following that abolished with 5 min rest.  -JS      Recorded by [JS] Lo Sánchez PT      Row Name 12/16/18 0925             Safety Issues, Functional Mobility    Impairments Affecting Function (Mobility)  balance;coordination;endurance/activity tolerance;strength  -JS      Recorded by [JS] Lo Sánchez PT      Row Name 12/16/18 0925             Pain Scale: Numbers Pre/Post-Treatment    Pain Scale: Numbers, Pretreatment  0/10 - no pain  -JS      Pain Scale: Numbers, Post-Treatment  0/10 - no pain  -JS      Pre/Post Treatment Pain Comment  L shoulder pain following  w/c mobility that did not persist the remainder of the treatment session  -JS      Pain Intervention(s)  Repositioned  -JS      Recorded by [JS] Lo Sánchez PT      Row Name 12/16/18 0945             Dynamic Balance Activity    Therapeutic Training Performed (Dynamic Balance)  side stepping  -JS      Support Needed for Balance (Dynamic Balance Training)  uses both upper extremities for support  -JS      Comment (Dynamic Balance Training)  3x8' at hemibar  -JS      Recorded by [JS] Lo Sánchez PT      Row Name 12/16/18 0925             Lower Extremity Standing Therapeutic Exercise    Performed, Standing Lower Extremity (Therapeutic Exercise)  toe raises;heel raises;hip flexion/extension  -JS      Device, Standing Lower Extremity (Therapeutic Exercise)  jacob bars  -JS      Exercise Type, Standing Lower Extremity (Therapeutic Exercise)  AROM (active range of motion)  -JS      Sets/Reps Detail, Standing Lower Extremity (Therapeutic Exercise)  1/10  -JS      Recorded by [MAURICIO] Lo Sánchez PT      Row Name 12/16/18 0999             Lower Extremity Seated Therapeutic Exercise    Performed, Seated Lower Extremity (Therapeutic Exercise)  hip flexion/extension;LAQ (long arc quad), knee extension;ankle dorsiflexion/plantarflexion  -JS      Device, Seated Lower Extremity (Therapeutic Exercise)  elastic bands/tubing;free weights, cuff red band, 1.5# cuff  -JS      Sets/Reps Detail, Seated Lower Extremity (Therapeutic Exercise)  1/20  -JS      Recorded by [JS] Lo Sánchez PT      Row Name 12/16/18 0973             Positioning and Restraints    Pre-Treatment Position  in bed  -JS      Post Treatment Position  bed  -JS      In Bed  call light within reach;encouraged to call for assist;exit alarm on;supine  -JS      Recorded by [MAURICIO] Lo Sánchez PT        User Key  (r) = Recorded By, (t) = Taken By, (c) = Cosigned By    Initials Name Effective Dates    JS Lo Sánchez PT 04/06/17 -         Wound 11/27/18 1318 Other (See comments)  neck incision (Active)   Dressing Appearance open to air 12/16/2018  8:00 AM   Closure Adhesive closure strips;Liquid skin adhesive 12/16/2018  8:00 AM   Drainage Amount none 12/16/2018  8:00 AM   Dressing Care, Wound open to air 12/15/2018  8:15 PM     Physical Therapy Education     Title: PT OT SLP Therapies (Done)     Topic: Physical Therapy (Done)     Point: Mobility training (Done)     Learning Progress Summary           Patient Acceptance, E,TB, VU,NR by MAURICIO at 12/16/2018 11:43 AM    Acceptance, E,TB, VU,NR by JS at 12/15/2018  3:33 PM    Acceptance, E, VU,NR by SIMÓN at 12/14/2018  4:14 PM    Acceptance, E,D, VU by JOVANNI at 12/14/2018 11:40 AM    Comment:  Safety with ambulation    Acceptance, E,TB,D, VU,NR by EE at 12/13/2018  9:11 AM    Acceptance, E,TB, VU,NR by EE at 12/12/2018  8:48 AM    Acceptance, E,TB,D, VU,NR by EE at 12/11/2018  8:48 AM    Acceptance, E,TB,D, VU,NR by EE at 12/10/2018  8:45 AM    Eager, E,TB,D, VU,DU,NR by MAR at 12/9/2018 10:24 AM    Eager, E,TB,D, VU,DU by MAR at 12/8/2018 12:00 PM    Acceptance, E,TB,D, VU,NR by EE at 12/7/2018  9:22 AM    Acceptance, E,TB, VU,NR by JAMARCUS at 12/6/2018  9:15 AM    Acceptance, E,TB, VU,NR by EE at 12/5/2018 10:10 AM    Acceptance, E, NR by SIMÓN at 12/4/2018 10:12 AM    Acceptance, E,TB, VU,NR by JAMARCUS at 12/3/2018  9:48 AM    Acceptance, E, VU,NR by IAN at 12/1/2018  9:50 AM   Family Acceptance, E,TB,D, VU,NR by EE at 12/11/2018  8:48 AM   Significant Other Eager, E,TB,D, VU,DU,NR by MAR at 12/9/2018 10:24 AM                   Point: Home exercise program (Done)     Learning Progress Summary           Patient Acceptance, E,TB, VU,NR by MAURICIO at 12/16/2018 11:43 AM    Acceptance, E,TB, VU,NR by MAURICIO at 12/15/2018  3:33 PM    Acceptance, E,TB,D, VU,NR by EE at 12/13/2018  9:11 AM    Acceptance, E,TB, VU,NR by EE at 12/12/2018  8:48 AM    Acceptance, E,TB,D, VU,NR by EE at 12/11/2018  8:48 AM    Acceptance, E,TB,D, VU,NR by EE at 12/10/2018  8:45 AM    Eager, E,TB,D,  VU,DU,NR by  at 12/9/2018 10:24 AM    Eager, E,TB,D, VU,DU by  at 12/8/2018 12:00 PM    Acceptance, E,TB,D, VU,NR by  at 12/7/2018  9:22 AM    Acceptance, E,TB, VU,NR by EE at 12/6/2018  9:15 AM    Acceptance, E,TB, VU,NR by EE at 12/5/2018 10:10 AM    Acceptance, E,TB, VU,NR by  at 12/3/2018  9:48 AM   Family Acceptance, E,TB,D, VU,NR by  at 12/11/2018  8:48 AM   Significant Other Eager, E,TB,D, VU,DU,NR by  at 12/9/2018 10:24 AM                   Point: Body mechanics (Done)     Learning Progress Summary           Patient Acceptance, E,TB, VU,NR by MAURICIO at 12/16/2018 11:43 AM    Acceptance, E,TB, VU,NR by MAURICIO at 12/15/2018  3:33 PM    Acceptance, E,TB,D, VU,NR by  at 12/13/2018  9:11 AM    Acceptance, E,TB, VU,NR by  at 12/12/2018  8:48 AM    Acceptance, E,TB,D, VU,NR by  at 12/11/2018  8:48 AM    Acceptance, E,TB,D, VU,NR by  at 12/10/2018  8:45 AM    Acceptance, E,TB,D, VU,NR by  at 12/7/2018  9:22 AM    Acceptance, E,TB, VU,NR by  at 12/6/2018  9:15 AM    Acceptance, E,TB, VU,NR by  at 12/5/2018 10:10 AM    Acceptance, E,TB, VU,NR by  at 12/3/2018  9:48 AM   Family Acceptance, E,TB,D, VU,NR by  at 12/11/2018  8:48 AM                   Point: Precautions (Done)     Learning Progress Summary           Patient Acceptance, E,TB, VU,NR by JS at 12/16/2018 11:43 AM    Acceptance, E,TB, VU,NR by JS at 12/15/2018  3:33 PM    Acceptance, E,TB,D, VU,NR by EE at 12/13/2018  9:11 AM    Acceptance, E,TB, VU,NR by EE at 12/12/2018  8:48 AM    Acceptance, E,TB,D, VU,NR by EE at 12/11/2018  8:48 AM    Acceptance, E,TB,D, VU,NR by EE at 12/10/2018  8:45 AM    Acceptance, E,TB,D, VU,NR by EE at 12/7/2018  9:22 AM    Acceptance, E,TB, VU,NR by EE at 12/6/2018  9:15 AM    Acceptance, E,TB, VU,NR by EE at 12/5/2018 10:10 AM    Acceptance, E,TB, VU,NR by EE at 12/3/2018  9:48 AM    Acceptance, E, VU,NR by IAN at 12/1/2018  9:50 AM   Family Acceptance, E,TB,D, VU,NR by EE at 12/11/2018  8:48 AM                                User Key     Initials Effective Dates Name Provider Type Discipline    JS 04/06/17 -  Lo Sánchez, PT Physical Therapist PT    KD 04/03/18 -  Daphne Quinteros, PT Physical Therapist PT    LB 03/07/18 -  Odette Orourke, PTA Physical Therapy Assistant PT    EE 04/03/18 -  Cathleen Rosario, PT Physical Therapist PT    JK 04/03/18 -  Janae Trujillo, PT Physical Therapist PT    KP 04/03/18 -  Angelic Bautista, PT Physical Therapist PT                  PT Recommendation and Plan                        Time Calculation:     PT Charges     Row Name 12/16/18 0925             Time Calculation    Start Time  0925  -JS      Stop Time  0955  -JS      Time Calculation (min)  30 min  -JS      PT Received On  12/16/18  -MAURICIO      PT - Next Appointment  12/17/18  -MAURICIO        User Key  (r) = Recorded By, (t) = Taken By, (c) = Cosigned By    Initials Name Provider Type    JS Lo Sánchez, PT Physical Therapist            Therapy Charges for Today     Code Description Service Date Service Provider Modifiers Qty    91102226525 HC PT THER PROC EA 15 MIN 12/15/2018 Lo Sánchez, PT GP 2    96745388055 HC PT THER PROC EA 15 MIN 12/16/2018 Lo Sánchez, PT GP 2                   Lo Sánchez PT  12/16/2018

## 2018-12-17 LAB
GLUCOSE BLDC GLUCOMTR-MCNC: 134 MG/DL (ref 70–130)
GLUCOSE BLDC GLUCOMTR-MCNC: 159 MG/DL (ref 70–130)
GLUCOSE BLDC GLUCOMTR-MCNC: 169 MG/DL (ref 70–130)
GLUCOSE BLDC GLUCOMTR-MCNC: 171 MG/DL (ref 70–130)

## 2018-12-17 PROCEDURE — 63710000001 INSULIN LISPRO (HUMAN) PER 5 UNITS: Performed by: HOSPITALIST

## 2018-12-17 PROCEDURE — 82962 GLUCOSE BLOOD TEST: CPT

## 2018-12-17 PROCEDURE — 97112 NEUROMUSCULAR REEDUCATION: CPT

## 2018-12-17 PROCEDURE — 97110 THERAPEUTIC EXERCISES: CPT

## 2018-12-17 RX ADMIN — LOSARTAN POTASSIUM 100 MG: 100 TABLET, FILM COATED ORAL at 08:41

## 2018-12-17 RX ADMIN — HYDROCODONE BITARTRATE AND ACETAMINOPHEN 2 TABLET: 5; 325 TABLET ORAL at 01:43

## 2018-12-17 RX ADMIN — PIOGLITAZONE 30 MG: 30 TABLET ORAL at 08:41

## 2018-12-17 RX ADMIN — INSULIN LISPRO 2 UNITS: 100 INJECTION, SOLUTION INTRAVENOUS; SUBCUTANEOUS at 20:25

## 2018-12-17 RX ADMIN — INSULIN LISPRO 2 UNITS: 100 INJECTION, SOLUTION INTRAVENOUS; SUBCUTANEOUS at 11:37

## 2018-12-17 RX ADMIN — METOPROLOL TARTRATE 25 MG: 25 TABLET ORAL at 08:41

## 2018-12-17 RX ADMIN — HYDROCODONE BITARTRATE AND ACETAMINOPHEN 2 TABLET: 5; 325 TABLET ORAL at 16:03

## 2018-12-17 RX ADMIN — GLIPIZIDE 10 MG: 10 TABLET ORAL at 07:07

## 2018-12-17 RX ADMIN — DULOXETINE HYDROCHLORIDE 60 MG: 60 CAPSULE, DELAYED RELEASE ORAL at 08:41

## 2018-12-17 RX ADMIN — CYCLOBENZAPRINE 10 MG: 10 TABLET, FILM COATED ORAL at 10:12

## 2018-12-17 RX ADMIN — ATOMOXETINE 100 MG: 60 CAPSULE ORAL at 08:41

## 2018-12-17 RX ADMIN — HYDROCODONE BITARTRATE AND ACETAMINOPHEN 2 TABLET: 5; 325 TABLET ORAL at 07:08

## 2018-12-17 RX ADMIN — RIVAROXABAN 20 MG: 20 TABLET, FILM COATED ORAL at 17:05

## 2018-12-17 RX ADMIN — HYDROCODONE BITARTRATE AND ACETAMINOPHEN 2 TABLET: 5; 325 TABLET ORAL at 11:41

## 2018-12-17 RX ADMIN — HYDROCODONE BITARTRATE AND ACETAMINOPHEN 2 TABLET: 5; 325 TABLET ORAL at 20:25

## 2018-12-17 RX ADMIN — HYDROCHLOROTHIAZIDE 25 MG: 25 TABLET ORAL at 08:41

## 2018-12-17 RX ADMIN — FAMOTIDINE 20 MG: 20 TABLET, FILM COATED ORAL at 08:41

## 2018-12-17 RX ADMIN — METFORMIN HYDROCHLORIDE 1000 MG: 1000 TABLET ORAL at 07:07

## 2018-12-17 RX ADMIN — METOPROLOL TARTRATE 25 MG: 25 TABLET ORAL at 20:24

## 2018-12-17 RX ADMIN — LIDOCAINE 2 PATCH: 50 PATCH CUTANEOUS at 08:44

## 2018-12-17 RX ADMIN — INSULIN LISPRO 2 UNITS: 100 INJECTION, SOLUTION INTRAVENOUS; SUBCUTANEOUS at 17:05

## 2018-12-17 RX ADMIN — METFORMIN HYDROCHLORIDE 1000 MG: 1000 TABLET ORAL at 17:05

## 2018-12-17 RX ADMIN — CYCLOBENZAPRINE 10 MG: 10 TABLET, FILM COATED ORAL at 18:16

## 2018-12-17 RX ADMIN — CYCLOBENZAPRINE 10 MG: 10 TABLET, FILM COATED ORAL at 01:43

## 2018-12-17 NOTE — PROGRESS NOTES
Inpatient Rehabilitation Plan of Care Note    Plan of Care  Updated Problems/Interventions  Mobility    [PT] Stairs(Active)  Current Status(12/17/2018): 8, handrails, CGA/Min  Weekly Goal(12/18/2018): PT only  Discharge Goal: 8, handrails, CGA    [PT] Walk(Active)  Current Status(12/17/2018): 160' CGA with RWX  Weekly Goal(12/18/2018): CGA/SBA to BR with RWX  Discharge Goal: 200' CGA/SBA with RWX    [PT] Bed/Chair/Wheelchair(Active)  Current Status(12/17/2018): CGA with RWX  Weekly Goal(12/18/2018): SBA with RWX  Discharge Goal: SBA with RWX    [PT] Bed Mobility(Active)  Current Status(12/17/2018): Min/CGA  Weekly Goal(12/19/2018): SBA  Discharge Goal: SBA    Signed by: JEFFERSON HeartT

## 2018-12-17 NOTE — PROGRESS NOTES
Inpatient Rehabilitation Functional Measures Assessment    Functional Measures  [facility] Eating:  [facility] Grooming: [facility] Bathing:  [facility] Upper Body Dressing:  [facility] Lower Body Dressing:  [facility] Toileting:  [facility] Bladder Management  Level of Assistance:  [place]  Frequency/Number of Accidents this Shift:  [facility] Bowel Management  Level of Assistance: [place]  Frequency/Number of Accidents this Shift: [facility] Bed/Chair/Wheelchair Transfer:  [facility] Toilet Transfer:  [facility] Tub/Shower Transfer:  [place]    Previously Documented Mode of Locomotion at Discharge: Field  [facility] Expected Mode of Locomotion at Discharge: [facility] Walk/Wheelchair:  [facility] Stairs:  [facility] Comprehension:  Auditory comprehension is the usual mode. Comprehension  Score = 6, Modified [place].  Patient comprehends complex/abstract  information in their primary language with only mild difficulty.  [place] Expression:  Vocal expression is the usual mode. Expression Score = 7,  Independent.  Patient expresses complex/abstract information in their primary  language.  Patient is completely independent for vocal expression.  There are no  activity limitations.  [facility] Social Interaction:  Social Interaction Score = 7, Independent. Patient is  completely independent for social interaction.  There are no activity  limitations.  [facility] Problem Solving:  Problem Solving Score = 6, Modified [place].  Patient  makes appropriate decisions in order to solve complex problems with mild  difficulty but self-corrects.  [facility] Memory:  Memory Score = 6, Modified [place].  Patient is modified  independent for memory, having only mild difficulty and using self-initiated or  environmental cues to remember.    Therapy Mode Minutes  Occupational Therapy: Branch  Physical Therapy: [place]  Speech Language Pathology:  [place]    Signed by: Stephanie Espana RN

## 2018-12-17 NOTE — PLAN OF CARE
Problem: Patient Care Overview  Goal: Plan of Care Review  Outcome: Ongoing (interventions implemented as appropriate)   12/17/18 1507   Patient Care Overview   IRF Plan of Care Review progress ongoing, continue   Progress, Functional Goals demonstrating adequate progress   Coping/Psychosocial   Plan of Care Reviewed With patient   OTHER   Outcome Summary Patient treated for pain every 4 hours and flexeril every 8 hours, tolerating therapies and no unsafe behavior. Family conference held today, medications discussed and follow up appointments will be made by wife- 2 appointments made. A & O x4, continent of B/B, and takes medications with water. Note left for Dr Melissa regarding medications.       Problem: Fall Risk (Adult)  Goal: Absence of Fall  Outcome: Ongoing (interventions implemented as appropriate)   12/17/18 1507   Fall Risk (Adult)   Absence of Fall making progress toward outcome       Problem: Pain, Acute (Adult)  Goal: Acceptable Pain Control/Comfort Level  Outcome: Ongoing (interventions implemented as appropriate)   12/17/18 1507   Pain, Acute (Adult)   Acceptable Pain Control/Comfort Level making progress toward outcome       Problem: Skin Injury Risk (Adult)  Goal: Skin Health and Integrity  Outcome: Ongoing (interventions implemented as appropriate)   12/17/18 1507   Skin Injury Risk (Adult)   Skin Health and Integrity making progress toward outcome

## 2018-12-17 NOTE — THERAPY TREATMENT NOTE
Inpatient Rehabilitation - Occupational Therapy Treatment Note    [facility]     Patient Name: Duane Mark Witten  : 1955  MRN: [id number]    Today's Date: 2018                 Admit Date: 2018      Visit Dx:    ICD-10-CM ICD-9-CM   1. Cervical stenosis of spinal canal M48.02 723.0   2. Weakness of both lower extremities R29.898 729.89   3. Paresthesia of both upper extremities R20.2 782.0    M79.601 729.5    M79.602    4. Morbid obesity with BMI of 40.0-44.9, adult (CMS/[facility]) E66.01 278.01    Z68.41 V85.41   5. Impaired functional mobility, balance, gait, and endurance Z74.09 V49.89   6. Functional gait abnormality R26.89 781.2       Patient Active Problem List   Diagnosis   • YANELI (generalized anxiety disorder)   • ADD (attention deficit disorder)   • Depression   • Diabetes type 2, controlled (CMS/[facility])   • ED (erectile dysfunction) of non-organic origin   • HLD (hyperlipidemia)   • Essential hypertension   • Arthritis   • Psoriasis   • Testosterone deficiency   • Primary insomnia   • Weakness of both lower extremities   • Paresthesia of both upper extremities   • Morbid obesity with BMI of 40.0-44.9, adult (CMS/[facility])   • Adverse effect of corticosteroids   • Type 2 diabetes mellitus with hyperglycemia (CMS/[facility])   • Cervical stenosis of spinal canal   • Edema of cervical spinal cord (CMS/[facility])   • Postoperative atrial fibrillation (CMS/[facility])   • Cervical myelopathy (CMS/[facility])         Therapy Treatment    IRF Treatment Summary     Row Name 18 1541 18 0800          Evaluation/Treatment Time and Intent    Subjective Information  no complaints  -AF  no complaints  -EE     Existing Precautions/Restrictions  fall;spinal  -AF  fall;spinal  -EE     Document Type  therapy note (daily note)  -AF  therapy note (daily note)  -EE     Mode of Treatment  occupational therapy  -AF  physical therapy  -EE     Patient/Family Observations  sitting up in w/c in AM and PM session, completed bathing with wife  prior to session  -AF  Pt sitting up in WC in no acute distress.  -EE     Recorded by [AF] Lolis Cooley OTR [EE] Cathleen Rosario, PT     Row Name 12/17/18 1541 12/17/18 0800          Cognition/Psychosocial- PT/OT    Affect/Mental Status (Cognitive)  WFL  -AF  WFL  -EE     Orientation Status (Cognition)  oriented x 4  -AF  oriented x 4  -EE     Follows Commands (Cognition)  WFL  -AF  WFL  -EE     Personal Safety Interventions  fall prevention program maintained;gait belt;nonskid shoes/slippers when out of bed  -AF  fall prevention program maintained;gait belt;muscle strengthening facilitated;nonskid shoes/slippers when out of bed;supervised activity  -EE     Recorded by [AF] Lolis Cooley OTR [EE] Cathleen Rosario, PT     Row Name 12/17/18 1541             Bed Mobility Assessment/Treatment    Supine-Sit [place] (Bed Mobility)  minimum assist (75% patient effort);verbal cues  -AF      Sit-Supine [place] (Bed Mobility)  verbal cues;supervision  -AF      Comment (Bed Mobility)  mat mobility  -AF      Recorded by [AF] Lolis Cooley OTR      Row Name 12/17/18 1541 12/17/18 0800          Transfer Assessment/Treatment    Comment (Transfers)  stand pivot transfer EOM <> w/c CGA, sit to stand at counter CGA  -AF  Wife present for teaching on car transfer; verbalizes understanding of safety precautions and technique with no further questions/concerns at this time.  -EE     Recorded by [AF] Lolis Cooley OTR [EE] Cathleen Rosario, PT     Row Name 12/17/18 0800             Sit-Stand Transfer    Sit-Stand [place] (Transfers)  contact guard  -EE      Assistive Device (Sit-Stand Transfers)  walker, front-wheeled  -EE      Recorded by [EE] Cathleen Rosario, PT      Row Name 12/17/18 0800             Stand-Sit Transfer    Stand-Sit [place] (Transfers)  contact guard  -EE      Assistive Device (Stand-Sit Transfers)  walker, front-wheeled  -EE      Recorded by [EE] Cathleen Rosario, PT      Row Name 12/17/18 0800              Car Transfer    Type (Car Transfer)  stand pivot/stand step  -EE      [place] Level (Car Transfer)  contact guard;verbal cues  -EE      Assistive Device (Car Transfer)  walker, front-wheeled  -EE      Recorded by [EE] Cathleen Rosario PT      Row Name 12/17/18 0800             Gait/Stairs Assessment/Training    [place] Level (Gait)  contact guard;verbal cues  -EE      Assistive Device (Gait)  walker, front-wheeled  -EE      Distance in Feet (Gait)  160 x 3, 80  -EE      Pattern (Gait)  step-through  -EE      Deviations/Abnormal Patterns (Gait)  ataxic;base of support, narrow;sarah decreased;stride length decreased  -EE      Bilateral Gait Deviations  forward flexed posture;weight shift ability decreased;heel strike decreased  -EE      [place] Level (Stairs)  minimum assist (75% patient effort) + 2nd person at [facility] for safety  -EE      Handrail Location (Stairs)  both sides  -EE      Number of Steps (Stairs)  8, 4  -EE      Ascending Technique (Stairs)  step-to-step  -EE      Descending Technique (Stairs)  step-to-step  -EE      Stairs, Safety Issues  weight-shifting ability decreased;balance decreased during turns  -EE      Stairs, Impairments  strength decreased;impaired balance  -EE      Comment (Gait/Stairs)  No buckling of knees during ambulation or stair negotiation today. Spouse present for teaching on stair negotiation, assisted pt up/down 4 stairs safely with therapist standing by.  Spouse demonstrates appropriate technique and good understaning of sequencing on stairs; no further questions or concerns at this time.   -EE      Recorded by [EE] Cathleen Rosario PT      Row Name 12/17/18 0800             Safety Issues, Functional Mobility    Impairments Affecting Function (Mobility)  balance;coordination;endurance/activity tolerance  -EE      Recorded by [EE] Cathleen Rosario PT      Row Name 12/17/18 1541             Fine Motor Testing & Training    Comment, Fine Motor Coordination  [facility]  with [place] manipulation acvitiy alternating hands with min/mod difficulty, focused on keeping elbows in during activity. in PM manipulatied nut/bolt set with min difficulty in standing and with a variety of plastic rings onto dowel rods to increase shoulder flexion with fucntional acitivities   -AF      Recorded by [AF] Lolis Cooley, MAYOR      Row Name 12/17/18 1541 12/17/18 0800          Pain Scale: Numbers Pre/Post-Treatment    Pain Scale: Numbers, Pretreatment  4/10  -AF  5/10  -EE     Pain Scale: Numbers, Post-Treatment  4/10  -AF  5/10  -EE     Pain Location - Orientation  incisional  -AF  incisional  -EE     Pain Location  neck  -AF  neck  -EE     Pain Intervention(s)  --  Repositioned;Medication (See MAR);Rest  -EE     Recorded by [AF] Lolis Cooley OTR [EE] Cathleen Rosario, HAILEY     Row Name 12/17/18 0800             Dynamic Balance Activity    Therapeutic Training Performed (Dynamic Balance)  side stepping  -EE      Support Needed for Balance (Dynamic Balance Training)  uses both upper extremities for support;CGA  -EE      Comment (Dynamic Balance Training)  2 x 8' at parallel bars  -EE      Recorded by [EE] Cathleen Rosario, HAILEY      Row Name 12/17/18 1541             Upper Extremity Seated Therapeutic Exercise    Performed, Seated Upper Extremity (Therapeutic Exercise)  digit flexion/extension;forearm supination/pronation;elbow flexion/extension;scapular protraction/retraction;shoulder external/internal rotation;shoulder flexion/extension  -AF      Device, Seated Upper Extremity (Therapeutic Exercise)  -- non weight dowel han, #1 hand weight   -AF      Exercise Type, Seated Upper Extremity (Therapeutic Exercise)  AROM (active range of motion)  -AF      Expected Outcomes, Seated Upper Extremity (Therapeutic Exercise)  improve neuromuscular control;improve functional tolerance, self-care activity;improve manipulation of objects, self care activity  -AF      Restrictions, Seated Upper Extremity  (Therapeutic Exercise)  cervical precautions   -AF      Sets/Reps Detail, Seated Upper Extremity (Therapeutic Exercise)  2/15  -AF      Comment, Seated Upper Extremity (Therapeutic Exercise)  table slides on angled surface, non weighted dowel han in supine, seated unsupported on EOM with hand weights  -AF      Recorded by [AF] Lolis Cooley OTR      Row Name 12/17/18 0800             Lower Extremity Standing Therapeutic Exercise    Performed, Standing Lower Extremity (Therapeutic Exercise)  toe raises;heel raises;hip/knee flexion/extension  -EE      Device, Standing Lower Extremity (Therapeutic Exercise)  parallel bars  -EE      Exercise Type, Standing Lower Extremity (Therapeutic Exercise)  AROM (active range of motion)  -EE      Sets/Reps Detail, Standing Lower Extremity (Therapeutic Exercise)  1/15  -EE      Recorded by [EE] Cathleen Rosario PT      Row Name 12/17/18 0800             Lower Extremity Seated Therapeutic Exercise    Performed, Seated Lower Extremity (Therapeutic Exercise)  hip flexion/extension;hip abduction/adduction;knee flexion/extension;ankle dorsiflexion/plantarflexion;LAQ (long arc quad), knee extension  -EE      Device, Seated Lower Extremity (Therapeutic Exercise)  elastic bands/tubing;small ball;free weights, cuff red theraband, 1.5# weights  -EE      Exercise Type, Seated Lower Extremity (Therapeutic Exercise)  resistive exercise  -EE      Sets/Reps Detail, Seated Lower Extremity (Therapeutic Exercise)  1/20  -EE      Recorded by [EE] Cathleen Rosario PT      Row Name 12/17/18 1541 12/17/18 0800          Positioning and Restraints    Pre-Treatment Position  sitting in chair/recliner  -AF  sitting in chair/recliner  -EE     Post Treatment Position  wheelchair  -AF  wheelchair  -EE     In Wheelchair  sitting;with PT;with family/caregiver;exit alarm on with PT In AM, with wife in room in PM  -AF  sitting;call light within reach;encouraged to call for assist;exit alarm on;with family/caregiver   -EE     Recorded by [AF] Lolis Cooley, OTR [EE] Cathleen Rosario, PT       User Key  (r) = Recorded By, (t) = Taken By, (c) = Cosigned By    Initials Name Effective Dates    EE Cathleen Rosario, PT 04/03/18 -     AF Lolis Cooley, OTR 04/03/18 -           Wound 11/27/18 1318 Other (See comments) neck incision (Active)   Dressing Appearance open to air 12/17/2018  7:09 AM   Closure Adhesive closure strips;Liquid skin adhesive 12/17/2018  7:09 AM   Base clean;dry;pink 12/16/2018  9:43 PM   Periwound dry;intact 12/16/2018  9:43 PM   Drainage Amount none 12/17/2018  7:09 AM   Dressing Care, Wound open to air 12/17/2018  7:09 AM         OT Recommendation and Plan                 OT IRF GOALS     Row Name 12/07/18 1216             Transfer Goal 1 (OT-IRF)    Activity/Assistive Device (Transfer Goal 1, OT-IRF)  toilet;walk-in shower;shower chair;walker, rolling  -AF      [place] Level (Transfer Goal 1, OT-IRF)  minimum assist (75% or more patient effort);contact guard assist;verbal cues required  -AF      Time Frame (Transfer Goal 1, OT-IRF)  short term goal (STG)  -AF      Progress/Outcomes (Transfer Goal 1, OT-IRF)  goal ongoing  -AF         Transfer Goal 2 (OT-IRF)    Activity/Assistive Device (Transfer Goal 2, OT-IRF)  toilet;shower chair;walk-in shower;walker, rolling  -AF      [place] Level (Transfer Goal 2, OT-IRF)  supervision required  -AF      Time Frame (Transfer Goal 2, OT-IRF)  long term goal (LTG)  -AF      Progress/Outcomes (Transfer Goal 2, OT-IRF)  goal ongoing  -AF         Bathing Goal 1 (OT-IRF)    Activity/Device (Bathing Goal 1, OT-IRF)  bathing skills, all;grab bar/tub rail;hand-held shower spray hose;long-handled sponge;tub bench  -AF      [place] Level (Bathing Goal 1, OT-IRF)  minimum assist (75% or more patient effort);verbal cues required  -AF      Time Frame (Bathing Goal 1, OT-IRF)  short term goal (STG)  -AF      Progress/Outcomes (Bathing Goal 1, OT-IRF)  goal ongoing  -AF          Bathing Goal 2 (OT-IRF)    Activity/Device (Bathing Goal 2, OT-IRF)  bathing skills, all;grab bar/tub rail;hand-held shower spray hose;tub bench;long-handled sponge  -AF      [place] Level (Bathing Goal 2, OT-IRF)  supervision required  -AF      Time Frame (Bathing Goal 2, OT-IRF)  long term goal (LTG)  -AF      Progress/Outcomes (Bathing Goal 2, OT-IRF)  goal ongoing  -AF         UB Dressing Goal 1 (OT-IRF)    Activity/Device (UB Dressing Goal 1, OT-IRF)  upper body dressing  -AF      [place] (UB Dress Goal 1, OT-IRF)  minimum assist (75% or more patient effort);verbal cues required  -AF      Time Frame (UB Dressing Goal 1, OT-IRF)  short term goal (STG)  -AF      Progress/Outcomes (UB Dressing Goal 1, OT-IRF)  goal ongoing  -AF         UB Dressing Goal 2 (OT-IRF)    Activity/Device (UB Dressing Goal 2, OT-IRF)  upper body dressing  -AF      [place] (UB Dress Goal 2, OT-IRF)  supervision required  -AF      Time Frame (UB Dressing Goal 2, OT-IRF)  long term goal (LTG)  -AF      Progress/Outcomes (UB Dressing Goal 2, OT-IRF)  goal ongoing  -AF         LB Dressing Goal 1 (OT-IRF)    Activity/Device (LB Dressing Goal 1, OT-IRF)  lower body dressing;long handled shoe horn;reacher;elastic laces  -AF      [place] (LB Dressing Goal 1, OT-IRF)  moderate assist (50-74% patient effort);verbal cues required;minimum assist (75% or more patient effort)  -AF      Time Frame (LB Dressing Goal 1, OT-IRF)  short term goal (STG)  -AF      Progress/Outcomes (LB Dressing Goal 1, OT-IRF)  goal ongoing  -AF         LB Dressing Goal 2 (OT-IRF)    Activity/Device (LB Dressing Goal 2, OT-IRF)  lower body dressing;long handled shoe horn;elastic laces;reacher  -AF      [place] (LB Dressing Goal 2, OT-IRF)  standby assist;verbal cues required  -AF      Time Frame (LB Dressing Goal 2, OT-IRF)  long term goal (LTG)  -AF      Progress/Outcomes (LB Dressing Goal 2, OT-IRF)  goal ongoing  -AF         Grooming Goal 1  (OT-IRF)    Activity/Device (Grooming Goal 1, OT-IRF)  grooming skills, all  -AF      [place] (Grooming Goal 1, OT-IRF)  minimum assist (75% or more patient effort);verbal cues required  -AF      Time Frame (Grooming Goal 1, OT-IRF)  short term goal (STG)  -AF      Progress/Outcomes (Grooming Goal 1, OT-IRF)  goal ongoing  -AF         Grooming Goal 2 (OT-IRF)    Activity/Device (Grooming Goal 2, OT-IRF)  grooming skills, all  -AF      [place] (Grooming Goal 2, OT-IRF)  supervision required  -AF      Time Frame (Grooming Goal 2, OT-IRF)  long term goal (LTG)  -AF      Progress/Outcomes (Grooming Goal 2, OT-IRF)  goal ongoing  -AF         Toileting Goal 1 (OT-IRF)    Activity/Device (Toileting Goal 1, OT-IRF)  toileting skills, all;grab bar/safety frame;raised toilet seat  -AF      [place] Level (Toileting Goal 1, OT-IRF)  moderate assist (50-74% patient effort);verbal cues required  -AF      Time Frame (Toileting Goal 1, OT-IRF)  short term goal (STG)  -AF      Progress/Outcomes (Toileting Goal 1, OT-IRF)  goal ongoing  -AF         Toileting Goal 2 (OT-IRF)    Activity/Device (Toileting Goal 2, OT-IRF)  toileting skills, all;grab bar/safety frame;raised toilet seat  -AF      [place] Level (Toileting Goal 2, OT-IRF)  supervision required;verbal cues required  -AF      Time Frame (Toileting Goal 2, OT-IRF)  long term goal (LTG)  -AF      Progress/Outcomes (Toileting Goal 2, OT-IRF)  goal ongoing  -AF         ROM Goal 1 (OT-IRF)    ROM Goal 1 (OT-IRF)  pt will increase B UE ROM to approx. 3/4 shoulder flexion to assist with ADL tasks   -AF      Time Frame (ROM Goal 1, OT-IRF)  long term goal (LTG)  -AF      Progress/Outcomes (ROM Goal 1, OT-IRF)  goal ongoing  -AF        User Key  (r) = Recorded By, (t) = Taken By, (c) = Cosigned By    Initials Name Provider Type    Lolis Horne, OTR Occupational Therapist          Occupational Therapy Education     Title: PT OT SLP Therapies (Done)      Topic: Occupational Therapy (Done)     Point: ADL training (Done)     Description: Instruct learner(s) on proper safety adaptation and remediation techniques during self care or transfers.   Instruct in proper use of assistive devices.    Learning Progress Summary           Patient Acceptance, E, VU by AF at 12/17/2018  3:46 PM    Comment:  pt and wife participated in meeting with rehab team, recommend seat for shower, reacher for home and MIN A with ADL tasks. Outpatient OT at d/c. continue teaching with wife prior to d/c.    Acceptance, E,TB, VU,NR by JS at 12/16/2018 11:43 AM    Acceptance, E, VU,DU by AF at 12/11/2018 12:20 PM    Comment:  wife signed off to assist with walking to and from bathroom and with toileting tasks, demo'd and discussed Tub transfer bench in tub/shower combo with pt. pt stated that he will order one  himself    Acceptance, E,D, VU,NR by AF at 12/4/2018 12:07 PM    Comment:  AE during LBD tasks    Acceptance, E,D, VU,NR by AF at 12/3/2018 11:46 AM    Comment:  to maintain cervial precautions during ADL tasks    Acceptance, E, VU by RP at 12/1/2018 11:48 AM    Comment:  OT educ on OT role in therapeutic process and pt's POC.   Family Acceptance, E, VU by AF at 12/17/2018  3:46 PM    Comment:  pt and wife participated in meeting with rehab team, recommend seat for shower, reacher for home and MIN A with ADL tasks. Outpatient OT at d/c. continue teaching with wife prior to d/c.    Acceptance, E, VU,DU by AF at 12/11/2018 12:20 PM    Comment:  wife signed off to assist with walking to and from bathroom and with toileting tasks, demo'd and discussed Tub transfer bench in tub/shower combo with pt. pt stated that he will order one  himself    Acceptance, E,D, VU,NR by AF at 12/3/2018 11:46 AM    Comment:  to maintain cervial precautions during ADL tasks                   Point: Home exercise program (Done)     Description: Instruct learner(s) on appropriate technique for monitoring, assisting  and/or progressing therapeutic exercises/activities.    Learning Progress Summary           Patient Acceptance, E, VU by AF at 12/17/2018  3:46 PM    Comment:  pt and wife participated in meeting with rehab team, recommend seat for shower, reacher for home and MIN A with ADL tasks. Outpatient OT at d/c. continue teaching with wife prior to d/c.    Acceptance, E,TB, VU,NR by JS at 12/16/2018 11:43 AM    Acceptance, E, VU by AF at 12/7/2018  3:08 PM    Comment:  theraputty and foam blocks to increase sensation and FMC/strength   Family Acceptance, E, VU by AF at 12/17/2018  3:46 PM    Comment:  pt and wife participated in meeting with rehab team, recommend seat for shower, reacher for home and MIN A with ADL tasks. Outpatient OT at d/c. continue teaching with wife prior to d/c.                   Point: Precautions (Done)     Description: Instruct learner(s) on prescribed precautions during self-care and functional transfers.    Learning Progress Summary           Patient Acceptance, E, VU by AF at 12/17/2018  3:46 PM    Comment:  pt and wife participated in meeting with rehab team, recommend seat for shower, reacher for home and MIN A with ADL tasks. Outpatient OT at d/c. continue teaching with wife prior to d/c.    Acceptance, E,TB, VU,NR by JS at 12/16/2018 11:43 AM    Acceptance, E,D, VU,NR by AF at 12/3/2018 11:46 AM    Comment:  to maintain cervial precautions during ADL tasks    Acceptance, E, VU by RP at 12/1/2018 11:48 AM    Comment:  OT educ on OT role in therapeutic process and pt's POC.   Family Acceptance, E, VU by AF at 12/17/2018  3:46 PM    Comment:  pt and wife participated in meeting with rehab team, recommend seat for shower, reacher for home and MIN A with ADL tasks. Outpatient OT at d/c. continue teaching with wife prior to d/c.    Acceptance, E,D, VU,NR by AF at 12/3/2018 11:46 AM    Comment:  to maintain cervial precautions during ADL tasks                   Point: Body mechanics (Done)      Description: Instruct learner(s) on proper positioning and spine alignment during self-care, functional mobility activities and/or exercises.    Learning Progress Summary           Patient Acceptance, E, VU by AF at 12/17/2018  3:46 PM    Comment:  pt and wife participated in meeting with rehab team, recommend seat for shower, reacher for home and MIN A with ADL tasks. Outpatient OT at d/c. continue teaching with wife prior to d/c.    Acceptance, E,TB, VU,NR by JS at 12/16/2018 11:43 AM   Family Acceptance, E, VU by AF at 12/17/2018  3:46 PM    Comment:  pt and wife participated in meeting with rehab team, recommend seat for shower, reacher for home and MIN A with ADL tasks. Outpatient OT at d/c. continue teaching with wife prior to d/c.                               User Key     Initials Effective Dates Name Provider Type Discipline     04/06/17 -  Lo Sánchez, PT Physical Therapist PT    AF 04/03/18 -  Lolis Cooley OTR Occupational Therapist OT    RP 05/03/18 -  Precious De Leon OT Occupational Therapist OT                       Time Calculation:     Time Calculation- OT     Row Name 12/17/18 1547 12/17/18 1545          Time Calculation- OT    OT Start Time  1430  -AF  0930  -AF     OT Stop Time  1500  -AF  1030  -AF     OT Time Calculation (min)  30 min  -AF  60 min  -AF     OT Non-Billable Time (min)  --  30 min family meeting   -AF       User Key  (r) = Recorded By, (t) = Taken By, (c) = Cosigned By    Initials Name Provider Type    AF Lolis Cooley OTJEANINE Occupational Therapist          Therapy Suggested Charges     Code   Minutes Charges    None              Therapy Charges for Today     Code Description Service Date Service Provider Modifiers Qty    55255183684 HC OT THER PROC EA 15 MIN 12/17/2018 Lolis Cooley OTR GO 3    21470643373 HC OT NEUROMUSC RE EDUCATION EA 15 MIN 12/17/2018 Lolis Cooley OTR GO 3    00955388493 HC OT CARE PLAN EA 15 MIN 12/17/2018 Lolis Cooley OTR GO 2                    Lolis Cooley, OTR  12/17/2018

## 2018-12-17 NOTE — PROGRESS NOTES
Adult Nutrition  Assessment/PES    Patient Name:  Duane Mark Witten  YOB: 1955  MRN: [id number]  Admit Date:  11/30/2018    Assessment Date:  12/17/2018    Reason for Assessment     Row Name 12/17/18 1117          Reason for Assessment    Reason For Assessment  follow-up protocol         Nutrition/Diet History     Row Name 12/17/18 1119          Nutrition/Diet History    Typical Food/Fluid Intake  Pt reports intake & appetite good. No questions about diet. Will be dc to home this week (Wed)           Labs/Tests/Procedures/Meds     Row Name 12/17/18 1119          Labs/Procedures/Meds    Lab Results Reviewed  reviewed     Lab Results Comments  glu [id number]        Diagnostic Tests/Procedures    Diagnostic Test/Procedure Reviewed  reviewed        Medications    Pertinent Medications Reviewed  reviewed         Physical Findings     Row Name 12/17/18 1120          Physical Findings    Overall Physical Appearance  obese     Skin  other (see comments) neck incision healing           Nutrition Prescription Ordered     Row Name 12/17/18 1120          Nutrition Prescription PO    Current PO Diet  Regular     Fluid Consistency  Thin         Evaluation of Received Nutrient/Fluid Intake     Row Name 12/17/18 1120          PO Evaluation    Number of Days PO Intake Evaluated  3 days     % PO Intake  100%           Problem/Interventions:      Intervention Goal     Row Name 12/17/18 1120          Intervention Goal    General  Maintain nutrition     PO  Maintain intake;PO intake (%)     PO Intake %  100 %     Weight  Appropriate weight loss         Nutrition Intervention     Row Name 12/17/18 1121          Nutrition Intervention    RD/Tech Action  Menu provided;Follow Tx progress;Care plan reviewd           Education/Evaluation     Row Name 12/17/18 1121          Monitor/Evaluation    Monitor  Per protocol           Electronically signed by:  Sally Conti RD  12/17/18 11:21 AM

## 2018-12-17 NOTE — PLAN OF CARE
Problem: Patient Care Overview  Goal: Plan of Care Review  Outcome: Ongoing (interventions implemented as appropriate)   12/16/18 1630 12/16/18 2143 12/17/18 0108   Patient Care Overview   IRF Plan of Care Review progress ongoing, continue --  --    Progress, Functional Goals demonstrating adequate progress --  --    Coping/Psychosocial   Plan of Care Reviewed With --  patient --    OTHER   Outcome Summary --  --  No unsafe behavior. Wife takes pt to br assist 1 w/c. General weakness, difficulty raising arms to shoulder level. Meds with water, larger pills 1 at a time. Wears cpap at night.     Goal: Individualization and Mutuality  Outcome: Ongoing (interventions implemented as appropriate)    Goal: Discharge Needs Assessment  Outcome: Ongoing (interventions implemented as appropriate)    Goal: Coping Plan  Outcome: Ongoing (interventions implemented as appropriate)      Problem: Fall Risk (Adult)  Goal: Identify Related Risk Factors and Signs and Symptoms  Outcome: Ongoing (interventions implemented as appropriate)    Goal: Absence of Fall  Outcome: Ongoing (interventions implemented as appropriate)      Problem: Pain, Acute (Adult)  Goal: Identify Related Risk Factors and Signs and Symptoms  Outcome: Ongoing (interventions implemented as appropriate)    Goal: Acceptable Pain Control/Comfort Level  Outcome: Ongoing (interventions implemented as appropriate)      Problem: Skin Injury Risk (Adult)  Goal: Identify Related Risk Factors and Signs and Symptoms  Outcome: Ongoing (interventions implemented as appropriate)    Goal: Skin Health and Integrity  Outcome: Ongoing (interventions implemented as appropriate)

## 2018-12-17 NOTE — PROGRESS NOTES
Inpatient Rehabilitation Functional Measures Assessment    Functional Measures  [facility] Eating:  Eating Score = 5. Patient is supervision/set-up for eating,  requiring: No assistive devices were required.  [facility] Grooming: Activity was not observed.  [facility] Bathing:  Activity was not observed.  [facility] Upper Body Dressing:  Activity was not observed.  [place] Lower Body Dressing:  Activity was not observed.  [facility] Toileting:  Toileting Score = 4.  Patient requires minimal assistance for  toileting, such as steadying for balance while cleansing or adjusting clothes.  Patient requires the following assistive device(s): Grab bar.    [facility]CC[facility] Bladder Management  Level of Assistance:  Bladder Score = 4. Patient performs 75% or more of tasks  and requires minimal assistance for bladder management. [place] positions the  urinal.  Frequency/Number of Accidents this Shift:  Bladder accidents this shift:  0 .  Patient has not had an accident this shift.    ROCCO Bowel Management  Level of Assistance: Bowel Score = 7.  Patient is completely independent for  bowel management.  Patient did not have bowel movement.  No  medication/intervention was provided.  Frequency/Number of Accidents this Shift: [place]    [facility] Bed/Chair/Wheelchair Transfer:  Bed/chair/wheelchair Transfer Score = 4.  Patient performs 75% or more of effort and minimal assistance (little/incidental  help/lifting of one limb/steadying) for transferring to and from the  bed/chair/wheelchair, requiring: Contact guard. Patient requires the following  assistive device(s): Walker. Bed rails. Grab bars. Arm rest.  [place] Toilet Transfer:  Toilet Transfer Score = 4.  Patient performs 75% or more  of effort and minimal assistance (little/incidental help/steadying) for  transferring to and from the toilet/commode, requiring: Contact guard. Patient  requires the following assistive device(s): Walker. Grab bars.  ROCCO Tub/Shower Transfer:  Activity was not observed.    Previously Documented Mode of  Locomotion at Discharge: Field  ROCCO Expected Mode of Locomotion at Discharge: Branch  [facility] Walk/Wheelchair:  Branch  [facility] Stairs:  Branch    [facility] Comprehension:  Branch  [facility] Expression:  [facility] Social Interaction:  [facility] Problem Solving:  [facility] Memory:  [place]    Therapy Mode Minutes  Occupational Therapy: Branch  Physical Therapy: Branch  Speech Language Pathology:  Branch    Signed by: ALEJO Haynes

## 2018-12-17 NOTE — PROGRESS NOTES
Inpatient Rehabilitation Functional Measures Assessment    Functional Measures  ROCCO Eating:  Branch  [facility] Grooming: Branch  [facility] Bathing:  Branch  [facility] Upper Body Dressing:  Branch  [facility] Lower Body Dressing:  [facility] Toileting:  [facility] Bladder Management  Level of Assistance:  [place]  Frequency/Number of Accidents this Shift:  [facility] Bowel Management  Level of Assistance: [place]  Frequency/Number of Accidents this Shift: Branch    [facility] Bed/Chair/Wheelchair Transfer:  Bed/chair/wheelchair Transfer Score = 4.  Patient performs 75% or more of effort and minimal assistance (little/incidental  help/lifting of one limb/steadying) for transferring to and from the  bed/chair/wheelchair, requiring: Patient requires the following assistive  device(s): Walker.  [facility] Toilet Transfer:  [facility] Tub/Shower Transfer:  [place]    Previously Documented Mode of Locomotion at Discharge: Field  [facility]CC[place] Expected Mode of Locomotion at Discharge: [facility] Walk/Wheelchair:  WHEELCHAIR OBSERVATION   Wheelchair did not occur.    WALK OBSERVATION   Walk Distance Scale = 3.  Distance walked is greater than 150 feet. Walk Score  = 4.  Patient performs 75% or more of effort and requires minimal assistance.  Incidental help/contact guard/steadying was provided. Patient walked a distance  of  160 feet. Patient requires the following assistive device(s): Rolling  walker.  [facility] Stairs:  Stairs Score = 2.  Incidental assistance with lifting or lowering,  contact guard or steadying was provided. Patient performs 75% or more of effort  and requires minimal contact assistance. Patient negotiated  8 stairs. Patient  requires the following assistive device(s): Handrail(s).    [facility] Comprehension:  Branch  [facility] Expression:  Branch  [facility] Social Interaction:  [facility] Problem Solving:  [facility] Memory:  [place]    Therapy Mode Minutes  Occupational Therapy: [place]  Physical Therapy: Individual: 90 minutes.  Speech Language Pathology:   Branch    Signed by: JEFFERSON HeartT

## 2018-12-17 NOTE — PROGRESS NOTES
Family conference held today with pt, pt's wife, PT,OT,RN and SW. Discussed pt's progress, current status and discharge recommendations.  PT reports pt completes bed mobility with CG-MIN assist and transfers with CG. He ambulates 160'-200' with rolling walker and CG. He can ascend/descend 8 steps with rails and CG-MIN assist.     In OT, pt completes commode and shower transfers with CG assist. He bathes seated on a tub bench with SBA-CG. He dresses his lower body and completes toileting tasks with CG assist. He completes grooming tasks with SBA but needs help with shampooing and combing his hair. He requires MIN assist to dress his upper body.  Upper extremity strength, range of motion, sensation and fine motor function are improving.    Nursing reviewed current medications and provided a list. There is some confusion re: pt's DM medications, so we have contacted pt's home pharmacy to verify his home medications.  SW confirmed that pt will have a $30.00 co-pay for xarelto.   MD follow up discussed. Nursing will contact Dr Markham for follow up instructions. Pt has an appointment with Dr Bravo on 1/20/2019 and pt's wife will schedule appoint with pt's PCP.    DME discussed. Pt to receive a rolling walker. He has a reacher and handicapped height commode.  Pt plans to d/c to his mother's home were he has access to a walk in shower with a bench.  Outpatient PT and OT is recommended and pt will return to [facility] for treatment.  Family teaching with pt's wife has been completed. Both pt and his wife feel they are ready for discharge on Wednesday, 12/19.

## 2018-12-17 NOTE — THERAPY TREATMENT NOTE
Inpatient Rehabilitation - Physical Therapy Treatment Note  [facility]     Patient Name: Duane Mark Witten  : 1955  MRN: [id number]    Today's Date: 2018                 Admit Date: 2018      Visit Dx:      ICD-10-CM ICD-9-CM   1. Cervical stenosis of spinal canal M48.02 723.0   2. Weakness of both lower extremities R29.898 729.89   3. Paresthesia of both upper extremities R20.2 782.0    M79.601 729.5    M79.602    4. Morbid obesity with BMI of 40.0-44.9, adult (CMS/Formerly [facility]) E66.01 278.01    Z68.41 V85.41   5. Impaired functional mobility, balance, gait, and endurance Z74.09 V49.89   6. Functional gait abnormality R26.89 781.2       Patient Active Problem List   Diagnosis   • YANELI (generalized anxiety disorder)   • ADD (attention deficit disorder)   • Depression   • Diabetes type 2, controlled (CMS/[facility])   • ED (erectile dysfunction) of non-organic origin   • HLD (hyperlipidemia)   • Essential hypertension   • Arthritis   • Psoriasis   • Testosterone deficiency   • Primary insomnia   • Weakness of both lower extremities   • Paresthesia of both upper extremities   • Morbid obesity with BMI of 40.0-44.9, adult (CMS/[facility])   • Adverse effect of corticosteroids   • Type 2 diabetes mellitus with hyperglycemia (CMS/[facility])   • Cervical stenosis of spinal canal   • Edema of cervical spinal cord (CMS/[facility])   • Postoperative atrial fibrillation (CMS/[facility])   • Cervical myelopathy (CMS/[facility])       Therapy Treatment    IRF Treatment Summary     Row Name 18             Evaluation/Treatment Time and Intent    Subjective Information  no complaints  -EE      Existing Precautions/Restrictions  fall;spinal  -EE      Document Type  therapy note (daily note)  -EE      Mode of Treatment  physical therapy  -EE      Patient/Family Observations  Pt sitting up in WC in no acute distress.  -EE      Recorded by [EE] Cathleen Rosario, PT      Row Name 18             Cognition/Psychosocial- PT/OT    Affect/Mental  Status (Cognitive)  WFL  -EE      Orientation Status (Cognition)  oriented x 4  -EE      Follows Commands (Cognition)  WFL  -EE      Personal Safety Interventions  fall prevention program maintained;gait belt;muscle strengthening facilitated;nonskid shoes/slippers when out of bed;supervised activity  -EE      Recorded by [EE] Cathleen Rosario, HAILEY      Row Name 12/17/18 0800             Transfer Assessment/Treatment    Comment (Transfers)  Wife present for teaching on car transfer; verbalizes understanding of safety precautions and technique with no further questions/concerns at this time.  -EE      Recorded by [EE] Cathleen Rosario, HAILEY      Row Name 12/17/18 0800             Sit-Stand Transfer    Sit-Stand Grand (Transfers)  contact guard  -EE      Assistive Device (Sit-Stand Transfers)  walker, front-wheeled  -EE      Recorded by [EE] Cathleen Rosario, HAILEY      Row Name 12/17/18 0800             Stand-Sit Transfer    Stand-Sit Grand (Transfers)  contact guard  -EE      Assistive Device (Stand-Sit Transfers)  walker, front-wheeled  -EE      Recorded by [EE] Cathleen Rosario, HAILEY      Row Name 12/17/18 0800             Car Transfer    Type (Car Transfer)  stand pivot/stand step  -EE      Grand Level (Car Transfer)  contact guard;verbal cues  -EE      Assistive Device (Car Transfer)  walker, front-wheeled  -EE      Recorded by [EE] Cathleen Rosario, HAILEY      Row Name 12/17/18 0800             Gait/Stairs Assessment/Training    [place] Level (Gait)  contact guard;verbal cues  -EE      Assistive Device (Gait)  walker, front-wheeled  -EE      Distance in Feet (Gait)  160 x 3, 80  -EE      Pattern (Gait)  step-through  -EE      Deviations/Abnormal Patterns (Gait)  ataxic;base of support, narrow;sarah decreased;stride length decreased  -EE      Bilateral Gait Deviations  forward flexed posture;weight shift ability decreased;heel strike decreased  -EE      Grand Level (Stairs)  minimum assist (75% patient  effort) + 2nd person at [employer] for safety  -EE      Handrail Location (Stairs)  both sides  -EE      Number of Steps (Stairs)  8, 4  -EE      Ascending Technique (Stairs)  step-to-step  -EE      Descending Technique (Stairs)  step-to-step  -EE      Stairs, Safety Issues  weight-shifting ability decreased;balance decreased during turns  -EE      Stairs, Impairments  strength decreased;impaired balance  -EE      Comment (Gait/Stairs)  No buckling of knees during ambulation or stair negotiation today. Spouse present for teaching on stair negotiation, assisted pt up/down 4 stairs safely with therapist standing by.  Spouse demonstrates appropriate technique and good understaning of sequencing on stairs; no further questions or concerns at this time.   -EE      Recorded by [EE] Cathleen Rosario PT      Row Name 12/17/18 0800             Safety Issues, Functional Mobility    Impairments Affecting Function (Mobility)  balance;coordination;endurance/activity tolerance  -EE      Recorded by [EE] Cathleen Rosario, PT      Row Name 12/17/18 0800             Pain Scale: Numbers Pre/Post-Treatment    Pain Scale: Numbers, Pretreatment  5/10  -EE      Pain Scale: Numbers, Post-Treatment  5/10  -EE      Pain Location - Orientation  incisional  -EE      Pain Location  neck  -EE      Pain Intervention(s)  Repositioned;Medication (See MAR);Rest  -EE      Recorded by [EE] Cathleen Rosario, HAILEY      Row Name 12/17/18 0800             Dynamic Balance Activity    Therapeutic Training Performed (Dynamic Balance)  side stepping  -EE      Support Needed for Balance (Dynamic Balance Training)  uses both upper extremities for support;[employer] [place] [employer]  -EE      Comment (Dynamic Balance Training)  2 x 8' at parallel bars  -EE      Recorded by [EE] Cathleen Rosario PT      Row Name 12/17/18 0800             Lower Extremity Standing Therapeutic Exercise    Performed, Standing Lower Extremity (Therapeutic Exercise)  toe raises;heel raises;hip/knee flexion/extension  -EE       Device, Standing Lower Extremity (Therapeutic Exercise)  parallel bars  -EE      Exercise Type, Standing Lower Extremity (Therapeutic Exercise)  AROM (active range of motion)  -EE      Sets/Reps Detail, Standing Lower Extremity (Therapeutic Exercise)  1/15  -EE      Recorded by [EE] Cathleen Rosario PT      Row Name 12/17/18 0800             Lower Extremity Seated Therapeutic Exercise    Performed, Seated Lower Extremity (Therapeutic Exercise)  hip flexion/extension;hip abduction/adduction;knee flexion/extension;ankle dorsiflexion/plantarflexion;LAQ (long arc quad), knee extension  -EE      Device, Seated Lower Extremity (Therapeutic Exercise)  elastic bands/tubing;small ball;free weights, cuff red theraband, 1.5# weights  -EE      Exercise Type, Seated Lower Extremity (Therapeutic Exercise)  resistive exercise  -EE      Sets/Reps Detail, Seated Lower Extremity (Therapeutic Exercise)  1/20  -EE      Recorded by [EE] Cathleen Rosario PT      Row Name 12/17/18 0800             Positioning and Restraints    Pre-Treatment Position  sitting in chair/recliner  -EE      Post Treatment Position  wheelchair  -EE      In Wheelchair  sitting;call light within reach;encouraged to call for assist;exit alarm on;with family/caregiver  -EE      Recorded by [EE] Cathleen Rosario PT        User Key  (r) = Recorded By, (t) = Taken By, (c) = Cosigned By    Initials Name Effective Dates    EE Cathleen Rosario PT 04/03/18 -         Wound 11/27/18 1318 Other (See comments) neck incision (Active)   Dressing Appearance open to air 12/17/2018  7:09 AM   Closure Adhesive closure strips;Liquid skin adhesive 12/17/2018  7:09 AM   Base clean;dry;pink 12/16/2018  9:43 PM   Periwound dry;intact 12/16/2018  9:43 PM   Drainage Amount none 12/17/2018  7:09 AM   Dressing Care, Wound open to air 12/17/2018  7:09 AM     Physical Therapy Education     Title: PT OT SLP Therapies (Done)     Topic: Physical Therapy (Done)     Point: Mobility training (Done)      Learning Progress Summary           Patient Acceptance, E,TB,D, VU,NR by EE at 12/17/2018  8:12 AM    Acceptance, E,TB, VU,NR by MAURICIO at 12/16/2018 11:43 AM    Acceptance, E,TB, VU,NR by JS at 12/15/2018  3:33 PM    Acceptance, E, VU,NR by SIMÓN at 12/14/2018  4:14 PM    Acceptance, E,D, VU by JOVANNI at 12/14/2018 11:40 AM    Comment:  Safety with ambulation    Acceptance, E,TB,D, VU,NR by EE at 12/13/2018  9:11 AM    Acceptance, E,TB, VU,NR by EE at 12/12/2018  8:48 AM    Acceptance, E,TB,D, VU,NR by EE at 12/11/2018  8:48 AM    Acceptance, E,TB,D, VU,NR by JAMARCUS at 12/10/2018  8:45 AM    Eager, E,TB,D, VU,DU,NR by MAR at 12/9/2018 10:24 AM    Eager, E,TB,D, VU,DU by MAR at 12/8/2018 12:00 PM    Acceptance, E,TB,D, VU,NR by EE at 12/7/2018  9:22 AM    Acceptance, E,TB, VU,NR by JAMARCUS at 12/6/2018  9:15 AM    Acceptance, E,TB, VU,NR by JAMARCUS at 12/5/2018 10:10 AM    Acceptance, E, NR by SIMÓN at 12/4/2018 10:12 AM    Acceptance, E,TB, VU,NR by JAMARCUS at 12/3/2018  9:48 AM    Acceptance, E, VU,NR by IAN at 12/1/2018  9:50 AM   Family Acceptance, E,TB,D, VU,NR by EE at 12/11/2018  8:48 AM   Significant Other Eager, E,TB,D, VU,DU,NR by MAR at 12/9/2018 10:24 AM                   Point: Home exercise program (Done)     Learning Progress Summary           Patient Acceptance, E,TB,D, VU,NR by EE at 12/17/2018  8:12 AM    Acceptance, E,TB, VU,NR by MAURICIO at 12/16/2018 11:43 AM    Acceptance, E,TB, VU,NR by MAURICIO at 12/15/2018  3:33 PM    Acceptance, E,TB,D, VU,NR by EE at 12/13/2018  9:11 AM    Acceptance, E,TB, VU,NR by EE at 12/12/2018  8:48 AM    Acceptance, E,TB,D, VU,NR by EE at 12/11/2018  8:48 AM    Acceptance, E,TB,D, VU,NR by EE at 12/10/2018  8:45 AM    Eager, E,TB,D, VU,DU,NR by  at 12/9/2018 10:24 AM    Eager, E,TB,D, VU,DU by  at 12/8/2018 12:00 PM    Acceptance, E,TB,D, VU,NR by EE at 12/7/2018  9:22 AM    Acceptance, E,TB, VU,NR by EE at 12/6/2018  9:15 AM    Acceptance, E,TB, VU,NR by EE at 12/5/2018 10:10 AM    Acceptance, E,TB, VU,NR  by EE at 12/3/2018  9:48 AM   Family Acceptance, E,TB,D, VU,NR by EE at 12/11/2018  8:48 AM   Significant Other Eager, E,TB,D, VU,DU,NR by  at 12/9/2018 10:24 AM                   Point: Body mechanics (Done)     Learning Progress Summary           Patient Acceptance, E,TB,D, VU,NR by EE at 12/17/2018  8:12 AM    Acceptance, E,TB, VU,NR by JS at 12/16/2018 11:43 AM    Acceptance, E,TB, VU,NR by JS at 12/15/2018  3:33 PM    Acceptance, E,TB,D, VU,NR by EE at 12/13/2018  9:11 AM    Acceptance, E,TB, VU,NR by EE at 12/12/2018  8:48 AM    Acceptance, E,TB,D, VU,NR by EE at 12/11/2018  8:48 AM    Acceptance, E,TB,D, VU,NR by EE at 12/10/2018  8:45 AM    Acceptance, E,TB,D, VU,NR by EE at 12/7/2018  9:22 AM    Acceptance, E,TB, VU,NR by EE at 12/6/2018  9:15 AM    Acceptance, E,TB, VU,NR by EE at 12/5/2018 10:10 AM    Acceptance, E,TB, VU,NR by EE at 12/3/2018  9:48 AM   Family Acceptance, E,TB,D, VU,NR by EE at 12/11/2018  8:48 AM                   Point: Precautions (Done)     Learning Progress Summary           Patient Acceptance, E,TB,D, VU,NR by EE at 12/17/2018  8:12 AM    Acceptance, E,TB, VU,NR by JS at 12/16/2018 11:43 AM    Acceptance, E,TB, VU,NR by JS at 12/15/2018  3:33 PM    Acceptance, E,TB,D, VU,NR by EE at 12/13/2018  9:11 AM    Acceptance, E,TB, VU,NR by EE at 12/12/2018  8:48 AM    Acceptance, E,TB,D, VU,NR by EE at 12/11/2018  8:48 AM    Acceptance, E,TB,D, VU,NR by EE at 12/10/2018  8:45 AM    Acceptance, E,TB,D, VU,NR by EE at 12/7/2018  9:22 AM    Acceptance, E,TB, VU,NR by EE at 12/6/2018  9:15 AM    Acceptance, E,TB, VU,NR by EE at 12/5/2018 10:10 AM    Acceptance, E,TB, VU,NR by EE at 12/3/2018  9:48 AM    Acceptance, E, VU,NR by IAN at 12/1/2018  9:50 AM   Family Acceptance, E,TB,D, VU,NR by EE at 12/11/2018  8:48 AM                               User Key     Initials Effective Dates Name Provider Type Discipline    JS 04/06/17 -  Lo Sánchez PT Physical Therapist PT    JOVANNI 04/03/18 -  [place],  Daphne MUHAMMAD, PT Physical Therapist PT    LB 03/07/18 -  Odette Orourke, PTA Physical Therapy Assistant PT    EE 04/03/18 -  Cathleen Rosario, PT Physical Therapist PT    JK 04/03/18 -  Janae Trujillo, PT Physical Therapist PT    KP 04/03/18 -  Angelic Bautista, PT Physical Therapist PT                  PT Recommendation and Plan                        Time Calculation:     PT Charges     Row Name 12/17/18 1331 12/17/18 1105 12/17/18 0831       Time Calculation    Start Time  1300  -EE  1030  -EE  0800  -EE    Stop Time  1330  -EE  1100  -EE  0830  -EE    Time Calculation (min)  30 min  -EE  30 min  -EE  30 min  -EE    PT Received On  12/17/18  -EE  12/17/18  -EE  12/17/18  -EE    PT - Next Appointment  12/18/18  -EE  12/17/18  -EE  12/17/18  -EE      User Key  (r) = Recorded By, (t) = Taken By, (c) = Cosigned By    Initials Name Provider Type    EE Cathleen Rosario, PT Physical Therapist            Therapy Charges for Today     Code Description Service Date Service Provider Modifiers Qty    [id number] HC PT THER PROC EA 15 MIN 12/17/2018 Cathleen Rosario, PT GP 6                   Cathleen Rosario, PT  12/17/2018

## 2018-12-17 NOTE — PROGRESS NOTES
Case Management  Inpatient Rehabilitation Plan of Care and Discharge Plan Note    Rehabilitation Diagnosis:  Branch  Date of Onset:  Branch    Medical Summary:  Branch  Past Medical History: Branch    Plan of Care  Updated Problems/Interventions  Field    Expected Intensity:  Branch  Interdisciplinary Team:  Branch  Estimated Length of Stay/Anticipated Discharge Date: Branch  Anticipated Discharge Destination:  Anticipated discharge destination from inpatient rehabilitation is community  discharge with assistance. Family conference held 12/17/2018. Pt to return to  [facility] for outpatient PT and OT. Family teaching completed with pt's wife.      Based on the patient's medical and functional status, their prognosis and  expected level of functional improvement is:  Branch    Signed by: SEBASTIAN Lizarraga

## 2018-12-17 NOTE — PROGRESS NOTES
Inpatient Rehabilitation Functional Measures Assessment and Plan of Care    Plan of Care  Updated Problems/Interventions  Field    Functional Measures  ROCCO Eating:  [facility] Grooming: [facility] Bathing:  [facility] Upper Body Dressing:  [facility] Lower Body Dressing:  [facility] Toileting:  [facility] Bladder Management  Level of Assistance:  [place]  Frequency/Number of Accidents this Shift:  [facility] Bowel Management  Level of Assistance: [place]  Frequency/Number of Accidents this Shift: [facility] Bed/Chair/Wheelchair Transfer:  [facility] Toilet Transfer:  [facility] Tub/Shower Transfer:  [place]    Previously Documented Mode of Locomotion at Discharge: Field  ROCCO Expected Mode of Locomotion at Discharge: [facility] Walk/Wheelchair:  [facility] Stairs:  [facility] Comprehension:  [facility] Expression:  [facility] Social Interaction:  [facility] Problem Solving:  [facility] Memory:  [place]    Therapy Mode Minutes  Occupational Therapy: Individual: 90 minutes.  Physical Therapy: [place]  Speech Language Pathology:  [place]    Signed by: NURIS Lara/REVA

## 2018-12-17 NOTE — PROGRESS NOTES
LOS: 17 days   Patient Care Team:  Miladis Colbert APRN as PCP - General (Family Medicine)    Chief Complaint:   Cervical myelopathy    Subjective     History of Present Illness    Subjective      She needs to work on dexterity.  9 hole PEG test improved from 3 minutes to 1.5 minutes.  Hand still feel like leather.  Buttons and zippers are difficult.  Does do better with manipulating an object and occupational therapy.  He feels he's making progress on his ambulation.    History taken from: patient    Objective     Vital Signs  Temp:  [97.5 °F (36.4 °C)-97.9 °F (36.6 °C)] 97.8 °F (36.6 °C)  Heart Rate:  [70-80] 80  Resp:  [18-20] 20  BP: (127-141)/(64-74) 128/74    Objective  MENTAL STATUS -  AWAKE / ALERT  HEENT- neck incision intact. No Drainage.   LUNGS - CTA, NO WHEEZES, RALES OR RHONCHI  HEART-S1,S2- IRREGULAR  ABD - NORMOACTIVE BOWEL SOUNDS, SOFT, NT.    EXT - 2+ BLE  NEURO -bilateral R > L hand incoordination.   MOTOR EXAM - takes resistance BUE and BLE.     Results Review:     I reviewed the patient's new clinical results.      Results from last 7 days   Lab Units  12/14/18   0446   SODIUM mmol/L  138   POTASSIUM mmol/L  4.0   CHLORIDE mmol/L  97*   CO2 mmol/L  27.5   BUN mg/dL  14   CREATININE mg/dL  0.81   CALCIUM mg/dL  9.2   GLUCOSE mg/dL  124*       Medication Review: done  Scheduled Meds:    atomoxetine 100 mg Oral Daily   DULoxetine 60 mg Oral Daily   famotidine 20 mg Oral Daily   glipiZIDE 10 mg Oral QAM AC   losartan 100 mg Oral Q24H   And      hydrochlorothiazide 25 mg Oral Daily   insulin lispro 0-9 Units Subcutaneous 4x Daily With Meals & Nightly   lidocaine 2 patch Transdermal Q24H   metFORMIN 1,000 mg Oral BID With Meals   metoprolol tartrate 25 mg Oral Q12H   pioglitazone 30 mg Oral Daily   rivaroxaban 20 mg Oral Daily With Dinner     Continuous Infusions:   PRN Meds:.•  acetaminophen  •  albuterol  •  cyclobenzaprine  •  dextrose  •  dextrose  •  docusate sodium  •   HYDROcodone-acetaminophen  •  HYDROcodone-acetaminophen  •  melatonin  •  ondansetron **OR** ondansetron ODT **OR** ondansetron  •  polyethylene glycol  •  sennosides-docusate sodium      Assessment/Plan       Cervical myelopathy (CMS/HCC)      Assessment & Plan  Cervical myelopathy    Immobilization syndrome status post C4, C5, 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 BEKAH reviewed.     history of morbid obesity.    Type 2 diabetes mellitus. Glipizide/metformin/actos    Hypertension - metoprolol    Atrial fibrillation - metoprolol/ Xarelto    Anxiety/depression -Cymbalta    Attention deficit disorder- Strattera    DVT prophylaxis - SCDs/ Xarelto    Continue rehab program.      Jose Shah MD  12/17/18  11:31 AM    Time:

## 2018-12-17 NOTE — PROGRESS NOTES
Inpatient Rehabilitation Functional Measures Assessment and Plan of Care    Plan of Care  Updated Problems/Interventions  Mobility    [OT] Toilet Transfers(Active)  Current Status(12/17/2018): CGA  Weekly Goal(12/19/2018): CGA/SBA  Discharge Goal: CGA/SBA    [OT] Tub/Shower Transfers(Active)  Current Status(12/17/2018): CGA  Weekly Goal(12/19/2018): CGA  Discharge Goal: CGA        Self Care    [OT] Bathing(Active)  Current Status(12/17/2018): MIN  Weekly Goal(12/19/2018): min/cga  Discharge Goal: min/CGA    [OT] Dressing (Lower)(Active)  Current Status(12/17/2018): MIN/CGA  Weekly Goal(12/19/2018): MIN/CGA  Discharge Goal: MIN/CGA    [OT] Dressing (Upper)(Active)  Current Status(12/17/2018): MIN  Weekly Goal(12/19/2018): Min A  Discharge Goal: MIN    [OT] Grooming(Active)  Current Status(12/17/2018): MIN/set up  Weekly Goal(12/19/2018): MIN/set up  Discharge Goal: MIN/set up    [OT] Toileting(Active)  Current Status(12/17/2018): CGA  Weekly Goal(12/19/2018): CGA  Discharge Goal: CGA    Functional Measures  ROCCO Eating:  Branch  ROCCO Grooming: Branch  ROCCO Bathing:  Branch  ROCCO Upper Body Dressing:  Branch  ROCCO Lower Body Dressing:  Branch  ROCCO Toileting:  Branch    ROCCO Bladder Management  Level of Assistance:  Branch  Frequency/Number of Accidents this Shift:  Branch    ROCCO Bowel Management  Level of Assistance: Branch  Frequency/Number of Accidents this Shift: Branch    ROCCO Bed/Chair/Wheelchair Transfer:  Branch  ROCCO Toilet Transfer:  Branch  ROCCO Tub/Shower Transfer:  Branch    Previously Documented Mode of Locomotion at Discharge: Field  ROCCO Expected Mode of Locomotion at Discharge: Branch  ROCCO Walk/Wheelchair:  Branch  ROCCO Stairs:  Branch    ROCCO Comprehension:  Branch  ROCCO Expression:  Branch  ROCCO Social Interaction:  Branch  ROCCO Problem Solving:  Branch  ROCCO Memory:  Branch    Therapy Mode Minutes  Occupational Therapy: Branch  Physical Therapy: Branch  Speech Language Pathology:  Branch    Signed by: Lolis Cooley  OT

## 2018-12-18 LAB
GLUCOSE BLDC GLUCOMTR-MCNC: 132 MG/DL (ref 70–130)
GLUCOSE BLDC GLUCOMTR-MCNC: 194 MG/DL (ref 70–130)
GLUCOSE BLDC GLUCOMTR-MCNC: 200 MG/DL (ref 70–130)
GLUCOSE BLDC GLUCOMTR-MCNC: 218 MG/DL (ref 70–130)

## 2018-12-18 PROCEDURE — 97110 THERAPEUTIC EXERCISES: CPT

## 2018-12-18 PROCEDURE — 63710000001 INSULIN LISPRO (HUMAN) PER 5 UNITS: Performed by: HOSPITALIST

## 2018-12-18 PROCEDURE — 97535 SELF CARE MNGMENT TRAINING: CPT

## 2018-12-18 PROCEDURE — 82962 GLUCOSE BLOOD TEST: CPT

## 2018-12-18 RX ORDER — HYDROCODONE BITARTRATE AND ACETAMINOPHEN 5; 325 MG/1; MG/1
1 TABLET ORAL EVERY 4 HOURS PRN
Qty: 100 TABLET | Refills: 0 | Status: SHIPPED | OUTPATIENT
Start: 2018-12-18 | End: 2018-12-23

## 2018-12-18 RX ORDER — HYDROCHLOROTHIAZIDE 25 MG/1
25 TABLET ORAL DAILY
Qty: 30 TABLET | Refills: 0 | Status: SHIPPED | OUTPATIENT
Start: 2018-12-18 | End: 2019-01-25

## 2018-12-18 RX ORDER — FAMOTIDINE 20 MG/1
20 TABLET, FILM COATED ORAL DAILY
Qty: 30 TABLET | Refills: 0 | Status: SHIPPED | OUTPATIENT
Start: 2018-12-18 | End: 2019-03-19

## 2018-12-18 RX ORDER — PIOGLITAZONEHYDROCHLORIDE 30 MG/1
30 TABLET ORAL DAILY
Qty: 30 TABLET | Refills: 0 | Status: SHIPPED | OUTPATIENT
Start: 2018-12-18 | End: 2019-05-13 | Stop reason: SDUPTHER

## 2018-12-18 RX ORDER — CYCLOBENZAPRINE HCL 10 MG
10 TABLET ORAL 3 TIMES DAILY PRN
Qty: 90 TABLET | Refills: 0 | Status: SHIPPED | OUTPATIENT
Start: 2018-12-18 | End: 2019-03-19

## 2018-12-18 RX ORDER — LIDOCAINE 50 MG/G
2 PATCH TOPICAL
Qty: 30 PATCH | Refills: 0 | Status: SHIPPED | OUTPATIENT
Start: 2018-12-18 | End: 2019-05-17

## 2018-12-18 RX ORDER — LOSARTAN POTASSIUM 100 MG/1
100 TABLET ORAL
Qty: 30 TABLET | Refills: 0 | Status: SHIPPED | OUTPATIENT
Start: 2018-12-18 | End: 2019-01-25

## 2018-12-18 RX ADMIN — POLYETHYLENE GLYCOL 3350 17 G: 17 POWDER, FOR SOLUTION ORAL at 07:15

## 2018-12-18 RX ADMIN — PIOGLITAZONE 30 MG: 30 TABLET ORAL at 08:03

## 2018-12-18 RX ADMIN — INSULIN LISPRO 4 UNITS: 100 INJECTION, SOLUTION INTRAVENOUS; SUBCUTANEOUS at 16:54

## 2018-12-18 RX ADMIN — METOPROLOL TARTRATE 25 MG: 25 TABLET ORAL at 20:11

## 2018-12-18 RX ADMIN — FAMOTIDINE 20 MG: 20 TABLET, FILM COATED ORAL at 08:03

## 2018-12-18 RX ADMIN — DULOXETINE HYDROCHLORIDE 60 MG: 60 CAPSULE, DELAYED RELEASE ORAL at 08:04

## 2018-12-18 RX ADMIN — CYCLOBENZAPRINE 10 MG: 10 TABLET, FILM COATED ORAL at 20:11

## 2018-12-18 RX ADMIN — HYDROCODONE BITARTRATE AND ACETAMINOPHEN 2 TABLET: 5; 325 TABLET ORAL at 16:06

## 2018-12-18 RX ADMIN — LIDOCAINE 2 PATCH: 50 PATCH CUTANEOUS at 08:05

## 2018-12-18 RX ADMIN — DOCUSATE SODIUM 100 MG: 100 CAPSULE, LIQUID FILLED ORAL at 07:15

## 2018-12-18 RX ADMIN — HYDROCODONE BITARTRATE AND ACETAMINOPHEN 2 TABLET: 5; 325 TABLET ORAL at 02:00

## 2018-12-18 RX ADMIN — SENNOSIDES AND DOCUSATE SODIUM 2 TABLET: 8.6; 5 TABLET ORAL at 16:54

## 2018-12-18 RX ADMIN — HYDROCODONE BITARTRATE AND ACETAMINOPHEN 2 TABLET: 5; 325 TABLET ORAL at 20:12

## 2018-12-18 RX ADMIN — METOPROLOL TARTRATE 25 MG: 25 TABLET ORAL at 08:04

## 2018-12-18 RX ADMIN — CYCLOBENZAPRINE 10 MG: 10 TABLET, FILM COATED ORAL at 02:00

## 2018-12-18 RX ADMIN — INSULIN LISPRO 4 UNITS: 100 INJECTION, SOLUTION INTRAVENOUS; SUBCUTANEOUS at 20:11

## 2018-12-18 RX ADMIN — METFORMIN HYDROCHLORIDE 1000 MG: 1000 TABLET ORAL at 16:54

## 2018-12-18 RX ADMIN — METFORMIN HYDROCHLORIDE 1000 MG: 1000 TABLET ORAL at 08:03

## 2018-12-18 RX ADMIN — HYDROCHLOROTHIAZIDE 25 MG: 25 TABLET ORAL at 08:03

## 2018-12-18 RX ADMIN — CYCLOBENZAPRINE 10 MG: 10 TABLET, FILM COATED ORAL at 11:42

## 2018-12-18 RX ADMIN — HYDROCODONE BITARTRATE AND ACETAMINOPHEN 2 TABLET: 5; 325 TABLET ORAL at 07:15

## 2018-12-18 RX ADMIN — RIVAROXABAN 20 MG: 20 TABLET, FILM COATED ORAL at 16:54

## 2018-12-18 RX ADMIN — LOSARTAN POTASSIUM 100 MG: 100 TABLET, FILM COATED ORAL at 08:04

## 2018-12-18 RX ADMIN — ATOMOXETINE 100 MG: 60 CAPSULE ORAL at 08:04

## 2018-12-18 RX ADMIN — HYDROCODONE BITARTRATE AND ACETAMINOPHEN 2 TABLET: 5; 325 TABLET ORAL at 11:42

## 2018-12-18 RX ADMIN — GLIPIZIDE 10 MG: 10 TABLET ORAL at 08:04

## 2018-12-18 RX ADMIN — INSULIN LISPRO 2 UNITS: 100 INJECTION, SOLUTION INTRAVENOUS; SUBCUTANEOUS at 11:42

## 2018-12-18 NOTE — PROGRESS NOTES
Inpatient Rehabilitation Functional Measures Assessment and Plan of Care    Plan of Care  Updated Problems/Interventions  Field    Functional Measures  ROCCO Eating:  [facility] Grooming: [facility] Bathing:  [facility] Upper Body Dressing:  [facility] Lower Body Dressing:  [facility] Toileting:  [facility] Bladder Management  Level of Assistance:  [place]  Frequency/Number of Accidents this Shift:  [facility] Bowel Management  Level of Assistance: [place]  Frequency/Number of Accidents this Shift: [facility] Bed/Chair/Wheelchair Transfer:  [facility] Toilet Transfer:  [facility] Tub/Shower Transfer:  [place]    Previously Documented Mode of Locomotion at Discharge: Field  ROCCO Expected Mode of Locomotion at Discharge: [facility] Walk/Wheelchair:  [facility] Stairs:  [facility] Comprehension:  [facility] Expression:  [facility] Social Interaction:  [facility] Problem Solving:  [facility] Memory:  [place]    Therapy Mode Minutes  Occupational Therapy: Individual: 90 minutes.  Physical Therapy: [place]  Speech Language Pathology:  [place]    Signed by: NURIS Lara/REVA

## 2018-12-18 NOTE — PROGRESS NOTES
Pt to discharge tomorrow ro his home with 24 hour assistance from his wife. Outpatient physical therapy and occupational therapy is scheduled to begin at [facility] on Thursday, 12/27.  Pt to receive a rolling walker. He has all other recommended equipment ( tub bench, reacher and handicapped height commode).  Family teaching has been completed and both pt and his wife feel they are ready for discharge.

## 2018-12-18 NOTE — PLAN OF CARE
Problem: Patient Care Overview  Goal: Plan of Care Review  Outcome: Ongoing (interventions implemented as appropriate)      Problem: Fall Risk (Adult)  Goal: Absence of Fall  Outcome: Ongoing (interventions implemented as appropriate)   12/18/18 1535   Fall Risk (Adult)   Absence of Fall making progress toward outcome       Problem: Pain, Acute (Adult)  Goal: Acceptable Pain Control/Comfort Level  Outcome: Ongoing (interventions implemented as appropriate)   12/18/18 1535   Pain, Acute (Adult)   Acceptable Pain Control/Comfort Level making progress toward outcome

## 2018-12-18 NOTE — PLAN OF CARE
Problem: Patient Care Overview  Goal: Plan of Care Review  Outcome: Ongoing (interventions implemented as appropriate)   12/18/18 0140   Patient Care Overview   IRF Plan of Care Review progress ongoing, continue   Progress, Functional Goals demonstrating adequate progress   Coping/Psychosocial   Plan of Care Reviewed With patient;spouse   OTHER   Outcome Summary Pt. is pleasant and cooperative. Complained of any pain to bilateral shoulders and back. Norco 2 tabs PO PRN given and relieved well. Flexeril 10 mg PO PRN given per pt. required. Spouse at bedside. Takes whole Meds with water. BG is 169. Insulin 2 units given at night. Uses CPAP. Family conference finished in day. Discharged scheduled on Wednesday. No unsafe behavior to note at this time.     Goal: Coping Plan  Outcome: Ongoing (interventions implemented as appropriate)   12/18/18 0140   Coping Plan   Demonstration of Effective Coping Strategies demonstrating adequate progress       Problem: Fall Risk (Adult)  Goal: Absence of Fall  Outcome: Ongoing (interventions implemented as appropriate)   12/18/18 0140   Fall Risk (Adult)   Absence of Fall making progress toward outcome       Problem: Pain, Acute (Adult)  Goal: Acceptable Pain Control/Comfort Level  Outcome: Ongoing (interventions implemented as appropriate)   12/18/18 0140   Pain, Acute (Adult)   Acceptable Pain Control/Comfort Level making progress toward outcome       Problem: Skin Injury Risk (Adult)  Goal: Skin Health and Integrity   12/18/18 0140   Skin Injury Risk (Adult)   Skin Health and Integrity making progress toward outcome

## 2018-12-18 NOTE — PROGRESS NOTES
LOS: 18 days   Patient Care Team:  Miladis Colbert APRN as PCP - General (Family Medicine)    Chief Complaint: same    Subjective     History of Present Illness    Subjective Pt is awake and alert. No new issues. We discussed dc meds.     History taken from: patient    Objective     Vital Signs  Temp:  [97.5 °F (36.4 °C)-97.8 °F (36.6 °C)] 97.8 °F (36.6 °C)  Heart Rate:  [73-83] 73  Resp:  [18-20] 18  BP: (128-157)/(74-86) 131/80    Objective exam unchanged    Results Review:     I reviewed the patient's new clinical results.    Medication Review:     Assessment/Plan       Cervical myelopathy (CMS/HCC)      Assessment & PlanContinue to prepare for dc. I returned to pt room to discuss dc plan as finalized in care coordination conference this am.     Mike Melissa MD  12/18/18  6:49 AM    Time:

## 2018-12-18 NOTE — PROGRESS NOTES
Inpatient Rehabilitation Functional Measures Assessment    Functional Measures  ROCCO Eating:  [facility] Grooming: [facility] Bathing:  [facility] Upper Body Dressing:  [facility] Lower Body Dressing:  [facility] Toileting:  [facility] Bladder Management  Level of Assistance:  [place]  Frequency/Number of Accidents this Shift:  [facility] Bowel Management  Level of Assistance: [place]  Frequency/Number of Accidents this Shift: [facility] Bed/Chair/Wheelchair Transfer:  [facility] Toilet Transfer:  [facility] Tub/Shower Transfer:  [place]    Previously Documented Mode of Locomotion at Discharge: Field  ROCCO Expected Mode of Locomotion at Discharge: [facility] Walk/Wheelchair:  [facility] Stairs:  [facility] Comprehension:  Auditory comprehension is the usual mode. Comprehension  Score = 7, Independent.  Patient comprehends complex/abstract information in  their primary language.  Patient is completely independent for auditory  comprehension.  There are no activity limitations.  ROCCO Expression:  Vocal expression is the usual mode. Expression Score = 7,  Independent.  Patient expresses complex/abstract information in their primary  language.  Patient is completely independent for vocal expression.  There are no  activity limitations.  ROCCO Social Interaction:  Social Interaction Score = 6, Modified Independent.  Patient is modified independent for social interaction, requiring:  [facility] Problem Solving:  Problem Solving Score = 7, Independent.  Patient makes  appropriate decisions in order to solve complex problems.  Patient is completely  independent for problem solving.  There are no activity limitations.  ROCCO Memory:  Memory Score = 7, Independent.  Patient is completely independent  for memory.  There are no activity limitations.    Therapy Mode Minutes  Occupational Therapy: Branch  Physical Therapy: Branch  Speech Language Pathology:  Branch    Signed by: Jojo Bowers RN

## 2018-12-18 NOTE — PROGRESS NOTES
Case Management  Inpatient Rehabilitation Team Conference    Conference Date/Time: 12/18/2018 7:33:42 AM    Team Conference Attendees:  Dr. Mike Ashley, HAYLEYW  Cathleen Rosario, PT  Lolis Cooley, OT  Rosibel Snell, CTRS  Sally Conti RD, VENKATESH Aggarwal RN    Demographics            Age: 63Y            Gender: Male    Admission Date: 11/30/2018 3:13:00 PM  Rehabilitation Diagnosis:  Incomplete quad - cervical myelopathy  Past Medical History: Lasik; HTN, hyperlipid; morbid obesity; DM; ADD,  genralized anxiety disorder, depression      Plan of Care  Anticipated Discharge Date/Estimated Length of Stay: ELOS: DC 12/19  Anticipated Discharge Destination: Community discharge with assistance  Discharge Plan : Family conference held 12/17/2018. Pt to return to [facility] for  outpatient PT and OT. Family teaching completed with pt's wife.  Medical Necessity Expected Level Rationale: Supervision  Intensity and Duration: an average of 3 hours/5 days per week  Medical Supervision and 24 Hour Rehab Nursing: x  Physical Therapy: x  PT Intensity/Duration: 90 minutes/day, 5 days/week  Occupational Therapy: x  OT Intensity/Duration: 90 minutes/day, 5 days/week  Social Work: x  Therapeutic Recreation: x  Updated (if changes indicated)    Anticipated Discharge Date/Estimated Length of Stay:   ELOS: DC 12/19    Based on the patient's medical and functional status, their prognosis and  expected level of functional improvement is: CGA      Interdisciplinary Problem/Goals/Status    All Rehab Problems:  Body Systems    [RN] Endocrine(Active)  Current Status(12/08/2018): Blood sugar not controlled  Weekly Goal(12/15/2018): Blood sugar will be within acceptable limits  Discharge Goal: Independent with diabetes regimen        Mobility    [OT] Bed/Chair/Wheelchair(Active)  Current Status(01/01/1753):  Weekly Goal(01/01/1753):  Discharge Goal:    [OT] Toilet Transfers(Active)  Current Status(12/17/2018):  CGA  Weekly Goal(12/19/2018): CGA/SBA  Discharge Goal: CGA/SBA    [PT] Stairs(Active)  Current Status(12/17/2018): 8, handrails, CGA/Min  Weekly Goal(12/18/2018): PT only  Discharge Goal: 8, handrails, CGA    [PT] Walk(Active)  Current Status(12/17/2018): 160' CGA with RWX  Weekly Goal(12/18/2018): CGA/SBA to BR with RWX  Discharge Goal: 200' CGA/SBA with RWX    [PT] Bed/Chair/Wheelchair(Active)  Current Status(12/17/2018): CGA with RWX  Weekly Goal(12/18/2018): SBA with RWX  Discharge Goal: SBA with RWX    [PT] Bed Mobility(Active)  Current Status(12/17/2018): Min/CGA  Weekly Goal(12/19/2018): SBA  Discharge Goal: SBA    [OT] Tub/Shower Transfers(Active)  Current Status(12/17/2018): CGA  Weekly Goal(12/19/2018): CGA  Discharge Goal: CGA        Pain    [RN] Pain Management(Active)  Current Status(12/08/2018): Pain related to surgery  Weekly Goal(12/15/2018): Able to tolerate therapies, & pain controlled  Discharge Goal: Pain under control        Psychosocial    [RN] Coping/Adjustment(Active)  Current Status(12/08/2018): Expresses appropriate coping  Weekly Goal(12/15/2018): Allow opportunity to express concerns regarding current  status  Discharge Goal: Adequate coping regarding life changes with ongoing support.        Safety    [RN] Potential for Injury(Active)  Current Status(12/08/2018): No unsafe behaviors, uses call light appropriately  Weekly Goal(12/15/2018): Instruct family regarding safety precautions & need for  close supervision  Discharge Goal: Family will be knowledgeable of need for cueing & supervision to  avoid falls.        Self Care    [OT] Bathing(Active)  Current Status(12/17/2018): MIN  Weekly Goal(12/19/2018): min/cga  Discharge Goal: min/CGA    [OT] Dressing (Lower)(Active)  Current Status(12/17/2018): MIN/CGA  Weekly Goal(12/19/2018): MIN/CGA  Discharge Goal: MIN/CGA    [OT] Dressing (Upper)(Active)  Current Status(12/17/2018): MIN  Weekly Goal(12/19/2018): Min A  Discharge Goal: MIN    [OT]  Grooming(Active)  Current Status(12/17/2018): MIN/set up  Weekly Goal(12/19/2018): MIN/set up  Discharge Goal: MIN/set up    [OT] Toileting(Active)  Current Status(12/17/2018): CGA  Weekly Goal(12/19/2018): CGA  Discharge Goal: CGA        Sphincter Control    [RN] bowel & bladder management(Active)  Current Status(12/08/2018): Continent 100%  Weekly Goal(12/15/2018): Remains continent 100%  Discharge Goal: Remains 100% continent        Comments: 12/4: poor coordination/strength in hands; to resume trulicity upon  discharge;    12/11: endurance imrproving; limited ROM BROOKE shoulders; numbness/tingling  arms/hands; impulsive at times;    12/18: constipated;    Signed by: Buck Gan RN    Physician CoSigned By: Mike Melissa 12/18/2018 07:50:07

## 2018-12-18 NOTE — PROGRESS NOTES
Inpatient Rehabilitation Plan of Care Note    Plan of Care  Care Plan Reviewed - No updates at this time.    Safety    Performed Intervention(s)  Items in reach, bed alarm & chair alarms      Body Systems    Performed Intervention(s)  Blood glucose AC & HS & insulin as ordered      Pain    Performed Intervention(s)  Medication prn & evaluate effectiveness    Signed by: Wojciech Lentz RN

## 2018-12-18 NOTE — THERAPY DISCHARGE NOTE
Inpatient Rehabilitation - Physical Therapy Treatment Note/Discharge  [facility]     Patient Name: Duane Mark Witten  : 1955  MRN: [id number]  Today's Date: 2018             Admit Date: 2018    Visit Dx:    ICD-10-CM ICD-9-CM   1. Cervical stenosis of spinal canal M48.02 723.0   2. Weakness of both lower extremities R29.898 729.89   3. Paresthesia of both upper extremities R20.2 782.0    M79.601 729.5    M79.602    4. Morbid obesity with BMI of 40.0-44.9, adult (CMS/[facility]) E66.01 278.01    Z68.41 V85.41   5. Impaired functional mobility, balance, gait, and endurance Z74.09 V49.89   6. Functional gait abnormality R26.89 781.2     Patient Active Problem List   Diagnosis   • YANELI (generalized anxiety disorder)   • ADD (attention deficit disorder)   • Depression   • Diabetes type 2, controlled (CMS/[facility])   • ED (erectile dysfunction) of non-organic origin   • HLD (hyperlipidemia)   • Essential hypertension   • Arthritis   • Psoriasis   • Testosterone deficiency   • Primary insomnia   • Weakness of both lower extremities   • Paresthesia of both upper extremities   • Morbid obesity with BMI of 40.0-44.9, adult (CMS/[facility])   • Adverse effect of corticosteroids   • Type 2 diabetes mellitus with hyperglycemia (CMS/[facility])   • Cervical stenosis of spinal canal   • Edema of cervical spinal cord (CMS/[facility])   • Postoperative atrial fibrillation (CMS/[facility])   • Cervical myelopathy (CMS/[facility])       Physical Therapy Education     Title: PT OT SLP Therapies (Done)     Topic: Physical Therapy (Resolved)     Point: Mobility training (Resolved)     Learning Progress Summary           Patient Acceptance, E,TB,D,H, VU,DU by EE at 2018  8:42 AM    Acceptance, E,TB,D, VU,NR by EE at 2018  8:12 AM    Acceptance, E,TB, VU,NR by MAURICIO at 2018 11:43 AM    Acceptance, E,TB, VU,NR by JS at 12/15/2018  3:33 PM    Acceptance, E, VU,NR by SIMÓN at 2018  4:14 PM    Acceptance, E,D, VU by JOVANNI at 2018 11:40 AM     Comment:  Safety with ambulation    Acceptance, E,TB,D, VU,NR by EE at 12/13/2018  9:11 AM    Acceptance, E,TB, VU,NR by EE at 12/12/2018  8:48 AM    Acceptance, E,TB,D, VU,NR by EE at 12/11/2018  8:48 AM    Acceptance, E,TB,D, VU,NR by EE at 12/10/2018  8:45 AM    Eager, E,TB,D, VU,DU,NR by MAR at 12/9/2018 10:24 AM    Eager, E,TB,D, VU,DU by KP at 12/8/2018 12:00 PM    Acceptance, E,TB,D, VU,NR by EE at 12/7/2018  9:22 AM    Acceptance, E,TB, VU,NR by EE at 12/6/2018  9:15 AM    Acceptance, E,TB, VU,NR by EE at 12/5/2018 10:10 AM    Acceptance, E, NR by SIMÓN at 12/4/2018 10:12 AM    Acceptance, E,TB, VU,NR by EE at 12/3/2018  9:48 AM    Acceptance, E, VU,NR by IAN at 12/1/2018  9:50 AM   Family Acceptance, E,TB,D, VU,NR by EE at 12/11/2018  8:48 AM   Significant Other Eager, E,TB,D, VU,DU,NR by MAR at 12/9/2018 10:24 AM                   Point: Home exercise program (Resolved)     Learning Progress Summary           Patient Acceptance, E,TB,D,H, VU,DU by EE at 12/18/2018  8:42 AM    Acceptance, E,TB,D, VU,NR by EE at 12/17/2018  8:12 AM    Acceptance, E,TB, VU,NR by MAURICIO at 12/16/2018 11:43 AM    Acceptance, E,TB, VU,NR by JS at 12/15/2018  3:33 PM    Acceptance, E,TB,D, VU,NR by EE at 12/13/2018  9:11 AM    Acceptance, E,TB, VU,NR by EE at 12/12/2018  8:48 AM    Acceptance, E,TB,D, VU,NR by EE at 12/11/2018  8:48 AM    Acceptance, E,TB,D, VU,NR by EE at 12/10/2018  8:45 AM    Eager, E,TB,D, VU,DU,NR by  at 12/9/2018 10:24 AM    Eager, E,TB,D, VU,DU by  at 12/8/2018 12:00 PM    Acceptance, E,TB,D, VU,NR by EE at 12/7/2018  9:22 AM    Acceptance, E,TB, VU,NR by EE at 12/6/2018  9:15 AM    Acceptance, E,TB, VU,NR by  at 12/5/2018 10:10 AM    Acceptance, E,TB, VU,NR by  at 12/3/2018  9:48 AM   Family Acceptance, E,TB,D, VU,NR by  at 12/11/2018  8:48 AM   Significant Other Eager, E,TB,D, VU,DU,NR by  at 12/9/2018 10:24 AM                   Point: Body mechanics (Resolved)     Learning Progress Summary            Patient Acceptance, E,TB,D,H, VU,DU by EE at 12/18/2018  8:42 AM    Acceptance, E,TB,D, VU,NR by EE at 12/17/2018  8:12 AM    Acceptance, E,TB, VU,NR by JS at 12/16/2018 11:43 AM    Acceptance, E,TB, VU,NR by JS at 12/15/2018  3:33 PM    Acceptance, E,TB,D, VU,NR by EE at 12/13/2018  9:11 AM    Acceptance, E,TB, VU,NR by EE at 12/12/2018  8:48 AM    Acceptance, E,TB,D, VU,NR by EE at 12/11/2018  8:48 AM    Acceptance, E,TB,D, VU,NR by EE at 12/10/2018  8:45 AM    Acceptance, E,TB,D, VU,NR by EE at 12/7/2018  9:22 AM    Acceptance, E,TB, VU,NR by EE at 12/6/2018  9:15 AM    Acceptance, E,TB, VU,NR by EE at 12/5/2018 10:10 AM    Acceptance, E,TB, VU,NR by EE at 12/3/2018  9:48 AM   Family Acceptance, E,TB,D, VU,NR by EE at 12/11/2018  8:48 AM                   Point: Precautions (Resolved)     Learning Progress Summary           Patient Acceptance, E,TB,D,H, VU,DU by EE at 12/18/2018  8:42 AM    Acceptance, E,TB,D, VU,NR by EE at 12/17/2018  8:12 AM    Acceptance, E,TB, VU,NR by JS at 12/16/2018 11:43 AM    Acceptance, E,TB, VU,NR by JS at 12/15/2018  3:33 PM    Acceptance, E,TB,D, VU,NR by EE at 12/13/2018  9:11 AM    Acceptance, E,TB, VU,NR by EE at 12/12/2018  8:48 AM    Acceptance, E,TB,D, VU,NR by EE at 12/11/2018  8:48 AM    Acceptance, E,TB,D, VU,NR by EE at 12/10/2018  8:45 AM    Acceptance, E,TB,D, VU,NR by EE at 12/7/2018  9:22 AM    Acceptance, E,TB, VU,NR by EE at 12/6/2018  9:15 AM    Acceptance, E,TB, VU,NR by EE at 12/5/2018 10:10 AM    Acceptance, E,TB, VU,NR by EE at 12/3/2018  9:48 AM    Acceptance, E, VU,NR by JK at 12/1/2018  9:50 AM   Family Acceptance, E,TB,D, VU,NR by EE at 12/11/2018  8:48 AM                               User Key     Initials Effective Dates Name Provider Type Discipline    JS 04/06/17 -  Lo Sánchez, PT Physical Therapist PT    KD 04/03/18 -  Daphne Quinteros, PT Physical Therapist PT    LB 03/07/18 -  Odette Orourke PTA Physical Therapy Assistant PT    EE 04/03/18 -   Cathleen Rosario, PT Physical Therapist PT    IAN 04/03/18 -  Janae Trujillo, PT Physical Therapist PT    KP 04/03/18 -  Angelic Bautista, PT Physical Therapist PT              PT IRF GOALS     Row Name 12/18/18 1300             Bed Mobility Goal 1 (PT-IRF)    Progress/Outcomes (Bed Mobility Goal 1, PT-IRF)  goal partially met CGA required for supine to sit  -EE         Transfer Goal 1 (PT-IRF)    Progress/Outcomes (Transfer Goal 1, PT-IRF)  goal partially met CGA/SBA required for safety  -EE         Transfer Goal 2 (PT-IRF)    Progress/Outcomes (Transfer Goal 2, PT-IRF)  goal partially met CGA required for balance  -EE         Gait/Walking Locomotion Goal 1 (PT-IRF)    Progress/Outcomes (Gait/Walking Locomotion Goal 1, PT-IRF)  goal partially met CGA required for balance  -EE         Stairs Goal 1 (PT-IRF)    Progress/Outcomes (Stairs Goal 1, PT-IRF)  goal partially met CGA required for balance  -EE         Stairs Goal 2 (PT-IRF)    Progress/Outcomes (Stairs Goal 2, PT-IRF)  goal partially met CGA required for balance  -EE        User Key  (r) = Recorded By, (t) = Taken By, (c) = Cosigned By    Initials Name Provider Type    EE Cathleen Rosario, HAILEY Physical Therapist          Therapy Treatment    IRF Treatment Summary     Row Name 12/18/18 0800             Evaluation/Treatment Time and Intent    Subjective Information  no complaints  -EE      Existing Precautions/Restrictions  fall;spinal  -EE      Document Type  discharge treatment  -EE      Mode of Treatment  physical therapy  -EE      Patient/Family Observations  Pt supine in bed in no acute distress in am session. Sitting up in WC prior to pm session.  -EE      Recorded by [EE] Cathleen Rosario, PT      Row Name 12/18/18 0800             Cognition/Psychosocial- PT/OT    Affect/Mental Status (Cognitive)  WFL  -EE      Orientation Status (Cognition)  oriented x 4  -EE      Follows Commands (Cognition)  WFL  -EE      Personal Safety Interventions  fall prevention program  maintained;gait belt;muscle strengthening facilitated;supervised activity;nonskid shoes/slippers when out of bed  -EE      Recorded by [EE] Cathleen Rosario, PT      Row Name 12/18/18 0800             Mobility    Advanced Gait Activity  curb negotiation  -EE      Additional Documentation  Advanced Gait Activity (Row)  -EE      Recorded by [EE] Cathleen Rosario, PT      Row Name 12/18/18 0800             Bed Mobility Assessment/Treatment    Rolling Left Rains (Bed Mobility)  independent  -EE      Rolling Right Rains (Bed Mobility)  independent  -EE      Supine-Sit Rains (Bed Mobility)  contact guard;verbal cues  -EE      Sit-Supine Rains (Bed Mobility)  supervision  -EE      Bed Mobility, Safety Issues  decreased use of arms for pushing/pulling  -EE      Recorded by [EE] Cathleen Rosario, PT      Row Name 12/18/18 0800             Bed-Chair Transfer    Bed-Chair Rains (Transfers)  contact guard;stand by assist  -EE      Assistive Device (Bed-Chair Transfers)  walker, front-wheeled  -EE      Recorded by [EE] Cathleen Rosario, PT      Row Name 12/18/18 0800             Sit-Stand Transfer    Sit-Stand Rains (Transfers)  contact guard;stand by assist  -EE      Assistive Device (Sit-Stand Transfers)  walker, front-wheeled  -EE      Recorded by [EE] Cathleen Rosario, PT      Row Name 12/18/18 0800             Stand-Sit Transfer    Stand-Sit Rains (Transfers)  contact guard;stand by assist  -EE      Assistive Device (Stand-Sit Transfers)  walker, front-wheeled  -EE      Recorded by [EE] Cathleen Rosario, PT      Row Name 12/18/18 0800             Car Transfer    [place]ains Level (Car Transfer)  contact guard;verbal cues  -EE      Assistive Device (Car Transfer)  walker, front-wheeled  -EE      Recorded by [EE] Cathleen Rosario, PT      Row Name 12/18/18 0800             Gait/Stairs Assessment/Training    [place] Level (Gait)  contact guard  -EE      Assistive Device (Gait)  walker, front-wheeled   -EE      Distance in Feet (Gait)  160 x 3, 80  -EE      Pattern (Gait)  step-through  -EE      Deviations/Abnormal Patterns (Gait)  ataxic;base of support, wide;sarah decreased;stride length decreased  -EE      Bilateral Gait Deviations  weight shift ability decreased;forward flexed posture;heel strike decreased  -EE      [place] Level (Stairs)  contact guard;verbal cues  -EE      Handrail Location (Stairs)  both sides  -EE      Number of Steps (Stairs)  4  -EE      Ascending Technique (Stairs)  step-to-step  -EE      Descending Technique (Stairs)  step-to-step  -EE      Stairs, Safety Issues  weight-shifting ability decreased;balance decreased during turns  -EE      Stairs, Impairments  strength decreased;impaired balance;coordination impaired  -EE      Comment (Gait/Stairs)  Able to  object from floor with use of reacher and RWX + CGA for safety.  -EE      Recorded by [EE] Cathleen Rosario, PT      Row Name 12/18/18 0800             Safety Issues, Functional Mobility    Impairments Affecting Function (Mobility)  balance;strength;coordination;endurance/activity tolerance;sensation/sensory awareness  -EE      Recorded by [EE] Cathleen Rosario, PT      Row Name 12/18/18 0800             Curb Negotiation (Mobility)    Stone, Curb Negotiation  minimal assist, 75% or more patient effort;verbal cues with RWX  -EE      Recorded by [EE] Cathleen Rosario, PT      Row Name 12/18/18 0800             Pain Scale: Numbers Pre/Post-Treatment    Pain Scale: Numbers, Pretreatment  3/10  -EE      Pain Scale: Numbers, Post-Treatment  3/10  -EE      Pain Location - Orientation  incisional  -EE      Pain Location  neck  -EE      Pain Intervention(s)  Repositioned;Medication (See MAR)  -EE      Recorded by [EE] Cathleen Rosario, PT      Row Name 12/18/18 0800             Dynamic Balance Activity    Therapeutic Training Performed (Dynamic Balance)  ambulate backward;side stepping  -EE      Support Needed for Balance (Dynamic Balance  Training)  uses both upper extremities for support;CGA UE support on // bars  -EE      Comment (Dynamic Balance Training)  2 x 8' side stepping and backwards walking  -EE      Recorded by [EE] Cathleen Rosario, HAILEY      Row Name 12/18/18 0800             Therapeutic Exercise    Therapeutic Exercise  aerobic exercise  -EE      Recorded by [EE] Cathleen Rosario PT      Row Name 12/18/18 0800             Aerobic Exercise Activity    Exercise Performed (Aerobic, Therapeutic Exercise)  recumbent elliptical  Nu-Step  -EE      Comment (Aerobic, Therapeutic Exercise)  Trialed 3 min on Nu-Step at level 5; pt demonstrates no pain or difficutly. Pt has similar recumbent elliptical  at home; recommend pt start with 5 min and gradually increase as endurance improves.  -EE      Recorded by [EE] Cathleen Rosario PT      Row Name 12/18/18 0800             Lower Extremity Standing Therapeutic Exercise    Performed, Standing Lower Extremity (Therapeutic Exercise)  toe raises;heel raises;hip/knee flexion/extension  -EE      Device, Standing Lower Extremity (Therapeutic Exercise)  parallel bars  -EE      Exercise Type, Standing Lower Extremity (Therapeutic Exercise)  AROM (active range of motion)  -EE      Sets/Reps Detail, Standing Lower Extremity (Therapeutic Exercise)  1/15  -EE      Recorded by [EE] Cathleen Rosario PT      Row Name 12/18/18 0800             Lower Extremity Seated Therapeutic Exercise    Performed, Seated Lower Extremity (Therapeutic Exercise)  hip flexion/extension;hip abduction/adduction;ankle dorsiflexion/plantarflexion;LAQ (long arc quad), knee extension;knee flexion/extension  -EE      Device, Seated Lower Extremity (Therapeutic Exercise)  elastic bands/tubing red theraband  -EE      Sets/Reps Detail, Seated Lower Extremity (Therapeutic Exercise)  1/20  -EE      Comment, Seated Lower Extremity (Therapeutic Exercise)  Provided handout and red theraband for HEP; pt verbalizes and demonstrates understanding of  all exercises  -EE      Recorded by [EE] Cathleen Rosario PT      Row Name 12/18/18 0800             Positioning and Restraints    Pre-Treatment Position  in bed  -EE      Post Treatment Position  wheelchair  -EE      In Wheelchair  sitting;call light within reach;encouraged to call for assist;exit alarm on  -EE      Recorded by [EE] Cathleen Rosario, HAILEY        User Key  (r) = Recorded By, (t) = Taken By, (c) = Cosigned By    Initials Name Effective Dates    EE Cathleen Rosario PT 04/03/18 -           PT Recommendation and Plan  Planned Therapy Interventions (PT Eval): balance training, bed mobility training, gait training, home exercise program, neuromuscular re-education, patient/family education, stair training, strengthening, transfer training            Time Calculation:   PT Charges     Row Name 12/18/18 1315 12/18/18 0911          Time Calculation    Start Time  1230  -EE  0800  -EE     Stop Time  1300  -EE  0900  -EE     Time Calculation (min)  30 min  -EE  60 min  -EE     PT Received On  12/18/18  -EE  12/18/18  -EE     PT - Next Appointment  --  12/18/18  -EE       User Key  (r) = Recorded By, (t) = Taken By, (c) = Cosigned By    Initials Name Provider Type    EE Cathleen Rosario, HAILEY Physical Therapist          Therapy Suggested Charges     Code   Minutes Charges    None             Therapy Charges for Today     Code Description Service Date Service Provider Modifiers Qty    17717368267  PT THER PROC EA 15 MIN 12/17/2018 Cathleen Rosario, PT GP 6    13158772734 HC PT THER PROC EA 15 MIN 12/18/2018 Cathleen Rosario, PT GP 6               PT Discharge Summary  Reason for Discharge: Discharge from facility  Outcomes Achieved: Patient able to partially acheive established goals  Discharge Destination: Home with assist, Home with outpatient services    Cathleen Rosario PT  12/18/2018

## 2018-12-18 NOTE — THERAPY DISCHARGE NOTE
Inpatient Rehabilitation - Occupational Therapy Discharge Summary  [facility]     Patient Name: Duane Mark Witten  : 1955  MRN: [id number]    Today's Date: 2018                 Admit Date: 2018        OT Recommendation and Plan    Visit Dx:    ICD-10-CM ICD-9-CM   1. Cervical stenosis of spinal canal M48.02 723.0   2. Weakness of both lower extremities R29.898 729.89   3. Paresthesia of both upper extremities R20.2 782.0    M79.601 729.5    M79.602    4. Morbid obesity with BMI of 40.0-44.9, adult (CMS/[facility]) E66.01 278.01    Z68.41 V85.41   5. Impaired functional mobility, balance, gait, and endurance Z74.09 V49.89   6. Functional gait abnormality R26.89 781.2         Time Calculation- OT     Row Name 18 1547 18 1545          Time Calculation- OT    OT Start Time  1430  -AF  0930  -AF     OT Stop Time  1500  -AF  1030  -AF     OT Time Calculation (min)  30 min  -AF  60 min  -AF       User Key  (r) = Recorded By, (t) = Taken By, (c) = Cosigned By    Initials Name Provider Type    Lolis Horne, OTR Occupational Therapist            OT IRF GOALS     Row Name 18 1544 18 1216          Transfer Goal 1 (OT-IRF)    Activity/Assistive Device (Transfer Goal 1, OT-IRF)  --  toilet;walk-in shower;shower chair;walker, rolling  -AF     [place] Level (Transfer Goal 1, OT-IRF)  --  minimum assist (75% or more patient effort);contact guard assist;verbal cues required  -AF     Time Frame (Transfer Goal 1, OT-IRF)  --  short term goal (STG)  -AF     Progress/Outcomes (Transfer Goal 1, OT-IRF)  goal met  -AF  goal ongoing  -AF        Transfer Goal 2 (OT-IRF)    Activity/Assistive Device (Transfer Goal 2, OT-IRF)  --  toilet;shower chair;walk-in shower;walker, rolling  -AF     [place] Level (Transfer Goal 2, OT-IRF)  --  supervision required  -AF     Time Frame (Transfer Goal 2, OT-IRF)  --  long term goal (LTG)  -AF     Progress/Outcomes (Transfer Goal 2, OT-IRF)  goal  partially met  -AF  goal ongoing  -AF        Bathing Goal 1 (OT-IRF)    Activity/Device (Bathing Goal 1, OT-IRF)  --  bathing skills, all;grab bar/tub rail;hand-held shower spray hose;long-handled sponge;tub bench  -AF     [place] Level (Bathing Goal 1, OT-IRF)  --  minimum assist (75% or more patient effort);verbal cues required  -AF     Time Frame (Bathing Goal 1, OT-IRF)  --  short term goal (STG)  -AF     Progress/Outcomes (Bathing Goal 1, OT-IRF)  goal met  -AF  goal ongoing  -AF        Bathing Goal 2 (OT-IRF)    Activity/Device (Bathing Goal 2, OT-IRF)  --  bathing skills, all;grab bar/tub rail;hand-held shower spray hose;tub bench;long-handled sponge  -AF     [place] Level (Bathing Goal 2, OT-IRF)  --  supervision required  -AF     Time Frame (Bathing Goal 2, OT-IRF)  --  long term goal (LTG)  -AF     Progress/Outcomes (Bathing Goal 2, OT-IRF)  goal not met  -AF  goal ongoing  -AF        UB Dressing Goal 1 (OT-IRF)    Activity/Device (UB Dressing Goal 1, OT-IRF)  --  upper body dressing  -AF     [place] (UB Dress Goal 1, OT-IRF)  --  minimum assist (75% or more patient effort);verbal cues required  -AF     Time Frame (UB Dressing Goal 1, OT-IRF)  --  short term goal (STG)  -AF     Progress/Outcomes (UB Dressing Goal 1, OT-IRF)  goal met  -AF  goal ongoing  -AF        UB Dressing Goal 2 (OT-IRF)    Activity/Device (UB Dressing Goal 2, OT-IRF)  --  upper body dressing  -AF     [place] (UB Dress Goal 2, OT-IRF)  --  supervision required  -AF     Time Frame (UB Dressing Goal 2, OT-IRF)  --  long term goal (LTG)  -AF     Progress/Outcomes (UB Dressing Goal 2, OT-IRF)  goal not met  -AF  goal ongoing  -AF        LB Dressing Goal 1 (OT-IRF)    Activity/Device (LB Dressing Goal 1, OT-IRF)  --  lower body dressing;long handled shoe horn;reacher;elastic laces  -AF     [place] (LB Dressing Goal 1, OT-IRF)  --  moderate assist (50-74% patient effort);verbal cues required;minimum assist (75% or  more patient effort)  -AF     Time Frame (LB Dressing Goal 1, OT-IRF)  --  short term goal (STG)  -AF     Progress/Outcomes (LB Dressing Goal 1, OT-IRF)  goal met  -AF  goal ongoing  -AF        LB Dressing Goal 2 (OT-IRF)    Activity/Device (LB Dressing Goal 2, OT-IRF)  --  lower body dressing;long handled shoe horn;elastic laces;reacher  -AF     [place] (LB Dressing Goal 2, OT-IRF)  --  standby assist;verbal cues required  -AF     Time Frame (LB Dressing Goal 2, OT-IRF)  --  long term goal (LTG)  -AF     Progress/Outcomes (LB Dressing Goal 2, OT-IRF)  goal partially met  -AF  goal ongoing  -AF        Grooming Goal 1 (OT-IRF)    Activity/Device (Grooming Goal 1, OT-IRF)  --  grooming skills, all  -AF     [place] (Grooming Goal 1, OT-IRF)  --  minimum assist (75% or more patient effort);verbal cues required  -AF     Time Frame (Grooming Goal 1, OT-IRF)  --  short term goal (STG)  -AF     Progress/Outcomes (Grooming Goal 1, OT-IRF)  goal met  -AF  goal ongoing  -AF        Grooming Goal 2 (OT-IRF)    Activity/Device (Grooming Goal 2, OT-IRF)  --  grooming skills, all  -AF     [place] (Grooming Goal 2, OT-IRF)  --  supervision required  -AF     Time Frame (Grooming Goal 2, OT-IRF)  --  long term goal (LTG)  -AF     Progress/Outcomes (Grooming Goal 2, OT-IRF)  goal partially met  -AF  goal ongoing  -AF        Toileting Goal 1 (OT-IRF)    Activity/Device (Toileting Goal 1, OT-IRF)  --  toileting skills, all;grab bar/safety frame;raised toilet seat  -AF     [place] Level (Toileting Goal 1, OT-IRF)  --  moderate assist (50-74% patient effort);verbal cues required  -AF     Time Frame (Toileting Goal 1, OT-IRF)  --  short term goal (STG)  -AF     Progress/Outcomes (Toileting Goal 1, OT-IRF)  goal met  -AF  goal ongoing  -AF        Toileting Goal 2 (OT-IRF)    Activity/Device (Toileting Goal 2, OT-IRF)  --  toileting skills, all;grab bar/safety frame;raised toilet seat  -AF     [place] Level (Toileting  Goal 2, OT-IRF)  --  supervision required;verbal cues required  -AF     Time Frame (Toileting Goal 2, OT-IRF)  --  long term goal (LTG)  -AF     Progress/Outcomes (Toileting Goal 2, OT-IRF)  goal partially met  -AF  goal ongoing  -AF        ROM Goal 1 (OT-IRF)    ROM Goal 1 (OT-IRF)  --  pt will increase B UE ROM to approx. 3/4 shoulder flexion to assist with ADL tasks   -AF     Time Frame (ROM Goal 1, OT-IRF)  --  long term goal (LTG)  -AF     Progress/Outcomes (ROM Goal 1, OT-IRF)  goal not met  -AF  goal ongoing  -AF       User Key  (r) = Recorded By, (t) = Taken By, (c) = Cosigned By    Initials Name Provider Type    Lolis Horne OTJEANINE Occupational Therapist              Therapy Suggested Charges     Code   Minutes Charges    None             Therapy Charges for Today     Code Description Service Date Service Provider Modifiers Qty    97222621476 HC OT THER PROC EA 15 MIN 12/17/2018 Lolis Cooley OTR GO 3    67952871194 HC OT NEUROMUSC RE EDUCATION EA 15 MIN 12/17/2018 Lolis Cooley, OTR GO 3    20361659889 HC OT CARE PLAN EA 15 MIN 12/17/2018 Lolis Cooley OTR GO 2    54391729014 HC OT SELF CARE/MGMT/TRAIN EA 15 MIN 12/18/2018 Lolis Cooley, OTR GO 3    89240982644 HC OT THER PROC EA 15 MIN 12/18/2018 Lolis Cooley, OTR GO 3          OT Discharge Summary  Anticipated Discharge Disposition (OT): home with assist, home with OP services  Reason for Discharge: Maximum functional potential achieved  Outcomes Achieved: Patient able to partially acheive established goals  Discharge Destination: Home with outpatient services      NURIS Grimm  12/18/2018

## 2018-12-18 NOTE — PROGRESS NOTES
Inpatient Rehabilitation Functional Measures Assessment    Functional Measures  [facility] Eating:  Branch  [place] Grooming: [place]  [facility] [facility] Bathing:  Br[place]  [facility] Upper Body Dressing:  [place]  [facility] [facility] Lower Body Dressing:  [facility] Toileting:  [facility] Bladder Management  Level of Assistance:  [place]  Frequency/Number of Accidents this Shift:  [facility] Bowel Management  Level of Assistance: [place]  Frequency/Number of Accidents this Shift: [place]    [place] [facility] Bed/Chair/Wheelchair Transfer:  Bed/chair/wheelchair Transfer Score = 4.  Patient performs 75% or more of effort and minimal assistance (little/incidental  help/lifting of one limb/steadying) for transferring to and from the  bed/chair/wheelchair, requiring: Patient requires the following assistive  device(s): Walker.  [facility] Toilet Transfer:  [facility] Tub/Shower Transfer:  [place]    Previously Documented Mode of Locomotion at Discharge: Field  ROCCO Expected Mode of Locomotion at Discharge: [facility] Walk/Wheelchair:  WHEELCHAIR OBSERVATION   Wheelchair did not occur.    WALK OBSERVATION   Walk Distance Scale = 3.  Distance walked is greater than 150 feet. Walk Score  = 4.  Patient performs 75% or more of effort and requires minimal assistance.  Incidental help/contact guard/steadying was provided. Patient walked a distance  of  160 feet. Patient requires the following assistive device(s): Rolling  walker.  [facility]O Stairs:  Stairs Score = 2.  Incidental assistance with lifting or lowering,  contact guard or steadying was provided. Patient performs 75% or more of effort  and requires minimal contact assistance. Patient negotiated  4 stairs. Patient  requires the following assistive device(s): Handrail(s).    [facility]O Comprehension:  [place]  [facility] Expression:  [place]  [facility] Social Interaction:  [facility] Problem Solving:  [facility] Memory:  [place]    Therapy Mode Minutes  Occupational Therapy: [place]  Physical Therapy: Individual: 90 minutes.  Speech Language Pathology:   [place]    Signed by: JEFFERSON HeartT

## 2018-12-18 NOTE — PROGRESS NOTES
Inpatient Rehabilitation Functional Measures Assessment    Functional Measures  ROCCO Eating:  [place]  [facility] Grooming: Patient requires no physical assistance for washing, rinsing and  drying the face. Patient requires no physical assistance for washing, rinsing  and drying the hands. Patient requires no physical assistance for brushing  teeth. Patient requires total assistance for brushing/combing hair. Shaving or  applying makeup not applicable for this patient. Patient performs 75 % of  grooming tasks. Grooming Score = 4, Minimal Assistance. No assistive devices  were required.  [facility] Bathing:  Patient bathed in shower. Patient requires no physical assistance  for washing, rinsing, and drying the right arm. Patient requires no physical  assistance for washing, rinsing, and drying the left arm. Patient requires no  physical assistance for washing, rinsing, and drying the chest. Patient requires  no physical assistance for washing, rinsing, and drying the abdomen. Patient  requires no physical assistance for washing, rinsing, and drying the perineal  area. Patient requires total assistance for washing, rinsing, or drying the  buttocks. Patient requires no physical assistance for washing, rinsing, and  drying the right upper leg. Patient requires no physical assistance for washing,  rinsing, and drying the left upper leg. Patient requires no physical assistance  for washing, rinsing, and drying the right lower leg, including the foot.  Patient requires no physical assistance for washing, rinsing, and drying the  left lower leg, including the foot. Patient performs 90 % of bathing tasks.  Bathing Score = 4, Minimal Assistance. Patient requires the following assistive  device(s): Grab bar/arm rest to maintain balance. Hand held shower.  [facility] Upper Body Dressing:  Patient requires no physical assistance for gathering  clothes. Wearing a bra or undershirt was not applicable for this patient.  Patient requires no physical  assistance for threading the right arm through the  garment (shirt/sweater). Patient requires no physical assistance for threading  the left arm through the garment (shirt/sweater). Patient requires total  assistance for holding clothing and/or pulling an over-head-garment over head or  pulling front-fastening-garment around back. Patient requires no physical  assistance for pulling an over-head-garment down the trunk or  adjusting/fastening together a front-fastening-garment. Patient performs 80 % of  upper body dressing tasks. Upper Body Dressing Score = 4, Minimal Assistance. No  assistive devices were required.  [facility] Lower Body Dressing:  Lower Body Dressing Score = 4. Patient requires  minimal assistance for lower body dressing, requiring incidental help only (such  as task initiation or assist with buttons/zips/snaps). Patient requires the  following assistive device(s): Elastic laces.  [facility] Toileting:  Toileting Score = 4.  Patient requires minimal assistance for  toileting, such as steadying for balance while cleansing or adjusting clothes.  Patient requires the following assistive device(s): Grab bar.    [facility] Bladder Management  Level of Assistance:  [place]  Frequency/Number of Accidents this Shift:  Branch    [facility] Bowel Management  Level of Assistance: [place]  Frequency/Number of Accidents this Shift: [place]    [facility] Bed/Chair/Wheelchair Transfer:  [place]  [facility] Toilet Transfer:  Toilet Transfer Score = 5.  Patient is supervision/set-up  for transferring to and from the toilet/commode, requiring: Stand by assistance.  Verbal cuing, prompting, or instructing. Patient requires the following  assistive device(s): Grab bars. Walker.  [facility] Tub/Shower Transfer:  Shower Transfer Score = 4. Patient performs 75% or  more of effort and minimal assistance (little/incidental help/lifting of one  limb/steadying) for transferring to and from the shower, requiring: Contact  guard. Patient requires the following assistive  device(s): Grab bars. Walker.  Tub bench.    Previously Documented Mode of Locomotion at Discharge: Field  ROCCO Expected Mode of Locomotion at Discharge: [facility] Walk/Wheelchair:  [facility] Stairs:  [facility] Comprehension:  [facility] Expression:  [facility] Social Interaction:  [facility] Problem Solving:  [facility] Memory:  [place]    Therapy Mode Minutes  Occupational Therapy: [place]  Physical Therapy: [place]  Speech Language Pathology:  [place]    Signed by: Lolis Cooley OT

## 2018-12-18 NOTE — PROGRESS NOTES
SECTION GG      Self Care Performance Discharge:   Oral Hygiene: [place] sets up or cleans up; patient completes activity. [place]  assists only prior to or following the activity.   Toileting Hygiene: : [place] provides verbal cues and/or touching/steadying  and/or contact guard assistance as patient completes activity.   Shower/Bathe Self: [place] does less than half the effort. [place] lifts, holds  or supports trunk or limbs but provides less than half the effort.   Upper Body Dressing: [place] provides verbal cues and/or touching/steadying  and/or contact guard assistance as patient completes activity.   Lower Body Dressing: [place] provides verbal cues and/or touching/steadying  and/or contact guard assistance as patient completes activity.   Putting On/Taking Off Footwear: [place] provides verbal cues and/or  touching/steadying and/or contact guard assistance as patient completes  activity.    Mobility Toilet Transfer Discharge: [place] provides verbal cues or  touching/steadying assistance as patient completes activity.    Signed by: Lolis Cooley OT

## 2018-12-18 NOTE — PROGRESS NOTES
SECTION GG      Mobility Performance Discharge:     Roll Left and Right: Patient completed the activities by him/herself with no  assistance from a helper.   Sit to Lying: Patient completed the activities by him/herself with no  assistance from a helper.   Lying to Sitting on Side of Bed: [place] provides verbal cues and/or  touching/steadying and/or contact guard assistance as patient completes  activity. Assistance may be provided throughout the activity or intermittently.   Sit to Stand: [place] provides verbal cues and/or touching/steadying and/or  contact guard assistance as patient completes activity. Assistance may be  provided throughout the activity or intermittently.   Chair/Bed to Chair Transfer: [place] provides verbal cues and/or  touching/steadying and/or contact guard assistance as patient completes  activity. Assistance may be provided throughout the activity or intermittently.   Car Transfer: [place] provides verbal cues and/or touching/steadying and/or  contact guard assistance as patient completes activity. Assistance may be  provided throughout the activity or intermittently.   Walk 10 Feet:   [place] provides verbal cues and/or touching/steadying and/or  contact guard assistance as patient completes activity. Assistance may be  provided throughout the activity or intermittently.  Walk 50 Feet with 2 Turns:   [place] provides verbal cues and/or  touching/steadying and/or contact guard assistance as patient completes  activity. Assistance may be provided throughout the activity or intermittently.  Walk 150 Feet:   [place] provides verbal cues and/or touching/steadying and/or  contact guard assistance as patient completes activity. Assistance may be  provided throughout the activity or intermittently.  Walking 10 Feet on Uneven Surfaces:   Not attempted due to medical or safety  concerns.  1 Step Over Curb or Up/Down Stair:   [place] provides verbal cues and/or  touching/steadying and/or contact guard  assistance as patient completes  activity. Assistance may be provided throughout the activity or intermittently.  4 Steps Up and Down, With/Without Rail:   [place] provides verbal cues and/or  touching/steadying and/or contact guard assistance as patient completes  activity. Assistance may be provided throughout the activity or intermittently.  12 Steps Up and Down, With/Without Rail:   Not attempted due to medical or  safety concerns.  Picking up an Object:   [place] provides verbal cues and/or touching/steadying  and/or contact guard assistance as patient completes activity. Assistance may be  provided throughout the activity or intermittently. Uses Wheelchair and/or  Scooter: No    Signed by: Cathleen Rosario DPT

## 2018-12-18 NOTE — PROGRESS NOTES
Inpatient Rehabilitation Functional Measures Assessment    Functional Measures  ROCCO Eating:  [facility] Grooming: [facility] Bathing:  [facility] Upper Body Dressing:  [facility] Lower Body Dressing:  [facility] Toileting:  [facility] Bladder Management  Level of Assistance:  [place]  Frequency/Number of Accidents this Shift:  [facility] Bowel Management  Level of Assistance: [place]  Frequency/Number of Accidents this Shift: [facility] Bed/Chair/Wheelchair Transfer:  [facility] Toilet Transfer:  [facility] Tub/Shower Transfer:  [place]    Previously Documented Mode of Locomotion at Discharge: Field  ROCCO Expected Mode of Locomotion at Discharge: [facility] Walk/Wheelchair:  [facility] Stairs:  [facility] Comprehension:  Auditory comprehension is the usual mode. Comprehension  Score = 7, Independent.  Patient comprehends complex/abstract information in  their primary language.  Patient is completely independent for auditory  comprehension.  There are no activity limitations.  [facility] Expression:  Vocal expression is the usual mode. Expression Score = 6,  Modified Independent.  Patient expresses complex/abstract information in their  primary language with only mild difficulty with tasks.  [facility] Social Interaction:  Social Interaction Score = 6, Modified Independent.  Patient is modified independent for social interaction, requiring: Requires  additional time.  [facility] Problem Solving:  Problem Solving Score = 6, Modified [place].  Patient  makes appropriate decisions in order to solve complex problems with mild  difficulty but self-corrects.  [facility] Memory:  Memory Score = 6, Modified [place].  Patient is modified  independent for memory, having only mild difficulty and using self-initiated or  environmental cues to remember.    Therapy Mode Minutes  Occupational Therapy: [place]  Physical Therapy: [place]  Speech Language Pathology:  [place]    Signed by: Wojciech Lentz RN

## 2018-12-19 VITALS
DIASTOLIC BLOOD PRESSURE: 61 MMHG | SYSTOLIC BLOOD PRESSURE: 108 MMHG | TEMPERATURE: 98 F | BODY MASS INDEX: 39.17 KG/M2 | HEART RATE: 72 BPM | HEIGHT: 75 IN | WEIGHT: 315 LBS | OXYGEN SATURATION: 97 % | RESPIRATION RATE: 18 BRPM

## 2018-12-19 LAB — GLUCOSE BLDC GLUCOMTR-MCNC: 164 MG/DL (ref 70–130)

## 2018-12-19 PROCEDURE — 63710000001 INSULIN LISPRO (HUMAN) PER 5 UNITS: Performed by: HOSPITALIST

## 2018-12-19 PROCEDURE — 82962 GLUCOSE BLOOD TEST: CPT

## 2018-12-19 RX ADMIN — INSULIN LISPRO 2 UNITS: 100 INJECTION, SOLUTION INTRAVENOUS; SUBCUTANEOUS at 07:59

## 2018-12-19 RX ADMIN — CYCLOBENZAPRINE 10 MG: 10 TABLET, FILM COATED ORAL at 06:47

## 2018-12-19 RX ADMIN — ATOMOXETINE 100 MG: 60 CAPSULE ORAL at 07:59

## 2018-12-19 RX ADMIN — FAMOTIDINE 20 MG: 20 TABLET, FILM COATED ORAL at 08:00

## 2018-12-19 RX ADMIN — DULOXETINE HYDROCHLORIDE 60 MG: 60 CAPSULE, DELAYED RELEASE ORAL at 08:00

## 2018-12-19 RX ADMIN — METFORMIN HYDROCHLORIDE 1000 MG: 1000 TABLET ORAL at 08:00

## 2018-12-19 RX ADMIN — LOSARTAN POTASSIUM 100 MG: 100 TABLET, FILM COATED ORAL at 08:00

## 2018-12-19 RX ADMIN — HYDROCHLOROTHIAZIDE 25 MG: 25 TABLET ORAL at 08:00

## 2018-12-19 RX ADMIN — HYDROCODONE BITARTRATE AND ACETAMINOPHEN 2 TABLET: 5; 325 TABLET ORAL at 00:09

## 2018-12-19 RX ADMIN — LIDOCAINE 2 PATCH: 50 PATCH CUTANEOUS at 07:59

## 2018-12-19 RX ADMIN — METOPROLOL TARTRATE 25 MG: 25 TABLET ORAL at 08:00

## 2018-12-19 RX ADMIN — HYDROCODONE BITARTRATE AND ACETAMINOPHEN 2 TABLET: 5; 325 TABLET ORAL at 06:47

## 2018-12-19 RX ADMIN — PIOGLITAZONE 30 MG: 30 TABLET ORAL at 08:00

## 2018-12-19 RX ADMIN — GLIPIZIDE 10 MG: 10 TABLET ORAL at 07:59

## 2018-12-19 NOTE — PROGRESS NOTES
Inpatient Rehabilitation Plan of Care Note    Plan of Care  Care Plan Reviewed - No updates at this time.    Signed by: Marissa Torres RN

## 2018-12-19 NOTE — PROGRESS NOTES
Inpatient Rehabilitation Functional Measures Assessment    Functional Measures  ROCCO Eating:  [facility] Grooming: [facility] Bathing:  [facility] Upper Body Dressing:  [facility] Lower Body Dressing:  [facility] Toileting:  [facility] Bladder Management  Level of Assistance:  [place]  Frequency/Number of Accidents this Shift:  [facility] Bowel Management  Level of Assistance: [place]  Frequency/Number of Accidents this Shift: [facility] Bed/Chair/Wheelchair Transfer:  [facility] Toilet Transfer:  [facility] Tub/Shower Transfer:  [place]    Previously Documented Mode of Locomotion at Discharge: Field  [facility]CCO Expected Mode of Locomotion at Discharge: [facility] Walk/Wheelchair:  [facility] Stairs:  [facility] Comprehension:  Auditory comprehension is the usual mode. Comprehension  Score = 7, Independent.  Patient comprehends complex/abstract information in  their primary language.  Patient is completely independent for auditory  comprehension.  There are no activity limitations.  [facility]O Expression:  Vocal expression is the usual mode. Expression Score = 7,  Independent.  Patient expresses complex/abstract information in their primary  language.  Patient is completely independent for vocal expression.  There are no  activity limitations.  [facility] Social Interaction:  Social Interaction Score = 6, Modified Independent.  Patient is modified independent for social interaction, requiring: Medications.    [facility] Problem Solving:  Problem Solving Score = 7, Independent.  Patient makes  appropriate decisions in order to solve complex problems.  Patient is completely  independent for problem solving.  There are no activity limitations.  [facility] Memory:  Memory Score = 7, Independent.  Patient is completely independent  for memory.  There are no activity limitations.    Therapy Mode Minutes  Occupational Therapy: [place]  Physical Therapy: [place]  Speech Language Pathology:  [place]    Signed by: Marissa Torres RN

## 2018-12-19 NOTE — PLAN OF CARE
Problem: Patient Care Overview  Goal: Plan of Care Review  Outcome: Ongoing (interventions implemented as appropriate)   12/19/18 0024   Patient Care Overview   IRF Plan of Care Review progress ongoing, continue   Progress, Functional Goals demonstrating adequate progress   Coping/Psychosocial   Plan of Care Reviewed With patient;spouse   OTHER   Outcome Summary Pt. is pleasant and cooperative. Complained of any pain to bilateral shoulders and back. Norco 2 tabs PO PRN given and then relieved well. Spouse at bedside and give more help. Flexeril 10 mg PO PRN given per pt. required. BG is 218. Insulin 4 units given at night. Discharge scheduled tomorrow.      Goal: Coping Plan  Outcome: Ongoing (interventions implemented as appropriate)   12/19/18 0024   Coping Plan   Demonstration of Effective Coping Strategies demonstrating adequate progress       Problem: Fall Risk (Adult)  Goal: Absence of Fall  Outcome: Ongoing (interventions implemented as appropriate)   12/19/18 0024   Fall Risk (Adult)   Absence of Fall making progress toward outcome       Problem: Pain, Acute (Adult)  Goal: Acceptable Pain Control/Comfort Level  Outcome: Ongoing (interventions implemented as appropriate)   12/19/18 0024   Pain, Acute (Adult)   Acceptable Pain Control/Comfort Level making progress toward outcome       Problem: Skin Injury Risk (Adult)  Goal: Skin Health and Integrity  Outcome: Ongoing (interventions implemented as appropriate)   12/19/18 0024   Skin Injury Risk (Adult)   Skin Health and Integrity making progress toward outcome

## 2018-12-19 NOTE — DISCHARGE SUMMARY
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    The patient will be [facility] [place]; 24/7 care is available from the wife.  The patient will be having outpatient therapies here at [facility].  The patient will be following up with his primary care physician, KAYLEN An.  The patient was recommended to have a rolling walker.  He owns tub bench, reacher, handicap height commode.  Family teaching had been completed.  Both patient and wife were very pleased with the outcome and the plan for discharge.  Family conference was held on 12/17/2018.  Patient was ambulating 160-200 feet with a rolling walker and contact guard assistance only.  He can ascend and descend to 8 steps with rails, contact guard to minimum assistance, contact guard assistance with commode and shower transfers.  He requires minimum assistance to dress his upper body.    DISCHARGE MEDICATIONS:  1.  Strattera 100 mg daily.  2.  Glucosamine/chondroitin 500/400 mg daily.  3.  Norco 5/325 mg one every 4 hours as needed for pain.  4.  Xarelto 20 mg daily.  5.  Cymbalta 60 mg daily.  6.  Amaryl 4 mg two times daily.  7.  Metformin 1000 mg two times daily with meals.  8.  Actose 30 mg daily.  9.  Zocor 40 mg q.p.m.  10.  Losartan 100 mg daily.  11.  Hydrodiuril 25 mg daily.  12.  Lopressor 25 mg q.12 hours.  13.  Lidoderm patches were also prescribed.  14.  Flexeril 10 mg three times daily as needed for muscle spasms.  15.  Patient was recommended to stop taking aspirin and melocixam, as well as Hyzaar 100/12.5 mg, which he was taking at home.    CONDITION:  The patient was discharged in stable condition.

## 2018-12-19 NOTE — PROGRESS NOTES
Inpatient Rehabilitation Functional Measures Assessment    Functional Measures  ROCCO Eating:  [facility] Grooming: [facility] Bathing:  [facility] Upper Body Dressing:  [facility] Lower Body Dressing:  [facility] Toileting:  [facility] Bladder Management  Level of Assistance:  [place]  Frequency/Number of Accidents this Shift:  [facility] Bowel Management  Level of Assistance: [place]  Frequency/Number of Accidents this Shift: [facility] Bed/Chair/Wheelchair Transfer:  [facility] Toilet Transfer:  [facility] Tub/Shower Transfer:  [place]    Previously Documented Mode of Locomotion at Discharge: Field  ROCCO Expected Mode of Locomotion at Discharge: [facility] Walk/Wheelchair:  [facility] Stairs:  [facility] Comprehension:  Auditory comprehension is the usual mode. Comprehension  Score = 7, Independent.  Patient comprehends complex/abstract information in  their primary language.  Patient is completely independent for auditory  comprehension.  There are no activity limitations. Uses glasses when reading  [facility]O Expression:  Vocal expression is the usual mode. Expression Score = 7,  Independent.  Patient expresses complex/abstract information in their primary  language.  Patient is completely independent for vocal expression.  There are no  activity limitations.  [facility] Social Interaction:  Social Interaction Score = 7, Independent. Patient is  completely independent for social interaction.  There are no activity  limitations.  [facility] Problem Solving:  Problem Solving Score = 7, Independent.  Patient makes  appropriate decisions in order to solve complex problems.  Patient is completely  independent for problem solving.  There are no activity limitations.  [facility] Memory:  Memory Score = 7, Independent.  Patient is completely independent  for memory.  There are no activity limitations.    Therapy Mode Minutes  Occupational Therapy: [place]  Physical Therapy: [place]  Speech Language Pathology:  [place]    Signed by:  Franc Flood RN

## 2018-12-19 NOTE — PROGRESS NOTES
LOS: 19 days   Patient Care Team:  Miladis Colbert APRN as PCP - General (Family Medicine)    Chief Complaint: same    Subjective     History of Present Illness    Subjective Pt is awake and alert. No new issues. We discussed dc plans again this am.  Answered pts questions re medications    History taken from: patient    Objective     Vital Signs  Temp:  [97.5 °F (36.4 °C)-98 °F (36.7 °C)] 98 °F (36.7 °C)  Heart Rate:  [72-80] 72  Resp:  [18] 18  BP: (108-147)/(61-80) 108/61    Objective exam unchanged    Results Review:     I reviewed the patient's new clinical results.    Medication Review:     Assessment/Plan       Cervical myelopathy (CMS/HCC)      Assessment & Plan Pt is ready for dc home today. Wife to provide transportation. Outpt tx to follow. DC summary dictated    Mike Melissa MD  12/19/18  7:06 AM    Time:

## 2018-12-19 NOTE — PLAN OF CARE
Problem: Patient Care Overview  Goal: Plan of Care Review  Outcome: Outcome(s) achieved Date Met: 12/19/18 12/19/18 0956   Patient Care Overview   IRF Plan of Care Review progress ongoing, continue;outcomes achieved;discharged   Progress, Functional Goals demonstrating adequate progress;preparing for discharge   Coping/Psychosocial   Plan of Care Reviewed With patient;spouse   OTHER   Outcome Summary Mr. Ramirez will be discharged today home with his wife. His pain is controlled with oral pain medication and muscle relaxer. His incision is clean dry and open to air, with one suture left. He does complain of numbness to bilateral upper extremities, encouraged him to continue following up with all scheduled outpatient appointments. No skin issues; still edema present to bilateral lower extremities. All discharge instructions will be reviewed prior to discharge with both patient and wife. It has been a pleasure caring for this patient.      Goal: Coping Plan  Outcome: Outcome(s) achieved Date Met: 12/19/18      Problem: Fall Risk (Adult)  Goal: Absence of Fall  Outcome: Outcome(s) achieved Date Met: 12/19/18      Problem: Pain, Acute (Adult)  Goal: Acceptable Pain Control/Comfort Level  Outcome: Outcome(s) achieved Date Met: 12/19/18      Problem: Skin Injury Risk (Adult)  Goal: Skin Health and Integrity  Outcome: Outcome(s) achieved Date Met: 12/19/18

## 2018-12-19 NOTE — PROGRESS NOTES
SECTION GG    Eating Performance Discharge: [place] sets up or cleans up; patient completes  activity. [place] assists only prior to or following the activity.    Signed by: Marissa Torres RN

## 2018-12-24 ENCOUNTER — TRANSCRIBE ORDERS (OUTPATIENT)
Dept: PHYSICAL THERAPY | Facility: HOSPITAL | Age: 63
End: 2018-12-24

## 2018-12-24 DIAGNOSIS — G95.9 CERVICAL MYELOPATHY (HCC): Primary | ICD-10-CM

## 2018-12-31 ENCOUNTER — APPOINTMENT (OUTPATIENT)
Dept: OCCUPATIONAL THERAPY | Facility: HOSPITAL | Age: 63
End: 2018-12-31

## 2018-12-31 ENCOUNTER — APPOINTMENT (OUTPATIENT)
Dept: PHYSICAL THERAPY | Facility: HOSPITAL | Age: 63
End: 2018-12-31

## 2019-01-01 NOTE — PLAN OF CARE
Problem: Patient Care Overview  Goal: Plan of Care Review  Outcome: Ongoing (interventions implemented as appropriate)   12/01/18 1621   Patient Care Overview   IRF Plan of Care Review progress ongoing, continue   Progress, Functional Goals demonstrating adequate progress   Coping/Psychosocial   Plan of Care Reviewed With patient   OTHER   Outcome Summary Patient has done well in first day of therapies has had moderate amount of pain, meds helping, no new issues present at this time.        Problem: Fall Risk (Adult)  Goal: Absence of Fall  Outcome: Ongoing (interventions implemented as appropriate)   12/01/18 1621   Fall Risk (Adult)   Absence of Fall making progress toward outcome       Problem: Pain, Acute (Adult)  Goal: Acceptable Pain Control/Comfort Level  Outcome: Ongoing (interventions implemented as appropriate)   12/01/18 1621   Pain, Acute (Adult)   Acceptable Pain Control/Comfort Level making progress toward outcome       Problem: Skin Injury Risk (Adult)  Goal: Skin Health and Integrity  Outcome: Ongoing (interventions implemented as appropriate)   12/01/18 1621   Skin Injury Risk (Adult)   Skin Health and Integrity making progress toward outcome          3.52

## 2019-01-02 ENCOUNTER — OFFICE VISIT (OUTPATIENT)
Dept: NEUROSURGERY | Facility: CLINIC | Age: 64
End: 2019-01-02

## 2019-01-02 DIAGNOSIS — Z48.89 POSTOPERATIVE VISIT: Primary | ICD-10-CM

## 2019-01-02 PROCEDURE — 99024 POSTOP FOLLOW-UP VISIT: CPT | Performed by: NEUROLOGICAL SURGERY

## 2019-01-02 RX ORDER — HYDROCODONE BITARTRATE AND ACETAMINOPHEN 5; 325 MG/1; MG/1
1 TABLET ORAL EVERY 6 HOURS PRN
COMMUNITY
End: 2019-03-19

## 2019-01-03 ENCOUNTER — APPOINTMENT (OUTPATIENT)
Dept: OCCUPATIONAL THERAPY | Facility: HOSPITAL | Age: 64
End: 2019-01-03

## 2019-01-03 ENCOUNTER — APPOINTMENT (OUTPATIENT)
Dept: PHYSICAL THERAPY | Facility: HOSPITAL | Age: 64
End: 2019-01-03
Attending: PHYSICAL MEDICINE & REHABILITATION

## 2019-01-07 ENCOUNTER — APPOINTMENT (OUTPATIENT)
Dept: PHYSICAL THERAPY | Facility: HOSPITAL | Age: 64
End: 2019-01-07

## 2019-01-07 ENCOUNTER — APPOINTMENT (OUTPATIENT)
Dept: OCCUPATIONAL THERAPY | Facility: HOSPITAL | Age: 64
End: 2019-01-07

## 2019-01-08 ENCOUNTER — HOSPITAL ENCOUNTER (OUTPATIENT)
Dept: PHYSICAL THERAPY | Facility: HOSPITAL | Age: 64
Setting detail: THERAPIES SERIES
Discharge: HOME | End: 2019-01-08
Attending: NEUROLOGICAL SURGERY

## 2019-01-08 DIAGNOSIS — Z74.09 IMPAIRED MOBILITY: ICD-10-CM

## 2019-01-08 DIAGNOSIS — R26.2 DIFFICULTY WALKING: Primary | ICD-10-CM

## 2019-01-08 PROCEDURE — 97161 PT EVAL LOW COMPLEX 20 MIN: CPT

## 2019-01-08 PROCEDURE — 97110 THERAPEUTIC EXERCISES: CPT

## 2019-01-08 NOTE — THERAPY EVALUATION
Outpatient Physical Therapy Ortho Initial Evaluation  [facility]     Patient Name: Duane Mark Witten  : 1955  MRN: [id number]  Today's Date: 2019      Visit Date: 2019    Patient Active Problem List   Diagnosis   • YANELI (generalized anxiety disorder)   • ADD (attention deficit disorder)   • Depression   • Diabetes type 2, controlled (CMS/HCC)   • ED (erectile dysfunction) of non-organic origin   • HLD (hyperlipidemia)   • Essential hypertension   • Arthritis   • Psoriasis   • Testosterone deficiency   • Primary insomnia   • Weakness of both lower extremities   • Paresthesia of both upper extremities   • Morbid obesity with BMI of 40.0-44.9, adult (CMS/HCC)   • Adverse effect of corticosteroids   • Type 2 diabetes mellitus with hyperglycemia (CMS/HCC)   • Cervical stenosis of spinal canal   • Edema of cervical spinal cord (CMS/HCC)   • Postoperative atrial fibrillation (CMS/HCC)   • Cervical myelopathy (CMS/HCC)   • Postoperative visit        Past Medical History:   Diagnosis Date   • ADD (attention deficit disorder)    • Depression    • ED (erectile dysfunction)    • Essential hypertension    • YANELI (generalized anxiety disorder)    • HLD (hyperlipidemia)    • Osteoarthrosis    • PONV (postoperative nausea and vomiting)    • Psoriasis    • Testosterone deficiency    • Type 2 diabetes mellitus (CMS/HCC)         Past Surgical History:   Procedure Laterality Date   • ANTERIOR CHANNEL VERTEBRECTOMY/CORPECTOMY Bilateral 2018    Procedure: C4, C5, C6 ANTERIOR CERVICAL CORPECTOMY;  Surgeon: Chirag Markham MD;  Location: [facility];  Service: Neurosurgery   • BIOPSY / EXCISION / DISSECTION AXILLARY NODE  1994    Lymph Node testing for cancer    • COLONOSCOPY      10+ years; normal   • NECK SURGERY  2018    C4/5/6 anterior cervical corpectomy   • PARATHYROIDECTOMY         Visit Dx:     ICD-10-CM ICD-9-CM   1. Difficulty walking R26.2 719.7   2. Impaired mobility Z74.09 799.89        Patient History     Row Name 01/08/19 0900             History    Chief Complaint  Difficulty Walking;Difficulty with daily activities  -      Date Current Problem(s) Began  11/03/18  -LS      Brief Description of Current Complaint  Pt reports he was laying on a mat under his truck working on it and felt cold and couldn't stand up. He was extremely weak and pulled himself up on the truck and could barely walk into the house. He got a shower and got into bed 11/3/18. He went to work on Monday and could barely move. Went to PCP on Tuesday. She thought maybe medication and told him to wait 3 weeks to get out of his system. He did not get out of bed for 2 weeks and hands were tingling then arms started tingling. Pt feared [place] due to previous flu shot. Couldn't walk in ER. Called neuro. C3-C6 cord compression and herniated disc. Pt had C3-C6 corpectomy and fusion. He reports weakness in B arms. He is R handed. He has been out but has to rest after short distance walking. He is driving. He enjoys boating and fishing. He is staying at his mother's home where it has grab bars, walk in tub, etc. minimal steps.  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  -LS      Patient seeing anyone else for problem(s)?  yes  -LS      How has patient tried to help current problem?  rehab for 2 week  -      What clinical tests have you had for this problem?  X-ray  -LS      Results of Clinical Tests  normal healing  -LS         Pain     Pain Location  Arm  -LS      Pain at Present  2  -LS      Pain at Best  1  -LS      Pain at Worst  3  -LS      Pain Frequency   Intermittent  -LS      Pain Description  Aching;Shooting;Numbness;Tingling  -LS      What Performance Factors Make the Current Problem(s) WORSE?  turning head  -LS      What Performance Factors Make the Current Problem(s) BETTER?  Lidocaine patch  -LS      Tolerance Time- Standing  5 minutes  -LS      Tolerance Time- Sitting  no issue  -LS      Tolerance Time- Walking  10 minutes  -LS      Is your sleep disturbed?  Yes  -LS      What position do you sleep in?  Supine  -LS      Difficulties at work?  Pt on disability right now. Unable to perform work duties.  -LS      Difficulties with ADL's?  Difficulty getting around his home.  -LS      Difficulties with recreational activities?  Unable to fish, work on cars.   -LS         Fall Risk Assessment    Any falls in the past year:  Yes  -LS      Number of falls reported in the last 12 months  2  -LS      Factors that contributed to the fall:  Tripped;Lost balance  -LS         Services    Prior Rehab/Home Health Experiences  Yes  -LS      When was the prior experience with Rehab/Home Health  Dec 2018  -LS      Where was the prior experience with Rehab/Home Health  [facility]  -LS      Are you currently receiving Home Health services  No  -LS      Do you plan to receive Home Health services in the near future  No  -LS         Daily Activities    Primary Language  English  -LS      Pt Participated in POC and Goals  Yes  -LS         Safety    Are you being hurt, hit, or frightened by anyone at home or in your life?  No  -LS      Are you being neglected by a caregiver  No  -LS        User Key  (r) = Recorded By, (t) = Taken By, (c) = Cosigned By    Initials Name Provider Type    Khushi Powers, PT Physical Therapist          PT Ortho     Row Name 01/08/19 0900       Subjective Comments    Subjective Comments  I just don't have the strength to carry groceries in.  -LS       Precautions and Contraindications    Precautions/Limitations  fall precautions;spinal precautions  -LS        Subjective Pain    Able to rate subjective pain?  yes  -LS    Pre-Treatment Pain Level  2  -LS       Posture/Observations    Posture/Observations Comments  forward head, dec cervical lordosis  -LS       Myotomal Screen- Upper Quarter Clearing    Shoulder flexion (C5)  Left:;3+ (Fair +);Right:;4- (Good -)  -LS    Elbow flexion/wrist extension (C6)  Bilateral:;5 (Normal)  -LS    Elbow extension/wrist flexion (C7)  Bilateral:;5 (Normal)  -LS      — see  testing; dec B  -LS       Cervical/Shoulder ROM Screen    Cervical flexion  Impaired 50% of normal  -LS    Cervical extension  Impaired  -LS    Cervical lateral flexion  Impaired 25% of normal  -LS    Cervical rotation  Impaired 50% of normal  -LS    Shoulder elevation   Normal  -LS       Sensory Screen for Light Touch- Lower Quarter Clearing    L3 (distal anterior thigh)  Intact  -LS    L4 (medial lower leg/foot)  Intact  -LS    L5 (lateral lower leg/great toe)  Intact  -LS    S1 (bottom of foot)  Intact  -LS       Myotomal Screen- Lower Quarter Clearing    Hip flexion (L2)  Right:;4 (Good);Left:;4+ (Good +)  -LS    Knee extension (L3)  Right:;4+ (Good +);Left:;5 (Normal)  -LS    Ankle DF (L4)  Bilateral:;4+ (Good +)  -LS    Ankle PF (S1)  Bilateral:;4 (Good)  -LS    Knee flexion (S2)  Bilateral:;5 (Normal)  -LS       Right Upper Ext    Rt Shoulder Abduction AROM  170 deg with compensation  -LS    Rt Shoulder Flexion AROM  170 deg with compensation  -LS    Rt Shoulder External Rotation AROM  KENDALL to R side of neck  -LS    Rt Shoulder Internal Rotation AROM  FIR to L4  -LS       Left Upper Ext    Lt Shoulder Abduction AROM  110 deg with compensation  -LS    Lt Shoulder Flexion AROM  80 deg with compensation  -LS    Lt Shoulder External Rotation AROM  KENDALL to L side of head  -LS    Lt Shoulder Internal Rotation AROM  FIR to L4  -LS        Strength Right    # Reps  2  -LS    Right Rung  2  -LS    Right  Test 1  60  -LS    Right  Test 2  60  -LS      Strength Average Right  60  -LS        Strength Left    # Reps  2  -LS    Left Rung  2  -LS    Left  Test 1  50  -LS    Left  Test 2  50  -LS     Strength Average Left  50  -LS       Sensation    Light Touch  Partial deficits in the LUE  -LS       Lower Extremity Flexibility    Hamstrings  Bilateral:;Moderately limited  -LS    Hip External Rotators  Bilateral:;Mildly limited  -LS    Hip Internal Rotators  WNL  -LS    Gastrocnemius  Bilateral:;Mildly limited  -LS       Transfers    Bed-Chair [place] (Transfers)  conditional independence  -LS    Chair-Bed [place] (Transfers)  conditional independence  -LS    Assistive Device (Bed-Chair Transfers)  walker, front-wheeled  -LS       Gait/Stairs Assessment/Training    [place] Level (Gait)  conditional independence  -LS    Assistive Device (Gait)  walker, front-wheeled  -LS    Distance in Feet (Gait)  50  -LS    Pattern (Gait)  step-to  -LS    Deviations/Abnormal Patterns (Gait)  bilateral deviations;base of support, narrow;gait speed decreased;festinating/shuffling;stride length decreased  -LS    Comment (Gait/Stairs)  FF posture, inc time required  -LS      User Key  (r) = Recorded By, (t) = Taken By, (c) = Cosigned By    Initials Name Provider Type    LS Khushi Trujillo, PT Physical Therapist                      Therapy Education  Education Details: reviewed HEP, discussed posture with RW, increased time walking around home  Given: Symptoms/condition management, HEP  Program: New  How Provided: Verbal, Demonstration, Written  Provided to: Patient  Level of Understanding: Teach back education performed, Verbalized, Demonstrated     PT OP Goals     Row Name 01/08/19 1300          PT Short Term Goals    STG Date to Achieve  01/22/19  -LS     STG 1  Pt will demonstrate understanding and compliance with initial HEP.  -LS     STG 1 Progress  New  -LS     STG 2  Pt will report improved ability to perform STS from home chair without requiring  multiple attempts.  -LS     STG 2 Progress  New  -LS     STG 3  Pt will demonstrate improved tolerance to standing from 5 minutes to 15 minutes during therapy session.  -LS     STG 3 Progress  New  -LS        Long Term Goals    LTG Date to Achieve  02/07/19  -LS     LTG 1  Pt will demonstrate ability to tolerate 30 minutes of standing to improve his ability to perform household tasks.   -LS     LTG 1 Progress  New  -LS     LTG 2  Pt will demonstrate STS x 5 from normal chair height in 30 seconds or less.  -LS     LTG 2 Progress  New  -LS     LTG 3  Pt will demonstrate ability to safely ambulate using SC for household distance and within clinic.   -LS     LTG 3 Progress  New  -LS        Time Calculation    PT Goal Re-Cert Due Date  04/08/19  -LS       User Key  (r) = Recorded By, (t) = Taken By, (c) = Cosigned By    Initials Name Provider Type    Khushi Powers, PT Physical Therapist          PT Assessment/Plan     Row Name 01/08/19 1310          PT Plan    PT Frequency  2x/week  -LS     Predicted Duration of Therapy Intervention (Therapy Eval)  4 weeks   -LS     Planned CPT's?  PT EVAL LOW COMPLEXITY: 06782;PT RE-EVAL: 67796;PT MANUAL THERAPY EA 15 MIN: 89293;PT HOT OR COLD PACK TREAT [facility];PT HOT/COLD PACK WC NONMCARE: 28564;PT THER PROC EA 15 MIN: 86125;PT NEUROMUSC RE-EDUCATION EA 15 MIN: 06555;PT THER ACT EA 15 MIN: 76836;PT GAIT TRAINING EA 15 MIN: 78403  -LS     PT Plan Comments  Review HEP, assess tolerance, continue standing exercise with rest breaks as needed, gait in // bars, cervical isometrics, chin tuck, scapular row/retraction.  -LS       User Key  (r) = Recorded By, (t) = Taken By, (c) = Cosigned By    Initials Name Provider Type    Khushi Powers, PT Physical Therapist            Exercises     Row Name 01/08/19 0900             Subjective Comments    Subjective Comments  I just don't have the strength to carry groceries in.  -LS         Subjective Pain    Able to rate subjective pain?  yes  -LS       Pre-Treatment Pain Level  2  -LS         Total Minutes    75125 - PT Therapeutic Exercise Minutes  10  -LS         Exercise 1    Exercise Name 1  standing march  -LS      Reps 1  20  -LS         Exercise 2    Exercise Name 2  standing HS curl  -LS      Sets 2  2  -LS      Reps 2  10  -LS         Exercise 3    Exercise Name 3  HR  -LS      Sets 3  2  -LS      Reps 3  10  -LS         Exercise 4    Exercise Name 4  STS  -LS      Reps 4  3  -LS      Additional Comments  twice per STS  -LS        User Key  (r) = Recorded By, (t) = Taken By, (c) = Cosigned By    Initials Name Provider Type    Khushi Powers, PT Physical Therapist                        Outcome Measure Options: Modifed [place]  Modified Oswestry  Modified Oswestry Score/Comments: 32% disability       Time Calculation:     Therapy Suggested Charges     Code   Minutes Charges    27467 (CPT®) Hc Pt Neuromusc Re Education Ea 15 Min      63097 (CPT®) Hc Pt Ther Proc Ea 15 Min 10 1    81671 (CPT®) Hc Gait Training Ea 15 Min      73154 (CPT®) Hc Pt Therapeutic Act Ea 15 Min      81423 (CPT®) Hc Pt Manual Therapy Ea 15 Min      82629 (CPT®) Hc Pt Ther Massage- Per 15 Min      23521 (CPT®) Hc Pt Iontophoresis Ea 15 Min      84626 (CPT®) Hc Pt Elec Stim Ea-Per 15 Min      60937 (CPT®) Hc Pt Ultrasound Ea 15 Min      12657 (CPT®) Hc Pt Self Care/Mgmt/Train Ea 15 Min      64790 (CPT®) Hc Pt Prosthetic (S) Train Initial Encounter, Each 15 Min      06129 (CPT®) Hc Orthotic(S) Mgmt/Train Initial Encounter, Each 15min      27765 (CPT®) Hc Pt Aquatic Therapy Ea 15 Min      66543 (CPT®) Hc Pt Orthotic(S)/Prosthetic(S) Encounter, Each 15 Min       (CPT®) Hc Pt Electrical Stim Unattended      Total  10 1          Start Time: 0900  Stop Time: 0945  Time Calculation (min): 45 min  Total Timed Code Minutes- PT: 15 minute(s)     Therapy Charges for Today     Code Description Service Date Service Provider Modifiers Qty    99317811873 HC PT THER PROC EA 15 MIN 1/8/2019  Khushi Trujillo, PT GP 1    17542586583 HC PT EVAL LOW COMPLEXITY 2 1/8/2019 Khushi Trujillo, PT GP 1          PT G-Codes  Outcome Measure Options: Modifed [place]  Modified Oswestry Score/Comments: 32% disability          Khushi Trujillo, PT  1/8/2019

## 2019-01-08 NOTE — THERAPY EVALUATION
Outpatient Physical Therapy Ortho Initial Evaluation  [facility]     Patient Name: Duane Mark Witten  : 1955  MRN: [id number]  Today's Date: 2019      Visit Date: 2019    Patient Active Problem List   Diagnosis   • YANELI (generalized anxiety disorder)   • ADD (attention deficit disorder)   • Depression   • Diabetes type 2, controlled (CMS/HCC)   • ED (erectile dysfunction) of non-organic origin   • HLD (hyperlipidemia)   • Essential hypertension   • Arthritis   • Psoriasis   • Testosterone deficiency   • Primary insomnia   • Weakness of both lower extremities   • Paresthesia of both upper extremities   • Morbid obesity with BMI of 40.0-44.9, adult (CMS/HCC)   • Adverse effect of corticosteroids   • Type 2 diabetes mellitus with hyperglycemia (CMS/HCC)   • Cervical stenosis of spinal canal   • Edema of cervical spinal cord (CMS/HCC)   • Postoperative atrial fibrillation (CMS/HCC)   • Cervical myelopathy (CMS/HCC)   • Postoperative visit        Past Medical History:   Diagnosis Date   • ADD (attention deficit disorder)    • Depression    • ED (erectile dysfunction)    • Essential hypertension    • YANELI (generalized anxiety disorder)    • HLD (hyperlipidemia)    • Osteoarthrosis    • PONV (postoperative nausea and vomiting)    • Psoriasis    • Testosterone deficiency    • Type 2 diabetes mellitus (CMS/HCC)         Past Surgical History:   Procedure Laterality Date   • ANTERIOR CHANNEL VERTEBRECTOMY/CORPECTOMY Bilateral 2018    Procedure: C4, C5, C6 ANTERIOR CERVICAL CORPECTOMY;  Surgeon: Chirag Markham MD;  Location: [facility];  Service: Neurosurgery   • BIOPSY / EXCISION / DISSECTION AXILLARY NODE  1994    Lymph Node testing for cancer    • COLONOSCOPY      10+ years; normal   • NECK SURGERY  2018    C4/5/6 anterior cervical corpectomy   • PARATHYROIDECTOMY         Visit Dx:     ICD-10-CM ICD-9-CM   1. Difficulty walking R26.2 719.7   2. Impaired mobility Z74.09 799.89        Patient History     Row Name 01/08/19 0900             History    Chief Complaint  Difficulty Walking;Difficulty with daily activities  -      Date Current Problem(s) Began  11/03/18  -LS      Brief Description of Current Complaint  Pt reports he was laying on a mat under his truck working on it and felt cold and couldn't stand up. He was extremely weak and pulled himself up on the truck and could barely walk into the house. He got a shower and got into bed 11/3/18. He went to work on Monday and could barely move. Went to PCP on Tuesday. She thought maybe medication and told him to wait 3 weeks to get out of his system. He did not get out of bed for 2 weeks and hands were tingling then arms started tingling. Pt feared [facility] [place] due to previous flu shot. Couldn't walk in ER. Called neuro. C3-C6 cord compression and herniated disc. Pt had C3-C6 corpectomy and fusion. He reports weakness in B arms. He is R handed. He has been out but has to rest after short distance walking. He is driving. He enjoys boating and fishing. He is staying at his mother's home where it has grab bars, walk in tub, etc. minimal steps.  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  -LS      Patient seeing anyone else for problem(s)?  yes  -LS      How has patient tried to help current problem?  rehab for 2 week  -      What clinical tests have you had for this problem?  X-ray  -LS      Results of Clinical Tests  normal healing  -LS         Pain     Pain Location  Arm  -LS      Pain at Present  2  -LS      Pain at Best  1  -LS      Pain at Worst  3  -LS      Pain Frequency   Intermittent  -LS      Pain Description  Aching;Shooting;Numbness;Tingling  -LS      What Performance Factors Make the Current Problem(s) WORSE?  turning head  -LS      What Performance Factors Make the Current Problem(s) BETTER?  Lidocaine patch  -LS      Tolerance Time- Standing  5 minutes  -LS      Tolerance Time- Sitting  no issue  -LS      Tolerance Time- Walking  10 minutes  -LS      Is your sleep disturbed?  Yes  -LS      What position do you sleep in?  Supine  -LS      Difficulties at work?  Pt on disability right now. Unable to perform work duties.  -LS      Difficulties with ADL's?  Difficulty getting around his home.  -LS      Difficulties with recreational activities?  Unable to fish, work on cars.   -LS         Fall Risk Assessment    Any falls in the past year:  Yes  -LS      Number of falls reported in the last 12 months  2  -LS      Factors that contributed to the fall:  Tripped;Lost balance  -LS         Services    Prior Rehab/Home Health Experiences  Yes  -LS      When was the prior experience with Rehab/Home Health  Dec 2018  -LS      Where was the prior experience with Rehab/Home Health  [facility]  -LS      Are you currently receiving Home Health services  No  -LS      Do you plan to receive Home Health services in the near future  No  -LS         Daily Activities    Primary Language  English  -LS      Pt Participated in POC and Goals  Yes  -LS         Safety    Are you being hurt, hit, or frightened by anyone at home or in your life?  No  -LS      Are you being neglected by a caregiver  No  -LS        User Key  (r) = Recorded By, (t) = Taken By, (c) = Cosigned By    Initials Name Provider Type    Khushi Powers, PT Physical Therapist          PT Ortho     Row Name 01/08/19 0900       Subjective Comments    Subjective Comments  I just don't have the strength to carry groceries in.  -LS       Precautions and Contraindications    Precautions/Limitations  fall precautions;spinal precautions  -LS        Subjective Pain    Able to rate subjective pain?  yes  -LS    Pre-Treatment Pain Level  2  -LS       Posture/Observations    Posture/Observations Comments  forward head, dec cervical lordosis  -LS       Myotomal Screen- Upper Quarter Clearing    Shoulder flexion (C5)  Left:;3+ (Fair +);Right:;4- (Good -)  -LS    Elbow flexion/wrist extension (C6)  Bilateral:;5 (Normal)  -LS    Elbow extension/wrist flexion (C7)  Bilateral:;5 (Normal)  -LS      -- see  testing; dec B  -LS       Cervical/Shoulder ROM Screen    Cervical flexion  Impaired 50% of normal  -LS    Cervical extension  Impaired  -LS    Cervical lateral flexion  Impaired 25% of normal  -LS    Cervical rotation  Impaired 50% of normal  -LS    Shoulder elevation   Normal  -LS       Sensory Screen for Light Touch- Lower Quarter Clearing    L3 (distal anterior thigh)  Intact  -LS    L4 (medial lower leg/foot)  Intact  -LS    L5 (lateral lower leg/great toe)  Intact  -LS    S1 (bottom of foot)  Intact  -LS       Myotomal Screen- Lower Quarter Clearing    Hip flexion (L2)  Right:;4 (Good);Left:;4+ (Good +)  -LS    Knee extension (L3)  Right:;4+ (Good +);Left:;5 (Normal)  -LS    Ankle DF (L4)  Bilateral:;4+ (Good +)  -LS    Ankle PF (S1)  Bilateral:;4 (Good)  -LS    Knee flexion (S2)  Bilateral:;5 (Normal)  -LS       Right Upper Ext    Rt Shoulder Abduction AROM  170 deg with compensation  -LS    Rt Shoulder Flexion AROM  170 deg with compensation  -LS    Rt Shoulder External Rotation AROM  KENDALL to R side of neck  -LS    Rt Shoulder Internal Rotation AROM  FIR to L4  -LS       Left Upper Ext    Lt Shoulder Abduction AROM  110 deg with compensation  -LS    Lt Shoulder Flexion AROM  80 deg with compensation  -LS    Lt Shoulder External Rotation AROM  KENDALL to L side of head  -LS    Lt Shoulder Internal Rotation AROM  FIR to L4  -LS        Strength Right    # Reps  2  -LS    Right Rung  2  -LS    Right  Test 1  60  -LS    Right  Test 2  60  -LS      Strength Average Right  60  -LS        Strength Left    # Reps  2  -LS    Left Rung  2  -LS    Left  Test 1  50  -LS    Left  Test 2  50  -LS     Strength Average Left  50  -LS       Sensation    Light Touch  Partial deficits in the LUE  -LS       Lower Extremity Flexibility    Hamstrings  Bilateral:;Moderately limited  -LS    Hip External Rotators  Bilateral:;Mildly limited  -LS    Hip Internal Rotators  WNL  -LS    Gastrocnemius  Bilateral:;Mildly limited  -LS       Transfers    Bed-Chair [place] (Transfers)  conditional independence  -LS    Chair-Bed [place] (Transfers)  conditional independence  -LS    Assistive Device (Bed-Chair Transfers)  walker, front-wheeled  -LS       Gait/Stairs Assessment/Training    [place] Level (Gait)  conditional independence  -LS    Assistive Device (Gait)  walker, front-wheeled  -LS    Distance in Feet (Gait)  50  -LS    Pattern (Gait)  step-to  -LS    Deviations/Abnormal Patterns (Gait)  bilateral deviations;base of support, narrow;gait speed decreased;festinating/shuffling;stride length decreased  -LS    Comment (Gait/Stairs)  FF posture, inc time required  -LS      User Key  (r) = Recorded By, (t) = Taken By, (c) = Cosigned By    Initials Name Provider Type    LS Khushi Trujillo, PT Physical Therapist                      Therapy Education  Education Details: reviewed HEP, discussed posture with RW, increased time walking around home  Given: Symptoms/condition management, HEP  Program: New  How Provided: Verbal, Demonstration, Written  Provided to: Patient  Level of Understanding: Teach back education performed, Verbalized, Demonstrated     PT OP Goals     Row Name 01/08/19 1300          PT Short Term Goals    STG Date to Achieve  01/22/19  -LS     STG 1  Pt will demonstrate understanding and compliance with initial HEP.  -LS     STG 1 Progress  New  -LS     STG 2  Pt will report improved ability to perform STS from home chair without requiring  multiple attempts.  -LS     STG 2 Progress  New  -LS     STG 3  Pt will demonstrate improved tolerance to standing from 5 minutes to 15 minutes during therapy session.  -LS     STG 3 Progress  New  -LS        Long Term Goals    LTG Date to Achieve  02/07/19  -LS     LTG 1  Pt will demonstrate ability to tolerate 30 minutes of standing to improve his ability to perform household tasks.   -LS     LTG 1 Progress  New  -LS     LTG 2  Pt will demonstrate STS x 5 from normal chair height in 30 seconds or less.  -LS     LTG 2 Progress  New  -LS     LTG 3  Pt will demonstrate ability to safely ambulate using SC for household distance and within clinic.   -LS     LTG 3 Progress  New  -LS        Time Calculation    PT Goal Re-Cert Due Date  04/08/19  -LS       User Key  (r) = Recorded By, (t) = Taken By, (c) = Cosigned By    Initials Name Provider Type    Khushi Powers, PT Physical Therapist          PT Assessment/Plan     Row Name 01/08/19 1310          PT Assessment    Functional Limitations  Decreased safety during functional activities;Limitations in functional capacity and performance;Impaired gait;Performance in leisure activities;Impaired locomotion;Performance in self-care ADL;Limitation in home management;Performance in sport activities;Limitations in community activities;Performance in work activities  -LS     Impairments  Endurance;Motor function;Range of motion;Posture;Locomotion;Joint mobility;Poor body mechanics;Peripheral nerve integrity;Pain;Impaired postural alignment;Muscle strength;Sensation;Impaired flexibility;Gait;Balance  -LS     Assessment Comments  Pt is 64 yo male s/p C3-C6 corpectomy and cage placement after he had increasing difficulty walking for 3 weeks in November. He reports minimal cervical pain unless he rotates quickly. He demonstrates impaired cervical AROM, dec UE AROM/strength, difficulty ambulating, and dec tolerance to functional mobility. He demonstrated 5 minutes standing  tolerance today with quick fatigue and need for seated rest break. He now requires RW for ambulation demonstrating FF posture, dec cervical lordosis, dec step length, and dec heel strike. He spent inc time in bed due to inability to ambulate prior to surgery, combined with neurological impact of central cord compression he will benefit from continued skilled PT services to improve his gait mechanics, improve his tolerance to mobility, and restore his function. He is limited in his ability to tolerate sleeping, stand for >5 minutes, perform household tasks, and take part in his hobbies of fishing and hunting. He will benefit from combined OT/PT in order to give him the best opportunity for recovery.   -LS     Please refer to paper survey for additional self-reported information  Yes  -LS     Rehab Potential  Good  -LS     Patient/caregiver participated in establishment of treatment plan and goals  Yes  -LS        PT Plan    PT Frequency  2x/week  -LS     Predicted Duration of Therapy Intervention (Therapy Eval)  4 weeks   -LS     Planned CPT's?  PT EVAL LOW COMPLEXITY: 25936;PT RE-EVAL: 85669;PT MANUAL THERAPY EA 15 MIN: 26681;PT HOT OR COLD PACK TREAT MCARE;PT HOT/COLD PACK WC NONMCARE: 83944;PT THER PROC EA 15 MIN: 34372;PT NEUROMUSC RE-EDUCATION EA 15 MIN: 60029;PT THER ACT EA 15 MIN: 87111;PT GAIT TRAINING EA 15 MIN: 63611  -LS     PT Plan Comments  Review HEP, assess tolerance, continue standing exercise with rest breaks as needed, gait in // bars, cervical isometrics, chin tuck, scapular row/retraction.  -LS       User Key  (r) = Recorded By, (t) = Taken By, (c) = Cosigned By    Initials Name Provider Type    Khushi Powers, PT Physical Therapist            Exercises     Row Name 01/08/19 0900             Subjective Comments    Subjective Comments  I just don't have the strength to carry groceries in.  -LS         Subjective Pain    Able to rate subjective pain?  yes  -LS      Pre-Treatment Pain Level  2   -LS         Total Minutes    12547 - PT Therapeutic Exercise Minutes  10  -LS         Exercise 1    Exercise Name 1  standing march  -LS      Reps 1  20  -LS         Exercise 2    Exercise Name 2  standing HS curl  -LS      Sets 2  2  -LS      Reps 2  10  -LS         Exercise 3    Exercise Name 3  HR  -LS      Sets 3  2  -LS      Reps 3  10  -LS         Exercise 4    Exercise Name 4  STS  -LS      Reps 4  3  -LS      Additional Comments  twice per STS  -LS        User Key  (r) = Recorded By, (t) = Taken By, (c) = Cosigned By    Initials Name Provider Type    Khushi Powers, PT Physical Therapist                        Outcome Measure Options: Modifed Owestry  Modified Oswestry  Modified Oswestry Score/Comments: 32% disability       Time Calculation:     Therapy Suggested Charges     Code   Minutes Charges    60221 (CPT®) Hc Pt Neuromusc Re Education Ea 15 Min      75213 (CPT®) Hc Pt Ther Proc Ea 15 Min 10 1    79603 (CPT®) Hc Gait Training Ea 15 Min      58718 (CPT®) Hc Pt Therapeutic Act Ea 15 Min      98439 (CPT®) Hc Pt Manual Therapy Ea 15 Min      35404 (CPT®) Hc Pt Ther Massage- Per 15 Min      89705 (CPT®) Hc Pt Iontophoresis Ea 15 Min      29398 (CPT®) Hc Pt Elec Stim Ea-Per 15 Min      74566 (CPT®) Hc Pt Ultrasound Ea 15 Min      29277 (CPT®) Hc Pt Self Care/Mgmt/Train Ea 15 Min      31616 (CPT®) Hc Pt Prosthetic (S) Train Initial Encounter, Each 15 Min      78869 (CPT®) Hc Orthotic(S) Mgmt/Train Initial Encounter, Each 15min      79790 (CPT®) Hc Pt Aquatic Therapy Ea 15 Min      13920 (CPT®) Hc Pt Orthotic(S)/Prosthetic(S) Encounter, Each 15 Min       (CPT®) Hc Pt Electrical Stim Unattended      Total  10 1          Start Time: 0900  Stop Time: 0945  Time Calculation (min): 45 min  Total Timed Code Minutes- PT: 15 minute(s)     Therapy Charges for Today     Code Description Service Date Service Provider Modifiers Qty    47295059256 HC PT THER PROC EA 15 MIN 1/8/2019 Khushi Trujillo, PT GP 1     [id number] HC PT EVAL LOW COMPLEXITY 2 1/8/2019 Khushi Trujillo, PT GP 1          PT G-Codes  Outcome Measure Options: Modifed Owestry  Modified Oswestry Score/Comments: 32% disability          Khushi Trujillo, PT  1/8/2019

## 2019-01-10 ENCOUNTER — APPOINTMENT (OUTPATIENT)
Dept: OCCUPATIONAL THERAPY | Facility: HOSPITAL | Age: 64
End: 2019-01-10

## 2019-01-10 ENCOUNTER — APPOINTMENT (OUTPATIENT)
Dept: PHYSICAL THERAPY | Facility: HOSPITAL | Age: 64
End: 2019-01-10
Attending: PHYSICAL MEDICINE & REHABILITATION

## 2019-01-10 ENCOUNTER — HOSPITAL ENCOUNTER (OUTPATIENT)
Dept: OCCUPATIONAL THERAPY | Facility: HOSPITAL | Age: 64
Setting detail: THERAPIES SERIES
Discharge: HOME | End: 2019-01-10
Attending: NEUROLOGICAL SURGERY

## 2019-01-10 DIAGNOSIS — M25.612 DECREASED RANGE OF MOTION OF LEFT SHOULDER: ICD-10-CM

## 2019-01-10 DIAGNOSIS — M25.611 DECREASED RANGE OF MOTION OF RIGHT SHOULDER: ICD-10-CM

## 2019-01-10 DIAGNOSIS — M62.81 MUSCLE WEAKNESS OF RIGHT UPPER EXTREMITY: ICD-10-CM

## 2019-01-10 DIAGNOSIS — M62.81 MUSCLE WEAKNESS OF LEFT UPPER EXTREMITY: ICD-10-CM

## 2019-01-10 PROCEDURE — 97110 THERAPEUTIC EXERCISES: CPT

## 2019-01-10 PROCEDURE — 97165 OT EVAL LOW COMPLEX 30 MIN: CPT

## 2019-01-10 PROCEDURE — 97535 SELF CARE MNGMENT TRAINING: CPT

## 2019-01-10 NOTE — THERAPY EVALUATION
Outpatient Occupational Therapy Ortho Initial Evaluation  [facility]     Patient Name: Duane Mark Witten  : 1955  MRN: [id number]  Today's Date: 1/10/2019      Visit Date: 01/10/2019    Patient Active Problem List   Diagnosis   • YANELI (generalized anxiety disorder)   • ADD (attention deficit disorder)   • Depression   • Diabetes type 2, controlled (CMS/HCC)   • ED (erectile dysfunction) of non-organic origin   • HLD (hyperlipidemia)   • Essential hypertension   • Arthritis   • Psoriasis   • Testosterone deficiency   • Primary insomnia   • Weakness of both lower extremities   • Paresthesia of both upper extremities   • Morbid obesity with BMI of 40.0-44.9, adult (CMS/HCC)   • Adverse effect of corticosteroids   • Type 2 diabetes mellitus with hyperglycemia (CMS/HCC)   • Cervical stenosis of spinal canal   • Edema of cervical spinal cord (CMS/HCC)   • Postoperative atrial fibrillation (CMS/HCC)   • Cervical myelopathy (CMS/HCC)   • Postoperative visit        Past Medical History:   Diagnosis Date   • ADD (attention deficit disorder)    • Depression    • ED (erectile dysfunction)    • Essential hypertension    • YANELI (generalized anxiety disorder)    • HLD (hyperlipidemia)    • Osteoarthrosis    • PONV (postoperative nausea and vomiting)    • Psoriasis    • Testosterone deficiency    • Type 2 diabetes mellitus (CMS/HCC)         Past Surgical History:   Procedure Laterality Date   • ANTERIOR CHANNEL VERTEBRECTOMY/CORPECTOMY Bilateral 2018    Procedure: C4, C5, C6 ANTERIOR CERVICAL CORPECTOMY;  Surgeon: Chirag Markham MD;  Location: [facility];  Service: Neurosurgery   • BIOPSY / EXCISION / DISSECTION AXILLARY NODE  1994    Lymph Node testing for cancer    • COLONOSCOPY      10+ years; normal   • NECK SURGERY  2018    C4/5/6 anterior cervical corpectomy   • PARATHYROIDECTOMY           Visit Dx:    ICD-10-CM ICD-9-CM   1. Decreased range of motion of right shoulder M25.611 719.51   2.  Muscle weakness of left upper extremity M62.81 728.87   3. Muscle weakness of right upper extremity M62.81 728.87   4. Decreased range of motion of left shoulder M25.612 719.51       Patient History     Row Name 01/10/19 1600 01/08/19 0900          History    Chief Complaint  Difficulty with daily activities;Muscle weakness;Difficulty Walking;Falls/history of falls  -RE  Difficulty Walking;Difficulty with daily activities  -     Date Current Problem(s) Began  11/03/18  -RE  11/03/18  -LS     Brief Description of Current Complaint  Patient reports significant weakness and difficulty walking following working on a car in a supine position. Following medical evaluation it was felt to possibly be a reaction to medication. He stayed in bed for about 3 weeks before noting symptom progression which prompted ER evaluation.  He ultimately had a c3-6 corpectomy and fusion followed by 2 weeks of IP Rehab. He continues to experience significant UE weakness and decreased AROM due to that weakness.   -RE  Pt reports he was laying on a mat under his truck working on it and felt cold and couldn't stand up. He was extremely weak and pulled himself up on the truck and could barely walk into the house. He got a shower and got into bed 11/3/18. He went to work on Monday and could barely move. Went to PCP on Tuesday. She thought maybe medication and told him to wait 3 weeks to get out of his system. He did not get out of bed for 2 weeks and hands were tingling then arms started tingling. Pt feared [facility] [place] due to previous flu shot. Couldn't walk in ER. Called neuro. C3-C6 cord compression and herniated disc. Pt had C3-C6 corpectomy and fusion. He reports weakness in B arms. He is R handed. He has been out but has to rest after short distance walking. He is driving. He enjoys boating and fishing. He is staying at his mother's home where it has grab bars, walk in tub, etc. minimal steps.  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  -LS     Patient seeing anyone else for problem(s)?  yes  -RE  yes  -LS     How has patient tried to help current problem?  --  rehab for 2 week  -LS     What clinical tests have you had for this problem?  --  X-ray  -LS     Results of Clinical Tests  --  normal healing  -LS        Pain     Pain Location  Shoulder  -RE  Arm  -LS     Pain at Present  2  -RE  2  -LS     Pain at Best  1  -RE  1  -LS     Pain at Worst  3  -RE  3  -LS     Pain Frequency  --  Intermittent  -LS     Pain Description  --  Aching;Shooting;Numbness;Tingling  -LS     What Performance Factors Make the Current Problem(s) WORSE?  --  turning head  -LS     What Performance Factors Make the Current Problem(s) BETTER?  --  Lidocaine patch  -LS     Tolerance Time- Standing  --  5 minutes  -LS     Tolerance Time- Sitting  --  no issue  -LS     Tolerance Time- Walking  --  10 minutes  -LS     Is your sleep disturbed?  --  Yes  -LS     What position do you sleep in?  --  Supine  -LS     Difficulties at work?  --  Pt on disability right now. Unable to perform work duties.  -LS     Difficulties with ADL's?  --  Difficulty getting around his home.  -LS     Difficulties with recreational activities?  --  Unable to fish, work on cars.   -LS        Fall Risk Assessment    Any falls in the past year:  Yes  -RE  Yes  -LS     Number of falls reported in the last 12 months  2  -RE  2  -LS     Factors that contributed to the fall:  Lost balance;Tripped;Fatigue  -RE   Tripped;Lost balance  -LS        Services    Prior Rehab/Home Health Experiences  Yes  -RE  Yes  -LS     When was the prior experience with Rehab/Home Health  Dec 2018  -RE  Dec 2018  -LS     Where was the prior experience with Rehab[facility]Home Health  [facility]  -[facility]  [facility]  -[facility]     Are you currently receiving Home Health services  No  -RE  No  -LS     Do you plan to receive Home Health services in the near future  No  -RE  No  -LS        Daily Activities    Primary Language  English  -RE  English  -LS     How does patient learn best?  Listening  -RE  --     Teaching needs identified  Home Exercise Program;Management of Condition  -RE  --     Patient is concerned about/has problems with  Performing job responsibilities/community activities (work, school,;Performing home management (household chores, shopping, care of dependents);Difficulty with self care (i.e. bathing, dressing, toileting:  -RE  --     Does patient have problems with the following?  None  -RE  --     Barriers to learning  None  -RE  --     Pt Participated in POC and Goals  Yes  -RE  Yes  -LS        Safety    Are you being hurt, hit, or frightened by anyone at home or in your life?  No  -RE  No  -LS     Are you being neglected by a caregiver  No  -RE  No  -LS       User Key  (r) = Recorded By, (t) = Taken By, (c) = Cosigned By    Initials Name Provider Type    RE Anna Ryan OTR Occupational Therapist    Khushi Powers, PT Physical Therapist          OT Ortho     Row Name 01/10/19 1600             Precautions and Contraindications    Precautions/Limitations  fall precautions;spinal precautions  -RE         Sensation    Light Touch  No apparent deficits  -RE      Additional Comments  numbness and tingling in both hands and forearms  -RE         Right Upper Ext    Rt Shoulder Flexion AROM  150 supine pain free  -RE      Rt Shoulder External Rotation AROM  WFL  -RE      Rt Shoulder Internal Rotation AROM  WFL  -RE         Left Upper Ext    Lt Shoulder Flexion  AROM  125 deg in supine pain free range  -RE      Lt Shoulder External Rotation AROM  WFL  -RE      Lt Shoulder Internal Rotation AROM  WFL  -RE         MMT (Manual Muscle Testing)    General MMT Comments  Grossly 3+/5 to 4-/5. R>L  -RE        User Key  (r) = Recorded By, (t) = Taken By, (c) = Cosigned By    Initials Name Provider Type    Anna Bonilla, OTR Occupational Therapist                  Therapy Education  Education Details: Discussed program and plan. Gave HEP and reviewed. insructed in compensation techniques for use during ADL's  Given: HEP  Program: New  How Provided: Verbal, Demonstration, Written  Provided to: Patient  Level of Understanding: Teach back education performed, Verbalized, Demonstrated  53255 - OT Self Care/Mgmt Minutes: 15    OT Goals     Row Name 01/10/19 1600          OT Short Term Goals    STG Date to Achieve  01/24/19  -RE     STG 1  Patient independant with HEP to increase UE function to allow for improved ADL and leisure activities.   -RE     STG 1 Progress  New  -RE     STG 2  Pt to increase UE strength to allow for audra shoulder flexion to 160 in supine to increase functional use of UE's.  -RE     STG 2 Progress  New  -RE        Long Term Goals    LTG Date to Achieve  02/07/19  -RE     LTG 1  Pt to increase audra shoulder flexion to 120 against gravity to allow for improved ADL function.  -RE     LTG 1 Progress  New  -RE        Time Calculation    OT Goal Re-Cert Due Date  04/10/19  -RE       User Key  (r) = Recorded By, (t) = Taken By, (c) = Cosigned By    Initials Name Provider Type    Anna Bonilla OTR Occupational Therapist          OT Assessment/Plan     Row Name 01/10/19 1642          OT Assessment    Functional Limitations  Limitations in community activities;Limitations in functional capacity and performance;Limitation in home management;Performance in leisure activities;Performance in self-care ADL;Performance in work activities  -RE     Impairments  Endurance;Range  of motion;Muscle strength;Impaired muscle endurance;Sensation;Motor function  -RE     Assessment Comments  Patient presents post corpectomy with fusion with audra UE weakness and decreased AROM L>R which is causing impaired ADL function, work and leisure. Recommend OT to address these deficits to restore functional abilities.   -RE     Please refer to paper survey for additional self-reported information  Yes  -RE     OT Diagnosis  UE weakness  -RE     OT Rehab Potential  Good  -RE     Patient/caregiver participated in establishment of treatment plan and goals  Yes  -RE     Patient would benefit from skilled therapy intervention  Yes  -RE        OT Plan    OT Frequency  2x/week  -RE     Predicted Duration of Therapy Intervention (Therapy Eval)  2-4 weeks  -RE     Planned CPT's?  OT EVAL LOW COMPLEXITY: 91629;OT THER PROC EA 15 MIN: 94143BZ;OT SELF CARE/MGMT/TRAIN 15 MIN: 64264;OT NEUROMUSC RE EDUCATION EA 15 MIN: 53601;OT THER ACT EA 15 MIN: 80571CK  -RE     Planned Therapy Interventions (Optional Details)  home exercise program;patient/family education;ROM (Range of Motion);strengthening  -RE       User Key  (r) = Recorded By, (t) = Taken By, (c) = Cosigned By    Initials Name Provider Type    Anna Bonilla OTR Occupational Therapist           OT Exercises     Row Name 01/10/19 1600             Precautions    Existing Precautions/Restrictions  fall;spinal  -RE         Total Minutes    94593 - OT Therapeutic Exercise Minutes  15  -RE         Exercise 1    Exercise Name 1  Scapular retraction and protraction in weight bearing on table  -RE      Cueing 1  Verbal;Tactile;Demo  -RE      Intensity 1  Mild  -RE        User Key  (r) = Recorded By, (t) = Taken By, (c) = Cosigned By    Initials Name Provider Type    Anna Bonilla OTR Occupational Therapist                        Time Calculation:    OT Start Time: 1300  OT Stop Time: 1400  OT Time Calculation (min): 60 min  Total Timed Code Minutes- OT: 30 minute(s)      Therapy Suggested Charges     Code   Minutes Charges    30544 (CPT®) Hc Ot Neuromusc Re Education Ea 15 Min      40244 (CPT®) Hc Ot Ther Proc Ea 15 Min 15 1    16254 (CPT®) Hc Ot Therapeutic Act Ea 15 Min      46592 (CPT®) Hc Ot Manual Therapy Ea 15 Min      97294 (CPT®) Hc Ot Iontophoresis Ea 15 Min      22137 (CPT®) Hc Ot Elec Stim Ea-Per 15 Min      11867 (CPT®) Hc Ot Ultrasound Ea 15 Min      36769 (CPT®) Hc Ot Self Care/Mgmt/Train Ea 15 Min 15 1     (CPT®) Hc Ot Electrical Stim Unattended      Total  30 2          Therapy Charges for Today     Code Description Service Date Service Provider Modifiers Qty    54894703222 HC OT THER PROC EA 15 MIN 1/10/2019 Anna Ryan OTR GO 1    26194701113 HC OT SELF CARE/MGMT/TRAIN EA 15 MIN 1/10/2019 Anna Ryan OTR GO 1    94920297890 HC OT EVAL LOW COMPLEXITY 2 1/10/2019 Anna Ryan OTR GO 1                  NURIS Cha  1/10/2019

## 2019-01-14 ENCOUNTER — APPOINTMENT (OUTPATIENT)
Dept: PHYSICAL THERAPY | Facility: HOSPITAL | Age: 64
End: 2019-01-14

## 2019-01-14 ENCOUNTER — APPOINTMENT (OUTPATIENT)
Dept: OCCUPATIONAL THERAPY | Facility: HOSPITAL | Age: 64
End: 2019-01-14

## 2019-01-15 ENCOUNTER — HOSPITAL ENCOUNTER (OUTPATIENT)
Dept: OCCUPATIONAL THERAPY | Facility: HOSPITAL | Age: 64
Discharge: HOME | End: 2019-01-15
Admitting: NEUROLOGICAL SURGERY

## 2019-01-15 ENCOUNTER — HOSPITAL ENCOUNTER (OUTPATIENT)
Dept: PHYSICAL THERAPY | Facility: HOSPITAL | Age: 64
Discharge: HOME | End: 2019-01-15

## 2019-01-15 DIAGNOSIS — R26.2 DIFFICULTY WALKING: Primary | ICD-10-CM

## 2019-01-15 DIAGNOSIS — M62.81 MUSCLE WEAKNESS OF LEFT UPPER EXTREMITY: ICD-10-CM

## 2019-01-15 DIAGNOSIS — M62.81 MUSCLE WEAKNESS OF RIGHT UPPER EXTREMITY: ICD-10-CM

## 2019-01-15 DIAGNOSIS — M25.612 DECREASED RANGE OF MOTION OF LEFT SHOULDER: Primary | ICD-10-CM

## 2019-01-15 DIAGNOSIS — M25.611 DECREASED RANGE OF MOTION OF RIGHT SHOULDER: ICD-10-CM

## 2019-01-15 DIAGNOSIS — Z74.09 IMPAIRED MOBILITY: ICD-10-CM

## 2019-01-15 PROCEDURE — 97110 THERAPEUTIC EXERCISES: CPT

## 2019-01-15 NOTE — THERAPY TREATMENT NOTE
Outpatient Occupational Therapy Lymphedema Treatment Note  [facility]     Patient Name: Duane Mark Witten  : 1955  MRN: [id number]  Today's Date: 1/15/2019      Visit Date: 01/15/2019    Patient Active Problem List   Diagnosis   • YANELI (generalized anxiety disorder)   • ADD (attention deficit disorder)   • Depression   • Diabetes type 2, controlled (CMS/HCC)   • ED (erectile dysfunction) of non-organic origin   • HLD (hyperlipidemia)   • Essential hypertension   • Arthritis   • Psoriasis   • Testosterone deficiency   • Primary insomnia   • Weakness of both lower extremities   • Paresthesia of both upper extremities   • Morbid obesity with BMI of 40.0-44.9, adult (CMS/HCC)   • Adverse effect of corticosteroids   • Type 2 diabetes mellitus with hyperglycemia (CMS/HCC)   • Cervical stenosis of spinal canal   • Edema of cervical spinal cord (CMS/HCC)   • Postoperative atrial fibrillation (CMS/HCC)   • Cervical myelopathy (CMS/HCC)   • Postoperative visit        Past Medical History:   Diagnosis Date   • ADD (attention deficit disorder)    • Depression    • ED (erectile dysfunction)    • Essential hypertension    • YANELI (generalized anxiety disorder)    • HLD (hyperlipidemia)    • Osteoarthrosis    • PONV (postoperative nausea and vomiting)    • Psoriasis    • Testosterone deficiency    • Type 2 diabetes mellitus (CMS/HCC)         Past Surgical History:   Procedure Laterality Date   • ANTERIOR CHANNEL VERTEBRECTOMY/CORPECTOMY Bilateral 2018    Procedure: C4, C5, C6 ANTERIOR CERVICAL CORPECTOMY;  Surgeon: Chirag Markham MD;  Location: [facility];  Service: Neurosurgery   • BIOPSY / EXCISION / DISSECTION AXILLARY NODE  1994    Lymph Node testing for cancer    • COLONOSCOPY      10+ years; normal   • NECK SURGERY  2018    C4/5/6 anterior cervical corpectomy   • PARATHYROIDECTOMY           Visit Dx:      ICD-10-CM ICD-9-CM   1. Decreased range of motion of left shoulder M25.612 719.51   2.  Decreased range of motion of right shoulder M25.611 719.51   3. Muscle weakness of left upper extremity M62.81 728.87   4. Muscle weakness of right upper extremity M62.81 728.87                   OT Assessment/Plan     Row Name 01/15/19 1405          OT Assessment    Assessment Comments  HEP is fatiguing to patient but he is able to perform.  He reports that donning his shirt is easier now indicating an increase in UE strength and function.  Noted significant substitiution as patient begins to fatigue.  Needs cues to recognize the substitiution pattern.   -RE        OT Plan    OT Plan Comments  continue  -RE       User Key  (r) = Recorded By, (t) = Taken By, (c) = Cosigned By    Initials Name Provider Type    Anna Bonilla OTR Occupational Therapist              OT Exercises     Row Name 01/15/19 1300             Precautions    Existing Precautions/Restrictions  spinal;fall  -RE         Total Minutes    27906 - OT Therapeutic Exercise Minutes  45  -RE         Exercise 1    Exercise Name 1  Scapular retraction and protraction in weight bearing prone   -RE      Cueing 1  Verbal;Tactile;Demo  -RE      Intensity 1  Moderate  -RE         Exercise 2    Exercise Name 2  B shoulder ER sidelying  -RE      Cueing 2  Verbal;Tactile;Demo  -RE      Sets 2  4  -RE      Reps 2  8  -RE         Exercise 3    Exercise Name 3  B shoulder flexion in supine  -RE      Cueing 3  Demo  -RE      Sets 3  2  -RE      Reps 3  10  -RE         Exercise 4    Exercise Name 4  biceps curls  -RE      Sets 4  2  -RE      Reps 4  10  -RE         Exercise 5    Exercise Name 5  shoulder  H abduction with pulleys  -RE      Cueing 5  Verbal;Demo  -RE      Sets 5  2  -RE      Intensity 5  -- to fatigue  -RE        User Key  (r) = Recorded By, (t) = Taken By, (c) = Cosigned By    Initials Name Provider Type    Anna Bonilla OTR Occupational Therapist                  Therapy Education  Education Details: reviewed HEP but made no changes   How  Provided: Demonstration, Verbal  Provided to: Patient  Level of Understanding: Teach back education performed, Verbalized, Demonstrated  95239 - OT Self Care/Mgmt Minutes: 5                Time Calculation:   OT Start Time: 1300  OT Stop Time: 1345  OT Time Calculation (min): 45 min     Therapy Suggested Charges     Code   Minutes Charges    70106 (CPT®) Hc Ot Neuromusc Re Education Ea 15 Min      72902 (CPT®) Hc Ot Ther Proc Ea 15 Min 45 3    03057 (CPT®) Hc Ot Therapeutic Act Ea 15 Min      24977 (CPT®) Hc Ot Manual Therapy Ea 15 Min      36815 (CPT®) Hc Ot Iontophoresis Ea 15 Min      15148 (CPT®) Hc Ot Elec Stim Ea-Per 15 Min      24317 (CPT®) Hc Ot Ultrasound Ea 15 Min      98545 (CPT®) Hc Ot Self Care/Mgmt/Train Ea 15 Min 5      (CPT®) Hc Ot Electrical Stim Unattended      Total  50 3            Therapy Charges for Today     Code Description Service Date Service Provider Modifiers Qty    91972877024 HC OT THER PROC EA 15 MIN 1/15/2019 Anna Ryan OTR GO 3                      NURIS Cha  1/15/2019

## 2019-01-15 NOTE — THERAPY TREATMENT NOTE
Outpatient Physical Therapy Ortho Treatment Note  [facility]     Patient Name: Duane Mark Witten  : 1955  MRN: [id number]  Today's Date: 1/15/2019      Visit Date: 01/15/2019    Visit Dx:    ICD-10-CM ICD-9-CM   1. Difficulty walking R26.2 719.7   2. Impaired mobility Z74.09 799.89       Patient Active Problem List   Diagnosis   • YANELI (generalized anxiety disorder)   • ADD (attention deficit disorder)   • Depression   • Diabetes type 2, controlled (CMS/HCC)   • ED (erectile dysfunction) of non-organic origin   • HLD (hyperlipidemia)   • Essential hypertension   • Arthritis   • Psoriasis   • Testosterone deficiency   • Primary insomnia   • Weakness of both lower extremities   • Paresthesia of both upper extremities   • Morbid obesity with BMI of 40.0-44.9, adult (CMS/HCC)   • Adverse effect of corticosteroids   • Type 2 diabetes mellitus with hyperglycemia (CMS/HCC)   • Cervical stenosis of spinal canal   • Edema of cervical spinal cord (CMS/HCC)   • Postoperative atrial fibrillation (CMS/HCC)   • Cervical myelopathy (CMS/HCC)   • Postoperative visit        Past Medical History:   Diagnosis Date   • ADD (attention deficit disorder)    • Depression    • ED (erectile dysfunction)    • Essential hypertension    • YANELI (generalized anxiety disorder)    • HLD (hyperlipidemia)    • Osteoarthrosis    • PONV (postoperative nausea and vomiting)    • Psoriasis    • Testosterone deficiency    • Type 2 diabetes mellitus (CMS/HCC)         Past Surgical History:   Procedure Laterality Date   • ANTERIOR CHANNEL VERTEBRECTOMY/CORPECTOMY Bilateral 2018    Procedure: C4, C5, C6 ANTERIOR CERVICAL CORPECTOMY;  Surgeon: Chirag Markham MD;  Location: [facility];  Service: Neurosurgery   • BIOPSY / EXCISION / DISSECTION AXILLARY NODE  1994    Lymph Node testing for cancer    • COLONOSCOPY      10+ years; normal   • NECK SURGERY  2018    C4/5/6 anterior cervical corpectomy   • PARATHYROIDECTOMY                          PT Assessment/Plan     Row Name 01/15/19 1638          PT Assessment    Assessment Comments  Pt returns for first follow up since evaluation following OT session reporting fatigue and continued STS difficulty. Began with standing gait activities which pt tolerated for approx. 8 minutes before requiring seated break. Pt with flatfoot contact and FF posture. Able to improve rollover with cuing but quick to return to flat foot with fatigue. Pt overall weakness and deconditioning limited standing activities today. Added gentle cervical ROM and strengthening in supine position.   -LS        PT Plan    PT Plan Comments  Assess tolerance to back to back sessions, continue to progress standing exercises as able, cervical strengthening, postural improvement.   -LS       User Key  (r) = Recorded By, (t) = Taken By, (c) = Cosigned By    Initials Name Provider Type    LS Khushi Trujillo, PT Physical Therapist              Exercises     Row Name 01/15/19 1600 01/15/19 1300          Precautions    Existing Precautions/Restrictions  --  spinal;fall  -RE        Subjective Comments    Subjective Comments  I am doing ok. I can't do the up and down from a chair 2x. It is hard enough to do one.  -LS  --        Subjective Pain    Able to rate subjective pain?  yes  -LS  --     Pre-Treatment Pain Level  3  -LS  --     Subjective Pain Comment  I am tired today but not bad.  -LS  --        Total Minutes    63137 - PT Therapeutic Exercise Minutes  40  -LS  --     72138 - OT Therapeutic Exercise Minutes  --  45  -RE        Exercise 1    Exercise Name 1  seated march  -LS  --     Reps 1  20  -LS  --     Additional Comments  2#  -LS  --        Exercise 2    Exercise Name 2  standing HS curl  -LS  --     Sets 2  2  -LS  --     Reps 2  10  -LS  --     Additional Comments  B  -LS  --        Exercise 3    Exercise Name 3  HR   -LS  --     Sets 3  2  -LS  --     Reps 3  10  -LS  --        Exercise 4    Exercise Name 4  Nustep   -LS  --     Time 4  5 minutes  -LS  --     Additional Comments  Level 3  -LS  --        Exercise 5    Exercise Name 5  walking in // bars  -LS  --     Cueing 5  Verbal cuing for heel strike + roll over  -LS  --     Reps 5  4 laps  -LS  --     Additional Comments  forward, retro, tandem   -LS  --        Exercise 6    Exercise Name 6  LAQ  -LS  --     Sets 6  2  -LS  --     Reps 6  10  -LS  --     Additional Comments  2#; B  -LS  --        Exercise 7    Exercise Name 7  seated HS curl  -LS  --     Sets 7  2  -LS  --     Reps 7  10  -LS  --     Additional Comments  GTB  -LS  --        Exercise 8    Exercise Name 8  shoulder rolls  -LS  --     Reps 8  20  -LS  --        Exercise 9    Exercise Name 9  supine chin tuck  -LS  --     Reps 9  15  -LS  --     Time 9  5  -LS  --        Exercise 10    Exercise Name 10  isometric cervical ROM  -LS  --     Reps 10  10  -LS  --     Time 10  5  -LS  --     Additional Comments  flexion,extension, B rotation  -LS  --        Exercise 11    Exercise Name 11  supine cervical rotation  -LS  --     Reps 11  10  -LS  --     Additional Comments  each  -LS  --       User Key  (r) = Recorded By, (t) = Taken By, (c) = Cosigned By    Initials Name Provider Type    Anna Bonilla, OTR Occupational Therapist    LS Khushi Trujillo, PT Physical Therapist                         PT OP Goals     Row Name 01/15/19 1600          PT Short Term Goals    STG Date to Achieve  01/22/19  -LS     STG 1  Pt will demonstrate understanding and compliance with initial HEP.  -LS     STG 1 Progress  Progressing  -LS     STG 1 Progress Comments  Pt doing well with compliance.  -LS     STG 2  Pt will report improved ability to perform STS from home chair without requiring multiple attempts.  -LS     STG 2 Progress  Ongoing  -LS     STG 2 Progress Comments  Pt continues to have difficulty with STS.  -LS     STG 3  Pt will demonstrate improved tolerance to standing from 5 minutes to 15 minutes during therapy session.   -LS     STG 3 Progress  Ongoing  -LS     STG 3 Progress Comments  Improving tolerance today to 8 minutes before seated break.  -LS        Long Term Goals    LTG Date to Achieve  02/07/19  -LS     LTG 1  Pt will demonstrate ability to tolerate 30 minutes of standing to improve his ability to perform household tasks.   -LS     LTG 1 Progress  Ongoing  -LS     LTG 2  Pt will demonstrate STS x 5 from normal chair height in 30 seconds or less.  -LS     LTG 2 Progress  Ongoing  -LS     LTG 3  Pt will demonstrate ability to safely ambulate using SC for household distance and within clinic.   -LS     LTG 3 Progress  Ongoing  -LS       User Key  (r) = Recorded By, (t) = Taken By, (c) = Cosigned By    Initials Name Provider Type    Khushi Powers, PT Physical Therapist                         Time Calculation:   Start Time: 1345  Stop Time: 1430  Time Calculation (min): 45 min  Total Timed Code Minutes- PT: 44 minute(s)  Therapy Suggested Charges     Code   Minutes Charges    96456 (CPT®) Hc Pt Neuromusc Re Education Ea 15 Min      68655 (CPT®) Hc Pt Ther Proc Ea 15 Min 40 3    57379 (CPT®) Hc Gait Training Ea 15 Min      52284 (CPT®) Hc Pt Therapeutic Act Ea 15 Min      96264 (CPT®) Hc Pt Manual Therapy Ea 15 Min      69367 (CPT®) Hc Pt Ther Massage- Per 15 Min      91567 (CPT®) Hc Pt Iontophoresis Ea 15 Min      75589 (CPT®) Hc Pt Elec Stim Ea-Per 15 Min      14085 (CPT®) Hc Pt Ultrasound Ea 15 Min      95389 (CPT®) Hc Pt Self Care/Mgmt/Train Ea 15 Min      62476 (CPT®) Hc Pt Prosthetic (S) Train Initial Encounter, Each 15 Min      70975 (CPT®) Hc Orthotic(S) Mgmt/Train Initial Encounter, Each 15min      47195 (CPT®) Hc Pt Aquatic Therapy Ea 15 Min      46874 (CPT®) Hc Pt Orthotic(S)/Prosthetic(S) Encounter, Each 15 Min       (CPT®) Hc Pt Electrical Stim Unattended      Total  40 3        Therapy Charges for Today     Code Description Service Date Service Provider Modifiers Qty    26826283750 HC PT THER PROC EA 15  MIN 1/15/2019 Khushi Trujillo, PT GP 3                    Khushi Trujillo, PT  1/15/2019

## 2019-01-17 ENCOUNTER — APPOINTMENT (OUTPATIENT)
Dept: PHYSICAL THERAPY | Facility: HOSPITAL | Age: 64
End: 2019-01-17
Attending: PHYSICAL MEDICINE & REHABILITATION

## 2019-01-17 ENCOUNTER — APPOINTMENT (OUTPATIENT)
Dept: OCCUPATIONAL THERAPY | Facility: HOSPITAL | Age: 64
End: 2019-01-17

## 2019-01-21 ENCOUNTER — APPOINTMENT (OUTPATIENT)
Dept: PHYSICAL THERAPY | Facility: HOSPITAL | Age: 64
End: 2019-01-21

## 2019-01-21 ENCOUNTER — APPOINTMENT (OUTPATIENT)
Dept: OCCUPATIONAL THERAPY | Facility: HOSPITAL | Age: 64
End: 2019-01-21

## 2019-01-22 ENCOUNTER — HOSPITAL ENCOUNTER (OUTPATIENT)
Dept: PHYSICAL THERAPY | Facility: HOSPITAL | Age: 64
Setting detail: THERAPIES SERIES
Discharge: HOME | End: 2019-01-22
Attending: NEUROLOGICAL SURGERY

## 2019-01-22 ENCOUNTER — HOSPITAL ENCOUNTER (OUTPATIENT)
Dept: OCCUPATIONAL THERAPY | Facility: HOSPITAL | Age: 64
Setting detail: THERAPIES SERIES
Discharge: HOME | End: 2019-01-22
Attending: NEUROLOGICAL SURGERY

## 2019-01-22 DIAGNOSIS — M25.612 DECREASED RANGE OF MOTION OF LEFT SHOULDER: Primary | ICD-10-CM

## 2019-01-22 DIAGNOSIS — M25.611 DECREASED RANGE OF MOTION OF RIGHT SHOULDER: ICD-10-CM

## 2019-01-22 DIAGNOSIS — R26.2 DIFFICULTY WALKING: Primary | ICD-10-CM

## 2019-01-22 DIAGNOSIS — Z74.09 IMPAIRED MOBILITY: ICD-10-CM

## 2019-01-22 PROCEDURE — 97110 THERAPEUTIC EXERCISES: CPT

## 2019-01-22 NOTE — THERAPY TREATMENT NOTE
Outpatient Physical Therapy Ortho Treatment Note  [facility]     Patient Name: Duane Mark Witten  : 1955  MRN: [id number]  Today's Date: 2019      Visit Date: 2019    Visit Dx:    ICD-10-CM ICD-9-CM   1. Difficulty walking R26.2 719.7   2. Impaired mobility Z74.09 799.89       Patient Active Problem List   Diagnosis   • YANELI (generalized anxiety disorder)   • ADD (attention deficit disorder)   • Depression   • Diabetes type 2, controlled (CMS/HCC)   • ED (erectile dysfunction) of non-organic origin   • HLD (hyperlipidemia)   • Essential hypertension   • Arthritis   • Psoriasis   • Testosterone deficiency   • Primary insomnia   • Weakness of both lower extremities   • Paresthesia of both upper extremities   • Morbid obesity with BMI of 40.0-44.9, adult (CMS/HCC)   • Adverse effect of corticosteroids   • Type 2 diabetes mellitus with hyperglycemia (CMS/HCC)   • Cervical stenosis of spinal canal   • Edema of cervical spinal cord (CMS/HCC)   • Postoperative atrial fibrillation (CMS/HCC)   • Cervical myelopathy (CMS/HCC)   • Postoperative visit        Past Medical History:   Diagnosis Date   • ADD (attention deficit disorder)    • Depression    • ED (erectile dysfunction)    • Essential hypertension    • YANELI (generalized anxiety disorder)    • HLD (hyperlipidemia)    • Osteoarthrosis    • PONV (postoperative nausea and vomiting)    • Psoriasis    • Testosterone deficiency    • Type 2 diabetes mellitus (CMS/HCC)         Past Surgical History:   Procedure Laterality Date   • ANTERIOR CHANNEL VERTEBRECTOMY/CORPECTOMY Bilateral 2018    Procedure: C4, C5, C6 ANTERIOR CERVICAL CORPECTOMY;  Surgeon: Chirag Markham MD;  Location: [facility];  Service: Neurosurgery   • BIOPSY / EXCISION / DISSECTION AXILLARY NODE  1994    Lymph Node testing for cancer    • COLONOSCOPY      10+ years; normal   • NECK SURGERY  2018    C4/5/6 anterior cervical corpectomy   • PARATHYROIDECTOMY                          PT Assessment/Plan     Row Name 01/22/19 1428          PT Assessment    Assessment Comments  Pt continues to fatigue quickly with inc repetitions but some inc time spent standing today and added LE exercises on mat table. Pt reporting difficulty controlling bowel, however he reports this has been consistent since surgery. Encouraged alerting Dr. Markham and considering potential men's health referral depending on progression.   -LS        PT Plan    PT Plan Comments  Continue LE/core strengthening, OT/PT combo, functional strengthening, gait training.   -LS       User Key  (r) = Recorded By, (t) = Taken By, (c) = Cosigned By    Initials Name Provider Type    LS Khushi Trujillo, PT Physical Therapist              Exercises     Row Name 01/22/19 1300             Subjective Comments    Subjective Comments  I was tired after last time but really not too bad.  -LS         Subjective Pain    Able to rate subjective pain?  yes  -LS      Pre-Treatment Pain Level  2  -LS         Total Minutes    42006 - PT Therapeutic Exercise Minutes  40  -LS         Exercise 1    Exercise Name 1  standing march  -LS      Reps 1  20  -LS         Exercise 2    Exercise Name 2  standing HS curl  -LS      Sets 2  2  -LS      Reps 2  10  -LS      Additional Comments  B  -LS         Exercise 3    Exercise Name 3  HR  -LS      Sets 3  2  -LS      Reps 3  10  -LS         Exercise 4    Exercise Name 4  STS  -LS      Reps 4  10  -LS      Time 4  --  -LS      Additional Comments  from elevated mat table  -LS         Exercise 5    Exercise Name 5  walking in // bars  -LS      Cueing 5  Verbal cuing for heel strike + roll over  -LS      Reps 5  4 laps  -LS      Additional Comments  forward, retro, tandem   -LS         Exercise 6    Exercise Name 6  LAQ  -LS      Sets 6  2  -LS      Reps 6  10  -LS      Additional Comments  4# B  -LS         Exercise 7    Exercise Name 7  seated HS curl  -LS      Sets 7  2  -LS      Reps 7  10   "-LS      Additional Comments  GTB  -LS         Exercise 8    Exercise Name 8  shoulder rolls  -LS      Reps 8  20  -LS         Exercise 9    Exercise Name 9  supine chin tuck  -LS      Reps 9  15  -LS      Time 9  5  -LS         Exercise 10    Exercise Name 10  --  -LS      Reps 10  --  -LS      Time 10  --  -LS         Exercise 11    Exercise Name 11  --  -LS      Reps 11  --  -LS         Exercise 12    Exercise Name 12  Nustep  -LS      Time 12  5 minutes  -LS      Additional Comments  Level 4  -LS         Exercise 13    Exercise Name 13  step up 4\"  -LS      Sets 13  2  -LS      Reps 13  10  -LS      Additional Comments  10 each leg  -LS         Exercise 14    Exercise Name 14  hip abduction  -LS      Sets 14  2  -LS      Reps 14  10  -LS      Additional Comments  GTB  -LS         Exercise 15    Exercise Name 15  hip adduction + PPT  -LS      Reps 15  15  -LS      Time 15  5  -LS         Exercise 16    Exercise Name 16  SAQ  -LS      Sets 16  2  -LS      Reps 16  10  -LS      Additional Comments  4#  -LS         Exercise 17    Exercise Name 17  hip bridge  -LS      Reps 17  10  -LS      Time 17  3  -LS        User Key  (r) = Recorded By, (t) = Taken By, (c) = Cosigned By    Initials Name Provider Type    Khushi Powers, PT Physical Therapist                         PT OP Goals     Row Name 01/22/19 1400          PT Short Term Goals    STG Date to Achieve  01/22/19  -LS     STG 1  Pt will demonstrate understanding and compliance with initial HEP.  -LS     STG 1 Progress  Met  -LS     STG 2  Pt will report improved ability to perform STS from home chair without requiring multiple attempts.  -LS     STG 2 Progress  Ongoing  -LS     STG 3  Pt will demonstrate improved tolerance to standing from 5 minutes to 15 minutes during therapy session.  -LS     STG 3 Progress  Ongoing  -LS        Long Term Goals    LTG Date to Achieve  02/07/19  -LS     LTG 1  Pt will demonstrate ability to tolerate 30 minutes of standing to " improve his ability to perform household tasks.   -LS     LTG 1 Progress  Ongoing  -LS     LTG 2  Pt will demonstrate STS x 5 from normal chair height in 30 seconds or less.  -LS     LTG 2 Progress  Ongoing  -LS     LTG 3  Pt will demonstrate ability to safely ambulate using SC for household distance and within clinic.   -LS     LTG 3 Progress  Ongoing  -LS       User Key  (r) = Recorded By, (t) = Taken By, (c) = Cosigned By    Initials Name Provider Type    Khushi Powers, PT Physical Therapist          Therapy Education  Given: Symptoms/condition management  Program: Reinforced  How Provided: Verbal, Demonstration  Provided to: Patient  Level of Understanding: Teach back education performed, Verbalized, Demonstrated              Time Calculation:   Start Time: 1345  Stop Time: 1430  Time Calculation (min): 45 min  Total Timed Code Minutes- PT: 43 minute(s)  Therapy Suggested Charges     Code   Minutes Charges    70585 (CPT®) Hc Pt Neuromusc Re Education Ea 15 Min      31625 (CPT®) Hc Pt Ther Proc Ea 15 Min 40 3    84059 (CPT®) Hc Gait Training Ea 15 Min      54509 (CPT®) Hc Pt Therapeutic Act Ea 15 Min      55776 (CPT®) Hc Pt Manual Therapy Ea 15 Min      14418 (CPT®) Hc Pt Ther Massage- Per 15 Min      48026 (CPT®) Hc Pt Iontophoresis Ea 15 Min      42685 (CPT®) Hc Pt Elec Stim Ea-Per 15 Min      87696 (CPT®) Hc Pt Ultrasound Ea 15 Min      23761 (CPT®) Hc Pt Self Care/Mgmt/Train Ea 15 Min      49981 (CPT®) Hc Pt Prosthetic (S) Train Initial Encounter, Each 15 Min      07624 (CPT®) Hc Orthotic(S) Mgmt/Train Initial Encounter, Each 15min      68251 (CPT®) Hc Pt Aquatic Therapy Ea 15 Min      10545 (CPT®) Hc Pt Orthotic(S)/Prosthetic(S) Encounter, Each 15 Min       (CPT®) Hc Pt Electrical Stim Unattended      Total  40 3        Therapy Charges for Today     Code Description Service Date Service Provider Modifiers Qty    47546346133 HC PT THER PROC EA 15 MIN 1/22/2019 Khushi Trujillo, PT GP 3                     Khushi Trujillo, PT  1/22/2019

## 2019-01-22 NOTE — THERAPY TREATMENT NOTE
Outpatient Occupational Therapy Ortho Treatment Note  [facility]     Patient Name: Duane Mark Witten  : 1955  MRN: [id number]  Today's Date: 2019        Visit Date: 2019    Patient Active Problem List   Diagnosis   • YANELI (generalized anxiety disorder)   • ADD (attention deficit disorder)   • Depression   • Diabetes type 2, controlled (CMS/HCC)   • ED (erectile dysfunction) of non-organic origin   • HLD (hyperlipidemia)   • Essential hypertension   • Arthritis   • Psoriasis   • Testosterone deficiency   • Primary insomnia   • Weakness of both lower extremities   • Paresthesia of both upper extremities   • Morbid obesity with BMI of 40.0-44.9, adult (CMS/HCC)   • Adverse effect of corticosteroids   • Type 2 diabetes mellitus with hyperglycemia (CMS/HCC)   • Cervical stenosis of spinal canal   • Edema of cervical spinal cord (CMS/HCC)   • Postoperative atrial fibrillation (CMS/HCC)   • Cervical myelopathy (CMS/HCC)   • Postoperative visit        Past Medical History:   Diagnosis Date   • ADD (attention deficit disorder)    • Depression    • ED (erectile dysfunction)    • Essential hypertension    • YANELI (generalized anxiety disorder)    • HLD (hyperlipidemia)    • Osteoarthrosis    • PONV (postoperative nausea and vomiting)    • Psoriasis    • Testosterone deficiency    • Type 2 diabetes mellitus (CMS/HCC)         Past Surgical History:   Procedure Laterality Date   • ANTERIOR CHANNEL VERTEBRECTOMY/CORPECTOMY Bilateral 2018    Procedure: C4, C5, C6 ANTERIOR CERVICAL CORPECTOMY;  Surgeon: Chirag Markham MD;  Location: [facility];  Service: Neurosurgery   • BIOPSY / EXCISION / DISSECTION AXILLARY NODE  1994    Lymph Node testing for cancer    • COLONOSCOPY      10+ years; normal   • NECK SURGERY  2018    C4/5/6 anterior cervical corpectomy   • PARATHYROIDECTOMY           Visit Dx:    ICD-10-CM ICD-9-CM   1. Decreased range of motion of left shoulder M25.612 719.51   2.  Decreased range of motion of right shoulder M25.611 719.51                   Therapy Education  Education Details: Reviewed HEP  Program: Reinforced  How Provided: Verbal  Provided to: Patient, Caregiver  Level of Understanding: Teach back education performed, Verbalized  67866 - OT Self Care/Mgmt Minutes: 5                 OT Exercises     Row Name 01/22/19 1600             Precautions    Existing Precautions/Restrictions  spinal;fall  -RE         Subjective Comments    Subjective Comments  Has not noted any significant changes in strength or AROM.   -RE         Total Minutes    14873 - OT Therapeutic Exercise Minutes  45  -RE         Exercise 1    Exercise Name 1  Scapular retraction and protraction in weight bearing prone   -RE      Cueing 1  Verbal;Tactile;Demo  -RE      Intensity 1  Moderate  -RE         Exercise 2    Exercise Name 2  B shoulder ER sidelying  -RE      Cueing 2  Verbal;Tactile  -RE      Intensity 2  Other to fatigue  -RE         Exercise 3    Exercise Name 3  B shoulder flexion in supine  -RE      Cueing 3  Verbal  -RE      Weights/Plates 3  1  -RE      Intensity 3  Other to fatigue  -RE         Exercise 5    Exercise Name 5  shoulder  H abduction with pulleys  -RE      Cueing 5  Other (comment) to fatigue  -RE         Exercise 6    Exercise Name 6  Scapular retraction   -RE      Cueing 6  Verbal;Demo  -RE      Equipment 6  Theraband  -RE      Resistance 6  Yellow  -RE      Intensity 6  Other To fatigue  -RE        User Key  (r) = Recorded By, (t) = Taken By, (c) = Cosigned By    Initials Name Provider Type    Anna Bonilla OTR Occupational Therapist                          Time Calculation:   OT Start Time: 1250  OT Stop Time: 1340  OT Time Calculation (min): 50 min  Total Timed Code Minutes- OT: 50 minute(s)     Therapy Suggested Charges     Code   Minutes Charges    52627 (CPT®)  Ot Neuromusc Re Education Ea 15 Min      42520 (CPT®) Hc Ot Ther Proc Ea 15 Min 45 3    01530 (CPT®)  Ot  Therapeutic Act Ea 15 Min      77359 (CPT®) Hc Ot Manual Therapy Ea 15 Min      17302 (CPT®) Hc Ot Iontophoresis Ea 15 Min      95726 (CPT®) Hc Ot Elec Stim Ea-Per 15 Min      19820 (CPT®) Hc Ot Ultrasound Ea 15 Min      23926 (CPT®) Hc Ot Self Care/Mgmt/Train Ea 15 Min 5      (CPT®) Hc Ot Electrical Stim Unattended      Total  50 3          Therapy Charges for Today     Code Description Service Date Service Provider Modifiers Qty    86173336875 HC OT THER PROC EA 15 MIN 1/22/2019 Anna Ryan, NURIS GO 3                    NURIS Cha  1/22/2019

## 2019-01-24 ENCOUNTER — HOSPITAL ENCOUNTER (OUTPATIENT)
Dept: OCCUPATIONAL THERAPY | Facility: HOSPITAL | Age: 64
Discharge: HOME | End: 2019-01-24
Admitting: PHYSICAL MEDICINE & REHABILITATION

## 2019-01-24 ENCOUNTER — APPOINTMENT (OUTPATIENT)
Dept: OCCUPATIONAL THERAPY | Facility: HOSPITAL | Age: 64
End: 2019-01-24

## 2019-01-24 ENCOUNTER — APPOINTMENT (OUTPATIENT)
Dept: PHYSICAL THERAPY | Facility: HOSPITAL | Age: 64
End: 2019-01-24
Attending: PHYSICAL MEDICINE & REHABILITATION

## 2019-01-24 ENCOUNTER — HOSPITAL ENCOUNTER (OUTPATIENT)
Dept: PHYSICAL THERAPY | Facility: HOSPITAL | Age: 64
Discharge: HOME | End: 2019-01-24

## 2019-01-24 DIAGNOSIS — M25.612 DECREASED RANGE OF MOTION OF LEFT SHOULDER: Primary | ICD-10-CM

## 2019-01-24 DIAGNOSIS — Z74.09 IMPAIRED MOBILITY: ICD-10-CM

## 2019-01-24 DIAGNOSIS — M25.611 DECREASED RANGE OF MOTION OF RIGHT SHOULDER: ICD-10-CM

## 2019-01-24 DIAGNOSIS — R26.2 DIFFICULTY WALKING: Primary | ICD-10-CM

## 2019-01-24 PROCEDURE — 97110 THERAPEUTIC EXERCISES: CPT

## 2019-01-24 PROCEDURE — 97535 SELF CARE MNGMENT TRAINING: CPT

## 2019-01-24 NOTE — THERAPY TREATMENT NOTE
Outpatient Occupational Therapy Lymphedema Treatment Note  [facility]     Patient Name: Duane Mark Witten  : 1955  MRN: [id number]  Today's Date: 2019      Visit Date: 2019    Patient Active Problem List   Diagnosis   • YANELI (generalized anxiety disorder)   • ADD (attention deficit disorder)   • Depression   • Diabetes type 2, controlled (CMS/HCC)   • ED (erectile dysfunction) of non-organic origin   • HLD (hyperlipidemia)   • Essential hypertension   • Arthritis   • Psoriasis   • Testosterone deficiency   • Primary insomnia   • Weakness of both lower extremities   • Paresthesia of both upper extremities   • Morbid obesity with BMI of 40.0-44.9, adult (CMS/HCC)   • Adverse effect of corticosteroids   • Type 2 diabetes mellitus with hyperglycemia (CMS/HCC)   • Cervical stenosis of spinal canal   • Edema of cervical spinal cord (CMS/HCC)   • Postoperative atrial fibrillation (CMS/HCC)   • Cervical myelopathy (CMS/HCC)   • Postoperative visit        Past Medical History:   Diagnosis Date   • ADD (attention deficit disorder)    • Depression    • ED (erectile dysfunction)    • Essential hypertension    • YANELI (generalized anxiety disorder)    • HLD (hyperlipidemia)    • Osteoarthrosis    • PONV (postoperative nausea and vomiting)    • Psoriasis    • Testosterone deficiency    • Type 2 diabetes mellitus (CMS/HCC)         Past Surgical History:   Procedure Laterality Date   • ANTERIOR CHANNEL VERTEBRECTOMY/CORPECTOMY Bilateral 2018    Procedure: C4, C5, C6 ANTERIOR CERVICAL CORPECTOMY;  Surgeon: Chirag Markham MD;  Location: [facility];  Service: Neurosurgery   • BIOPSY / EXCISION / DISSECTION AXILLARY NODE  1994    Lymph Node testing for cancer    • COLONOSCOPY      10+ years; normal   • NECK SURGERY  2018    C4/5/6 anterior cervical corpectomy   • PARATHYROIDECTOMY           Visit Dx:      ICD-10-CM ICD-9-CM   1. Decreased range of motion of left shoulder M25.612 719.51   2.  Decreased range of motion of right shoulder M25.611 719.51           OT Ortho     Row Name 01/24/19 1400             Subjective Comments    Subjective Comments  Pt had increase muscle fatigue and pain following last session.  -RE         Precautions and Contraindications    Precautions/Limitations  spinal precautions;fall precautions  -RE         Subjective Pain    Able to rate subjective pain?  yes  -RE      Pre-Treatment Pain Level  2  -RE      Post-Treatment Pain Level  2  -RE        User Key  (r) = Recorded By, (t) = Taken By, (c) = Cosigned By    Initials Name Provider Type    Anna Bonilla OTR Occupational Therapist              OT Assessment/Plan     Row Name 01/24/19 6267          OT Assessment    Assessment Comments  Patient had a significant increase in pain following therapy 2 days ago.  decreased the intensity today and addressed additional muscle groups to allow for less intensity in the rotators and scap retractors.  -RE        OT Plan    OT Plan Comments  Decrease to 1 session per weeks to allow for better recovery between session and because the healing process will be slow.   -RE       User Key  (r) = Recorded By, (t) = Taken By, (c) = Cosigned By    Initials Name Provider Type    Anna Bonilla OTR Occupational Therapist              OT Exercises     Row Name 01/24/19 1300             Total Minutes    93389 - OT Therapeutic Exercise Minutes  35  -RE         Exercise 2    Exercise Name 2  B shoulder ER sidelying  -RE      Cueing 2  Verbal;Tactile  -RE      Sets 2  2  -RE      Reps 2  10  -RE         Exercise 4    Exercise Name 4  biceps curls  -RE      Weights/Plates 4  2  -RE      Sets 4  2  -RE      Reps 4  15  -RE         Exercise 7    Exercise Name 7  AAROM shoulder flexion   -RE      Sets 7  1  -RE      Reps 7  15  -RE         Exercise 8    Exercise Name 8  Gravity reduced shoulder protraction with ball  -RE      Cueing 8  Verbal  -RE      Equipment 8  Other swiss ball  -RE      Sets 8   2  -RE      Reps 8  10  -RE        User Key  (r) = Recorded By, (t) = Taken By, (c) = Cosigned By    Initials Name Provider Type    Anna Bonilla OTR Occupational Therapist              OT Goals     Row Name 01/24/19 1400          OT Short Term Goals    STG Date to Achieve  02/07/19  -RE     STG 1  Patient independant with HEP to increase UE function to allow for improved ADL and leisure activities.   -RE     STG 1 Progress  Partially Met;Ongoing  -RE     STG 2  Pt to increase UE strength to allow for audra shoulder flexion to 160 in supine to increase functional use of UE's.  -RE     STG 2 Progress  Not Met;Ongoing  -RE        Long Term Goals    LTG Date to Achieve  02/07/19  -RE     LTG 1  Pt to increase audra shoulder flexion to 120 against gravity to allow for improved ADL function.  -RE     LTG 1 Progress  Not Met;Ongoing  -RE        Time Calculation    OT Goal Re-Cert Due Date  04/10/19  -RE       User Key  (r) = Recorded By, (t) = Taken By, (c) = Cosigned By    Initials Name Provider Type    Anna Bonilla OTR Occupational Therapist          Therapy Education  Education Details: Explained possible substitution patterns which may cause over use of the traps. May use ice for pain.  Given: HEP  Program: Reinforced  How Provided: Verbal  Provided to: Patient, Caregiver  Level of Understanding: Teach back education performed, Verbalized  01934 - OT Self Care/Mgmt Minutes: 10                Time Calculation:   OT Start Time: 1300  OT Stop Time: 1345  OT Time Calculation (min): 45 min  Total Timed Code Minutes- OT: 45 minute(s)     Therapy Suggested Charges     Code   Minutes Charges    58521 (CPT®) Hc Ot Neuromusc Re Education Ea 15 Min      73599 (CPT®) Hc Ot Ther Proc Ea 15 Min 35 2    03135 (CPT®) Hc Ot Therapeutic Act Ea 15 Min      50167 (CPT®) Hc Ot Manual Therapy Ea 15 Min      53282 (CPT®) Hc Ot Iontophoresis Ea 15 Min      69850 (CPT®) Hc Ot Elec Stim Ea-Per 15 Min      69839 (CPT®) Hc Ot Ultrasound Ea  15 Min      31974 (CPT®) Hc Ot Self Care/Mgmt/Train Ea 15 Min 10 1     (CPT®) Hc Ot Electrical Stim Unattended      Total  45 3            Therapy Charges for Today     Code Description Service Date Service Provider Modifiers Qty    72166849904 HC OT THER PROC EA 15 MIN 1/24/2019 Anna Ryan, NURIS GO 2    09605979594 HC OT SELF CARE/MGMT/TRAIN EA 15 MIN 1/24/2019 Anna Ryan, OTR GO 1                      NURIS Cha  1/24/2019

## 2019-01-24 NOTE — THERAPY TREATMENT NOTE
Outpatient Physical Therapy Ortho Treatment Note  [facility]     Patient Name: Duane Mark Witten  : 1955  MRN: [id number]  Today's Date: 2019      Visit Date: 2019    Visit Dx:    ICD-10-CM ICD-9-CM   1. Difficulty walking R26.2 719.7   2. Impaired mobility Z74.09 799.89       Patient Active Problem List   Diagnosis   • YANELI (generalized anxiety disorder)   • ADD (attention deficit disorder)   • Depression   • Diabetes type 2, controlled (CMS/HCC)   • ED (erectile dysfunction) of non-organic origin   • HLD (hyperlipidemia)   • Essential hypertension   • Arthritis   • Psoriasis   • Testosterone deficiency   • Primary insomnia   • Weakness of both lower extremities   • Paresthesia of both upper extremities   • Morbid obesity with BMI of 40.0-44.9, adult (CMS/HCC)   • Adverse effect of corticosteroids   • Type 2 diabetes mellitus with hyperglycemia (CMS/HCC)   • Cervical stenosis of spinal canal   • Edema of cervical spinal cord (CMS/HCC)   • Postoperative atrial fibrillation (CMS/HCC)   • Cervical myelopathy (CMS/HCC)   • Postoperative visit        Past Medical History:   Diagnosis Date   • ADD (attention deficit disorder)    • Depression    • ED (erectile dysfunction)    • Essential hypertension    • YANELI (generalized anxiety disorder)    • HLD (hyperlipidemia)    • Osteoarthrosis    • PONV (postoperative nausea and vomiting)    • Psoriasis    • Testosterone deficiency    • Type 2 diabetes mellitus (CMS/HCC)         Past Surgical History:   Procedure Laterality Date   • ANTERIOR CHANNEL VERTEBRECTOMY/CORPECTOMY Bilateral 2018    Procedure: C4, C5, C6 ANTERIOR CERVICAL CORPECTOMY;  Surgeon: Chirag Markham MD;  Location: [facility];  Service: Neurosurgery   • BIOPSY / EXCISION / DISSECTION AXILLARY NODE  1994    Lymph Node testing for cancer    • COLONOSCOPY      10+ years; normal   • NECK SURGERY  2018    C4/5/6 anterior cervical corpectomy   • PARATHYROIDECTOMY                          PT Assessment/Plan     Row Name 01/24/19 1433          PT Assessment    Assessment Comments  Pt tolerating inc ankle weights for standing activities. Fatigue and dec endurance after gait activities in // bars. Pt requesting 1x/week PT and OT combo due to inc fatigue. He was not interested in separate days of therapy due to challenges to get in/out of car/hospital.   -LS        PT Plan    PT Plan Comments  Reduce freuqency to 1x/week with progression of HEP weekly.   -LS       User Key  (r) = Recorded By, (t) = Taken By, (c) = Cosigned By    Initials Name Provider Type    LS Khushi Trujillo, PT Physical Therapist              Exercises     Row Name 01/24/19 1400 01/24/19 1300          Subjective Comments    Subjective Comments  I think I am going to cut down to one day a week. I am too fatigued.  -LS  --        Subjective Pain    Able to rate subjective pain?  yes  -LS  --     Pre-Treatment Pain Level  2  -LS  --        Total Minutes    78898 - PT Therapeutic Exercise Minutes  43  -LS  --     59134 - OT Therapeutic Exercise Minutes  --  35  -RE        Exercise 1    Exercise Name 1  seated march  -LS  --     Reps 1  20  -LS  --     Additional Comments  3#  -LS  --        Exercise 2    Exercise Name 2  standing HS curl  -LS  --     Sets 2  2  -LS  --     Reps 2  10  -LS  --     Additional Comments  3#  -LS  --        Exercise 3    Exercise Name 3  HR  -LS  --     Sets 3  2  -LS  --     Reps 3  10  -LS  --        Exercise 4    Exercise Name 4  STS  -LS  --     Reps 4  10  -LS  --     Additional Comments  from elevated mat table  -LS  --        Exercise 5    Exercise Name 5  walking in // bars  -LS  --     Cueing 5  Verbal cuing for heel strike + roll over  -LS  --     Reps 5  4 laps  -LS  --     Additional Comments  forward, retro, lateral, tandem, heel/toe  -LS  --        Exercise 6    Exercise Name 6  LAQ  -LS  --     Sets 6  2  -LS  --     Reps 6  10  -LS  --     Additional Comments  4# B  -LS   "--        Exercise 7    Exercise Name 7  seated HS curl  -LS  --     Sets 7  2  -LS  --     Reps 7  10  -LS  --     Additional Comments  GTB  -LS  --        Exercise 8    Exercise Name 8  shoulder rolls  -LS  --     Reps 8  20  -LS  --        Exercise 9    Exercise Name 9  supine chin tuck  -LS  --     Reps 9  15  -LS  --     Time 9  5  -LS  --        Exercise 10    Exercise Name 10  LTR  -LS  --     Reps 10  20  -LS  --        Exercise 12    Exercise Name 12  Nustep  -LS  --     Time 12  5 minutes  -LS  --        Exercise 13    Exercise Name 13  step up 4\"  -LS  --     Sets 13  2  -LS  --     Reps 13  10  -LS  --        Exercise 14    Exercise Name 14  hip abduction  -LS  --     Sets 14  2  -LS  --     Reps 14  10  -LS  --     Additional Comments  GTB  -LS  --        Exercise 15    Exercise Name 15  hip adduction + PPT  -LS  --     Reps 15  15  -LS  --     Time 15  5  -LS  --        Exercise 16    Exercise Name 16  SAQ  -LS  --     Sets 16  2  -LS  --     Reps 16  10  -LS  --     Additional Comments  4#  -LS  --        Exercise 17    Exercise Name 17  hip bridge  -LS  --     Reps 17  10  -LS  --     Time 17  3  -LS  --       User Key  (r) = Recorded By, (t) = Taken By, (c) = Cosigned By    Initials Name Provider Type    Anna Bonilla, OTR Occupational Therapist    LS Khushi Trujillo, PT Physical Therapist                             Therapy Education  Given: Symptoms/condition management, HEP  Program: Reinforced  How Provided: Verbal, Demonstration  Provided to: Patient  Level of Understanding: Demonstrated, Verbalized, Teach back education performed              Time Calculation:   Start Time: 1345  Stop Time: 1430  Time Calculation (min): 45 min  Total Timed Code Minutes- PT: 43 minute(s)  Therapy Suggested Charges     Code   Minutes Charges    86854 (CPT®) Hc Pt Neuromusc Re Education Ea 15 Min      81655 (CPT®) Hc Pt Ther Proc Ea 15 Min 43 3    64581 (CPT®) Hc Gait Training Ea 15 Min      58307 (CPT®) Hc Pt " Therapeutic Act Ea 15 Min      64167 (CPT®) Hc Pt Manual Therapy Ea 15 Min      72484 (CPT®) Hc Pt Ther Massage- Per 15 Min      99039 (CPT®) Hc Pt Iontophoresis Ea 15 Min      66362 (CPT®) Hc Pt Elec Stim Ea-Per 15 Min      88899 (CPT®) Hc Pt Ultrasound Ea 15 Min      10065 (CPT®) Hc Pt Self Care/Mgmt/Train Ea 15 Min      70776 (CPT®) Hc Pt Prosthetic (S) Train Initial Encounter, Each 15 Min      11127 (CPT®) Hc Orthotic(S) Mgmt/Train Initial Encounter, Each 15min      01133 (CPT®) Hc Pt Aquatic Therapy Ea 15 Min      88977 (CPT®) Hc Pt Orthotic(S)/Prosthetic(S) Encounter, Each 15 Min       (CPT®) Hc Pt Electrical Stim Unattended      Total  43 3        Therapy Charges for Today     Code Description Service Date Service Provider Modifiers Qty    48612817299 HC PT THER PROC EA 15 MIN 1/24/2019 Khushi Trujillo, PT GP 3                    Khushi Trujillo, PT  1/24/2019

## 2019-01-25 ENCOUNTER — OFFICE VISIT (OUTPATIENT)
Dept: FAMILY MEDICINE CLINIC | Facility: CLINIC | Age: 64
End: 2019-01-25

## 2019-01-25 VITALS
OXYGEN SATURATION: 97 % | DIASTOLIC BLOOD PRESSURE: 90 MMHG | SYSTOLIC BLOOD PRESSURE: 160 MMHG | WEIGHT: 315 LBS | HEIGHT: 75 IN | TEMPERATURE: 97.5 F | RESPIRATION RATE: 16 BRPM | BODY MASS INDEX: 39.17 KG/M2 | HEART RATE: 89 BPM

## 2019-01-25 DIAGNOSIS — I97.89 POSTOPERATIVE ATRIAL FIBRILLATION (HCC): ICD-10-CM

## 2019-01-25 DIAGNOSIS — E11.9 DIABETES MELLITUS TYPE 2, NONINSULIN DEPENDENT (HCC): Primary | ICD-10-CM

## 2019-01-25 DIAGNOSIS — I10 ESSENTIAL HYPERTENSION: ICD-10-CM

## 2019-01-25 DIAGNOSIS — Z09 HOSPITAL DISCHARGE FOLLOW-UP: ICD-10-CM

## 2019-01-25 DIAGNOSIS — G95.9 CERVICAL MYELOPATHY (HCC): ICD-10-CM

## 2019-01-25 DIAGNOSIS — G95.19 EDEMA OF SPINAL CORD (HCC): ICD-10-CM

## 2019-01-25 DIAGNOSIS — I48.91 POSTOPERATIVE ATRIAL FIBRILLATION (HCC): ICD-10-CM

## 2019-01-25 PROCEDURE — 99214 OFFICE O/P EST MOD 30 MIN: CPT | Performed by: NURSE PRACTITIONER

## 2019-01-25 RX ORDER — VALSARTAN AND HYDROCHLOROTHIAZIDE 160; 12.5 MG/1; MG/1
1 TABLET, FILM COATED ORAL DAILY
Qty: 30 TABLET | Refills: 2 | Status: SHIPPED | OUTPATIENT
Start: 2019-01-25 | End: 2019-03-31 | Stop reason: SDUPTHER

## 2019-01-25 NOTE — PROGRESS NOTES
Subjective   Duane Mark Witten is a 64 y.o. male.     History of Present Illness   Duane Mark Witten 64 y.o. male who presents today for routine follow up check and medication refills.  he has a history of   Patient Active Problem List   Diagnosis   • YANELI (generalized anxiety disorder)   • ADD (attention deficit disorder)   • Depression   • Diabetes type 2, controlled (CMS/[facility])   • ED (erectile dysfunction) of non-organic origin   • HLD (hyperlipidemia)   • Essential hypertension   • Arthritis   • Psoriasis   • Testosterone deficiency   • Primary insomnia   • Weakness of both lower extremities   • Paresthesia of both upper extremities   • Morbid obesity with BMI of 40.0-44.9, adult (CMS/[facility])   • Adverse effect of corticosteroids   • Type 2 diabetes mellitus with hyperglycemia (CMS/[facility])   • Cervical stenosis of spinal canal   • Edema of cervical spinal cord (CMS/[facility])   • Postoperative atrial fibrillation (CMS/[facility])   • Cervical myelopathy (CMS/[facility])   • Postoperative visit   .  Since the last visit, he has overall felt improved since hospital discharge.  He has HTN and has not been taking hctz or losartan.  He does not like the way he has felt on hctz but did not mind it when it was combination medication. He has not taken losartan since it was recalled.  he has been compliant with current medications have reviewed them.  The patient denies medication side effects.    Results for orders placed or performed during the hospital encounter of 11/30/18   Basic Metabolic Panel   Result Value Ref Range    Glucose 124 (H) 65 - 99 mg/dL    BUN 14 8 - 23 mg/dL    Creatinine 0.81 0.76 - 1.27 mg/dL    Sodium 138 136 - 145 mmol/L    Potassium 4.0 3.5 - 5.2 mmol/L    Chloride 97 (L) 98 - 107 mmol/L    CO2 27.5 22.0 - 29.0 mmol/L    Calcium 9.2 8.6 - 10.5 mg/dL    eGFR Non African Amer 96 >60 mL/min/1.73    BUN/Creatinine Ratio 17.3 7.0 - 25.0    Anion Gap 13.5 mmol/L   POC Glucose Once   Result Value Ref Range    Glucose 104 70 -  130 mg/dL   POC Glucose Once   Result Value Ref Range    Glucose 126 70 - 130 mg/dL   POC Glucose Once   Result Value Ref Range    Glucose 101 70 - 130 mg/dL   POC Glucose Once   Result Value Ref Range    Glucose 159 (H) 70 - 130 mg/dL   POC Glucose Once   Result Value Ref Range    Glucose 72 70 - 130 mg/dL   POC Glucose Once   Result Value Ref Range    Glucose 157 (H) 70 - 130 mg/dL   POC Glucose Once   Result Value Ref Range    Glucose 105 70 - 130 mg/dL   POC Glucose Once   Result Value Ref Range    Glucose 113 70 - 130 mg/dL   POC Glucose Once   Result Value Ref Range    Glucose 123 70 - 130 mg/dL   POC Glucose Once   Result Value Ref Range    Glucose 196 (H) 70 - 130 mg/dL   POC Glucose Once   Result Value Ref Range    Glucose 123 70 - 130 mg/dL   POC Glucose Once   Result Value Ref Range    Glucose 115 70 - 130 mg/dL   POC Glucose Once   Result Value Ref Range    Glucose 110 70 - 130 mg/dL   POC Glucose Once   Result Value Ref Range    Glucose 195 (H) 70 - 130 mg/dL   POC Glucose Once   Result Value Ref Range    Glucose 103 70 - 130 mg/dL   POC Glucose Once   Result Value Ref Range    Glucose 127 70 - 130 mg/dL   POC Glucose Once   Result Value Ref Range    Glucose 111 70 - 130 mg/dL   POC Glucose Once   Result Value Ref Range    Glucose 125 70 - 130 mg/dL   POC Glucose Once   Result Value Ref Range    Glucose 134 (H) 70 - 130 mg/dL   POC Glucose Once   Result Value Ref Range    Glucose 140 (H) 70 - 130 mg/dL   POC Glucose Once   Result Value Ref Range    Glucose 149 (H) 70 - 130 mg/dL   POC Glucose Once   Result Value Ref Range    Glucose 117 70 - 130 mg/dL   POC Glucose Once   Result Value Ref Range    Glucose 263 (H) 70 - 130 mg/dL   POC Glucose Once   Result Value Ref Range    Glucose 130 70 - 130 mg/dL   POC Glucose Once   Result Value Ref Range    Glucose 157 (H) 70 - 130 mg/dL   POC Glucose Once   Result Value Ref Range    Glucose 145 (H) 70 - 130 mg/dL   POC Glucose Once   Result Value Ref Range     Glucose 204 (H) 70 - 130 mg/dL   POC Glucose Once   Result Value Ref Range    Glucose 129 70 - 130 mg/dL   POC Glucose Once   Result Value Ref Range    Glucose 178 (H) 70 - 130 mg/dL   POC Glucose Once   Result Value Ref Range    Glucose 111 70 - 130 mg/dL   POC Glucose Once   Result Value Ref Range    Glucose 183 (H) 70 - 130 mg/dL   POC Glucose Once   Result Value Ref Range    Glucose 154 (H) 70 - 130 mg/dL   POC Glucose Once   Result Value Ref Range    Glucose 204 (H) 70 - 130 mg/dL   POC Glucose Once   Result Value Ref Range    Glucose 176 (H) 70 - 130 mg/dL   POC Glucose Once   Result Value Ref Range    Glucose 204 (H) 70 - 130 mg/dL   POC Glucose Once   Result Value Ref Range    Glucose 132 (H) 70 - 130 mg/dL   POC Glucose Once   Result Value Ref Range    Glucose 166 (H) 70 - 130 mg/dL   POC Glucose Once   Result Value Ref Range    Glucose 208 (H) 70 - 130 mg/dL   POC Glucose Once   Result Value Ref Range    Glucose 166 (H) 70 - 130 mg/dL   POC Glucose Once   Result Value Ref Range    Glucose 124 70 - 130 mg/dL   POC Glucose Once   Result Value Ref Range    Glucose 192 (H) 70 - 130 mg/dL   POC Glucose Once   Result Value Ref Range    Glucose 179 (H) 70 - 130 mg/dL   POC Glucose Once   Result Value Ref Range    Glucose 221 (H) 70 - 130 mg/dL   POC Glucose Once   Result Value Ref Range    Glucose 113 70 - 130 mg/dL   POC Glucose Once   Result Value Ref Range    Glucose 195 (H) 70 - 130 mg/dL   POC Glucose Once   Result Value Ref Range    Glucose 99 70 - 130 mg/dL   POC Glucose Once   Result Value Ref Range    Glucose 194 (H) 70 - 130 mg/dL   POC Glucose Once   Result Value Ref Range    Glucose 166 (H) 70 - 130 mg/dL   POC Glucose Once   Result Value Ref Range    Glucose 188 (H) 70 - 130 mg/dL   POC Glucose Once   Result Value Ref Range    Glucose 168 (H) 70 - 130 mg/dL   POC Glucose Once   Result Value Ref Range    Glucose 192 (H) 70 - 130 mg/dL   POC Glucose Once   Result Value Ref Range    Glucose 167 (H) 70 -  130 mg/dL   POC Glucose Once   Result Value Ref Range    Glucose 185 (H) 70 - 130 mg/dL   POC Glucose Once   Result Value Ref Range    Glucose 170 (H) 70 - 130 mg/dL   POC Glucose Once   Result Value Ref Range    Glucose 182 (H) 70 - 130 mg/dL   POC Glucose Once   Result Value Ref Range    Glucose 125 70 - 130 mg/dL   POC Glucose Once   Result Value Ref Range    Glucose 147 (H) 70 - 130 mg/dL   POC Glucose Once   Result Value Ref Range    Glucose 138 (H) 70 - 130 mg/dL   POC Glucose Once   Result Value Ref Range    Glucose 221 (H) 70 - 130 mg/dL   POC Glucose Once   Result Value Ref Range    Glucose 156 (H) 70 - 130 mg/dL   POC Glucose Once   Result Value Ref Range    Glucose 135 (H) 70 - 130 mg/dL   POC Glucose Once   Result Value Ref Range    Glucose 129 70 - 130 mg/dL   POC Glucose Once   Result Value Ref Range    Glucose 161 (H) 70 - 130 mg/dL   POC Glucose Once   Result Value Ref Range    Glucose 162 (H) 70 - 130 mg/dL   POC Glucose Once   Result Value Ref Range    Glucose 175 (H) 70 - 130 mg/dL   POC Glucose Once   Result Value Ref Range    Glucose 121 70 - 130 mg/dL   POC Glucose Once   Result Value Ref Range    Glucose 147 (H) 70 - 130 mg/dL   POC Glucose Once   Result Value Ref Range    Glucose 134 (H) 70 - 130 mg/dL   POC Glucose Once   Result Value Ref Range    Glucose 159 (H) 70 - 130 mg/dL   POC Glucose Once   Result Value Ref Range    Glucose 171 (H) 70 - 130 mg/dL   POC Glucose Once   Result Value Ref Range    Glucose 169 (H) 70 - 130 mg/dL   POC Glucose Once   Result Value Ref Range    Glucose 132 (H) 70 - 130 mg/dL   POC Glucose Once   Result Value Ref Range    Glucose 194 (H) 70 - 130 mg/dL   POC Glucose Once   Result Value Ref Range    Glucose 200 (H) 70 - 130 mg/dL   POC Glucose Once   Result Value Ref Range    Glucose 218 (H) 70 - 130 mg/dL   POC Glucose Once   Result Value Ref Range    Glucose 164 (H) 70 - 130 mg/dL     Duane Mark Witten 64 y.o. male presents today for hosptial follow up.  he  was treated BHE for Immobilization syndrome .  I reviewed all of the labs and diagnostic testing.  The patient's medications were changed:  Current outpatient and discharge medications have been reconciled for the patient.  Reviewed by: KAYLEN An    he does have a follow up appointment with a specialist:  Cardiology and neurosurgery.        Hospital note as follows:    Pleasant 63-year-old gentleman with history of morbid obesity, diabetes, hypertension.  He presents with weakness in the lower extremities and paresthesias in the upper extremities.  Please H&P for full details.  MRI of the C-spine revealed significant stenosis.  Neurosurgical consultation was obtained.  On November 27 he underwent ACCF C4, C5, C6 with cage and plate.  Clinically he has had good results from the procedure.  He did develop atrial fibrillation postop.  Cardiology consultation was obtained.  The rate was easily controlled.  Echocardiogram revealed a left ventricular ejection fraction 47%.  Mildly dilated left atria.  Mild mitral valve regurgitation and mild-to-moderate aortic stenosis.  It is recommended that once it is safe from the neurosurgical perspective that he be started on long-term anticoagulants.  Postop he was on steroids and did have a significant spike in his blood sugar but this is improved considerably with weaning off the steroids.  His insulin dose is been decreased significantly today in the next few days remaining of wean him off the Lantus altogether.       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    The patient [place] [facility] [place] [facility] [place]; 24/7 care is available from the wife.  The patient will be having outpatient therapies here at [facility].  The patient will be following up with his primary care physician, Miladis  KAYLEN Geiger.  The patient was recommended to have a rolling walker.  He owns tub bench, reacher, handicap height commode.  Family teaching had been completed.  Both patient and wife were very pleased with the outcome and the plan for discharge.  Family conference was held on 12/17/2018.  Patient was ambulating 160-200 feet with a rolling walker and contact guard assistance only.  He can ascend and descend to 8 steps with rails, contact guard to minimum assistance, contact guard assistance with commode and shower transfers.  He requires minimum assistance to dress his upper body.         Patient reports feeling improved, but still very weak after surgery. He had bone stimulator placed.  He is ambulating with walker and is working with physical and occupational therapy.  He is taking xarelto and metoprolol as prescribed but is almost out of medications.  He needs refill until he sees cardiology next week.   Patient has already started Trulicity back a few weeks ago as it was not the cause of his weakness.  He states he has plenty of this medication and does not need refills. He reports fasting blood sugar at home has been 120s-140s.     The following portions of the patient's history were reviewed and updated as appropriate: allergies, current medications, past family history, past medical history, past social history, past surgical history and problem list.    Review of Systems   Constitutional: Negative for fatigue.   Respiratory: Negative for cough and shortness of breath.    Cardiovascular: Negative for chest pain and palpitations.   Endocrine: Negative for cold intolerance, heat intolerance, polydipsia, polyphagia and polyuria.   Musculoskeletal: Positive for gait problem. Negative for arthralgias.   Skin: Negative for rash.   Neurological: Positive for weakness. Negative for numbness.   Psychiatric/Behavioral: Negative for dysphoric mood and sleep disturbance. The patient is not nervous/anxious.         Objective   Physical Exam   Constitutional: He is oriented to person, place, and time. He appears well-developed and well-nourished.   Cardiovascular: Normal rate and regular rhythm.   Pulmonary/Chest: Effort normal and breath sounds normal.   Musculoskeletal:   Gait steady with walker   Neurological: He is oriented to person, place, and time.   Skin: Skin is warm and dry.   Psychiatric: He has a normal mood and affect. His behavior is normal. Judgment and thought content normal.   Nursing note and vitals reviewed.      Assessment/Plan   Duane was seen today for med refill and hospital follow up.    Diagnoses and all orders for this visit:    Diabetes mellitus type 2, noninsulin dependent (CMS/HCC)  -     Comprehensive metabolic panel  -     Lipid panel  -     CBC and Differential  -     TSH  -     Hemoglobin A1c    Essential hypertension  -     valsartan-hydrochlorothiazide (DIOVAN HCT) 160-12.5 MG per tablet; Take 1 tablet by mouth Daily.    Postoperative atrial fibrillation (CMS/HCC)  -     metoprolol tartrate (LOPRESSOR) 25 MG tablet; Take 1 tablet by mouth Every 12 (Twelve) Hours.    Edema of cervical spinal cord (CMS/HCC)    Cervical myelopathy (CMS/HCC)    Hospital discharge follow-up    Hospital records reviewed with pt confirming HPI.    Gave samples of Xarelto to get him through until cardiology appt.  Refilled metoprolol.   Changed losartan and hctz to valsartan/hctz 160/12.5 mg daily.  He will f/u in 3 months as planned with labs prior.

## 2019-01-28 ENCOUNTER — APPOINTMENT (OUTPATIENT)
Dept: PHYSICAL THERAPY | Facility: HOSPITAL | Age: 64
End: 2019-01-28

## 2019-01-28 ENCOUNTER — APPOINTMENT (OUTPATIENT)
Dept: OCCUPATIONAL THERAPY | Facility: HOSPITAL | Age: 64
End: 2019-01-28

## 2019-01-29 ENCOUNTER — HOSPITAL ENCOUNTER (OUTPATIENT)
Dept: PHYSICAL THERAPY | Facility: HOSPITAL | Age: 64
Discharge: HOME | End: 2019-01-29
Admitting: NEUROLOGICAL SURGERY

## 2019-01-29 ENCOUNTER — HOSPITAL ENCOUNTER (OUTPATIENT)
Dept: OCCUPATIONAL THERAPY | Facility: HOSPITAL | Age: 64
Discharge: HOME | End: 2019-01-29

## 2019-01-29 DIAGNOSIS — R26.2 DIFFICULTY WALKING: Primary | ICD-10-CM

## 2019-01-29 DIAGNOSIS — M25.611 DECREASED RANGE OF MOTION OF RIGHT SHOULDER: ICD-10-CM

## 2019-01-29 DIAGNOSIS — M25.612 DECREASED RANGE OF MOTION OF LEFT SHOULDER: Primary | ICD-10-CM

## 2019-01-29 DIAGNOSIS — Z74.09 IMPAIRED MOBILITY: ICD-10-CM

## 2019-01-29 PROCEDURE — 97110 THERAPEUTIC EXERCISES: CPT

## 2019-01-29 NOTE — THERAPY TREATMENT NOTE
Outpatient Physical Therapy Ortho Treatment Note  [facility]     Patient Name: Duane Mark Witten  : 1955  MRN: [id number]  Today's Date: 2019      Visit Date: 2019    Visit Dx:    ICD-10-CM ICD-9-CM   1. Difficulty walking R26.2 719.7   2. Impaired mobility Z74.09 799.89       Patient Active Problem List   Diagnosis   • YANELI (generalized anxiety disorder)   • ADD (attention deficit disorder)   • Depression   • Diabetes type 2, controlled (CMS/HCC)   • ED (erectile dysfunction) of non-organic origin   • HLD (hyperlipidemia)   • Essential hypertension   • Arthritis   • Psoriasis   • Testosterone deficiency   • Primary insomnia   • Weakness of both lower extremities   • Paresthesia of both upper extremities   • Morbid obesity with BMI of 40.0-44.9, adult (CMS/HCC)   • Adverse effect of corticosteroids   • Type 2 diabetes mellitus with hyperglycemia (CMS/HCC)   • Cervical stenosis of spinal canal   • Edema of cervical spinal cord (CMS/HCC)   • Postoperative atrial fibrillation (CMS/HCC)   • Cervical myelopathy (CMS/HCC)   • Postoperative visit        Past Medical History:   Diagnosis Date   • ADD (attention deficit disorder)    • Atrial fibrillation (CMS/HCC)    • Depression    • ED (erectile dysfunction)    • Essential hypertension    • YANELI (generalized anxiety disorder)    • HLD (hyperlipidemia)    • Morbid obesity with BMI of 40.0-44.9, adult (CMS/HCC)    • CAMERON (obstructive sleep apnea)    • Osteoarthrosis    • PONV (postoperative nausea and vomiting)    • Psoriasis    • Testosterone deficiency    • Type 2 diabetes mellitus (CMS/HCC)         Past Surgical History:   Procedure Laterality Date   • ANTERIOR CHANNEL VERTEBRECTOMY/CORPECTOMY Bilateral 2018    Procedure: C4, C5, C6 ANTERIOR CERVICAL CORPECTOMY;  Surgeon: Chirag Markham MD;  Location: [facility] OR;  Service: Neurosurgery   • BIOPSY / EXCISION / DISSECTION AXILLARY NODE  1994    Lymph Node testing for cancer    •  COLONOSCOPY      10+ years; normal   • NECK SURGERY  11/27/2018    C4/5/6 anterior cervical corpectomy   • PARATHYROIDECTOMY  2008                       PT Assessment/Plan     Row Name 01/29/19 1437          PT Assessment    Assessment Comments  Pt fatigued today from prior OT as well as fall in bathroom this morning with difficulty getting up and approx. 15-20 minutes spent on floor attempting transfer. Inc rest breaks and dec ther ex today due to generalized fatigue. Discussed fall prevention strategies and ways to attempt floor transfer if it ever were necessary in the future. Pt reports he did not hit his head or neck and has had no adverse symptoms, vision changes, headache, dizziness.   -LS        PT Plan    PT Plan Comments  Return to full POC once pt recovers next week.   -LS       User Key  (r) = Recorded By, (t) = Taken By, (c) = Cosigned By    Initials Name Provider Type    LS Khushi Trujillo, PT Physical Therapist              Exercises     Row Name 01/29/19 1400             Subjective Comments    Subjective Comments  I am pretty fatigued. It took alot of effort to get up off of the floor today. I didn't hit my head at all. I am just sore.  -LS         Subjective Pain    Able to rate subjective pain?  yes  -LS      Pre-Treatment Pain Level  5  -LS         Total Minutes    35058 - PT Therapeutic Exercise Minutes  38  -LS         Exercise 1    Exercise Name 1  walking march in // bars  -LS      Reps 1  3 laps  -LS         Exercise 2    Exercise Name 2  standing HS curl  -LS      Sets 2  2  -LS      Reps 2  10  -LS      Additional Comments  3#  -LS         Exercise 3    Exercise Name 3  HR  -LS      Reps 3  20  -LS         Exercise 5    Exercise Name 5  walking in // bars  -LS      Cueing 5  Verbal cuing for heel strike + roll over  -LS      Reps 5  4 laps  -LS      Additional Comments  forward, retro, lateral, tandem, heel/toe  -LS         Exercise 6    Exercise Name 6  LAQ  -LS      Sets 6  2  -LS       Reps 6  10  -LS         Exercise 7    Exercise Name 7  seated HS curl  -LS      Sets 7  2  -LS      Reps 7  10  -LS      Additional Comments  GTB  -LS         Exercise 8    Exercise Name 8  shoulder rolls  -LS      Reps 8  20  -LS         Exercise 12    Exercise Name 12  Nustep  -LS      Time 12  5 minutes  -LS         Exercise 14    Exercise Name 14  hip abduction  -LS      Sets 14  2  -LS      Reps 14  12  -LS      Additional Comments  GTB; B/uni  -LS         Exercise 15    Exercise Name 15  hip adduction + bridge  -LS      Reps 15  15  -LS      Time 15  3  -LS        User Key  (r) = Recorded By, (t) = Taken By, (c) = Cosigned By    Initials Name Provider Type    LS Khushi Trujillo, PT Physical Therapist                         PT OP Goals     Row Name 01/29/19 1400          PT Short Term Goals    STG Date to Achieve  01/22/19  -LS     STG 1  Pt will demonstrate understanding and compliance with initial HEP.  -LS     STG 1 Progress  Met  -LS     STG 2  Pt will report improved ability to perform STS from home chair without requiring multiple attempts.  -LS     STG 2 Progress  Ongoing  -LS     STG 3  Pt will demonstrate improved tolerance to standing from 5 minutes to 15 minutes during therapy session.  -LS     STG 3 Progress  Ongoing  -LS        Long Term Goals    LTG Date to Achieve  02/07/19  -LS     LTG 1  Pt will demonstrate ability to tolerate 30 minutes of standing to improve his ability to perform household tasks.   -LS     LTG 1 Progress  Ongoing  -LS     LTG 2  Pt will demonstrate STS x 5 from normal chair height in 30 seconds or less.  -LS     LTG 2 Progress  Ongoing  -LS     LTG 3  Pt will demonstrate ability to safely ambulate using SC for household distance and within clinic.   -LS     LTG 3 Progress  Ongoing  -LS       User Key  (r) = Recorded By, (t) = Taken By, (c) = Cosigned By    Initials Name Provider Type    LS Khushi Trujillo, PT Physical Therapist          Therapy Education  Given:  Symptoms/condition management  Program: Reinforced  How Provided: Verbal, Demonstration  Provided to: Patient  Level of Understanding: Demonstrated, Verbalized, Teach back education performed              Time Calculation:   Start Time: 1345  Stop Time: 1430  Time Calculation (min): 45 min  Total Timed Code Minutes- PT: 38 minute(s)  Therapy Suggested Charges     Code   Minutes Charges    70919 (CPT®) Hc Pt Neuromusc Re Education Ea 15 Min      53330 (CPT®) Hc Pt Ther Proc Ea 15 Min 38 3    86420 (CPT®) Hc Gait Training Ea 15 Min      89473 (CPT®) Hc Pt Therapeutic Act Ea 15 Min      01131 (CPT®) Hc Pt Manual Therapy Ea 15 Min      17920 (CPT®) Hc Pt Ther Massage- Per 15 Min      98015 (CPT®) Hc Pt Iontophoresis Ea 15 Min      53372 (CPT®) Hc Pt Elec Stim Ea-Per 15 Min      81882 (CPT®) Hc Pt Ultrasound Ea 15 Min      82223 (CPT®) Hc Pt Self Care/Mgmt/Train Ea 15 Min      49338 (CPT®) Hc Pt Prosthetic (S) Train Initial Encounter, Each 15 Min      34328 (CPT®) Hc Orthotic(S) Mgmt/Train Initial Encounter, Each 15min      34622 (CPT®) Hc Pt Aquatic Therapy Ea 15 Min      25839 (CPT®) Hc Pt Orthotic(S)/Prosthetic(S) Encounter, Each 15 Min       (CPT®) Hc Pt Electrical Stim Unattended      Total  38 3        Therapy Charges for Today     Code Description Service Date Service Provider Modifiers Qty    33793016436 HC PT THER PROC EA 15 MIN 1/29/2019 Khushi Trujillo, PT GP 3                    Khushi Trujillo, PT  1/29/2019

## 2019-01-29 NOTE — THERAPY TREATMENT NOTE
Outpatient Occupational Therapy Lymphedema Treatment Note  [facility]     Patient Name: Duane Mark Witten  : 1955  MRN: [id number]  Today's Date: 2019      Visit Date: 2019    Patient Active Problem List   Diagnosis   • YANELI (generalized anxiety disorder)   • ADD (attention deficit disorder)   • Depression   • Diabetes type 2, controlled (CMS/HCC)   • ED (erectile dysfunction) of non-organic origin   • HLD (hyperlipidemia)   • Essential hypertension   • Arthritis   • Psoriasis   • Testosterone deficiency   • Primary insomnia   • Weakness of both lower extremities   • Paresthesia of both upper extremities   • Morbid obesity with BMI of 40.0-44.9, adult (CMS/HCC)   • Adverse effect of corticosteroids   • Type 2 diabetes mellitus with hyperglycemia (CMS/HCC)   • Cervical stenosis of spinal canal   • Edema of cervical spinal cord (CMS/HCC)   • Postoperative atrial fibrillation (CMS/HCC)   • Cervical myelopathy (CMS/HCC)   • Postoperative visit        Past Medical History:   Diagnosis Date   • ADD (attention deficit disorder)    • Atrial fibrillation (CMS/HCC)    • Depression    • ED (erectile dysfunction)    • Essential hypertension    • YANELI (generalized anxiety disorder)    • HLD (hyperlipidemia)    • Morbid obesity with BMI of 40.0-44.9, adult (CMS/HCC)    • CAMERON (obstructive sleep apnea)    • Osteoarthrosis    • PONV (postoperative nausea and vomiting)    • Psoriasis    • Testosterone deficiency    • Type 2 diabetes mellitus (CMS/HCC)         Past Surgical History:   Procedure Laterality Date   • ANTERIOR CHANNEL VERTEBRECTOMY/CORPECTOMY Bilateral 2018    Procedure: C4, C5, C6 ANTERIOR CERVICAL CORPECTOMY;  Surgeon: Chirag Markham MD;  Location: [facility];  Service: Neurosurgery   • BIOPSY / EXCISION / DISSECTION AXILLARY NODE  1994    Lymph Node testing for cancer    • COLONOSCOPY      10+ years; normal   • NECK SURGERY  2018    C4/5/6 anterior cervical corpectomy   •  PARATHYROIDECTOMY  2008         Visit Dx:      ICD-10-CM ICD-9-CM   1. Decreased range of motion of left shoulder M25.612 719.51   2. Decreased range of motion of right shoulder M25.611 719.51                   OT Assessment/Plan     Row Name 01/29/19 1658          OT Assessment    Assessment Comments  Noted improved shoulder AROM. Joo arteaga able to use pulleys for flexion as well as abd/add. Progressing.  -RE        OT Plan    OT Plan Comments  continue  -RE       User Key  (r) = Recorded By, (t) = Taken By, (c) = Cosigned By    Initials Name Provider Type    Anna Bonilla OTR Occupational Therapist              OT Exercises     Row Name 01/29/19 1600             Subjective Comments    Subjective Comments  fell in BR today  -RE         Total Minutes    68108 - OT Therapeutic Exercise Minutes  40  -RE         Exercise 4    Exercise Name 4  biceps curls  -RE      Weights/Plates 4  2  -RE      Sets 4  1  -RE      Intensity 4  Other to fatigue  -RE         Exercise 5    Exercise Name 5  Shld flexion with pulleys  -RE      Intensity 5  -- to fatigue  -RE         Exercise 6    Exercise Name 6  Scapular retraction, depression   -RE      Cueing 6  Verbal;Demo  -RE      Resistance 6  Yellow  -RE      Intensity 6  Other to fatigue  -RE         Exercise 9    Exercise Name 9  triceps  -RE      Cueing 9  Verbal;Demo  -RE      Equipment 9  Theraband  -RE      Resistance 9  Yellow  -RE      Intensity 9  Other to fatigue  -RE        User Key  (r) = Recorded By, (t) = Taken By, (c) = Cosigned By    Initials Name Provider Type    Anna Bonilla, MAYOR Occupational Therapist                  Therapy Education  Education Details: ADded theraband exercises to HEP  Given: HEP  Program: Progressed  How Provided: Verbal, Demonstration  Provided to: Patient  Level of Understanding: Teach back education performed, Verbalized  48281 - OT Self Care/Mgmt Minutes: 5                Time Calculation:   OT Start Time: 1300  OT Stop Time:  1345  OT Time Calculation (min): 45 min  Total Timed Code Minutes- OT: 45 minute(s)     Therapy Suggested Charges     Code   Minutes Charges    04994 (CPT®) Hc Ot Neuromusc Re Education Ea 15 Min      71550 (CPT®) Hc Ot Ther Proc Ea 15 Min 40 3    55386 (CPT®) Hc Ot Therapeutic Act Ea 15 Min      17012 (CPT®) Hc Ot Manual Therapy Ea 15 Min      42223 (CPT®) Hc Ot Iontophoresis Ea 15 Min      95848 (CPT®) Hc Ot Elec Stim Ea-Per 15 Min      35767 (CPT®) Hc Ot Ultrasound Ea 15 Min      50962 (CPT®) Hc Ot Self Care/Mgmt/Train Ea 15 Min 5      (CPT®) Hc Ot Electrical Stim Unattended      Total  45 3            Therapy Charges for Today     Code Description Service Date Service Provider Modifiers Qty    [id number] HC OT THER PROC EA 15 MIN 1/29/2019 Anna Ryan OTR GO 3                      NURIS Cha  1/29/2019

## 2019-01-30 ENCOUNTER — OFFICE VISIT (OUTPATIENT)
Dept: CARDIOLOGY | Facility: CLINIC | Age: 64
End: 2019-01-30

## 2019-01-30 VITALS
BODY MASS INDEX: 39.17 KG/M2 | DIASTOLIC BLOOD PRESSURE: 76 MMHG | SYSTOLIC BLOOD PRESSURE: 116 MMHG | HEART RATE: 69 BPM | WEIGHT: 315 LBS | HEIGHT: 75 IN

## 2019-01-30 DIAGNOSIS — E78.5 HYPERLIPIDEMIA, UNSPECIFIED HYPERLIPIDEMIA TYPE: ICD-10-CM

## 2019-01-30 DIAGNOSIS — I48.91 POSTOPERATIVE ATRIAL FIBRILLATION (HCC): ICD-10-CM

## 2019-01-30 DIAGNOSIS — I48.19 PERSISTENT ATRIAL FIBRILLATION (HCC): Primary | ICD-10-CM

## 2019-01-30 DIAGNOSIS — I10 ESSENTIAL HYPERTENSION: ICD-10-CM

## 2019-01-30 DIAGNOSIS — E66.01 MORBID OBESITY WITH BMI OF 40.0-44.9, ADULT (HCC): ICD-10-CM

## 2019-01-30 DIAGNOSIS — I97.89 POSTOPERATIVE ATRIAL FIBRILLATION (HCC): ICD-10-CM

## 2019-01-30 DIAGNOSIS — I42.9 CARDIOMYOPATHY, UNSPECIFIED TYPE (HCC): ICD-10-CM

## 2019-01-30 DIAGNOSIS — E11.9 CONTROLLED TYPE 2 DIABETES MELLITUS WITHOUT COMPLICATION, UNSPECIFIED WHETHER LONG TERM INSULIN USE (HCC): ICD-10-CM

## 2019-01-30 PROCEDURE — 93000 ELECTROCARDIOGRAM COMPLETE: CPT | Performed by: INTERNAL MEDICINE

## 2019-01-30 PROCEDURE — 99214 OFFICE O/P EST MOD 30 MIN: CPT | Performed by: INTERNAL MEDICINE

## 2019-01-30 NOTE — PROGRESS NOTES
"Date of Office Visit: 2019  Encounter Provider: Valeria Bravo MD  Place of Service: [facility] CARDIOLOGY  Patient Name: Duane Mark Witten  :1955      Patient ID:  Duane Mark Witten is a 64 y.o. male is here for  followup for atrial fibrillation.         History of Present Illness    He has a history of HTN, DM, obstructive sleep apnea (compliant with cpap), and obesity. He has no known cardiac history. He does report attempting to donate blood several years ago and was told he had an irregular heart beat at that time.      He presented to the emergency room on 18 with complaints of 3 weeks of progressive weakness to bilateral lower extremities as well as paraesthesias of the upper extremities. He began feeling his balance was off and that his legs were becoming weaker and \"just didn't want to work right.\"  He was found to have severe cervical stenosis with cord compression. He was seen by Dr. Markham and had underwent C4, C5, C6 anterior cervical corpectomy with cage and plate. During surgery HR was in the 110's. EKG was obtained in the recovery room and showed atrial fibrillation. He was completely asymptomatic.      Echo done 18 showed LVEF 47% with mild LVH and mild LVE, with mild to moderate AS, mild MR with mild anterior prolapse, mild LAE    He has no chest pain, dyspnea.  He has fatigue.  He still working to rehabilitation.  He's had no dizziness or syncope.  He has no change in lower extremity edema.  He is using a walker.  He doesn't feels heart racing or skipping.  He's had no fevers or chills.  He's had no vomiting blood or blood in the stool.  He is taking his medications as directed.    Past Medical History:   Diagnosis Date   • ADD (attention deficit disorder)    • Atrial fibrillation (CMS/HCC)    • Depression    • ED (erectile dysfunction)    • Essential hypertension    • YANELI (generalized anxiety disorder)    • HLD (hyperlipidemia)  "   • Morbid obesity with BMI of 40.0-44.9, adult (CMS/HCC)    • CAMERON (obstructive sleep apnea)    • Osteoarthrosis    • PONV (postoperative nausea and vomiting)    • Psoriasis    • Testosterone deficiency    • Type 2 diabetes mellitus (CMS/HCC)          Past Surgical History:   Procedure Laterality Date   • ANTERIOR CHANNEL VERTEBRECTOMY/CORPECTOMY Bilateral 11/27/2018    Procedure: C4, C5, C6 ANTERIOR CERVICAL CORPECTOMY;  Surgeon: Chirag Markham MD;  Location: [facility];  Service: Neurosurgery   • BIOPSY / EXCISION / DISSECTION AXILLARY NODE  1994    Lymph Node testing for cancer    • COLONOSCOPY      10+ years; normal   • NECK SURGERY  11/27/2018    C4/5/6 anterior cervical corpectomy   • PARATHYROIDECTOMY  2008       Current Outpatient Medications on File Prior to Visit   Medication Sig Dispense Refill   • ACCU-CHEK AGNES PLUS test strip Use as directed to test blood sugar tid E11.9 100 each 11   • atomoxetine (STRATTERA) 100 MG capsule Take 1 capsule by mouth Daily. 30 capsule 5   • Dulaglutide (TRULICITY) 1.5 MG/0.5ML solution pen-injector Inject 1.5 mg under the skin into the appropriate area as directed 1 (One) Time Per Week. 4 pen    • DULoxetine (CYMBALTA) 60 MG capsule Take 1 capsule by mouth Daily. 30 capsule 5   • FIBER ADULT GUMMIES PO Take 1 dose by mouth Daily.     • glimepiride (AMARYL) 4 MG tablet Take 1 tablet by mouth 2 (Two) Times a Day. 60 tablet 5   • glucosamine-chondroitin 500-400 MG capsule capsule Take 1 capsule by mouth Daily.     • lidocaine (LIDODERM) 5 % Place 2 patches on the skin as directed by provider Daily. Remove & Discard patch within 12 hours or as directed by MD 30 patch 0   • metFORMIN (GLUCOPHAGE) 1000 MG tablet Take 1 tablet by mouth 2 (Two) Times a Day With Meals. 60 tablet 5   • pioglitazone (ACTOS) 30 MG tablet Take 1 tablet by mouth Daily. 30 tablet 0   • simvastatin (ZOCOR) 40 MG tablet Take 1 tablet by mouth Every Night. 30 tablet 5   •  valsartan-hydrochlorothiazide (DIOVAN HCT) 160-12.5 MG per tablet Take 1 tablet by mouth Daily. 30 tablet 2   • [DISCONTINUED] metoprolol tartrate (LOPRESSOR) 25 MG tablet Take 1 tablet by mouth Every 12 (Twelve) Hours. 60 tablet 0   • [DISCONTINUED] rivaroxaban (XARELTO) 20 MG tablet Take 1 tablet by mouth Daily With Dinner. 30 tablet 0   • cyclobenzaprine (FLEXERIL) 10 MG tablet Take 1 tablet by mouth 3 (Three) Times a Day As Needed for Muscle Spasms. 90 tablet 0   • famotidine (PEPCID) 20 MG tablet Take 1 tablet by mouth Daily. 30 tablet 0   • HYDROcodone-acetaminophen (NORCO) 5-325 MG per tablet Take 1 tablet by mouth Every 6 (Six) Hours As Needed.     • Insulin Pen Needle (BD ULTRA-FINE PEN NEEDLES) 29G X 12.7MM misc 12.7 mm 2 (Two) Times a Day. 60 each 5     No current facility-administered medications on file prior to visit.        Social History     Socioeconomic History   • Marital status:      Spouse name: Not on file   • Number of children: Not on file   • Years of education: Not on file   • Highest education level: Not on file   Social Needs   • Financial resource strain: Not on file   • Food insecurity - worry: Not on file   • Food insecurity - inability: Not on file   • Transportation needs - medical: Not on file   • Transportation needs - non-medical: Not on file   Occupational History   • Not on file   Tobacco Use   • Smoking status: Former Smoker     Packs/day: 1.00   • Smokeless tobacco: Never Used   Substance and Sexual Activity   • Alcohol use: No   • Drug use: No   • Sexual activity: Not on file   Other Topics Concern   • Not on file   Social History Narrative   • Not on file           Review of Systems   Constitution: Negative.   HENT: Negative for congestion.    Eyes: Negative for vision loss in left eye and vision loss in right eye.   Respiratory: Negative.  Negative for cough, hemoptysis, shortness of breath, sleep disturbances due to breathing, snoring, sputum production and  "wheezing.    Endocrine: Negative.    Hematologic/Lymphatic: Negative.    Skin: Negative for poor wound healing and rash.   Musculoskeletal: Negative for falls, gout, muscle cramps and myalgias.   Gastrointestinal: Negative for abdominal pain, diarrhea, dysphagia, hematemesis, melena, nausea and vomiting.   Neurological: Negative for excessive daytime sleepiness, dizziness, headaches, light-headedness, loss of balance, seizures and vertigo.   Psychiatric/Behavioral: Negative for depression and substance abuse. The patient is not nervous/anxious.        Procedures    ECG 12 Lead  Date/Time: 1/30/2019 11:03 AM  Performed by: Valeria Bravo MD  Authorized by: Valeria Bravo MD   Comparison: compared with previous ECG   Similar to previous ECG  Rhythm: atrial fibrillation  Clinical impression: abnormal ECG                Objective:      Vitals:    01/30/19 1025   BP: 116/76   BP Location: Left arm   Patient Position: Sitting   Pulse: 69   Weight: (!) 152 kg (336 lb)   Height: 190.5 cm (75\")     Body mass index is 42 kg/m².    Physical Exam   Constitutional: He is oriented to person, place, and time. He appears well-developed and well-nourished. No distress.   HENT:   Head: Normocephalic and atraumatic.   Eyes: Conjunctivae are normal. No scleral icterus.   Neck: Neck supple. No JVD present. Carotid bruit is not present. No thyromegaly present.   Cardiovascular: Normal rate, S1 normal, S2 normal and intact distal pulses. An irregularly irregular rhythm present.  No extrasystoles are present. PMI is not displaced. Exam reveals no gallop.   Murmur heard.   Harsh midsystolic murmur is present with a grade of 2/6 at the upper right sternal border radiating to the neck.  Pulses:       Carotid pulses are 2+ on the right side, and 2+ on the left side.       Radial pulses are 2+ on the right side, and 2+ on the left side.        Dorsalis pedis pulses are 2+ on the right side, and 2+ on the left side.        " Posterior tibial pulses are 2+ on the right side, and 2+ on the left side.   Pulmonary/Chest: Effort normal and breath sounds normal. No respiratory distress. He has no wheezes. He has no rhonchi. He has no rales. He exhibits no tenderness.   Abdominal: Soft. Bowel sounds are normal. He exhibits no distension, no abdominal bruit and no mass. There is no tenderness.   Musculoskeletal: He exhibits no edema or deformity.   Lymphadenopathy:     He has no cervical adenopathy.   Neurological: He is alert and oriented to person, place, and time. No cranial nerve deficit.   Skin: Skin is warm and dry. No rash noted. He is not diaphoretic. No cyanosis. No pallor. Nails show no clubbing.   Psychiatric: He has a normal mood and affect. Judgment normal.   Vitals reviewed.      Lab Review:       Assessment:      Diagnosis Plan   1. Persistent atrial fibrillation (CMS/HCC)  Stress Test With Myocardial Perfusion One Day   2. Essential hypertension  Stress Test With Myocardial Perfusion One Day   3. Hyperlipidemia, unspecified hyperlipidemia type     4. Morbid obesity with BMI of 40.0-44.9, adult (CMS/[facility])     5. Controlled type 2 diabetes mellitus without complication, unspecified whether long term insulin use (CMS/[facility])  Stress Test With Myocardial Perfusion One Day   6. Postoperative atrial fibrillation (CMS/HCC)  metoprolol tartrate (LOPRESSOR) 25 MG tablet   7. Cardiomyopathy, unspecified type (CMS/[facility])  Stress Test With Myocardial Perfusion One Day     1. Persistent atrial fibrillation, on xarelto.   2. S/p c-spine surgery for cervical stenosis, done 11/2018.  3. DM  4. Hypertension - controlled.   5. Hyperlipidemia  6. Cardiomyopathy, no chf  7. Mild AS       Plan:       See fredrick in 6 months, no med changes.      Atrial Fibrillation and Atrial Flutter  Assessment  • The patient has persistent atrial fibrillation  • The patient's CHADS2-VASc score is 2  • A [id number] score of 2 or more is considered a high risk for a  thromboembolic event  • Rivaroxaban prescribed    Plan  • Continue in atrial fibrillation with rate control  • Continue rivaroxaban for antithrombotic therapy, bleeding issues discussed  • Continue beta blocker for rate control    Heart Failure  Assessment  • NYHA class II - There is slight limitation of physical activity. The patient is comfortable at rest, but physical activity results in fatigue, palpitations or shortness of breath.  • Beta blocker prescribed  • Angiotensin receptor blocker (ARB) prescribed  • Left ventricular function is mildly reduced by qualitative assessment    Plan  • The heart failure care plan was discussed with the patient today including: continuing the current program    Subjective/Objective    • Physical exam findings negative for rales and elevated JVP.

## 2019-01-31 ENCOUNTER — APPOINTMENT (OUTPATIENT)
Dept: OCCUPATIONAL THERAPY | Facility: HOSPITAL | Age: 64
End: 2019-01-31

## 2019-01-31 ENCOUNTER — APPOINTMENT (OUTPATIENT)
Dept: PHYSICAL THERAPY | Facility: HOSPITAL | Age: 64
End: 2019-01-31
Attending: PHYSICAL MEDICINE & REHABILITATION

## 2019-01-31 DIAGNOSIS — I48.91 POSTOPERATIVE ATRIAL FIBRILLATION (HCC): ICD-10-CM

## 2019-01-31 DIAGNOSIS — I97.89 POSTOPERATIVE ATRIAL FIBRILLATION (HCC): ICD-10-CM

## 2019-02-04 ENCOUNTER — APPOINTMENT (OUTPATIENT)
Dept: PHYSICAL THERAPY | Facility: HOSPITAL | Age: 64
End: 2019-02-04

## 2019-02-04 ENCOUNTER — APPOINTMENT (OUTPATIENT)
Dept: OCCUPATIONAL THERAPY | Facility: HOSPITAL | Age: 64
End: 2019-02-04

## 2019-02-05 ENCOUNTER — HOSPITAL ENCOUNTER (OUTPATIENT)
Dept: PHYSICAL THERAPY | Facility: HOSPITAL | Age: 64
Setting detail: THERAPIES SERIES
Discharge: HOME | End: 2019-02-05
Attending: NEUROLOGICAL SURGERY

## 2019-02-05 ENCOUNTER — HOSPITAL ENCOUNTER (OUTPATIENT)
Dept: OCCUPATIONAL THERAPY | Facility: HOSPITAL | Age: 64
Setting detail: THERAPIES SERIES
Discharge: HOME | End: 2019-02-05
Attending: NEUROLOGICAL SURGERY

## 2019-02-05 DIAGNOSIS — M25.612 DECREASED RANGE OF MOTION OF LEFT SHOULDER: Primary | ICD-10-CM

## 2019-02-05 DIAGNOSIS — M25.611 DECREASED RANGE OF MOTION OF RIGHT SHOULDER: ICD-10-CM

## 2019-02-05 DIAGNOSIS — R26.2 DIFFICULTY WALKING: Primary | ICD-10-CM

## 2019-02-05 DIAGNOSIS — Z74.09 IMPAIRED MOBILITY: ICD-10-CM

## 2019-02-05 PROCEDURE — 97110 THERAPEUTIC EXERCISES: CPT

## 2019-02-05 NOTE — THERAPY PROGRESS REPORT/RE-CERT
Outpatient Occupational Therapy Lymphedema Progress Note  [facility]     Patient Name: Duane Mark Witten  : 1955  MRN: [id number]  Today's Date: 2019      Visit Date: 2019    Patient Active Problem List   Diagnosis   • YANELI (generalized anxiety disorder)   • ADD (attention deficit disorder)   • Depression   • Diabetes type 2, controlled (CMS/HCC)   • ED (erectile dysfunction) of non-organic origin   • HLD (hyperlipidemia)   • Essential hypertension   • Arthritis   • Psoriasis   • Testosterone deficiency   • Primary insomnia   • Weakness of both lower extremities   • Paresthesia of both upper extremities   • Morbid obesity with BMI of 40.0-44.9, adult (CMS/HCC)   • Adverse effect of corticosteroids   • Type 2 diabetes mellitus with hyperglycemia (CMS/HCC)   • Cervical stenosis of spinal canal   • Edema of cervical spinal cord (CMS/HCC)   • Persistent atrial fibrillation (CMS/HCC)   • Cervical myelopathy (CMS/HCC)   • Postoperative visit        Past Medical History:   Diagnosis Date   • ADD (attention deficit disorder)    • Atrial fibrillation (CMS/HCC)    • Depression    • ED (erectile dysfunction)    • Essential hypertension    • YANELI (generalized anxiety disorder)    • HLD (hyperlipidemia)    • Morbid obesity with BMI of 40.0-44.9, adult (CMS/HCC)    • CAMERON (obstructive sleep apnea)    • Osteoarthrosis    • PONV (postoperative nausea and vomiting)    • Psoriasis    • Testosterone deficiency    • Type 2 diabetes mellitus (CMS/HCC)         Past Surgical History:   Procedure Laterality Date   • ANTERIOR CHANNEL VERTEBRECTOMY/CORPECTOMY Bilateral 2018    Procedure: C4, C5, C6 ANTERIOR CERVICAL CORPECTOMY;  Surgeon: Chirag Markham MD;  Location: [facility];  Service: Neurosurgery   • BIOPSY / EXCISION / DISSECTION AXILLARY NODE  1994    Lymph Node testing for cancer    • COLONOSCOPY      10+ years; normal   • NECK SURGERY  2018    C4/5/6 anterior cervical corpectomy   •  PARATHYROIDECTOMY  2008         Visit Dx:      ICD-10-CM ICD-9-CM   1. Decreased range of motion of left shoulder M25.612 719.51   2. Decreased range of motion of right shoulder M25.611 719.51                   OT Assessment/Plan     Row Name 02/05/19 1712 02/05/19 1348       OT Assessment    Functional Limitations  Limitations in community activities;Limitations in functional capacity and performance;Limitation in home management;Performance in leisure activities;Performance in self-care ADL;Performance in work activities  -RE  --    Impairments  Endurance;Range of motion;Muscle strength;Impaired muscle endurance;Sensation;Motor function  -RE  --    Assessment Comments  AROM/strength is improving slowly. Pt was fatigued today.   -RE  --    OT Diagnosis  UE weakness  -RE  --    OT Rehab Potential  Good  -RE  --    Patient/caregiver participated in establishment of treatment plan and goals  Yes  -RE  --    Patient would benefit from skilled therapy intervention  Yes  -RE  --       OT Plan    OT Frequency  1x/week  -RE  --    Predicted Duration of Therapy Intervention (Therapy Eval)  3-4 weeks  -RE  4 weeks   -LS    Planned CPT's?  OT THER PROC EA 15 MIN: 22469UF;OT SELF CARE/MGMT/TRAIN 15 MIN: 23002;OT NEUROMUSC RE EDUCATION EA 15 MIN: 66528;OT THER ACT EA 15 MIN: 74041DC  -RE  --    Planned Therapy Interventions (Optional Details)  home exercise program;patient/family education;ROM (Range of Motion);strengthening  -RE  --      User Key  (r) = Recorded By, (t) = Taken By, (c) = Cosigned By    Initials Name Provider Type    RE Anna Ryan OTR Occupational Therapist    Khushi Powers PT Physical Therapist              OT Exercises     Row Name 02/05/19 1700             Precautions    Existing Precautions/Restrictions  spinal;fall  -RE         Subjective Pain    Able to rate subjective pain?  yes  -RE      Pre-Treatment Pain Level  0  -RE      Post-Treatment Pain Level  0  -RE      Subjective Pain Comment  at  rest  -RE         Total Minutes    51190 - OT Therapeutic Exercise Minutes  45  -RE         Exercise 1    Exercise Name 1  Scapular retraction and protraction with 3# in supine  -RE      Intensity 1  Moderate  -RE         Exercise 2    Exercise Name 2  B shoulder ER sidelying  -RE      Cueing 2  Verbal;Tactile  -RE      Intensity 2  Other to fatigue  -RE         Exercise 3    Exercise Name 3  B shoulder flexion in supine  -RE         Exercise 4    Exercise Name 4  biceps curls  -RE      Weights/Plates 4  2  -RE      Sets 4  2  -RE      Reps 4  20  -RE         Exercise 5    Exercise Name 5  Shld flexion with pulleys  -RE      Sets 5  2  -RE         Exercise 6    Exercise Name 6  Scapular retraction, depression with and without theraband  -RE      Cueing 6  Verbal;Tactile;Demo  -RE      Resistance 6  Yellow  -RE        User Key  (r) = Recorded By, (t) = Taken By, (c) = Cosigned By    Initials Name Provider Type    Anna Bonilla, NURIS Occupational Therapist              OT Goals     Row Name 02/05/19 1700          OT Short Term Goals    STG Date to Achieve  02/19/19  -RE     STG 1  Patient independant with HEP to increase UE function to allow for improved ADL and leisure activities.   -RE     STG 1 Progress  Partially Met;Ongoing  -RE     STG 2  Pt to increase UE strength to allow for audra shoulder flexion to 160 in supine to increase functional use of UE's.  -RE     STG 2 Progress  Not Met;Ongoing  -RE        Long Term Goals    LTG Date to Achieve  03/05/19  -RE     LTG 1  Pt to increase audra shoulder flexion to 120 against gravity to allow for improved ADL function.  -RE     LTG 1 Progress  Not Met;Ongoing  -RE     LTG 1 Progress Comments  L shoulder flexion to 115 against gravity  -RE        Time Calculation    OT Goal Re-Cert Due Date  04/10/19  -RE       User Key  (r) = Recorded By, (t) = Taken By, (c) = Cosigned By    Initials Name Provider Type    Anna Bonilla, NURIS Occupational Therapist          Therapy  Education  Given: HEP  Program: Reinforced  How Provided: Verbal  Provided to: Patient  Level of Understanding: Teach back education performed, Verbalized  06830 - OT Self Care/Mgmt Minutes: 5                Time Calculation:   OT Start Time: 1315  OT Stop Time: 1400  OT Time Calculation (min): 45 min  Total Timed Code Minutes- OT: 45 minute(s)     Therapy Suggested Charges     Code   Minutes Charges    21824 (CPT®) Hc Ot Neuromusc Re Education Ea 15 Min      08731 (CPT®) Hc Ot Ther Proc Ea 15 Min 45 3    42642 (CPT®) Hc Ot Therapeutic Act Ea 15 Min      17626 (CPT®) Hc Ot Manual Therapy Ea 15 Min      05451 (CPT®) Hc Ot Iontophoresis Ea 15 Min      51128 (CPT®) Hc Ot Elec Stim Ea-Per 15 Min      58008 (CPT®) Hc Ot Ultrasound Ea 15 Min      03115 (CPT®) Hc Ot Self Care/Mgmt/Train Ea 15 Min 5      (CPT®) Hc Ot Electrical Stim Unattended      Total  50 3                            NURIS Cha  2/5/2019   Quality 137: Melanoma: Continuity Of Care - Recall System: Patient information entered into a recall system that includes: target date for the next exam specified AND a process to follow up with patients regarding missed or unscheduled appointments Quality 224: Stage 0-Iic Melanoma: Overutilization Of Imaging Studies For Only Stage 0-Iic Melanoma: None of the following diagnostic imaging studies ordered: chest X-ray, CT, Ultrasound, MRI, PET, or nuclear medicine scans (ML) Quality 226: Preventive Care And Screening: Tobacco Use: Screening And Cessation Intervention: Patient screened for tobacco and is an ex-smoker Quality 131: Pain Assessment And Follow-Up: Pain assessment using a standardized tool is documented as negative, no follow-up plan required Detail Level: Zone

## 2019-02-05 NOTE — THERAPY PROGRESS REPORT/RE-CERT
Outpatient Physical Therapy Ortho Progress Note  [facility]     Patient Name: Duane Mark Witten  : 1955  MRN: [id number]  Today's Date: 2019      Visit Date: 2019    Patient Active Problem List   Diagnosis   • YANELI (generalized anxiety disorder)   • ADD (attention deficit disorder)   • Depression   • Diabetes type 2, controlled (CMS/HCC)   • ED (erectile dysfunction) of non-organic origin   • HLD (hyperlipidemia)   • Essential hypertension   • Arthritis   • Psoriasis   • Testosterone deficiency   • Primary insomnia   • Weakness of both lower extremities   • Paresthesia of both upper extremities   • Morbid obesity with BMI of 40.0-44.9, adult (CMS/HCC)   • Adverse effect of corticosteroids   • Type 2 diabetes mellitus with hyperglycemia (CMS/HCC)   • Cervical stenosis of spinal canal   • Edema of cervical spinal cord (CMS/HCC)   • Persistent atrial fibrillation (CMS/HCC)   • Cervical myelopathy (CMS/HCC)   • Postoperative visit        Past Medical History:   Diagnosis Date   • ADD (attention deficit disorder)    • Atrial fibrillation (CMS/HCC)    • Depression    • ED (erectile dysfunction)    • Essential hypertension    • YANELI (generalized anxiety disorder)    • HLD (hyperlipidemia)    • Morbid obesity with BMI of 40.0-44.9, adult (CMS/HCC)    • CAMERON (obstructive sleep apnea)    • Osteoarthrosis    • PONV (postoperative nausea and vomiting)    • Psoriasis    • Testosterone deficiency    • Type 2 diabetes mellitus (CMS/HCC)         Past Surgical History:   Procedure Laterality Date   • ANTERIOR CHANNEL VERTEBRECTOMY/CORPECTOMY Bilateral 2018    Procedure: C4, C5, C6 ANTERIOR CERVICAL CORPECTOMY;  Surgeon: Chirag Markham MD;  Location: [facility];  Service: Neurosurgery   • BIOPSY / EXCISION / DISSECTION AXILLARY NODE  1994    Lymph Node testing for cancer    • COLONOSCOPY      10+ years; normal   • NECK SURGERY  2018    C4/5/6 anterior cervical corpectomy   • PARATHYROIDECTOMY   2008       Visit Dx:     ICD-10-CM ICD-9-CM   1. Difficulty walking R26.2 719.7   2. Impaired mobility Z74.09 799.89                           Therapy Education  Education Details: encouraged pt to increase ambulation within his home as well as time spent in standing daily in order to improve endurance  Given: Symptoms/condition management, HEP, Mobility training  Program: Reinforced  How Provided: Verbal, Demonstration  Provided to: Patient  Level of Understanding: Teach back education performed, Verbalized, Demonstrated     PT OP Goals     Row Name 02/05/19 1300          PT Short Term Goals    STG Date to Achieve  02/19/19  -LS     STG 1  Pt will demonstrate understanding and compliance with initial HEP.  -LS     STG 1 Progress  Met  -LS     STG 2  Pt will report improved ability to perform STS from home chair without requiring multiple attempts.  -LS     STG 2 Progress  Met  -LS     STG 2 Progress Comments  Pt able to perform with BUE assist.  -LS     STG 3  Pt will demonstrate improved tolerance to standing from 5 minutes to 15 minutes during therapy session.  -LS     STG 3 Progress  Ongoing  -LS     STG 3 Progress Comments  Pt continues to report fatigue after 5 minutes. Able to tolerate 10 mintues within clinic.   -LS        Long Term Goals    LTG Date to Achieve  02/07/19  -LS     LTG 1  Pt will demonstrate ability to tolerate 30 minutes of standing to improve his ability to perform household tasks.   -LS     LTG 1 Progress  Ongoing  -LS     LTG 1 Progress Comments  Slow improvement.  -LS     LTG 2  Pt will demonstrate STS x 5 from normal chair height in 30 seconds or less.  -LS     LTG 2 Progress  Ongoing  -LS     LTG 2 Progress Comments  38 seconds with UE assist.  -LS     LTG 3  Pt will demonstrate ability to safely ambulate using SC for household distance and within clinic.   -LS     LTG 3 Progress  Ongoing  -LS     LTG 3 Progress Comments  Pt continues to use RW for all ambulation.  -       User Key   (r) = Recorded By, (t) = Taken By, (c) = Cosigned By    Initials Name Provider Type    LS Khushi Trujillo, PT Physical Therapist          PT Assessment/Plan     Row Name 02/05/19 6608          PT Assessment    Functional Limitations  Decreased safety during functional activities;Limitations in functional capacity and performance;Impaired gait;Performance in leisure activities;Impaired locomotion;Performance in self-care ADL;Limitation in home management;Performance in sport activities;Limitations in community activities;Performance in work activities  -LS     Impairments  Endurance;Motor function;Range of motion;Posture;Locomotion;Joint mobility;Poor body mechanics;Peripheral nerve integrity;Pain;Impaired postural alignment;Muscle strength;Sensation;Impaired flexibility;Gait;Balance  -LS     Assessment Comments  Pt is 64 yo male s/p C3-C6 corpectomy and fusion after central cord compression and 3 weeks of immobility. He demonstrates slow improvements in functional capacity with standing tolerance to 5 minutes of continuous standing. He can now rise from normal height chairs with use of BUE without AD and without requiring multiple attempts. His 5x STS time with BUE assist is 38 seconds. He is able to drive and can tolerate short trips with minimal extended walking distances. He is tolerating increasing amounts of resistance for LE exercise and increasing amounts of time spent in standing within clinic. He remains limited in his ability to toelrate grocery shopping, walking for any longer distances than household and short office negotiation, and to cook/clean his home. He will benefit from continued skilled PT services in order to improve his tolerance to mobility and core/LE strength.   -LS     Please refer to paper survey for additional self-reported information  Yes  -LS     Rehab Potential  Good  -LS     Patient/caregiver participated in establishment of treatment plan and goals  Yes  -LS     Patient would  benefit from skilled therapy intervention  Yes  -LS        PT Plan    PT Frequency  1x/week  -LS     Predicted Duration of Therapy Intervention (Therapy Eval)  4 weeks   -LS     Planned CPT's?  PT RE-EVAL: 53209;PT THER PROC EA 15 MIN: 67542;PT THER ACT EA 15 MIN: 36982;PT MANUAL THERAPY EA 15 MIN: 13117;PT GAIT TRAINING EA 15 MIN: 37300;PT HOT OR COLD PACK TREAT MCARE;PT ELECTRICAL STIM UNATTEND: ;PT ULTRASOUND EA 15 MIN: 24680  -LS     PT Plan Comments  Continue to progress, follow up on pt's independent walking/standing program.   -LS       User Key  (r) = Recorded By, (t) = Taken By, (c) = Cosigned By    Initials Name Provider Type    LS Khushi Trujillo, PT Physical Therapist            Exercises     Row Name 02/05/19 1300             Subjective Comments    Subjective Comments  I am doing ok. I just feel like I am not getting any better. I can't tolerate standing or walking for any length of time.  -LS         Subjective Pain    Able to rate subjective pain?  yes  -LS      Pre-Treatment Pain Level  4  -LS      Subjective Pain Comment  My knees bother me.  -LS         Total Minutes    43631 - PT Therapeutic Exercise Minutes  44  -LS         Exercise 1    Exercise Name 1  walking march in // bars  -LS      Reps 1  3 laps  -LS         Exercise 2    Exercise Name 2  standing HS curl  -LS      Sets 2  2  -LS      Reps 2  10  -LS      Additional Comments  3#  -LS         Exercise 3    Exercise Name 3  HR  -LS      Reps 3  20  -LS         Exercise 4    Exercise Name 4  STS   -LS      Sets 4  2  -LS      Reps 4  10  -LS      Additional Comments  10 from elevated mat, 5 from lower, 5 from chair  -LS         Exercise 5    Exercise Name 5  walking in // bars  -LS      Cueing 5  Verbal cuing for heel strike + roll over  -LS      Reps 5  4 laps  -LS      Additional Comments  forward, retro, lateral, tandem, heel/toe  -LS         Exercise 6    Exercise Name 6  LAQ  -LS      Sets 6  2  -LS      Reps 6  10  -LS       Additional Comments  4# B  -LS         Exercise 7    Exercise Name 7  seated HS curl  -LS      Sets 7  2  -LS      Reps 7  10  -LS      Additional Comments  BTB  -LS         Exercise 9    Exercise Name 9  lateral walking tband  -LS      Reps 9  2 laps  -LS      Additional Comments  GTB  -LS         Exercise 12    Exercise Name 12  Nustep  -LS      Time 12  5 minutes  -LS        User Key  (r) = Recorded By, (t) = Taken By, (c) = Cosigned By    Initials Name Provider Type    LS Khushi Trujillo, PT Physical Therapist                        Outcome Measure Options: 5x Sit to Stand  5 Times Sit to Stand  5 Times Sit to Stand (seconds): 38 seconds  5 Times Sit to Stand Comments: BUE assist         Time Calculation:     Therapy Suggested Charges     Code   Minutes Charges    93058 (CPT®) Hc Pt Neuromusc Re Education Ea 15 Min      67415 (CPT®) Hc Pt Ther Proc Ea 15 Min 44 3    51059 (CPT®) Hc Gait Training Ea 15 Min      18377 (CPT®) Hc Pt Therapeutic Act Ea 15 Min      16047 (CPT®) Hc Pt Manual Therapy Ea 15 Min      26600 (CPT®) Hc Pt Ther Massage- Per 15 Min      08009 (CPT®) Hc Pt Iontophoresis Ea 15 Min      73480 (CPT®) Hc Pt Elec Stim Ea-Per 15 Min      67626 (CPT®) Hc Pt Ultrasound Ea 15 Min      31500 (CPT®) Hc Pt Self Care/Mgmt/Train Ea 15 Min      39977 (CPT®) Hc Pt Prosthetic (S) Train Initial Encounter, Each 15 Min      33326 (CPT®) Hc Orthotic(S) Mgmt/Train Initial Encounter, Each 15min      07602 (CPT®) Hc Pt Aquatic Therapy Ea 15 Min      69948 (CPT®) Hc Pt Orthotic(S)/Prosthetic(S) Encounter, Each 15 Min       (CPT®) Hc Pt Electrical Stim Unattended      Total  44 3          Start Time: 1230  Stop Time: 1315  Time Calculation (min): 45 min  Total Timed Code Minutes- PT: 43 minute(s)     Therapy Charges for Today     Code Description Service Date Service Provider Modifiers Qty    04185066816 HC PT THER PROC EA 15 MIN 2/5/2019 Khushi Trujillo, PT GP 3          PT G-Codes  Outcome Measure Options: 5x Sit  to Stand         Khushi Trujillo, PT  2/5/2019

## 2019-02-07 ENCOUNTER — APPOINTMENT (OUTPATIENT)
Dept: PHYSICAL THERAPY | Facility: HOSPITAL | Age: 64
End: 2019-02-07
Attending: PHYSICAL MEDICINE & REHABILITATION

## 2019-02-07 ENCOUNTER — APPOINTMENT (OUTPATIENT)
Dept: OCCUPATIONAL THERAPY | Facility: HOSPITAL | Age: 64
End: 2019-02-07

## 2019-02-08 ENCOUNTER — HOSPITAL ENCOUNTER (OUTPATIENT)
Dept: GENERAL RADIOLOGY | Facility: HOSPITAL | Age: 64
Discharge: HOME | End: 2019-02-08

## 2019-02-08 ENCOUNTER — HOSPITAL ENCOUNTER (OUTPATIENT)
Dept: CARDIOLOGY | Facility: HOSPITAL | Age: 64
Discharge: HOME | End: 2019-02-08
Attending: INTERNAL MEDICINE | Admitting: INTERNAL MEDICINE

## 2019-02-08 VITALS — WEIGHT: 315 LBS | BODY MASS INDEX: 39.17 KG/M2 | HEIGHT: 75 IN

## 2019-02-08 DIAGNOSIS — I10 ESSENTIAL HYPERTENSION: ICD-10-CM

## 2019-02-08 DIAGNOSIS — E11.9 CONTROLLED TYPE 2 DIABETES MELLITUS WITHOUT COMPLICATION, UNSPECIFIED WHETHER LONG TERM INSULIN USE (HCC): ICD-10-CM

## 2019-02-08 DIAGNOSIS — Z48.89 POSTOPERATIVE VISIT: ICD-10-CM

## 2019-02-08 DIAGNOSIS — I42.9 CARDIOMYOPATHY, UNSPECIFIED TYPE (HCC): ICD-10-CM

## 2019-02-08 DIAGNOSIS — I48.19 PERSISTENT ATRIAL FIBRILLATION (HCC): ICD-10-CM

## 2019-02-08 LAB
BH CV NUCLEAR PRIOR STUDY: 3
BH CV STRESS BP STAGE 1: NORMAL
BH CV STRESS COMMENTS STAGE 1: NORMAL
BH CV STRESS DOSE REGADENOSON STAGE 1: 0.4
BH CV STRESS DURATION MIN STAGE 1: 0
BH CV STRESS DURATION SEC STAGE 1: 10
BH CV STRESS HR STAGE 1: 106
BH CV STRESS PROTOCOL 1: NORMAL
BH CV STRESS RECOVERY BP: NORMAL MMHG
BH CV STRESS RECOVERY HR: 105 BPM
BH CV STRESS STAGE 1: 1
LV EF NUC BP: 36 %
MAXIMAL PREDICTED HEART RATE: 156 BPM
PERCENT MAX PREDICTED HR: 67.95 %
STRESS BASELINE BP: NORMAL MMHG
STRESS BASELINE HR: 109 BPM
STRESS PERCENT HR: 80 %
STRESS POST EXERCISE DUR SEC: 10 SEC
STRESS POST PEAK BP: NORMAL MMHG
STRESS POST PEAK HR: 106 BPM
STRESS TARGET HR: 133 BPM

## 2019-02-08 PROCEDURE — 78492 MYOCRD IMG PET MLT RST&STRS: CPT

## 2019-02-08 PROCEDURE — A9555 RB82 RUBIDIUM: HCPCS | Performed by: INTERNAL MEDICINE

## 2019-02-08 PROCEDURE — 0 RUBIDIUM CHLORIDE: Performed by: INTERNAL MEDICINE

## 2019-02-08 PROCEDURE — 93017 CV STRESS TEST TRACING ONLY: CPT

## 2019-02-08 PROCEDURE — 78492 MYOCRD IMG PET MLT RST&STRS: CPT | Performed by: INTERNAL MEDICINE

## 2019-02-08 PROCEDURE — 72050 X-RAY EXAM NECK SPINE 4/5VWS: CPT

## 2019-02-08 PROCEDURE — 25010000002 REGADENOSON 0.4 MG/5ML SOLUTION: Performed by: INTERNAL MEDICINE

## 2019-02-08 PROCEDURE — 93018 CV STRESS TEST I&R ONLY: CPT | Performed by: INTERNAL MEDICINE

## 2019-02-08 PROCEDURE — 93016 CV STRESS TEST SUPVJ ONLY: CPT | Performed by: INTERNAL MEDICINE

## 2019-02-08 RX ADMIN — RUBIDIUM CHLORIDE RB-82 1 DOSE: 150 INJECTION, SOLUTION INTRAVENOUS at 09:27

## 2019-02-08 RX ADMIN — REGADENOSON 0.4 MG: 0.08 INJECTION, SOLUTION INTRAVENOUS at 09:43

## 2019-02-08 RX ADMIN — RUBIDIUM CHLORIDE RB-82 1 DOSE: 150 INJECTION, SOLUTION INTRAVENOUS at 09:43

## 2019-02-08 NOTE — PROGRESS NOTES
"Subjective   Patient ID: Duane Mark Witten is a 64 y.o. male is here today for post op appt after PT and with new cervical spine plain films- pt had surgery 11.28.18  C4/5/6 ACCF.  He was last seen 1.2.19 and had been unable to go to PT due to transportation issues,however,he was released to drive himself. He is using a bone stimulator.    Patient is currently having thoracic spine pain \" tightness\" when his neck is extended.  Patient denies neck, arm or shoulder arm pain. He has constant N/T bilateral arms/hands. He states that his arm weakness is improving slowly.   He is going to PT/OT once a week.  He states that his balance and gait have improved slightly, he is still using a walker to ambulate with.  He is having very little control over his bowels, unsure if related to his surgery or not.  He does have some problems swallowing on small pieces of food.    Back Pain   The problem occurs intermittently. The pain is present in the thoracic spine. The pain is at a severity of 5/10. The pain is moderate. Associated symptoms include numbness and weakness.   Extremity Weakness    The pain is present in the right arm and left arm. The problem occurs constantly. Associated symptoms include numbness.   Difficulty Walking   The problem occurs intermittently. Associated symptoms include arthralgias, numbness and weakness.       The following portions of the patient's history were reviewed and updated as appropriate: allergies, current medications, past family history, past medical history, past social history, past surgical history and problem list.    Review of Systems   Musculoskeletal: Positive for arthralgias, back pain and extremity weakness.   Neurological: Positive for weakness and numbness.   All other systems reviewed and are negative.    It has been almost 3 months since he underwent C4, C5, and C6 corpectomy with cage and plate for a profound progressive myelopathy. Progress is slow as to be expected. He is " working with the therapist once a week because twice a week exhausted him. He is driving. He came to the conclusion that he really would not be able to return to work. He is having some difficulty controlling his anal sphincter and sometimes has accidents. Again, this is not surprising either given how profoundly affected his spinal cord function was. He is still using the walker and the bone stimulator and he is doing his Kegel exercises but it is slow. It is further complicated by the fact that his wife broke her leg and is recovering from that, but I think we need to continue and stay the course and continue therapy. I will see him in 3 months. He is in the process of applying for social security disability which I think is entirely reasonable.         Objective   Physical Exam   Constitutional: He appears well-developed and well-nourished.   HENT:   Head: Normocephalic and atraumatic.   Eyes: Conjunctivae are normal. Pupils are equal, round, and reactive to light.   Fundoscopic exam:       The right eye shows no papilledema. The right eye shows venous pulsations.        The left eye shows no papilledema. The left eye shows venous pulsations.   Neck: Carotid bruit is not present.     Neurologic Exam     Cranial Nerves     CN III, IV, VI   Pupils are equal, round, and reactive to light.      Assessment/Plan   Independent Review of Radiographic Studies:    The x-rays done on 2/8/19 show good positioning of the hardware without any change from the intraoperative films.  He has a corpectomy at C4, C5, C6 with a cage and plate from C3 to C7.  Agree with the report.      Medical Decision Making:    Making slow progress.  He'll continue therapy.  I will see him in 3 months.      Duane was seen today for post-op, back pain, numbness, extremity weakness and difficulty walking.    Diagnoses and all orders for this visit:    Postoperative visit      Return in about 3 months (around 5/11/2019).

## 2019-02-11 ENCOUNTER — OFFICE VISIT (OUTPATIENT)
Dept: NEUROSURGERY | Facility: CLINIC | Age: 64
End: 2019-02-11

## 2019-02-11 ENCOUNTER — APPOINTMENT (OUTPATIENT)
Dept: PHYSICAL THERAPY | Facility: HOSPITAL | Age: 64
End: 2019-02-11

## 2019-02-11 DIAGNOSIS — Z48.89 POSTOPERATIVE VISIT: Primary | ICD-10-CM

## 2019-02-11 PROCEDURE — 99024 POSTOP FOLLOW-UP VISIT: CPT | Performed by: NEUROLOGICAL SURGERY

## 2019-02-14 ENCOUNTER — APPOINTMENT (OUTPATIENT)
Dept: PHYSICAL THERAPY | Facility: HOSPITAL | Age: 64
End: 2019-02-14
Attending: PHYSICAL MEDICINE & REHABILITATION

## 2019-02-18 ENCOUNTER — APPOINTMENT (OUTPATIENT)
Dept: PHYSICAL THERAPY | Facility: HOSPITAL | Age: 64
End: 2019-02-18

## 2019-02-19 ENCOUNTER — HOSPITAL ENCOUNTER (OUTPATIENT)
Dept: PHYSICAL THERAPY | Facility: HOSPITAL | Age: 64
Setting detail: THERAPIES SERIES
Discharge: HOME | End: 2019-02-19

## 2019-02-19 DIAGNOSIS — Z74.09 IMPAIRED MOBILITY: ICD-10-CM

## 2019-02-19 DIAGNOSIS — R26.2 DIFFICULTY WALKING: Primary | ICD-10-CM

## 2019-02-19 PROCEDURE — 97110 THERAPEUTIC EXERCISES: CPT

## 2019-02-19 NOTE — THERAPY TREATMENT NOTE
Outpatient Physical Therapy Ortho Treatment Note  [facility]     Patient Name: Duane Mark Witten  : 1955  MRN: [id number]  Today's Date: 2019      Visit Date: 2019    Visit Dx:    ICD-10-CM ICD-9-CM   1. Difficulty walking R26.2 719.7   2. Impaired mobility Z74.09 799.89       Patient Active Problem List   Diagnosis   • YANELI (generalized anxiety disorder)   • ADD (attention deficit disorder)   • Depression   • Diabetes type 2, controlled (CMS/HCC)   • ED (erectile dysfunction) of non-organic origin   • HLD (hyperlipidemia)   • Essential hypertension   • Arthritis   • Psoriasis   • Testosterone deficiency   • Primary insomnia   • Weakness of both lower extremities   • Paresthesia of both upper extremities   • Morbid obesity with BMI of 40.0-44.9, adult (CMS/HCC)   • Adverse effect of corticosteroids   • Type 2 diabetes mellitus with hyperglycemia (CMS/HCC)   • Cervical stenosis of spinal canal   • Edema of cervical spinal cord (CMS/HCC)   • Persistent atrial fibrillation (CMS/HCC)   • Cervical myelopathy (CMS/HCC)   • Postoperative visit        Past Medical History:   Diagnosis Date   • ADD (attention deficit disorder)    • Atrial fibrillation (CMS/HCC)    • Depression    • ED (erectile dysfunction)    • Essential hypertension    • YANELI (generalized anxiety disorder)    • HLD (hyperlipidemia)    • Morbid obesity with BMI of 40.0-44.9, adult (CMS/HCC)    • CAMERON (obstructive sleep apnea)    • Osteoarthrosis    • PONV (postoperative nausea and vomiting)    • Psoriasis    • Testosterone deficiency    • Type 2 diabetes mellitus (CMS/HCC)         Past Surgical History:   Procedure Laterality Date   • ANTERIOR CHANNEL VERTEBRECTOMY/CORPECTOMY Bilateral 2018    Procedure: C4, C5, C6 ANTERIOR CERVICAL CORPECTOMY;  Surgeon: Chirag Markham MD;  Location: [facility] OR;  Service: Neurosurgery   • BIOPSY / EXCISION / DISSECTION AXILLARY NODE  1994    Lymph Node testing for cancer    • COLONOSCOPY  "     10+ years; normal   • NECK SURGERY  11/27/2018    C4/5/6 anterior cervical corpectomy   • PARATHYROIDECTOMY  2008                       PT Assessment/Plan     Row Name 02/19/19 2455          PT Assessment    Assessment Comments  Pt making small improvements in LE strength, balance, and tolerance to standing. He was able to tolerate 20 minutes of near continuous standing activity today without rest break. He reports ability to stand on first attempt from home chairs \"majority of the time.\" Continued functional strengthening without adverse reaction. Pt requires assist with supine to sit from elevated mat table. Pt concerned with inability to return to work and price of health insurance.   -LS        PT Plan    PT Plan Comments  Continue functional strengthening. Continue to encourage independent mobility.   -LS       User Key  (r) = Recorded By, (t) = Taken By, (c) = Cosigned By    Initials Name Provider Type    LS Khushi Trujillo, PT Physical Therapist              Exercises     Row Name 02/19/19 1500             Subjective Comments    Subjective Comments  I am doing ok. I can get up most of the time with one stand from a chair. I am really sore and achy due to all of this cold weather.  -LS         Subjective Pain    Able to rate subjective pain?  yes  -LS      Pre-Treatment Pain Level  6  -LS         Total Minutes    94226 - PT Therapeutic Exercise Minutes  43  -LS         Exercise 1    Exercise Name 1  standing march  -LS      Sets 1  3  -LS      Reps 1  10  -LS      Additional Comments  4# cuff weight  -LS         Exercise 2    Exercise Name 2  seated HS curl  -LS      Sets 2  --  -LS      Reps 2  20  -LS      Additional Comments  BTB + 4#  -LS         Exercise 3    Exercise Name 3  HR  -LS      Reps 3  20  -LS         Exercise 4    Exercise Name 4  STS   -LS      Sets 4  --  -LS      Reps 4  10  -LS         Exercise 5    Exercise Name 5  walking in // bars  -LS      Cueing 5  Verbal cuing for heel strike + " "roll over  -LS      Reps 5  4 laps  -LS      Additional Comments  forward, retro, lateral, tandem, heel/toe  -LS         Exercise 6    Exercise Name 6  LAQ  -LS      Sets 6  2  -LS      Reps 6  10  -LS      Additional Comments  4#  -LS         Exercise 7    Exercise Name 7  step tap  -LS      Sets 7  2  -LS      Reps 7  20  -LS         Exercise 8    Exercise Name 8  4\" step up   -LS      Reps 8  10  -LS      Additional Comments  each LE  -LS         Exercise 9    Exercise Name 9  standing hip extension   -LS      Reps 9  20  -LS      Additional Comments  4#; B  -LS         Exercise 10    Exercise Name 10  hip bridge  -LS      Sets 10  2  -LS      Reps 10  10  -LS      Time 10  3  -LS         Exercise 11    Exercise Name 11  DKTC with TA  -LS      Reps 11  15  -LS      Time 11  5  -LS         Exercise 12    Exercise Name 12  Nustep  -LS      Time 12  5 minutes  -LS      Additional Comments  Level 4   -LS         Exercise 13    Exercise Name 13  HL hip abduction  -LS      Sets 13  2  -LS      Reps 13  15  -LS      Additional Comments  one set B/ one set uni GTB  -LS        User Key  (r) = Recorded By, (t) = Taken By, (c) = Cosigned By    Initials Name Provider Type    Khushi Powers, PT Physical Therapist                         PT OP Goals     Row Name 02/19/19 1500          PT Short Term Goals    STG Date to Achieve  02/19/19  -LS     STG 1  Pt will demonstrate understanding and compliance with initial HEP.  -LS     STG 1 Progress  Met  -LS     STG 2  Pt will report improved ability to perform STS from home chair without requiring multiple attempts.  -LS     STG 2 Progress  Met  -LS     STG 3  Pt will demonstrate improved tolerance to standing from 5 minutes to 15 minutes during therapy session.  -LS     STG 3 Progress  Met  -LS     STG 3 Progress Comments  Good tolerance to primarily standing activities today for first 20 minutes.  -LS        Long Term Goals    LTG Date to Achieve  02/07/19  -LS     LTG 1  Pt " will demonstrate ability to tolerate 30 minutes of standing to improve his ability to perform household tasks.   -LS     LTG 1 Progress  Ongoing  -LS     LTG 2  Pt will demonstrate STS x 5 from normal chair height in 30 seconds or less.  -LS     LTG 2 Progress  Ongoing  -LS     LTG 3  Pt will demonstrate ability to safely ambulate using SC for household distance and within clinic.   -LS     LTG 3 Progress  Ongoing  -LS       User Key  (r) = Recorded By, (t) = Taken By, (c) = Cosigned By    Initials Name Provider Type    Khushi Powers, PT Physical Therapist          Therapy Education  Given: Symptoms/condition management, HEP  Program: Reinforced  How Provided: Verbal, Demonstration  Provided to: Patient  Level of Understanding: Teach back education performed, Verbalized, Demonstrated              Time Calculation:   Start Time: 1515  Stop Time: 1600  Time Calculation (min): 45 min  Total Timed Code Minutes- PT: 43 minute(s)  Therapy Suggested Charges     Code   Minutes Charges    16104 (CPT®) Hc Pt Neuromusc Re Education Ea 15 Min      60703 (CPT®) Hc Pt Ther Proc Ea 15 Min 43 3    85080 (CPT®) Hc Gait Training Ea 15 Min      81647 (CPT®) Hc Pt Therapeutic Act Ea 15 Min      90374 (CPT®) Hc Pt Manual Therapy Ea 15 Min      12111 (CPT®) Hc Pt Ther Massage- Per 15 Min      71153 (CPT®) Hc Pt Iontophoresis Ea 15 Min      79690 (CPT®) Hc Pt Elec Stim Ea-Per 15 Min      22143 (CPT®) Hc Pt Ultrasound Ea 15 Min      98915 (CPT®) Hc Pt Self Care/Mgmt/Train Ea 15 Min      56675 (CPT®) Hc Pt Prosthetic (S) Train Initial Encounter, Each 15 Min      98110 (CPT®) Hc Orthotic(S) Mgmt/Train Initial Encounter, Each 15min      20944 (CPT®) Hc Pt Aquatic Therapy Ea 15 Min      67393 (CPT®) Hc Pt Orthotic(S)/Prosthetic(S) Encounter, Each 15 Min       (CPT®) Hc Pt Electrical Stim Unattended      Total  43 3        Therapy Charges for Today     Code Description Service Date Service Provider Modifiers Qty    48130370782 HC PT  THER PROC EA 15 MIN 2/19/2019 Khushi Trujillo, PT GP 3                    Khushi Trujillo, PT  2/19/2019

## 2019-02-21 ENCOUNTER — APPOINTMENT (OUTPATIENT)
Dept: PHYSICAL THERAPY | Facility: HOSPITAL | Age: 64
End: 2019-02-21
Attending: PHYSICAL MEDICINE & REHABILITATION

## 2019-02-25 ENCOUNTER — APPOINTMENT (OUTPATIENT)
Dept: PHYSICAL THERAPY | Facility: HOSPITAL | Age: 64
End: 2019-02-25

## 2019-02-26 ENCOUNTER — HOSPITAL ENCOUNTER (OUTPATIENT)
Dept: PHYSICAL THERAPY | Facility: HOSPITAL | Age: 64
Setting detail: THERAPIES SERIES
Discharge: HOME | End: 2019-02-26

## 2019-02-26 DIAGNOSIS — Z74.09 IMPAIRED MOBILITY: ICD-10-CM

## 2019-02-26 DIAGNOSIS — R26.2 DIFFICULTY WALKING: Primary | ICD-10-CM

## 2019-02-26 PROCEDURE — 97110 THERAPEUTIC EXERCISES: CPT

## 2019-02-26 NOTE — THERAPY TREATMENT NOTE
Outpatient Physical Therapy Ortho Treatment Note  [facility]     Patient Name: Duane Mark Witten  : 1955  MRN: [id number]  Today's Date: 2019      Visit Date: 2019    Visit Dx:    ICD-10-CM ICD-9-CM   1. Difficulty walking R26.2 719.7   2. Impaired mobility Z74.09 799.89       Patient Active Problem List   Diagnosis   • YANELI (generalized anxiety disorder)   • ADD (attention deficit disorder)   • Depression   • Diabetes type 2, controlled (CMS/HCC)   • ED (erectile dysfunction) of non-organic origin   • HLD (hyperlipidemia)   • Essential hypertension   • Arthritis   • Psoriasis   • Testosterone deficiency   • Primary insomnia   • Weakness of both lower extremities   • Paresthesia of both upper extremities   • Morbid obesity with BMI of 40.0-44.9, adult (CMS/HCC)   • Adverse effect of corticosteroids   • Type 2 diabetes mellitus with hyperglycemia (CMS/HCC)   • Cervical stenosis of spinal canal   • Edema of cervical spinal cord (CMS/HCC)   • Persistent atrial fibrillation (CMS/HCC)   • Cervical myelopathy (CMS/HCC)   • Postoperative visit        Past Medical History:   Diagnosis Date   • ADD (attention deficit disorder)    • Atrial fibrillation (CMS/HCC)    • Depression    • ED (erectile dysfunction)    • Essential hypertension    • YANELI (generalized anxiety disorder)    • HLD (hyperlipidemia)    • Morbid obesity with BMI of 40.0-44.9, adult (CMS/HCC)    • CAMERON (obstructive sleep apnea)    • Osteoarthrosis    • PONV (postoperative nausea and vomiting)    • Psoriasis    • Testosterone deficiency    • Type 2 diabetes mellitus (CMS/HCC)         Past Surgical History:   Procedure Laterality Date   • ANTERIOR CHANNEL VERTEBRECTOMY/CORPECTOMY Bilateral 2018    Procedure: C4, C5, C6 ANTERIOR CERVICAL CORPECTOMY;  Surgeon: Chirag Markham MD;  Location: [facility] OR;  Service: Neurosurgery   • BIOPSY / EXCISION / DISSECTION AXILLARY NODE  1994    Lymph Node testing for cancer    • COLONOSCOPY       10+ years; normal   • NECK SURGERY  11/27/2018    C4/5/6 anterior cervical corpectomy   • PARATHYROIDECTOMY  2008                       PT Assessment/Plan     Row Name 02/26/19 1605          PT Assessment    Assessment Comments  Pt continues to demonstrate improved tolerance to continuous standing and standing exercises. 30 minutes today with 2 seated rest breaks for 2 minutes or less. After 30 minutes pt rather fatigued and reports weakness. Pt unable to tolerate 1.5 hours continuous PT/OT so plan to see PT/OT every other week for next month. Pt reports functional improvements with ability to ambulate within kitchen without AD and carrying in one load of groceries before rest needed.   -LS        PT Plan    PT Plan Comments  Continue every other week OT/PT, functional strengthening.  -LS       User Key  (r) = Recorded By, (t) = Taken By, (c) = Cosigned By    Initials Name Provider Type    LS Khushi Trujillo, PT Physical Therapist              Exercises     Row Name 02/26/19 1600             Subjective Comments    Subjective Comments  I am doing pretty good. I have been getting around better and I can carry groceries in.  -LS         Subjective Pain    Able to rate subjective pain?  yes  -LS      Pre-Treatment Pain Level  4  -LS      Subjective Pain Comment  I do have some pain from OA.  -LS         Total Minutes    56999 - PT Therapeutic Exercise Minutes  40  -LS         Exercise 1    Exercise Name 1  seated march  -LS      Reps 1  20  -LS      Additional Comments  3# (during rest break)  -LS         Exercise 2    Exercise Name 2  standing HS curl  -LS      Reps 2  20  -LS      Additional Comments  3# B  -LS         Exercise 3    Exercise Name 3  HR  -LS      Reps 3  20  -LS         Exercise 4    Exercise Name 4  lateral walking with tband  -LS      Reps 4  3 laps   -LS      Additional Comments  GTB  -LS         Exercise 5    Exercise Name 5  walking in // bars  -LS      Cueing 5  Verbal cuing for heel strike +  "roll over  -LS      Reps 5  4 laps  -LS      Additional Comments  forward, retro, lateral, tandem, heel/toe  -LS         Exercise 6    Exercise Name 6  LAQ  -LS      Sets 6  2  -LS      Reps 6  10  -LS      Additional Comments  4#  -LS         Exercise 7    Exercise Name 7  walking slow march  -LS      Reps 7  3 laps  -LS         Exercise 8    Exercise Name 8  4\" step up   -LS      Sets 8  2  -LS      Reps 8  10  -LS      Additional Comments  10 each  -LS         Exercise 9    Exercise Name 9  standing hip abduction  -LS      Reps 9  20  -LS      Additional Comments  3# B  -LS         Exercise 12    Exercise Name 12  Nustep  -LS      Time 12  5 minutes  -LS      Additional Comments  Level 5  -LS         Exercise 14    Exercise Name 14  shoulder row  -LS      Reps 14  20  -LS      Additional Comments  GTB  -LS         Exercise 15    Exercise Name 15  shoulder extension  -LS      Reps 15  20  -LS      Additional Comments  GTB  -LS        User Key  (r) = Recorded By, (t) = Taken By, (c) = Cosigned By    Initials Name Provider Type    Khushi Powers, PT Physical Therapist                         PT OP Goals     Row Name 02/26/19 1600          PT Short Term Goals    STG Date to Achieve  02/19/19  -LS     STG 1  Pt will demonstrate understanding and compliance with initial HEP.  -LS     STG 1 Progress  Met  -LS     STG 2  Pt will report improved ability to perform STS from home chair without requiring multiple attempts.  -LS     STG 2 Progress  Met  -LS     STG 3  Pt will demonstrate improved tolerance to standing from 5 minutes to 15 minutes during therapy session.  -LS     STG 3 Progress  Met  -LS        Long Term Goals    LTG Date to Achieve  02/07/19  -LS     LTG 1  Pt will demonstrate ability to tolerate 30 minutes of standing to improve his ability to perform household tasks.   -LS     LTG 1 Progress  Ongoing  -LS     LTG 1 Progress Comments  Inc time spent standing during treatment  -LS     LTG 2  Pt will " demonstrate STS x 5 from normal chair height in 30 seconds or less.  -LS     LTG 2 Progress  Ongoing  -LS     LTG 3  Pt will demonstrate ability to safely ambulate using SC for household distance and within clinic.   -LS     LTG 3 Progress  Ongoing  -LS       User Key  (r) = Recorded By, (t) = Taken By, (c) = Cosigned By    Initials Name Provider Type    Khushi Powers, PT Physical Therapist          Therapy Education  Education Details: continue to encourage inc walking around his home  Given: Symptoms/condition management, HEP  Program: Reinforced  How Provided: Verbal, Demonstration  Provided to: Patient  Level of Understanding: Teach back education performed, Verbalized, Demonstrated              Time Calculation:   Start Time: 1430  Stop Time: 1515  Time Calculation (min): 45 min  Total Timed Code Minutes- PT: 43 minute(s)  Therapy Suggested Charges     Code   Minutes Charges    31223 (CPT®) Hc Pt Neuromusc Re Education Ea 15 Min      68600 (CPT®) Hc Pt Ther Proc Ea 15 Min 40 3    87255 (CPT®) Hc Gait Training Ea 15 Min      30890 (CPT®) Hc Pt Therapeutic Act Ea 15 Min      48957 (CPT®) Hc Pt Manual Therapy Ea 15 Min      76216 (CPT®) Hc Pt Ther Massage- Per 15 Min      38193 (CPT®) Hc Pt Iontophoresis Ea 15 Min      56332 (CPT®) Hc Pt Elec Stim Ea-Per 15 Min      25220 (CPT®) Hc Pt Ultrasound Ea 15 Min      22128 (CPT®) Hc Pt Self Care/Mgmt/Train Ea 15 Min      99689 (CPT®) Hc Pt Prosthetic (S) Train Initial Encounter, Each 15 Min      61559 (CPT®) Hc Orthotic(S) Mgmt/Train Initial Encounter, Each 15min      79357 (CPT®) Hc Pt Aquatic Therapy Ea 15 Min      04939 (CPT®) Hc Pt Orthotic(S)/Prosthetic(S) Encounter, Each 15 Min       (CPT®) Hc Pt Electrical Stim Unattended      Total  40 3        Therapy Charges for Today     Code Description Service Date Service Provider Modifiers Qty    55639817182 HC PT THER PROC EA 15 MIN 2/26/2019 Khushi Trujillo, PT GP 3                    Khushi Trujillo,  PT  2/26/2019

## 2019-02-28 ENCOUNTER — APPOINTMENT (OUTPATIENT)
Dept: PHYSICAL THERAPY | Facility: HOSPITAL | Age: 64
End: 2019-02-28
Attending: PHYSICAL MEDICINE & REHABILITATION

## 2019-03-04 ENCOUNTER — APPOINTMENT (OUTPATIENT)
Dept: PHYSICAL THERAPY | Facility: HOSPITAL | Age: 64
End: 2019-03-04

## 2019-03-05 ENCOUNTER — HOSPITAL ENCOUNTER (OUTPATIENT)
Dept: OCCUPATIONAL THERAPY | Facility: HOSPITAL | Age: 64
Setting detail: THERAPIES SERIES
Discharge: HOME | End: 2019-03-05

## 2019-03-05 DIAGNOSIS — M79.602 PAIN OF LEFT UPPER EXTREMITY: ICD-10-CM

## 2019-03-05 DIAGNOSIS — M25.612 DECREASED RANGE OF MOTION OF LEFT SHOULDER: ICD-10-CM

## 2019-03-05 DIAGNOSIS — M25.611 DECREASED RANGE OF MOTION OF RIGHT SHOULDER: Primary | ICD-10-CM

## 2019-03-05 DIAGNOSIS — R29.898 DECREASED STRENGTH OF UPPER EXTREMITY: ICD-10-CM

## 2019-03-05 PROCEDURE — 97110 THERAPEUTIC EXERCISES: CPT

## 2019-03-05 NOTE — THERAPY PROGRESS REPORT/RE-CERT
Outpatient Occupational Therapy Ortho Progress Note  [facility]     Patient Name: Duane Mark Witten  : 1955  MRN: [id number]  Today's Date: 3/5/2019        Visit Date: 2019    Patient Active Problem List   Diagnosis   • YANELI (generalized anxiety disorder)   • ADD (attention deficit disorder)   • Depression   • Diabetes type 2, controlled (CMS/HCC)   • ED (erectile dysfunction) of non-organic origin   • HLD (hyperlipidemia)   • Essential hypertension   • Arthritis   • Psoriasis   • Testosterone deficiency   • Primary insomnia   • Weakness of both lower extremities   • Paresthesia of both upper extremities   • Morbid obesity with BMI of 40.0-44.9, adult (CMS/HCC)   • Adverse effect of corticosteroids   • Type 2 diabetes mellitus with hyperglycemia (CMS/HCC)   • Cervical stenosis of spinal canal   • Edema of cervical spinal cord (CMS/HCC)   • Persistent atrial fibrillation (CMS/HCC)   • Cervical myelopathy (CMS/HCC)   • Postoperative visit        Past Medical History:   Diagnosis Date   • ADD (attention deficit disorder)    • Atrial fibrillation (CMS/HCC)    • Depression    • ED (erectile dysfunction)    • Essential hypertension    • YANELI (generalized anxiety disorder)    • HLD (hyperlipidemia)    • Morbid obesity with BMI of 40.0-44.9, adult (CMS/HCC)    • CAMERON (obstructive sleep apnea)    • Osteoarthrosis    • PONV (postoperative nausea and vomiting)    • Psoriasis    • Testosterone deficiency    • Type 2 diabetes mellitus (CMS/HCC)         Past Surgical History:   Procedure Laterality Date   • ANTERIOR CHANNEL VERTEBRECTOMY/CORPECTOMY Bilateral 2018    Procedure: C4, C5, C6 ANTERIOR CERVICAL CORPECTOMY;  Surgeon: Chirag Markham MD;  Location: [facility];  Service: Neurosurgery   • BIOPSY / EXCISION / DISSECTION AXILLARY NODE  1994    Lymph Node testing for cancer    • COLONOSCOPY      10+ years; normal   • NECK SURGERY  2018    C4/5/6 anterior cervical corpectomy   •  PARATHYROIDECTOMY  2008         Visit Dx:    ICD-10-CM ICD-9-CM   1. Decreased range of motion of right shoulder M25.611 719.51   2. Decreased range of motion of left shoulder M25.612 719.51   3. Pain of left upper extremity M79.602 729.5   4. Decreased strength of upper extremity R29.898 729.89       OT Ortho     Row Name 03/05/19 1700             Right Upper Ext    Rt Shoulder Abduction AROM  0-120 in sitting  -RE      Rt Shoulder Flexion AROM  0-140 sitting  -RE         Left Upper Ext    Lt Shoulder Abduction AROM  0-125  -RE      Lt Shoulder Flexion AROM  0-130  -RE        User Key  (r) = Recorded By, (t) = Taken By, (c) = Cosigned By    Initials Name Provider Type    RE Anna Ryan OTR Occupational Therapist                  Therapy Education  Education Details: encouraged continuing to perform HEP and activity every hour  Given: Symptoms/condition management  Program: Reinforced  How Provided: Verbal  Provided to: Patient, Caregiver  Level of Understanding: Teach back education performed, Verbalized  68665 - OT Self Care/Mgmt Minutes: 5    OT Assessment/Plan     Row Name 03/05/19 1722          OT Assessment    Functional Limitations  Limitations in community activities;Limitations in functional capacity and performance;Limitation in home management;Performance in leisure activities;Performance in self-care ADL;Performance in work activities  -RE     Impairments  Endurance;Range of motion;Muscle strength;Impaired muscle endurance;Sensation;Motor function  -RE     Assessment Comments  Patient is progressing but slowly.  He has met his AROM goals indicating that strength is improving but still limited.  Fatigue continues to limit his ability to perform activities at home and goals were updated to reflect an emphasis on standing functional activities.  He could benefit from continued OT to continue address strength and AROM defits and activity tolerance.   -RE     Please refer to paper survey for additional  self-reported information  No  -RE     OT Diagnosis  UE weakness  -RE     OT Rehab Potential  Good  -RE     Patient/caregiver participated in establishment of treatment plan and goals  Yes  -RE     Patient would benefit from skilled therapy intervention  Yes  -RE        OT Plan    OT Frequency  Other (comment) 1 per 2 weeks  -RE     Predicted Duration of Therapy Intervention (Therapy Eval)  4 weeks  -RE     Planned CPT's?  OT THER PROC EA 15 MIN: 47077JX;OT SELF CARE/MGMT/TRAIN 15 MIN: 43634;OT NEUROMUSC RE EDUCATION EA 15 MIN: 16362;OT THER ACT EA 15 MIN: [id number]  -RE     Planned Therapy Interventions (Optional Details)  home exercise program;patient/family education;ROM (Range of Motion);strengthening  -RE       User Key  (r) = Recorded By, (t) = Taken By, (c) = Cosigned By    Initials Name Provider Type    RE Anna Ryan OTR Occupational Therapist           OT Goals     Row Name 03/05/19 1100          OT Short Term Goals    STG Date to Achieve  03/19/19  -RE     STG 1  Patient independant with HEP to increase UE function to allow for improved ADL and leisure activities.   -RE     STG 1 Progress  Met;Ongoing  -RE     STG 2  Pt to increase UE strength to allow for audra shoulder flexion to 160 in supine to increase functional use of UE's.  -RE     STG 2 Progress  Met  -RE     STG 3  Pt to increase audra shoulder flexion to 150 against gravity to allow for improved ADL function.  -RE     STG 3 Progress  New  -RE        Long Term Goals    LTG Date to Achieve  04/02/19  -RE     LTG 1  Pt to increase audra shoulder flexion to 120 against gravity to allow for improved ADL function.  -RE     LTG 1 Progress  Met  -RE     LTG 2  Patient to tolerate 10 minutes of UE activity in standing  to increase patient's ability to perform self and home care activities.  -RE     LTG 2 Progress  New  -RE        Time Calculation    OT Goal Re-Cert Due Date  04/10/19  -RE       User Key  (r) = Recorded By, (t) = Taken By, (c) = Cosigned By     Initials Name Provider Type    Anna Bonilla OTR Occupational Therapist              OT Exercises     Row Name 03/05/19 1600             Precautions    Existing Precautions/Restrictions  spinal;fall  -RE         Subjective Comments    Subjective Comments  continues to c/o fatigue and l shoulder pain  -RE         Subjective Pain    Able to rate subjective pain?  yes  -RE      Pre-Treatment Pain Level  0  -RE      Post-Treatment Pain Level  0  -RE      Subjective Pain Comment  at rest, pain with movement  -RE         Total Minutes    98091 - OT Therapeutic Exercise Minutes  45  -RE         Exercise 2    Exercise Name 2  B shoulder ER sidelying  -RE      Equipment 2  Dumbell  -RE      Weights/Plates 2  1  -RE      Sets 2  2  -RE      Reps 2  20  -RE         Exercise 3    Exercise Name 3  B shoulder flexion in supine  -RE      Cueing 3  Verbal  -RE      Sets 3  2  -RE      Reps 3  20  -RE         Exercise 5    Exercise Name 5  Shld flexion and abduction with pulleys  -RE      Time (Minutes) 5  5  -RE         Exercise 6    Exercise Name 6  Scapular retraction, depression with and without theraband  -RE      Resistance 6  Yellow  -RE      Sets 6  2  -RE      Reps 6  15  -RE        User Key  (r) = Recorded By, (t) = Taken By, (c) = Cosigned By    Initials Name Provider Type    Anna Bonilla, OTR Occupational Therapist                          Time Calculation:   OT Start Time: 1145  OT Stop Time: 1230  OT Time Calculation (min): 45 min  Total Timed Code Minutes- OT: 45 minute(s)     Therapy Suggested Charges     Code   Minutes Charges    82218 (CPT®) Hc Ot Neuromusc Re Education Ea 15 Min      40794 (CPT®) Hc Ot Ther Proc Ea 15 Min 45 3    57591 (CPT®) Hc Ot Therapeutic Act Ea 15 Min      03475 (CPT®) Hc Ot Manual Therapy Ea 15 Min      06690 (CPT®) Hc Ot Iontophoresis Ea 15 Min      67888 (CPT®) Hc Ot Elec Stim Ea-Per 15 Min      34538 (CPT®) Hc Ot Ultrasound Ea 15 Min      01439 (CPT®) Hc Ot Self  Care/Mgmt/Train Ea 15 Min 5      (CPT®) Hc Ot Electrical Stim Unattended      Total  50 3          Therapy Charges for Today     Code Description Service Date Service Provider Modifiers Qty    34055052106 HC OT THER PROC EA 15 MIN 3/5/2019 Anna Ryan OTR GO 3                    NURIS Cha  3/5/2019

## 2019-03-07 ENCOUNTER — APPOINTMENT (OUTPATIENT)
Dept: PHYSICAL THERAPY | Facility: HOSPITAL | Age: 64
End: 2019-03-07
Attending: PHYSICAL MEDICINE & REHABILITATION

## 2019-03-12 ENCOUNTER — APPOINTMENT (OUTPATIENT)
Dept: PHYSICAL THERAPY | Facility: HOSPITAL | Age: 64
End: 2019-03-12

## 2019-03-19 ENCOUNTER — HOSPITAL ENCOUNTER (OUTPATIENT)
Dept: OCCUPATIONAL THERAPY | Facility: HOSPITAL | Age: 64
Setting detail: THERAPIES SERIES
Discharge: HOME | End: 2019-03-19

## 2019-03-19 ENCOUNTER — OFFICE VISIT (OUTPATIENT)
Dept: CARDIOLOGY | Facility: CLINIC | Age: 64
End: 2019-03-19

## 2019-03-19 VITALS
OXYGEN SATURATION: 98 % | WEIGHT: 315 LBS | RESPIRATION RATE: 17 BRPM | DIASTOLIC BLOOD PRESSURE: 80 MMHG | SYSTOLIC BLOOD PRESSURE: 142 MMHG | HEIGHT: 75 IN | BODY MASS INDEX: 39.17 KG/M2 | HEART RATE: 83 BPM

## 2019-03-19 DIAGNOSIS — I48.91 POSTOPERATIVE ATRIAL FIBRILLATION (HCC): ICD-10-CM

## 2019-03-19 DIAGNOSIS — E66.01 MORBID OBESITY WITH BMI OF 40.0-44.9, ADULT (HCC): ICD-10-CM

## 2019-03-19 DIAGNOSIS — I42.0 DILATED CARDIOMYOPATHY (HCC): ICD-10-CM

## 2019-03-19 DIAGNOSIS — M25.611 DECREASED RANGE OF MOTION OF RIGHT SHOULDER: ICD-10-CM

## 2019-03-19 DIAGNOSIS — R94.39 ABNORMAL NUCLEAR STRESS TEST: ICD-10-CM

## 2019-03-19 DIAGNOSIS — I97.89 POSTOPERATIVE ATRIAL FIBRILLATION (HCC): ICD-10-CM

## 2019-03-19 DIAGNOSIS — I10 ESSENTIAL HYPERTENSION: ICD-10-CM

## 2019-03-19 DIAGNOSIS — M25.612 DECREASED RANGE OF MOTION OF LEFT SHOULDER: Primary | ICD-10-CM

## 2019-03-19 DIAGNOSIS — I48.19 PERSISTENT ATRIAL FIBRILLATION (HCC): Primary | ICD-10-CM

## 2019-03-19 PROBLEM — Z48.89 POSTOPERATIVE VISIT: Status: RESOLVED | Noted: 2019-01-02 | Resolved: 2019-03-19

## 2019-03-19 PROCEDURE — 97110 THERAPEUTIC EXERCISES: CPT

## 2019-03-19 PROCEDURE — 99214 OFFICE O/P EST MOD 30 MIN: CPT | Performed by: NURSE PRACTITIONER

## 2019-03-19 PROCEDURE — 93000 ELECTROCARDIOGRAM COMPLETE: CPT | Performed by: NURSE PRACTITIONER

## 2019-03-19 RX ORDER — METOPROLOL SUCCINATE 25 MG/1
25 TABLET, EXTENDED RELEASE ORAL DAILY
Qty: 60 TABLET | Refills: 2 | Status: SHIPPED | OUTPATIENT
Start: 2019-03-19 | End: 2019-04-17

## 2019-03-19 NOTE — THERAPY TREATMENT NOTE
Outpatient Occupational Therapy Ortho Treatment Note  [facility]     Patient Name: Duane Mark Witten  : 1955  MRN: [id number]  Today's Date: 3/19/2019        Visit Date: 2019    Patient Active Problem List   Diagnosis   • YANELI (generalized anxiety disorder)   • ADD (attention deficit disorder)   • Depression   • Diabetes type 2, controlled (CMS/HCC)   • ED (erectile dysfunction) of non-organic origin   • HLD (hyperlipidemia)   • Essential hypertension   • Arthritis   • Psoriasis   • Testosterone deficiency   • Primary insomnia   • Weakness of both lower extremities   • Paresthesia of both upper extremities   • Morbid obesity with BMI of 40.0-44.9, adult (CMS/HCC)   • Adverse effect of corticosteroids   • Type 2 diabetes mellitus with hyperglycemia (CMS/HCC)   • Cervical stenosis of spinal canal   • Edema of cervical spinal cord (CMS/HCC)   • Persistent atrial fibrillation (CMS/HCC)   • Cervical myelopathy (CMS/HCC)   • Dilated cardiomyopathy (CMS/HCC)        Past Medical History:   Diagnosis Date   • ADD (attention deficit disorder)    • Atrial fibrillation (CMS/HCC)    • Depression    • ED (erectile dysfunction)    • Essential hypertension    • YANELI (generalized anxiety disorder)    • HLD (hyperlipidemia)    • Morbid obesity with BMI of 40.0-44.9, adult (CMS/HCC)    • CAMERON (obstructive sleep apnea)    • Osteoarthrosis    • PONV (postoperative nausea and vomiting)    • Psoriasis    • Testosterone deficiency    • Type 2 diabetes mellitus (CMS/HCC)         Past Surgical History:   Procedure Laterality Date   • ANTERIOR CHANNEL VERTEBRECTOMY/CORPECTOMY Bilateral 2018    Procedure: C4, C5, C6 ANTERIOR CERVICAL CORPECTOMY;  Surgeon: Chirag Markham MD;  Location: [facility];  Service: Neurosurgery   • BIOPSY / EXCISION / DISSECTION AXILLARY NODE  1994    Lymph Node testing for cancer    • COLONOSCOPY      10+ years; normal   • NECK SURGERY  2018    C4/5/6 anterior cervical corpectomy   •  PARATHYROIDECTOMY  2008         Visit Dx:    ICD-10-CM ICD-9-CM   1. Decreased range of motion of left shoulder M25.612 719.51   2. Decreased range of motion of right shoulder M25.611 719.51                   Therapy Education  Education Details: Hep: shoulder flexion, abd/add, biceps curls, wrist flexion and extension, scap add, Shouler ER, retraction,  Given: HEP  Program: Progressed  How Provided: Verbal, Demonstration, Written  Provided to: Patient  Level of Understanding: Teach back education performed, Verbalized, Demonstrated    OT Assessment/Plan     Row Name 03/19/19 1648          OT Assessment    Assessment Comments  Updated HEP. Patient has progressed well with increased UE AROM.  His endurance continues to be limited.    -RE        OT Plan    OT Plan Comments  D/C  -RE       User Key  (r) = Recorded By, (t) = Taken By, (c) = Cosigned By    Initials Name Provider Type    Anna Bonilla OTR Occupational Therapist           OT Goals     Row Name 03/19/19 1100          OT Short Term Goals    STG Date to Achieve  04/02/19  -RE     STG 1  Patient independant with HEP to increase UE function to allow for improved ADL and leisure activities.   -RE     STG 1 Progress  Met  -RE     STG 2  Pt to increase UE strength to allow for audra shoulder flexion to 160 in supine to increase functional use of UE's.  -RE     STG 2 Progress  Met  -RE     STG 3  Pt to increase audra shoulder flexion to 150 against gravity to allow for improved ADL function.  -RE     STG 3 Progress  Not Met  -RE        Long Term Goals    LTG Date to Achieve  04/02/19  -RE     LTG 1  Pt to increase audra shoulder flexion to 120 against gravity to allow for improved ADL function.  -RE     LTG 1 Progress  Met  -RE     LTG 2  Patient to tolerate 10 minutes of UE activity in standing  to increase patient's ability to perform self and home care activities.  -RE     LTG 2 Progress  Not Met  -RE        Time Calculation    OT Goal Re-Cert Due Date  04/10/19   -RE       User Key  (r) = Recorded By, (t) = Taken By, (c) = Cosigned By    Initials Name Provider Type    Anna Bonilla OTR Occupational Therapist            OT Exercises     Row Name 03/19/19 1800             Precautions    Existing Precautions/Restrictions  spinal;fall  -RE         Subjective Comments    Subjective Comments  Patiet wishes to d/c therapy due to cost.   -RE         Subjective Pain    Able to rate subjective pain?  yes  -RE      Pre-Treatment Pain Level  0  -RE      Post-Treatment Pain Level  0  -RE      Subjective Pain Comment  no pain at rest.  -RE         Total Minutes    62283 - OT Therapeutic Exercise Minutes  45  -RE         Exercise 1    Exercise Name 1  scapular retraction   -RE      Equipment 1  Theraband  -RE      Resistance 1  Red  -RE      Sets 1  2  -RE      Reps 1  Other to fatigue  -RE         Exercise 2    Exercise Name 2  shoulder ER   -RE      Equipment 2  Theraband  -RE      Resistance 2  Yellow  -RE      Sets 2  2  -RE      Reps 2  10  -RE         Exercise 3    Exercise Name 3  shoulder h. abd and add  -RE      Equipment 3  Theraband  -RE      Resistance 3  Red  -RE      Sets 3  2  -RE      Reps 3  15  -RE         Exercise 5    Exercise Name 5  Shld flexion and abduction with pulleys  -RE      Time (Minutes) 5  5  -RE        User Key  (r) = Recorded By, (t) = Taken By, (c) = Cosigned By    Initials Name Provider Type    Anna Bonilla OTR Occupational Therapist                        Time Calculation:   OT Start Time: 1145  OT Stop Time: 1230  OT Time Calculation (min): 45 min  Total Timed Code Minutes- OT: 45 minute(s)     Therapy Charges for Today     Code Description Service Date Service Provider Modifiers Qty    [id number]  OT THER PROC EA 15 MIN 3/19/2019 Anna Ryan OTR GO 3                    NURIS Cha  3/19/2019

## 2019-03-19 NOTE — PROGRESS NOTES
Date of Office Visit: 2019  Encounter Provider: KAYLEN Correa  Place of Service: [facility] CARDIOLOGY  Patient Name: Duane Mark Witten  :1955      Chief Complaint   Patient presents with   • Atrial Fibrillation     follow up   :     Dear KAYLEN Hoover-    HPI: Duane Mark Witten is a pleasant 64 y.o. male who presents today for cardiac follow up. He is a new patient to me and his previous records have been reviewed.  He has been diagnosed with hypertension, hyperlipidemia, obesity, type 2 diabetes mellitus, and obstructive sleep apnea (compliant with BiPAP machine).    In 2018, he presented to the emergency department with progressive weakness of the lower extremities and paresthesias of the upper extremities.  He was found to have severe cervical stenosis with cord compression and underwent anterior cervical corpectomy with cage and plate by Dr. Markham.  He was diagnosed with new onset atrial fibrillation with RVR during surgery and he was asymptomatic.  An echocardiogram was obtained on 18 which revealed an EF of 47%, mild left ventricular hypertrophy, mild left ventricular enlargement, mild to moderate aortic stenosis, mild mitral regurgitation with mild anterior prolapse, and mild left atrial enlargement.    He followed up with Dr. Valeria Bravo in 2019.  He remained in atrial fibrillation with controlled ventricular response and was taking rivaroxaban for stroke prevention.  Dr. Bravo recommended a nuclear stress test to be completed and to start metoprolol tartrate 25 mg twice a day.  A cardiac PET scan was completed 19 and showed the following results: EF 36%, bilateral ventricular dilation, diffusely decreased tracer uptake in all wall segments and stress imaging and a more pronounced hypoperfusion of the mid anterior wall that was considered small, mild, and may represent ischemia.    He presents today for  follow-up appointment.  He remains asymptomatic with atrial fibrillation.  He denies chest pain, shortness of breath, PND, orthopnea, edema, dizziness, or syncope.  His wife said he becomes very fatigued about the same time every afternoon and he just takes a rest.  His blood pressure is borderline elevated and heart rate normal.  He denies any bleeding on rivaroxaban.    Past Medical History:   Diagnosis Date   • ADD (attention deficit disorder)    • Atrial fibrillation (CMS/HCC)    • Depression    • ED (erectile dysfunction)    • Essential hypertension    • YANELI (generalized anxiety disorder)    • HLD (hyperlipidemia)    • Morbid obesity with BMI of 40.0-44.9, adult (CMS/HCC)    • CAMERON (obstructive sleep apnea)    • Osteoarthrosis    • PONV (postoperative nausea and vomiting)    • Psoriasis    • Testosterone deficiency    • Type 2 diabetes mellitus (CMS/HCC)        Past Surgical History:   Procedure Laterality Date   • ANTERIOR CHANNEL VERTEBRECTOMY/CORPECTOMY Bilateral 11/27/2018    Procedure: C4, C5, C6 ANTERIOR CERVICAL CORPECTOMY;  Surgeon: Chirag Markham MD;  Location: [facility];  Service: Neurosurgery   • BIOPSY / EXCISION / DISSECTION AXILLARY NODE  1994    Lymph Node testing for cancer    • COLONOSCOPY      10+ years; normal   • NECK SURGERY  11/27/2018    C4/5/6 anterior cervical corpectomy   • PARATHYROIDECTOMY  2008       Social History     Socioeconomic History   • Marital status:      Spouse name: Not on file   • Number of children: Not on file   • Years of education: Not on file   • Highest education level: Not on file   Social Needs   • Financial resource strain: Not on file   • Food insecurity - worry: Not on file   • Food insecurity - inability: Not on file   • Transportation needs - medical: Not on file   • Transportation needs - non-medical: Not on file   Occupational History   • Not on file   Tobacco Use   • Smoking status: Former Smoker     Packs/day: 1.00   • Smokeless tobacco:  Never Used   Substance and Sexual Activity   • Alcohol use: No   • Drug use: No   • Sexual activity: Not on file   Other Topics Concern   • Not on file   Social History Narrative   • Not on file       Family History   Problem Relation Age of Onset   • Anxiety disorder Mother    • Bipolar disorder Mother    • Depression Mother    • Glaucoma Mother    • Macular degeneration Mother    • Lupus Mother    • Cancer Father    • Diabetes Father    • Heart disease Father    • Hypertension Father    • Stroke Father    • Lupus Other         grandmother       The following portion of the patient's history were reviewed and updated as appropriate: past medical history, past surgical history, past social history, past family history, allergies, current medications, and problem list.    Review of Systems   Constitution: Positive for malaise/fatigue. Negative for chills, diaphoresis, fever, night sweats, weight gain and weight loss.   HENT: Negative for hearing loss, nosebleeds, sore throat and tinnitus.    Eyes: Negative for blurred vision, double vision, pain and visual disturbance.   Cardiovascular: Negative for chest pain, claudication, cyanosis, dyspnea on exertion, irregular heartbeat, leg swelling, near-syncope, orthopnea, palpitations, paroxysmal nocturnal dyspnea and syncope.   Respiratory: Negative for cough, hemoptysis, shortness of breath, snoring and wheezing.    Endocrine: Negative for cold intolerance, heat intolerance and polyuria.   Hematologic/Lymphatic: Negative for bleeding problem. Does not bruise/bleed easily.   Skin: Negative for color change, dry skin, flushing and itching.   Musculoskeletal: Negative for falls, joint pain, joint swelling, muscle cramps, muscle weakness and myalgias.   Gastrointestinal: Negative for abdominal pain, constipation, heartburn, melena, nausea and vomiting.   Genitourinary: Negative for dysuria and hematuria.   Neurological: Positive for paresthesias. Negative for excessive daytime  sleepiness, dizziness, light-headedness, loss of balance, numbness, seizures and vertigo.   Psychiatric/Behavioral: Negative for altered mental status, depression, memory loss and substance abuse. The patient does not have insomnia and is not nervous/anxious.    Allergic/Immunologic: Negative for environmental allergies.       No Known Allergies      Current Outpatient Medications:   •  ACCU-CHEK AGNES PLUS test strip, Use as directed to test blood sugar tid E11.9, Disp: 100 each, Rfl: 11  •  atomoxetine (STRATTERA) 100 MG capsule, Take 1 capsule by mouth Daily., Disp: 30 capsule, Rfl: 5  •  Dulaglutide (TRULICITY) 1.5 MG/0.5ML solution pen-injector, Inject 1.5 mg under the skin into the appropriate area as directed 1 (One) Time Per Week., Disp: 4 pen, Rfl:   •  DULoxetine (CYMBALTA) 60 MG capsule, Take 1 capsule by mouth Daily., Disp: 30 capsule, Rfl: 5  •  FIBER ADULT GUMMIES PO, Take 1 dose by mouth Daily., Disp: , Rfl:   •  glimepiride (AMARYL) 4 MG tablet, Take 1 tablet by mouth 2 (Two) Times a Day., Disp: 60 tablet, Rfl: 5  •  glucosamine-chondroitin 500-400 MG capsule capsule, Take 1 capsule by mouth Daily., Disp: , Rfl:   •  Insulin Pen Needle (BD ULTRA-FINE PEN NEEDLES) 29G X 12.7MM misc, 12.7 mm 2 (Two) Times a Day., Disp: 60 each, Rfl: 5  •  lidocaine (LIDODERM) 5 %, Place 2 patches on the skin as directed by provider Daily. Remove & Discard patch within 12 hours or as directed by MD, Disp: 30 patch, Rfl: 0  •  metFORMIN (GLUCOPHAGE) 1000 MG tablet, Take 1 tablet by mouth 2 (Two) Times a Day With Meals., Disp: 60 tablet, Rfl: 5  •  metoprolol tartrate (LOPRESSOR) 25 MG tablet, Take 1 tablet by mouth Every 12 (Twelve) Hours., Disp: 180 tablet, Rfl: 3  •  pioglitazone (ACTOS) 30 MG tablet, Take 1 tablet by mouth Daily., Disp: 30 tablet, Rfl: 0  •  rivaroxaban (XARELTO) 20 MG tablet, Take 1 tablet by mouth Daily With Dinner., Disp: 90 tablet, Rfl: 3  •  simvastatin (ZOCOR) 40 MG tablet, Take 1 tablet by  "mouth Every Night., Disp: 30 tablet, Rfl: 5  •  valsartan-hydrochlorothiazide (DIOVAN HCT) 160-12.5 MG per tablet, Take 1 tablet by mouth Daily., Disp: 30 tablet, Rfl: 2        Objective:     Vitals:    03/19/19 1019   BP: 142/80   BP Location: Left arm   Patient Position: Sitting   Cuff Size: Large Adult   Pulse: 83   Resp: 17   SpO2: 98%   Weight: (!) 159 kg (351 lb)   Height: 190.5 cm (75\")     Body mass index is 43.87 kg/m².    PHYSICAL EXAM:    Vitals Reviewed.   General Appearance: No acute distress, well developed and well nourished.  Obese.  Eyes: Conjunctiva and lids: No erythema, swelling, or discharge. Sclera non-icteric.   HENT: Atraumatic, normocephalic. External eyes, ears, and nose normal. No hearing loss noted. Mucous membranes normal. Lips not cyanotic. Neck supple with no tenderness.  Respiratory: No signs of respiratory distress. Respiration rhythm and depth normal.   Clear to auscultation. No rales, crackles, rhonchi, or wheezing auscultated.   Cardiovascular:  Jugular Venous Pressure: Normal  Heart Rate and Rhythm: Irregularly, irregular.  Heart Sounds: Normal S1 and S2. No S3 or S4 noted.  Murmurs: No murmurs noted. No rubs, thrills, or gallops.   Arterial Pulses: Carotid pulses normal. No carotid bruit noted. Posterior tibialis and dorsalis pedis pulses normal.   Lower Extremities: No edema noted.  Gastrointestinal:  Abdomen soft, non-distended, non-tender. Normal bowel sounds. No hepatomegaly.   Musculoskeletal: Normal movement of extremities  Skin and Nails: General appearance normal. No pallor, cyanosis, diaphoresis. Skin temperature normal. No clubbing of fingernails.   Psychiatric: Patient alert and oriented to person, place, and time. Speech and behavior appropriate. Normal mood and affect.       ECG 12 Lead  Date/Time: 3/19/2019 10:25 AM  Performed by: Theresa Henderson APRN  Authorized by: Theresa Henderson APRN   Comparison: compared with previous ECG from 1/30/2019  Similar to " previous ECG  Rhythm: atrial fibrillation  Rate: normal  BPM: 72  Conduction: conduction normal  ST Segments: ST segments normal  T Waves: T waves normal  QRS axis: normal    Clinical impression: abnormal EKG              Assessment:       Diagnosis Plan   1. Persistent atrial fibrillation (CMS/HCC)     2. Dilated cardiomyopathy (CMS/HCC)     3. Abnormal nuclear stress test     4. Essential hypertension     5. Morbid obesity with BMI of 40.0-44.9, adult (CMS/HCC)            Plan:       1.  Atrial Fibrillation: He remains in atrial fibrillation with controlled ventricular response and is asymptomatic.  He is on rivaroxaban and denies any bleeding.    Atrial Fibrillation and Atrial Flutter  Assessment  • The patient has persistent atrial fibrillation  • The patient's CHADS2-VASc score is 2  • A [id number] score of 2 or more is considered a high risk for a thromboembolic event  • Rivaroxaban prescribed    Plan  • Attempt to maintain sinus rhythm  • Continue rivaroxaban for antithrombotic therapy, bleeding issues discussed  • Continue beta blocker for rate control      2.  Dilated Cardiomyopathy: Echocardiogram revealed an EF of 47% and recent cardiac PET scan 36%.  No evidence of heart failure today.  The echocardiogram is a better study to assess the ejection fraction.  I will discuss with Dr. Bravo metoprolol succinate versus tartrate.    3.  Abnormal Cardiac PET Scan: He denies any anginal symptoms.  I will review his stress test results with Dr. Bravo.    4.  Hypertension: Blood pressure is borderline elevated.  Continue to monitor.    5.  Obesity: BMI is 43.9.  He would benefit from weight loss.  He is currently ambulating with a walker after his cervical surgery.    6.  I discussed the plan of care Dr. Bravo and further recommendations to follow.    ADDENDUM 3/19/2019: I discussed the plan of care with Dr. Valeria Bravo.  She agrees with me and feels that would be beneficial to switch him to  metoprolol XL 25 mg twice a day and a new prescription has been sent in.  The stress test is abnormal and she is thinking about possible cardiac catheterization.  She is recommended follow-up with her in 4 weeks for further discussion.  Mr. Morillo was informed and verbalizes understanding.    As always, it has been a pleasure to participate in your patient's care. Thank you.       Sincerely,         KAYLEN Wiseman        **Dragon Disclaimer:**  Much of this encounter note is an electronic transcription/translation of spoken language to printed text. The electronic translation of spoken language may permit erroneous, or at times, nonsensical words or phrases to be inadvertently transcribed. Although I have reviewed the note for such errors, some may still exist.

## 2019-03-31 DIAGNOSIS — I10 ESSENTIAL HYPERTENSION: ICD-10-CM

## 2019-04-01 RX ORDER — VALSARTAN AND HYDROCHLOROTHIAZIDE 160; 12.5 MG/1; MG/1
1 TABLET, FILM COATED ORAL DAILY
Qty: 30 TABLET | Refills: 0 | Status: SHIPPED | OUTPATIENT
Start: 2019-04-01 | End: 2019-05-13 | Stop reason: SDUPTHER

## 2019-04-02 RX ORDER — ATOMOXETINE 100 MG/1
100 CAPSULE ORAL DAILY
Qty: 30 CAPSULE | Refills: 5 | Status: SHIPPED | OUTPATIENT
Start: 2019-04-02 | End: 2019-10-29 | Stop reason: SDUPTHER

## 2019-04-17 ENCOUNTER — OFFICE VISIT (OUTPATIENT)
Dept: CARDIOLOGY | Facility: CLINIC | Age: 64
End: 2019-04-17

## 2019-04-17 VITALS
BODY MASS INDEX: 39.17 KG/M2 | DIASTOLIC BLOOD PRESSURE: 70 MMHG | HEART RATE: 74 BPM | SYSTOLIC BLOOD PRESSURE: 122 MMHG | HEIGHT: 75 IN | WEIGHT: 315 LBS

## 2019-04-17 DIAGNOSIS — I42.0 DILATED CARDIOMYOPATHY (HCC): ICD-10-CM

## 2019-04-17 DIAGNOSIS — E66.01 MORBID OBESITY WITH BMI OF 40.0-44.9, ADULT (HCC): ICD-10-CM

## 2019-04-17 DIAGNOSIS — I10 ESSENTIAL HYPERTENSION: ICD-10-CM

## 2019-04-17 DIAGNOSIS — E11.65 TYPE 2 DIABETES MELLITUS WITH HYPERGLYCEMIA, WITH LONG-TERM CURRENT USE OF INSULIN (HCC): ICD-10-CM

## 2019-04-17 DIAGNOSIS — Z79.4 TYPE 2 DIABETES MELLITUS WITH HYPERGLYCEMIA, WITH LONG-TERM CURRENT USE OF INSULIN (HCC): ICD-10-CM

## 2019-04-17 DIAGNOSIS — E78.5 HYPERLIPIDEMIA, UNSPECIFIED HYPERLIPIDEMIA TYPE: ICD-10-CM

## 2019-04-17 DIAGNOSIS — I48.19 PERSISTENT ATRIAL FIBRILLATION (HCC): Primary | ICD-10-CM

## 2019-04-17 PROCEDURE — 93000 ELECTROCARDIOGRAM COMPLETE: CPT | Performed by: INTERNAL MEDICINE

## 2019-04-17 PROCEDURE — 99214 OFFICE O/P EST MOD 30 MIN: CPT | Performed by: INTERNAL MEDICINE

## 2019-04-17 RX ORDER — METOPROLOL SUCCINATE 50 MG/1
50 TABLET, EXTENDED RELEASE ORAL DAILY
Qty: 90 TABLET | Refills: 3 | Status: SHIPPED | OUTPATIENT
Start: 2019-04-17 | End: 2019-04-25 | Stop reason: SDUPTHER

## 2019-04-17 NOTE — PROGRESS NOTES
"Date of Office Visit: 2019  Encounter Provider: Valeria Bravo MD  Place of Service: [facility] CARDIOLOGY  Patient Name: Duane Mark Witten  :1955      Patient ID:  Duane Mark Witten is a 64 y.o. male is here for  followup for atrial fibrillation and cardiomyopathy.         History of Present Illness    He has a history of HTN, DM, obstructive sleep apnea (compliant with cpap), and obesity. He has no known cardiac history. He does report attempting to donate blood several years ago and was told he had an irregular heart beat at that time.      He presented to the emergency room on 18 with complaints of 3 weeks of progressive weakness to bilateral lower extremities as well as paraesthesias of the upper extremities. He began feeling his balance was off and that his legs were becoming weaker and \"just didn't want to work right.\"  He was found to have severe cervical stenosis with cord compression. He was seen by Dr. Markham and had underwent C4, C5, C6 anterior cervical corpectomy with cage and plate. During surgery HR was in the 110's. EKG was obtained in the recovery room and showed atrial fibrillation. He was completely asymptomatic.      Echo done 18 showed LVEF 47% with mild LVH and mild LVE, with mild to moderate AS, mild MR with mild anterior prolapse, mild LAE    stress PET scan was completed 19 and showed the following results: EF 36%, bilateral ventricular dilation, diffusely decreased tracer uptake in all wall segments and stress imaging and a more pronounced hypoperfusion of the mid anterior wall that was considered small, mild, and may represent ischemia.    He feels much better since his surgery for the spinal cord compression.  He is still weak and somewhat short winded but that is improving almost daily and has dramatically improved since November.  He has no chest pain or pressure.  Does not feel his heart racing or skipping.  His " energy level is increasing.  He is trying to stay active.  He has been taking his medications as directed.  He has had no syncope or falls.            Past Medical History:   Diagnosis Date   • ADD (attention deficit disorder)    • Atrial fibrillation (CMS/HCC)    • Depression    • Dilated cardiomyopathy (CMS/HCC)    • ED (erectile dysfunction)    • Essential hypertension    • YANELI (generalized anxiety disorder)    • HLD (hyperlipidemia)    • Morbid obesity with BMI of 40.0-44.9, adult (CMS/HCC)    • CAMERON (obstructive sleep apnea)    • Osteoarthrosis    • PONV (postoperative nausea and vomiting)    • Psoriasis    • Testosterone deficiency    • Type 2 diabetes mellitus (CMS/HCC)          Past Surgical History:   Procedure Laterality Date   • ANTERIOR CHANNEL VERTEBRECTOMY/CORPECTOMY Bilateral 11/27/2018    Procedure: C4, C5, C6 ANTERIOR CERVICAL CORPECTOMY;  Surgeon: Chirag Markham MD;  Location: [facility];  Service: Neurosurgery   • BIOPSY / EXCISION / DISSECTION AXILLARY NODE  1994    Lymph Node testing for cancer    • COLONOSCOPY      10+ years; normal   • NECK SURGERY  11/27/2018    C4/5/6 anterior cervical corpectomy   • PARATHYROIDECTOMY  2008       Current Outpatient Medications on File Prior to Visit   Medication Sig Dispense Refill   • ACCU-CHEK AGNES PLUS test strip Use as directed to test blood sugar tid E11.9 100 each 11   • atomoxetine (STRATTERA) 100 MG capsule Take 1 capsule by mouth Daily. 30 capsule 5   • Dulaglutide (TRULICITY) 1.5 MG/0.5ML solution pen-injector Inject 1.5 mg under the skin into the appropriate area as directed 1 (One) Time Per Week. 4 pen 5   • DULoxetine (CYMBALTA) 60 MG capsule Take 1 capsule by mouth Daily. 30 capsule 5   • FIBER ADULT GUMMIES PO Take 1 dose by mouth Daily.     • glimepiride (AMARYL) 4 MG tablet Take 1 tablet by mouth 2 (Two) Times a Day. 60 tablet 5   • glucosamine-chondroitin 500-400 MG capsule capsule Take 1 capsule by mouth Daily.     • Insulin Pen  Needle (BD ULTRA-FINE PEN NEEDLES) 29G X 12.7MM misc 12.7 mm 2 (Two) Times a Day. 60 each 5   • lidocaine (LIDODERM) 5 % Place 2 patches on the skin as directed by provider Daily. Remove & Discard patch within 12 hours or as directed by MD 30 patch 0   • metFORMIN (GLUCOPHAGE) 1000 MG tablet Take 1 tablet by mouth 2 (Two) Times a Day With Meals. 60 tablet 5   • metoprolol succinate XL (TOPROL-XL) 25 MG 24 hr tablet Take 1 tablet by mouth Daily. 60 tablet 2   • pioglitazone (ACTOS) 30 MG tablet Take 1 tablet by mouth Daily. 30 tablet 0   • rivaroxaban (XARELTO) 20 MG tablet Take 1 tablet by mouth Daily With Dinner. 30 tablet 11   • simvastatin (ZOCOR) 40 MG tablet Take 1 tablet by mouth Every Night. 30 tablet 5   • valsartan-hydrochlorothiazide (DIOVAN-HCT) 160-12.5 MG per tablet TAKE 1 TABLET BY MOUTH DAILY. 30 tablet 0     No current facility-administered medications on file prior to visit.        Social History     Socioeconomic History   • Marital status:      Spouse name: Not on file   • Number of children: Not on file   • Years of education: Not on file   • Highest education level: Not on file   Tobacco Use   • Smoking status: Former Smoker     Packs/day: 1.00   • Smokeless tobacco: Never Used   Substance and Sexual Activity   • Alcohol use: No   • Drug use: No           Review of Systems   Constitution: Negative.   HENT: Negative for congestion.    Eyes: Negative for vision loss in left eye and vision loss in right eye.   Respiratory: Negative.  Negative for cough, hemoptysis, shortness of breath, sleep disturbances due to breathing, snoring, sputum production and wheezing.    Endocrine: Negative.    Hematologic/Lymphatic: Negative.    Skin: Negative for poor wound healing and rash.   Musculoskeletal: Negative for falls, gout, muscle cramps and myalgias.   Gastrointestinal: Negative for abdominal pain, diarrhea, dysphagia, hematemesis, melena, nausea and vomiting.   Neurological: Negative for excessive  "daytime sleepiness, dizziness, headaches, light-headedness, loss of balance, seizures and vertigo.   Psychiatric/Behavioral: Negative for depression and substance abuse. The patient is not nervous/anxious.        Procedures    ECG 12 Lead  Date/Time: 4/17/2019 1:52 PM  Performed by: Valeria Bravo MD  Authorized by: Valreia Bravo MD   Comparison: compared with previous ECG   Similar to previous ECG  Rhythm: atrial fibrillation    Clinical impression: abnormal EKG                Objective:      Vitals:    04/17/19 1342   BP: 122/70   BP Location: Left arm   Patient Position: Sitting   Pulse: 74   Weight: (!) 158 kg (347 lb 12.8 oz)   Height: 190.5 cm (75\")     Body mass index is 43.47 kg/m².    Physical Exam   Constitutional: He is oriented to person, place, and time. He appears well-developed and well-nourished. No distress.   obese   HENT:   Head: Normocephalic and atraumatic.   Eyes: Conjunctivae are normal. No scleral icterus.   Neck: Neck supple. No JVD present. Carotid bruit is not present. No thyromegaly present.   Cardiovascular: Normal rate, S1 normal, S2 normal, normal heart sounds and intact distal pulses. An irregularly irregular rhythm present.  No extrasystoles are present. PMI is not displaced. Exam reveals no gallop.   No murmur heard.  Pulses:       Carotid pulses are 2+ on the right side, and 2+ on the left side.       Radial pulses are 2+ on the right side, and 2+ on the left side.        Dorsalis pedis pulses are 2+ on the right side, and 2+ on the left side.        Posterior tibial pulses are 2+ on the right side, and 2+ on the left side.   Pulmonary/Chest: Effort normal and breath sounds normal. No respiratory distress. He has no wheezes. He has no rhonchi. He has no rales. He exhibits no tenderness.   Abdominal: Soft. Bowel sounds are normal. He exhibits no distension, no abdominal bruit and no mass. There is no tenderness.   Musculoskeletal: He exhibits no edema or deformity. "   Lymphadenopathy:     He has no cervical adenopathy.   Neurological: He is alert and oriented to person, place, and time. No cranial nerve deficit.   Skin: Skin is warm and dry. No rash noted. He is not diaphoretic. No cyanosis. No pallor. Nails show no clubbing.   Psychiatric: He has a normal mood and affect. Judgment normal.   Vitals reviewed.      Lab Review:       Assessment:      Diagnosis Plan   1. Persistent atrial fibrillation (CMS/[facility])     2. Dilated cardiomyopathy (CMS/[facility])     3. Essential hypertension     4. Hyperlipidemia, unspecified hyperlipidemia type     5. Morbid obesity with BMI of 40.0-44.9, adult (CMS/[facility])     6. Type 2 diabetes mellitus with hyperglycemia, with long-term current use of insulin (CMS/[facility])       1. Persistent atrial fibrillation, on xarelto.   2. S/p c-spine surgery for cervical stenosis, done 11/2018.  3. DM  4. Hypertension - controlled.   5. Hyperlipidemia  6. Cardiomyopathy, no chf  7. Mild AS        Plan:       Increase toprol xl to 50mg nightly.  See fredrick in 3 months.  No cardioversion at this time as he is not overtly symptomatic.  Should his breathing become decompensated, would attempt cardioversion.    Atrial Fibrillation and Atrial Flutter  Assessment  • The patient has permanent atrial fibrillation  • This is non-valvular in etiology  • The patient's CHADS2-VASc score is 3  • A [id number] score of 2 or more is considered a high risk for a thromboembolic event  • Rivaroxaban prescribed    Plan  • Continue in atrial fibrillation with rate control  • Continue rivaroxaban for antithrombotic therapy, bleeding issues discussed  • Continue beta blocker for rate control      Heart Failure  Assessment  • NYHA class III-A - There is limitation of physical activity. The patient is comfortable at rest, but ordinary activity causes fatigue, palpitations or shortness of breath.  • Beta blocker prescribed  • Angiotensin receptor blocker (ARB) prescribed  • Left ventricular  function is mildly reduced by qualitative assessment    Plan  • The heart failure care plan was discussed with the patient today including: continuing the current program    Subjective/Objective    • Physical exam findings negative for rales and elevated JVP.

## 2019-04-25 ENCOUNTER — DOCUMENTATION (OUTPATIENT)
Dept: PHYSICAL THERAPY | Facility: HOSPITAL | Age: 64
End: 2019-04-25

## 2019-04-25 RX ORDER — METOPROLOL SUCCINATE 50 MG/1
50 TABLET, EXTENDED RELEASE ORAL DAILY
Qty: 90 TABLET | Refills: 3 | Status: SHIPPED | OUTPATIENT
Start: 2019-04-25 | End: 2019-05-08 | Stop reason: SDUPTHER

## 2019-04-25 NOTE — THERAPY DISCHARGE NOTE
Outpatient Physical Therapy Discharge Summary         Patient Name: Duane Mark Witten  : 1955  MRN: [id number]    Today's Date: 2019    Visit Dx:  No diagnosis found.    PT OP Goals     Row Name 19 1600          PT Short Term Goals    STG Date to Achieve  19  -LB     STG 1  Pt will demonstrate understanding and compliance with initial HEP.  -LB     STG 1 Progress  Met  -LB     STG 2  Pt will report improved ability to perform STS from home chair without requiring multiple attempts.  -LB     STG 2 Progress  Met  -LB     STG 3  Pt will demonstrate improved tolerance to standing from 5 minutes to 15 minutes during therapy session.  -LB     STG 3 Progress  Met  -LB        Long Term Goals    LTG Date to Achieve  19  -LB     LTG 1  Pt will demonstrate ability to tolerate 30 minutes of standing to improve his ability to perform household tasks.   -LB     LTG 1 Progress  Partially Met  -LB     LTG 1 Progress Comments  Pt with some improvement at last session.  -LB     LTG 2  Pt will demonstrate STS x 5 from normal chair height in 30 seconds or less.  -LB     LTG 2 Progress  Not Met  -LB     LTG 3  Pt will demonstrate ability to safely ambulate using SC for household distance and within clinic.   -LB     LTG 3 Progress  Not Met  -LB     LTG 3 Progress Comments  Continued using RW.  -LB       User Key  (r) = Recorded By, (t) = Taken By, (c) = Cosigned By    Initials Name Provider Type    Khushi Rodney, PT Physical Therapist          OP PT Discharge Summary  Date of Discharge: 19  Reason for Discharge: Patient/Caregiver request  Outcomes Achieved: Patient able to partially acheive established goals  Discharge Destination: Home with home program  Discharge Instructions/Additional Comments: Pt participated in 8 skilled PT sessions s/p multilevel corpectomy and fusion. He made slow functional progress towards goals. Transportation to and from therapy along with wife's medical issues and  multiple doctor's appts. led to dec attendance and availability to continue skilled PT services. Pt requested d/c due to difficulty attending and fatigue with reaching office and participating in OT and PT.       Time Calculation:                    Khushi Ji, PT  4/25/2019

## 2019-05-06 NOTE — PROGRESS NOTES
Subjective   Patient ID: Duane Mark Witten is a 64 y.o. male is here today for follow-up after PT, patient states that he only went for a couple days as it was wearing him out and causing increased pain.  He is following HEP, he is not using a cane instead of a walker.    Patient had surgery 11.28.18  C4/5/6 ACCF,  He was last seen 2.11.19 for post op residual N/T bilateral hands and arms and mild thoracic spine pain and arm weakness.    He saw an orthopedist recently for left shoulder pain and had a cortisone injection which has helped a lot.    He is currently having not having any neck or arm pain, N/T is still present in both hands/fingers. He is seeing gradual improvement with his balance and gait.  He does have some urinary incontinence that is improving.  He denies arm or leg weakness.  He does get fatigued quickly.   He is not taking any pain meds.      It has been 6 months since his C4, C5, and C6 corpectomy with a cage and plate for myelopathy. He is making steady progress. His gait is actually better than it was before and his hand function is better but he probably will never get to the point where he can engage in gainful employment. The spinal cord was injured too badly and he still fatigues quite a bit and still has problems with bowel and urinary incontinence but he is better than he was before. He is in the process for applying for disability. I will see him in 6 months with x-rays.      Difficulty Walking   The problem occurs constantly. The problem has been gradually improving. Associated symptoms include numbness. Pertinent negatives include no arthralgias, myalgias, neck pain or weakness.       The following portions of the patient's history were reviewed and updated as appropriate: allergies, current medications, past family history, past medical history, past social history, past surgical history and problem list.    Review of Systems   Genitourinary: Positive for urgency.   Musculoskeletal:  Positive for gait problem. Negative for arthralgias, myalgias, neck pain and neck stiffness.   Neurological: Positive for numbness. Negative for weakness.   All other systems reviewed and are negative.      Objective   Physical Exam   Constitutional: He is oriented to person, place, and time. He appears well-developed and well-nourished.   HENT:   Head: Normocephalic and atraumatic.   Eyes: Conjunctivae and EOM are normal. Pupils are equal, round, and reactive to light.   Fundoscopic exam:       The right eye shows no papilledema. The right eye shows venous pulsations.        The left eye shows no papilledema. The left eye shows venous pulsations.   Neck: Carotid bruit is not present.   Neurological: He is oriented to person, place, and time. He has a normal Finger-Nose-Finger Test and a normal Heel to Shin Test. Gait normal.   Reflex Scores:       Tricep reflexes are 2+ on the right side and 2+ on the left side.       Bicep reflexes are 2+ on the right side and 2+ on the left side.       Brachioradialis reflexes are 2+ on the right side and 2+ on the left side.       Patellar reflexes are 2+ on the right side and 2+ on the left side.       Achilles reflexes are 2+ on the right side and 2+ on the left side.  Psychiatric: His speech is normal.     Neurologic Exam     Mental Status   Oriented to person, place, and time.   Registration of memory: Good recent and remote memory.   Attention: normal. Concentration: normal.   Speech: speech is normal   Level of consciousness: alert  Knowledge: consistent with education.     Cranial Nerves     CN II   Visual fields full to confrontation.   Visual acuity: normal    CN III, IV, VI   Pupils are equal, round, and reactive to light.  Extraocular motions are normal.     CN V   Facial sensation intact.   Right corneal reflex: normal  Left corneal reflex: normal    CN VII   Facial expression full, symmetric.   Right facial weakness: none  Left facial weakness: none    CN VIII    Hearing: intact    CN IX, X   Palate: symmetric    CN XI   Right sternocleidomastoid strength: normal  Left sternocleidomastoid strength: normal    CN XII   Tongue: not atrophic  Tongue deviation: none    Motor Exam   Muscle bulk: normal  Right arm tone: normal  Left arm tone: normal  Right leg tone: normal  Left leg tone: normal    Strength   Strength 5/5 except as noted.     Sensory Exam   Light touch normal.     Gait, Coordination, and Reflexes     Gait  Gait: normal    Coordination   Finger to nose coordination: normal  Heel to shin coordination: normal    Reflexes   Right brachioradialis: 2+  Left brachioradialis: 2+  Right biceps: 2+  Left biceps: 2+  Right triceps: 2+  Left triceps: 2+  Right patellar: 2+  Left patellar: 2+  Right achilles: 2+  Left achilles: 2+  Right : 2+  Left : 2+Using a cane.  Mildly ataxic gait.       Assessment/Plan   Independent Review of Radiographic Studies:    X-rays done 2/8/2019 show stable positioning of the cage, the plate, and the screws.  Agree with the report.      Medical Decision Making:    Has made very nice progress and continues to improve.  Again, I do not think he ever gets to the point where he can engage in gainful employment because of his baseline myelopathy.  I will see him in 6 months with x-rays.      Duane was seen today for numbness, difficulty walking and urinary incontinence.    Diagnoses and all orders for this visit:    Cervical spondylosis with myelopathy  -     XR Cervical Spine AP & Lat with Flex and Ext. views; Future    History of spinal fusion  -     XR Cervical Spine AP & Lat with Flex and Ext. views; Future      Return in about 6 months (around 11/17/2019).

## 2019-05-08 RX ORDER — METOPROLOL SUCCINATE 50 MG/1
50 TABLET, EXTENDED RELEASE ORAL DAILY
Qty: 90 TABLET | Refills: 3 | Status: SHIPPED | OUTPATIENT
Start: 2019-05-08 | End: 2019-10-29 | Stop reason: SDUPTHER

## 2019-05-13 ENCOUNTER — TELEPHONE (OUTPATIENT)
Dept: FAMILY MEDICINE CLINIC | Facility: CLINIC | Age: 64
End: 2019-05-13

## 2019-05-13 DIAGNOSIS — I10 ESSENTIAL HYPERTENSION: ICD-10-CM

## 2019-05-13 DIAGNOSIS — E11.9 TYPE 2 DIABETES MELLITUS WITHOUT COMPLICATION, WITHOUT LONG-TERM CURRENT USE OF INSULIN (HCC): ICD-10-CM

## 2019-05-13 DIAGNOSIS — F32.5 MAJOR DEPRESSIVE DISORDER WITH SINGLE EPISODE, IN FULL REMISSION (HCC): ICD-10-CM

## 2019-05-13 DIAGNOSIS — E78.5 HYPERLIPIDEMIA, UNSPECIFIED HYPERLIPIDEMIA TYPE: Primary | ICD-10-CM

## 2019-05-13 RX ORDER — DULOXETIN HYDROCHLORIDE 60 MG/1
60 CAPSULE, DELAYED RELEASE ORAL DAILY
Qty: 30 CAPSULE | Refills: 5 | Status: SHIPPED | OUTPATIENT
Start: 2019-05-13 | End: 2019-05-17 | Stop reason: SDUPTHER

## 2019-05-13 RX ORDER — GLIMEPIRIDE 4 MG/1
4 TABLET ORAL 2 TIMES DAILY
Qty: 180 TABLET | Refills: 0 | Status: SHIPPED | OUTPATIENT
Start: 2019-05-13 | End: 2019-08-08 | Stop reason: SDUPTHER

## 2019-05-13 RX ORDER — VALSARTAN AND HYDROCHLOROTHIAZIDE 160; 12.5 MG/1; MG/1
1 TABLET, FILM COATED ORAL DAILY
Qty: 90 TABLET | Refills: 0 | Status: SHIPPED | OUTPATIENT
Start: 2019-05-13 | End: 2019-08-08 | Stop reason: SDUPTHER

## 2019-05-13 RX ORDER — ATORVASTATIN CALCIUM 20 MG/1
20 TABLET, FILM COATED ORAL DAILY
Qty: 90 TABLET | Refills: 0 | Status: SHIPPED | OUTPATIENT
Start: 2019-05-13 | End: 2019-08-08 | Stop reason: SDUPTHER

## 2019-05-13 RX ORDER — PIOGLITAZONEHYDROCHLORIDE 30 MG/1
30 TABLET ORAL DAILY
Qty: 90 TABLET | Refills: 0 | Status: SHIPPED | OUTPATIENT
Start: 2019-05-13 | End: 2019-08-08 | Stop reason: SDUPTHER

## 2019-05-14 ENCOUNTER — TELEPHONE (OUTPATIENT)
Dept: CARDIOLOGY | Facility: CLINIC | Age: 64
End: 2019-05-14

## 2019-05-14 NOTE — TELEPHONE ENCOUNTER
Pt called and states that hid needing a zarelto 20 mg samples. We only have xarelto 10 mg samples. Per RM he can take 2 tablets of the xarelto 10 mg qd.      Called and informed pt. Verbally understood      Frida RAMOS

## 2019-05-17 ENCOUNTER — OFFICE VISIT (OUTPATIENT)
Dept: NEUROSURGERY | Facility: CLINIC | Age: 64
End: 2019-05-17

## 2019-05-17 ENCOUNTER — TELEPHONE (OUTPATIENT)
Dept: FAMILY MEDICINE CLINIC | Facility: CLINIC | Age: 64
End: 2019-05-17

## 2019-05-17 VITALS
SYSTOLIC BLOOD PRESSURE: 132 MMHG | DIASTOLIC BLOOD PRESSURE: 79 MMHG | WEIGHT: 240 LBS | BODY MASS INDEX: 29.84 KG/M2 | HEART RATE: 86 BPM | HEIGHT: 75 IN

## 2019-05-17 DIAGNOSIS — M47.12 CERVICAL SPONDYLOSIS WITH MYELOPATHY: Primary | ICD-10-CM

## 2019-05-17 DIAGNOSIS — F32.5 MAJOR DEPRESSIVE DISORDER WITH SINGLE EPISODE, IN FULL REMISSION (HCC): ICD-10-CM

## 2019-05-17 DIAGNOSIS — Z98.1 HISTORY OF SPINAL FUSION: ICD-10-CM

## 2019-05-17 PROCEDURE — 99213 OFFICE O/P EST LOW 20 MIN: CPT | Performed by: NEUROLOGICAL SURGERY

## 2019-05-17 RX ORDER — DULOXETIN HYDROCHLORIDE 60 MG/1
60 CAPSULE, DELAYED RELEASE ORAL DAILY
Qty: 90 CAPSULE | Refills: 1 | Status: SHIPPED | OUTPATIENT
Start: 2019-05-17 | End: 2019-08-08 | Stop reason: SDUPTHER

## 2019-07-16 NOTE — PROGRESS NOTES
"Spoke to patient who reports that she went out to eat with her , he asked her a question on where she wants to go and she could not get any words out.  This lasted \"seconds\" and then she was ok.  The next day she went to the ED and was diagnosed with a TIA.  She now is seeing a neurologist and her family practice provider recommended to see her cardiologist also.    Writer offered appointment with Dr. quinones 7/25 at 9:30 am and patient accepted.    Records are viewable in Epic/[facility]    Calista Li RN    " Inpatient Rehabilitation Plan of Care Note    Plan of Care  Care Plan Reviewed - No updates at this time.    Psychosocial    Performed Intervention(s)  Verbalizes needs & concerns      Safety    Performed Intervention(s)  Safety rounds/ Fall precautions  Items in reach, bed alarm & chair alarms      Sphincter Control    Performed Intervention(s)  Monitor intake, output, & bowel movements  Encourage appropriate diet & fluids      Body Systems    Performed Intervention(s)  Blood glucose AC & HS & insulin as ordered      Pain    Performed Intervention(s)  Medication prn & evaluate effectiveness  Monitor neck incision q shift    Signed by: Jojo Bowers RN

## 2019-08-04 DIAGNOSIS — E11.9 TYPE 2 DIABETES MELLITUS WITHOUT COMPLICATION, WITHOUT LONG-TERM CURRENT USE OF INSULIN (HCC): ICD-10-CM

## 2019-08-04 DIAGNOSIS — I10 ESSENTIAL HYPERTENSION: ICD-10-CM

## 2019-08-05 RX ORDER — PIOGLITAZONEHYDROCHLORIDE 30 MG/1
30 TABLET ORAL DAILY
Qty: 90 TABLET | Refills: 0 | OUTPATIENT
Start: 2019-08-05

## 2019-08-05 RX ORDER — VALSARTAN AND HYDROCHLOROTHIAZIDE 160; 12.5 MG/1; MG/1
1 TABLET, FILM COATED ORAL DAILY
Qty: 90 TABLET | Refills: 0 | OUTPATIENT
Start: 2019-08-05

## 2019-08-05 RX ORDER — GLIMEPIRIDE 4 MG/1
TABLET ORAL
Qty: 180 TABLET | Refills: 0 | OUTPATIENT
Start: 2019-08-05

## 2019-08-06 DIAGNOSIS — I10 ESSENTIAL HYPERTENSION: ICD-10-CM

## 2019-08-06 RX ORDER — VALSARTAN AND HYDROCHLOROTHIAZIDE 160; 12.5 MG/1; MG/1
1 TABLET, FILM COATED ORAL DAILY
Qty: 90 TABLET | Refills: 0 | OUTPATIENT
Start: 2019-08-06

## 2019-08-07 DIAGNOSIS — E11.9 TYPE 2 DIABETES MELLITUS WITHOUT COMPLICATION, WITHOUT LONG-TERM CURRENT USE OF INSULIN (HCC): ICD-10-CM

## 2019-08-07 DIAGNOSIS — E78.5 HYPERLIPIDEMIA, UNSPECIFIED HYPERLIPIDEMIA TYPE: ICD-10-CM

## 2019-08-07 RX ORDER — ATORVASTATIN CALCIUM 20 MG/1
TABLET, FILM COATED ORAL
Qty: 90 TABLET | Refills: 0 | OUTPATIENT
Start: 2019-08-07

## 2019-08-08 ENCOUNTER — OFFICE VISIT (OUTPATIENT)
Dept: FAMILY MEDICINE CLINIC | Facility: CLINIC | Age: 64
End: 2019-08-08

## 2019-08-08 VITALS
OXYGEN SATURATION: 98 % | WEIGHT: 315 LBS | HEART RATE: 74 BPM | HEIGHT: 75 IN | DIASTOLIC BLOOD PRESSURE: 68 MMHG | RESPIRATION RATE: 16 BRPM | BODY MASS INDEX: 39.17 KG/M2 | TEMPERATURE: 98.2 F | SYSTOLIC BLOOD PRESSURE: 110 MMHG

## 2019-08-08 DIAGNOSIS — E78.5 HYPERLIPIDEMIA, UNSPECIFIED HYPERLIPIDEMIA TYPE: ICD-10-CM

## 2019-08-08 DIAGNOSIS — I10 ESSENTIAL HYPERTENSION: ICD-10-CM

## 2019-08-08 DIAGNOSIS — E11.9 TYPE 2 DIABETES MELLITUS WITHOUT COMPLICATION, WITHOUT LONG-TERM CURRENT USE OF INSULIN (HCC): ICD-10-CM

## 2019-08-08 DIAGNOSIS — E11.9 TYPE 2 DIABETES MELLITUS NOT AT GOAL (HCC): ICD-10-CM

## 2019-08-08 DIAGNOSIS — F32.5 MAJOR DEPRESSIVE DISORDER WITH SINGLE EPISODE, IN FULL REMISSION (HCC): ICD-10-CM

## 2019-08-08 PROCEDURE — 99214 OFFICE O/P EST MOD 30 MIN: CPT | Performed by: NURSE PRACTITIONER

## 2019-08-08 RX ORDER — ATORVASTATIN CALCIUM 20 MG/1
20 TABLET, FILM COATED ORAL DAILY
Qty: 90 TABLET | Refills: 1 | Status: SHIPPED | OUTPATIENT
Start: 2019-08-08 | End: 2019-10-29 | Stop reason: SDUPTHER

## 2019-08-08 RX ORDER — PIOGLITAZONEHYDROCHLORIDE 30 MG/1
30 TABLET ORAL DAILY
Qty: 90 TABLET | Refills: 1 | Status: SHIPPED | OUTPATIENT
Start: 2019-08-08 | End: 2019-10-29 | Stop reason: SDUPTHER

## 2019-08-08 RX ORDER — BLOOD SUGAR DIAGNOSTIC
STRIP MISCELLANEOUS
Qty: 100 EACH | Refills: 11 | Status: CANCELLED | OUTPATIENT
Start: 2019-08-08

## 2019-08-08 RX ORDER — GLIMEPIRIDE 4 MG/1
4 TABLET ORAL 2 TIMES DAILY
Qty: 180 TABLET | Refills: 1 | Status: SHIPPED | OUTPATIENT
Start: 2019-08-08 | End: 2019-10-29 | Stop reason: SDUPTHER

## 2019-08-08 RX ORDER — DULOXETIN HYDROCHLORIDE 60 MG/1
60 CAPSULE, DELAYED RELEASE ORAL DAILY
Qty: 90 CAPSULE | Refills: 1 | Status: SHIPPED | OUTPATIENT
Start: 2019-08-08 | End: 2019-10-29 | Stop reason: SDUPTHER

## 2019-08-08 RX ORDER — VALSARTAN AND HYDROCHLOROTHIAZIDE 160; 12.5 MG/1; MG/1
1 TABLET, FILM COATED ORAL DAILY
Qty: 90 TABLET | Refills: 1 | Status: SHIPPED | OUTPATIENT
Start: 2019-08-08 | End: 2019-10-29 | Stop reason: SINTOL

## 2019-08-08 NOTE — PROGRESS NOTES
Subjective   Duane Mark Witten is a 64 y.o. male.     History of Present Illness   Duane Mark Witten 64 y.o. male who presents today for routine follow up check and medication refills.  he has a history of   Patient Active Problem List   Diagnosis   • YANELI (generalized anxiety disorder)   • ADD (attention deficit disorder)   • Depression   • Diabetes type 2, controlled (CMS/HCC)   • ED (erectile dysfunction) of non-organic origin   • HLD (hyperlipidemia)   • Essential hypertension   • Arthritis   • Psoriasis   • Testosterone deficiency   • Primary insomnia   • Weakness of both lower extremities   • Paresthesia of both upper extremities   • Morbid obesity with BMI of 40.0-44.9, adult (CMS/HCC)   • Adverse effect of corticosteroids   • Type 2 diabetes mellitus with hyperglycemia (CMS/HCC)   • Cervical stenosis of spinal canal   • Edema of cervical spinal cord (CMS/HCC)   • Persistent atrial fibrillation (CMS/HCC)   • Cervical myelopathy (CMS/HCC)   • Dilated cardiomyopathy (CMS/HCC)   • Cervical spondylosis with myelopathy   • History of spinal fusion   .  Since the last visit, he has overall felt well.  He has Hypertenision and is well controlled on medication, Hyperlipidemia and is well controlled on medication and DM 2 and will need labs to assess control .  he has been compliant with current medications have reviewed them.  The patient denies medication side effects.    Results for orders placed or performed during the hospital encounter of 02/08/19   Stress Test With Pet Myocardial Perfusion (MULTI STUDY, REST AND STRESS)   Result Value Ref Range    Nuclear Prior Study 3     BH CV STRESS PROTOCOL 1 Pharmacologic     Stage 1 1     HR Stage 1 106     BP Stage 1 151/76     Duration Min Stage 1 0     Duration Sec Stage 1 10     Stress Dose Regadenoson Stage 1 0.4     Stress Comments Stage 1 10 sec bolus injection     Baseline  bpm    Baseline /88 mmHg    Peak  bpm    Percent Max Pred HR 67.95 %     Percent Target HR 80 %    Peak /76 mmHg    Recovery  bpm    Recovery /64 mmHg    Target HR (85%) 133 bpm    Max. Pred. HR (100%) 156 bpm    Exercise duration (sec) 10 sec    Nuc Stress EF 36 %         The following portions of the patient's history were reviewed and updated as appropriate: allergies, current medications, past family history, past medical history, past social history, past surgical history and problem list.    Review of Systems   Constitutional: Negative for fatigue.   Eyes: Negative for visual disturbance.   Respiratory: Negative for cough and shortness of breath.    Cardiovascular: Negative for chest pain and palpitations.   Endocrine: Negative for cold intolerance, heat intolerance, polydipsia, polyphagia and polyuria.   Skin: Negative for rash.   Neurological: Negative for dizziness and headaches.   Psychiatric/Behavioral: Negative for dysphoric mood and sleep disturbance. The patient is not nervous/anxious.        Objective   Physical Exam   Constitutional: He is oriented to person, place, and time. He appears well-developed and well-nourished.   Neck: Carotid bruit is not present.   Cardiovascular: Normal rate and regular rhythm.   Pulmonary/Chest: Effort normal and breath sounds normal.   Neurological: He is oriented to person, place, and time.   Skin: Skin is warm and dry.   Psychiatric: He has a normal mood and affect. His behavior is normal. Judgment and thought content normal.   Nursing note and vitals reviewed.      Assessment/Plan   Duane was seen today for med refill, diabetes, hypertension and hyperlipidemia.    Diagnoses and all orders for this visit:    Essential hypertension  -     valsartan-hydrochlorothiazide (DIOVAN-HCT) 160-12.5 MG per tablet; Take 1 tablet by mouth Daily.  -     Comprehensive metabolic panel  -     Lipid panel  -     CBC and Differential  -     TSH  -     Hemoglobin A1c  -     MicroAlbumin, Urine, Random - Urine, Clean Catch  -     PSA  Screen    Type 2 diabetes mellitus without complication, without long-term current use of insulin (CMS/[facility])  -     pioglitazone (ACTOS) 30 MG tablet; Take 1 tablet by mouth Daily.  -     metFORMIN (GLUCOPHAGE) 1000 MG tablet; Take 1 tablet by mouth 2 (Two) Times a Day With Meals.  -     glimepiride (AMARYL) 4 MG tablet; Take 1 tablet by mouth 2 (Two) Times a Day.  -     Comprehensive metabolic panel  -     Lipid panel  -     CBC and Differential  -     TSH  -     Hemoglobin A1c  -     MicroAlbumin, Urine, Random - Urine, Clean Catch  -     PSA Screen    Type 2 diabetes mellitus not at goal (CMS/[facility])    Major depressive disorder with single episode, in full remission (CMS/[facility])  -     DULoxetine (CYMBALTA) 60 MG capsule; Take 1 capsule by mouth Daily.    Hyperlipidemia, unspecified hyperlipidemia type  -     atorvastatin (LIPITOR) 20 MG tablet; Take 1 tablet by mouth Daily.  -     Comprehensive metabolic panel  -     Lipid panel  -     CBC and Differential  -     TSH  -     Hemoglobin A1c  -     MicroAlbumin, Urine, Random - Urine, Clean Catch  -     PSA Screen    Other orders  -     Cancel: Insulin Pen Needle (BD ULTRA-FINE PEN NEEDLES) 29G X 12.7MM misc; 12.7 mm Take As Directed.  -     Cancel: Dulaglutide (TRULICITY) 1.5 MG/0.5ML solution pen-injector; Inject 1.5 mg under the skin into the appropriate area as directed 1 (One) Time Per Week.  -     Cancel: ACCU-CHEK AGNES PLUS test strip; Use as directed to test blood sugar tid E11.9        Patient gets Trulicity and Strattera through [employer].

## 2019-08-11 LAB
ALBUMIN SERPL-MCNC: 4.5 G/DL (ref 3.6–4.8)
ALBUMIN/GLOB SERPL: 2 {RATIO} (ref 1.2–2.2)
ALP SERPL-CCNC: 102 IU/L (ref 39–117)
ALT SERPL-CCNC: 13 IU/L (ref 0–44)
AST SERPL-CCNC: 15 IU/L (ref 0–40)
BASOPHILS # BLD AUTO: 0.1 X10E3/UL (ref 0–0.2)
BASOPHILS NFR BLD AUTO: 1 %
BILIRUB SERPL-MCNC: 0.5 MG/DL (ref 0–1.2)
BUN SERPL-MCNC: 23 MG/DL (ref 8–27)
BUN/CREAT SERPL: 18 (ref 10–24)
CALCIUM SERPL-MCNC: 9.7 MG/DL (ref 8.6–10.2)
CHLORIDE SERPL-SCNC: 103 MMOL/L (ref 96–106)
CHOLEST SERPL-MCNC: 124 MG/DL (ref 100–199)
CO2 SERPL-SCNC: 26 MMOL/L (ref 20–29)
CREAT SERPL-MCNC: 1.27 MG/DL (ref 0.76–1.27)
EOSINOPHIL # BLD AUTO: 0.5 X10E3/UL (ref 0–0.4)
EOSINOPHIL NFR BLD AUTO: 4 %
ERYTHROCYTE [DISTWIDTH] IN BLOOD BY AUTOMATED COUNT: 14.9 % (ref 12.3–15.4)
GLOBULIN SER CALC-MCNC: 2.3 G/DL (ref 1.5–4.5)
GLUCOSE SERPL-MCNC: 107 MG/DL (ref 65–99)
HBA1C MFR BLD: 6.5 % (ref 4.8–5.6)
HCT VFR BLD AUTO: 41.6 % (ref 37.5–51)
HDLC SERPL-MCNC: 39 MG/DL
HGB BLD-MCNC: 13.3 G/DL (ref 13–17.7)
IMM GRANULOCYTES # BLD AUTO: 0 X10E3/UL (ref 0–0.1)
IMM GRANULOCYTES NFR BLD AUTO: 0 %
LDLC SERPL CALC-MCNC: 69 MG/DL (ref 0–99)
LYMPHOCYTES # BLD AUTO: 3.9 X10E3/UL (ref 0.7–3.1)
LYMPHOCYTES NFR BLD AUTO: 31 %
MCH RBC QN AUTO: 27.7 PG (ref 26.6–33)
MCHC RBC AUTO-ENTMCNC: 32 G/DL (ref 31.5–35.7)
MCV RBC AUTO: 87 FL (ref 79–97)
MICROALBUMIN UR-MCNC: 16.6 UG/ML
MONOCYTES # BLD AUTO: 0.8 X10E3/UL (ref 0.1–0.9)
MONOCYTES NFR BLD AUTO: 6 %
NEUTROPHILS # BLD AUTO: 7 X10E3/UL (ref 1.4–7)
NEUTROPHILS NFR BLD AUTO: 58 %
PLATELET # BLD AUTO: 298 X10E3/UL (ref 150–450)
POTASSIUM SERPL-SCNC: 4.3 MMOL/L (ref 3.5–5.2)
PROT SERPL-MCNC: 6.8 G/DL (ref 6–8.5)
PSA SERPL-MCNC: 0.5 NG/ML (ref 0–4)
RBC # BLD AUTO: 4.81 X10E6/UL (ref 4.14–5.8)
SODIUM SERPL-SCNC: 144 MMOL/L (ref 134–144)
TRIGL SERPL-MCNC: 80 MG/DL (ref 0–149)
TSH SERPL DL<=0.005 MIU/L-ACNC: 2.27 UIU/ML (ref 0.45–4.5)
VLDLC SERPL CALC-MCNC: 16 MG/DL (ref 5–40)
WBC # BLD AUTO: 12.3 X10E3/UL (ref 3.4–10.8)

## 2019-08-15 DIAGNOSIS — R79.89 ABNORMAL CBC: Primary | ICD-10-CM

## 2019-08-21 ENCOUNTER — OFFICE VISIT (OUTPATIENT)
Dept: CARDIOLOGY | Facility: CLINIC | Age: 64
End: 2019-08-21

## 2019-08-21 VITALS
BODY MASS INDEX: 39.17 KG/M2 | SYSTOLIC BLOOD PRESSURE: 130 MMHG | HEART RATE: 70 BPM | DIASTOLIC BLOOD PRESSURE: 70 MMHG | HEIGHT: 75 IN | WEIGHT: 315 LBS

## 2019-08-21 DIAGNOSIS — I10 ESSENTIAL HYPERTENSION: ICD-10-CM

## 2019-08-21 DIAGNOSIS — E78.2 MIXED HYPERLIPIDEMIA: ICD-10-CM

## 2019-08-21 DIAGNOSIS — I42.0 DILATED CARDIOMYOPATHY (HCC): ICD-10-CM

## 2019-08-21 DIAGNOSIS — I48.19 PERSISTENT ATRIAL FIBRILLATION (HCC): Primary | ICD-10-CM

## 2019-08-21 DIAGNOSIS — I35.0 NONRHEUMATIC AORTIC VALVE STENOSIS: ICD-10-CM

## 2019-08-21 DIAGNOSIS — E66.01 MORBID OBESITY WITH BMI OF 40.0-44.9, ADULT (HCC): ICD-10-CM

## 2019-08-21 DIAGNOSIS — R06.09 DYSPNEA ON EXERTION: ICD-10-CM

## 2019-08-21 PROCEDURE — 93000 ELECTROCARDIOGRAM COMPLETE: CPT | Performed by: NURSE PRACTITIONER

## 2019-08-21 PROCEDURE — 99214 OFFICE O/P EST MOD 30 MIN: CPT | Performed by: NURSE PRACTITIONER

## 2019-08-22 NOTE — PROGRESS NOTES
Date of Office Visit: 2019  Encounter Provider: KAYLEN Correa  Place of Service: [facility] CARDIOLOGY  Patient Name: Duane Mark Witten  :1955      Chief Complaint   Patient presents with   • Atrial Fibrillation   • Follow-up   :     Dear KAYLEN Hoover-    HPI: Duane Mark Witten is a pleasant 64 y.o. male who presents today for cardiac follow-up.  He has a known history of hypertension, hyperlipidemia, obesity, type 2 diabetes mellitus, and obstructive sleep apnea (compliant with BiPAP machine).    In 2018, he presented to the emergency department with progressive weakness of the lower extremities and paresthesias of the upper extremities.  He was found to have severe cervical stenosis with cord compression and underwent anterior cervical corpectomy with cage and plate by Dr. Markham.  He was diagnosed with new onset atrial fibrillation with RVR during surgery and he was asymptomatic.  An echocardiogram was obtained on 18 which revealed an EF of 47%, mild left ventricular hypertrophy, mild left ventricular enlargement, mild to moderate aortic stenosis, mild mitral regurgitation with mild anterior prolapse, and mild left atrial enlargement.    He followed up with Dr. Valeria Bravo in 2019.  He remained in atrial fibrillation with controlled ventricular response and was taking rivaroxaban for stroke prevention.  Dr. Bravo recommended a nuclear stress test to be completed and to start metoprolol tartrate 25 mg twice a day.  A cardiac PET scan was completed 19 and showed the following results: EF 36%, bilateral ventricular dilation, diffusely decreased tracer uptake in all wall segments and stress imaging and a more pronounced hypoperfusion of the mid anterior wall that was considered small, mild, and may represent ischemia.    In 2019, he followed up with Dr. Bravo.  He he is feeling better since his spinal cord  compression surgery, but reported some mild weakness and shortness of breath.  Dr. Bravo recommended increasing Toprol-XL to 50 mg nightly and following up with me in 3 months.  She did not think that he was overtly symptomatic with atrial fibrillation and did not recommend cardioversion.  She said if his breathing became decompensated that we would attempt cardioversion.    He presents today for follow-up visit.  He is taking Toprol-XL 50 mg at nighttime and says that this is helped with his fatigue.  He is sleeping better.  Overall he is feeling better since his surgery last year and was told that it may take a full year for recovery.  He reports some mild dyspnea and lower extremity edema.  He denies chest pain, PND, orthopnea, palpitations, dizziness, syncope, or bleeding.  His blood pressure and heart rate are both normal today.  His mom had a TAVR recently at age 87 and he says she is doing very well.      Past Medical History:   Diagnosis Date   • ADD (attention deficit disorder)    • Atrial fibrillation (CMS/HCC)    • Depression    • Dilated cardiomyopathy (CMS/HCC)    • ED (erectile dysfunction)    • Essential hypertension    • YANELI (generalized anxiety disorder)    • HLD (hyperlipidemia)    • Morbid obesity with BMI of 40.0-44.9, adult (CMS/HCC)    • CAMERON (obstructive sleep apnea)    • Osteoarthrosis    • Persistent atrial fibrillation (CMS/HCC)    • PONV (postoperative nausea and vomiting)    • Psoriasis    • Testosterone deficiency    • Type 2 diabetes mellitus (CMS/HCC)        Past Surgical History:   Procedure Laterality Date   • ANTERIOR CHANNEL VERTEBRECTOMY/CORPECTOMY Bilateral 11/27/2018    Procedure: C4, C5, C6 ANTERIOR CERVICAL CORPECTOMY;  Surgeon: Chirag Markham MD;  Location: [facility];  Service: Neurosurgery   • BIOPSY / EXCISION / DISSECTION AXILLARY NODE  1994    Lymph Node testing for cancer    • COLONOSCOPY      10+ years; normal   • NECK SURGERY  11/27/2018    C4/5/6 anterior  cervical corpectomy   • PARATHYROIDECTOMY         Social History     Socioeconomic History   • Marital status:      Spouse name: Not on file   • Number of children: Not on file   • Years of education: Not on file   • Highest education level: Not on file   Tobacco Use   • Smoking status: Former Smoker     Packs/day: 1.00     Last attempt to quit:      Years since quittin.6   • Smokeless tobacco: Never Used   Substance and Sexual Activity   • Alcohol use: No     Comment: Rare caffeine use   • Drug use: No       Family History   Problem Relation Age of Onset   • Anxiety disorder Mother    • Bipolar disorder Mother    • Depression Mother    • Glaucoma Mother    • Macular degeneration Mother    • Lupus Mother    • Aortic stenosis Mother         s/p TAVR at age 87   • Cancer Father    • Diabetes Father    • Heart disease Father    • Hypertension Father    • Stroke Father    • Lupus Other         grandmother       The following portion of the patient's history were reviewed and updated as appropriate: past medical history, past surgical history, past social history, past family history, allergies, current medications, and problem list.    Review of Systems   Constitution: Positive for malaise/fatigue. Negative for chills, diaphoresis, fever, night sweats, weight gain and weight loss.   HENT: Negative for hearing loss, nosebleeds, sore throat and tinnitus.    Eyes: Negative for blurred vision, double vision, pain and visual disturbance.   Cardiovascular: Positive for dyspnea on exertion and leg swelling. Negative for chest pain, claudication, cyanosis, irregular heartbeat, near-syncope, orthopnea, palpitations, paroxysmal nocturnal dyspnea and syncope.   Respiratory: Negative for cough, hemoptysis, shortness of breath, snoring and wheezing.    Endocrine: Negative for cold intolerance, heat intolerance and polyuria.   Hematologic/Lymphatic: Negative for bleeding problem. Does not bruise/bleed easily.    Skin: Negative for color change, dry skin, flushing and itching.   Musculoskeletal: Negative for falls, joint pain, joint swelling, muscle cramps, muscle weakness and myalgias.   Gastrointestinal: Negative for abdominal pain, constipation, heartburn, melena, nausea and vomiting.   Genitourinary: Negative for dysuria and hematuria.   Neurological: Negative for excessive daytime sleepiness, dizziness, light-headedness, loss of balance, numbness, paresthesias, seizures and vertigo.   Psychiatric/Behavioral: Negative for altered mental status, depression, memory loss and substance abuse. The patient does not have insomnia and is not nervous/anxious.    Allergic/Immunologic: Negative for environmental allergies.       No Known Allergies      Current Outpatient Medications:   •  ACCU-CHEK AGNES PLUS test strip, Use as directed to test blood sugar tid E11.9, Disp: 100 each, Rfl: 11  •  atomoxetine (STRATTERA) 100 MG capsule, Take 1 capsule by mouth Daily., Disp: 30 capsule, Rfl: 5  •  atorvastatin (LIPITOR) 20 MG tablet, Take 1 tablet by mouth Daily., Disp: 90 tablet, Rfl: 1  •  Dulaglutide (TRULICITY) 1.5 MG/0.5ML solution pen-injector, Inject 1.5 mg under the skin into the appropriate area as directed 1 (One) Time Per Week., Disp: 4 pen, Rfl: 5  •  DULoxetine (CYMBALTA) 60 MG capsule, Take 1 capsule by mouth Daily., Disp: 90 capsule, Rfl: 1  •  FIBER ADULT GUMMIES PO, Take 1 dose by mouth Daily., Disp: , Rfl:   •  glimepiride (AMARYL) 4 MG tablet, Take 1 tablet by mouth 2 (Two) Times a Day., Disp: 180 tablet, Rfl: 1  •  glucosamine-chondroitin 500-400 MG capsule capsule, Take 1 capsule by mouth Daily., Disp: , Rfl:   •  Insulin Pen Needle (BD ULTRA-FINE PEN NEEDLES) 29G X 12.7MM misc, 12.7 mm 2 (Two) Times a Day., Disp: 60 each, Rfl: 5  •  metFORMIN (GLUCOPHAGE) 1000 MG tablet, Take 1 tablet by mouth 2 (Two) Times a Day With Meals., Disp: 180 tablet, Rfl: 1  •  metoprolol succinate XL (TOPROL-XL) 50 MG 24 hr tablet,  "Take 1 tablet by mouth Daily., Disp: 90 tablet, Rfl: 3  •  pioglitazone (ACTOS) 30 MG tablet, Take 1 tablet by mouth Daily., Disp: 90 tablet, Rfl: 1  •  rivaroxaban (XARELTO) 20 MG tablet, Take 1 tablet by mouth Daily With Dinner., Disp: 90 tablet, Rfl: 3  •  valsartan-hydrochlorothiazide (DIOVAN-HCT) 160-12.5 MG per tablet, Take 1 tablet by mouth Daily., Disp: 90 tablet, Rfl: 1        Objective:     Vitals:    08/21/19 1059   BP: 130/70   BP Location: Left arm   Pulse: 70   Weight: (!) 168 kg (370 lb)   Height: 190.5 cm (75\")     Body mass index is 46.25 kg/m².    PHYSICAL EXAM:    Vitals Reviewed.   General Appearance: No acute distress, well developed and well nourished.  Obese.  Eyes: Conjunctiva and lids: No erythema, swelling, or discharge. Sclera non-icteric.   HENT: Atraumatic, normocephalic. External eyes, ears, and nose normal. No hearing loss noted. Mucous membranes normal. Lips not cyanotic. Neck supple with no tenderness.  Respiratory: No signs of respiratory distress. Respiration rhythm and depth normal.   Clear to auscultation. No rales, crackles, rhonchi, or wheezing auscultated.   Cardiovascular:  Jugular Venous Pressure: Normal  Heart Rate and Rhythm: Irregularly, irregular.  Heart Sounds: Normal S1 and S2. No S3 or S4 noted.  Murmurs: No murmurs noted. No rubs, thrills, or gallops.   Lower Extremities: No edema noted.  Gastrointestinal:  Abdomen soft, non-distended, non-tender. Normal bowel sounds. No hepatomegaly.   Musculoskeletal: Normal movement of extremities  Skin and Nails: General appearance normal. No pallor, cyanosis, diaphoresis. Skin temperature normal. No clubbing of fingernails.   Psychiatric: Patient alert and oriented to person, place, and time. Speech and behavior appropriate. Normal mood and affect.       ECG 12 Lead  Date/Time: 8/21/2019 10:59 AM  Performed by: Theresa Henderson APRN  Authorized by: Theresa Henderson APRN   Comparison: compared with previous ECG from " 4/17/2019  Similar to previous ECG  Rhythm: atrial fibrillation  Rate: normal  BPM: 64  Conduction: conduction normal  ST Segments: ST segments normal  T Waves: T waves normal  QRS axis: normal    Clinical impression: abnormal EKG              Assessment:       Diagnosis Plan   1. Persistent atrial fibrillation (CMS/HCC)  Adult Transthoracic Echo Complete W/ Cont if Necessary Per Protocol   2. Nonrheumatic aortic valve stenosis  Adult Transthoracic Echo Complete W/ Cont if Necessary Per Protocol   3. Dilated cardiomyopathy (CMS/HCC)     4. Dyspnea on exertion     5. Essential hypertension     6. Mixed hyperlipidemia     7. Morbid obesity with BMI of 40.0-44.9, adult (CMS/HCC)            Plan:       1.  Atrial Fibrillation: He remains in atrial fibrillation with controlled ventricular response and is asymptomatic.  He is on rivaroxaban and denies any bleeding.  He thinks his dyspnea is adequately controlled and does not want to undergo electrical cardioversion.  Continue to monitor.    Atrial Fibrillation and Atrial Flutter  Assessment  • The patient has persistent atrial fibrillation  • The patient's CHADS2-VASc score is 2  • A [id number] score of 2 or more is considered a high risk for a thromboembolic event  • Rivaroxaban prescribed    Plan  • Attempt to maintain sinus rhythm  • Continue rivaroxaban for antithrombotic therapy, bleeding issues discussed  • Continue beta blocker for rate control    2.  Aortic Stenosis: Mild to moderate per echocardiogram 11/2018.  His mother just had a TAVR at age 87.  I recommend a repeat echocardiogram and he would like to do that in 2020 due to insurance reasons.    3.  Dilated Cardiomyopathy: Echocardiogram revealed an EF of 47% and recent cardiac PET scan 36%.  Echocardiogram is a better assessment of the EF and I think he is euvolemic today.    4.  Dyspnea: Chronic and unchanged.    5.  Hypertension: Blood pressure well controlled today.    6.  Hyperlipidemia: Remains on  atorvastatin.    7.  Obesity: BMI is 46.2.  He would benefit from exercise, weight loss, and heart healthy/low-sodium diet.    8.  I recommend a six-month appointment with Dr. Bravo and echocardiogram will be performed the same day.    As always, it has been a pleasure to participate in your patient's care. Thank you.       Sincerely,         KAYLEN Wiseman        **Dragon Disclaimer:**  Much of this encounter note is an electronic transcription/translation of spoken language to printed text. The electronic translation of spoken language may permit erroneous, or at times, nonsensical words or phrases to be inadvertently transcribed. Although I have reviewed the note for such errors, some may still exist.

## 2019-10-29 ENCOUNTER — OFFICE VISIT (OUTPATIENT)
Dept: FAMILY MEDICINE CLINIC | Facility: CLINIC | Age: 64
End: 2019-10-29

## 2019-10-29 VITALS
WEIGHT: 315 LBS | HEART RATE: 94 BPM | HEIGHT: 74 IN | SYSTOLIC BLOOD PRESSURE: 134 MMHG | TEMPERATURE: 99.1 F | DIASTOLIC BLOOD PRESSURE: 82 MMHG | BODY MASS INDEX: 40.43 KG/M2 | OXYGEN SATURATION: 94 %

## 2019-10-29 DIAGNOSIS — E78.2 MIXED HYPERLIPIDEMIA: ICD-10-CM

## 2019-10-29 DIAGNOSIS — F98.8 ATTENTION DEFICIT DISORDER (ADD) WITHOUT HYPERACTIVITY: ICD-10-CM

## 2019-10-29 DIAGNOSIS — Z00.00 HEALTHCARE MAINTENANCE: ICD-10-CM

## 2019-10-29 DIAGNOSIS — E04.1 THYROID NODULE: ICD-10-CM

## 2019-10-29 DIAGNOSIS — E11.9 CONTROLLED TYPE 2 DIABETES MELLITUS WITHOUT COMPLICATION, WITHOUT LONG-TERM CURRENT USE OF INSULIN (HCC): Primary | ICD-10-CM

## 2019-10-29 DIAGNOSIS — F41.1 GAD (GENERALIZED ANXIETY DISORDER): ICD-10-CM

## 2019-10-29 DIAGNOSIS — F32.5 MAJOR DEPRESSIVE DISORDER WITH SINGLE EPISODE, IN FULL REMISSION (HCC): ICD-10-CM

## 2019-10-29 DIAGNOSIS — I10 ESSENTIAL HYPERTENSION: ICD-10-CM

## 2019-10-29 PROCEDURE — 90686 IIV4 VACC NO PRSV 0.5 ML IM: CPT | Performed by: NURSE PRACTITIONER

## 2019-10-29 PROCEDURE — 90471 IMMUNIZATION ADMIN: CPT | Performed by: NURSE PRACTITIONER

## 2019-10-29 PROCEDURE — 99214 OFFICE O/P EST MOD 30 MIN: CPT | Performed by: NURSE PRACTITIONER

## 2019-10-29 PROCEDURE — 90715 TDAP VACCINE 7 YRS/> IM: CPT | Performed by: NURSE PRACTITIONER

## 2019-10-29 PROCEDURE — 90472 IMMUNIZATION ADMIN EACH ADD: CPT | Performed by: NURSE PRACTITIONER

## 2019-10-29 RX ORDER — ATORVASTATIN CALCIUM 20 MG/1
20 TABLET, FILM COATED ORAL DAILY
Qty: 30 TABLET | Refills: 9 | Status: SHIPPED | OUTPATIENT
Start: 2019-10-29 | End: 2020-07-31 | Stop reason: SDUPTHER

## 2019-10-29 RX ORDER — METOPROLOL SUCCINATE 50 MG/1
50 TABLET, EXTENDED RELEASE ORAL DAILY
Qty: 30 TABLET | Refills: 3 | Status: SHIPPED | OUTPATIENT
Start: 2019-10-29 | End: 2020-02-20 | Stop reason: SDUPTHER

## 2019-10-29 RX ORDER — ATOMOXETINE 100 MG/1
100 CAPSULE ORAL DAILY
Qty: 30 CAPSULE | Refills: 3 | Status: SHIPPED | OUTPATIENT
Start: 2019-10-29 | End: 2020-02-20 | Stop reason: SDUPTHER

## 2019-10-29 RX ORDER — ATOMOXETINE 100 MG/1
100 CAPSULE ORAL DAILY
Qty: 30 CAPSULE | Refills: 3 | Status: SHIPPED | OUTPATIENT
Start: 2019-10-29 | End: 2019-10-29 | Stop reason: SDUPTHER

## 2019-10-29 RX ORDER — GLIMEPIRIDE 4 MG/1
4 TABLET ORAL 2 TIMES DAILY
Qty: 60 TABLET | Refills: 3 | Status: SHIPPED | OUTPATIENT
Start: 2019-10-29 | End: 2020-02-21 | Stop reason: SDUPTHER

## 2019-10-29 RX ORDER — METFORMIN HYDROCHLORIDE 500 MG/1
1000 TABLET, EXTENDED RELEASE ORAL 2 TIMES DAILY
Qty: 120 TABLET | Refills: 3 | Status: SHIPPED | OUTPATIENT
Start: 2019-10-29 | End: 2020-02-21 | Stop reason: SDUPTHER

## 2019-10-29 RX ORDER — VALSARTAN 160 MG/1
160 TABLET ORAL DAILY
Qty: 30 TABLET | Refills: 3 | Status: SHIPPED | OUTPATIENT
Start: 2019-10-29 | End: 2019-12-18 | Stop reason: RX

## 2019-10-29 RX ORDER — DULOXETIN HYDROCHLORIDE 60 MG/1
60 CAPSULE, DELAYED RELEASE ORAL DAILY
Qty: 30 CAPSULE | Refills: 3 | Status: SHIPPED | OUTPATIENT
Start: 2019-10-29 | End: 2020-02-20 | Stop reason: SDUPTHER

## 2019-10-29 RX ORDER — HYDROXYZINE PAMOATE 25 MG/1
25 CAPSULE ORAL NIGHTLY PRN
Qty: 30 CAPSULE | Refills: 2 | Status: SHIPPED | OUTPATIENT
Start: 2019-10-29 | End: 2020-01-20

## 2019-10-29 RX ORDER — PIOGLITAZONEHYDROCHLORIDE 30 MG/1
30 TABLET ORAL DAILY
Qty: 30 TABLET | Refills: 3 | Status: SHIPPED | OUTPATIENT
Start: 2019-10-29 | End: 2020-02-21 | Stop reason: SDUPTHER

## 2019-10-29 NOTE — PROGRESS NOTES
Subjective   Duane Mark Witten is a 64 y.o. male.     Chief Complaint   Patient presents with   • Diabetes     This is my first time seeing this patient.   History of Present Illness   Diabetes Mellitus Type II, Follow-up: Patient here for evaluation of Type 2 diabetes mellitus. Symptoms: polyuria. Home monitoring: The patient is checking blood sugars at home. Medications: The patient is adherent with their medication regimen. Medication(s): Trulicity, glimepiride, metformin and Actos. Due for: eye exam and foot exam. Patient is not currently exercising. Patient has had prior visit with dietician.       Hypertension: Patient here for evaluation of essential hypertension. Blood pressure is well controlled at home.He is not exercising and is not adherent to low salt diet. Home monitoring: The patient is not checking blood pressure at home. Symptoms: none. Medications: The patient is not adherent with their medication regimen. Medication(s): ARB and Diuretic. Patient complains of urinary frequency with HCTZ. The patient is due for nothing at this time.      Hyperlipidemia: Symptoms: none. Medications:The patient is adherent with their medication regimen. Medication(s): statin. His last labs showed total cholesterol 124, HDL 39, LDL 69,  triglycerides 80. The patient is due for nothing at this time.      Depression: Patient states their depression has improved. Interval symptoms: none. Social support: The patient has good social support. The patient is adherent with their medication regimen. Medication(s): Straterra and Cymbalta .         The following portions of the patient's history were reviewed and updated as appropriate: allergies, current medications, past family history, past medical history, past social history, past surgical history and problem list.    Past Medical History:   Diagnosis Date   • ADD (attention deficit disorder)    • Atrial fibrillation (CMS/HCC)    • Depression    • Dilated cardiomyopathy  (CMS/[facility])    • ED (erectile dysfunction)    • Essential hypertension    • YANELI (generalized anxiety disorder)    • HLD (hyperlipidemia)    • Morbid obesity with BMI of 40.0-44.9, adult (CMS/[facility])    • CAMERON (obstructive sleep apnea)    • Osteoarthrosis    • Persistent atrial fibrillation    • PONV (postoperative nausea and vomiting)    • Psoriasis    • Testosterone deficiency    • Type 2 diabetes mellitus (CMS/[facility])        Past Surgical History:   Procedure Laterality Date   • ANTERIOR CHANNEL VERTEBRECTOMY/CORPECTOMY Bilateral 2018    Procedure: C4, C5, C6 ANTERIOR CERVICAL CORPECTOMY;  Surgeon: Chirag Markham MD;  Location: [facility];  Service: Neurosurgery   • BIOPSY / EXCISION / DISSECTION AXILLARY NODE      Lymph Node testing for cancer    • COLONOSCOPY      10+ years; normal   • NECK SURGERY  2018    C4/5/6 anterior cervical corpectomy   • PARATHYROIDECTOMY         Family History   Problem Relation Age of Onset   • Anxiety disorder Mother    • Bipolar disorder Mother    • Depression Mother    • Glaucoma Mother    • Macular degeneration Mother    • Lupus Mother    • Aortic stenosis Mother         s/p TAVR at age 87   • Cancer Father    • Diabetes Father    • Heart disease Father    • Hypertension Father    • Stroke Father    • Lupus Other         grandmother       Social History     Socioeconomic History   • Marital status:      Spouse name: Not on file   • Number of children: Not on file   • Years of education: Not on file   • Highest education level: Not on file   Tobacco Use   • Smoking status: Former Smoker     Packs/day: 1.00     Last attempt to quit:      Years since quittin.8   • Smokeless tobacco: Never Used   Substance and Sexual Activity   • Alcohol use: No     Comment: Rare caffeine use   • Drug use: No       Review of Systems   Eyes: Negative for visual disturbance.   Cardiovascular: Negative for chest pain and leg swelling.   Endocrine: Positive for polyuria.  "Negative for polydipsia.   Musculoskeletal: Negative for myalgias.   Psychiatric/Behavioral: Positive for sleep disturbance. Negative for suicidal ideas and depressed mood.       Objective   Vitals:    10/29/19 1325   BP: 134/82   BP Location: Left arm   Patient Position: Sitting   Pulse: 94   Temp: 99.1 °F (37.3 °C)   SpO2: 94%   Weight: (!) 170 kg (375 lb)   Height: 188 cm (74\")      Body mass index is 48.15 kg/m².  Physical Exam   Constitutional: He appears well-developed and well-nourished.   Cardiovascular: Normal rate. An irregularly irregular rhythm present.   Pulmonary/Chest: Effort normal and breath sounds normal.   Abdominal: Soft. Bowel sounds are normal. There is no tenderness.   Musculoskeletal: He exhibits no edema.    Duane had a diabetic foot exam performed today.   During the foot exam he had a monofilament test performed.    Neurological Sensory Findings -  Unaltered sharp/dull right ankle/foot discrimination and unaltered sharp/dull left ankle/foot discrimination.  Vascular Status -  His right foot exhibits normal foot vasculature  and no edema. His left foot exhibits normal foot vasculature  and no edema.  Skin Integrity  -  His right foot skin is intact.His left foot skin is intact..  Psychiatric: He has a normal mood and affect. He expresses no homicidal and no suicidal ideation.   Vitals reviewed.        Assessment/Plan   Duane was seen today for diabetes.    Diagnoses and all orders for this visit:    Controlled type 2 diabetes mellitus without complication, without long-term current use of insulin (CMS/[facility])  -     Ambulatory Referral for Diabetic Eye Exam-Ophthalmology  -     Discontinue: Dulaglutide (TRULICITY) 1.5 MG/0.5ML solution pen-injector; Inject 1.5 mg under the skin into the appropriate area as directed 1 (One) Time Per Week.  -     glimepiride (AMARYL) 4 MG tablet; Take 1 tablet by mouth 2 (Two) Times a Day.  -     metFORMIN ER (GLUCOPHAGE-XR) 500 MG 24 hr tablet; Take 2 tablets " by mouth 2 (Two) Times a Day.  -     pioglitazone (ACTOS) 30 MG tablet; Take 1 tablet by mouth Daily.  -     Dulaglutide (TRULICITY) 1.5 MG/0.5ML solution pen-injector; Inject 1.5 mg under the skin into the appropriate area as directed 1 (One) Time Per Week.    Essential hypertension  -     valsartan (DIOVAN) 160 MG tablet; Take 1 tablet by mouth Daily.  -     metoprolol succinate XL (TOPROL-XL) 50 MG 24 hr tablet; Take 1 tablet by mouth Daily.    Mixed hyperlipidemia  -     atorvastatin (LIPITOR) 20 MG tablet; Take 1 tablet by mouth Daily.    Major depressive disorder with single episode, in full remission (CMS/HCC)  -     DULoxetine (CYMBALTA) 60 MG capsule; Take 1 capsule by mouth Daily.    YANELI (generalized anxiety disorder)  -     hydrOXYzine pamoate (VISTARIL) 25 MG capsule; Take 1 capsule by mouth At Night As Needed for Anxiety (insomnia).  -     DULoxetine (CYMBALTA) 60 MG capsule; Take 1 capsule by mouth Daily.    Attention deficit disorder (ADD) without hyperactivity  -     Discontinue: atomoxetine (STRATTERA) 100 MG capsule; Take 1 capsule by mouth Daily.  -     atomoxetine (STRATTERA) 100 MG capsule; Take 1 capsule by mouth Daily.    Healthcare maintenance  -     Fluarix/Fluzone/Afluria Quad/FluLaval Quad  -     Ambulatory Referral to Gastroenterology  -     Tdap Vaccine Greater Than or Equal To 6yo IM    Thyroid nodule  -     US Thyroid; Future    Essential hypertension:  - Will discontinue hydrochlorothiazide at this time.  - Follow-up in 4 months or sooner if blood pressure worsens.    Depression, Anxiety:  - ER if any SI/HI.

## 2019-11-01 ENCOUNTER — HOSPITAL ENCOUNTER (OUTPATIENT)
Dept: ULTRASOUND IMAGING | Facility: HOSPITAL | Age: 64
Discharge: HOME | End: 2019-11-01
Admitting: NURSE PRACTITIONER

## 2019-11-01 DIAGNOSIS — E04.1 THYROID NODULE: ICD-10-CM

## 2019-11-01 PROCEDURE — 76536 US EXAM OF HEAD AND NECK: CPT

## 2019-11-05 NOTE — PROGRESS NOTES
.     REASON FOR CONSULTATION:   Leukocytosis  Provide an opinion on any further workup or treatment                             REQUESTING PHYSICIAN: Miladis Colbert,*  RECORDS OBTAINED:  Records of the patients history including those obtained from the referring provider were reviewed and summarized in detail.    HISTORY OF PRESENT ILLNESS:  The patient is a 64 y.o. year old male  who is here for follow-up with the above-mentioned history.    WBC 10.5-15.5 since at least October 2017.  Hb mostly normal but sometimes mildly low.  PLT normal.  WBC differential unremarkable.    Last saw PCP, KAYLEN Carrillo, on 10/29/2019 for follow-up of diabetes, hypertension, hyperlipidemia, depression    Patient denies fever, chills, weight loss, night sweats, recurrent or unusual infections.  However, he does state he gets occasional sinus infections.  He thinks he may have some chronic right-sided sinus issues.    Past Medical History:   Diagnosis Date   • ADD (attention deficit disorder)    • Atrial fibrillation (CMS/HCC)    • Depression    • Dilated cardiomyopathy (CMS/HCC)    • ED (erectile dysfunction)    • Essential hypertension    • YANELI (generalized anxiety disorder)    • HLD (hyperlipidemia)    • Morbid obesity with BMI of 40.0-44.9, adult (CMS/HCC)    • CAMERON (obstructive sleep apnea)    • Osteoarthrosis    • Persistent atrial fibrillation    • PONV (postoperative nausea and vomiting)    • Psoriasis    • Testosterone deficiency    • Type 2 diabetes mellitus (CMS/HCC)      Past Surgical History:   Procedure Laterality Date   • ANTERIOR CHANNEL VERTEBRECTOMY/CORPECTOMY Bilateral 11/27/2018    Procedure: C4, C5, C6 ANTERIOR CERVICAL CORPECTOMY;  Surgeon: Chirag Markham MD;  Location: [facility];  Service: Neurosurgery   • BIOPSY / EXCISION / DISSECTION AXILLARY NODE  1994    Lymph Node testing for cancer    • COLONOSCOPY      10+ years; normal   • NECK SURGERY  11/27/2018    C4/5/6 anterior cervical  corpectomy   • PARATHYROIDECTOMY  2008       HEMATOLOGIC/ONCOLOGIC HISTORY:  (History from previous dates can be found in the separate document.)    MEDICATIONS    Current Outpatient Medications:   •  ACCU-CHEK AGNES PLUS test strip, Use as directed to test blood sugar tid E11.9, Disp: 100 each, Rfl: 11  •  atomoxetine (STRATTERA) 100 MG capsule, Take 1 capsule by mouth Daily., Disp: 30 capsule, Rfl: 3  •  atorvastatin (LIPITOR) 20 MG tablet, Take 1 tablet by mouth Daily., Disp: 30 tablet, Rfl: 9  •  Dulaglutide (TRULICITY) 1.5 MG/0.5ML solution pen-injector, Inject 1.5 mg under the skin into the appropriate area as directed 1 (One) Time Per Week., Disp: 4 pen, Rfl: 3  •  DULoxetine (CYMBALTA) 60 MG capsule, Take 1 capsule by mouth Daily., Disp: 30 capsule, Rfl: 3  •  FIBER ADULT GUMMIES PO, Take 1 dose by mouth Daily., Disp: , Rfl:   •  glimepiride (AMARYL) 4 MG tablet, Take 1 tablet by mouth 2 (Two) Times a Day., Disp: 60 tablet, Rfl: 3  •  glucosamine-chondroitin 500-400 MG capsule capsule, Take 1 capsule by mouth Daily., Disp: , Rfl:   •  hydrOXYzine pamoate (VISTARIL) 25 MG capsule, Take 1 capsule by mouth At Night As Needed for Anxiety (insomnia)., Disp: 30 capsule, Rfl: 2  •  metFORMIN ER (GLUCOPHAGE-XR) 500 MG 24 hr tablet, Take 2 tablets by mouth 2 (Two) Times a Day., Disp: 120 tablet, Rfl: 3  •  metoprolol succinate XL (TOPROL-XL) 50 MG 24 hr tablet, Take 1 tablet by mouth Daily., Disp: 30 tablet, Rfl: 3  •  pioglitazone (ACTOS) 30 MG tablet, Take 1 tablet by mouth Daily., Disp: 30 tablet, Rfl: 3  •  rivaroxaban (XARELTO) 20 MG tablet, Take 1 tablet by mouth Daily With Dinner., Disp: 90 tablet, Rfl: 3  •  valsartan (DIOVAN) 160 MG tablet, Take 1 tablet by mouth Daily., Disp: 30 tablet, Rfl: 3    ALLERGIES:   No Known Allergies    SOCIAL HISTORY:       Social History     Socioeconomic History   • Marital status:      Spouse name: Not on file   • Number of children: Not on file   • Years of education:  "Not on file   • Highest education level: Not on file   Tobacco Use   • Smoking status: Former Smoker     Packs/day: 1.00     Last attempt to quit: 1979     Years since quittin.8   • Smokeless tobacco: Never Used   Substance and Sexual Activity   • Alcohol use: No     Comment: Rare caffeine use   • Drug use: No         FAMILY HISTORY:  Family History   Problem Relation Age of Onset   • Anxiety disorder Mother    • Bipolar disorder Mother    • Depression Mother    • Glaucoma Mother    • Macular degeneration Mother    • Lupus Mother    • Aortic stenosis Mother         s/p TAVR at age 87   • Cancer Father    • Diabetes Father    • Heart disease Father    • Hypertension Father    • Stroke Father    • Lupus Other         grandmother       REVIEW OF SYSTEMS:  Review of Systems   Constitutional: Negative for activity change.   HENT: Negative for nosebleeds and trouble swallowing.    Respiratory: Negative for shortness of breath and wheezing.    Cardiovascular: Negative for chest pain and palpitations.   Gastrointestinal: Negative for constipation, diarrhea and nausea.   Genitourinary: Negative for dysuria and hematuria.   Musculoskeletal: Negative for arthralgias and myalgias.   Neurological: Negative for seizures and syncope.   Hematological: Negative for adenopathy. Does not bruise/bleed easily.   Psychiatric/Behavioral: Negative for confusion.              Vitals:    19 1359   BP: 122/72   Pulse: 63   Resp: 16   Temp: 97.8 °F (36.6 °C)   SpO2: 99%   Weight: (!) 173 kg (382 lb 3.2 oz)   Height: 186.3 cm (73.35\")   PainSc: 0-No pain     Current Status 2019   ECOG score 1      PHYSICAL EXAM:      CONSTITUTIONAL:  Vital signs reviewed.  No distress, looks comfortable.  EYES:  Conjunctiva and lids unremarkable.  PERRLA  EARS,NOSE,MOUTH,THROAT:  Ears and nose appear unremarkable.  Lips, teeth, gums appear unremarkable.  RESPIRATORY:  Normal respiratory effort.  Lungs clear to auscultation " bilaterally.  CARDIOVASCULAR:  Normal S1, S2.  No murmurs rubs or gallops.  No significant lower extremity edema.  GASTROINTESTINAL: Abdomen appears unremarkable.  Nontender.  No hepatomegaly.  No splenomegaly.  LYMPHATIC:  No cervical, supraclavicular, axillary lymphadenopathy.  SKIN:  Warm.  No rashes.  PSYCHIATRIC:  Normal judgment and insight.  Normal mood and affect.      RECENT LABS:        WBC   Date Value Ref Range Status   11/07/2019 12.78 (H) 3.40 - 10.80 10*3/mm3 Final   08/10/2019 12.3 (H) 3.4 - 10.8 x10E3/uL Final   11/30/2018 15.71 (H) 4.50 - 10.70 10*3/mm3 Final   11/29/2018 14.53 (H) 4.50 - 10.70 10*3/mm3 Final   11/28/2018 15.47 (H) 4.50 - 10.70 10*3/mm3 Final   11/27/2018 13.20 (H) 4.50 - 10.70 10*3/mm3 Final   11/23/2018 12.26 (H) 4.50 - 10.70 10*3/mm3 Final   11/22/2018 13.37 (H) 4.50 - 10.70 10*3/mm3 Final   11/22/2018 13.08 (H) 4.50 - 10.70 10*3/mm3 Final   10/22/2018 10.57 4.50 - 10.70 10*3/mm3 Final   05/18/2018 11.3 (H) 3.4 - 10.8 x10E3/uL Final   10/28/2017 10.5 3.4 - 10.8 x10E3/uL Final     Hemoglobin   Date Value Ref Range Status   11/07/2019 13.9 13.0 - 17.7 g/dL Final   08/10/2019 13.3 13.0 - 17.7 g/dL Final   11/30/2018 12.2 (L) 13.7 - 17.6 g/dL Final   11/29/2018 11.8 (L) 13.7 - 17.6 g/dL Final   11/28/2018 13.4 (L) 13.7 - 17.6 g/dL Final   11/27/2018 14.3 13.7 - 17.6 g/dL Final   11/23/2018 15.2 13.7 - 17.6 g/dL Final   11/22/2018 14.5 13.7 - 17.6 g/dL Final   11/22/2018 15.0 13.7 - 17.6 g/dL Final   10/22/2018 14.8 13.7 - 17.6 g/dL Final   05/18/2018 16.0 13.0 - 17.7 g/dL Final   10/28/2017 14.8 12.6 - 17.7 g/dL Final     Platelets   Date Value Ref Range Status   11/07/2019 268 140 - 450 10*3/mm3 Final   08/10/2019 298 150 - 450 x10E3/uL Final   11/30/2018 219 140 - 500 10*3/mm3 Final   11/29/2018 212 140 - 500 10*3/mm3 Final   11/28/2018 238 140 - 500 10*3/mm3 Final   11/27/2018 274 140 - 500 10*3/mm3 Final   11/23/2018 252 140 - 500 10*3/mm3 Final   11/22/2018 255 140 - 500  10*3/mm3 Final   11/22/2018 239 140 - 500 10*3/mm3 Final   10/22/2018 256 140 - 500 10*3/mm3 Final   05/18/2018 264 150 - 379 x10E3/uL Final   10/28/2017 300 150 - 379 x10E3/uL Final       Assessment/Plan   Leukocytosis, unspecified type      *Leukocytosis  WBC 10.5-15.5 since at least October 2017.  WBC today 12.8.  Differential mostly unremarkable.  Peripheral smear personally reviewed by me and looks unremarkable.    *Anemia, intermittent  Hb mostly normal but sometimes mildly low.  Hb today 13.9, normal    *Obesity  Obesity can cause leukocytosis  Obesity can lead to cytopenias through hepatic steatosis.  He should lose weight.    *He thinks he may have some chronic right-sided sinus issues.  This can also cause leukocytosis.    Plan  · I do not think he needs any further work-up right now for leukocytosis.  · No follow-up in our office  · He should return to see us if CBC significantly worsens in the future.    His wife assisted with history.  Records reviewed and summarized.

## 2019-11-06 ENCOUNTER — OFFICE VISIT (OUTPATIENT)
Dept: SLEEP MEDICINE | Facility: HOSPITAL | Age: 64
End: 2019-11-06

## 2019-11-06 ENCOUNTER — TELEPHONE (OUTPATIENT)
Dept: FAMILY MEDICINE CLINIC | Facility: CLINIC | Age: 64
End: 2019-11-06

## 2019-11-06 VITALS
WEIGHT: 315 LBS | HEIGHT: 75 IN | DIASTOLIC BLOOD PRESSURE: 74 MMHG | OXYGEN SATURATION: 96 % | BODY MASS INDEX: 39.17 KG/M2 | HEART RATE: 82 BPM | SYSTOLIC BLOOD PRESSURE: 140 MMHG

## 2019-11-06 DIAGNOSIS — E04.1 THYROID NODULE: Primary | ICD-10-CM

## 2019-11-06 DIAGNOSIS — G47.33 OSA (OBSTRUCTIVE SLEEP APNEA): Primary | ICD-10-CM

## 2019-11-06 DIAGNOSIS — E66.01 MORBID OBESITY WITH BMI OF 45.0-49.9, ADULT (HCC): ICD-10-CM

## 2019-11-06 DIAGNOSIS — G47.33 OSA TREATED WITH BIPAP: Primary | ICD-10-CM

## 2019-11-06 PROCEDURE — G0463 HOSPITAL OUTPT CLINIC VISIT: HCPCS

## 2019-11-06 NOTE — PROGRESS NOTES
[facility]  Telephone: [phone] / Fax: [phone] [place]  Telephone: [phone] / Fax: [phone] Magali Mcmanus    Referring Physician: Marly Myrick APRN  PCP: Marly Myrick APRN    Reason for consult:  sleep apnea    Duane Mark Witten is a 64 y.o.male  was seen in the [facility] today for evaluation of sleep apnea. He was diagnosed with extremely severe CAMERON in 2007 by Dr Santana around 2005- 2007. According to the patient, study showed . He has been on the machine since 2007. He is now on the 3rd machine-which is a BIPAP and it is 5 years old. It started making some noises and may be approaching end of its motor life.  We called Dr. Santana's office, and there is no record of prior study available.  He uses the machine and benefits from its use. He is very happy with CAMERON tx. He uses Andrews Fx medium size mask. He has been buying supplies online. All his supplies are worn out. He needs to be established with a new DME for supplies. He lost 15-20 lbs since CAMERON was diagnosed.  Sleep schedule is 10pm-7am, he wakes up feeling rested.    Social history, former smoker age [age], no illicit drugs.    Review of systems negative.    Duane Mark Witten  has a past medical history of ADD (attention deficit disorder), Atrial fibrillation (CMS/HCC), Depression, Dilated cardiomyopathy (CMS/HCC), ED (erectile dysfunction), Essential hypertension, YANELI (generalized anxiety disorder), HLD (hyperlipidemia), Morbid obesity with BMI of 40.0-44.9, adult (CMS/HCC), CAMERON (obstructive sleep apnea), Osteoarthrosis, Persistent atrial fibrillation, PONV (postoperative nausea and vomiting), Psoriasis, Testosterone deficiency, and Type 2 diabetes mellitus (CMS/HCC).    Current Medications:    Current Outpatient Medications:   •  ACCU-CHEK AGNES PLUS test strip, Use as directed to test blood sugar tid E11.9, Disp: 100 each, Rfl: 11  •  atomoxetine (STRATTERA) 100 MG capsule, Take 1  "capsule by mouth Daily., Disp: 30 capsule, Rfl: 3  •  atorvastatin (LIPITOR) 20 MG tablet, Take 1 tablet by mouth Daily., Disp: 30 tablet, Rfl: 9  •  Dulaglutide (TRULICITY) 1.5 MG/0.5ML solution pen-injector, Inject 1.5 mg under the skin into the appropriate area as directed 1 (One) Time Per Week., Disp: 4 pen, Rfl: 3  •  DULoxetine (CYMBALTA) 60 MG capsule, Take 1 capsule by mouth Daily., Disp: 30 capsule, Rfl: 3  •  FIBER ADULT GUMMIES PO, Take 1 dose by mouth Daily., Disp: , Rfl:   •  glimepiride (AMARYL) 4 MG tablet, Take 1 tablet by mouth 2 (Two) Times a Day., Disp: 60 tablet, Rfl: 3  •  glucosamine-chondroitin 500-400 MG capsule capsule, Take 1 capsule by mouth Daily., Disp: , Rfl:   •  hydrOXYzine pamoate (VISTARIL) 25 MG capsule, Take 1 capsule by mouth At Night As Needed for Anxiety (insomnia)., Disp: 30 capsule, Rfl: 2  •  metFORMIN ER (GLUCOPHAGE-XR) 500 MG 24 hr tablet, Take 2 tablets by mouth 2 (Two) Times a Day., Disp: 120 tablet, Rfl: 3  •  metoprolol succinate XL (TOPROL-XL) 50 MG 24 hr tablet, Take 1 tablet by mouth Daily., Disp: 30 tablet, Rfl: 3  •  pioglitazone (ACTOS) 30 MG tablet, Take 1 tablet by mouth Daily., Disp: 30 tablet, Rfl: 3  •  rivaroxaban (XARELTO) 20 MG tablet, Take 1 tablet by mouth Daily With Dinner., Disp: 90 tablet, Rfl: 3  •  valsartan (DIOVAN) 160 MG tablet, Take 1 tablet by mouth Daily., Disp: 30 tablet, Rfl: 3    I have reviewed Past Medical History, Past Surgical History, Medication List, Social History and Family History as entered in Sleep Questionnaire and EPIC.    ESS  8   Vital Signs /74   Pulse 82   Ht 190.5 cm (75\")   Wt (!) 173 kg (382 lb)   SpO2 96%   BMI 47.75 kg/m²  Body mass index is 47.75 kg/m².    General Alert and oriented. No acute distress noted   Pharynx/Throat Class IV Mallampati airway, large tongue, no evidence of redundant lateral pharyngeal tissue. No oral lesions. No thrush. Moist mucous membranes.   Head Normocephalic. Symmetrical. " Atraumatic.    Nose No septal deviation. No drainage   Chest Wall Normal shape. Symmetric expansion with respiration. No tenderness.   Neck Trachea midline, no thyromegaly or adenopathy    Lungs Clear to auscultation bilaterally. No wheezes. No rhonchi. No rales. Respirations regular, even and unlabored.   Heart Regular rhythm and normal rate. Normal S1 and S2. No murmur   Abdomen Soft, non-tender and non-distended. Normal bowel sounds. No masses.   Extremities Moves all extremities well. No edema   Psychiatric Normal mood and affect.            Impression:  1. CAMERON treated with BiPAP    2. Morbid obesity with BMI of 45.0-49.9, adult (CMS/[facility])          Plan:  Old study is unavailable. Patient will therefore require a repeat study. Order new machine from [facility] after study.  Order new supplies from [facility] after study. Andrews Fx nose pillows-medium cushion work great for him    I reviewed download dates August 9, 2019-November 6, 2019, 100% compliance is noted with average nightly use of 11 hours and 16 minutes on auto BiPAP with average pressure of 18/16, average AHI 1.5.  The machine is set at BiPAP  IPAP max 20, min EPAP of 16, pressure support of 3.  Cardiovascular benefits of obstructive sleep apnea treatment were reemphasized today. I discussed the pathophysiology of obstructive sleep apnea with the patient.  We discussed the adverse outcomes associated with untreated sleep-disordered breathing.     Thank you for allowing me to participate in your patient's care.    Follow-up with Dr. Deluna in 8-10 weeks.    KAYLEN Ramirez  [facility]  Phone: [phone]      Part of this note may be an electronic transcription/translation of spoken language to printed text using the Dragon Dictation System. Some errors may exist even though the document was edited.

## 2019-11-06 NOTE — TELEPHONE ENCOUNTER
Thyroid ultrasound shows nodule within left thyroid gland measuring up to 2.3 cm.  Would recommend referral to ENT for fine-needle aspiration.

## 2019-11-07 ENCOUNTER — LAB (OUTPATIENT)
Dept: OTHER | Facility: HOSPITAL | Age: 64
End: 2019-11-07

## 2019-11-07 ENCOUNTER — CONSULT (OUTPATIENT)
Dept: ONCOLOGY | Facility: CLINIC | Age: 64
End: 2019-11-07

## 2019-11-07 VITALS
RESPIRATION RATE: 16 BRPM | BODY MASS INDEX: 41.75 KG/M2 | DIASTOLIC BLOOD PRESSURE: 72 MMHG | SYSTOLIC BLOOD PRESSURE: 122 MMHG | HEIGHT: 73 IN | WEIGHT: 315 LBS | TEMPERATURE: 97.8 F | OXYGEN SATURATION: 99 % | HEART RATE: 63 BPM

## 2019-11-07 DIAGNOSIS — R79.89 ABNORMAL CBC: Primary | ICD-10-CM

## 2019-11-07 DIAGNOSIS — D72.829 LEUKOCYTOSIS, UNSPECIFIED TYPE: Primary | ICD-10-CM

## 2019-11-07 LAB
BASOPHILS # BLD AUTO: 0.08 10*3/MM3 (ref 0–0.2)
BASOPHILS NFR BLD AUTO: 0.6 % (ref 0–1.5)
DEPRECATED RDW RBC AUTO: 45.2 FL (ref 37–54)
EOSINOPHIL # BLD AUTO: 1.33 10*3/MM3 (ref 0–0.4)
EOSINOPHIL NFR BLD AUTO: 10.4 % (ref 0.3–6.2)
ERYTHROCYTE [DISTWIDTH] IN BLOOD BY AUTOMATED COUNT: 14.9 % (ref 12.3–15.4)
HCT VFR BLD AUTO: 42.6 % (ref 37.5–51)
HGB BLD-MCNC: 13.9 G/DL (ref 13–17.7)
IMM GRANULOCYTES # BLD AUTO: 0.09 10*3/MM3 (ref 0–0.05)
IMM GRANULOCYTES NFR BLD AUTO: 0.7 % (ref 0–0.5)
LYMPHOCYTES # BLD AUTO: 3.09 10*3/MM3 (ref 0.7–3.1)
LYMPHOCYTES NFR BLD AUTO: 24.2 % (ref 19.6–45.3)
MCH RBC QN AUTO: 27.6 PG (ref 26.6–33)
MCHC RBC AUTO-ENTMCNC: 32.6 G/DL (ref 31.5–35.7)
MCV RBC AUTO: 84.5 FL (ref 79–97)
MONOCYTES # BLD AUTO: 0.72 10*3/MM3 (ref 0.1–0.9)
MONOCYTES NFR BLD AUTO: 5.6 % (ref 5–12)
NEUTROPHILS # BLD AUTO: 7.47 10*3/MM3 (ref 1.7–7)
NEUTROPHILS NFR BLD AUTO: 58.5 % (ref 42.7–76)
NRBC BLD AUTO-RTO: 0 /100 WBC (ref 0–0.2)
PLATELET # BLD AUTO: 268 10*3/MM3 (ref 140–450)
PMV BLD AUTO: 9.4 FL (ref 6–12)
RBC # BLD AUTO: 5.04 10*6/MM3 (ref 4.14–5.8)
WBC NRBC COR # BLD: 12.78 10*3/MM3 (ref 3.4–10.8)

## 2019-11-07 PROCEDURE — 36415 COLL VENOUS BLD VENIPUNCTURE: CPT

## 2019-11-07 PROCEDURE — 85025 COMPLETE CBC W/AUTO DIFF WBC: CPT | Performed by: INTERNAL MEDICINE

## 2019-11-07 PROCEDURE — 99205 OFFICE O/P NEW HI 60 MIN: CPT | Performed by: INTERNAL MEDICINE

## 2019-11-15 ENCOUNTER — TELEPHONE (OUTPATIENT)
Dept: NEUROSURGERY | Facility: CLINIC | Age: 64
End: 2019-11-15

## 2019-11-15 NOTE — PROGRESS NOTES
Subjective   Patient ID: Duane Mark Witten is a 64 y.o. male is here today for follow-up to discuss cervical spine plain film results that were done today.  He is still following his HEP.    Patient was last seen 5.17.19 for difficulty walking and urinary incontinence.     Patient states that his problems with his balance and gait are unchanged, he denies neck or arm pain.  He denies N/T or arm/hand weakness.  He still has some urinary urgency, he states that he cannot get to the bathroom quick enough, but is has improved some.  He states that he does have some cracking in his neck when he turns his head from side to side    Patient had surgery 11.28.18  C4-C6 ACCF      It has been about a year since he underwent a C4-C5-C6 corpectomy with a cage and plate for a profound progressive myelopathy. He is still in the process of trying to get Social Security Disability. He is not having any significant pain and he has gotten a lot of his strength back. He does have some popping noises in his neck, which is not unusual after a fusion of this nature. His balance is better but he still has to use a cane. I showed him simple balance exercises to do. I encouraged him to work on a recumbent bicycle since he cannot walk far enough because of his balance problems to get any exercise, so he will use his mother's recumbent bicycle. I will see him in another year with x-rays.      Difficulty Walking   The problem occurs constantly. The problem has been unchanged. Pertinent negatives include no arthralgias, joint swelling, myalgias, neck pain, numbness or weakness.       The following portions of the patient's history were reviewed and updated as appropriate: allergies, current medications, past family history, past medical history, past social history, past surgical history and problem list.    Review of Systems   Genitourinary: Positive for urgency.   Musculoskeletal: Positive for gait problem. Negative for arthralgias, back pain,  joint swelling, myalgias, neck pain and neck stiffness.   Neurological: Negative for weakness and numbness.   All other systems reviewed and are negative.      Objective   Physical Exam   Constitutional: He is oriented to person, place, and time. He appears well-developed and well-nourished.   HENT:   Head: Normocephalic and atraumatic.   Eyes: Conjunctivae and EOM are normal. Pupils are equal, round, and reactive to light.   Fundoscopic exam:       The right eye shows no papilledema. The right eye shows venous pulsations.        The left eye shows no papilledema. The left eye shows venous pulsations.   Neck: Carotid bruit is not present.   Neurological: He is oriented to person, place, and time. He has a normal Finger-Nose-Finger Test and a normal Heel to Shin Test. Gait normal.   Reflex Scores:       Tricep reflexes are 2+ on the right side and 2+ on the left side.       Bicep reflexes are 2+ on the right side and 2+ on the left side.       Brachioradialis reflexes are 2+ on the right side and 2+ on the left side.       Patellar reflexes are 2+ on the right side and 2+ on the left side.       Achilles reflexes are 2+ on the right side and 2+ on the left side.  Psychiatric: His speech is normal.     Neurologic Exam     Mental Status   Oriented to person, place, and time.   Registration of memory: Good recent and remote memory.   Attention: normal. Concentration: normal.   Speech: speech is normal   Level of consciousness: alert  Knowledge: consistent with education.     Cranial Nerves     CN II   Visual fields full to confrontation.   Visual acuity: normal    CN III, IV, VI   Pupils are equal, round, and reactive to light.  Extraocular motions are normal.     CN V   Facial sensation intact.   Right corneal reflex: normal  Left corneal reflex: normal    CN VII   Facial expression full, symmetric.   Right facial weakness: none  Left facial weakness: none    CN VIII   Hearing: intact    CN IX, X   Palate:  symmetric    CN XI   Right sternocleidomastoid strength: normal  Left sternocleidomastoid strength: normal    CN XII   Tongue: not atrophic  Tongue deviation: none    Motor Exam   Muscle bulk: normal  Right arm tone: normal  Left arm tone: normal  Right leg tone: normal  Left leg tone: normal    Strength   Strength 5/5 except as noted.     Sensory Exam   Light touch normal.     Gait, Coordination, and Reflexes     Gait  Gait: normal    Coordination   Finger to nose coordination: normal  Heel to shin coordination: normal    Reflexes   Right brachioradialis: 2+  Left brachioradialis: 2+  Right biceps: 2+  Left biceps: 2+  Right triceps: 2+  Left triceps: 2+  Right patellar: 2+  Left patellar: 2+  Right achilles: 2+  Left achilles: 2+  Right : 2+  Left : 2+      Assessment/Plan   Independent Review of Radiographic Studies:    I reviewed the plain x-rays done today of the cervical spine which showed stable positioning of the cage and the plate from C3-C7.  Agree with the report.      Medical Decision Making:    Overall he is doing well.  I will see him in another year with x-rays.  He will let me know if he has any ankle symptoms in the neck or the arms.  He will work on the balance exercises that I showed him  Duane was seen today for difficulty walking and urinary urgency.    Diagnoses and all orders for this visit:    Cervical spondylosis with myelopathy  -     XR spine cervical ap and lat w flex and ext; Future    History of spinal fusion  -     XR spine cervical ap and lat w flex and ext; Future      Return in about 1 year (around 11/20/2020).

## 2019-11-20 ENCOUNTER — HOSPITAL ENCOUNTER (OUTPATIENT)
Dept: GENERAL RADIOLOGY | Facility: HOSPITAL | Age: 64
Discharge: HOME | End: 2019-11-20
Admitting: NEUROLOGICAL SURGERY

## 2019-11-20 ENCOUNTER — OFFICE VISIT (OUTPATIENT)
Dept: NEUROSURGERY | Facility: CLINIC | Age: 64
End: 2019-11-20

## 2019-11-20 VITALS
HEART RATE: 72 BPM | DIASTOLIC BLOOD PRESSURE: 78 MMHG | SYSTOLIC BLOOD PRESSURE: 138 MMHG | WEIGHT: 315 LBS | BODY MASS INDEX: 41.75 KG/M2 | HEIGHT: 73 IN

## 2019-11-20 DIAGNOSIS — M47.12 CERVICAL SPONDYLOSIS WITH MYELOPATHY: Primary | ICD-10-CM

## 2019-11-20 DIAGNOSIS — Z98.1 HISTORY OF SPINAL FUSION: ICD-10-CM

## 2019-11-20 DIAGNOSIS — M47.12 CERVICAL SPONDYLOSIS WITH MYELOPATHY: ICD-10-CM

## 2019-11-20 PROCEDURE — 72050 X-RAY EXAM NECK SPINE 4/5VWS: CPT

## 2019-11-20 PROCEDURE — 99213 OFFICE O/P EST LOW 20 MIN: CPT | Performed by: NEUROLOGICAL SURGERY

## 2019-11-27 ENCOUNTER — TELEPHONE (OUTPATIENT)
Dept: FAMILY MEDICINE CLINIC | Facility: CLINIC | Age: 64
End: 2019-11-27

## 2019-11-27 ENCOUNTER — PATIENT MESSAGE (OUTPATIENT)
Dept: FAMILY MEDICINE CLINIC | Facility: CLINIC | Age: 64
End: 2019-11-27

## 2019-11-27 DIAGNOSIS — E04.1 THYROID NODULE: Primary | ICD-10-CM

## 2019-11-27 DIAGNOSIS — H91.90 HEARING LOSS, UNSPECIFIED HEARING LOSS TYPE, UNSPECIFIED LATERALITY: Primary | ICD-10-CM

## 2019-11-27 NOTE — TELEPHONE ENCOUNTER
Regarding: Test Results Question  Contact: [phone]  ----- Message from Mychart, Generic sent at 11/26/2019  9:02 PM EST -----    I have a question about US THYROID resulted on 11/5/19, 6:48 AM.    Elvis Luke. Do we need to schedule an appointment to have a biopsy done?    Duane Mark Witten  1955

## 2019-12-03 NOTE — TELEPHONE ENCOUNTER
From: Duane Mark Witten  To: Marly Myrick APRN  Sent: 11/27/2019 2:42 PM EST  Subject: Referral Request    Elvis Luke, I need a referral to see a provider to get hearing aids. I talked to [facility] and they said I need a referral from you. Thanks

## 2019-12-04 ENCOUNTER — TELEPHONE (OUTPATIENT)
Dept: FAMILY MEDICINE CLINIC | Facility: CLINIC | Age: 64
End: 2019-12-04

## 2019-12-04 NOTE — TELEPHONE ENCOUNTER
Regarding: Prescription Question  Contact: [phone]  ----- Message from [employer], Generic sent at 12/4/2019 12:04 AM EST -----    Elvis Luke, I need you to send a script to [facility] for a replacement for Valsartin.  They have taken it off the market     Thanks

## 2019-12-04 NOTE — TELEPHONE ENCOUNTER
Please inform patient that valsartan has not been taken off the market it is simply on a back order at that [facility].  Please ask patient if there is another pharmacy I can send the valsartan to.

## 2019-12-04 NOTE — TELEPHONE ENCOUNTER
Pt returned call and I gave him the information stated. Pt stated [facility] told him that the medication was recall.

## 2019-12-17 ENCOUNTER — TELEPHONE (OUTPATIENT)
Dept: FAMILY MEDICINE CLINIC | Facility: CLINIC | Age: 64
End: 2019-12-17

## 2019-12-17 NOTE — TELEPHONE ENCOUNTER
Patient's wife came in and states that the valsartan is on back order at [facility] and they don't know when they can get some in.  She wants to know if there is something else that could be called in since they can't get that filled.  Pharmacy is [facility] on file.  His number is [phone].

## 2019-12-18 ENCOUNTER — HOSPITAL ENCOUNTER (OUTPATIENT)
Dept: SLEEP MEDICINE | Facility: HOSPITAL | Age: 64
Discharge: HOME | End: 2019-12-18
Admitting: NURSE PRACTITIONER

## 2019-12-18 DIAGNOSIS — G47.33 OSA (OBSTRUCTIVE SLEEP APNEA): ICD-10-CM

## 2019-12-18 PROCEDURE — 95806 SLEEP STUDY UNATT&RESP EFFT: CPT

## 2019-12-18 RX ORDER — IRBESARTAN 150 MG/1
150 TABLET ORAL DAILY
Qty: 30 TABLET | Refills: 2 | Status: SHIPPED | OUTPATIENT
Start: 2019-12-18 | End: 2020-02-20 | Stop reason: SDUPTHER

## 2019-12-20 ENCOUNTER — TELEPHONE (OUTPATIENT)
Dept: SLEEP MEDICINE | Facility: HOSPITAL | Age: 64
End: 2019-12-20

## 2019-12-20 NOTE — TELEPHONE ENCOUNTER
Lm for pt to cb for results.AHI was 15.8/hr, a little higher while supine. Pt was previously on bpap. Dr Deluna has put order in for new bpap. Need to know previous DME so we can fax orders and pt needs to schedule compliance f/u.

## 2020-01-19 DIAGNOSIS — F41.1 GAD (GENERALIZED ANXIETY DISORDER): ICD-10-CM

## 2020-01-20 RX ORDER — HYDROXYZINE PAMOATE 25 MG/1
25 CAPSULE ORAL NIGHTLY PRN
Qty: 30 CAPSULE | Refills: 0 | Status: SHIPPED | OUTPATIENT
Start: 2020-01-20 | End: 2020-02-25 | Stop reason: SDUPTHER

## 2020-02-20 ENCOUNTER — OFFICE VISIT (OUTPATIENT)
Dept: FAMILY MEDICINE CLINIC | Facility: CLINIC | Age: 65
End: 2020-02-20

## 2020-02-20 VITALS
TEMPERATURE: 97.5 F | SYSTOLIC BLOOD PRESSURE: 130 MMHG | WEIGHT: 315 LBS | BODY MASS INDEX: 39.17 KG/M2 | OXYGEN SATURATION: 98 % | DIASTOLIC BLOOD PRESSURE: 86 MMHG | HEART RATE: 68 BPM | HEIGHT: 75 IN

## 2020-02-20 DIAGNOSIS — F41.1 GAD (GENERALIZED ANXIETY DISORDER): ICD-10-CM

## 2020-02-20 DIAGNOSIS — F32.5 MAJOR DEPRESSIVE DISORDER WITH SINGLE EPISODE, IN FULL REMISSION (HCC): ICD-10-CM

## 2020-02-20 DIAGNOSIS — E11.9 CONTROLLED TYPE 2 DIABETES MELLITUS WITHOUT COMPLICATION, WITHOUT LONG-TERM CURRENT USE OF INSULIN (HCC): Primary | ICD-10-CM

## 2020-02-20 DIAGNOSIS — E66.01 MORBID OBESITY WITH BMI OF 45.0-49.9, ADULT (HCC): ICD-10-CM

## 2020-02-20 DIAGNOSIS — I10 ESSENTIAL HYPERTENSION: ICD-10-CM

## 2020-02-20 DIAGNOSIS — F98.8 ATTENTION DEFICIT DISORDER (ADD) WITHOUT HYPERACTIVITY: ICD-10-CM

## 2020-02-20 PROCEDURE — 99213 OFFICE O/P EST LOW 20 MIN: CPT | Performed by: NURSE PRACTITIONER

## 2020-02-20 RX ORDER — DULOXETIN HYDROCHLORIDE 60 MG/1
60 CAPSULE, DELAYED RELEASE ORAL DAILY
Qty: 90 CAPSULE | Refills: 1 | Status: SHIPPED | OUTPATIENT
Start: 2020-02-20 | End: 2020-07-31 | Stop reason: SDUPTHER

## 2020-02-20 RX ORDER — IRBESARTAN 150 MG/1
150 TABLET ORAL DAILY
Qty: 90 TABLET | Refills: 1 | Status: SHIPPED | OUTPATIENT
Start: 2020-02-20 | End: 2020-02-26

## 2020-02-20 RX ORDER — METOPROLOL SUCCINATE 50 MG/1
50 TABLET, EXTENDED RELEASE ORAL DAILY
Qty: 90 TABLET | Refills: 1 | Status: SHIPPED | OUTPATIENT
Start: 2020-02-20 | End: 2020-02-26

## 2020-02-20 RX ORDER — ATOMOXETINE 100 MG/1
100 CAPSULE ORAL DAILY
Qty: 90 CAPSULE | Refills: 1 | Status: SHIPPED | OUTPATIENT
Start: 2020-02-20 | End: 2020-07-31 | Stop reason: SDUPTHER

## 2020-02-20 NOTE — PROGRESS NOTES
Subjective   Duane Mark Witten is a 65 y.o. male.     Chief Complaint   Patient presents with   • Diabetes   • Med Refill       History of Present Illness   Diabetes Mellitus Type II, Follow-up: Patient here for follow-up of Type 2 diabetes mellitus. Symptoms: none. Home monitoring: The patient is checking blood sugars at home. Medications: The patient is adherent with their medication regimen. Medication(s): Trulicity, glimepiride, metformin and pioglitazone . Due for: A1C. Patient is currently exercising.     Hypertension: Patient here for follow-up of essential hypertension. Blood pressure is well controlled at home.He is exercising and is adherent to low salt diet. Home monitoring: The patient is not checking blood pressure at home. Symptoms: none. Medications: The patient is adherent with their medication regimen. Medication(s): ARB and Beta Blocker. The patient is due for nothing at this time.        The following portions of the patient's history were reviewed and updated as appropriate: allergies, current medications, past family history, past medical history, past social history, past surgical history and problem list.    Past Medical History:   Diagnosis Date   • ADD (attention deficit disorder)    • Anemia     intermittent   • Atrial fibrillation (CMS/HCC)    • Depression    • Dilated cardiomyopathy (CMS/HCC)    • ED (erectile dysfunction)    • Essential hypertension    • YANELI (generalized anxiety disorder)    • History of obesity    • HLD (hyperlipidemia)    • Leukocytosis    • Morbid obesity with BMI of 40.0-44.9, adult (CMS/HCC)    • CAMERON (obstructive sleep apnea)    • Osteoarthrosis    • Persistent atrial fibrillation    • PONV (postoperative nausea and vomiting)    • Psoriasis    • Testosterone deficiency    • Type 2 diabetes mellitus (CMS/HCC)        Past Surgical History:   Procedure Laterality Date   • ANTERIOR CHANNEL VERTEBRECTOMY/CORPECTOMY Bilateral 11/27/2018    Procedure: C4, C5, C6 ANTERIOR  "CERVICAL CORPECTOMY;  Surgeon: Chirag Markham MD;  Location: [facility];  Service: Neurosurgery   • BIOPSY / EXCISION / DISSECTION AXILLARY NODE      Lymph Node testing for cancer    • COLONOSCOPY      10+ years; normal   • NECK SURGERY  2018    C4/5/6 anterior cervical corpectomy   • PARATHYROIDECTOMY         Family History   Problem Relation Age of Onset   • Anxiety disorder Mother    • Bipolar disorder Mother    • Depression Mother    • Glaucoma Mother    • Macular degeneration Mother    • Lupus Mother    • Aortic stenosis Mother         s/p TAVR at age 87   • Cancer Father    • Diabetes Father    • Heart disease Father    • Hypertension Father    • Stroke Father    • Lupus Other         grandmother       Social History     Socioeconomic History   • Marital status:      Spouse name: Mindy   • Number of children: Not on file   • Years of education: Not on file   • Highest education level: Not on file   Tobacco Use   • Smoking status: Former Smoker     Packs/day: 1.00     Last attempt to quit:      Years since quittin.1   • Smokeless tobacco: Never Used   Substance and Sexual Activity   • Alcohol use: No     Comment: Rare caffeine use   • Drug use: No       Review of Systems   Eyes: Negative for visual disturbance.   Cardiovascular: Negative for chest pain and leg swelling.   Endocrine: Negative for polydipsia and polyuria.       Objective   Vitals:    20 1052   BP: 130/86   BP Location: Left arm   Patient Position: Sitting   Cuff Size: Adult   Pulse: 68   Temp: 97.5 °F (36.4 °C)   TempSrc: Oral   SpO2: 98%   Weight: (!) 170 kg (374 lb 9.6 oz)   Height: 190.5 cm (75\")      Body mass index is 46.82 kg/m².  Physical Exam   Constitutional: He is oriented to person, place, and time. He appears well-developed and well-nourished.   Cardiovascular: Normal rate. An irregularly irregular rhythm present.   Pulmonary/Chest: Effort normal and breath sounds normal.   Neurological: He " is alert and oriented to person, place, and time.   Skin: Skin is warm and dry.   Psychiatric: He has a normal mood and affect.   Nursing note and vitals reviewed.        Assessment/Plan   Duane was seen today for diabetes and med refill.    Diagnoses and all orders for this visit:    Controlled type 2 diabetes mellitus without complication, without long-term current use of insulin (CMS/[facility])  -     Hemoglobin A1c    Essential hypertension  -     irbesartan (AVAPRO) 150 MG tablet; Take 1 tablet by mouth Daily.  -     metoprolol succinate XL (TOPROL-XL) 50 MG 24 hr tablet; Take 1 tablet by mouth Daily.    Morbid obesity with BMI of 45.0-49.9, adult (CMS/[facility])  Comments:  - Advice and education were given to patient regarding diet and exercise.     Major depressive disorder with single episode, in full remission (CMS/[facility])  -     DULoxetine (CYMBALTA) 60 MG capsule; Take 1 capsule by mouth Daily.    YANELI (generalized anxiety disorder)  -     DULoxetine (CYMBALTA) 60 MG capsule; Take 1 capsule by mouth Daily.    Attention deficit disorder (ADD) without hyperactivity  -     atomoxetine (STRATTERA) 100 MG capsule; Take 1 capsule by mouth Daily.

## 2020-02-21 ENCOUNTER — OFFICE VISIT (OUTPATIENT)
Dept: SLEEP MEDICINE | Facility: HOSPITAL | Age: 65
End: 2020-02-21

## 2020-02-21 ENCOUNTER — TELEPHONE (OUTPATIENT)
Dept: FAMILY MEDICINE CLINIC | Facility: CLINIC | Age: 65
End: 2020-02-21

## 2020-02-21 VITALS
OXYGEN SATURATION: 96 % | WEIGHT: 315 LBS | DIASTOLIC BLOOD PRESSURE: 72 MMHG | SYSTOLIC BLOOD PRESSURE: 149 MMHG | BODY MASS INDEX: 39.17 KG/M2 | HEIGHT: 75 IN | HEART RATE: 73 BPM

## 2020-02-21 DIAGNOSIS — G47.33 OSA TREATED WITH BIPAP: Primary | ICD-10-CM

## 2020-02-21 DIAGNOSIS — E66.01 MORBID OBESITY WITH BMI OF 45.0-49.9, ADULT (HCC): ICD-10-CM

## 2020-02-21 DIAGNOSIS — E11.9 CONTROLLED TYPE 2 DIABETES MELLITUS WITHOUT COMPLICATION, WITHOUT LONG-TERM CURRENT USE OF INSULIN (HCC): ICD-10-CM

## 2020-02-21 LAB — HBA1C MFR BLD: 7.6 % (ref 4.8–5.6)

## 2020-02-21 PROCEDURE — G0463 HOSPITAL OUTPT CLINIC VISIT: HCPCS

## 2020-02-21 RX ORDER — METFORMIN HYDROCHLORIDE 500 MG/1
1000 TABLET, EXTENDED RELEASE ORAL 2 TIMES DAILY
Qty: 360 TABLET | Refills: 0 | Status: SHIPPED | OUTPATIENT
Start: 2020-02-21 | End: 2020-04-10

## 2020-02-21 RX ORDER — GLIMEPIRIDE 4 MG/1
4 TABLET ORAL 2 TIMES DAILY
Qty: 180 TABLET | Refills: 0 | Status: SHIPPED | OUTPATIENT
Start: 2020-02-21 | End: 2020-04-13

## 2020-02-21 RX ORDER — PIOGLITAZONEHYDROCHLORIDE 30 MG/1
30 TABLET ORAL DAILY
Qty: 90 TABLET | Refills: 0 | Status: SHIPPED | OUTPATIENT
Start: 2020-02-21 | End: 2020-04-10

## 2020-02-21 NOTE — TELEPHONE ENCOUNTER
Diabetes is not well controlled with A1c of 7.6% so will need to work harder on diet and/or adjust antidiabetic medications.

## 2020-02-21 NOTE — TELEPHONE ENCOUNTER
Patient's wife is aware of results and recommendations. She declines change in antidiabetic medications at this time.

## 2020-02-21 NOTE — PROGRESS NOTES
[facility]  Telephone: [phone] / Fax: [phone] [place]  Telephone: [phone] / Fax: [phone] Magali Mcmanus    PCP: Marly Myrick APRN    Reason for visit: CAMERON f/u    Duane Wojciech Ramirez is a 65 y.o.male  was last seen at [facility] sleep lab in November 2019. He got a new machine after his sleep study. It is doing the job just fine and helps control the snoring and hypersomnia. He feels that current machine is not as powerful as the old machine was. He is on IPAP max 20, EPAP min 10, PS 4. He denies health changes. His sleep schedule is MN-11am. ESS is 11.    SH- no tobacco, no alcohol, no energy drinks, 0 caffeine    ROS- negative.    DME [facility]    Current Medications:    Current Outpatient Medications:   •  ACCU-CHEK AGNES PLUS test strip, Use as directed to test blood sugar tid E11.9, Disp: 100 each, Rfl: 11  •  atomoxetine (STRATTERA) 100 MG capsule, Take 1 capsule by mouth Daily., Disp: 90 capsule, Rfl: 1  •  atorvastatin (LIPITOR) 20 MG tablet, Take 1 tablet by mouth Daily., Disp: 30 tablet, Rfl: 9  •  Dulaglutide (TRULICITY) 1.5 MG/0.5ML solution pen-injector, Inject 1.5 mg under the skin into the appropriate area as directed 1 (One) Time Per Week., Disp: 4 pen, Rfl: 2  •  DULoxetine (CYMBALTA) 60 MG capsule, Take 1 capsule by mouth Daily., Disp: 90 capsule, Rfl: 1  •  FIBER ADULT GUMMIES PO, Take 1 dose by mouth Daily., Disp: , Rfl:   •  glimepiride (AMARYL) 4 MG tablet, Take 1 tablet by mouth 2 (Two) Times a Day., Disp: 180 tablet, Rfl: 0  •  glucosamine-chondroitin 500-400 MG capsule capsule, Take 1 capsule by mouth Daily., Disp: , Rfl:   •  hydrOXYzine pamoate (VISTARIL) 25 MG capsule, TAKE 1 CAPSULE BY MOUTH AT NIGHT AS NEEDED FOR ANXIETY (INSOMNIA)., Disp: 30 capsule, Rfl: 0  •  irbesartan (AVAPRO) 150 MG tablet, Take 1 tablet by mouth Daily., Disp: 90 tablet, Rfl: 1  •  metFORMIN ER (GLUCOPHAGE-XR) 500 MG 24 hr tablet, Take 2 tablets by mouth 2 (Two) Times a Day.,  "Disp: 360 tablet, Rfl: 0  •  metoprolol succinate XL (TOPROL-XL) 50 MG 24 hr tablet, Take 1 tablet by mouth Daily., Disp: 90 tablet, Rfl: 1  •  pioglitazone (ACTOS) 30 MG tablet, Take 1 tablet by mouth Daily., Disp: 90 tablet, Rfl: 0  •  rivaroxaban (XARELTO) 20 MG tablet, Take 1 tablet by mouth Daily With Dinner., Disp: 90 tablet, Rfl: 3   also entered in Sleep Questionnaire    Patient  has a past medical history of ADD (attention deficit disorder), Anemia, Atrial fibrillation (CMS/HCC), Depression, Dilated cardiomyopathy (CMS/HCC), ED (erectile dysfunction), Essential hypertension, AYNELI (generalized anxiety disorder), History of obesity, HLD (hyperlipidemia), Leukocytosis, Morbid obesity with BMI of 40.0-44.9, adult (CMS/HCC), CAMERON (obstructive sleep apnea), Osteoarthrosis, Persistent atrial fibrillation, PONV (postoperative nausea and vomiting), Psoriasis, Testosterone deficiency, and Type 2 diabetes mellitus (CMS/HCC).    I have reviewed the Past Medical History, Past Surgical History, Social History and Family History.    Vital Signs /72   Pulse 73   Ht 190.5 cm (75\")   Wt (!) 170 kg (375 lb)   SpO2 96%   BMI 46.87 kg/m²  Body mass index is 46.87 kg/m².    General Alert and oriented. No acute distress noted   Pharynx/Throat Class IV Mallampati airway, large tongue, no evidence of redundant lateral pharyngeal tissue. No oral lesions. No thrush. Moist mucous membranes.   Head Normocephalic. Symmetrical. Atraumatic.    Nose No septal deviation. No drainage   Chest Wall Normal shape. Symmetric expansion with respiration. No tenderness.   Neck Trachea midline, no thyromegaly or adenopathy    Lungs Clear to auscultation bilaterally. No wheezes. No rhonchi. No rales. Respirations regular, even and unlabored.   Heart Regular rhythm and normal rate. Normal S1 and S2. No murmur   Abdomen Soft, non-tender and non-distended. Normal bowel sounds. No masses.   Extremities Moves all extremities well. No edema "   Psychiatric Normal mood and affect.     Testing:  · Download January 2020-present, average nightly use of 12 hours and 24 minutes on auto BIPAP, max IPAP 20, min EPAP 10, PS 4, AHI 1.0, avg pr 15/11.    Diagnostic findings- Study 12/19/19- The patient tolerated the home sleep testing with monitoring time of 449 minutes. The data obtained make this a technically adequate study. The apnea hypopneas index(AHI) was 15.8 per sleep hour.  The AHI during supine position was 17.7 per sleep hour. Mean heart rate of 70.1 BPM.  Snoring was noted 30.2% of sleep time. Lowest oxygen saturation during the study was 80%. Saturation below 89% was noted for 5.5 mins.     Impression:  1. CAMERON treated with BiPAP    2. Morbid obesity with BMI of 45.0-49.9, adult (CMS/[facility])          Plan:  Change EPAP min to 12 as he feels that current pressures may be too low. Keep IPAP max at 20 and PS at 4.    He uses the BIPAP device and benefits from its use in terms of reduction of hypersomnia and snoring.  AHI appears to be within adequate range. I reviewed download report and original sleep study report with the patient.     Weight loss will be strongly beneficial to reduce the severity of sleep-disordered breathing.  Caution during activities that require prolonged concentration is strongly advised if sleepiness returns. Changing of PAP supplies regularly is important for effective use.     Follow up with Dr. Deluna in 6 months, and then annually if all is stable.    Thank you for allowing me to participate in your patient's care.      KAYLEN Ramirez  [facility]  Phone: [phone]      Part of this note may be an electronic transcription/translation of spoken language to printed text using the Dragon Dictation System.

## 2020-02-25 DIAGNOSIS — F41.1 GAD (GENERALIZED ANXIETY DISORDER): ICD-10-CM

## 2020-02-25 RX ORDER — HYDROXYZINE PAMOATE 25 MG/1
25 CAPSULE ORAL NIGHTLY PRN
Qty: 30 CAPSULE | Refills: 1 | Status: SHIPPED | OUTPATIENT
Start: 2020-02-25 | End: 2020-04-20 | Stop reason: SDUPTHER

## 2020-02-26 ENCOUNTER — HOSPITAL ENCOUNTER (OUTPATIENT)
Dept: CARDIOLOGY | Facility: HOSPITAL | Age: 65
Discharge: HOME | End: 2020-02-26
Admitting: NURSE PRACTITIONER

## 2020-02-26 ENCOUNTER — OFFICE VISIT (OUTPATIENT)
Dept: CARDIOLOGY | Facility: CLINIC | Age: 65
End: 2020-02-26

## 2020-02-26 VITALS
SYSTOLIC BLOOD PRESSURE: 140 MMHG | HEIGHT: 75 IN | BODY MASS INDEX: 39.17 KG/M2 | WEIGHT: 315 LBS | HEART RATE: 75 BPM | DIASTOLIC BLOOD PRESSURE: 80 MMHG

## 2020-02-26 VITALS — HEIGHT: 75 IN | WEIGHT: 315 LBS | BODY MASS INDEX: 39.17 KG/M2 | HEART RATE: 72 BPM

## 2020-02-26 DIAGNOSIS — I35.0 NONRHEUMATIC AORTIC VALVE STENOSIS: ICD-10-CM

## 2020-02-26 DIAGNOSIS — I42.0 DILATED CARDIOMYOPATHY (HCC): Primary | ICD-10-CM

## 2020-02-26 DIAGNOSIS — G47.33 OSA (OBSTRUCTIVE SLEEP APNEA): ICD-10-CM

## 2020-02-26 DIAGNOSIS — I10 ESSENTIAL HYPERTENSION: ICD-10-CM

## 2020-02-26 DIAGNOSIS — I48.19 PERSISTENT ATRIAL FIBRILLATION (HCC): ICD-10-CM

## 2020-02-26 DIAGNOSIS — E78.2 MIXED HYPERLIPIDEMIA: ICD-10-CM

## 2020-02-26 DIAGNOSIS — Z79.4 TYPE 2 DIABETES MELLITUS WITH HYPERGLYCEMIA, WITH LONG-TERM CURRENT USE OF INSULIN (HCC): ICD-10-CM

## 2020-02-26 DIAGNOSIS — E11.65 TYPE 2 DIABETES MELLITUS WITH HYPERGLYCEMIA, WITH LONG-TERM CURRENT USE OF INSULIN (HCC): ICD-10-CM

## 2020-02-26 DIAGNOSIS — E66.01 MORBID OBESITY WITH BMI OF 40.0-44.9, ADULT (HCC): ICD-10-CM

## 2020-02-26 LAB
AORTIC ROOT ANNULUS: 2.2 CM
BH CV ECHO MEAS - ACS: 1.4 CM
BH CV ECHO MEAS - AO MAX PG (FULL): 25.9 MMHG
BH CV ECHO MEAS - AO MAX PG: 28.1 MMHG
BH CV ECHO MEAS - AO MEAN PG (FULL): 16 MMHG
BH CV ECHO MEAS - AO MEAN PG: 18 MMHG
BH CV ECHO MEAS - AO ROOT AREA (BSA CORRECTED): 1.2
BH CV ECHO MEAS - AO ROOT AREA: 9.6 CM^2
BH CV ECHO MEAS - AO ROOT DIAM: 3.5 CM
BH CV ECHO MEAS - AO V2 MAX: 265 CM/SEC
BH CV ECHO MEAS - AO V2 MEAN: 202 CM/SEC
BH CV ECHO MEAS - AO V2 VTI: 66.3 CM
BH CV ECHO MEAS - AVA(I,A): 0.85 CM^2
BH CV ECHO MEAS - AVA(I,D): 0.85 CM^2
BH CV ECHO MEAS - AVA(V,A): 0.88 CM^2
BH CV ECHO MEAS - AVA(V,D): 0.88 CM^2
BH CV ECHO MEAS - BSA(HAYCOCK): 3 M^2
BH CV ECHO MEAS - BSA: 2.8 M^2
BH CV ECHO MEAS - BZI_BMI: 45 KILOGRAMS/M^2
BH CV ECHO MEAS - BZI_METRIC_HEIGHT: 190.5 CM
BH CV ECHO MEAS - BZI_METRIC_WEIGHT: 163.3 KG
BH CV ECHO MEAS - EDV(MOD-SP2): 123 ML
BH CV ECHO MEAS - EDV(MOD-SP4): 103 ML
BH CV ECHO MEAS - EDV(TEICH): 223.6 ML
BH CV ECHO MEAS - EF(CUBED): 68.3 %
BH CV ECHO MEAS - EF(MOD-BP): 51 %
BH CV ECHO MEAS - EF(MOD-SP2): 51.2 %
BH CV ECHO MEAS - EF(MOD-SP4): 50.5 %
BH CV ECHO MEAS - EF(TEICH): 58.7 %
BH CV ECHO MEAS - ESV(MOD-SP2): 60 ML
BH CV ECHO MEAS - ESV(MOD-SP4): 51 ML
BH CV ECHO MEAS - ESV(TEICH): 92.4 ML
BH CV ECHO MEAS - FS: 31.8 %
BH CV ECHO MEAS - IVS/LVPW: 1
BH CV ECHO MEAS - IVSD: 1.2 CM
BH CV ECHO MEAS - LAT PEAK E' VEL: 11 CM/SEC
BH CV ECHO MEAS - LV DIASTOLIC VOL/BSA (35-75): 36.6 ML/M^2
BH CV ECHO MEAS - LV MASS(C)D: 367.9 GRAMS
BH CV ECHO MEAS - LV MASS(C)DI: 130.6 GRAMS/M^2
BH CV ECHO MEAS - LV MAX PG: 2.2 MMHG
BH CV ECHO MEAS - LV MEAN PG: 2 MMHG
BH CV ECHO MEAS - LV SYSTOLIC VOL/BSA (12-30): 18.1 ML/M^2
BH CV ECHO MEAS - LV V1 MAX: 74 CM/SEC
BH CV ECHO MEAS - LV V1 MEAN: 58.1 CM/SEC
BH CV ECHO MEAS - LV V1 VTI: 18 CM
BH CV ECHO MEAS - LVIDD: 6.6 CM
BH CV ECHO MEAS - LVIDS: 4.5 CM
BH CV ECHO MEAS - LVLD AP2: 8.3 CM
BH CV ECHO MEAS - LVLD AP4: 8.2 CM
BH CV ECHO MEAS - LVLS AP2: 7.3 CM
BH CV ECHO MEAS - LVLS AP4: 7.2 CM
BH CV ECHO MEAS - LVOT AREA (M): 3.1 CM^2
BH CV ECHO MEAS - LVOT AREA: 3.1 CM^2
BH CV ECHO MEAS - LVOT DIAM: 2 CM
BH CV ECHO MEAS - LVPWD: 1.2 CM
BH CV ECHO MEAS - MED PEAK E' VEL: 7 CM/SEC
BH CV ECHO MEAS - MV DEC SLOPE: 735 CM/SEC^2
BH CV ECHO MEAS - MV DEC TIME: 0.2 SEC
BH CV ECHO MEAS - MV E MAX VEL: 132 CM/SEC
BH CV ECHO MEAS - MV MAX PG: 9.7 MMHG
BH CV ECHO MEAS - MV MEAN PG: 4 MMHG
BH CV ECHO MEAS - MV P1/2T MAX VEL: 163 CM/SEC
BH CV ECHO MEAS - MV P1/2T: 65 MSEC
BH CV ECHO MEAS - MV V2 MAX: 156 CM/SEC
BH CV ECHO MEAS - MV V2 MEAN: 84.7 CM/SEC
BH CV ECHO MEAS - MV V2 VTI: 42.1 CM
BH CV ECHO MEAS - MVA P1/2T LCG: 1.3 CM^2
BH CV ECHO MEAS - MVA(P1/2T): 3.4 CM^2
BH CV ECHO MEAS - MVA(VTI): 1.3 CM^2
BH CV ECHO MEAS - PA MAX PG (FULL): 3.8 MMHG
BH CV ECHO MEAS - PA MAX PG: 5.2 MMHG
BH CV ECHO MEAS - PA V2 MAX: 114 CM/SEC
BH CV ECHO MEAS - PVA(V,A): 3.4 CM^2
BH CV ECHO MEAS - PVA(V,D): 3.4 CM^2
BH CV ECHO MEAS - QP/QS: 1.5
BH CV ECHO MEAS - RV MAX PG: 1.4 MMHG
BH CV ECHO MEAS - RV MEAN PG: 1 MMHG
BH CV ECHO MEAS - RV V1 MAX: 58.7 CM/SEC
BH CV ECHO MEAS - RV V1 MEAN: 42.4 CM/SEC
BH CV ECHO MEAS - RV V1 VTI: 12.8 CM
BH CV ECHO MEAS - RVOT AREA: 6.6 CM^2
BH CV ECHO MEAS - RVOT DIAM: 2.9 CM
BH CV ECHO MEAS - SI(AO): 226.4 ML/M^2
BH CV ECHO MEAS - SI(CUBED): 69.7 ML/M^2
BH CV ECHO MEAS - SI(LVOT): 20.1 ML/M^2
BH CV ECHO MEAS - SI(MOD-SP2): 22.4 ML/M^2
BH CV ECHO MEAS - SI(MOD-SP4): 18.5 ML/M^2
BH CV ECHO MEAS - SI(TEICH): 46.6 ML/M^2
BH CV ECHO MEAS - SUP REN AO DIAM: 2.9 CM
BH CV ECHO MEAS - SV(AO): 637.9 ML
BH CV ECHO MEAS - SV(CUBED): 196.4 ML
BH CV ECHO MEAS - SV(LVOT): 56.5 ML
BH CV ECHO MEAS - SV(MOD-SP2): 63 ML
BH CV ECHO MEAS - SV(MOD-SP4): 52 ML
BH CV ECHO MEAS - SV(RVOT): 84.5 ML
BH CV ECHO MEAS - SV(TEICH): 131.2 ML
BH CV ECHO MEASUREMENTS AVERAGE E/E' RATIO: 14.67
LEFT ATRIUM VOLUME INDEX: 50 ML/M2
MAXIMAL PREDICTED HEART RATE: 155 BPM
SINUS: 3.3 CM
STJ: 3.1 CM
STRESS TARGET HR: 132 BPM

## 2020-02-26 PROCEDURE — 99214 OFFICE O/P EST MOD 30 MIN: CPT | Performed by: INTERNAL MEDICINE

## 2020-02-26 PROCEDURE — 93306 TTE W/DOPPLER COMPLETE: CPT

## 2020-02-26 PROCEDURE — 93306 TTE W/DOPPLER COMPLETE: CPT | Performed by: INTERNAL MEDICINE

## 2020-02-26 PROCEDURE — 93000 ELECTROCARDIOGRAM COMPLETE: CPT | Performed by: INTERNAL MEDICINE

## 2020-02-26 RX ORDER — IRBESARTAN 300 MG/1
300 TABLET ORAL EVERY MORNING
Qty: 90 TABLET | Refills: 3 | Status: SHIPPED | OUTPATIENT
Start: 2020-02-26 | End: 2020-03-12 | Stop reason: SDUPTHER

## 2020-02-26 RX ORDER — METOPROLOL SUCCINATE 50 MG/1
50 TABLET, EXTENDED RELEASE ORAL NIGHTLY
Qty: 90 TABLET | Refills: 1 | Status: SHIPPED | OUTPATIENT
Start: 2020-02-26 | End: 2020-09-11 | Stop reason: SDUPTHER

## 2020-02-26 NOTE — PROGRESS NOTES
"Date of Office Visit: 2020  Encounter Provider: Valeria Bravo MD  Place of Service: [facility] CARDIOLOGY  Patient Name: Duane Mark Witten  :1955      Patient ID:  Duane Mark Witten is a 65 y.o. male is here for  followup for atrial fibrillation and cardiomyopathy.         History of Present Illness    He has a history of HTN, DM, obstructive sleep apnea (compliant with cpap), and obesity.      He presented to the emergency room on 18 with complaints of 3 weeks of progressive weakness to bilateral lower extremities as well as paraesthesias of the upper extremities. He began feeling his balance was off and that his legs were becoming weaker and \"just didn't want to work right.\"  He was found to have severe cervical stenosis with cord compression. He was seen by Dr. Markham and had underwent C4, C5, C6 anterior cervical corpectomy with cage and plate. During surgery HR was in the 110's. EKG was obtained in the recovery room and showed atrial fibrillation. He was completely asymptomatic.      Echo done 18 showed LVEF 47% with mild LVH and mild LVE, with mild to moderate AS, mild MR with mild anterior prolapse, mild LAE     stress PET scan was completed 19 and showed the following results: EF 36%, bilateral ventricular dilation, diffusely decreased tracer uptake in all wall segments on stress imaging and a more pronounced hypoperfusion of the mid anterior wall that was considered small, mild, and may represent ischemia.     Repeat echocardiogram done 2020 shows LVEF low normal.     He has no orthopnea or PND.  He has had no tachycardia.  He has occasional dizziness and when that happens his wife decreases his metoprolol to 25 mg daily.  He has had no difficulty with his medications.  He is not exercising.  He does use his CPAP.  His energy level does seem to be better.  He has had no change in lower extremity edema.  He has no exertional chest " tightness or pressure.  He has not lost any weight.       Past Medical History:   Diagnosis Date   • ADD (attention deficit disorder)    • Anemia     intermittent   • Atrial fibrillation (CMS/HCC)    • Depression    • Dilated cardiomyopathy (CMS/HCC)    • ED (erectile dysfunction)    • Essential hypertension    • YANELI (generalized anxiety disorder)    • History of obesity    • HLD (hyperlipidemia)    • Leukocytosis    • Morbid obesity with BMI of 40.0-44.9, adult (CMS/HCC)    • CAMERON (obstructive sleep apnea)    • Osteoarthrosis    • Persistent atrial fibrillation    • PONV (postoperative nausea and vomiting)    • Psoriasis    • Testosterone deficiency    • Type 2 diabetes mellitus (CMS/HCC)          Past Surgical History:   Procedure Laterality Date   • ANTERIOR CHANNEL VERTEBRECTOMY/CORPECTOMY Bilateral 11/27/2018    Procedure: C4, C5, C6 ANTERIOR CERVICAL CORPECTOMY;  Surgeon: Chirag Markham MD;  Location: [facility];  Service: Neurosurgery   • BIOPSY / EXCISION / DISSECTION AXILLARY NODE  1994    Lymph Node testing for cancer    • COLONOSCOPY      10+ years; normal   • NECK SURGERY  11/27/2018    C4/5/6 anterior cervical corpectomy   • PARATHYROIDECTOMY  2008       Current Outpatient Medications on File Prior to Visit   Medication Sig Dispense Refill   • ACCU-CHEK AGNES PLUS test strip Use as directed to test blood sugar tid E11.9 100 each 11   • atomoxetine (STRATTERA) 100 MG capsule Take 1 capsule by mouth Daily. 90 capsule 1   • atorvastatin (LIPITOR) 20 MG tablet Take 1 tablet by mouth Daily. 30 tablet 9   • Dulaglutide (TRULICITY) 1.5 MG/0.5ML solution pen-injector Inject 1.5 mg under the skin into the appropriate area as directed 1 (One) Time Per Week. 4 pen 2   • DULoxetine (CYMBALTA) 60 MG capsule Take 1 capsule by mouth Daily. 90 capsule 1   • FIBER ADULT GUMMIES PO Take 1 dose by mouth Daily.     • glimepiride (AMARYL) 4 MG tablet Take 1 tablet by mouth 2 (Two) Times a Day. 180 tablet 0   •  glucosamine-chondroitin 500-400 MG capsule capsule Take 1 capsule by mouth Daily.     • hydrOXYzine pamoate (VISTARIL) 25 MG capsule Take 1 capsule by mouth At Night As Needed for Anxiety (insomnia). 30 capsule 1   • irbesartan (AVAPRO) 150 MG tablet Take 1 tablet by mouth Daily. 90 tablet 1   • metFORMIN ER (GLUCOPHAGE-XR) 500 MG 24 hr tablet Take 2 tablets by mouth 2 (Two) Times a Day. 360 tablet 0   • metoprolol succinate XL (TOPROL-XL) 50 MG 24 hr tablet Take 1 tablet by mouth Daily. 90 tablet 1   • pioglitazone (ACTOS) 30 MG tablet Take 1 tablet by mouth Daily. 90 tablet 0   • rivaroxaban (XARELTO) 20 MG tablet Take 1 tablet by mouth Daily With Dinner. 90 tablet 3     No current facility-administered medications on file prior to visit.        Social History     Socioeconomic History   • Marital status:      Spouse name: Mindy   • Number of children: Not on file   • Years of education: Not on file   • Highest education level: Not on file   Tobacco Use   • Smoking status: Former Smoker     Packs/day: 1.00     Last attempt to quit: 1979     Years since quittin.1   • Smokeless tobacco: Never Used   Substance and Sexual Activity   • Alcohol use: No     Comment: Rare caffeine use   • Drug use: No           Review of Systems   Constitution: Negative.   HENT: Negative for congestion.    Eyes: Negative for vision loss in left eye and vision loss in right eye.   Respiratory: Negative.  Negative for cough, hemoptysis, shortness of breath, sleep disturbances due to breathing, snoring, sputum production and wheezing.    Endocrine: Negative.    Hematologic/Lymphatic: Negative.    Skin: Negative for poor wound healing and rash.   Musculoskeletal: Negative for falls, gout, muscle cramps and myalgias.   Gastrointestinal: Negative for abdominal pain, diarrhea, dysphagia, hematemesis, melena, nausea and vomiting.   Neurological: Negative for excessive daytime sleepiness, dizziness, headaches, light-headedness, loss of  "balance, seizures and vertigo.   Psychiatric/Behavioral: Negative for depression and substance abuse. The patient is not nervous/anxious.        Procedures    ECG 12 Lead  Date/Time: 2/26/2020 11:51 AM  Performed by: Valeria Bravo MD  Authorized by: Valeria Bravo MD   Comparison: compared with previous ECG   Similar to previous ECG  Rhythm: atrial fibrillation  Other findings: poor R wave progression    Clinical impression: abnormal EKG                Objective:      Vitals:    02/26/20 1134   BP: 140/80   BP Location: Right arm   Patient Position: Sitting   Pulse: 75   Weight: (!) 169 kg (372 lb 12.8 oz)   Height: 190.5 cm (75\")     Body mass index is 46.6 kg/m².    Physical Exam   Constitutional: He is oriented to person, place, and time. He appears well-developed and well-nourished. No distress.   obese   HENT:   Head: Normocephalic and atraumatic.   Eyes: Conjunctivae are normal. No scleral icterus.   Neck: Neck supple. No JVD present. Carotid bruit is not present. No thyromegaly present.   Cardiovascular: Normal rate, S1 normal, S2 normal and intact distal pulses. An irregularly irregular rhythm present.  No extrasystoles are present. PMI is not displaced. Exam reveals no gallop.   No murmur heard.  Pulses:       Carotid pulses are 2+ on the right side, and 2+ on the left side.       Radial pulses are 2+ on the right side, and 2+ on the left side.        Dorsalis pedis pulses are 2+ on the right side, and 2+ on the left side.        Posterior tibial pulses are 2+ on the right side, and 2+ on the left side.   Pulmonary/Chest: Effort normal and breath sounds normal. No respiratory distress. He has no wheezes. He has no rhonchi. He has no rales. He exhibits no tenderness.   Abdominal: Soft. Bowel sounds are normal. He exhibits no distension, no abdominal bruit and no mass. There is no tenderness.   Musculoskeletal: He exhibits no edema or deformity.   Lymphadenopathy:     He has no cervical " adenopathy.   Neurological: He is alert and oriented to person, place, and time. No cranial nerve deficit.   Skin: Skin is warm and dry. No rash noted. He is not diaphoretic. No cyanosis. No pallor. Nails show no clubbing.   Psychiatric: He has a normal mood and affect. Judgment normal.   Vitals reviewed.      Lab Review:       Assessment:      Diagnosis Plan   1. Dilated cardiomyopathy (CMS/[facility])     2. Persistent atrial fibrillation     3. Essential hypertension     4. Mixed hyperlipidemia     5. Morbid obesity with BMI of 40.0-44.9, adult (CMS/[facility])     6. Type 2 diabetes mellitus with hyperglycemia, with long-term current use of insulin (CMS/[facility])     7. CAMERON (obstructive sleep apnea)       1. Persistent atrial fibrillation, on xarelto.   2. S/p c-spine surgery for cervical stenosis, done 11/2018.  3. DM  4. Hypertension - controlled.   5. Hyperlipidemia  6. H/o Cardiomyopathy, now LVEF low normal.   7. Mild AS        Plan:       See fredrick in 6 months.  Increase avapro to 300mg in am and take metoprolol nightly.

## 2020-03-12 DIAGNOSIS — I10 ESSENTIAL HYPERTENSION: ICD-10-CM

## 2020-03-12 RX ORDER — IRBESARTAN 300 MG/1
300 TABLET ORAL EVERY MORNING
Qty: 90 TABLET | Refills: 3 | Status: SHIPPED | OUTPATIENT
Start: 2020-03-12 | End: 2020-08-27 | Stop reason: SDUPTHER

## 2020-04-10 DIAGNOSIS — E11.9 CONTROLLED TYPE 2 DIABETES MELLITUS WITHOUT COMPLICATION, WITHOUT LONG-TERM CURRENT USE OF INSULIN (HCC): ICD-10-CM

## 2020-04-10 RX ORDER — METFORMIN HYDROCHLORIDE 500 MG/1
TABLET, EXTENDED RELEASE ORAL
Qty: 120 TABLET | Refills: 2 | Status: SHIPPED | OUTPATIENT
Start: 2020-04-10 | End: 2020-07-09 | Stop reason: SDUPTHER

## 2020-04-10 RX ORDER — PIOGLITAZONEHYDROCHLORIDE 30 MG/1
TABLET ORAL
Qty: 90 TABLET | Refills: 2 | Status: SHIPPED | OUTPATIENT
Start: 2020-04-10 | End: 2020-08-18 | Stop reason: SDUPTHER

## 2020-04-13 DIAGNOSIS — E11.9 CONTROLLED TYPE 2 DIABETES MELLITUS WITHOUT COMPLICATION, WITHOUT LONG-TERM CURRENT USE OF INSULIN (HCC): ICD-10-CM

## 2020-04-13 RX ORDER — GLIMEPIRIDE 4 MG/1
TABLET ORAL
Qty: 60 TABLET | Refills: 2 | Status: SHIPPED | OUTPATIENT
Start: 2020-04-13 | End: 2020-07-09 | Stop reason: SDUPTHER

## 2020-04-18 ENCOUNTER — PATIENT MESSAGE (OUTPATIENT)
Dept: FAMILY MEDICINE CLINIC | Facility: CLINIC | Age: 65
End: 2020-04-18

## 2020-04-18 DIAGNOSIS — F41.1 GAD (GENERALIZED ANXIETY DISORDER): ICD-10-CM

## 2020-04-20 RX ORDER — HYDROXYZINE PAMOATE 25 MG/1
25 CAPSULE ORAL NIGHTLY PRN
Qty: 30 CAPSULE | Refills: 1 | Status: SHIPPED | OUTPATIENT
Start: 2020-04-20 | End: 2020-05-12

## 2020-04-20 NOTE — TELEPHONE ENCOUNTER
From: Duane Mark Witten  To: Marly Myrick APRN  Sent: 4/18/2020 2:15 AM EDT  Subject: Prescription Question    Please send a refill in for my hydroxyzine.    Thanks

## 2020-05-12 DIAGNOSIS — F41.1 GAD (GENERALIZED ANXIETY DISORDER): ICD-10-CM

## 2020-05-12 RX ORDER — HYDROXYZINE PAMOATE 25 MG/1
25 CAPSULE ORAL NIGHTLY PRN
Qty: 30 CAPSULE | Refills: 1 | Status: SHIPPED | OUTPATIENT
Start: 2020-05-12 | End: 2020-06-08

## 2020-05-13 DIAGNOSIS — E11.9 CONTROLLED TYPE 2 DIABETES MELLITUS WITHOUT COMPLICATION, WITHOUT LONG-TERM CURRENT USE OF INSULIN (HCC): ICD-10-CM

## 2020-05-26 ENCOUNTER — OFFICE VISIT (OUTPATIENT)
Dept: FAMILY MEDICINE CLINIC | Facility: CLINIC | Age: 65
End: 2020-05-26

## 2020-05-26 VITALS
BODY MASS INDEX: 39.17 KG/M2 | WEIGHT: 315 LBS | SYSTOLIC BLOOD PRESSURE: 122 MMHG | TEMPERATURE: 96.8 F | HEIGHT: 75 IN | DIASTOLIC BLOOD PRESSURE: 80 MMHG | OXYGEN SATURATION: 98 % | HEART RATE: 71 BPM

## 2020-05-26 DIAGNOSIS — E11.65 TYPE 2 DIABETES MELLITUS WITH HYPERGLYCEMIA, WITHOUT LONG-TERM CURRENT USE OF INSULIN (HCC): Primary | ICD-10-CM

## 2020-05-26 PROCEDURE — 99212 OFFICE O/P EST SF 10 MIN: CPT | Performed by: NURSE PRACTITIONER

## 2020-05-26 NOTE — PROGRESS NOTES
Subjective   Duane Mark Witten is a 65 y.o. male.     Chief Complaint   Patient presents with   • Diabetes       History of Present Illness   Diabetes Mellitus Type II, Follow-up: Patient here for follow-up of Type 2 diabetes mellitus. Symptoms: none. Home monitoring: The patient is checking blood sugars at home. Medications: The patient is adherent with their medication regimen. Medication(s): Trulicity, glimepiride, metformin and pioglitazone . Due for: A1C. Patient is not currently exercising.    The following portions of the patient's history were reviewed and updated as appropriate: allergies, current medications, past family history, past medical history, past social history, past surgical history and problem list.    Past Medical History:   Diagnosis Date   • ADD (attention deficit disorder)    • Anemia     intermittent   • Atrial fibrillation (CMS/HCC)    • Depression    • Dilated cardiomyopathy (CMS/HCC)    • ED (erectile dysfunction)    • Essential hypertension    • YANELI (generalized anxiety disorder)    • History of obesity    • HLD (hyperlipidemia)    • Leukocytosis    • Morbid obesity with BMI of 40.0-44.9, adult (CMS/HCC)    • CAMERON (obstructive sleep apnea)    • Osteoarthrosis    • Persistent atrial fibrillation    • PONV (postoperative nausea and vomiting)    • Psoriasis    • Testosterone deficiency    • Type 2 diabetes mellitus (CMS/HCC)        Past Surgical History:   Procedure Laterality Date   • ANTERIOR CHANNEL VERTEBRECTOMY/CORPECTOMY Bilateral 11/27/2018    Procedure: C4, C5, C6 ANTERIOR CERVICAL CORPECTOMY;  Surgeon: Chirag Markham MD;  Location: [facility];  Service: Neurosurgery   • BIOPSY / EXCISION / DISSECTION AXILLARY NODE  1994    Lymph Node testing for cancer    • COLONOSCOPY      10+ years; normal   • NECK SURGERY  11/27/2018    C4/5/6 anterior cervical corpectomy   • PARATHYROIDECTOMY  2008       Family History   Problem Relation Age of Onset   • Anxiety disorder Mother    •  "Bipolar disorder Mother    • Depression Mother    • Glaucoma Mother    • Macular degeneration Mother    • Lupus Mother    • Aortic stenosis Mother         s/p TAVR at age 87   • Cancer Father    • Diabetes Father    • Heart disease Father    • Hypertension Father    • Stroke Father    • Lupus Other         grandmother       Social History     Socioeconomic History   • Marital status:      Spouse name: Mindy   • Number of children: Not on file   • Years of education: Not on file   • Highest education level: Not on file   Tobacco Use   • Smoking status: Former Smoker     Packs/day: 1.00     Last attempt to quit:      Years since quittin.4   • Smokeless tobacco: Never Used   Substance and Sexual Activity   • Alcohol use: No     Comment: Rare caffeine use   • Drug use: No       Review of Systems   Eyes: Negative for visual disturbance.   Endocrine: Negative for polydipsia and polyuria.       Objective   Vitals:    20 1140   BP: 122/80   BP Location: Right arm   Patient Position: Sitting   Cuff Size: Large Adult   Pulse: 71   Temp: 96.8 °F (36 °C)   TempSrc: Temporal   SpO2: 98%   Weight: (!) 169 kg (373 lb)   Height: 190.5 cm (75\")      Body mass index is 46.62 kg/m².  Physical Exam   Constitutional: He is oriented to person, place, and time. He appears well-developed and well-nourished.   Cardiovascular: Normal rate. An irregularly irregular rhythm present.   Pulmonary/Chest: Effort normal and breath sounds normal.   Neurological: He is alert and oriented to person, place, and time.   Psychiatric: He has a normal mood and affect.   Nursing note and vitals reviewed.        Assessment/Plan   Duane was seen today for diabetes.    Diagnoses and all orders for this visit:    Type 2 diabetes mellitus with hyperglycemia, without long-term current use of insulin (CMS/[facility])  -     Hemoglobin A1c               "

## 2020-05-27 LAB — HBA1C MFR BLD: 6.7 % (ref 4.8–5.6)

## 2020-06-04 DIAGNOSIS — E11.9 CONTROLLED TYPE 2 DIABETES MELLITUS WITHOUT COMPLICATION, WITHOUT LONG-TERM CURRENT USE OF INSULIN (HCC): ICD-10-CM

## 2020-06-04 RX ORDER — DULAGLUTIDE 1.5 MG/.5ML
INJECTION, SOLUTION SUBCUTANEOUS
Qty: 4 PEN | Refills: 2 | Status: SHIPPED | OUTPATIENT
Start: 2020-06-04 | End: 2020-08-18 | Stop reason: SDUPTHER

## 2020-06-06 DIAGNOSIS — F41.1 GAD (GENERALIZED ANXIETY DISORDER): ICD-10-CM

## 2020-06-08 RX ORDER — HYDROXYZINE PAMOATE 25 MG/1
25 CAPSULE ORAL NIGHTLY PRN
Qty: 30 CAPSULE | Refills: 1 | Status: SHIPPED | OUTPATIENT
Start: 2020-06-08 | End: 2020-07-07

## 2020-07-03 DIAGNOSIS — F41.1 GAD (GENERALIZED ANXIETY DISORDER): ICD-10-CM

## 2020-07-07 RX ORDER — HYDROXYZINE PAMOATE 25 MG/1
25 CAPSULE ORAL NIGHTLY PRN
Qty: 30 CAPSULE | Refills: 1 | Status: SHIPPED | OUTPATIENT
Start: 2020-07-07 | End: 2020-08-04

## 2020-07-09 DIAGNOSIS — E11.9 CONTROLLED TYPE 2 DIABETES MELLITUS WITHOUT COMPLICATION, WITHOUT LONG-TERM CURRENT USE OF INSULIN (HCC): ICD-10-CM

## 2020-07-09 RX ORDER — METFORMIN HYDROCHLORIDE 500 MG/1
1000 TABLET, EXTENDED RELEASE ORAL 2 TIMES DAILY
Qty: 120 TABLET | Refills: 2 | Status: SHIPPED | OUTPATIENT
Start: 2020-07-09 | End: 2020-08-18 | Stop reason: SDUPTHER

## 2020-07-09 RX ORDER — GLIMEPIRIDE 4 MG/1
4 TABLET ORAL 2 TIMES DAILY
Qty: 60 TABLET | Refills: 2 | Status: SHIPPED | OUTPATIENT
Start: 2020-07-09 | End: 2020-08-18 | Stop reason: SDUPTHER

## 2020-07-09 NOTE — TELEPHONE ENCOUNTER
LOV - 05/26/20    Last filled metformin 04/10/20    Last filled glimepiride 04/13/20    Upcoming appointment 08/18/20

## 2020-07-31 DIAGNOSIS — E78.2 MIXED HYPERLIPIDEMIA: ICD-10-CM

## 2020-07-31 DIAGNOSIS — F41.1 GAD (GENERALIZED ANXIETY DISORDER): ICD-10-CM

## 2020-07-31 DIAGNOSIS — F32.5 MAJOR DEPRESSIVE DISORDER WITH SINGLE EPISODE, IN FULL REMISSION (HCC): ICD-10-CM

## 2020-07-31 DIAGNOSIS — F98.8 ATTENTION DEFICIT DISORDER (ADD) WITHOUT HYPERACTIVITY: ICD-10-CM

## 2020-08-01 DIAGNOSIS — F41.1 GAD (GENERALIZED ANXIETY DISORDER): ICD-10-CM

## 2020-08-03 NOTE — TELEPHONE ENCOUNTER
LOV - 05/26/20    Atorvastatin LF- -10/29/19  Duloxetine LF- 02/20/20  Atomoxetine Lf - 02/20/20    Upcoming appointment 08/18/20

## 2020-08-04 RX ORDER — DULOXETIN HYDROCHLORIDE 60 MG/1
60 CAPSULE, DELAYED RELEASE ORAL DAILY
Qty: 90 CAPSULE | Refills: 1 | Status: SHIPPED | OUTPATIENT
Start: 2020-08-04 | End: 2020-08-18 | Stop reason: SDUPTHER

## 2020-08-04 RX ORDER — HYDROXYZINE PAMOATE 25 MG/1
25 CAPSULE ORAL NIGHTLY PRN
Qty: 30 CAPSULE | Refills: 1 | Status: SHIPPED | OUTPATIENT
Start: 2020-08-04 | End: 2020-08-18 | Stop reason: SDUPTHER

## 2020-08-04 RX ORDER — ATOMOXETINE 100 MG/1
100 CAPSULE ORAL DAILY
Qty: 90 CAPSULE | Refills: 1 | Status: SHIPPED | OUTPATIENT
Start: 2020-08-04 | End: 2020-08-18 | Stop reason: SDUPTHER

## 2020-08-04 RX ORDER — ATORVASTATIN CALCIUM 20 MG/1
20 TABLET, FILM COATED ORAL DAILY
Qty: 90 TABLET | Refills: 1 | Status: SHIPPED | OUTPATIENT
Start: 2020-08-04 | End: 2020-08-19 | Stop reason: SDUPTHER

## 2020-08-18 ENCOUNTER — OFFICE VISIT (OUTPATIENT)
Dept: FAMILY MEDICINE CLINIC | Facility: CLINIC | Age: 65
End: 2020-08-18

## 2020-08-18 VITALS
TEMPERATURE: 97.8 F | HEART RATE: 76 BPM | WEIGHT: 315 LBS | DIASTOLIC BLOOD PRESSURE: 82 MMHG | HEIGHT: 75 IN | OXYGEN SATURATION: 98 % | BODY MASS INDEX: 39.17 KG/M2 | SYSTOLIC BLOOD PRESSURE: 140 MMHG

## 2020-08-18 DIAGNOSIS — E78.5 HYPERLIPIDEMIA, UNSPECIFIED HYPERLIPIDEMIA TYPE: Primary | ICD-10-CM

## 2020-08-18 DIAGNOSIS — F98.8 ATTENTION DEFICIT DISORDER (ADD) WITHOUT HYPERACTIVITY: ICD-10-CM

## 2020-08-18 DIAGNOSIS — D72.829 LEUKOCYTOSIS, UNSPECIFIED TYPE: ICD-10-CM

## 2020-08-18 DIAGNOSIS — Z12.5 ENCOUNTER FOR SCREENING FOR MALIGNANT NEOPLASM OF PROSTATE: ICD-10-CM

## 2020-08-18 DIAGNOSIS — E11.9 CONTROLLED TYPE 2 DIABETES MELLITUS WITHOUT COMPLICATION, WITHOUT LONG-TERM CURRENT USE OF INSULIN (HCC): ICD-10-CM

## 2020-08-18 DIAGNOSIS — F41.1 GAD (GENERALIZED ANXIETY DISORDER): ICD-10-CM

## 2020-08-18 DIAGNOSIS — F32.5 MAJOR DEPRESSIVE DISORDER WITH SINGLE EPISODE, IN FULL REMISSION (HCC): ICD-10-CM

## 2020-08-18 DIAGNOSIS — R35.1 BENIGN PROSTATIC HYPERPLASIA WITH NOCTURIA: ICD-10-CM

## 2020-08-18 DIAGNOSIS — N40.1 BENIGN PROSTATIC HYPERPLASIA WITH NOCTURIA: ICD-10-CM

## 2020-08-18 PROCEDURE — 99214 OFFICE O/P EST MOD 30 MIN: CPT | Performed by: NURSE PRACTITIONER

## 2020-08-18 RX ORDER — GLIMEPIRIDE 4 MG/1
4 TABLET ORAL 2 TIMES DAILY
Qty: 180 TABLET | Refills: 1 | Status: SHIPPED | OUTPATIENT
Start: 2020-08-18 | End: 2020-11-12 | Stop reason: SDUPTHER

## 2020-08-18 RX ORDER — HYDROXYZINE PAMOATE 25 MG/1
50 CAPSULE ORAL NIGHTLY PRN
Qty: 180 CAPSULE | Refills: 1 | Status: SHIPPED | OUTPATIENT
Start: 2020-08-18 | End: 2020-09-09 | Stop reason: SDUPTHER

## 2020-08-18 RX ORDER — PIOGLITAZONEHYDROCHLORIDE 30 MG/1
30 TABLET ORAL DAILY
Qty: 90 TABLET | Refills: 1 | Status: SHIPPED | OUTPATIENT
Start: 2020-08-18 | End: 2020-11-12 | Stop reason: SDUPTHER

## 2020-08-18 RX ORDER — DULOXETIN HYDROCHLORIDE 60 MG/1
60 CAPSULE, DELAYED RELEASE ORAL DAILY
Qty: 90 CAPSULE | Refills: 1 | Status: SHIPPED | OUTPATIENT
Start: 2020-08-18 | End: 2020-11-12 | Stop reason: SDUPTHER

## 2020-08-18 RX ORDER — ATOMOXETINE 100 MG/1
100 CAPSULE ORAL DAILY
Qty: 90 CAPSULE | Refills: 1 | Status: SHIPPED | OUTPATIENT
Start: 2020-08-18 | End: 2020-11-12 | Stop reason: SDUPTHER

## 2020-08-18 RX ORDER — METFORMIN HYDROCHLORIDE 500 MG/1
1000 TABLET, EXTENDED RELEASE ORAL 2 TIMES DAILY
Qty: 360 TABLET | Refills: 1 | Status: SHIPPED | OUTPATIENT
Start: 2020-08-18 | End: 2020-11-12 | Stop reason: SDUPTHER

## 2020-08-18 RX ORDER — TAMSULOSIN HYDROCHLORIDE 0.4 MG/1
1 CAPSULE ORAL DAILY
Qty: 90 CAPSULE | Refills: 1 | Status: SHIPPED | OUTPATIENT
Start: 2020-08-18 | End: 2020-11-12 | Stop reason: SDUPTHER

## 2020-08-18 NOTE — PROGRESS NOTES
Subjective   Duane Mark Witten is a 65 y.o. male.     Chief Complaint   Patient presents with   • Hypertension   • Diabetes   • Med Refill       Benign Prostatic Hypertrophy   This is a new problem. The current episode started more than 1 month ago. The problem has been gradually worsening since onset. Irritative symptoms include nocturia. Obstructive symptoms include incomplete emptying and a weak stream. Pertinent negatives include no dysuria or hematuria. He is not sexually active. Nothing aggravates the symptoms. Past treatments include nothing.      Hyperlipidemia: Symptoms: none. Medications:The patient is adherent with their medication regimen. Medication(s): statin. His last labs showed total cholesterol 124, HDL 39, LDL 69,  triglycerides 80. The patient is due for lipid panel and liver function tests .    Depression: Patient states their depression has improved since the last visit. Interval symptoms: none. Social support: The patient has good social support. The patient is adherent with their medication regimen. Medication(s): SNRI.       The following portions of the patient's history were reviewed and updated as appropriate: allergies, current medications, past family history, past medical history, past social history, past surgical history and problem list.    Past Medical History:   Diagnosis Date   • ADD (attention deficit disorder)    • Anemia     intermittent   • Atrial fibrillation (CMS/HCC)    • Depression    • Dilated cardiomyopathy (CMS/[facility])    • ED (erectile dysfunction)    • Essential hypertension    • YANELI (generalized anxiety disorder)    • History of obesity    • HLD (hyperlipidemia)    • Leukocytosis    • Morbid obesity with BMI of 40.0-44.9, adult (CMS/HCC)    • CAMERON (obstructive sleep apnea)    • Osteoarthrosis    • Persistent atrial fibrillation (CMS/[facility])    • PONV (postoperative nausea and vomiting)    • Psoriasis    • Testosterone deficiency    • Type 2 diabetes mellitus (CMS/HCC)   "      Past Surgical History:   Procedure Laterality Date   • ANTERIOR CHANNEL VERTEBRECTOMY/CORPECTOMY Bilateral 2018    Procedure: C4, C5, C6 ANTERIOR CERVICAL CORPECTOMY;  Surgeon: Chirag Markham MD;  Location: [facility] OR;  Service: Neurosurgery   • BIOPSY / EXCISION / DISSECTION AXILLARY NODE      Lymph Node testing for cancer    • COLONOSCOPY      10+ years; normal   • NECK SURGERY  2018    C4/5/6 anterior cervical corpectomy   • PARATHYROIDECTOMY         Family History   Problem Relation Age of Onset   • Anxiety disorder Mother    • Bipolar disorder Mother    • Depression Mother    • Glaucoma Mother    • Macular degeneration Mother    • Lupus Mother    • Aortic stenosis Mother         s/p TAVR at age 87   • Cancer Father    • Diabetes Father    • Heart disease Father    • Hypertension Father    • Stroke Father    • Lupus Other         grandmother       Social History     Socioeconomic History   • Marital status:      Spouse name: Mindy   • Number of children: Not on file   • Years of education: Not on file   • Highest education level: Not on file   Tobacco Use   • Smoking status: Former Smoker     Packs/day: 1.00     Last attempt to quit: 1979     Years since quittin.6   • Smokeless tobacco: Never Used   Substance and Sexual Activity   • Alcohol use: No     Comment: Rare caffeine use   • Drug use: No       Review of Systems   Cardiovascular: Negative for chest pain.   Genitourinary: Positive for incomplete emptying and nocturia. Negative for dysuria and hematuria.   Musculoskeletal: Negative for myalgias.   Psychiatric/Behavioral: Negative for suicidal ideas and depressed mood.       Objective   Vitals:    20 1018   BP: 140/82   Pulse: 76   Temp: 97.8 °F (36.6 °C)   TempSrc: Temporal   SpO2: 98%   Weight: (!) 172 kg (380 lb)   Height: 190.5 cm (75\")      Body mass index is 47.5 kg/m².  Physical Exam   Constitutional: He is oriented to person, place, and time. He " appears well-developed and well-nourished.   Cardiovascular: Normal rate. An irregularly irregular rhythm present.   Pulmonary/Chest: Effort normal and breath sounds normal.   Genitourinary:   Genitourinary Comments: Declined    Neurological: He is alert and oriented to person, place, and time.   Skin: Skin is warm and dry.   Psychiatric: He has a normal mood and affect.   Nursing note and vitals reviewed.        Assessment/Plan   Duane was seen today for hypertension, diabetes and med refill.    Diagnoses and all orders for this visit:    Hyperlipidemia, unspecified hyperlipidemia type  -     Lipid Panel With / Chol / HDL Ratio  -     Comprehensive Metabolic Panel    Benign prostatic hyperplasia with nocturia  -     PSA Screen  -     tamsulosin (FLOMAX) 0.4 MG capsule 24 hr capsule; Take 1 capsule by mouth Daily.    Major depressive disorder with single episode, in full remission (CMS/[facility])  Comments:  - ER if any SI/HI.   Orders:  -     DULoxetine (CYMBALTA) 60 MG capsule; Take 1 capsule by mouth Daily.    YANELI (generalized anxiety disorder)  -     DULoxetine (CYMBALTA) 60 MG capsule; Take 1 capsule by mouth Daily.  -     hydrOXYzine pamoate (VISTARIL) 25 MG capsule; Take 2 capsules by mouth At Night As Needed for Anxiety (insomnia).    Attention deficit disorder (ADD) without hyperactivity  -     atomoxetine (Strattera) 100 MG capsule; Take 1 capsule by mouth Daily.    Controlled type 2 diabetes mellitus without complication, without long-term current use of insulin (CMS/[facility])  -     glimepiride (AMARYL) 4 MG tablet; Take 1 tablet by mouth 2 (Two) Times a Day.  -     metFORMIN ER (GLUCOPHAGE-XR) 500 MG 24 hr tablet; Take 2 tablets by mouth 2 (Two) Times a Day.  -     pioglitazone (ACTOS) 30 MG tablet; Take 1 tablet by mouth Daily.  -     Dulaglutide (Trulicity) 1.5 MG/0.5ML solution pen-injector; Inject 1.5 mg under the skin into the appropriate area as directed 1 (One) Time Per Week.    Leukocytosis, unspecified  type  -     CBC & Differential    Encounter for screening for malignant neoplasm of prostate   -     PSA Screen

## 2020-08-19 DIAGNOSIS — E78.2 MIXED HYPERLIPIDEMIA: ICD-10-CM

## 2020-08-19 LAB
ALBUMIN SERPL-MCNC: 4.4 G/DL (ref 3.5–5.2)
ALBUMIN/GLOB SERPL: 2.3 G/DL
ALP SERPL-CCNC: 111 U/L (ref 39–117)
ALT SERPL-CCNC: 10 U/L (ref 1–41)
AST SERPL-CCNC: 10 U/L (ref 1–40)
BASOPHILS # BLD AUTO: 0.09 10*3/MM3 (ref 0–0.2)
BASOPHILS NFR BLD AUTO: 0.7 % (ref 0–1.5)
BILIRUB SERPL-MCNC: 0.7 MG/DL (ref 0–1.2)
BUN SERPL-MCNC: 10 MG/DL (ref 8–23)
BUN/CREAT SERPL: 11.1 (ref 7–25)
CALCIUM SERPL-MCNC: 9.1 MG/DL (ref 8.6–10.5)
CHLORIDE SERPL-SCNC: 102 MMOL/L (ref 98–107)
CHOLEST SERPL-MCNC: 133 MG/DL (ref 0–200)
CHOLEST/HDLC SERPL: 3.69 {RATIO}
CO2 SERPL-SCNC: 27.8 MMOL/L (ref 22–29)
CREAT SERPL-MCNC: 0.9 MG/DL (ref 0.76–1.27)
EOSINOPHIL # BLD AUTO: 1.11 10*3/MM3 (ref 0–0.4)
EOSINOPHIL NFR BLD AUTO: 8.3 % (ref 0.3–6.2)
ERYTHROCYTE [DISTWIDTH] IN BLOOD BY AUTOMATED COUNT: 15.2 % (ref 12.3–15.4)
GLOBULIN SER CALC-MCNC: 1.9 GM/DL
GLUCOSE SERPL-MCNC: 97 MG/DL (ref 65–99)
HCT VFR BLD AUTO: 41.9 % (ref 37.5–51)
HDLC SERPL-MCNC: 36 MG/DL (ref 40–60)
HGB BLD-MCNC: 13 G/DL (ref 13–17.7)
IMM GRANULOCYTES # BLD AUTO: 0.08 10*3/MM3 (ref 0–0.05)
IMM GRANULOCYTES NFR BLD AUTO: 0.6 % (ref 0–0.5)
LDLC SERPL CALC-MCNC: 79 MG/DL (ref 0–100)
LYMPHOCYTES # BLD AUTO: 3.17 10*3/MM3 (ref 0.7–3.1)
LYMPHOCYTES NFR BLD AUTO: 23.7 % (ref 19.6–45.3)
MCH RBC QN AUTO: 26.9 PG (ref 26.6–33)
MCHC RBC AUTO-ENTMCNC: 31 G/DL (ref 31.5–35.7)
MCV RBC AUTO: 86.6 FL (ref 79–97)
MONOCYTES # BLD AUTO: 0.86 10*3/MM3 (ref 0.1–0.9)
MONOCYTES NFR BLD AUTO: 6.4 % (ref 5–12)
NEUTROPHILS # BLD AUTO: 8.08 10*3/MM3 (ref 1.7–7)
NEUTROPHILS NFR BLD AUTO: 60.3 % (ref 42.7–76)
NRBC BLD AUTO-RTO: 0 /100 WBC (ref 0–0.2)
PLATELET # BLD AUTO: 304 10*3/MM3 (ref 140–450)
POTASSIUM SERPL-SCNC: 3.9 MMOL/L (ref 3.5–5.2)
PROT SERPL-MCNC: 6.3 G/DL (ref 6–8.5)
PSA SERPL-MCNC: 0.45 NG/ML (ref 0–4)
RBC # BLD AUTO: 4.84 10*6/MM3 (ref 4.14–5.8)
SODIUM SERPL-SCNC: 141 MMOL/L (ref 136–145)
TRIGL SERPL-MCNC: 92 MG/DL (ref 0–150)
VLDLC SERPL CALC-MCNC: 18.4 MG/DL
WBC # BLD AUTO: 13.39 10*3/MM3 (ref 3.4–10.8)

## 2020-08-19 RX ORDER — ATORVASTATIN CALCIUM 20 MG/1
20 TABLET, FILM COATED ORAL DAILY
Qty: 90 TABLET | Refills: 3 | Status: SHIPPED | OUTPATIENT
Start: 2020-08-19 | End: 2020-11-12 | Stop reason: SDUPTHER

## 2020-08-27 ENCOUNTER — OFFICE VISIT (OUTPATIENT)
Dept: CARDIOLOGY | Facility: CLINIC | Age: 65
End: 2020-08-27

## 2020-08-27 VITALS
HEIGHT: 75 IN | WEIGHT: 315 LBS | SYSTOLIC BLOOD PRESSURE: 136 MMHG | BODY MASS INDEX: 39.17 KG/M2 | DIASTOLIC BLOOD PRESSURE: 80 MMHG | HEART RATE: 77 BPM

## 2020-08-27 DIAGNOSIS — I48.19 PERSISTENT ATRIAL FIBRILLATION (HCC): Primary | ICD-10-CM

## 2020-08-27 DIAGNOSIS — I10 ESSENTIAL HYPERTENSION: ICD-10-CM

## 2020-08-27 DIAGNOSIS — E66.01 MORBID OBESITY (HCC): ICD-10-CM

## 2020-08-27 DIAGNOSIS — E78.2 MIXED HYPERLIPIDEMIA: ICD-10-CM

## 2020-08-27 DIAGNOSIS — I42.0 DILATED CARDIOMYOPATHY (HCC): ICD-10-CM

## 2020-08-27 DIAGNOSIS — G47.33 OSA (OBSTRUCTIVE SLEEP APNEA): ICD-10-CM

## 2020-08-27 DIAGNOSIS — R53.83 OTHER FATIGUE: ICD-10-CM

## 2020-08-27 DIAGNOSIS — I35.0 NONRHEUMATIC AORTIC VALVE STENOSIS: ICD-10-CM

## 2020-08-27 PROCEDURE — 93000 ELECTROCARDIOGRAM COMPLETE: CPT | Performed by: NURSE PRACTITIONER

## 2020-08-27 PROCEDURE — 99214 OFFICE O/P EST MOD 30 MIN: CPT | Performed by: NURSE PRACTITIONER

## 2020-08-27 RX ORDER — IRBESARTAN 300 MG/1
300 TABLET ORAL EVERY MORNING
Qty: 90 TABLET | Refills: 3 | Status: SHIPPED | OUTPATIENT
Start: 2020-08-27 | End: 2020-11-12 | Stop reason: SDUPTHER

## 2020-08-27 NOTE — PROGRESS NOTES
Date of Office Visit: 2020  Encounter Provider: KAYLEN Correa  Place of Service: [facility] CARDIOLOGY  Patient Name: Duane Mark Witten  :1955  Primary Cardiologist: Dr. Valeria Bravo     Chief Complaint   Patient presents with   • Follow-up   :       HPI: Duane Mark Witten is a pleasant 65 y.o. male who presents today for cardiac follow up.  He has a known history of hypertension, hyperlipidemia, obesity, type 2 diabetes mellitus, and obstructive sleep apnea (compliant with BiPAP machine).     In 2018, he presented to the ED with progressive weakness of the lower extremities and paresthesias of the upper extremities.  He was found to have severe cervical stenosis with cord compression and underwent anterior cervical corpectomy with cage and plate by Dr. Markham.  He was diagnosed with new onset atrial fibrillation with RVR during surgery and he was asymptomatic.  An echocardiogram was obtained on 18 which revealed an EF of 47%, mild left ventricular hypertrophy, mild left ventricular enlargement, mild to moderate aortic stenosis, mild mitral regurgitation with mild anterior prolapse, and mild left atrial enlargement.     In 2019, he followed up with Dr. Bravo.  He remained in atrial fibrillation with controlled ventricular response.  On 2019 a cardiac PET scan showed the following: EF 36%, bilateral ventricular dilation, diffusely decreased tracer uptake in all wall segments and stress imaging and a more pronounced hypoperfusion of the mid anterior wall that was considered small, mild, and may represent ischemia.  Medical treatment was recommended.  He was not felt to be a good candidate for cardioversion.    On 2020 he had a repeat echocardiogram which showed the following: EF 51%, mild LVH, left ventricle cavity borderline dilated, left atrium moderately dilated, bicuspid aortic valve, aortic valve calcification, and  mild to moderate aortic valve stenosis.  In February 2020, he also saw Dr. Bravo.  He reported occasional dizziness and his wife had decreased his metoprolol to 25 mg daily.  He remains fatigued and had decreased energy.  EKG at that time showed atrial fibrillation with controlled ventricular response.  His blood pressure was elevated and his irbesartan was increased to 300 mg in the morning and he was recommended to take metoprolol at nighttime.  He also received a new BiPAP machine in February.    Today is his follow-up visit.  He is taking the medications as recommended.  Blood pressure is been better controlled.  He remains fatigued, has decreased energy, dizziness with walking, and occasional shortness of breath.  He denies chest pain, palpitations, edema, syncope, or bleeding.  He is compliant with his BiPAP machine.    I reviewed his recent blood work from 8/18/2020: CBC showed normal hemoglobin and hematocrit.  WBC elevated at 13.39.  He says he was referred to hematology and was told he was stable.  Total cholesterol 133, triglycerides 92, HDL 36, and LDL 79.  CMP normal.    Past Medical History:   Diagnosis Date   • ADD (attention deficit disorder)    • Anemia     intermittent   • Atrial fibrillation (CMS/HCC)    • Depression    • Dilated cardiomyopathy (CMS/HCC)    • ED (erectile dysfunction)    • Essential hypertension    • YANELI (generalized anxiety disorder)    • History of obesity    • HLD (hyperlipidemia)    • Leukocytosis    • Morbid obesity (CMS/HCC)    • CAMERON (obstructive sleep apnea)     BiPAP nightly   • Osteoarthrosis    • Persistent atrial fibrillation (CMS/HCC)    • PONV (postoperative nausea and vomiting)    • Psoriasis    • Testosterone deficiency    • Type 2 diabetes mellitus (CMS/HCC)        Past Surgical History:   Procedure Laterality Date   • ANTERIOR CHANNEL VERTEBRECTOMY/CORPECTOMY Bilateral 11/27/2018    Procedure: C4, C5, C6 ANTERIOR CERVICAL CORPECTOMY;  Surgeon: Rashi  MD Chirag;  Location: [facility] OR;  Service: Neurosurgery   • BIOPSY / EXCISION / DISSECTION AXILLARY NODE      Lymph Node testing for cancer    • COLONOSCOPY      10+ years; normal   • NECK SURGERY  2018    C4/5/6 anterior cervical corpectomy   • PARATHYROIDECTOMY         Social History     Socioeconomic History   • Marital status:      Spouse name: Mindy   • Number of children: Not on file   • Years of education: Not on file   • Highest education level: Not on file   Tobacco Use   • Smoking status: Former Smoker     Packs/day: 1.00     Last attempt to quit:      Years since quittin.6   • Smokeless tobacco: Never Used   Substance and Sexual Activity   • Alcohol use: No     Comment: caffeine use: None   • Drug use: No       Family History   Problem Relation Age of Onset   • Anxiety disorder Mother    • Bipolar disorder Mother    • Depression Mother    • Glaucoma Mother    • Macular degeneration Mother    • Lupus Mother    • Aortic stenosis Mother         s/p TAVR at age 87   • Cancer Father    • Diabetes Father    • Heart disease Father    • Hypertension Father    • Stroke Father    • Lupus Other         grandmother       The following portion of the patient's history were reviewed and updated as appropriate: past medical history, past surgical history, past social history, past family history, allergies, current medications, and problem list.    Review of Systems   Constitution: Positive for malaise/fatigue. Negative for chills, diaphoresis, fever, night sweats, weight gain and weight loss.   HENT: Negative for hearing loss, nosebleeds, sore throat and tinnitus.    Eyes: Negative for blurred vision, double vision, pain and visual disturbance.   Cardiovascular: Positive for dyspnea on exertion. Negative for chest pain, claudication, cyanosis, irregular heartbeat, leg swelling, near-syncope, orthopnea, palpitations, paroxysmal nocturnal dyspnea and syncope.   Respiratory: Negative for  cough, hemoptysis, shortness of breath, snoring and wheezing.    Endocrine: Negative for cold intolerance, heat intolerance and polyuria.   Hematologic/Lymphatic: Negative for bleeding problem. Does not bruise/bleed easily.   Skin: Negative for color change, dry skin, flushing and itching.   Musculoskeletal: Negative for falls, joint pain, joint swelling, muscle cramps, muscle weakness and myalgias.   Gastrointestinal: Negative for abdominal pain, constipation, heartburn, melena, nausea and vomiting.   Genitourinary: Negative for dysuria and hematuria.   Neurological: Positive for light-headedness. Negative for excessive daytime sleepiness, dizziness, loss of balance, numbness, paresthesias, seizures and vertigo.   Psychiatric/Behavioral: Negative for altered mental status, depression, memory loss and substance abuse. The patient does not have insomnia and is not nervous/anxious.    Allergic/Immunologic: Negative for environmental allergies.       No Known Allergies      Current Outpatient Medications:   •  ACCU-CHEK AGNES PLUS test strip, Use as directed to test blood sugar tid E11.9, Disp: 100 each, Rfl: 11  •  atomoxetine (Strattera) 100 MG capsule, Take 1 capsule by mouth Daily., Disp: 90 capsule, Rfl: 1  •  atorvastatin (LIPITOR) 20 MG tablet, Take 1 tablet by mouth Daily., Disp: 90 tablet, Rfl: 3  •  Dulaglutide (Trulicity) 1.5 MG/0.5ML solution pen-injector, Inject 1.5 mg under the skin into the appropriate area as directed 1 (One) Time Per Week., Disp: 4 pen, Rfl: 2  •  DULoxetine (CYMBALTA) 60 MG capsule, Take 1 capsule by mouth Daily., Disp: 90 capsule, Rfl: 1  •  FIBER ADULT GUMMIES PO, Take 1 dose by mouth Daily., Disp: , Rfl:   •  glimepiride (AMARYL) 4 MG tablet, Take 1 tablet by mouth 2 (Two) Times a Day., Disp: 180 tablet, Rfl: 1  •  glucosamine-chondroitin 500-400 MG capsule capsule, Take 1 capsule by mouth Daily., Disp: , Rfl:   •  hydrOXYzine pamoate (VISTARIL) 25 MG capsule, Take 2 capsules by  "mouth At Night As Needed for Anxiety (insomnia)., Disp: 180 capsule, Rfl: 1  •  irbesartan (AVAPRO) 300 MG tablet, Take 1 tablet by mouth Every Morning., Disp: 90 tablet, Rfl: 3  •  metFORMIN ER (GLUCOPHAGE-XR) 500 MG 24 hr tablet, Take 2 tablets by mouth 2 (Two) Times a Day., Disp: 360 tablet, Rfl: 1  •  metoprolol succinate XL (TOPROL-XL) 50 MG 24 hr tablet, Take 1 tablet by mouth Every Night., Disp: 90 tablet, Rfl: 1  •  pioglitazone (ACTOS) 30 MG tablet, Take 1 tablet by mouth Daily., Disp: 90 tablet, Rfl: 1  •  rivaroxaban (XARELTO) 20 MG tablet, Take 1 tablet by mouth Daily With Dinner. Indications: Atrial Fibrillation, Disp: 90 tablet, Rfl: 3  •  tamsulosin (FLOMAX) 0.4 MG capsule 24 hr capsule, Take 1 capsule by mouth Daily., Disp: 90 capsule, Rfl: 1        Objective:     Vitals:    08/27/20 1135 08/27/20 1136   BP: 128/70 136/80   BP Location: Left arm Right arm   Pulse: 77    Weight: (!) 170 kg (373 lb 12.8 oz)    Height: 190.5 cm (75\")      Body mass index is 46.72 kg/m².    PHYSICAL EXAM:    Vitals Reviewed.   General Appearance: No acute distress, well developed and well nourished. Obese.   Eyes: Conjunctiva and lids: No erythema, swelling, or discharge. Sclera non-icteric.   HENT: Atraumatic, normocephalic. External eyes, ears, and nose normal. No hearing loss noted. Mucous membranes normal. Lips not cyanotic. Neck supple with no tenderness.  Respiratory: No signs of respiratory distress. Respiration rhythm and depth normal.   Clear to auscultation. No rales, crackles, rhonchi, or wheezing auscultated.   Cardiovascular:  Jugular Venous Pressure: Normal  Heart Rate and Rhythm: Irregularly, irregular, Heart Sounds: Normal S1 and S2. No S3 or S4 noted.  Murmurs: No murmurs noted. No rubs, thrills, or gallops.   Arterial Pulses: Carotid pulses normal. No carotid bruit noted. Posterior tibialis and dorsalis pedis pulses normal.   Lower Extremities: Bilateral trace lower extremity edema " noted.  Gastrointestinal:  Abdomen soft, non-distended, non-tender. Normal bowel sounds. No hepatomegaly.   Musculoskeletal: Normal movement of extremities  Skin and Nails: General appearance normal. No pallor, cyanosis, diaphoresis. Skin temperature normal. No clubbing of fingernails.   Psychiatric: Patient alert and oriented to person, place, and time. Speech and behavior appropriate. Normal mood and affect.       ECG 12 Lead  Date/Time: 8/27/2020 11:36 AM  Performed by: Theresa Henderson APRN  Authorized by: Theresa Henderson APRN   Comparison: compared with previous ECG from 2/26/2020  Similar to previous ECG  Rhythm: atrial fibrillation  Rate: normal  BPM: 77  Conduction: conduction normal  ST Segments: ST segments normal  T Waves: T waves normal  QRS axis: normal    Clinical impression: abnormal EKG  Comments:               Assessment:       Diagnosis Plan   1. Persistent atrial fibrillation (CMS/HCC)  ECG 12 Lead   2. Essential hypertension  irbesartan (AVAPRO) 300 MG tablet    ECG 12 Lead   3. Dilated cardiomyopathy (CMS/HCC)     4. Nonrheumatic aortic valve stenosis     5. CAMERON (obstructive sleep apnea)     6. Mixed hyperlipidemia     7. Morbid obesity (CMS/HCC)     8. Other fatigue            Plan:       1.    Persistent atrial Fibrillation: He remains in atrial fibrillation with controlled ventricular response.  He remains on rivaroxaban.  He is not felt to be a good candidate for cardioversion. He has a [id number] score of 2, putting him at high risk for thromboembolic event annually.    2.  Hypertension: Blood pressure better controlled.    3.  History of Dilated Cardiomyopathy: Echocardiogram 2/2020 revealed EF of 51%.  He has some trace lower extremity edema and chronic dyspnea which is unchanged.  Continue to monitor.    4. Aortic Stenosis: Bicuspid aortic valve.  Mild to moderate aortic stenosis per echocardiogram 11/2018 and echo 2/2020.  I did not appreciate a heart murmur today.      5.   Obstructive Sleep Apnea: Compliant with BiPAP machine.    6.  Hyperlipidemia: Remains on atorvastatin.     7.  Obesity: BMI is 46.7.  He would benefit from exercise, weight loss, and heart healthy/low-sodium diet.     8.    Fatigue: He remains fatigued.  He thinks it may be from the beta-blocker and he is currently taking 50 mg at nighttime.  I have suggested decreasing the metoprolol to 25 mg at nighttime x2 weeks to see if it makes a difference in his energy level.  He will send me a message through my chart.    9.  I recommend a six-month appointment with Dr. Bravo, unless otherwise needed sooner.    As always, it has been a pleasure to participate in your patient's care. Thank you.       Sincerely,       KAYLEN Wiseman  [facility] Cardiology      · COVID-19 Precautions - Patient was compliant in wearing a mask. When I saw the patient, I used appropriate personal protective equipment (PPE) including mask (standard procedure).  Additionally, I used gown, gloves, and eye shield if indicated.  Hand hygiene was completed before and after seeing the patient.  · Dictated utilizing Dragon Dictation

## 2020-09-09 DIAGNOSIS — F41.1 GAD (GENERALIZED ANXIETY DISORDER): ICD-10-CM

## 2020-09-11 ENCOUNTER — TELEPHONE (OUTPATIENT)
Dept: CARDIOLOGY | Facility: CLINIC | Age: 65
End: 2020-09-11

## 2020-09-11 DIAGNOSIS — I10 ESSENTIAL HYPERTENSION: ICD-10-CM

## 2020-09-11 RX ORDER — HYDROXYZINE PAMOATE 25 MG/1
50 CAPSULE ORAL NIGHTLY PRN
Qty: 180 CAPSULE | Refills: 1 | Status: SHIPPED | OUTPATIENT
Start: 2020-09-11 | End: 2020-11-12 | Stop reason: SDUPTHER

## 2020-09-11 RX ORDER — METOPROLOL SUCCINATE 50 MG/1
50 TABLET, EXTENDED RELEASE ORAL NIGHTLY
Qty: 90 TABLET | Refills: 1 | Status: SHIPPED | OUTPATIENT
Start: 2020-09-11 | End: 2021-03-02

## 2020-09-11 NOTE — TELEPHONE ENCOUNTER
Refilled. TK  
----- Message from Laura Costa CMA sent at 9/11/2020  3:17 PM EDT -----  Regarding: FW: Prescription Question  Contact: [phone]      ----- Message -----  From: Duane Mark Witten  Sent: 9/11/2020   3:13 PM EDT  To: Dilma Sewell [facility]  Subject: Prescription Question                            Theresa  I've been talking half my Metoprolol for a couple of weeks and their has no change in my weakness.  If you would send a script in to [facility] for the 50mg.  Thanks  
yes

## 2020-11-03 DIAGNOSIS — E11.9 CONTROLLED TYPE 2 DIABETES MELLITUS WITHOUT COMPLICATION, WITHOUT LONG-TERM CURRENT USE OF INSULIN (HCC): ICD-10-CM

## 2020-11-04 RX ORDER — DULAGLUTIDE 1.5 MG/.5ML
INJECTION, SOLUTION SUBCUTANEOUS
Qty: 12 PEN | Refills: 2 | Status: SHIPPED | OUTPATIENT
Start: 2020-11-04 | End: 2020-11-12 | Stop reason: SDUPTHER

## 2020-11-12 ENCOUNTER — OFFICE VISIT (OUTPATIENT)
Dept: FAMILY MEDICINE CLINIC | Facility: CLINIC | Age: 65
End: 2020-11-12

## 2020-11-12 VITALS
TEMPERATURE: 97.3 F | BODY MASS INDEX: 39.17 KG/M2 | WEIGHT: 315 LBS | SYSTOLIC BLOOD PRESSURE: 129 MMHG | HEART RATE: 83 BPM | OXYGEN SATURATION: 98 % | HEIGHT: 75 IN | DIASTOLIC BLOOD PRESSURE: 85 MMHG

## 2020-11-12 DIAGNOSIS — R53.83 FATIGUE, UNSPECIFIED TYPE: ICD-10-CM

## 2020-11-12 DIAGNOSIS — E55.9 VITAMIN D DEFICIENCY, UNSPECIFIED: ICD-10-CM

## 2020-11-12 DIAGNOSIS — F98.8 ATTENTION DEFICIT DISORDER (ADD) WITHOUT HYPERACTIVITY: ICD-10-CM

## 2020-11-12 DIAGNOSIS — R35.1 BENIGN PROSTATIC HYPERPLASIA WITH NOCTURIA: ICD-10-CM

## 2020-11-12 DIAGNOSIS — N40.1 BENIGN PROSTATIC HYPERPLASIA WITH NOCTURIA: ICD-10-CM

## 2020-11-12 DIAGNOSIS — E11.9 CONTROLLED TYPE 2 DIABETES MELLITUS WITHOUT COMPLICATION, WITHOUT LONG-TERM CURRENT USE OF INSULIN (HCC): Primary | ICD-10-CM

## 2020-11-12 DIAGNOSIS — I10 ESSENTIAL HYPERTENSION: ICD-10-CM

## 2020-11-12 DIAGNOSIS — F41.1 GAD (GENERALIZED ANXIETY DISORDER): ICD-10-CM

## 2020-11-12 DIAGNOSIS — E78.2 MIXED HYPERLIPIDEMIA: ICD-10-CM

## 2020-11-12 DIAGNOSIS — F32.5 MAJOR DEPRESSIVE DISORDER WITH SINGLE EPISODE, IN FULL REMISSION (HCC): ICD-10-CM

## 2020-11-12 PROCEDURE — 99214 OFFICE O/P EST MOD 30 MIN: CPT | Performed by: NURSE PRACTITIONER

## 2020-11-12 RX ORDER — TAMSULOSIN HYDROCHLORIDE 0.4 MG/1
1 CAPSULE ORAL DAILY
Qty: 90 CAPSULE | Refills: 1 | Status: SHIPPED | OUTPATIENT
Start: 2020-11-12 | End: 2021-06-08 | Stop reason: SDUPTHER

## 2020-11-12 RX ORDER — ATOMOXETINE 100 MG/1
100 CAPSULE ORAL DAILY
Qty: 90 CAPSULE | Refills: 1 | Status: SHIPPED | OUTPATIENT
Start: 2020-11-12 | End: 2021-07-21 | Stop reason: SDUPTHER

## 2020-11-12 RX ORDER — GLIMEPIRIDE 4 MG/1
4 TABLET ORAL 2 TIMES DAILY
Qty: 180 TABLET | Refills: 1 | Status: SHIPPED | OUTPATIENT
Start: 2020-11-12 | End: 2021-02-10 | Stop reason: SDUPTHER

## 2020-11-12 RX ORDER — DULOXETIN HYDROCHLORIDE 60 MG/1
60 CAPSULE, DELAYED RELEASE ORAL DAILY
Qty: 90 CAPSULE | Refills: 1 | Status: SHIPPED | OUTPATIENT
Start: 2020-11-12 | End: 2020-12-29 | Stop reason: SDUPTHER

## 2020-11-12 RX ORDER — DULAGLUTIDE 1.5 MG/.5ML
1.5 INJECTION, SOLUTION SUBCUTANEOUS WEEKLY
Qty: 12 PEN | Refills: 2 | Status: SHIPPED | OUTPATIENT
Start: 2020-11-12 | End: 2021-07-30 | Stop reason: SDUPTHER

## 2020-11-12 RX ORDER — HYDROXYZINE PAMOATE 25 MG/1
50 CAPSULE ORAL NIGHTLY PRN
Qty: 180 CAPSULE | Refills: 1 | Status: SHIPPED | OUTPATIENT
Start: 2020-11-12 | End: 2020-12-29

## 2020-11-12 RX ORDER — PIOGLITAZONEHYDROCHLORIDE 30 MG/1
30 TABLET ORAL DAILY
Qty: 90 TABLET | Refills: 1 | Status: SHIPPED | OUTPATIENT
Start: 2020-11-12 | End: 2021-06-03

## 2020-11-12 RX ORDER — IRBESARTAN 300 MG/1
300 TABLET ORAL EVERY MORNING
Qty: 90 TABLET | Refills: 3 | Status: SHIPPED | OUTPATIENT
Start: 2020-11-12 | End: 2021-12-30 | Stop reason: SDUPTHER

## 2020-11-12 RX ORDER — BLOOD SUGAR DIAGNOSTIC
STRIP MISCELLANEOUS
Qty: 100 EACH | Refills: 11 | Status: SHIPPED | OUTPATIENT
Start: 2020-11-12 | End: 2022-08-26

## 2020-11-12 RX ORDER — ATORVASTATIN CALCIUM 20 MG/1
20 TABLET, FILM COATED ORAL DAILY
Qty: 90 TABLET | Refills: 3 | Status: SHIPPED | OUTPATIENT
Start: 2020-11-12 | End: 2022-02-08 | Stop reason: SDUPTHER

## 2020-11-12 RX ORDER — METFORMIN HYDROCHLORIDE 500 MG/1
1000 TABLET, EXTENDED RELEASE ORAL 2 TIMES DAILY
Qty: 360 TABLET | Refills: 1 | Status: SHIPPED | OUTPATIENT
Start: 2020-11-12 | End: 2021-02-10 | Stop reason: SDUPTHER

## 2020-11-12 NOTE — PROGRESS NOTES
Subjective   Duane Mark Witten is a 65 y.o. male.     Chief Complaint   Patient presents with   • Diabetes   • Hyperlipidemia   • Med Refill       History of Present Illness   Diabetes Mellitus Type II, Follow-up: Patient here for follow-up of Type 2 diabetes mellitus. Symptoms: none. Home monitoring: The patient is checking blood sugars at home. Medications: The patient is adherent with their medication regimen. Medication(s): Trulicity, glimepiride, metformin and Actos . Due for: A1C and urine microalbumin. Patient is not currently exercising.    Hyperlipidemia: Symptoms: none. Medications:The patient is adherent with their medication regimen. Medication(s): statin. His last labs showed total cholesterol 133, HDL 36, LDL 79,  triglycerides 92. The patient is due for nothing at this time.    Hypertension: Patient here for follow-up of essential hypertension. Blood pressure is well controlled at home.He is not exercising and is adherent to low salt diet. Home monitoring: The patient is not checking blood pressure at home. Symptoms: none. Medications: The patient is adherent with their medication regimen. Medication(s): ARB and Beta Blocker. The patient is due for nothing at this time.    Depression: Patient states their depression has remained stable since the last visit. Interval symptoms: none. Social support: The patient has good social support. The patient is adherent with their medication regimen. Medication(s): SNRI.     The following portions of the patient's history were reviewed and updated as appropriate: allergies, current medications, past family history, past medical history, past social history, past surgical history and problem list.    Past Medical History:   Diagnosis Date   • ADD (attention deficit disorder)    • Anemia     intermittent   • Atrial fibrillation (CMS/HCC)    • Depression    • Dilated cardiomyopathy (CMS/HCC)    • ED (erectile dysfunction)    • Essential hypertension    • YANLEI  (generalized anxiety disorder)    • History of obesity    • HLD (hyperlipidemia)    • Leukocytosis    • Morbid obesity (CMS/HCC)    • CAMERON (obstructive sleep apnea)     BiPAP nightly   • Osteoarthrosis    • Persistent atrial fibrillation (CMS/HCC)    • PONV (postoperative nausea and vomiting)    • Psoriasis    • Testosterone deficiency    • Type 2 diabetes mellitus (CMS/HCC)        Past Surgical History:   Procedure Laterality Date   • ANTERIOR CHANNEL VERTEBRECTOMY/CORPECTOMY Bilateral 2018    Procedure: C4, C5, C6 ANTERIOR CERVICAL CORPECTOMY;  Surgeon: Chirag Markham MD;  Location: [facility]  Service: Neurosurgery   • BIOPSY / EXCISION / DISSECTION AXILLARY NODE      Lymph Node testing for cancer    • COLONOSCOPY      10+ years; normal   • NECK SURGERY  2018    C4/5/6 anterior cervical corpectomy   • PARATHYROIDECTOMY         Family History   Problem Relation Age of Onset   • Anxiety disorder Mother    • Bipolar disorder Mother    • Depression Mother    • Glaucoma Mother    • Macular degeneration Mother    • Lupus Mother    • Aortic stenosis Mother         s/p TAVR at age 87   • Cancer Father    • Diabetes Father    • Heart disease Father    • Hypertension Father    • Stroke Father    • Lupus Other         grandmother       Social History     Socioeconomic History   • Marital status:      Spouse name: Mindy   • Number of children: Not on file   • Years of education: Not on file   • Highest education level: Not on file   Tobacco Use   • Smoking status: Former Smoker     Packs/day: 1.00     Quit date:      Years since quittin.8   • Smokeless tobacco: Never Used   Substance and Sexual Activity   • Alcohol use: No     Comment: caffeine use: None   • Drug use: No       Review of Systems   Constitutional: Positive for fatigue.   Eyes: Negative for visual disturbance.   Cardiovascular: Negative for chest pain.   Endocrine: Negative for polydipsia and polyuria.  "  Psychiatric/Behavioral: Negative for suicidal ideas and depressed mood.       Objective   Vitals:    11/12/20 1305   BP: 129/85   Pulse: 83   Temp: 97.3 °F (36.3 °C)   TempSrc: Temporal   SpO2: 98%   Weight: (!) 179 kg (395 lb 6.4 oz)   Height: 190.5 cm (75\")      Body mass index is 49.42 kg/m².  Physical Exam  Vitals signs and nursing note reviewed.   Constitutional:       Appearance: Normal appearance.   Cardiovascular:      Rate and Rhythm: Normal rate and regular rhythm.   Pulmonary:      Effort: Pulmonary effort is normal.      Breath sounds: Normal breath sounds.   Neurological:      Mental Status: He is alert and oriented to person, place, and time.   Psychiatric:         Mood and Affect: Mood normal.           Assessment/Plan   Diagnoses and all orders for this visit:    1. Controlled type 2 diabetes mellitus without complication, without long-term current use of insulin (CMS/[facility]) (Primary)  -     Hemoglobin A1c  -     Microalbumin / Creatinine Urine Ratio - Urine, Clean Catch  -     Accu-Chek Leilani Plus test strip; Use as directed to test blood sugar tid E11.9  Dispense: 100 each; Refill: 11  -     glimepiride (AMARYL) 4 MG tablet; Take 1 tablet by mouth 2 (Two) Times a Day.  Dispense: 180 tablet; Refill: 1  -     metFORMIN ER (GLUCOPHAGE-XR) 500 MG 24 hr tablet; Take 2 tablets by mouth 2 (Two) Times a Day.  Dispense: 360 tablet; Refill: 1  -     pioglitazone (ACTOS) 30 MG tablet; Take 1 tablet by mouth Daily.  Dispense: 90 tablet; Refill: 1  -     Dulaglutide (Trulicity) 1.5 MG/0.5ML solution pen-injector; Inject 1.5 mg under the skin into the appropriate area as directed 1 (One) Time Per Week.  Dispense: 12 pen; Refill: 2    2. Fatigue, unspecified type  -     Vitamin D 25 Hydroxy  -     TSH Rfx On Abnormal To Free T4  -     Vitamin B12    3. Mixed hyperlipidemia  -     atorvastatin (LIPITOR) 20 MG tablet; Take 1 tablet by mouth Daily.  Dispense: 90 tablet; Refill: 3    4. Essential hypertension  -     " irbesartan (AVAPRO) 300 MG tablet; Take 1 tablet by mouth Every Morning.  Dispense: 90 tablet; Refill: 3    5. Major depressive disorder with single episode, in full remission (CMS/HCC)  Comments:  - ER if any SI/HI.   Orders:  -     DULoxetine (CYMBALTA) 60 MG capsule; Take 1 capsule by mouth Daily.  Dispense: 90 capsule; Refill: 1    6. YANELI (generalized anxiety disorder)  -     DULoxetine (CYMBALTA) 60 MG capsule; Take 1 capsule by mouth Daily.  Dispense: 90 capsule; Refill: 1  -     hydrOXYzine pamoate (VISTARIL) 25 MG capsule; Take 2 capsules by mouth At Night As Needed for Anxiety (insomnia).  Dispense: 180 capsule; Refill: 1    7. Attention deficit disorder (ADD) without hyperactivity  -     atomoxetine (Strattera) 100 MG capsule; Take 1 capsule by mouth Daily.  Dispense: 90 capsule; Refill: 1    8. Benign prostatic hyperplasia with nocturia  -     tamsulosin (FLOMAX) 0.4 MG capsule 24 hr capsule; Take 1 capsule by mouth Daily.  Dispense: 90 capsule; Refill: 1    9. Vitamin D deficiency, unspecified   -     Vitamin D 25 Hydroxy

## 2020-11-13 LAB
25(OH)D3+25(OH)D2 SERPL-MCNC: 31.5 NG/ML (ref 30–100)
ALBUMIN/CREAT UR: 17 MG/G CREAT (ref 0–29)
CREAT UR-MCNC: 135.7 MG/DL
HBA1C MFR BLD: 6.9 % (ref 4.8–5.6)
MICROALBUMIN UR-MCNC: 23.6 UG/ML
TSH SERPL DL<=0.005 MIU/L-ACNC: 2.66 UIU/ML (ref 0.27–4.2)
VIT B12 SERPL-MCNC: 628 PG/ML (ref 211–946)

## 2020-12-08 ENCOUNTER — HOSPITAL ENCOUNTER (OUTPATIENT)
Dept: GENERAL RADIOLOGY | Facility: HOSPITAL | Age: 65
Discharge: HOME | End: 2020-12-08
Admitting: NEUROLOGICAL SURGERY

## 2020-12-08 DIAGNOSIS — M47.12 CERVICAL SPONDYLOSIS WITH MYELOPATHY: ICD-10-CM

## 2020-12-08 DIAGNOSIS — Z98.1 HISTORY OF SPINAL FUSION: ICD-10-CM

## 2020-12-08 PROCEDURE — 72050 X-RAY EXAM NECK SPINE 4/5VWS: CPT

## 2020-12-08 NOTE — PROGRESS NOTES
Subjective   History of Present Illness: Duane Mark Witten is a 65 y.o. male is here today for follow-up with a new Cervical XR.  History of C4/5/6 anterior cervical corpectomy with C3-7 anterior fusion 11/27/2018 for severe cervical stenosis/myelopathy.    Patient is wearing a mask in our office today.     History of Present Illness  Patient continues to have difficulty with using his hands as well as balance and gait issues. He has improved but persistent numbness and tingling of hands as compared to preop. He has difficulty with dexterity. He has difficulty typing and strumming guitar. He tries to exercise everyday by walking up and down stairs but he states that he runs out of energy. His legs fatigue. He continues to have intermittent episodes of bowel and bladder incontinence- has urgency but cannot get to bathroom in time. He has difficulty with bathing alone (cannot shower without help due to imbalance). He requires assistance with lower body dressing- if he leans forward to tie shoes, he will fall forward. He uses recumbent bike 4-5 times per week. He has tried to push himself to do more, but cannot pass a certain intensity. He has difficulty carrying weight greater than a gallon of milk.  Prior to surgery he was unable to lift his arms higher than shoulder level.  He can lift them to his head but they fatigue quickly. He is unable to enjoy previous hobbies-boating as he does not have strength or stamina to hook up boat. He has been attempting to get SSI disability but has been unable.  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  His job requires frequent computer use.  He is a non-smoker.      The following portions of the patient's history were reviewed and updated as appropriate: allergies, current medications, past surgical history and problem list.    Review of Systems   Constitutional: Positive for fatigue.   Respiratory: Negative for chest tightness and shortness of breath.   "  Cardiovascular: Negative for chest pain.   Gastrointestinal:        Intermittent bowel incontinence   Genitourinary: Positive for enuresis ( Urge incontinence) and urgency.   Musculoskeletal: Positive for gait problem, myalgias and neck pain.   Neurological: Positive for weakness and numbness.        Positive for tingling       Objective     Vitals:    12/09/20 1146   BP: 142/80   Pulse: 80   Temp: 97.1 °F (36.2 °C)   Weight: (!) 179 kg (395 lb)   Height: 190.5 cm (75\")     Body mass index is 49.37 kg/m².      Physical Exam  Vitals signs reviewed.   Constitutional:       Comments: Body mass index is 49.37 kg/m².     Neck:      Musculoskeletal: Neck supple.      Comments: Well-healed right anterior incision  Pulmonary:      Effort: Pulmonary effort is normal.   Musculoskeletal:      Right shoulder: He exhibits decreased range of motion and decreased strength. He exhibits no pain.      Left shoulder: He exhibits decreased range of motion and decreased strength. He exhibits no pain.      Cervical back: He exhibits decreased range of motion.   Skin:     General: Skin is warm and dry.   Neurological:      General: No focal deficit present.      Mental Status: He is alert.   Psychiatric:         Attention and Perception: Attention normal.         Speech: Speech normal.         Behavior: Behavior normal.         Cognition and Memory: Cognition normal.      Comments:   Slightly emotional at times discussing his activity and employment limitations.       Neurologic Exam     Mental Status   Speech: speech is normal   Level of consciousness: alert  Knowledge: good.   Normal comprehension.     Motor Exam   Muscle bulk: normal    Strength   Right deltoid: 4/5  Left deltoid: 4/5  Right biceps: 5/5  Left biceps: 5/5  Right triceps: 4/5  Left triceps: 4/5  Right wrist flexion: 4/5  Left wrist flexion: 4/5  Right wrist extension: 4/5  Left wrist extension: 4/5  Right interossei: 4/5  Right iliopsoas: 5/5  Left iliopsoas: " 5/5  Right anterior tibial: 5/5  Left anterior tibial: 5/5   strength 4/5.  Pincer strength bilateral thumb and index finger 4/5     Sensory Exam   Right arm light touch: decreased from wrist  Left arm light touch: decreased from wrist  Right arm pinprick: decreased from wrist  Left arm pinprick: decreased from wrist    Gait, Coordination, and Reflexes     Gait  Gait: wide-based (Mildly ataxic.  straight cane.  Slight imbalance upon initial standing but able to correct on her own.)    Reflexes   Right Up: absent  Left Up: absent  Decreased rapid alternating movements bilateral hands       Assessment/Plan   Independent Review of Radiographic Studies:      I personally reviewed the images from the following studies.    Cervical x-rays reveal postoperative changes C3-7 with instrumentation well-positioned no complicating features.  There is at least partial fusion at all levels there is further evidence of fusion particularly at C5/6 as compared to the prior study.  No abnormal motion with flexion-extension.    Medical Decision Making:    Patient presents for follow-up now 2 years following multilevel corpectomy and fusion of the cervical spine for severe cervical stenosis with myelopathy.  He made significant gains in strength and mobility following surgery, but he is left with persistent deficits of sensory changes and motor weakness particularly in his upper extremities.  He has reduced dexterity in his hands which limits his pincer  and finger movements. Patient requires use of cane for stability.  He is frustrated with his lack of progress in the last year but has not had any worsening symptoms.  He is also frustrated that he is unable to work due to his hand weakness, extremity fatigue as well as other confounding factors including intermittent bowel and bladder incontinence and mobility difficulties.  He has been trying to obtain Social Security disability but has been limited in progress.  He  has obtained an  for assistance.  Spoke with Dr. Markham regarding the patient's concerns.  From neurosurgical standpoint he is stable and is likely to make further progress with regard to his dexterity, strength, sensory issues.  His recent x-rays are acceptable with no concerning findings.  I do not see how the patient would be able to return to his prior employment with meaningful ability.  Dr. Markham will see the patient in close follow-up to evaluate for this particular issue as well.      Diagnoses and all orders for this visit:    1. Cervical myelopathy (CMS/HCC) (Primary)    2. Paresthesia of both upper extremities    3. Bilateral upper extremity weakness      Return in about 2 weeks (around 12/23/2020) for Follow-up with Dr. Markham for discussion of disability issues.    Time: 45 minutes spent with patient.  35 minutes of that in counseling regarding his mobility issues, Social Security disability concerns

## 2020-12-09 ENCOUNTER — OFFICE VISIT (OUTPATIENT)
Dept: NEUROSURGERY | Facility: CLINIC | Age: 65
End: 2020-12-09

## 2020-12-09 VITALS
BODY MASS INDEX: 39.17 KG/M2 | HEART RATE: 80 BPM | HEIGHT: 75 IN | SYSTOLIC BLOOD PRESSURE: 142 MMHG | TEMPERATURE: 97.1 F | DIASTOLIC BLOOD PRESSURE: 80 MMHG | WEIGHT: 315 LBS

## 2020-12-09 DIAGNOSIS — R20.2 PARESTHESIA AND PAIN OF BOTH UPPER EXTREMITIES: ICD-10-CM

## 2020-12-09 DIAGNOSIS — G95.9 CERVICAL MYELOPATHY (HCC): Primary | ICD-10-CM

## 2020-12-09 DIAGNOSIS — M79.601 PARESTHESIA AND PAIN OF BOTH UPPER EXTREMITIES: ICD-10-CM

## 2020-12-09 DIAGNOSIS — M79.602 PARESTHESIA AND PAIN OF BOTH UPPER EXTREMITIES: ICD-10-CM

## 2020-12-09 DIAGNOSIS — R29.898 UPPER EXTREMITY WEAKNESS: ICD-10-CM

## 2020-12-09 PROCEDURE — 99214 OFFICE O/P EST MOD 30 MIN: CPT | Performed by: NURSE PRACTITIONER

## 2020-12-16 ENCOUNTER — OFFICE VISIT (OUTPATIENT)
Dept: NEUROSURGERY | Facility: CLINIC | Age: 65
End: 2020-12-16

## 2020-12-16 VITALS
HEIGHT: 75 IN | TEMPERATURE: 97.8 F | WEIGHT: 315 LBS | BODY MASS INDEX: 39.17 KG/M2 | SYSTOLIC BLOOD PRESSURE: 154 MMHG | HEART RATE: 84 BPM | DIASTOLIC BLOOD PRESSURE: 84 MMHG

## 2020-12-16 DIAGNOSIS — Z98.1 HISTORY OF SPINAL FUSION: ICD-10-CM

## 2020-12-16 DIAGNOSIS — G95.9 CERVICAL MYELOPATHY (HCC): Primary | ICD-10-CM

## 2020-12-16 DIAGNOSIS — R26.9 ABNORMALITY OF GAIT: ICD-10-CM

## 2020-12-16 PROCEDURE — 99213 OFFICE O/P EST LOW 20 MIN: CPT | Performed by: NEUROLOGICAL SURGERY

## 2020-12-16 NOTE — PROGRESS NOTES
Subjective   Patient ID: Duane Mark Witten is a 65 y.o. male is here today for follow-up.    Patient was last seen 12.09.2020 by Eden ABDULLAHI for trouble using his hands, N/T bilateral hands, and balance and gait issues.    Patient reports today that he has aching in bilateral thumbs. Patient reports that he is unable to lift things. He states that he gets his arm up so far and then it feels like it doesn't want to work anymore.     Patient reports that his balance is still off. He reports that he is not able to walk without his cane. Patient states that when he Is sitting to put his socks on, he will sometimes fall out of the chair.    Patient reports that he has been experiencing some bowel and bladder incontinence.    It has been 2 years since I did an anterior cervical corpectomy at C4, C5, and C6, with a cage and plate from C3 to C7. He was profoundly myelopathic and nearly completely paralyzed at the time. He has made improvements but not unexpectedly, there are some residual symptoms including weakness, gait ataxia, unsteadiness, lack of use of the hands, balance problems, all resulting from long-term cervical myelopathy. I doubt that there is going to be much more improvement after 2 years. He still was not able to get long-term disability or Social Security disability which amazes me. He is not capable of gainful employment nor what I want him to be in a situation where he might fall and injure himself. He is at risk for adjacent level disease above and below his fusion site. He asked me if I could fill out paperwork for his effort to get Social Security disability and long-term disability. I told him I would take care of that. Apparently he was seen for what sounds like an ZOYA by a chiropractor who was assigned by the [facility]. We will see him in 1 year with x-rays and I will fill out the paperwork as described above.       Extremity Weakness   The pain is present in the left hand, right hand, right  "fingers and left fingers. This is a chronic problem. The problem occurs constantly. The problem has been unchanged. The quality of the pain is described as aching (B/L thumbs). The pain is at a severity of 4/10. The pain is mild. Associated symptoms include numbness and tingling. Pertinent negatives include no fever, joint locking or joint swelling. The treatment provided mild relief. Family history does not include gout or rheumatoid arthritis. There is no history of diabetes or osteoarthritis.       The following portions of the patient's history were reviewed and updated as appropriate: allergies, current medications, past family history, past medical history, past social history, past surgical history and problem list.    Review of Systems   Constitutional: Negative for chills and fever.   HENT: Negative for congestion.    Genitourinary: Negative for difficulty urinating and dysuria.   Musculoskeletal: Positive for extremity weakness, gait problem, neck pain and neck stiffness. Negative for back pain.   Neurological: Positive for tingling, weakness and numbness.   All other systems reviewed and are negative.          Objective     Vitals:    12/16/20 1308   BP: 154/84   Cuff Size: Large Adult   Pulse: 84   Temp: 97.8 °F (36.6 °C)   Weight: (!) 179 kg (395 lb)   Height: 190.5 cm (75\")     Body mass index is 49.37 kg/m².      Physical Exam  Constitutional:       Appearance: He is well-developed.   HENT:      Head: Normocephalic and atraumatic.   Eyes:      Extraocular Movements: EOM normal.      Conjunctiva/sclera: Conjunctivae normal.      Pupils: Pupils are equal, round, and reactive to light.      Funduscopic exam:     Right eye: No papilledema.         Left eye: No papilledema.   Neck:      Vascular: No carotid bruit.   Neurological:      Mental Status: He is oriented to person, place, and time.      Coordination: Finger-Nose-Finger Test and Heel to Shin Test normal.      Gait: Gait is intact.      Deep Tendon " Reflexes:      Reflex Scores:       Tricep reflexes are 4+ on the right side and 4+ on the left side.       Bicep reflexes are 4+ on the right side and 4+ on the left side.       Brachioradialis reflexes are 3+ on the right side and 3+ on the left side.       Patellar reflexes are 4+ on the right side and 4+ on the left side.       Achilles reflexes are 2+ on the right side and 2+ on the left side.  Psychiatric:         Speech: Speech normal.       Neurologic Exam     Mental Status   Oriented to person, place, and time.   Registration of memory: Good recent and remote memory.   Attention: normal. Concentration: normal.   Speech: speech is normal   Level of consciousness: alert  Knowledge: consistent with education.     Cranial Nerves     CN II   Visual fields full to confrontation.   Visual acuity: normal    CN III, IV, VI   Pupils are equal, round, and reactive to light.  Extraocular motions are normal.     CN V   Facial sensation intact.   Right corneal reflex: normal  Left corneal reflex: normal    CN VII   Facial expression full, symmetric.   Right facial weakness: none  Left facial weakness: none    CN VIII   Hearing: intact    CN IX, X   Palate: symmetric    CN XI   Right sternocleidomastoid strength: normal  Left sternocleidomastoid strength: normal    CN XII   Tongue: not atrophic  Tongue deviation: none    Motor Exam   Muscle bulk: normal  Right arm tone: normal  Left arm tone: normal  Right leg tone: normal  Left leg tone: normal    Strength   Strength 5/5 except as noted.   Right neck flexion: 4/5  Left neck flexion: 4/5  Right neck extension: 4/5  Left neck extension: 4/5  Right deltoid: 4/5  Left deltoid: 4/5  Right biceps: 4/5  Left biceps: 4/5  Right triceps: 4/5  Left triceps: 4/5  Right wrist flexion: 3/5  Left wrist flexion: 3/5  Right wrist extension: 3/5  Left wrist extension: 3/5  Right interossei: 3/5  Left interossei: 3/5  Right abdominals: 4/5  Left abdominals: 4/5  Right iliopsoas: 4/5  Left  iliopsoas: 4/5  Right quadriceps: 4/5  Left quadriceps: 4/5  Right hamstrin/5  Left hamstrin/5  Right glutei: 4/5  Left glutei: 4/5  Right anterior tibial: 3/5  Left anterior tibial: 3/5  Right posterior tibial: 3/5  Left posterior tibial: 3/5  Right peroneal: 3/5  Left peroneal: 3/5  Right gastroc: 3/5  Left gastroc: 3/5    Sensory Exam   Light touch normal.     Gait, Coordination, and Reflexes     Gait  Gait: normal    Coordination   Finger to nose coordination: normal  Heel to shin coordination: normal    Reflexes   Right brachioradialis: 3+  Left brachioradialis: 3+  Right biceps: 4+  Left biceps: 4+  Right triceps: 4+  Left triceps: 4+  Right patellar: 4+  Left patellar: 4+  Right achilles: 2+  Left achilles: 2+  Right : 2+  Left : 2+  Right plantar: upgoing  Left plantar: upgoing  Right Up: present  Left Up: presentAtaxic, unsteady gait           Assessment/Plan   Independent Review of Radiographic Studies:      I personally reviewed the images from the following studies.    Plain x-rays done on 2020 show stable position of the cage and the plate from C3 3 to C7.  Agree with the report.        Medical Decision Making:      I will fill out myself the disability paperwork and send it back to him.  We will see him in 1 year with another set of x-rays.      Diagnoses and all orders for this visit:    1. Cervical myelopathy (CMS/HCC) (Primary)  -     XR spine cervical ap and lat w flex and ext; Future    2. History of spinal fusion  -     XR spine cervical ap and lat w flex and ext; Future    3. Abnormality of gait  -     XR spine cervical ap and lat w flex and ext; Future      Return in about 1 year (around 2021) for Face-to-face.

## 2020-12-29 ENCOUNTER — OFFICE VISIT (OUTPATIENT)
Dept: FAMILY MEDICINE CLINIC | Facility: CLINIC | Age: 65
End: 2020-12-29

## 2020-12-29 VITALS
WEIGHT: 315 LBS | TEMPERATURE: 97.1 F | BODY MASS INDEX: 39.17 KG/M2 | SYSTOLIC BLOOD PRESSURE: 122 MMHG | OXYGEN SATURATION: 99 % | HEART RATE: 76 BPM | HEIGHT: 75 IN | DIASTOLIC BLOOD PRESSURE: 74 MMHG

## 2020-12-29 DIAGNOSIS — Z91.81 AT HIGH RISK FOR FALLS: ICD-10-CM

## 2020-12-29 DIAGNOSIS — Z12.12 SCREENING FOR COLORECTAL CANCER: ICD-10-CM

## 2020-12-29 DIAGNOSIS — Z00.00 WELCOME TO MEDICARE PREVENTIVE VISIT: Primary | ICD-10-CM

## 2020-12-29 DIAGNOSIS — Z12.11 SCREENING FOR COLORECTAL CANCER: ICD-10-CM

## 2020-12-29 DIAGNOSIS — F41.1 GAD (GENERALIZED ANXIETY DISORDER): ICD-10-CM

## 2020-12-29 DIAGNOSIS — F32.5 MAJOR DEPRESSIVE DISORDER WITH SINGLE EPISODE, IN FULL REMISSION (HCC): ICD-10-CM

## 2020-12-29 PROCEDURE — G0402 INITIAL PREVENTIVE EXAM: HCPCS | Performed by: NURSE PRACTITIONER

## 2020-12-29 RX ORDER — CETIRIZINE HYDROCHLORIDE 10 MG/1
10 TABLET ORAL DAILY
Qty: 90 TABLET | Refills: 0 | Status: SHIPPED | OUTPATIENT
Start: 2020-12-29 | End: 2021-03-23

## 2020-12-29 RX ORDER — DULOXETIN HYDROCHLORIDE 60 MG/1
60 CAPSULE, DELAYED RELEASE ORAL 2 TIMES DAILY
Qty: 180 CAPSULE | Refills: 0 | Status: SHIPPED | OUTPATIENT
Start: 2020-12-29 | End: 2021-03-22 | Stop reason: SDUPTHER

## 2020-12-29 NOTE — PATIENT INSTRUCTIONS

## 2020-12-29 NOTE — PROGRESS NOTES
The ABCs of the Annual Wellness Visit  [facility]    Chief Complaint   Patient presents with   • Medicare Wellness-Initial Visit       Subjective   History of Present Illness:  Duane Mark Witten is a 65 y.o. male who [facility]  [facility].    HEALTH RISK ASSESSMENT    Recent Hospitalizations:  No hospitalization(s) within the last year.    Current Medical Providers:  Patient Care Team:  Marly Myrick APRN as PCP - General (Family Medicine)  Miladis Colbert APRN as Referring Physician (Family Medicine)  Angelo Lozano II, MD as Consulting Physician (Hematology and Oncology)    Smoking Status:  Social History     Tobacco Use   Smoking Status Former Smoker   • Packs/day: 1.00   • Quit date:    • Years since quittin.0   Smokeless Tobacco Never Used       Alcohol Consumption:  Social History     Substance and Sexual Activity   Alcohol Use No    Comment: caffeine use: None       Depression Screen:   PHQ-2/PHQ-9 Depression Screening 2020   Little interest or pleasure in doing things 3   Feeling down, depressed, or hopeless 0   Trouble falling or staying asleep, or sleeping too much 3   Feeling tired or having little energy 3   Poor appetite or overeating 0   Feeling bad about yourself - or that you are a failure or have let yourself or your family down 0   Trouble concentrating on things, such as reading the newspaper or watching television 2   Moving or speaking so slowly that other people could have noticed. Or the opposite - being so fidgety or restless that you have been moving around a lot more than usual 1   Thoughts that you would be better off dead, or of hurting yourself in some way 0   Total Score 12   If you checked off any problems, how difficult have these problems made it for you to do your work, take care of things at home, or get along with other people? Extremely dIfficult       Fall Risk Screen:  STEADI Fall Risk Assessment was completed, and  patient is at HIGH risk for falls. Assessment completed on:12/29/2020    Health Habits and Functional and Cognitive Screening:  Functional & Cognitive Status 12/29/2020   Do you have difficulty preparing food and eating? Yes   Do you have difficulty bathing yourself, getting dressed or grooming yourself? Yes   Do you have difficulty using the toilet? Yes   Do you have difficulty moving around from place to place? Yes   Do you have trouble with steps or getting out of a bed or a chair? Yes   Current Diet Well Balanced Diet   Dental Exam Up to date   Eye Exam Up to date   Exercise (times per week) 4 times per week   Current Exercise Activities Include Walking   Do you need help using the phone?  No   Are you deaf or do you have serious difficulty hearing?  Yes   Do you need help with transportation? Yes   Do you need help shopping? Yes   Do you need help preparing meals?  Yes   Do you need help with housework?  Yes   Do you need help with laundry? Yes   Do you need help taking your medications? Yes   Do you need help managing money? No   Do you ever drive or ride in a car without wearing a seat belt? No   Have you felt unusual stress, anger or loneliness in the last month? No   Who do you live with? Spouse   If you need help, do you have trouble finding someone available to you? No   Do you have difficulty concentrating, remembering or making decisions? Yes         Does the patient have evidence of cognitive impairment? No    Asprin use counseling:Does not need ASA (and currently is not on it)    Visual Acuity:     Visual Acuity Screening    Right eye Left eye Both eyes   Without correction:      With correction: 20/25 20/40 20/25       Age-appropriate Screening Schedule:  Refer to the list below for future screening recommendations based on patient's age, sex and/or medical conditions. Orders for these recommended tests are listed in the plan section. The patient has been provided with a written plan.    Health  Maintenance   Topic Date Due   • COLONOSCOPY  1955   • DIABETIC EYE EXAM  12/19/2020   • HEMOGLOBIN A1C  05/12/2021   • LIPID PANEL  08/18/2021   • URINE MICROALBUMIN  11/12/2021   • DIABETIC FOOT EXAM  12/29/2021   • TDAP/TD VACCINES (2 - Td) 10/29/2029   • INFLUENZA VACCINE  Completed   • ZOSTER VACCINE  Completed          The following portions of the patient's history were reviewed and updated as appropriate: allergies, current medications, past family history, past medical history, past social history, past surgical history and problem list.    Outpatient Medications Prior to Visit   Medication Sig Dispense Refill   • Accu-Chek Leilani Plus test strip Use as directed to test blood sugar tid E11.9 100 each 11   • atomoxetine (Strattera) 100 MG capsule Take 1 capsule by mouth Daily. 90 capsule 1   • atorvastatin (LIPITOR) 20 MG tablet Take 1 tablet by mouth Daily. 90 tablet 3   • Dulaglutide (Trulicity) 1.5 MG/0.5ML solution pen-injector Inject 1.5 mg under the skin into the appropriate area as directed 1 (One) Time Per Week. 12 pen 2   • glimepiride (AMARYL) 4 MG tablet Take 1 tablet by mouth 2 (Two) Times a Day. 180 tablet 1   • glucosamine-chondroitin 500-400 MG capsule capsule Take 1 capsule by mouth Daily.     • irbesartan (AVAPRO) 300 MG tablet Take 1 tablet by mouth Every Morning. 90 tablet 3   • metFORMIN ER (GLUCOPHAGE-XR) 500 MG 24 hr tablet Take 2 tablets by mouth 2 (Two) Times a Day. 360 tablet 1   • metoprolol succinate XL (TOPROL-XL) 50 MG 24 hr tablet Take 1 tablet by mouth Every Night. 90 tablet 1   • pioglitazone (ACTOS) 30 MG tablet Take 1 tablet by mouth Daily. 90 tablet 1   • rivaroxaban (XARELTO) 20 MG tablet Take 1 tablet by mouth Daily With Dinner. Indications: Atrial Fibrillation 90 tablet 3   • tamsulosin (FLOMAX) 0.4 MG capsule 24 hr capsule Take 1 capsule by mouth Daily. 90 capsule 1   • DULoxetine (CYMBALTA) 60 MG capsule Take 1 capsule by mouth Daily. 90 capsule 1   • hydrOXYzine  pamoate (VISTARIL) 25 MG capsule Take 2 capsules by mouth At Night As Needed for Anxiety (insomnia). 180 capsule 1   • FIBER ADULT GUMMIES PO Take 1 dose by mouth Daily.       No facility-administered medications prior to visit.        Patient Active Problem List   Diagnosis   • YANELI (generalized anxiety disorder)   • ADD (attention deficit disorder)   • Depression   • Diabetes type 2, controlled (CMS/[facility])   • ED (erectile dysfunction) of non-organic origin   • HLD (hyperlipidemia)   • Essential hypertension   • Arthritis   • Psoriasis   • Testosterone deficiency   • Primary insomnia   • Weakness of both lower extremities   • Paresthesia of both upper extremities   • Morbid obesity (CMS/[facility])   • Adverse effect of corticosteroids   • Type 2 diabetes mellitus with hyperglycemia (CMS/[facility])   • Cervical stenosis of spinal canal   • Edema of cervical spinal cord (CMS/[facility])   • Persistent atrial fibrillation (CMS/[facility])   • Cervical myelopathy (CMS/[facility])   • Dilated cardiomyopathy (CMS/[facility])   • Cervical spondylosis with myelopathy   • History of spinal fusion   • Nonrheumatic aortic valve stenosis   • Leukocytosis   • CAMERON (obstructive sleep apnea)   • Bilateral upper extremity weakness   • Abnormality of gait       Advanced Care Planning:  ACP discussion was held with the patient during this visit. Patient does not have an advance directive, information provided.    Review of Systems   Constitutional: Negative for fever.   HENT: Negative for ear pain, rhinorrhea and sore throat.    Eyes: Negative for visual disturbance.   Respiratory: Negative for cough and shortness of breath.    Cardiovascular: Negative for chest pain.   Gastrointestinal: Negative for abdominal pain, diarrhea, nausea and vomiting.   Genitourinary: Negative.    Musculoskeletal: Negative.    Skin: Negative for rash.   Neurological: Negative for dizziness and headaches.   Psychiatric/Behavioral: Negative for confusion.       Compared to one year ago, the patient  "feels his physical health is the same.  Compared to one year ago, the patient feels his mental health is the same.    Reviewed chart for potential of high risk medication in the elderly: yes  Reviewed chart for potential of harmful drug interactions in the elderly:yes    Objective         Vitals:    12/29/20 1415   BP: 122/74   Pulse: 76   Temp: 97.1 °F (36.2 °C)   TempSrc: Temporal   SpO2: 99%   Weight: (!) 179 kg (393 lb 12.8 oz)   Height: 190.5 cm (75\")       Body mass index is 49.22 kg/m².  Discussed the patient's BMI with him. The BMI is above average; BMI management plan is completed.    Physical Exam  Vitals signs and nursing note reviewed.   Constitutional:       Appearance: Normal appearance.   HENT:      Head: Normocephalic and atraumatic.      Right Ear: Tympanic membrane and ear canal normal.      Left Ear: Tympanic membrane and ear canal normal.   Eyes:      Conjunctiva/sclera: Conjunctivae normal.      Pupils: Pupils are equal, round, and reactive to light.   Neck:      Musculoskeletal: Neck supple.   Cardiovascular:      Rate and Rhythm: Normal rate. Rhythm irregular.   Pulmonary:      Effort: Pulmonary effort is normal.      Breath sounds: Normal breath sounds.   Abdominal:      General: Bowel sounds are normal.      Palpations: Abdomen is soft.   Genitourinary:     Comments: Declined   Musculoskeletal:      Comments: Uses cane for ambulation    Skin:     General: Skin is warm and dry.   Neurological:      Mental Status: He is alert and oriented to person, place, and time.   Psychiatric:         Mood and Affect: Mood normal.         Lab Results   Component Value Date    HGBA1C 6.90 (H) 11/12/2020        Procedures    Assessment/Plan   Medicare Risks and Personalized Health Plan  CMS Preventative Services Quick Reference  Abdominal Aortic Aneurysm Screening  Advance Directive Discussion  Colon Cancer Screening  Obesity/Overweight     The above risks/problems have been discussed with the " patient.  Pertinent information has been shared with the patient in the After Visit Summary.  Follow up plans and orders are seen below in the Assessment/Plan Section.    Diagnoses and all orders for this visit:    1. Welcome to Medicare preventive visit (Primary)    2. Screening for colorectal cancer  -     Ambulatory Referral to Gastroenterology    3. Major depressive disorder with single episode, in full remission (CMS/[facility])  Comments:  - ER if any SI/HI.   Orders:  -     DULoxetine (CYMBALTA) 60 MG capsule; Take 1 capsule by mouth 2 (Two) Times a Day.  Dispense: 180 capsule; Refill: 0    4. YANELI (generalized anxiety disorder)  -     DULoxetine (CYMBALTA) 60 MG capsule; Take 1 capsule by mouth 2 (Two) Times a Day.  Dispense: 180 capsule; Refill: 0    5. At high risk for falls    Other orders  -     cetirizine (zyrTEC) 10 MG tablet; Take 1 tablet by mouth Daily.  Dispense: 90 tablet; Refill: 0        Follow Up:  Return in about 5 months (around 5/29/2021).     An After Visit Summary and PPPS were given to the patient.

## 2020-12-30 ENCOUNTER — TELEPHONE (OUTPATIENT)
Dept: GASTROENTEROLOGY | Facility: CLINIC | Age: 65
End: 2020-12-30

## 2021-02-10 DIAGNOSIS — E11.9 CONTROLLED TYPE 2 DIABETES MELLITUS WITHOUT COMPLICATION, WITHOUT LONG-TERM CURRENT USE OF INSULIN (HCC): ICD-10-CM

## 2021-02-10 RX ORDER — METFORMIN HYDROCHLORIDE 500 MG/1
1000 TABLET, EXTENDED RELEASE ORAL 2 TIMES DAILY
Qty: 360 TABLET | Refills: 1 | Status: SHIPPED | OUTPATIENT
Start: 2021-02-10 | End: 2022-02-08 | Stop reason: SDUPTHER

## 2021-02-10 RX ORDER — GLIMEPIRIDE 4 MG/1
4 TABLET ORAL 2 TIMES DAILY
Qty: 180 TABLET | Refills: 1 | Status: SHIPPED | OUTPATIENT
Start: 2021-02-10 | End: 2022-02-08 | Stop reason: SDUPTHER

## 2021-02-19 ENCOUNTER — OFFICE VISIT (OUTPATIENT)
Dept: SLEEP MEDICINE | Facility: HOSPITAL | Age: 66
End: 2021-02-19

## 2021-02-19 VITALS
BODY MASS INDEX: 39.17 KG/M2 | HEIGHT: 75 IN | HEART RATE: 84 BPM | OXYGEN SATURATION: 99 % | DIASTOLIC BLOOD PRESSURE: 67 MMHG | WEIGHT: 315 LBS | SYSTOLIC BLOOD PRESSURE: 142 MMHG

## 2021-02-19 DIAGNOSIS — G47.00 INSOMNIA, UNSPECIFIED TYPE: ICD-10-CM

## 2021-02-19 DIAGNOSIS — G47.33 OBSTRUCTIVE SLEEP APNEA: Primary | ICD-10-CM

## 2021-02-19 DIAGNOSIS — E66.01 OBESITY, MORBID, BMI 40.0-49.9 (HCC): ICD-10-CM

## 2021-02-19 PROCEDURE — G0463 HOSPITAL OUTPT CLINIC VISIT: HCPCS

## 2021-02-19 NOTE — PROGRESS NOTES
[facility] SLEEP MEDICINE  [address] [facility] [address] [facility]  [address]RD FLOOR  [facility] [address]  [phone]    PCP: Marly Myrick APRN    Reason for visit:  Sleep disorders: CAMERON    Duane is a 66 y.o.male who was seen in the [facility] today. Annual fu. He is doing well with his CPAP and denies complaints. He uses nightly and wakes up rested and refreshed. He uses nasal pillows, no leaks or dry mouth. He also has insomnia - currently on Cymbalta, unsure if he is on this for anxiety or insomnia. He has ADD and is on Strattera. He feels thoughts running through his head - and feels due to ADD being poorly controlled.  Napoleon Sleepiness Scale is 0. Caffeine 0 per day. Alcohol 0 per week.    Duane  reports that he quit smoking about 42 years ago. He smoked 1.00 pack per day. He has never used smokeless tobacco.    Pertinent Positive Review of Systems of denies  Rest of Review of Systems was negative as recorded in Sleep Questionnaire.    Patient  has a past medical history of ADD (attention deficit disorder), Anemia, Atrial fibrillation (CMS/HCC), Depression, Dilated cardiomyopathy (CMS/HCC), ED (erectile dysfunction), Essential hypertension, YANELI (generalized anxiety disorder), History of obesity, HLD (hyperlipidemia), Leukocytosis, Morbid obesity (CMS/HCC), CAMERON (obstructive sleep apnea), Osteoarthrosis, Persistent atrial fibrillation (CMS/HCC), PONV (postoperative nausea and vomiting), Psoriasis, Testosterone deficiency, and Type 2 diabetes mellitus (CMS/HCC).     Current Medications:    Current Outpatient Medications:   •  Accu-Chek Leilani Plus test strip, Use as directed to test blood sugar tid E11.9, Disp: 100 each, Rfl: 11  •  atomoxetine (Strattera) 100 MG capsule, Take 1 capsule by mouth Daily., Disp: 90 capsule, Rfl: 1  •  atorvastatin (LIPITOR) 20 MG tablet, Take 1 tablet by mouth Daily., Disp: 90 tablet, Rfl: 3  •  cetirizine (zyrTEC) 10 MG tablet, Take 1 tablet by mouth Daily., Disp: 90 tablet, Rfl:  "0  •  Dulaglutide (Trulicity) 1.5 MG/0.5ML solution pen-injector, Inject 1.5 mg under the skin into the appropriate area as directed 1 (One) Time Per Week., Disp: 12 pen, Rfl: 2  •  DULoxetine (CYMBALTA) 60 MG capsule, Take 1 capsule by mouth 2 (Two) Times a Day., Disp: 180 capsule, Rfl: 0  •  FIBER ADULT GUMMIES PO, Take 1 dose by mouth Daily., Disp: , Rfl:   •  glimepiride (AMARYL) 4 MG tablet, Take 1 tablet by mouth 2 (Two) Times a Day., Disp: 180 tablet, Rfl: 1  •  glucosamine-chondroitin 500-400 MG capsule capsule, Take 1 capsule by mouth Daily., Disp: , Rfl:   •  irbesartan (AVAPRO) 300 MG tablet, Take 1 tablet by mouth Every Morning., Disp: 90 tablet, Rfl: 3  •  metFORMIN ER (GLUCOPHAGE-XR) 500 MG 24 hr tablet, Take 2 tablets by mouth 2 (Two) Times a Day., Disp: 360 tablet, Rfl: 1  •  metoprolol succinate XL (TOPROL-XL) 50 MG 24 hr tablet, Take 1 tablet by mouth Every Night., Disp: 90 tablet, Rfl: 1  •  pioglitazone (ACTOS) 30 MG tablet, Take 1 tablet by mouth Daily., Disp: 90 tablet, Rfl: 1  •  rivaroxaban (XARELTO) 20 MG tablet, Take 1 tablet by mouth Daily With Dinner. Indications: Atrial Fibrillation, Disp: 90 tablet, Rfl: 3  •  tamsulosin (FLOMAX) 0.4 MG capsule 24 hr capsule, Take 1 capsule by mouth Daily., Disp: 90 capsule, Rfl: 1   also entered in Sleep Questionnaire         Vital Signs: /67   Pulse 84   Ht 190.5 cm (75\")   Wt (!) 176 kg (389 lb)   SpO2 99%   BMI 48.62 kg/m²     Body mass index is 48.62 kg/m².       Tongue: Large       Dentition: good       Pharynx: Posterior pharyngeal pillars are narrow   Mallampatti: IV (only hard palate visible)        General: Alert. Cooperative. Well developed. No acute distress.             Head:  Normocephalic. Symmetrical. Atraumatic.              Nose: No septal deviation. No drainage.          Throat: No oral lesions. No thrush. Moist mucous membranes.    Chest Wall:  Normal shape. Symmetric expansion with respiration. No tenderness.             " Neck:  Trachea midline.           Lungs:  Clear to auscultation bilaterally. No wheezes. No rhonchi. No rales. Respirations regular, even and unlabored.            Heart:  Regular rhythm and normal rate. Normal S1 and S2. No murmur.     Abdomen:  Soft, non-tender and non-distended. Normal bowel sounds. No masses.  Extremities:  Moves all extremities well. No edema.    Psychiatric: Normal mood and affect.    Diagnostic data available to date is as below and was reviewed on current visit:  · Study 12/19/19- The patient tolerated the home sleep testing with monitoring time of 449 minutes. The data obtained make this a technically adequate study. The apnea hypopneas index(AHI) was 15.8 per sleep hour.  The AHI during supine position was 17.7 per sleep hour. Mean heart rate of 70.1 BPM.  Snoring was noted 30.2% of sleep time. Lowest oxygen saturation during the study was 80%. Saturation below 89% was noted for 5.5 mins.     Most current available usage data reviewed on 02/19/2021:  · 100% compliance average usage 11 hours 45 minutes AHI 1.4 average pressure is 17/13    DME Company: [employer]    Impression:  1. Obstructive sleep apnea    2. Obesity, morbid, BMI 40.0-49.9 (CMS/[facility])    3. Insomnia, unspecified type        Plan:  Duane is very compliant with the device.  Uses nightly.  He received an updated device last year and is getting supplies regularly replaced by [employer].    He reports issues with insomnia, however his description sounds like his ADD may not be as well controlled.  This is causing symptoms during the day as well.  Sleep aids are unlikely to be helpful in the long-term and he was asked to dw psychiatrist for more effective rx of his ADD.    I reiterated the importance of effective treatment of obstructive sleep apnea with PAP machine.  Cardiovascular health risks of untreated sleep apnea were again reviewed.  Patient was asked to remain cautious if there is persistent hypersomnolence. The  benefit of weight loss in reducing severity of obstructive sleep apnea was discussed.  Patient would benefit from adhering to a strict diet to achieve ideal BMI.     Change of PAP supplies regularly is important for effective use.  Change of cushion on the mask or plugs on nasal pillows along with disposable filters once every month and change of mask frame, tubing, headgear and Velcro straps every 6 months at the minimum was reiterated.    This patient is compliant with PAP machine and benefits from its use.  Apnea hypopneas index is corrected/improved.  Daytime hypersomnolence has resolved.     Patient will follow up in this clinic in 1 year APRN    Thank you for allowing me to participate in your patient's care.    Electronically signed by Myron Deluna MD, 02/19/21, 12:05 PM EST.    Part of this note may be an electronic transcription/translation of spoken language to printed text using the Dragon Dictation System.

## 2021-02-28 DIAGNOSIS — I10 ESSENTIAL HYPERTENSION: ICD-10-CM

## 2021-03-02 RX ORDER — METOPROLOL SUCCINATE 50 MG/1
TABLET, EXTENDED RELEASE ORAL
Qty: 90 TABLET | Refills: 0 | Status: SHIPPED | OUTPATIENT
Start: 2021-03-02 | End: 2022-04-13 | Stop reason: SDUPTHER

## 2021-03-19 ENCOUNTER — BULK ORDERING (OUTPATIENT)
Dept: CASE MANAGEMENT | Facility: OTHER | Age: 66
End: 2021-03-19

## 2021-03-19 DIAGNOSIS — Z23 IMMUNIZATION DUE: ICD-10-CM

## 2021-03-22 DIAGNOSIS — F41.1 GAD (GENERALIZED ANXIETY DISORDER): ICD-10-CM

## 2021-03-22 DIAGNOSIS — F32.5 MAJOR DEPRESSIVE DISORDER WITH SINGLE EPISODE, IN FULL REMISSION (HCC): ICD-10-CM

## 2021-03-23 ENCOUNTER — OFFICE VISIT (OUTPATIENT)
Dept: CARDIOLOGY | Facility: CLINIC | Age: 66
End: 2021-03-23

## 2021-03-23 VITALS
DIASTOLIC BLOOD PRESSURE: 70 MMHG | WEIGHT: 315 LBS | HEART RATE: 78 BPM | HEIGHT: 75 IN | SYSTOLIC BLOOD PRESSURE: 126 MMHG | BODY MASS INDEX: 39.17 KG/M2

## 2021-03-23 DIAGNOSIS — I42.0 DILATED CARDIOMYOPATHY (HCC): Primary | ICD-10-CM

## 2021-03-23 DIAGNOSIS — E78.2 MIXED HYPERLIPIDEMIA: ICD-10-CM

## 2021-03-23 DIAGNOSIS — I48.19 PERSISTENT ATRIAL FIBRILLATION (HCC): ICD-10-CM

## 2021-03-23 DIAGNOSIS — I10 ESSENTIAL HYPERTENSION: ICD-10-CM

## 2021-03-23 DIAGNOSIS — I35.0 NONRHEUMATIC AORTIC VALVE STENOSIS: ICD-10-CM

## 2021-03-23 DIAGNOSIS — R09.89 LEFT CAROTID BRUIT: ICD-10-CM

## 2021-03-23 PROCEDURE — 99214 OFFICE O/P EST MOD 30 MIN: CPT | Performed by: INTERNAL MEDICINE

## 2021-03-23 PROCEDURE — 93000 ELECTROCARDIOGRAM COMPLETE: CPT | Performed by: INTERNAL MEDICINE

## 2021-03-23 RX ORDER — CETIRIZINE HYDROCHLORIDE 10 MG/1
TABLET ORAL
Qty: 90 TABLET | Refills: 0 | Status: SHIPPED | OUTPATIENT
Start: 2021-03-23

## 2021-03-23 RX ORDER — DULOXETIN HYDROCHLORIDE 60 MG/1
60 CAPSULE, DELAYED RELEASE ORAL 2 TIMES DAILY
Qty: 180 CAPSULE | Refills: 0 | Status: SHIPPED | OUTPATIENT
Start: 2021-03-23 | End: 2021-09-16 | Stop reason: SDUPTHER

## 2021-03-23 NOTE — PROGRESS NOTES
"Date of Office Visit: 2021  Encounter Provider: Valeria Bravo MD  Place of Service: [facility] CARDIOLOGY  Patient Name: Duane Mark Witten  :1955      Patient ID:  Duane Mark Witten is a 66 y.o. male is here for  followup for atrial fibrillation and aortic stenosis.        History of Present Illness    He has a history of HTN, DM, obstructive sleep apnea (compliant with cpap), and obesity.      He presented to the emergency room on 18 with complaints of 3 weeks of progressive weakness to bilateral lower extremities as well as paraesthesias of the upper extremities. He began feeling his balance was off and that his legs were becoming weaker and \"just didn't want to work right.\"  He was found to have severe cervical stenosis with cord compression. He was seen by Dr. Markham and had underwent C4, C5, C6 anterior cervical corpectomy with cage and plate. During surgery HR was in the 110's. EKG was obtained in the recovery room and showed atrial fibrillation. He was completely asymptomatic.      Echo done 18 showed LVEF 47% with mild LVH and mild LVE, with mild to moderate AS, mild MR with mild anterior prolapse, mild LAE     stress PET scan was completed 19 and showed the following results: EF 36%, bilateral ventricular dilation, diffusely decreased tracer uptake in all wall segments on stress imaging and a more pronounced hypoperfusion of the mid anterior wall that was considered small, mild, and may represent ischemia.     Repeat echocardiogram done 2020 shows LVEF low normal with mild concentric left ventricular hypertrophy, moderate left atrial enlargement, bicuspid aortic valve with mild to moderate aortic stenosis..      Labs done 2020 show hemoglobin A1c 6.9, TSH 2.66.  Labs in 2020 showed normal CMP, HDL 36, LDL 79, triglycerides 92, normal CBC.    He has no chest pain, orthopnea, PND.  He has chronic exertional dyspnea and " fatigue.  He has a lot of leg pain which limits his walking.  He is taking his meds as directed.  He has no dizziness, syncope.  He has no palpitations.     Past Medical History:   Diagnosis Date   • ADD (attention deficit disorder)    • Anemia     intermittent   • Atrial fibrillation (CMS/HCC)    • Depression    • Dilated cardiomyopathy (CMS/HCC)    • ED (erectile dysfunction)    • Essential hypertension    • YANELI (generalized anxiety disorder)    • History of obesity    • HLD (hyperlipidemia)    • Leukocytosis    • Morbid obesity (CMS/HCC)    • CAMERON (obstructive sleep apnea)     BiPAP nightly   • Osteoarthrosis    • Persistent atrial fibrillation (CMS/HCC)    • PONV (postoperative nausea and vomiting)    • Psoriasis    • Testosterone deficiency    • Type 2 diabetes mellitus (CMS/HCC)          Past Surgical History:   Procedure Laterality Date   • ANTERIOR CHANNEL VERTEBRECTOMY/CORPECTOMY Bilateral 11/27/2018    Procedure: C4, C5, C6 ANTERIOR CERVICAL CORPECTOMY;  Surgeon: Chirag Markham MD;  Location: [facility];  Service: Neurosurgery   • BIOPSY / EXCISION / DISSECTION AXILLARY NODE  1994    Lymph Node testing for cancer    • COLONOSCOPY      10+ years; normal   • NECK SURGERY  11/27/2018    C4/5/6 anterior cervical corpectomy   • PARATHYROIDECTOMY  2008       Current Outpatient Medications on File Prior to Visit   Medication Sig Dispense Refill   • Accu-Chek Leilani Plus test strip Use as directed to test blood sugar tid E11.9 100 each 11   • atomoxetine (Strattera) 100 MG capsule Take 1 capsule by mouth Daily. 90 capsule 1   • atorvastatin (LIPITOR) 20 MG tablet Take 1 tablet by mouth Daily. 90 tablet 3   • Dulaglutide (Trulicity) 1.5 MG/0.5ML solution pen-injector Inject 1.5 mg under the skin into the appropriate area as directed 1 (One) Time Per Week. 12 pen 2   • DULoxetine (CYMBALTA) 60 MG capsule Take 1 capsule by mouth 2 (Two) Times a Day. 180 capsule 0   • FIBER ADULT GUMMIES PO Take 1 dose by mouth  "Daily.     • glimepiride (AMARYL) 4 MG tablet Take 1 tablet by mouth 2 (Two) Times a Day. 180 tablet 1   • glucosamine-chondroitin 500-400 MG capsule capsule Take 1 capsule by mouth Daily.     • irbesartan (AVAPRO) 300 MG tablet Take 1 tablet by mouth Every Morning. 90 tablet 3   • metFORMIN ER (GLUCOPHAGE-XR) 500 MG 24 hr tablet Take 2 tablets by mouth 2 (Two) Times a Day. 360 tablet 1   • metoprolol succinate XL (TOPROL-XL) 50 MG 24 hr tablet TAKE 1 TABLET BY MOUTH EVERY DAY AT NIGHT 90 tablet 0   • pioglitazone (ACTOS) 30 MG tablet Take 1 tablet by mouth Daily. 90 tablet 1   • rivaroxaban (XARELTO) 20 MG tablet Take 1 tablet by mouth Daily With Dinner. Indications: Atrial Fibrillation 90 tablet 3   • tamsulosin (FLOMAX) 0.4 MG capsule 24 hr capsule Take 1 capsule by mouth Daily. 90 capsule 1   • [DISCONTINUED] cetirizine (zyrTEC) 10 MG tablet Take 1 tablet by mouth Daily. 90 tablet 0     No current facility-administered medications on file prior to visit.       Social History     Socioeconomic History   • Marital status:      Spouse name: Mindy   • Number of children: Not on file   • Years of education: Not on file   • Highest education level: Not on file   Tobacco Use   • Smoking status: Former Smoker     Packs/day: 1.00     Quit date:      Years since quittin.2   • Smokeless tobacco: Never Used   Substance and Sexual Activity   • Alcohol use: No     Comment: caffeine use: None   • Drug use: No           ROS    Procedures    ECG 12 Lead    Date/Time: 3/23/2021 10:50 AM  Performed by: Valeria Bravo MD  Authorized by: Valeria Bravo MD   Comparison: compared with previous ECG   Similar to previous ECG  Rhythm: atrial fibrillation  Other findings: poor R wave progression    Clinical impression: abnormal EKG                Objective:      Vitals:    21 1037   BP: 126/70   BP Location: Left arm   Pulse: 78   Weight: (!) 179 kg (394 lb 6.4 oz)   Height: 190.5 cm (75\")     Body mass " index is 49.3 kg/m².    Vitals reviewed.   Constitutional:       General: Not in acute distress.     Appearance: Well-developed. Not diaphoretic.   Eyes:      General: No scleral icterus.     Conjunctiva/sclera: Conjunctivae normal.   HENT:      Head: Normocephalic and atraumatic.   Neck:      Thyroid: No thyromegaly.      Vascular: No carotid bruit or JVD.      Lymphadenopathy: No cervical adenopathy.   Pulmonary:      Effort: Pulmonary effort is normal. No respiratory distress.      Breath sounds: Normal breath sounds. No wheezing. No rhonchi. No rales.   Chest:      Chest wall: Not tender to palpatation.   Cardiovascular:      Normal rate. Irregularly irregular rhythm.      Murmurs: There is no murmur.      No gallop.   Pulses:     Carotid: 2+ bilaterally with bruit on the left.     Radial: 2+ bilaterally.     Dorsalis pedis: 2+ bilaterally.     Posterior tibial: 2+ bilaterally.  Edema:     Peripheral edema absent.   Abdominal:      General: Bowel sounds are normal. There is no distension or abdominal bruit.      Palpations: Abdomen is soft. There is no abdominal mass.      Tenderness: There is no abdominal tenderness.   Musculoskeletal:         General: No deformity.      Extremities: No clubbing present.     Cervical back: Neck supple. Skin:     General: Skin is warm and dry. There is no cyanosis.      Coloration: Skin is not pale.      Findings: No rash.   Neurological:      Mental Status: Alert and oriented to person, place, and time.      Cranial Nerves: No cranial nerve deficit.   Psychiatric:         Judgment: Judgment normal.         Lab Review:       Assessment:      Diagnosis Plan   1. Dilated cardiomyopathy (CMS/HCC)     2. Nonrheumatic aortic valve stenosis     3. Persistent atrial fibrillation (CMS/HCC)     4. Essential hypertension     5. Mixed hyperlipidemia  Duplex Carotid Ultrasound CAR   6. Left carotid bruit  Duplex Carotid Ultrasound CAR        1. Persistent atrial fibrillation, on xarelto.    2. S/p c-spine surgery for cervical stenosis, done 11/2018.  3. DM  4. Hypertension - controlled.   5. Hyperlipidemia on atorvastatin  6. H/o Cardiomyopathy, now LVEF low normal.   7. Mild to moderate AS with bicuspid aortic valve  8. Left carotid bruit, set up carotid bruit.         Plan:       See fredrick in 1 year, set up carotid.  No med changes, doing well.

## 2021-03-27 ENCOUNTER — IMMUNIZATION (OUTPATIENT)
Dept: VACCINE CLINIC | Facility: HOSPITAL | Age: 66
End: 2021-03-27

## 2021-03-27 DIAGNOSIS — Z23 IMMUNIZATION DUE: ICD-10-CM

## 2021-03-27 PROCEDURE — 91300 HC SARSCOV02 VAC 30MCG/0.3ML IM: CPT | Performed by: INTERNAL MEDICINE

## 2021-03-27 PROCEDURE — 0001A: CPT | Performed by: INTERNAL MEDICINE

## 2021-03-31 ENCOUNTER — HOSPITAL ENCOUNTER (OUTPATIENT)
Dept: CARDIOLOGY | Facility: HOSPITAL | Age: 66
Discharge: HOME | End: 2021-03-31
Admitting: INTERNAL MEDICINE

## 2021-03-31 DIAGNOSIS — R09.89 LEFT CAROTID BRUIT: ICD-10-CM

## 2021-03-31 DIAGNOSIS — E78.2 MIXED HYPERLIPIDEMIA: ICD-10-CM

## 2021-03-31 LAB
BH CV XLRA MEAS LEFT DIST CCA EDV: -23.4 CM/SEC
BH CV XLRA MEAS LEFT DIST CCA PSV: -111.7 CM/SEC
BH CV XLRA MEAS LEFT DIST ICA EDV: -22.5 CM/SEC
BH CV XLRA MEAS LEFT DIST ICA PSV: -74.5 CM/SEC
BH CV XLRA MEAS LEFT ICA/CCA RATIO: 0.8
BH CV XLRA MEAS LEFT MID CCA EDV: -27.7 CM/SEC
BH CV XLRA MEAS LEFT MID CCA PSV: -96.1 CM/SEC
BH CV XLRA MEAS LEFT MID ICA EDV: -28.6 CM/SEC
BH CV XLRA MEAS LEFT MID ICA PSV: -71.9 CM/SEC
BH CV XLRA MEAS LEFT PROX CCA EDV: 19.1 CM/SEC
BH CV XLRA MEAS LEFT PROX CCA PSV: 89.2 CM/SEC
BH CV XLRA MEAS LEFT PROX ECA PSV: -108.2 CM/SEC
BH CV XLRA MEAS LEFT PROX ICA EDV: 26 CM/SEC
BH CV XLRA MEAS LEFT PROX ICA PSV: 83.1 CM/SEC
BH CV XLRA MEAS LEFT PROX SCLA PSV: 129.9 CM/SEC
BH CV XLRA MEAS LEFT VERTEBRAL A PSV: -40.7 CM/SEC
BH CV XLRA MEAS RIGHT DIST CCA EDV: -15.5 CM/SEC
BH CV XLRA MEAS RIGHT DIST CCA PSV: -61.5 CM/SEC
BH CV XLRA MEAS RIGHT DIST ICA EDV: -20 CM/SEC
BH CV XLRA MEAS RIGHT DIST ICA PSV: -89.6 CM/SEC
BH CV XLRA MEAS RIGHT ICA/CCA RATIO: 1.5
BH CV XLRA MEAS RIGHT MID CCA EDV: -18 CM/SEC
BH CV XLRA MEAS RIGHT MID CCA PSV: -67.1 CM/SEC
BH CV XLRA MEAS RIGHT MID ICA EDV: -26.3 CM/SEC
BH CV XLRA MEAS RIGHT MID ICA PSV: -76.4 CM/SEC
BH CV XLRA MEAS RIGHT PROX CCA EDV: -13.7 CM/SEC
BH CV XLRA MEAS RIGHT PROX CCA PSV: -72.1 CM/SEC
BH CV XLRA MEAS RIGHT PROX ECA PSV: -191.3 CM/SEC
BH CV XLRA MEAS RIGHT PROX ICA EDV: -27.3 CM/SEC
BH CV XLRA MEAS RIGHT PROX ICA PSV: -74.3 CM/SEC
BH CV XLRA MEAS RIGHT PROX SCLA PSV: 126.4 CM/SEC
BH CV XLRA MEAS RIGHT VERTEBRAL A PSV: -52 CM/SEC

## 2021-03-31 PROCEDURE — 93880 EXTRACRANIAL BILAT STUDY: CPT | Performed by: INTERNAL MEDICINE

## 2021-03-31 PROCEDURE — 93880 EXTRACRANIAL BILAT STUDY: CPT

## 2021-04-01 ENCOUNTER — TELEPHONE (OUTPATIENT)
Dept: CARDIOLOGY | Facility: CLINIC | Age: 66
End: 2021-04-01

## 2021-04-01 NOTE — TELEPHONE ENCOUNTER
Notified pt. He verbalized understanding.    Thank you,    Hannah James, RN  Triage [facility] [place]

## 2021-04-20 ENCOUNTER — IMMUNIZATION (OUTPATIENT)
Dept: VACCINE CLINIC | Facility: HOSPITAL | Age: 66
End: 2021-04-20

## 2021-04-20 PROCEDURE — 91300 HC SARSCOV02 VAC 30MCG/0.3ML IM: CPT | Performed by: INTERNAL MEDICINE

## 2021-04-20 PROCEDURE — 0002A: CPT | Performed by: INTERNAL MEDICINE

## 2021-05-25 ENCOUNTER — OFFICE VISIT (OUTPATIENT)
Dept: FAMILY MEDICINE CLINIC | Facility: CLINIC | Age: 66
End: 2021-05-25

## 2021-05-25 VITALS
HEIGHT: 75 IN | OXYGEN SATURATION: 96 % | TEMPERATURE: 97.7 F | SYSTOLIC BLOOD PRESSURE: 127 MMHG | HEART RATE: 73 BPM | WEIGHT: 315 LBS | DIASTOLIC BLOOD PRESSURE: 80 MMHG | BODY MASS INDEX: 39.17 KG/M2

## 2021-05-25 DIAGNOSIS — E78.2 MIXED HYPERLIPIDEMIA: ICD-10-CM

## 2021-05-25 DIAGNOSIS — E11.9 CONTROLLED TYPE 2 DIABETES MELLITUS WITHOUT COMPLICATION, WITHOUT LONG-TERM CURRENT USE OF INSULIN (HCC): Primary | ICD-10-CM

## 2021-05-25 PROCEDURE — 99213 OFFICE O/P EST LOW 20 MIN: CPT | Performed by: NURSE PRACTITIONER

## 2021-05-25 RX ORDER — HYDROXYZINE PAMOATE 25 MG/1
25 CAPSULE ORAL
COMMUNITY
Start: 2021-03-11 | End: 2021-11-18

## 2021-05-25 NOTE — PROGRESS NOTES
"Chief Complaint  Diabetes, Hyperlipidemia, and Med Refill    Subjective          Duane Wojciech Ramirez presents to [facility]  History of Present Illness  Diabetes Mellitus Type II, Follow-up: Patient here for follow-up of Type 2 diabetes mellitus. Symptoms: none. Home monitoring: The patient is checking blood sugars at home. Medications: The patient is adherent with their medication regimen. Medication(s): Trulicity, glimepiride, metformin and Actos.  Due for: A1C and eye exam. Patient is not currently exercising.    Hyperlipidemia: Symptoms: none. Medications:The patient is adherent with their medication regimen. Medication(s): statin. The patient is due for lipid panel and liver function tests .    Objective   Vital Signs:   /80   Pulse 73   Temp 97.7 °F (36.5 °C) (Temporal)   Ht 190.5 cm (75\")   Wt (!) 181 kg (399 lb)   SpO2 96%   BMI 49.87 kg/m²     Physical Exam  Vitals and nursing note reviewed.   Constitutional:       Appearance: Normal appearance.   Cardiovascular:      Rate and Rhythm: Normal rate. Rhythm irregular.      Heart sounds: Normal heart sounds.   Pulmonary:      Effort: Pulmonary effort is normal.      Breath sounds: Normal breath sounds.   Neurological:      Mental Status: He is alert and oriented to person, place, and time.   Psychiatric:         Mood and Affect: Mood normal.        Result Review :   The following data was reviewed by: KAYLEN Uriostegui on 05/25/2021:  CMP    CMP 8/18/20   Glucose 97   BUN 10   Creatinine 0.90   eGFR Non  Am 85   eGFR  Am 103   Sodium 141   Potassium 3.9   Chloride 102   Calcium 9.1   Total Protein 6.3   Albumin 4.40   Globulin 1.9   Total Bilirubin 0.7   Alkaline Phosphatase 111   AST (SGOT) 10   ALT (SGPT) 10           Lipid Panel    Lipid Panel 8/18/20   Total Cholesterol 133   Triglycerides 92   HDL Cholesterol 36 (A)   VLDL Cholesterol 18.4   LDL Cholesterol  79   (A) Abnormal value            Most " Recent A1C    HGBA1C Most Recent 11/12/20   Hemoglobin A1C 6.90 (A)   (A) Abnormal value       Comments are available for some flowsheets but are not being displayed.                  Assessment and Plan    Diagnoses and all orders for this visit:    1. Controlled type 2 diabetes mellitus without complication, without long-term current use of insulin (CMS/[facility]) (Primary)  -     Hemoglobin A1c  -     Comprehensive Metabolic Panel    2. Mixed hyperlipidemia  -     Lipid Panel With / Chol / HDL Ratio  -     Comprehensive Metabolic Panel      I spent 20 minutes caring for Duane on this date of service. This time includes time spent by me in the following activities:reviewing tests, performing a medically appropriate examination and/or evaluation , counseling and educating the patient/family/caregiver, ordering medications, tests, or procedures and documenting information in the medical record  Follow Up   No follow-ups on file.  Patient was given instructions and counseling regarding his condition or for health maintenance advice. Please see specific information pulled into the AVS if appropriate.

## 2021-05-26 LAB
ALBUMIN SERPL-MCNC: 4.2 G/DL (ref 3.5–5.2)
ALBUMIN/GLOB SERPL: 2.2 G/DL
ALP SERPL-CCNC: 105 U/L (ref 39–117)
ALT SERPL-CCNC: 10 U/L (ref 1–41)
AST SERPL-CCNC: 11 U/L (ref 1–40)
BILIRUB SERPL-MCNC: 0.7 MG/DL (ref 0–1.2)
BUN SERPL-MCNC: 11 MG/DL (ref 8–23)
BUN/CREAT SERPL: 11.3 (ref 7–25)
CALCIUM SERPL-MCNC: 9.2 MG/DL (ref 8.6–10.5)
CHLORIDE SERPL-SCNC: 103 MMOL/L (ref 98–107)
CHOLEST SERPL-MCNC: 128 MG/DL (ref 0–200)
CHOLEST/HDLC SERPL: 3.46 {RATIO}
CO2 SERPL-SCNC: 28.6 MMOL/L (ref 22–29)
CREAT SERPL-MCNC: 0.97 MG/DL (ref 0.76–1.27)
GLOBULIN SER CALC-MCNC: 1.9 GM/DL
GLUCOSE SERPL-MCNC: 72 MG/DL (ref 65–99)
HBA1C MFR BLD: 6.3 % (ref 4.8–5.6)
HDLC SERPL-MCNC: 37 MG/DL (ref 40–60)
LDLC SERPL CALC-MCNC: 74 MG/DL (ref 0–100)
POTASSIUM SERPL-SCNC: 4.3 MMOL/L (ref 3.5–5.2)
PROT SERPL-MCNC: 6.1 G/DL (ref 6–8.5)
SODIUM SERPL-SCNC: 142 MMOL/L (ref 136–145)
TRIGL SERPL-MCNC: 87 MG/DL (ref 0–150)
VLDLC SERPL CALC-MCNC: 17 MG/DL (ref 5–40)

## 2021-06-02 DIAGNOSIS — E11.9 CONTROLLED TYPE 2 DIABETES MELLITUS WITHOUT COMPLICATION, WITHOUT LONG-TERM CURRENT USE OF INSULIN (HCC): ICD-10-CM

## 2021-06-03 RX ORDER — PIOGLITAZONEHYDROCHLORIDE 30 MG/1
TABLET ORAL
Qty: 90 TABLET | Refills: 1 | Status: SHIPPED | OUTPATIENT
Start: 2021-06-03 | End: 2021-11-18 | Stop reason: SDUPTHER

## 2021-06-07 ENCOUNTER — TELEPHONE (OUTPATIENT)
Dept: FAMILY MEDICINE CLINIC | Facility: CLINIC | Age: 66
End: 2021-06-07

## 2021-06-08 DIAGNOSIS — R35.1 BENIGN PROSTATIC HYPERPLASIA WITH NOCTURIA: ICD-10-CM

## 2021-06-08 DIAGNOSIS — N40.1 BENIGN PROSTATIC HYPERPLASIA WITH NOCTURIA: ICD-10-CM

## 2021-06-08 RX ORDER — TAMSULOSIN HYDROCHLORIDE 0.4 MG/1
1 CAPSULE ORAL DAILY
Qty: 90 CAPSULE | Refills: 1 | Status: SHIPPED | OUTPATIENT
Start: 2021-06-08 | End: 2021-12-07

## 2021-07-20 ENCOUNTER — OFFICE VISIT (OUTPATIENT)
Dept: FAMILY MEDICINE CLINIC | Facility: CLINIC | Age: 66
End: 2021-07-20

## 2021-07-20 VITALS
TEMPERATURE: 98.2 F | HEIGHT: 75 IN | SYSTOLIC BLOOD PRESSURE: 138 MMHG | WEIGHT: 315 LBS | HEART RATE: 72 BPM | DIASTOLIC BLOOD PRESSURE: 82 MMHG | OXYGEN SATURATION: 99 % | BODY MASS INDEX: 39.17 KG/M2

## 2021-07-20 DIAGNOSIS — M25.511 ACUTE PAIN OF RIGHT SHOULDER: Primary | ICD-10-CM

## 2021-07-20 PROCEDURE — 73030 X-RAY EXAM OF SHOULDER: CPT | Performed by: FAMILY MEDICINE

## 2021-07-20 PROCEDURE — 99213 OFFICE O/P EST LOW 20 MIN: CPT | Performed by: FAMILY MEDICINE

## 2021-07-20 NOTE — PROGRESS NOTES
"Chief Complaint  Shoulder Pain and Fall    Subjective          Duane Wojciech Ramirez presents to [facility]  History of Present Illness  Past week fell and landed on his rt shoulder after losing balance.  He is still in pain and wants to make sure he has no fractures.    Objective   Vital Signs:   /82 (BP Location: Left arm, Patient Position: Sitting)   Pulse 72   Temp 98.2 °F (36.8 °C) (Temporal)   Ht 190.5 cm (75\")   Wt (!) 180 kg (396 lb)   SpO2 99%   BMI 49.50 kg/m²     Physical Exam  Vitals and nursing note reviewed.   Constitutional:       Appearance: Normal appearance.   Cardiovascular:      Rate and Rhythm: Normal rate and regular rhythm.      Heart sounds: Normal heart sounds.   Musculoskeletal:      Comments: Rt shoulder-  ttp anteriorly and superiorly.    Very limited ROM due to pain.    Unable to ext and internally rotate arm.    Unable to abduct arm.   Bruising medial part of his upper rt arm.   Neurological:      Mental Status: He is alert.        Result Review :                 Assessment and Plan    Diagnoses and all orders for this visit:    1. Acute pain of right shoulder (Primary)  -     XR Shoulder 2+ View Right        Follow Up   No follow-ups on file.  Patient was given instructions and counseling regarding his condition or for health maintenance advice. Please see specific information pulled into the AVS if appropriate.     Will get xray of shoulder;  Tylenol for the pain  "

## 2021-07-21 DIAGNOSIS — F98.8 ATTENTION DEFICIT DISORDER (ADD) WITHOUT HYPERACTIVITY: ICD-10-CM

## 2021-07-21 RX ORDER — ATOMOXETINE 100 MG/1
100 CAPSULE ORAL DAILY
Qty: 90 CAPSULE | Refills: 1 | Status: SHIPPED | OUTPATIENT
Start: 2021-07-21 | End: 2022-02-08 | Stop reason: SDUPTHER

## 2021-07-22 PROBLEM — M13.811 OTHER SPECIFIED ARTHRITIS, RIGHT SHOULDER: Status: ACTIVE | Noted: 2021-07-22

## 2021-07-30 ENCOUNTER — PATIENT MESSAGE (OUTPATIENT)
Dept: FAMILY MEDICINE CLINIC | Facility: CLINIC | Age: 66
End: 2021-07-30

## 2021-07-30 DIAGNOSIS — E11.9 CONTROLLED TYPE 2 DIABETES MELLITUS WITHOUT COMPLICATION, WITHOUT LONG-TERM CURRENT USE OF INSULIN (HCC): ICD-10-CM

## 2021-07-30 RX ORDER — DULAGLUTIDE 1.5 MG/.5ML
1.5 INJECTION, SOLUTION SUBCUTANEOUS WEEKLY
Qty: 12 PEN | Refills: 2 | Status: SHIPPED | OUTPATIENT
Start: 2021-07-30 | End: 2022-02-08 | Stop reason: SDUPTHER

## 2021-07-30 NOTE — TELEPHONE ENCOUNTER
From: Duane Mark Witten  To: KAYLEN Uriostegui  Sent: 7/30/2021 1:02 AM EDT  Subject: Prescription Question    Can you send a script to [facility] For Trulicity 1.5. Thanks

## 2021-09-09 ENCOUNTER — TELEPHONE (OUTPATIENT)
Dept: FAMILY MEDICINE CLINIC | Facility: CLINIC | Age: 66
End: 2021-09-09

## 2021-09-13 ENCOUNTER — TELEPHONE (OUTPATIENT)
Dept: FAMILY MEDICINE CLINIC | Facility: CLINIC | Age: 66
End: 2021-09-13

## 2021-09-13 NOTE — TELEPHONE ENCOUNTER
PT'S WIFE CALLED TO CHECK IF THERE'S ANY SAMPLES OF TRULICITY THEY CAN  AT THE OFFICE.  THEY HAVE APPLIED FOR PATIENT ASSISTANCE WITH [employer] BUT THE PPW IS TAKING A LITTLE LONGER THAN EXPECTED    PLEASE ADVISE    [phone]

## 2021-09-16 DIAGNOSIS — F41.1 GAD (GENERALIZED ANXIETY DISORDER): ICD-10-CM

## 2021-09-16 DIAGNOSIS — F32.5 MAJOR DEPRESSIVE DISORDER WITH SINGLE EPISODE, IN FULL REMISSION (HCC): ICD-10-CM

## 2021-09-16 RX ORDER — DULOXETIN HYDROCHLORIDE 60 MG/1
60 CAPSULE, DELAYED RELEASE ORAL 2 TIMES DAILY
Qty: 180 CAPSULE | Refills: 0 | Status: SHIPPED | OUTPATIENT
Start: 2021-09-16 | End: 2022-02-08 | Stop reason: SDUPTHER

## 2021-09-20 ENCOUNTER — TELEPHONE (OUTPATIENT)
Dept: FAMILY MEDICINE CLINIC | Facility: CLINIC | Age: 66
End: 2021-09-20

## 2021-09-20 NOTE — TELEPHONE ENCOUNTER
Patient would like to have sample of medcation below.    Dulaglutide (Trulicity) 1.5 MG/0.5ML solution pen-injector        Sig: Inject 1.5 mg under the skin into the appropriate area as directed 1 (One) Time Per Week.        Sent to pharmacy as: Trulicity 1.5 MG/0.5ML Subcutaneous Solution Pen-injector (Dulaglutide)        Class: Normal

## 2021-10-11 DIAGNOSIS — F41.1 GAD (GENERALIZED ANXIETY DISORDER): ICD-10-CM

## 2021-10-11 DIAGNOSIS — F32.5 MAJOR DEPRESSIVE DISORDER WITH SINGLE EPISODE, IN FULL REMISSION (HCC): ICD-10-CM

## 2021-10-11 RX ORDER — DULOXETIN HYDROCHLORIDE 60 MG/1
CAPSULE, DELAYED RELEASE ORAL
Qty: 90 CAPSULE | Refills: 1 | OUTPATIENT
Start: 2021-10-11

## 2021-10-15 ENCOUNTER — OFFICE VISIT (OUTPATIENT)
Dept: FAMILY MEDICINE CLINIC | Facility: CLINIC | Age: 66
End: 2021-10-15

## 2021-10-15 VITALS
HEIGHT: 75 IN | WEIGHT: 315 LBS | TEMPERATURE: 97.9 F | HEART RATE: 80 BPM | BODY MASS INDEX: 39.17 KG/M2 | DIASTOLIC BLOOD PRESSURE: 67 MMHG | SYSTOLIC BLOOD PRESSURE: 103 MMHG | OXYGEN SATURATION: 95 %

## 2021-10-15 DIAGNOSIS — S80.811A ABRASION OF RIGHT LOWER EXTREMITY, INITIAL ENCOUNTER: Primary | ICD-10-CM

## 2021-10-15 PROCEDURE — 99213 OFFICE O/P EST LOW 20 MIN: CPT | Performed by: NURSE PRACTITIONER

## 2021-10-15 RX ORDER — SULFAMETHOXAZOLE AND TRIMETHOPRIM 800; 160 MG/1; MG/1
1 TABLET ORAL 2 TIMES DAILY
Qty: 14 TABLET | Refills: 0 | Status: SHIPPED | OUTPATIENT
Start: 2021-10-15 | End: 2021-10-22

## 2021-11-18 ENCOUNTER — OFFICE VISIT (OUTPATIENT)
Dept: FAMILY MEDICINE CLINIC | Facility: CLINIC | Age: 66
End: 2021-11-18

## 2021-11-18 VITALS
HEART RATE: 77 BPM | OXYGEN SATURATION: 98 % | BODY MASS INDEX: 50.32 KG/M2 | SYSTOLIC BLOOD PRESSURE: 140 MMHG | TEMPERATURE: 97.5 F | DIASTOLIC BLOOD PRESSURE: 82 MMHG | WEIGHT: 315 LBS

## 2021-11-18 DIAGNOSIS — M48.02 CERVICAL STENOSIS OF SPINAL CANAL: ICD-10-CM

## 2021-11-18 DIAGNOSIS — R60.0 BILATERAL EDEMA OF LOWER EXTREMITY: ICD-10-CM

## 2021-11-18 DIAGNOSIS — M47.12 CERVICAL SPONDYLOSIS WITH MYELOPATHY: ICD-10-CM

## 2021-11-18 DIAGNOSIS — M19.90 ARTHRITIS: ICD-10-CM

## 2021-11-18 DIAGNOSIS — E11.9 CONTROLLED TYPE 2 DIABETES MELLITUS WITHOUT COMPLICATION, WITHOUT LONG-TERM CURRENT USE OF INSULIN (HCC): Primary | ICD-10-CM

## 2021-11-18 DIAGNOSIS — F51.01 PRIMARY INSOMNIA: ICD-10-CM

## 2021-11-18 PROCEDURE — 99213 OFFICE O/P EST LOW 20 MIN: CPT | Performed by: NURSE PRACTITIONER

## 2021-11-18 RX ORDER — TRAZODONE HYDROCHLORIDE 50 MG/1
50 TABLET ORAL NIGHTLY PRN
Qty: 90 TABLET | Refills: 0 | Status: SHIPPED | OUTPATIENT
Start: 2021-11-18 | End: 2022-01-31

## 2021-11-18 RX ORDER — PIOGLITAZONEHYDROCHLORIDE 15 MG/1
15 TABLET ORAL DAILY
Qty: 90 TABLET | Refills: 0 | Status: SHIPPED | OUTPATIENT
Start: 2021-11-18 | End: 2021-12-30 | Stop reason: SDUPTHER

## 2021-11-18 NOTE — PROGRESS NOTES
Chief Complaint  Diabetes and Med Refill    Subjective          Duane Wojciech Ramirez presents to [facility]  History of Present Illness  Diabetes Mellitus Type II, Follow-up: Patient here for follow-up of Type 2 diabetes mellitus. Symptoms: polydipsia and polyuria. Home monitoring: The patient is not checking blood sugars at home. Medications: The patient is adherent with their medication regimen. Medication(s): Trulicity, glimepiride, metformin and pioglitazone. . Due for: A1C and urine microalbumin.     Objective   Vital Signs:   /82 (BP Location: Left arm, Patient Position: Sitting, Cuff Size: Large Adult)   Pulse 77   Temp 97.5 °F (36.4 °C) (Temporal)   Wt (!) 183 kg (402 lb 9.6 oz)   SpO2 98%   BMI 50.32 kg/m²     Physical Exam  Vitals and nursing note reviewed.   Constitutional:       Appearance: Normal appearance.   Cardiovascular:      Rate and Rhythm: Normal rate and regular rhythm.      Heart sounds: Normal heart sounds.   Pulmonary:      Effort: Pulmonary effort is normal.      Breath sounds: Normal breath sounds.   Neurological:      Mental Status: He is alert and oriented to person, place, and time.   Psychiatric:         Mood and Affect: Mood normal.        Result Review :{Labs  Result Review  Imaging  Med Tab  Media  Procedures :23}   The following data was reviewed by: KAYLEN Uriostegui on 11/18/2021:  CMP    CMP 5/25/21   Glucose 72   BUN 11   Creatinine 0.97   eGFR Non  Am 77   eGFR  Am 94   Sodium 142   Potassium 4.3   Chloride 103   Calcium 9.2   Total Protein 6.1   Albumin 4.20   Globulin 1.9   Total Bilirubin 0.7   Alkaline Phosphatase 105   AST (SGOT) 11   ALT (SGPT) 10      Comments are available for some flowsheets but are not being displayed.           Lipid Panel    Lipid Panel 5/25/21   Total Cholesterol 128   Triglycerides 87   HDL Cholesterol 37 (A)   VLDL Cholesterol 17   LDL Cholesterol  74   (A) Abnormal value        Comments are available for some flowsheets but are not being displayed.           Most Recent A1C    HGBA1C Most Recent 5/25/21   Hemoglobin A1C 6.30 (A)   (A) Abnormal value       Comments are available for some flowsheets but are not being displayed.                  Assessment and Plan    Diagnoses and all orders for this visit:    1. Controlled type 2 diabetes mellitus without complication, without long-term current use of insulin (HCC) (Primary)  -     Hemoglobin A1c  -     Microalbumin / Creatinine Urine Ratio - Urine, Clean Catch  -     pioglitazone (ACTOS) 15 MG tablet; Take 1 tablet by mouth Daily.  Dispense: 90 tablet; Refill: 0    2. Bilateral edema of lower extremity  Comments:  - Will decrease pioglitazone to 15 mg daily.  - May consider adding furosemide if symptoms persist.     3. Arthritis  -     Ambulatory Referral to Pain Management    4. Cervical stenosis of spinal canal  -     Ambulatory Referral to Pain Management    5. Cervical spondylosis with myelopathy  -     Ambulatory Referral to Pain Management    6. Primary insomnia  Comments:  - Risks and benefits of medication discussed with patient.   Orders:  -     traZODone (DESYREL) 50 MG tablet; Take 1 tablet by mouth At Night As Needed for Sleep.  Dispense: 90 tablet; Refill: 0      I spent 25 minutes caring for Duane on this date of service. This time includes time spent by me in the following activities:reviewing tests, performing a medically appropriate examination and/or evaluation , counseling and educating the patient/family/caregiver, ordering medications, tests, or procedures and documenting information in the medical record  Follow Up   Return in about 6 weeks (around 12/29/2021) for Recheck, Medicare Wellness.  Patient was given instructions and counseling regarding his condition or for health maintenance advice. Please see specific information pulled into the AVS if appropriate.

## 2021-11-19 LAB
ALBUMIN/CREAT UR: 27 MG/G CREAT (ref 0–29)
CREAT UR-MCNC: 146.8 MG/DL
HBA1C MFR BLD: 6.9 % (ref 4.8–5.6)
MICROALBUMIN UR-MCNC: 39.3 UG/ML

## 2021-12-07 DIAGNOSIS — R35.1 BENIGN PROSTATIC HYPERPLASIA WITH NOCTURIA: ICD-10-CM

## 2021-12-07 DIAGNOSIS — N40.1 BENIGN PROSTATIC HYPERPLASIA WITH NOCTURIA: ICD-10-CM

## 2021-12-07 RX ORDER — TAMSULOSIN HYDROCHLORIDE 0.4 MG/1
CAPSULE ORAL
Qty: 90 CAPSULE | Refills: 0 | Status: SHIPPED | OUTPATIENT
Start: 2021-12-07 | End: 2022-02-08 | Stop reason: SDUPTHER

## 2021-12-30 DIAGNOSIS — I10 ESSENTIAL HYPERTENSION: ICD-10-CM

## 2021-12-30 DIAGNOSIS — E11.9 CONTROLLED TYPE 2 DIABETES MELLITUS WITHOUT COMPLICATION, WITHOUT LONG-TERM CURRENT USE OF INSULIN (HCC): ICD-10-CM

## 2021-12-30 RX ORDER — IRBESARTAN 300 MG/1
300 TABLET ORAL EVERY MORNING
Qty: 90 TABLET | Refills: 3 | Status: SHIPPED | OUTPATIENT
Start: 2021-12-30 | End: 2022-02-08 | Stop reason: SDUPTHER

## 2021-12-30 RX ORDER — PIOGLITAZONEHYDROCHLORIDE 15 MG/1
15 TABLET ORAL DAILY
Qty: 30 TABLET | Refills: 0 | OUTPATIENT
Start: 2021-12-30

## 2021-12-30 RX ORDER — PIOGLITAZONEHYDROCHLORIDE 15 MG/1
15 TABLET ORAL DAILY
Qty: 90 TABLET | Refills: 0 | Status: SHIPPED | OUTPATIENT
Start: 2021-12-30 | End: 2022-02-08 | Stop reason: SDUPTHER

## 2021-12-30 RX ORDER — IRBESARTAN 300 MG/1
300 TABLET ORAL EVERY MORNING
Qty: 30 TABLET | Refills: 0 | OUTPATIENT
Start: 2021-12-30

## 2021-12-30 RX ORDER — PIOGLITAZONEHYDROCHLORIDE 30 MG/1
TABLET ORAL
Qty: 30 TABLET | Refills: 0 | OUTPATIENT
Start: 2021-12-30

## 2022-01-11 ENCOUNTER — IMMUNIZATION (OUTPATIENT)
Dept: VACCINE CLINIC | Facility: HOSPITAL | Age: 67
End: 2022-01-11

## 2022-01-11 PROCEDURE — 91300 HC SARSCOV02 VAC 30MCG/0.3ML IM: CPT | Performed by: INTERNAL MEDICINE

## 2022-01-11 PROCEDURE — 0004A HC ADM SARSCOV2 30MCG/0.3ML BOOSTER: CPT | Performed by: INTERNAL MEDICINE

## 2022-01-20 ENCOUNTER — TELEPHONE (OUTPATIENT)
Dept: NEUROSURGERY | Facility: CLINIC | Age: 67
End: 2022-01-20

## 2022-01-20 DIAGNOSIS — M47.12 CERVICAL SPONDYLOSIS WITH MYELOPATHY: ICD-10-CM

## 2022-01-20 DIAGNOSIS — Z98.1 HISTORY OF SPINAL FUSION: Primary | ICD-10-CM

## 2022-01-20 DIAGNOSIS — G95.9 CERVICAL MYELOPATHY: ICD-10-CM

## 2022-01-20 DIAGNOSIS — M48.02 CERVICAL STENOSIS OF SPINAL CANAL: ICD-10-CM

## 2022-01-30 DIAGNOSIS — F51.01 PRIMARY INSOMNIA: ICD-10-CM

## 2022-01-31 RX ORDER — TRAZODONE HYDROCHLORIDE 50 MG/1
50 TABLET ORAL NIGHTLY PRN
Qty: 90 TABLET | Refills: 0 | Status: SHIPPED | OUTPATIENT
Start: 2022-01-31 | End: 2022-02-08 | Stop reason: SDUPTHER

## 2022-02-08 ENCOUNTER — OFFICE VISIT (OUTPATIENT)
Dept: FAMILY MEDICINE CLINIC | Facility: CLINIC | Age: 67
End: 2022-02-08

## 2022-02-08 VITALS
SYSTOLIC BLOOD PRESSURE: 110 MMHG | OXYGEN SATURATION: 98 % | TEMPERATURE: 97.6 F | HEART RATE: 81 BPM | BODY MASS INDEX: 49.37 KG/M2 | DIASTOLIC BLOOD PRESSURE: 74 MMHG | WEIGHT: 315 LBS

## 2022-02-08 DIAGNOSIS — E11.9 CONTROLLED TYPE 2 DIABETES MELLITUS WITHOUT COMPLICATION, WITHOUT LONG-TERM CURRENT USE OF INSULIN: ICD-10-CM

## 2022-02-08 DIAGNOSIS — Z12.12 SCREENING FOR COLORECTAL CANCER: ICD-10-CM

## 2022-02-08 DIAGNOSIS — I10 ESSENTIAL HYPERTENSION: ICD-10-CM

## 2022-02-08 DIAGNOSIS — E78.2 MIXED HYPERLIPIDEMIA: ICD-10-CM

## 2022-02-08 DIAGNOSIS — Z00.00 MEDICARE ANNUAL WELLNESS VISIT, SUBSEQUENT: Primary | ICD-10-CM

## 2022-02-08 DIAGNOSIS — F51.01 PRIMARY INSOMNIA: ICD-10-CM

## 2022-02-08 DIAGNOSIS — R35.1 BENIGN PROSTATIC HYPERPLASIA WITH NOCTURIA: ICD-10-CM

## 2022-02-08 DIAGNOSIS — F98.8 ATTENTION DEFICIT DISORDER (ADD) WITHOUT HYPERACTIVITY: ICD-10-CM

## 2022-02-08 DIAGNOSIS — F32.5 MAJOR DEPRESSIVE DISORDER WITH SINGLE EPISODE, IN FULL REMISSION: ICD-10-CM

## 2022-02-08 DIAGNOSIS — N40.1 BENIGN PROSTATIC HYPERPLASIA WITH NOCTURIA: ICD-10-CM

## 2022-02-08 DIAGNOSIS — F41.1 GAD (GENERALIZED ANXIETY DISORDER): ICD-10-CM

## 2022-02-08 DIAGNOSIS — Z12.11 SCREENING FOR COLORECTAL CANCER: ICD-10-CM

## 2022-02-08 PROCEDURE — 1159F MED LIST DOCD IN RCRD: CPT | Performed by: NURSE PRACTITIONER

## 2022-02-08 PROCEDURE — G0439 PPPS, SUBSEQ VISIT: HCPCS | Performed by: NURSE PRACTITIONER

## 2022-02-08 PROCEDURE — 1170F FXNL STATUS ASSESSED: CPT | Performed by: NURSE PRACTITIONER

## 2022-02-08 RX ORDER — TRAZODONE HYDROCHLORIDE 100 MG/1
100 TABLET ORAL NIGHTLY PRN
Qty: 90 TABLET | Refills: 0 | Status: SHIPPED | OUTPATIENT
Start: 2022-02-08 | End: 2022-04-27

## 2022-02-08 RX ORDER — DULAGLUTIDE 1.5 MG/.5ML
1.5 INJECTION, SOLUTION SUBCUTANEOUS WEEKLY
Qty: 12 PEN | Refills: 2 | Status: SHIPPED | OUTPATIENT
Start: 2022-02-08 | End: 2022-11-01 | Stop reason: SDUPTHER

## 2022-02-08 RX ORDER — TAMSULOSIN HYDROCHLORIDE 0.4 MG/1
1 CAPSULE ORAL DAILY
Qty: 90 CAPSULE | Refills: 0 | Status: SHIPPED | OUTPATIENT
Start: 2022-02-08 | End: 2022-04-27

## 2022-02-08 RX ORDER — DULOXETIN HYDROCHLORIDE 60 MG/1
60 CAPSULE, DELAYED RELEASE ORAL 2 TIMES DAILY
Qty: 180 CAPSULE | Refills: 0 | Status: SHIPPED | OUTPATIENT
Start: 2022-02-08 | End: 2022-06-21 | Stop reason: SDUPTHER

## 2022-02-08 RX ORDER — IRBESARTAN 300 MG/1
300 TABLET ORAL EVERY MORNING
Qty: 90 TABLET | Refills: 3 | Status: SHIPPED | OUTPATIENT
Start: 2022-02-08 | End: 2023-02-08 | Stop reason: SDUPTHER

## 2022-02-08 RX ORDER — PIOGLITAZONEHYDROCHLORIDE 15 MG/1
15 TABLET ORAL DAILY
Qty: 90 TABLET | Refills: 0 | Status: SHIPPED | OUTPATIENT
Start: 2022-02-08 | End: 2022-06-23 | Stop reason: SDUPTHER

## 2022-02-08 RX ORDER — ATORVASTATIN CALCIUM 20 MG/1
20 TABLET, FILM COATED ORAL DAILY
Qty: 90 TABLET | Refills: 3 | Status: SHIPPED | OUTPATIENT
Start: 2022-02-08 | End: 2023-02-08 | Stop reason: SDUPTHER

## 2022-02-08 RX ORDER — METFORMIN HYDROCHLORIDE 500 MG/1
1000 TABLET, EXTENDED RELEASE ORAL 2 TIMES DAILY
Qty: 360 TABLET | Refills: 1 | Status: SHIPPED | OUTPATIENT
Start: 2022-02-08 | End: 2022-06-21 | Stop reason: SDUPTHER

## 2022-02-08 RX ORDER — ATOMOXETINE 100 MG/1
100 CAPSULE ORAL DAILY
Qty: 90 CAPSULE | Refills: 1 | Status: SHIPPED | OUTPATIENT
Start: 2022-02-08 | End: 2022-06-21 | Stop reason: SDUPTHER

## 2022-02-08 RX ORDER — GLIMEPIRIDE 4 MG/1
4 TABLET ORAL 2 TIMES DAILY
Qty: 180 TABLET | Refills: 1 | Status: SHIPPED | OUTPATIENT
Start: 2022-02-08 | End: 2022-06-21 | Stop reason: SDUPTHER

## 2022-02-08 RX ORDER — HYDROCODONE BITARTRATE AND ACETAMINOPHEN 7.5; 325 MG/1; MG/1
1 TABLET ORAL 3 TIMES DAILY
COMMUNITY
Start: 2022-01-30 | End: 2022-08-26

## 2022-02-08 NOTE — PROGRESS NOTES
The ABCs of the Annual Wellness Visit  Subsequent Medicare Wellness Visit    Chief Complaint   Patient presents with   • Medicare Wellness-subsequent      Subjective    History of Present Illness:  Duane Mark Witten is a 67 y.o. male who presents for a Subsequent Medicare Wellness Visit.    The following portions of the patient's history were reviewed and   updated as appropriate: allergies, current medications, past family history, past medical history, past social history, past surgical history and problem list.    Compared to one year ago, the patient feels his physical   health is the same.    Compared to one year ago, the patient feels his mental   health is the same.    Recent Hospitalizations:  He was not admitted to the hospital during the last year.       Current Medical Providers:  Patient Care Team:  Marly Myrick APRN as PCP - General (Family Medicine)  Code, Angelo SCOTT II, MD as Consulting Physician (Hematology and Oncology)  Tyler Sellers MD (Anesthesiology)    Outpatient Medications Prior to Visit   Medication Sig Dispense Refill   • Accu-Chek Leilani Plus test strip Use as directed to test blood sugar tid E11.9 100 each 11   • cetirizine (zyrTEC) 10 MG tablet TAKE 1 TABLET BY MOUTH EVERY DAY 90 tablet 0   • glucosamine-chondroitin 500-400 MG capsule capsule Take 1 capsule by mouth Daily.     • HYDROcodone-acetaminophen (NORCO) 7.5-325 MG per tablet Take 1 tablet by mouth 3 (Three) Times a Day.     • metoprolol succinate XL (TOPROL-XL) 50 MG 24 hr tablet TAKE 1 TABLET BY MOUTH EVERY DAY AT NIGHT 90 tablet 0   • rivaroxaban (XARELTO) 20 MG tablet Take 1 tablet by mouth Daily With Dinner. Indications: Atrial Fibrillation 30 tablet 0   • atomoxetine (Strattera) 100 MG capsule Take 1 capsule by mouth Daily. 90 capsule 1   • atorvastatin (LIPITOR) 20 MG tablet Take 1 tablet by mouth Daily. 90 tablet 3   • Dulaglutide (Trulicity) 1.5 MG/0.5ML solution pen-injector Inject 1.5 mg under the skin into the  appropriate area as directed 1 (One) Time Per Week. 12 pen 2   • DULoxetine (CYMBALTA) 60 MG capsule Take 1 capsule by mouth 2 (Two) Times a Day. 180 capsule 0   • glimepiride (AMARYL) 4 MG tablet Take 1 tablet by mouth 2 (Two) Times a Day. 180 tablet 1   • irbesartan (AVAPRO) 300 MG tablet Take 1 tablet by mouth Every Morning. 90 tablet 3   • metFORMIN ER (GLUCOPHAGE-XR) 500 MG 24 hr tablet Take 2 tablets by mouth 2 (Two) Times a Day. 360 tablet 1   • pioglitazone (ACTOS) 15 MG tablet Take 1 tablet by mouth Daily. 90 tablet 0   • tamsulosin (FLOMAX) 0.4 MG capsule 24 hr capsule TAKE 1 CAPSULE BY MOUTH EVERY DAY 90 capsule 0   • traZODone (DESYREL) 50 MG tablet TAKE 1 TABLET BY MOUTH AT NIGHT AS NEEDED FOR SLEEP. 90 tablet 0     No facility-administered medications prior to visit.       Opioid medication/s are on active medication list.  and I have evaluated his active treatment plan and pain score trends (see table).  There were no vitals filed for this visit.  I have reviewed the chart for potential of high risk medication and harmful drug interactions in the elderly.            Aspirin is not on active medication list.  Aspirin use is not indicated based on review of current medical condition/s. Risk of harm outweighs potential benefits.  .    Patient Active Problem List   Diagnosis   • YANELI (generalized anxiety disorder)   • ADD (attention deficit disorder)   • Depression   • Diabetes type 2, controlled (HCC)   • ED (erectile dysfunction) of non-organic origin   • HLD (hyperlipidemia)   • Essential hypertension   • Arthritis   • Psoriasis   • Testosterone deficiency   • Primary insomnia   • Weakness of both lower extremities   • Paresthesia of both upper extremities   • Morbid obesity (HCC)   • Adverse effect of corticosteroids   • Type 2 diabetes mellitus with hyperglycemia (HCC)   • Cervical stenosis of spinal canal   • Edema of cervical spinal cord (CMS/HCC)   • Persistent atrial fibrillation (HCC)   •  Cervical myelopathy (HCC)   • Dilated cardiomyopathy (HCC)   • Cervical spondylosis with myelopathy   • History of spinal fusion   • Nonrheumatic aortic valve stenosis   • Leukocytosis   • CAMERON (obstructive sleep apnea)   • Bilateral upper extremity weakness   • Abnormality of gait   • Acute pain of right shoulder   • Other specified arthritis, right shoulder     Advance Care Planning  Advance Directive is not on file.  ACP discussion was held with the patient during this visit. Patient does not have an advance directive, information provided.    Review of Systems   Constitutional: Negative for fever.   HENT: Negative for ear pain, rhinorrhea and sore throat.    Eyes: Negative for visual disturbance.   Respiratory: Negative for cough and shortness of breath.    Cardiovascular: Negative for chest pain.   Gastrointestinal: Negative for abdominal pain, diarrhea, nausea and vomiting.   Genitourinary: Negative.    Musculoskeletal:        Knee pain   Skin: Negative for rash.   Neurological: Negative for dizziness and confusion.        Objective    Vitals:    02/08/22 1018   BP: 110/74   BP Location: Right arm   Patient Position: Sitting   Cuff Size: Large Adult   Pulse: 81   Temp: 97.6 °F (36.4 °C)   TempSrc: Temporal   SpO2: 98%   Weight: (!) 179 kg (395 lb)     BMI Readings from Last 1 Encounters:   02/08/22 49.37 kg/m²   BMI is above normal parameters. Recommendations include: exercise counseling and nutrition counseling    Does the patient have evidence of cognitive impairment? No    Physical Exam  Vitals and nursing note reviewed.   Constitutional:       Appearance: Normal appearance.   HENT:      Head: Normocephalic and atraumatic.      Right Ear: Tympanic membrane and ear canal normal.      Left Ear: Tympanic membrane and ear canal normal.   Eyes:      Conjunctiva/sclera: Conjunctivae normal.      Pupils: Pupils are equal, round, and reactive to light.   Cardiovascular:      Rate and Rhythm: Normal rate and regular  rhythm.      Heart sounds: Normal heart sounds.   Pulmonary:      Effort: Pulmonary effort is normal.      Breath sounds: Normal breath sounds.   Abdominal:      General: Bowel sounds are normal.      Palpations: Abdomen is soft.      Tenderness: There is no abdominal tenderness.   Genitourinary:     Comments: Declined   Musculoskeletal:         General: Normal range of motion.      Cervical back: Neck supple.   Skin:     General: Skin is warm and dry.   Neurological:      Mental Status: He is alert and oriented to person, place, and time.   Psychiatric:         Mood and Affect: Mood normal.       Lab Results   Component Value Date    HGBA1C 6.9 (H) 2021            HEALTH RISK ASSESSMENT    Smoking Status:  Social History     Tobacco Use   Smoking Status Former Smoker   • Packs/day: 1.00   • Quit date:    • Years since quittin.1   Smokeless Tobacco Never Used     Alcohol Consumption:  Social History     Substance and Sexual Activity   Alcohol Use No    Comment: caffeine use: None     Fall Risk Screen:    [facility] Fall Risk Assessment was completed, and patient is at HIGH risk for falls. Assessment completed on:2022    Depression Screening:  PHQ-2/PHQ-9 Depression Screening 2022   Little interest or pleasure in doing things 0   Feeling down, depressed, or hopeless 0   Trouble falling or staying asleep, or sleeping too much -   Feeling tired or having little energy -   Poor appetite or overeating -   Feeling bad about yourself - or that you are a failure or have let yourself or your family down -   Trouble concentrating on things, such as reading the newspaper or watching television -   Moving or speaking so slowly that other people could have noticed. Or the opposite - being so fidgety or restless that you have been moving around a lot more than usual -   Thoughts that you would be better off dead, or of hurting yourself in some way -   Total Score 0   If you checked off any problems, how  difficult have these problems made it for you to do your work, take care of things at home, or get along with other people? -       Health Habits and Functional and Cognitive Screening:  Functional & Cognitive Status 2/8/2022   Do you have difficulty preparing food and eating? Yes   Do you have difficulty bathing yourself, getting dressed or grooming yourself? Yes   Do you have difficulty using the toilet? No   Do you have difficulty moving around from place to place? Yes   Do you have trouble with steps or getting out of a bed or a chair? Yes   Current Diet Well Balanced Diet   Dental Exam Up to date   Eye Exam Not up to date   Exercise (times per week) 4 times per week   Current Exercises Include Walking   Current Exercise Activities Include -   Do you need help using the phone?  No   Are you deaf or do you have serious difficulty hearing?  Yes   Do you need help with transportation? Yes   Do you need help shopping? Yes   Do you need help preparing meals?  Yes   Do you need help with housework?  Yes   Do you need help with laundry? Yes   Do you need help taking your medications? No   Do you need help managing money? No   Do you ever drive or ride in a car without wearing a seat belt? No   Have you felt unusual stress, anger or loneliness in the last month? No   Who do you live with? Spouse   If you need help, do you have trouble finding someone available to you? No   Have you been bothered in the last four weeks by sexual problems? No   Do you have difficulty concentrating, remembering or making decisions? Yes       Age-appropriate Screening Schedule:  Refer to the list below for future screening recommendations based on patient's age, sex and/or medical conditions. Orders for these recommended tests are listed in the plan section. The patient has been provided with a written plan.    Health Maintenance   Topic Date Due   • DIABETIC EYE EXAM  12/19/2020   • INFLUENZA VACCINE  03/31/2022 (Originally 8/1/2021)   •  HEMOGLOBIN A1C  05/18/2022   • LIPID PANEL  05/25/2022   • URINE MICROALBUMIN  11/18/2022   • DIABETIC FOOT EXAM  02/08/2023   • TDAP/TD VACCINES (2 - Td or Tdap) 10/29/2029   • ZOSTER VACCINE  Completed              Assessment/Plan   CMS Preventative Services Quick Reference  Risk Factors Identified During Encounter  Cardiovascular Disease  Chronic Pain   Immunizations Discussed/Encouraged (specific Immunizations; Influenza  Obesity/Overweight   The above risks/problems have been discussed with the patient.  Follow up actions/plans if indicated are seen below in the Assessment/Plan Section.  Pertinent information has been shared with the patient in the After Visit Summary.    Diagnoses and all orders for this visit:    1. Medicare annual wellness visit, subsequent (Primary)    2. Primary insomnia  Comments:  - Will increase Trazodone to 100 mg nightly.   Orders:  -     traZODone (DESYREL) 100 MG tablet; Take 1 tablet by mouth At Night As Needed for Sleep.  Dispense: 90 tablet; Refill: 0    3. Attention deficit disorder (ADD) without hyperactivity  -     atomoxetine (Strattera) 100 MG capsule; Take 1 capsule by mouth Daily.  Dispense: 90 capsule; Refill: 1    4. Mixed hyperlipidemia  -     atorvastatin (LIPITOR) 20 MG tablet; Take 1 tablet by mouth Daily.  Dispense: 90 tablet; Refill: 3    5. Controlled type 2 diabetes mellitus without complication, without long-term current use of insulin (HCC)  -     Dulaglutide (Trulicity) 1.5 MG/0.5ML solution pen-injector; Inject 1.5 mg under the skin into the appropriate area as directed 1 (One) Time Per Week.  Dispense: 12 pen; Refill: 2  -     glimepiride (AMARYL) 4 MG tablet; Take 1 tablet by mouth 2 (Two) Times a Day.  Dispense: 180 tablet; Refill: 1  -     metFORMIN ER (GLUCOPHAGE-XR) 500 MG 24 hr tablet; Take 2 tablets by mouth 2 (Two) Times a Day.  Dispense: 360 tablet; Refill: 1  -     pioglitazone (ACTOS) 15 MG tablet; Take 1 tablet by mouth Daily.  Dispense: 90  tablet; Refill: 0    6. Major depressive disorder with single episode, in full remission (HCC)  Comments:  - ER if any SI/HI.   Orders:  -     DULoxetine (CYMBALTA) 60 MG capsule; Take 1 capsule by mouth 2 (Two) Times a Day.  Dispense: 180 capsule; Refill: 0    7. YANELI (generalized anxiety disorder)  -     DULoxetine (CYMBALTA) 60 MG capsule; Take 1 capsule by mouth 2 (Two) Times a Day.  Dispense: 180 capsule; Refill: 0    8. Essential hypertension  -     irbesartan (AVAPRO) 300 MG tablet; Take 1 tablet by mouth Every Morning.  Dispense: 90 tablet; Refill: 3    9. Benign prostatic hyperplasia with nocturia  -     tamsulosin (FLOMAX) 0.4 MG capsule 24 hr capsule; Take 1 capsule by mouth Daily.  Dispense: 90 capsule; Refill: 0    10. Screening for colorectal cancer  -     Ambulatory Referral For Screening Colonoscopy        Follow Up:   Return in about 3 months (around 5/18/2022) for Recheck.     An After Visit Summary and PPPS were made available to the patient.

## 2022-02-18 ENCOUNTER — APPOINTMENT (OUTPATIENT)
Dept: SLEEP MEDICINE | Facility: HOSPITAL | Age: 67
End: 2022-02-18

## 2022-02-21 ENCOUNTER — TELEPHONE (OUTPATIENT)
Dept: CARDIOLOGY | Facility: CLINIC | Age: 67
End: 2022-02-21

## 2022-03-09 ENCOUNTER — OFFICE VISIT (OUTPATIENT)
Dept: SLEEP MEDICINE | Facility: HOSPITAL | Age: 67
End: 2022-03-09

## 2022-03-09 ENCOUNTER — TELEPHONE (OUTPATIENT)
Dept: SLEEP MEDICINE | Facility: HOSPITAL | Age: 67
End: 2022-03-09

## 2022-03-09 VITALS
BODY MASS INDEX: 39.17 KG/M2 | DIASTOLIC BLOOD PRESSURE: 88 MMHG | WEIGHT: 315 LBS | HEART RATE: 88 BPM | SYSTOLIC BLOOD PRESSURE: 147 MMHG | OXYGEN SATURATION: 96 % | HEIGHT: 75 IN

## 2022-03-09 DIAGNOSIS — G47.00 INSOMNIA, UNSPECIFIED TYPE: ICD-10-CM

## 2022-03-09 DIAGNOSIS — E66.01 OBESITY, MORBID, BMI 40.0-49.9: ICD-10-CM

## 2022-03-09 DIAGNOSIS — G47.33 OBSTRUCTIVE SLEEP APNEA: Primary | ICD-10-CM

## 2022-03-09 PROCEDURE — G0463 HOSPITAL OUTPT CLINIC VISIT: HCPCS

## 2022-03-09 NOTE — PROGRESS NOTES
[facility]  Telephone: [phone] / Fax: [phone] [place]  Telephone: [phone] / Fax: [phone] Magali Mcmanus    PCP: Marly Myrick APRN    Reason for visit: CAMERON f/u    Duane Mark Witten is a 67 y.o.male  was last seen at [facility] sleep lab in 2021.  He feels that current BIPAP pressures are a bit too low. His IPAP max is 20, EPAP min is 12, pressure support is 4, 95% pressure 18/14. He uses Andrews FX mask. It works well and fits great.  Wife reports that he continues to snore occasionally despite BIPAP. He uses the machine at night and for naps. Sleep quality is definitely better with BIPAP.  There is no complaint of aerophagia, excessive dry mouth or air leak as long as he keeps the strap in the back tight and secure.  His bedtime schedule is midnight and awake time is 8am. Some nights he may stay up until 8am. He takes Trazodone 100mg at bedtime.  It helps the insomnia significantly.    SH- no tobacco, no alcohol, no caffeine, no energy drinks.    ROS- +post nasal drio    DME [facility]     Current Medications:    Current Outpatient Medications:   •  Accu-Chek Leilani Plus test strip, Use as directed to test blood sugar tid E11.9, Disp: 100 each, Rfl: 11  •  atomoxetine (Strattera) 100 MG capsule, Take 1 capsule by mouth Daily., Disp: 90 capsule, Rfl: 1  •  atorvastatin (LIPITOR) 20 MG tablet, Take 1 tablet by mouth Daily., Disp: 90 tablet, Rfl: 3  •  cetirizine (zyrTEC) 10 MG tablet, TAKE 1 TABLET BY MOUTH EVERY DAY, Disp: 90 tablet, Rfl: 0  •  Dulaglutide (Trulicity) 1.5 MG/0.5ML solution pen-injector, Inject 1.5 mg under the skin into the appropriate area as directed 1 (One) Time Per Week., Disp: 12 pen, Rfl: 2  •  DULoxetine (CYMBALTA) 60 MG capsule, Take 1 capsule by mouth 2 (Two) Times a Day., Disp: 180 capsule, Rfl: 0  •  glimepiride (AMARYL) 4 MG tablet, Take 1 tablet by mouth 2 (Two) Times a Day., Disp: 180 tablet, Rfl: 1  •  glucosamine-chondroitin 500-400 MG capsule  "capsule, Take 1 capsule by mouth Daily., Disp: , Rfl:   •  HYDROcodone-acetaminophen (NORCO) 7.5-325 MG per tablet, Take 1 tablet by mouth 3 (Three) Times a Day., Disp: , Rfl:   •  irbesartan (AVAPRO) 300 MG tablet, Take 1 tablet by mouth Every Morning., Disp: 90 tablet, Rfl: 3  •  metFORMIN ER (GLUCOPHAGE-XR) 500 MG 24 hr tablet, Take 2 tablets by mouth 2 (Two) Times a Day., Disp: 360 tablet, Rfl: 1  •  metoprolol succinate XL (TOPROL-XL) 50 MG 24 hr tablet, TAKE 1 TABLET BY MOUTH EVERY DAY AT NIGHT, Disp: 90 tablet, Rfl: 0  •  pioglitazone (ACTOS) 15 MG tablet, Take 1 tablet by mouth Daily., Disp: 90 tablet, Rfl: 0  •  rivaroxaban (XARELTO) 20 MG tablet, Take 1 tablet by mouth Daily With Dinner. Indications: Atrial Fibrillation, Disp: 30 tablet, Rfl: 0  •  tamsulosin (FLOMAX) 0.4 MG capsule 24 hr capsule, Take 1 capsule by mouth Daily., Disp: 90 capsule, Rfl: 0  •  traZODone (DESYREL) 100 MG tablet, Take 1 tablet by mouth At Night As Needed for Sleep., Disp: 90 tablet, Rfl: 0   also entered in Sleep Questionnaire    Patient  has a past medical history of ADD (attention deficit disorder), Anemia, Atrial fibrillation (HCC), Depression, Dilated cardiomyopathy (HCC), ED (erectile dysfunction), Essential hypertension, YANELI (generalized anxiety disorder), History of obesity, HLD (hyperlipidemia), Leukocytosis, Morbid obesity (HCC), CAMERON (obstructive sleep apnea), Osteoarthrosis, Persistent atrial fibrillation (HCC), PONV (postoperative nausea and vomiting), Psoriasis, Testosterone deficiency, and Type 2 diabetes mellitus (HCC).    I have reviewed the Past Medical History, Past Surgical History, Social History and Family History.    Vital Signs /88   Pulse 88   Ht 190.5 cm (75\")   Wt (!) 172 kg (379 lb)   SpO2 96%   BMI 47.37 kg/m²  Body mass index is 47.37 kg/m².    General Alert and oriented. No acute distress noted   Pharynx/Throat Class IV  Mallampati airway, large tongue, no evidence of redundant lateral " pharyngeal tissue. No oral lesions. No thrush. Moist mucous membranes.   Head Normocephalic. Symmetrical. Atraumatic.    Nose No septal deviation. No drainage   Chest Wall Normal shape. Symmetric expansion with respiration. No tenderness.   Neck Trachea midline, no thyromegaly or adenopathy    Lungs Clear to auscultation bilaterally. No wheezes. No rhonchi. No rales. Respirations regular, even and unlabored.   Heart Regular rhythm and normal rate. Normal S1 and S2. No murmur   Abdomen Soft, non-tender and non-distended. Normal bowel sounds. No masses.   Extremities Moves all extremities well. No edema   Psychiatric Normal mood and affect.     Testing:  · Download 12/9/21-3/8/22 100% use with average nightly use of 11 hours and 59 minutes on auto BIPAP, IPAP max 20, EPAP min 12, PS 4, 95% pressure 18/14.    Study 12/19/19- The patient tolerated the home sleep testing with monitoring time of 449 minutes. The data obtained make this a technically adequate study. The apnea hypopneas index(AHI) was 15.8 per sleep hour.  The AHI during supine position was 17.7 per sleep hour. Mean heart rate of 70.1 BPM.  Snoring was noted 30.2% of sleep time. Lowest oxygen saturation during the study was 80%. Saturation below 89% was noted for 5.5 mins.        Impression:  1. Obstructive sleep apnea    2. Obesity, morbid, BMI 40.0-49.9 ([facility])    3. Insomnia, unspecified type          Plan:  Increase EPAP min on BIPAP to 15 and increase IPAP max 22. Keep PS at 4. If pressures continue to feel too low, he was advised to call us. Change will be made today via mode. I will send order to [facility] to renew supplies. He uses the machine and benefits from its use in terms of reduction of hypersomnia and snoring. Insomnia is controlled well on the Trazodone.    Follow up with Dr. Deluna in one year    Thank you for allowing me to participate in your patient's care.      KAYLEN Ramirez  [facility]  Phone: [phone]      Part of  this note may be an electronic transcription/translation of spoken language to printed text using the Dragon Dictation System.

## 2022-03-18 ENCOUNTER — OFFICE VISIT (OUTPATIENT)
Dept: FAMILY MEDICINE CLINIC | Facility: CLINIC | Age: 67
End: 2022-03-18

## 2022-03-18 VITALS
WEIGHT: 315 LBS | TEMPERATURE: 97.9 F | HEART RATE: 80 BPM | DIASTOLIC BLOOD PRESSURE: 83 MMHG | SYSTOLIC BLOOD PRESSURE: 132 MMHG | BODY MASS INDEX: 46.7 KG/M2 | OXYGEN SATURATION: 97 %

## 2022-03-18 DIAGNOSIS — R53.83 FATIGUE, UNSPECIFIED TYPE: ICD-10-CM

## 2022-03-18 DIAGNOSIS — R31.9 HEMATURIA, UNSPECIFIED TYPE: Primary | ICD-10-CM

## 2022-03-18 DIAGNOSIS — E11.9 CONTROLLED TYPE 2 DIABETES MELLITUS WITHOUT COMPLICATION, WITHOUT LONG-TERM CURRENT USE OF INSULIN: ICD-10-CM

## 2022-03-18 LAB
BILIRUB BLD-MCNC: ABNORMAL MG/DL
CLARITY, POC: ABNORMAL
COLOR UR: ABNORMAL
EXPIRATION DATE: ABNORMAL
GLUCOSE UR STRIP-MCNC: NEGATIVE MG/DL
KETONES UR QL: NEGATIVE
LEUKOCYTE EST, POC: ABNORMAL
Lab: ABNORMAL
NITRITE UR-MCNC: NEGATIVE MG/ML
PH UR: 6 [PH] (ref 5–8)
PROT UR STRIP-MCNC: ABNORMAL MG/DL
RBC # UR STRIP: ABNORMAL /UL
SP GR UR: 1.02 (ref 1–1.03)
UROBILINOGEN UR QL: NORMAL

## 2022-03-18 PROCEDURE — 81003 URINALYSIS AUTO W/O SCOPE: CPT | Performed by: NURSE PRACTITIONER

## 2022-03-18 PROCEDURE — 99213 OFFICE O/P EST LOW 20 MIN: CPT | Performed by: NURSE PRACTITIONER

## 2022-03-18 RX ORDER — SULFAMETHOXAZOLE AND TRIMETHOPRIM 800; 160 MG/1; MG/1
1 TABLET ORAL 2 TIMES DAILY
Qty: 6 TABLET | Refills: 0 | Status: SHIPPED | OUTPATIENT
Start: 2022-03-18 | End: 2022-03-21

## 2022-03-19 LAB
25(OH)D3+25(OH)D2 SERPL-MCNC: 27.8 NG/ML (ref 30–100)
BASOPHILS # BLD AUTO: 0.1 X10E3/UL (ref 0–0.2)
BASOPHILS NFR BLD AUTO: 1 %
EOSINOPHIL # BLD AUTO: 0.9 X10E3/UL (ref 0–0.4)
EOSINOPHIL NFR BLD AUTO: 9 %
ERYTHROCYTE [DISTWIDTH] IN BLOOD BY AUTOMATED COUNT: 15.7 % (ref 11.6–15.4)
HBA1C MFR BLD: 7 % (ref 4.8–5.6)
HCT VFR BLD AUTO: 38.8 % (ref 37.5–51)
HGB BLD-MCNC: 12 G/DL (ref 13–17.7)
IMM GRANULOCYTES # BLD AUTO: 0.1 X10E3/UL (ref 0–0.1)
IMM GRANULOCYTES NFR BLD AUTO: 1 %
LYMPHOCYTES # BLD AUTO: 2.5 X10E3/UL (ref 0.7–3.1)
LYMPHOCYTES NFR BLD AUTO: 24 %
MCH RBC QN AUTO: 24.5 PG (ref 26.6–33)
MCHC RBC AUTO-ENTMCNC: 30.9 G/DL (ref 31.5–35.7)
MCV RBC AUTO: 79 FL (ref 79–97)
MONOCYTES # BLD AUTO: 0.6 X10E3/UL (ref 0.1–0.9)
MONOCYTES NFR BLD AUTO: 6 %
NEUTROPHILS # BLD AUTO: 6.3 X10E3/UL (ref 1.4–7)
NEUTROPHILS NFR BLD AUTO: 59 %
PLATELET # BLD AUTO: 301 X10E3/UL (ref 150–450)
RBC # BLD AUTO: 4.89 X10E6/UL (ref 4.14–5.8)
WBC # BLD AUTO: 10.5 X10E3/UL (ref 3.4–10.8)

## 2022-03-20 LAB
BACTERIA UR CULT: NORMAL
BACTERIA UR CULT: NORMAL

## 2022-03-21 NOTE — PROGRESS NOTES
Chief Complaint  Blood in Urine and Back Pain    Subjective          Duane Wojciech Ramirez presents to [facility]  Blood in Urine  This is a new problem. He is experiencing no pain. He describes his urine color as tea-colored. Pertinent negatives include no abdominal pain, fever, flank pain, nausea or vomiting. Risk factors include anticoagulant.     Objective   Vital Signs:   /83 (BP Location: Right arm, Patient Position: Sitting, Cuff Size: Large Adult)   Pulse 80   Temp 97.9 °F (36.6 °C) (Temporal)   Wt (!) 169 kg (373 lb 9.6 oz)   SpO2 97%   BMI 46.70 kg/m²     Physical Exam  Vitals and nursing note reviewed.   Constitutional:       Appearance: Normal appearance.   Cardiovascular:      Rate and Rhythm: Normal rate and regular rhythm.   Pulmonary:      Effort: Pulmonary effort is normal.      Breath sounds: Normal breath sounds.   Abdominal:      General: Bowel sounds are normal.      Palpations: Abdomen is soft.      Tenderness: There is no abdominal tenderness. There is no right CVA tenderness or left CVA tenderness.   Neurological:      Mental Status: He is alert and oriented to person, place, and time.   Psychiatric:         Mood and Affect: Mood normal.        Result Review :   The following data was reviewed by: KAYLEN Uriostegui on 03/18/2022:  UA    Urinalysis 3/18/22   Ketones, UA Negative   Leukocytes, UA Trace (A)   (A) Abnormal value                   Assessment and Plan    Diagnoses and all orders for this visit:    1. Hematuria, unspecified type (Primary)  -     POCT urinalysis dipstick, automated  -     Urine Culture - Urine, Urine, Clean Catch  -     sulfamethoxazole-trimethoprim (Bactrim DS) 800-160 MG per tablet; Take 1 tablet by mouth 2 (Two) Times a Day for 3 days.  Dispense: 6 tablet; Refill: 0    2. Fatigue, unspecified type  -     CBC & Differential  -     Vitamin D 25 Hydroxy    3. Controlled type 2 diabetes mellitus without complication, without  long-term current use of insulin (HCC)  -     Hemoglobin A1c    4. Body mass index (BMI) 45.0-49.9, adult (HCC)   -     Vitamin D 25 Hydroxy      I spent 20 minutes caring for Duane on this date of service. This time includes time spent by me in the following activities:reviewing tests, performing a medically appropriate examination and/or evaluation , counseling and educating the patient/family/caregiver, ordering medications, tests, or procedures and documenting information in the medical record  Follow Up   Return if symptoms worsen or fail to improve.  Patient was given instructions and counseling regarding his condition or for health maintenance advice. Please see specific information pulled into the AVS if appropriate.

## 2022-03-23 ENCOUNTER — TELEPHONE (OUTPATIENT)
Dept: FAMILY MEDICINE CLINIC | Facility: CLINIC | Age: 67
End: 2022-03-23

## 2022-03-23 DIAGNOSIS — D64.9 ANEMIA, UNSPECIFIED TYPE: ICD-10-CM

## 2022-03-23 DIAGNOSIS — R53.83 FATIGUE, UNSPECIFIED TYPE: Primary | ICD-10-CM

## 2022-03-23 NOTE — TELEPHONE ENCOUNTER
Urine culture is negative for infection.  CBC shows new anemia.  Since you are taking Xarelto would recommend urgent GI referral.  Would recommend starting iron supplementation at this time.  Vitamin D is insufficient at 27.8 so would recommend starting over-the-counter vitamin D3 daily.  Diabetes remains well controlled so continue current treatment regimen.    Patient aware of results and recommendations.

## 2022-04-04 ENCOUNTER — PRE-PROCEDURE SCREENING (OUTPATIENT)
Dept: GASTROENTEROLOGY | Facility: CLINIC | Age: 67
End: 2022-04-04

## 2022-04-13 ENCOUNTER — HOSPITAL ENCOUNTER (OUTPATIENT)
Dept: GENERAL RADIOLOGY | Facility: HOSPITAL | Age: 67
Discharge: HOME | End: 2022-04-13
Admitting: NEUROLOGICAL SURGERY

## 2022-04-13 ENCOUNTER — OFFICE VISIT (OUTPATIENT)
Dept: CARDIOLOGY | Facility: CLINIC | Age: 67
End: 2022-04-13

## 2022-04-13 VITALS
SYSTOLIC BLOOD PRESSURE: 144 MMHG | HEART RATE: 83 BPM | WEIGHT: 315 LBS | DIASTOLIC BLOOD PRESSURE: 70 MMHG | BODY MASS INDEX: 39.17 KG/M2 | HEIGHT: 75 IN

## 2022-04-13 DIAGNOSIS — I42.0 DILATED CARDIOMYOPATHY: ICD-10-CM

## 2022-04-13 DIAGNOSIS — I48.21 PERMANENT ATRIAL FIBRILLATION: Primary | ICD-10-CM

## 2022-04-13 DIAGNOSIS — G95.9 CERVICAL MYELOPATHY: ICD-10-CM

## 2022-04-13 DIAGNOSIS — Q23.1 BICUSPID AORTIC VALVE: ICD-10-CM

## 2022-04-13 DIAGNOSIS — M48.02 CERVICAL STENOSIS OF SPINAL CANAL: ICD-10-CM

## 2022-04-13 DIAGNOSIS — Z98.1 HISTORY OF SPINAL FUSION: ICD-10-CM

## 2022-04-13 DIAGNOSIS — I34.1 MVP (MITRAL VALVE PROLAPSE): ICD-10-CM

## 2022-04-13 DIAGNOSIS — I34.0 NONRHEUMATIC MITRAL VALVE REGURGITATION: ICD-10-CM

## 2022-04-13 DIAGNOSIS — I35.0 NONRHEUMATIC AORTIC VALVE STENOSIS: ICD-10-CM

## 2022-04-13 DIAGNOSIS — M47.12 CERVICAL SPONDYLOSIS WITH MYELOPATHY: ICD-10-CM

## 2022-04-13 DIAGNOSIS — I10 ESSENTIAL HYPERTENSION: ICD-10-CM

## 2022-04-13 PROCEDURE — 99214 OFFICE O/P EST MOD 30 MIN: CPT | Performed by: NURSE PRACTITIONER

## 2022-04-13 PROCEDURE — 93000 ELECTROCARDIOGRAM COMPLETE: CPT | Performed by: NURSE PRACTITIONER

## 2022-04-13 PROCEDURE — 72050 X-RAY EXAM NECK SPINE 4/5VWS: CPT

## 2022-04-13 RX ORDER — METOPROLOL SUCCINATE 50 MG/1
50 TABLET, EXTENDED RELEASE ORAL
Qty: 90 TABLET | Refills: 3 | Status: SHIPPED | OUTPATIENT
Start: 2022-04-13

## 2022-04-13 NOTE — PROGRESS NOTES
Date of Office Visit: 2022  Encounter Provider: KAYLEN Correa  Place of Service: [facility] CARDIOLOGY  Patient Name: Duane Mark Witten  :1955  Primary Cardiologist: Dr. Valeria Bravo     Chief Complaint   Patient presents with   • Follow-up   • Atrial Fibrillation   :     HPI: Duane Mark Witten is a pleasant 67 y.o. male who presents today for follow upon atrial fibrillation. I have reviewed her medical records.     He has been diagnosed with hypertension, hyperlipidemia, obesity, type 2 diabetes, and obstructive sleep apnea (compliant with BiPAP machine).    In 2018, he was hospitalized for severe cervical stenosis and underwent cervical corpectomy with cage.  Postoperatively he developed asymptomatic atrial fibrillation.  Echo showed EF of 47%, mild LVH, mild to moderate aortic stenosis, mitral valve prolapse, and mild mitral regurgitation.  In 2019, cardiac PET scan showed possible ischemia felt to be a low risk study and medical treatment was recommended.  He remains in persistent atrial fibrillation and was not felt to be a good candidate for cardioversion.    In 2020, repeat echo showed improved EF of 51%, bicuspid aortic valve, and mild to moderate aortic valve stenosis.      In 2021, carotid duplex showed mid/right/left carotid plaque and proximal left/right plaque.    He presents today with his wife accompanying him.  His blood pressure is elevated and heart rate well controlled.  They alternate between taking metoprolol 50 mg daily for a week and then sometimes switching to 25 mg daily because of fatigue.  He feels better on the lower dose of metoprolol 25 mg and takes it in the morning.  He has chronic shortness of breath and fatigue.  He was recently noted to have hematuria and then was referred to GI for an EGD and colonoscopy.  He denies chest pain, PND, orthopnea, palpitations, dizziness, or syncope.      Past  Medical History:   Diagnosis Date   • ADD (attention deficit disorder)    • Anemia     intermittent   • Atrial fibrillation (HCC)    • Depression    • Dilated cardiomyopathy (HCC)    • ED (erectile dysfunction)    • Essential hypertension    • YANELI (generalized anxiety disorder)    • History of obesity    • HLD (hyperlipidemia)    • Leukocytosis    • Morbid obesity (HCC)    • CAMERON (obstructive sleep apnea)     BiPAP nightly   • Osteoarthrosis    • Persistent atrial fibrillation (HCC)    • PONV (postoperative nausea and vomiting)    • Psoriasis    • Testosterone deficiency    • Type 2 diabetes mellitus (HCC)        Past Surgical History:   Procedure Laterality Date   • ANTERIOR CHANNEL VERTEBRECTOMY/CORPECTOMY Bilateral 2018    Procedure: C4, C5, C6 ANTERIOR CERVICAL CORPECTOMY;  Surgeon: Chirag Markham MD;  Location: [facility];  Service: Neurosurgery   • BIOPSY / EXCISION / DISSECTION AXILLARY NODE  1994    Lymph Node testing for cancer    • COLONOSCOPY      10+ years; normal   • NECK SURGERY  2018    C4/5/6 anterior cervical corpectomy   • PARATHYROIDECTOMY         Social History     Socioeconomic History   • Marital status:      Spouse name: Mindy   Tobacco Use   • Smoking status: Former Smoker     Packs/day: 1.00     Quit date:      Years since quittin.3   • Smokeless tobacco: Never Used   Substance and Sexual Activity   • Alcohol use: No     Comment: caffeine use: None   • Drug use: No       Family History   Problem Relation Age of Onset   • Anxiety disorder Mother    • Bipolar disorder Mother    • Depression Mother    • Glaucoma Mother    • Macular degeneration Mother    • Lupus Mother    • Aortic stenosis Mother         s/p TAVR at age 87   • Cancer Father    • Diabetes Father    • Heart disease Father    • Hypertension Father    • Stroke Father    • Lupus Other         grandmother       The following portion of the patient's history were reviewed and updated as  appropriate: past medical history, past surgical history, past social history, past family history, allergies, current medications, and problem list.    Review of Systems   Constitutional: Positive for malaise/fatigue.   Cardiovascular: Positive for dyspnea on exertion and leg swelling.   Respiratory: Positive for shortness of breath.    Hematologic/Lymphatic: Negative.    Musculoskeletal: Positive for joint pain and myalgias.   Neurological: Negative.        No Known Allergies      Current Outpatient Medications:   •  Accu-Chek Leilani Plus test strip, Use as directed to test blood sugar tid E11.9, Disp: 100 each, Rfl: 11  •  atomoxetine (Strattera) 100 MG capsule, Take 1 capsule by mouth Daily., Disp: 90 capsule, Rfl: 1  •  atorvastatin (LIPITOR) 20 MG tablet, Take 1 tablet by mouth Daily., Disp: 90 tablet, Rfl: 3  •  cetirizine (zyrTEC) 10 MG tablet, TAKE 1 TABLET BY MOUTH EVERY DAY, Disp: 90 tablet, Rfl: 0  •  Dulaglutide (Trulicity) 1.5 MG/0.5ML solution pen-injector, Inject 1.5 mg under the skin into the appropriate area as directed 1 (One) Time Per Week., Disp: 12 pen, Rfl: 2  •  DULoxetine (CYMBALTA) 60 MG capsule, Take 1 capsule by mouth 2 (Two) Times a Day., Disp: 180 capsule, Rfl: 0  •  glimepiride (AMARYL) 4 MG tablet, Take 1 tablet by mouth 2 (Two) Times a Day., Disp: 180 tablet, Rfl: 1  •  glucosamine-chondroitin 500-400 MG capsule capsule, Take 1 capsule by mouth Daily., Disp: , Rfl:   •  HYDROcodone-acetaminophen (NORCO) 7.5-325 MG per tablet, Take 1 tablet by mouth 3 (Three) Times a Day., Disp: , Rfl:   •  irbesartan (AVAPRO) 300 MG tablet, Take 1 tablet by mouth Every Morning., Disp: 90 tablet, Rfl: 3  •  metFORMIN ER (GLUCOPHAGE-XR) 500 MG 24 hr tablet, Take 2 tablets by mouth 2 (Two) Times a Day., Disp: 360 tablet, Rfl: 1  •  metoprolol succinate XL (TOPROL-XL) 50 MG 24 hr tablet, Take 1 tablet by mouth every night at bedtime., Disp: 90 tablet, Rfl: 3  •  pioglitazone (ACTOS) 15 MG tablet, Take 1  "tablet by mouth Daily., Disp: 90 tablet, Rfl: 0  •  rivaroxaban (XARELTO) 20 MG tablet, Take 1 tablet by mouth Daily With Dinner. Indications: Atrial Fibrillation, Disp: 90 tablet, Rfl: 3  •  tamsulosin (FLOMAX) 0.4 MG capsule 24 hr capsule, Take 1 capsule by mouth Daily., Disp: 90 capsule, Rfl: 0  •  traZODone (DESYREL) 100 MG tablet, Take 1 tablet by mouth At Night As Needed for Sleep., Disp: 90 tablet, Rfl: 0        Objective:     Vitals:    04/13/22 1255 04/13/22 1311   BP: 150/78 144/70   BP Location: Left arm Right arm   Pulse: 83    Weight: (!) 172 kg (380 lb)    Height: 190.5 cm (75\")      Body mass index is 47.5 kg/m².    PHYSICAL EXAM:    Vitals Reviewed.   General Appearance: No acute distress, well developed and well nourished. Obese.   Eyes: Conjunctiva and lids: No erythema, swelling, or discharge. Sclera non-icteric. Glasses.   HENT: Atraumatic, normocephalic. External eyes, ears, and nose normal. No hearing loss noted. Mucous membranes normal. Lips not cyanotic. Neck supple with no tenderness. Wearing mask.   Respiratory: No signs of respiratory distress. Respiration rhythm and depth normal.   Clear to auscultation. No rales, crackles, rhonchi, or wheezing auscultated.   Cardiovascular:  Jugular Venous Pressure: Normal  Heart Rate and Rhythm: Irregularly, irregular.  Heart Sounds: Normal S1 and S2. No S3 or S4 noted.  Murmurs: No murmurs noted. No rubs, thrills, or gallops.   Lower Extremities: No edema noted.  Gastrointestinal:  Abdomen soft, non-distended, non-tender.    Musculoskeletal: Normal movement of extremities.  Skin and Nails: General appearance normal. No pallor, cyanosis, diaphoresis. Skin temperature normal. No clubbing of fingernails.   Psychiatric: Patient alert and oriented to person, place, and time. Speech and behavior appropriate. Normal mood and affect.       ECG 12 Lead    Date/Time: 4/13/2022 1:09 PM  Performed by: Theresa Henderson APRN  Authorized by: Theresa Henderson APRN "   Comparison: compared with previous ECG from 3/23/2021  Similar to previous ECG  Rhythm: sinus rhythm  Rate: normal  BPM: 83  Conduction: conduction normal  ST Segments: ST segments normal  T Waves: T waves normal  QRS axis: normal  Other findings: poor R wave progression    Clinical impression: abnormal EKG              Assessment:       Diagnosis Plan   1. Permanent atrial fibrillation (HCC)  Adult Transthoracic Echo Complete W/ Cont if Necessary Per Protocol   2. Dilated cardiomyopathy (HCC)  Adult Transthoracic Echo Complete W/ Cont if Necessary Per Protocol   3. Essential hypertension  metoprolol succinate XL (TOPROL-XL) 50 MG 24 hr tablet    Adult Transthoracic Echo Complete W/ Cont if Necessary Per Protocol   4. Bicuspid aortic valve  Adult Transthoracic Echo Complete W/ Cont if Necessary Per Protocol   5. Nonrheumatic aortic valve stenosis  Adult Transthoracic Echo Complete W/ Cont if Necessary Per Protocol   6. MVP (mitral valve prolapse)  Adult Transthoracic Echo Complete W/ Cont if Necessary Per Protocol   7. Nonrheumatic mitral valve regurgitation  Adult Transthoracic Echo Complete W/ Cont if Necessary Per Protocol          Plan:       1.  Permanent Atrial Fibrillation: Rate controlled today.  He is switching between taking metoprolol 50 mg or 25 mg based on his fatigue level.  I recommended taking metoprolol 50 mg 1 hour before bedtime.  I will call him in a few weeks and if his fatigue level is improved we will continue with that dosage.  If not, I will decrease metoprolol to 25 mg nightly and follow-up with a 24-hour Holter monitor to see if he is adequately rate controlled.  Continue rivaroxaban for stroke prevention.  He denies any current bleeding.    Atrial Fibrillation and Atrial Flutter  Assessment  • The patient has permanent atrial fibrillation  • This is non-valvular in etiology  • The patient's CHADS2-VASc score is 3  • A [id number] score of 2 or more is considered a high risk for a  thromboembolic event  • Rivaroxaban prescribed      2.  Dilated Cardiomyopathy: NYHA class II.  LVEF 51% per echo 2020.  Continue metoprolol succinate and irbesartan.    3.  Bicuspid Aortic Valve and aortic Stenosis: Mild to moderate per echo 2020.  Repeat echocardiogram at his convenience.    4/5.  Mitral Valve Prolapse and Mitral Regurgitation: Repeat echocardiogram.    6.  Hypertension: Blood pressure elevated today.    7.  Hyperlipidemia: On atorvastatin.    8.  Carotid Plaque: Noted on duplex in 2021.    9.  Fatigue: This may be his baseline over the last couple of years.  Repeat echocardiogram.    10.  I recommended a 1 year follow-up visit with Dr. Valeria Bravo.    ADDENDUM 5/18/2022:      I reviewed with Dr. Bravo. Normal EF, bicuspid aortic valve, and moderate to severe aortic stenosis.  Dr. Bravo recommended repeating in 6 months.  He does not want blood pressure to get too low and his blood pressure was actually a little elevated on the last visit.  He has chronic shortness of breath over the past 3 years and has been unchanged and he said it happened after his neck surgery.  I asked him to call with any changes of his shortness of breath or if he develops chest pain or syncope.  Follow-up in 6 months and have a same-day echocardiogram.  He verbalized understanding.      As always, it has been a pleasure to participate in your patient's care. Thank you.       Sincerely,         KAYLEN Wiesman  [facility] Cardiology      · Dictated utilizing Dragon Dictation  · COVID-19 Precautions - Patient was compliant in wearing a mask. When I saw the patient, I used appropriate personal protective equipment (PPE) including mask and eye shield (standard procedure).  Additionally, I used gown and gloves if indicated.  Hand hygiene was completed before and after seeing the patient.  · I spent 30 minutes reviewing her medical records/testing/previous office notes/labs, face-to-face  interaction with patient, physical examination, formulating the plan of care, and discussion of plan of care with patient.

## 2022-04-18 NOTE — PROGRESS NOTES
Subjective   Patient ID: Duane Mark Witten is a 67 y.o. male is here today for a follow-up of cervical myelopathy.  Pt last seen in 12/16/20 and reported neck pain and stiffness as well as N/T/W in  bilateral hands with balance issues. Pt had cervical xray done on 4/13/22.    Today Mr. Ramirez reports things are about the same. He reports continued stiffness and pain of his neck. He reports some mild numbness, tingling and weakness of his hands. He reports an increased weakness of his hands when he raises his arms. He reports that his balance is about the same, some days he uses his cane and other days he does not have to use it. He reports when he is sitting down watching tv and turns his head sometimes he hears a pop. He reports since his last visit he has started going to pain management primarily for his knees. He is wanting to have something for his car to state he has a spinal injury and cannot walk in a straight line. He would also like prescription for transport chair.     Is here with his wife.  Its been about 3-1/2 years since he underwent a C4-C5 and C6 corpectomy with a cage and plate for progressive myelopathy.  I saw him last about 18 months ago.  He finally got Social Security disability.  He is about the same from a motor standpoint and he is ataxic which is probably not going to change at this point.  He is quite deconditioned because he really has not been getting much exercise and I encouraged him to use a recumbent bicycle with an arm cycle that his mother has to help him get back into better physical condition.  I encouraged him to do some body weight air squats and calisthenics to help condition his muscles as well.  His wife wanted to know if we could give a prescription for a transport chair that she can use when she takes them out to the store since he really cannot walk very quickly.  I am fine with doing that but I asked her to try and find a specific model that would be acceptable  particular for someone his size and send it to us and I will write this prescription up accordingly.  I think Medicare will probably pay for it.  We discussed the x-rays which are stable.  There is no pullout of the screws of the cage of the plate.  He wanted to know if I could write a letter explaining the nature of his problem.  He is concerned that if he ever gets pulled over and he is asked to do a sobriety test he would fail because of the ataxia that he has.  I be happy to do that and I will dictate a letter and will send it to him.  Otherwise I will see him in 18 months with x-rays.  He has not developed any significant C7-T1 spondylolisthesis.    Neck Pain   This is a chronic problem. The current episode started more than 1 year ago. The problem occurs constantly. The problem has been unchanged. The pain is present in the left side, midline and right side. The quality of the pain is described as aching. The pain is at a severity of 4/10. The pain is mild. The symptoms are aggravated by position, bending and twisting. Associated symptoms include numbness, tingling and weakness. Pertinent negatives include no fever or leg pain. He has tried oral narcotics for the symptoms. The treatment provided mild relief.       The following portions of the patient's history were reviewed and updated as appropriate: allergies, current medications, past family history, past medical history, past social history, past surgical history and problem list.    Review of Systems   Constitutional: Positive for activity change. Negative for fever.   Musculoskeletal: Positive for neck pain and neck stiffness.   Neurological: Positive for tingling, weakness and numbness.   Psychiatric/Behavioral: Negative for sleep disturbance.   All other systems reviewed and are negative.      Objective   Physical Exam  Constitutional:       Appearance: He is well-developed.   HENT:      Head: Normocephalic and atraumatic.   Eyes:      Extraocular  Movements: EOM normal.      Conjunctiva/sclera: Conjunctivae normal.      Pupils: Pupils are equal, round, and reactive to light.   Neck:      Vascular: No carotid bruit.   Neurological:      Mental Status: He is oriented to person, place, and time.      Coordination: Finger-Nose-Finger Test and Heel to Shin Test normal.      Gait: Gait is intact.      Deep Tendon Reflexes:      Reflex Scores:       Tricep reflexes are 2+ on the right side and 2+ on the left side.       Bicep reflexes are 2+ on the right side and 2+ on the left side.       Brachioradialis reflexes are 2+ on the right side and 2+ on the left side.       Patellar reflexes are 2+ on the right side and 2+ on the left side.       Achilles reflexes are 2+ on the right side and 2+ on the left side.  Psychiatric:         Speech: Speech normal.       Neurologic Exam     Mental Status   Oriented to person, place, and time.   Registration of memory: Good recent and remote memory.   Attention: normal. Concentration: normal.   Speech: speech is normal   Level of consciousness: alert  Knowledge: consistent with education.     Cranial Nerves     CN II   Visual fields full to confrontation.   Visual acuity: normal    CN III, IV, VI   Pupils are equal, round, and reactive to light.  Extraocular motions are normal.     CN V   Facial sensation intact.   Right corneal reflex: normal  Left corneal reflex: normal    CN VII   Facial expression full, symmetric.   Right facial weakness: none  Left facial weakness: none    CN VIII   Hearing: intact    CN IX, X   Palate: symmetric    CN XI   Right sternocleidomastoid strength: normal  Left sternocleidomastoid strength: normal    CN XII   Tongue: not atrophic  Tongue deviation: none    Motor Exam   Muscle bulk: normal  Right arm tone: normal  Left arm tone: normal  Right leg tone: normal  Left leg tone: normal    Strength   Strength 5/5 except as noted.   Right neck flexion: 4/5  Left neck flexion: 4/5  Right neck extension:  4/5  Left neck extension: 4/5  Right deltoid: 4/5  Left deltoid: 4/5  Right biceps: 4/5  Left biceps: 4/5  Right triceps: 4/5  Left triceps: 4/5  Right wrist flexion: 4/5  Left wrist flexion: 4/5  Right wrist extension: 4/5  Left wrist extension: 4/5  Right interossei: 4/5  Left interossei: 4/5  Right abdominals: 4/5  Left abdominals: 4/5  Right iliopsoas: 4/5  Left iliopsoas: 4/5  Right quadriceps: 4/5  Left quadriceps: 4/5  Right hamstrin/5  Left hamstrin/5  Right glutei: 4/5  Left glutei: 4/5  Right anterior tibial: 4/5  Left anterior tibial: 4/5  Right posterior tibial: 4/5  Left posterior tibial: 4/5  Right peroneal: 4/5  Left peroneal: 4/5  Right gastroc: 4/5  Left gastroc: 4/5    Sensory Exam   Light touch normal.     Gait, Coordination, and Reflexes     Gait  Gait: normal    Coordination   Finger to nose coordination: normal  Heel to shin coordination: normal    Reflexes   Right brachioradialis: 2+  Left brachioradialis: 2+  Right biceps: 2+  Left biceps: 2+  Right triceps: 2+  Left triceps: 2+  Right patellar: 2+  Left patellar: 2+  Right achilles: 2+  Left achilles: 2+  Right : 2+  Left : 2+Gait ataxia       Assessment/Plan   Independent Review of Radiographic Studies:      Plain x-rays done on 2022 show stable positioning of the cage at C4-C5 and C6 with the screws in the plate from C3-C7.  No pullout or significant subsidence.  Agree with the report.      Medical Decision Making:      His wife will let us know what kind of transport chair he would be appropriate for him and then I will write a prescription out.  All dictate and send out the letter regarding his gait ataxia.  We will see him in 18 months with x-rays, sooner if there is a problem.    Transport chair: Patient is unable to safely use a cane or walker Due to (DX) Transport chair is needed to assist with daily living activities inside the home. Patient unable to propel a standard WC or as WC but has a caregiver who is  available w/the transport chair.       Diagnoses and all orders for this visit:    1. Abnormality of gait (Primary)    2. Cervical myelopathy (HCC)    3. History of spinal fusion  -     XR spine cervical ap and lat w flex and ext; Future      Return in about 18 months (around 10/27/2023) for Face-to-face.

## 2022-04-19 ENCOUNTER — TELEPHONE (OUTPATIENT)
Dept: CARDIOLOGY | Facility: CLINIC | Age: 67
End: 2022-04-19

## 2022-04-26 ENCOUNTER — OFFICE VISIT (OUTPATIENT)
Dept: GASTROENTEROLOGY | Facility: CLINIC | Age: 67
End: 2022-04-26

## 2022-04-26 VITALS — TEMPERATURE: 96.1 F | WEIGHT: 315 LBS | BODY MASS INDEX: 39.17 KG/M2 | HEIGHT: 75 IN

## 2022-04-26 DIAGNOSIS — R13.10 DYSPHAGIA, UNSPECIFIED TYPE: ICD-10-CM

## 2022-04-26 DIAGNOSIS — D64.9 ANEMIA, UNSPECIFIED TYPE: Primary | ICD-10-CM

## 2022-04-26 DIAGNOSIS — R12 HEARTBURN: ICD-10-CM

## 2022-04-26 PROCEDURE — 99214 OFFICE O/P EST MOD 30 MIN: CPT | Performed by: NURSE PRACTITIONER

## 2022-04-26 NOTE — PROGRESS NOTES
Chief Complaint   Patient presents with   • Anemia   • Difficulty Swallowing       HPI    Duane Mark Witten is a  67 y.o. male here to establish care as a new patient for anemia.    This patient will also follow with Dr. Rojas.    Hemoglobin 1 month ago of 12, MCV 79, RDW 15.7.  No iron indices were review.    On visit today his primary concern is intermittent esophageal dysphagia ongoing for at least 6 months.  Occurs approximately 3 days out of week.  Mainly with solids.  Frequent heartburn.  Has been managing symptoms with Tums and Rolaids but recently started over-the-counter omeprazole with benefit.  No nausea, vomiting, epigastric pain, poor appetite, or weight loss.  Current weight 372.    Denies diarrhea, constipation, melena, or bright red blood per rectum.    Reports having a colonoscopy in the early 1980s unsure of results.    He takes Norco for chronic back pain.    He is on Xarelto for history of atrial fibrillation.    Past Medical History:   Diagnosis Date   • ADD (attention deficit disorder)    • Anemia     intermittent   • Atrial fibrillation (HCC)    • Depression    • Dilated cardiomyopathy (HCC)    • ED (erectile dysfunction)    • Essential hypertension    • YANELI (generalized anxiety disorder)    • History of obesity    • HLD (hyperlipidemia)    • Leukocytosis    • Morbid obesity (HCC)    • CAMERON (obstructive sleep apnea)     BiPAP nightly   • Osteoarthrosis    • Persistent atrial fibrillation (HCC)    • PONV (postoperative nausea and vomiting)    • Psoriasis    • Testosterone deficiency    • Type 2 diabetes mellitus (HCC)        Past Surgical History:   Procedure Laterality Date   • ANTERIOR CHANNEL VERTEBRECTOMY/CORPECTOMY Bilateral 11/27/2018    Procedure: C4, C5, C6 ANTERIOR CERVICAL CORPECTOMY;  Surgeon: Chirag Markham MD;  Location: [facility];  Service: Neurosurgery   • BIOPSY / EXCISION / DISSECTION AXILLARY NODE  1994    Lymph Node testing for cancer    • COLONOSCOPY      10+ years;  normal   • NECK SURGERY  2018    C4/5/6 anterior cervical corpectomy   • PARATHYROIDECTOMY         Scheduled Meds:     Continuous Infusions: No current facility-administered medications for this visit.      PRN Meds:     No Known Allergies    Social History     Socioeconomic History   • Marital status:      Spouse name: Mindy   Tobacco Use   • Smoking status: Former Smoker     Packs/day: 1.00     Quit date:      Years since quittin.3   • Smokeless tobacco: Never Used   Substance and Sexual Activity   • Alcohol use: No     Comment: caffeine use: None   • Drug use: No       Family History   Problem Relation Age of Onset   • Anxiety disorder Mother    • Bipolar disorder Mother    • Depression Mother    • Glaucoma Mother    • Macular degeneration Mother    • Lupus Mother    • Aortic stenosis Mother         s/p TAVR at age 87   • Cancer Father    • Diabetes Father    • Heart disease Father    • Hypertension Father    • Stroke Father    • Lupus Other         grandmother       Review of Systems   Constitutional: Negative for activity change, appetite change, fatigue and unexpected weight change.   HENT: Negative for trouble swallowing.    Eyes: Negative.    Respiratory: Negative.    Cardiovascular: Negative.    Gastrointestinal: Negative for abdominal distention, abdominal pain, anal bleeding, blood in stool, constipation, diarrhea, nausea, rectal pain and vomiting.   Endocrine: Negative.    Genitourinary: Negative.    Musculoskeletal: Negative.    Allergic/Immunologic: Negative.    Neurological: Negative.    Hematological: Negative.    Psychiatric/Behavioral: Negative.        Vitals:    22 1518   Temp: 96.1 °F (35.6 °C)       Physical Exam  Constitutional:       Appearance: He is well-developed.   Abdominal:      General: Bowel sounds are normal. There is no distension.      Palpations: Abdomen is soft. There is no mass.      Tenderness: There is no abdominal tenderness. There is no  guarding.      Hernia: No hernia is present.   Skin:     General: Skin is warm and dry.      Capillary Refill: Capillary refill takes less than 2 seconds.   Neurological:      Mental Status: He is alert and oriented to person, place, and time.   Psychiatric:         Behavior: Behavior normal.       Assessment    Diagnoses and all orders for this visit:    1. Anemia, unspecified type (Primary)  -     CBC & Differential  -     Iron and TIBC  -     Vitamin B12 and Folate    2. Dysphagia, unspecified type    3. Heartburn       Plan    Arrange esophagram for further evaluation of esophageal dysphagia.  Continue omeprazole 20 mg once daily.  Follow an antireflux diet.  Avoid doughy breads and dense meats in the interim.  Labs today as above to reassess anemia and check iron indices.  Check B12 folate.  Patient will likely need bidirectional endoscopic examination but this will be pending esophagram findings and serology.           KAYLEN Vivas  [facility]  [address] [place] [address]  Office: [phone]

## 2022-04-27 ENCOUNTER — OFFICE VISIT (OUTPATIENT)
Dept: NEUROSURGERY | Facility: CLINIC | Age: 67
End: 2022-04-27

## 2022-04-27 VITALS
HEIGHT: 75 IN | WEIGHT: 315 LBS | DIASTOLIC BLOOD PRESSURE: 80 MMHG | OXYGEN SATURATION: 95 % | BODY MASS INDEX: 39.17 KG/M2 | HEART RATE: 81 BPM | SYSTOLIC BLOOD PRESSURE: 130 MMHG

## 2022-04-27 DIAGNOSIS — N40.1 BENIGN PROSTATIC HYPERPLASIA WITH NOCTURIA: ICD-10-CM

## 2022-04-27 DIAGNOSIS — R26.9 ABNORMALITY OF GAIT: ICD-10-CM

## 2022-04-27 DIAGNOSIS — R26.9 ABNORMALITY OF GAIT: Primary | ICD-10-CM

## 2022-04-27 DIAGNOSIS — G95.9 CERVICAL MYELOPATHY: ICD-10-CM

## 2022-04-27 DIAGNOSIS — F51.01 PRIMARY INSOMNIA: ICD-10-CM

## 2022-04-27 DIAGNOSIS — Z98.1 HISTORY OF SPINAL FUSION: ICD-10-CM

## 2022-04-27 DIAGNOSIS — R35.1 BENIGN PROSTATIC HYPERPLASIA WITH NOCTURIA: ICD-10-CM

## 2022-04-27 DIAGNOSIS — Z98.1 HISTORY OF SPINAL FUSION: Primary | ICD-10-CM

## 2022-04-27 LAB
BASOPHILS # BLD AUTO: 0.1 X10E3/UL (ref 0–0.2)
BASOPHILS NFR BLD AUTO: 1 %
EOSINOPHIL # BLD AUTO: 0.8 X10E3/UL (ref 0–0.4)
EOSINOPHIL NFR BLD AUTO: 7 %
ERYTHROCYTE [DISTWIDTH] IN BLOOD BY AUTOMATED COUNT: 16.1 % (ref 11.6–15.4)
FOLATE SERPL-MCNC: 3.5 NG/ML
HCT VFR BLD AUTO: 37.1 % (ref 37.5–51)
HGB BLD-MCNC: 11.4 G/DL (ref 13–17.7)
IMM GRANULOCYTES # BLD AUTO: 0.1 X10E3/UL (ref 0–0.1)
IMM GRANULOCYTES NFR BLD AUTO: 1 %
IRON SATN MFR SERPL: 12 % (ref 15–55)
IRON SERPL-MCNC: 39 UG/DL (ref 38–169)
LYMPHOCYTES # BLD AUTO: 2.2 X10E3/UL (ref 0.7–3.1)
LYMPHOCYTES NFR BLD AUTO: 18 %
MCH RBC QN AUTO: 25.1 PG (ref 26.6–33)
MCHC RBC AUTO-ENTMCNC: 30.7 G/DL (ref 31.5–35.7)
MCV RBC AUTO: 82 FL (ref 79–97)
MONOCYTES # BLD AUTO: 0.6 X10E3/UL (ref 0.1–0.9)
MONOCYTES NFR BLD AUTO: 5 %
NEUTROPHILS # BLD AUTO: 8.7 X10E3/UL (ref 1.4–7)
NEUTROPHILS NFR BLD AUTO: 68 %
PLATELET # BLD AUTO: 349 X10E3/UL (ref 150–450)
RBC # BLD AUTO: 4.54 X10E6/UL (ref 4.14–5.8)
TIBC SERPL-MCNC: 316 UG/DL (ref 250–450)
UIBC SERPL-MCNC: 277 UG/DL (ref 111–343)
VIT B12 SERPL-MCNC: 553 PG/ML (ref 232–1245)
WBC # BLD AUTO: 12.4 X10E3/UL (ref 3.4–10.8)

## 2022-04-27 PROCEDURE — 99213 OFFICE O/P EST LOW 20 MIN: CPT | Performed by: NEUROLOGICAL SURGERY

## 2022-04-27 RX ORDER — TRAZODONE HYDROCHLORIDE 100 MG/1
100 TABLET ORAL NIGHTLY PRN
Qty: 90 TABLET | Refills: 0 | Status: SHIPPED | OUTPATIENT
Start: 2022-04-27 | End: 2022-06-21 | Stop reason: SDUPTHER

## 2022-04-27 RX ORDER — TAMSULOSIN HYDROCHLORIDE 0.4 MG/1
CAPSULE ORAL
Qty: 90 CAPSULE | Refills: 0 | Status: SHIPPED | OUTPATIENT
Start: 2022-04-27 | End: 2022-06-21 | Stop reason: SDUPTHER

## 2022-04-27 NOTE — PROGRESS NOTES
Please inform the patient that lab work shows normal B12 and folate.  Normal iron stores.  Blood counts about the same at 11.4.  Await esophagram results.

## 2022-04-28 DIAGNOSIS — F51.01 PRIMARY INSOMNIA: ICD-10-CM

## 2022-04-28 RX ORDER — TRAZODONE HYDROCHLORIDE 50 MG/1
50 TABLET ORAL NIGHTLY PRN
Qty: 90 TABLET | Refills: 0 | OUTPATIENT
Start: 2022-04-28

## 2022-05-06 ENCOUNTER — TELEPHONE (OUTPATIENT)
Dept: CARDIOLOGY | Facility: CLINIC | Age: 67
End: 2022-05-06

## 2022-05-06 NOTE — TELEPHONE ENCOUNTER
In mid April, I saw the patient in the office.  I recommended taking metoprolol 50 mg at nighttime.  I told him I would call him back in a few weeks to reassess his blood pressure, heart rate, and fatigue level.    In my note, I said if not, I would decrease metoprolol to 25 mg at nighttime and follow-up with a 24-hour Holter monitor see if he is adequately rate controlled.    He is scheduled for an echocardiogram on 5/16.    I have left a message for patient to return my call.

## 2022-05-10 NOTE — TELEPHONE ENCOUNTER
I spoke with pt via phone. He is taking the metoprolol at night and feels that his fatigue is better.  He has not been checking his blood pressure and heart rates.  He will be coming in next week for an echocardiogram and we will reassess his vitals at that time.

## 2022-05-16 ENCOUNTER — HOSPITAL ENCOUNTER (OUTPATIENT)
Dept: CARDIOLOGY | Facility: HOSPITAL | Age: 67
Discharge: HOME | End: 2022-05-16
Admitting: NURSE PRACTITIONER

## 2022-05-16 VITALS
HEIGHT: 75 IN | HEART RATE: 68 BPM | SYSTOLIC BLOOD PRESSURE: 120 MMHG | BODY MASS INDEX: 39.17 KG/M2 | DIASTOLIC BLOOD PRESSURE: 60 MMHG | WEIGHT: 315 LBS

## 2022-05-16 DIAGNOSIS — Q23.1 BICUSPID AORTIC VALVE: ICD-10-CM

## 2022-05-16 DIAGNOSIS — I10 ESSENTIAL HYPERTENSION: ICD-10-CM

## 2022-05-16 DIAGNOSIS — I42.0 DILATED CARDIOMYOPATHY: ICD-10-CM

## 2022-05-16 DIAGNOSIS — I48.21 PERMANENT ATRIAL FIBRILLATION: ICD-10-CM

## 2022-05-16 DIAGNOSIS — I35.0 NONRHEUMATIC AORTIC VALVE STENOSIS: ICD-10-CM

## 2022-05-16 DIAGNOSIS — I34.0 NONRHEUMATIC MITRAL VALVE REGURGITATION: ICD-10-CM

## 2022-05-16 DIAGNOSIS — I34.1 MVP (MITRAL VALVE PROLAPSE): ICD-10-CM

## 2022-05-16 LAB
AORTIC ARCH: 2.3 CM
AORTIC DIMENSIONLESS INDEX: 0.23 (DI)
ASCENDING AORTA: 3 CM
BH CV ECHO MEAS - AO MAX PG: 50.2 MMHG
BH CV ECHO MEAS - AO MEAN PG: 27.8 MMHG
BH CV ECHO MEAS - AO V2 MAX: 354.3 CM/SEC
BH CV ECHO MEAS - AO V2 VTI: 81.1 CM
BH CV ECHO MEAS - AVA(I,D): 0.77 CM2
BH CV ECHO MEAS - EDV(CUBED): 193.1 ML
BH CV ECHO MEAS - EDV(MOD-SP2): 123 ML
BH CV ECHO MEAS - EDV(MOD-SP4): 98 ML
BH CV ECHO MEAS - EF(MOD-BP): 52.9 %
BH CV ECHO MEAS - EF(MOD-SP2): 53.7 %
BH CV ECHO MEAS - EF(MOD-SP4): 52 %
BH CV ECHO MEAS - ESV(CUBED): 44.8 ML
BH CV ECHO MEAS - ESV(MOD-SP2): 57 ML
BH CV ECHO MEAS - ESV(MOD-SP4): 47 ML
BH CV ECHO MEAS - FS: 38.5 %
BH CV ECHO MEAS - IVS/LVPW: 1.02 CM
BH CV ECHO MEAS - IVSD: 1.39 CM
BH CV ECHO MEAS - LAT PEAK E' VEL: 14 CM/SEC
BH CV ECHO MEAS - LV DIASTOLIC VOL/BSA (35-75): 34.3 CM2
BH CV ECHO MEAS - LV MASS(C)D: 356.4 GRAMS
BH CV ECHO MEAS - LV MAX PG: 2.8 MMHG
BH CV ECHO MEAS - LV MEAN PG: 1.74 MMHG
BH CV ECHO MEAS - LV SYSTOLIC VOL/BSA (12-30): 16.5 CM2
BH CV ECHO MEAS - LV V1 MAX: 84.4 CM/SEC
BH CV ECHO MEAS - LV V1 VTI: 18.8 CM
BH CV ECHO MEAS - LVIDD: 5.8 CM
BH CV ECHO MEAS - LVIDS: 3.6 CM
BH CV ECHO MEAS - LVOT AREA: 3.3 CM2
BH CV ECHO MEAS - LVOT DIAM: 2.05 CM
BH CV ECHO MEAS - LVPWD: 1.36 CM
BH CV ECHO MEAS - MED PEAK E' VEL: 10 CM/SEC
BH CV ECHO MEAS - MV DEC SLOPE: 541.9 CM/SEC2
BH CV ECHO MEAS - MV DEC TIME: 0.18 MSEC
BH CV ECHO MEAS - MV E MAX VEL: 120.4 CM/SEC
BH CV ECHO MEAS - MV MAX PG: 7.2 MMHG
BH CV ECHO MEAS - MV MEAN PG: 2.23 MMHG
BH CV ECHO MEAS - MV P1/2T: 70.4 MSEC
BH CV ECHO MEAS - MV V2 VTI: 33.5 CM
BH CV ECHO MEAS - MVA(P1/2T): 3.1 CM2
BH CV ECHO MEAS - MVA(VTI): 1.86 CM2
BH CV ECHO MEAS - PA ACC TIME: 0.15 SEC
BH CV ECHO MEAS - PA PR(ACCEL): 13.6 MMHG
BH CV ECHO MEAS - PA V2 MAX: 84.2 CM/SEC
BH CV ECHO MEAS - PULM DIAS VEL: 67.7 CM/SEC
BH CV ECHO MEAS - PULM S/D: 0.6
BH CV ECHO MEAS - PULM SYS VEL: 40.4 CM/SEC
BH CV ECHO MEAS - QP/QS: 1.11
BH CV ECHO MEAS - RAP SYSTOLE: 3 MMHG
BH CV ECHO MEAS - RV MAX PG: 1.61 MMHG
BH CV ECHO MEAS - RV V1 MAX: 63.4 CM/SEC
BH CV ECHO MEAS - RV V1 VTI: 13.8 CM
BH CV ECHO MEAS - RVOT DIAM: 2.5 CM
BH CV ECHO MEAS - RVSP: 27 MMHG
BH CV ECHO MEAS - SI(MOD-SP2): 23.1 ML/M2
BH CV ECHO MEAS - SI(MOD-SP4): 17.9 ML/M2
BH CV ECHO MEAS - SUP REN AO DIAM: 2.4 CM
BH CV ECHO MEAS - SV(LVOT): 62.3 ML
BH CV ECHO MEAS - SV(MOD-SP2): 66 ML
BH CV ECHO MEAS - SV(MOD-SP4): 51 ML
BH CV ECHO MEAS - SV(RVOT): 69.3 ML
BH CV ECHO MEAS - TAPSE (>1.6): 1.78 CM
BH CV ECHO MEAS - TR MAX PG: 24.4 MMHG
BH CV ECHO MEAS - TR MAX VEL: 247 CM/SEC
BH CV ECHO MEASUREMENTS AVERAGE E/E' RATIO: 10.03
BH CV XLRA - TDI S': 12.2 CM/SEC
LEFT ATRIUM VOLUME INDEX: 39.2 ML/M2
MAXIMAL PREDICTED HEART RATE: 153 BPM
SINUS: 3.5 CM
STJ: 2.45 CM
STRESS TARGET HR: 130 BPM

## 2022-05-16 PROCEDURE — 93306 TTE W/DOPPLER COMPLETE: CPT

## 2022-05-16 PROCEDURE — 93306 TTE W/DOPPLER COMPLETE: CPT | Performed by: INTERNAL MEDICINE

## 2022-05-18 ENCOUNTER — TELEPHONE (OUTPATIENT)
Dept: CARDIOLOGY | Facility: CLINIC | Age: 67
End: 2022-05-18

## 2022-05-18 DIAGNOSIS — I35.0 NONRHEUMATIC AORTIC VALVE STENOSIS: Primary | ICD-10-CM

## 2022-05-18 DIAGNOSIS — Q23.1 BICUSPID AORTIC VALVE: ICD-10-CM

## 2022-05-18 NOTE — TELEPHONE ENCOUNTER
I reviewed with Dr. Bravo. Normal EF, bicuspid aortic valve, and moderate to severe aortic stenosis.  Dr. Bravo recommended repeating in 6 months.  He does not want blood pressure to get too low and his blood pressure was actually a little elevated on the last visit.  He has chronic shortness of breath over the past 3 years and has been unchanged and he said it happened after his neck surgery.  I asked him to call with any changes of his shortness of breath or if he develops chest pain or syncope.  Follow-up in 6 months and have a same-day echocardiogram.  He verbalized understanding.

## 2022-05-19 ENCOUNTER — HOSPITAL ENCOUNTER (OUTPATIENT)
Dept: GENERAL RADIOLOGY | Facility: HOSPITAL | Age: 67
Discharge: HOME | End: 2022-05-19
Admitting: NURSE PRACTITIONER

## 2022-05-19 DIAGNOSIS — R13.10 DYSPHAGIA, UNSPECIFIED TYPE: ICD-10-CM

## 2022-05-19 PROCEDURE — 74221 X-RAY XM ESOPHAGUS 2CNTRST: CPT

## 2022-05-19 RX ADMIN — BARIUM SULFATE 183 ML: 960 POWDER, FOR SUSPENSION ORAL at 09:50

## 2022-05-19 RX ADMIN — ANTACID/ANTIFLATULENT 1 PACKET: 380; 550; 10; 10 GRANULE, EFFERVESCENT ORAL at 09:40

## 2022-05-19 RX ADMIN — BARIUM SULFATE 135 ML: 980 POWDER, FOR SUSPENSION ORAL at 09:42

## 2022-05-25 NOTE — PROGRESS NOTES
I saw this patient for esophageal dysphagia with solids 3 days out of the week.  He is also due for colonoscopy.  Esophagram looks normal.  Would you like an EGD to rule out EOE?  Plus C-scope?

## 2022-05-31 NOTE — PROGRESS NOTES
Please inform the patient that esophagram is normal.  We would like for him to have an EGD in combination with colonoscopy with Dr. Rojas if he is agreeable.  Please place case request and send to scheduling to book.

## 2022-06-01 ENCOUNTER — TELEPHONE (OUTPATIENT)
Dept: GASTROENTEROLOGY | Facility: CLINIC | Age: 67
End: 2022-06-01

## 2022-06-01 DIAGNOSIS — R13.10 DYSPHAGIA, UNSPECIFIED TYPE: Primary | ICD-10-CM

## 2022-06-01 DIAGNOSIS — Z12.11 ENCOUNTER FOR SCREENING FOR MALIGNANT NEOPLASM OF COLON: ICD-10-CM

## 2022-06-01 NOTE — TELEPHONE ENCOUNTER
----- Message from KAYLEN Vivas sent at 5/31/2022  1:46 PM EDT -----  Please inform the patient that esophagram is normal.  We would like for him to have an EGD in combination with colonoscopy with Dr. Rojas if he is agreeable.  Please place case request and send to scheduling to book.

## 2022-06-08 NOTE — TELEPHONE ENCOUNTER
Patient called. Advised as per Tamia's note. He verb understanding and is in agreement with the plan.   Case request placed for c/s and EGD.

## 2022-06-10 ENCOUNTER — TELEPHONE (OUTPATIENT)
Dept: GASTROENTEROLOGY | Facility: CLINIC | Age: 67
End: 2022-06-10

## 2022-06-10 PROBLEM — Z12.11 ENCOUNTER FOR SCREENING FOR MALIGNANT NEOPLASM OF COLON: Status: ACTIVE | Noted: 2022-06-10

## 2022-06-10 PROBLEM — R13.10 DYSPHAGIA: Status: ACTIVE | Noted: 2022-06-10

## 2022-06-10 NOTE — TELEPHONE ENCOUNTER
KEMAL Aguilar  for egd/colonoscopy on 8/29/22  arrive at 830AM   . Mailed Prep instructions to Mailing address on-file. ----[employer]    Advised PT  that  will call with final arrival time  24 hrs before procedure. If they do not get a phone call, arrival time will stay the same as given on instructions

## 2022-06-21 ENCOUNTER — OFFICE VISIT (OUTPATIENT)
Dept: FAMILY MEDICINE CLINIC | Facility: CLINIC | Age: 67
End: 2022-06-21

## 2022-06-21 VITALS
TEMPERATURE: 98 F | DIASTOLIC BLOOD PRESSURE: 85 MMHG | BODY MASS INDEX: 46.2 KG/M2 | WEIGHT: 315 LBS | OXYGEN SATURATION: 96 % | SYSTOLIC BLOOD PRESSURE: 138 MMHG | HEART RATE: 72 BPM

## 2022-06-21 DIAGNOSIS — F41.1 GAD (GENERALIZED ANXIETY DISORDER): ICD-10-CM

## 2022-06-21 DIAGNOSIS — N40.1 BENIGN PROSTATIC HYPERPLASIA WITH NOCTURIA: ICD-10-CM

## 2022-06-21 DIAGNOSIS — E78.2 MIXED HYPERLIPIDEMIA: ICD-10-CM

## 2022-06-21 DIAGNOSIS — E11.9 CONTROLLED TYPE 2 DIABETES MELLITUS WITHOUT COMPLICATION, WITHOUT LONG-TERM CURRENT USE OF INSULIN: Primary | ICD-10-CM

## 2022-06-21 DIAGNOSIS — R35.1 BENIGN PROSTATIC HYPERPLASIA WITH NOCTURIA: ICD-10-CM

## 2022-06-21 DIAGNOSIS — Z23 NEED FOR COVID-19 VACCINE: ICD-10-CM

## 2022-06-21 DIAGNOSIS — F98.8 ATTENTION DEFICIT DISORDER (ADD) WITHOUT HYPERACTIVITY: ICD-10-CM

## 2022-06-21 DIAGNOSIS — F51.01 PRIMARY INSOMNIA: ICD-10-CM

## 2022-06-21 DIAGNOSIS — F32.5 MAJOR DEPRESSIVE DISORDER WITH SINGLE EPISODE, IN FULL REMISSION: ICD-10-CM

## 2022-06-21 PROCEDURE — 99213 OFFICE O/P EST LOW 20 MIN: CPT | Performed by: NURSE PRACTITIONER

## 2022-06-21 PROCEDURE — 91305 COVID-19 (PFIZER) 12+ YRS: CPT | Performed by: NURSE PRACTITIONER

## 2022-06-21 PROCEDURE — 0051A COVID-19 (PFIZER) 12+ YRS: CPT | Performed by: NURSE PRACTITIONER

## 2022-06-21 RX ORDER — BACLOFEN 10 MG/1
10 TABLET ORAL 2 TIMES DAILY PRN
COMMUNITY
Start: 2022-05-12

## 2022-06-21 RX ORDER — ATOMOXETINE 100 MG/1
100 CAPSULE ORAL DAILY
Qty: 90 CAPSULE | Refills: 3 | Status: SHIPPED | OUTPATIENT
Start: 2022-06-21 | End: 2022-11-01 | Stop reason: SDUPTHER

## 2022-06-21 RX ORDER — DULOXETIN HYDROCHLORIDE 60 MG/1
60 CAPSULE, DELAYED RELEASE ORAL 2 TIMES DAILY
Qty: 180 CAPSULE | Refills: 3 | Status: SHIPPED | OUTPATIENT
Start: 2022-06-21 | End: 2022-11-01 | Stop reason: SDUPTHER

## 2022-06-21 RX ORDER — TAMSULOSIN HYDROCHLORIDE 0.4 MG/1
1 CAPSULE ORAL DAILY
Qty: 90 CAPSULE | Refills: 3 | Status: SHIPPED | OUTPATIENT
Start: 2022-06-21 | End: 2022-11-18 | Stop reason: SDUPTHER

## 2022-06-21 RX ORDER — TRAZODONE HYDROCHLORIDE 100 MG/1
100 TABLET ORAL NIGHTLY PRN
Qty: 90 TABLET | Refills: 3 | Status: SHIPPED | OUTPATIENT
Start: 2022-06-21 | End: 2022-11-18 | Stop reason: SDUPTHER

## 2022-06-21 RX ORDER — METFORMIN HYDROCHLORIDE 500 MG/1
1000 TABLET, EXTENDED RELEASE ORAL 2 TIMES DAILY
Qty: 360 TABLET | Refills: 3 | Status: SHIPPED | OUTPATIENT
Start: 2022-06-21

## 2022-06-21 RX ORDER — GLIMEPIRIDE 4 MG/1
4 TABLET ORAL
Qty: 90 TABLET | Refills: 3 | Status: SHIPPED | OUTPATIENT
Start: 2022-06-21 | End: 2022-08-05

## 2022-06-21 NOTE — PROGRESS NOTES
"Chief Complaint  Diabetes and Med Refill    Subjective        Duane Wojciech Ramirez presents to [facility]  History of Present Illness  Diabetes Mellitus Type II, Follow-up: Patient here for follow-up of Type 2 diabetes mellitus. Symptoms: none. Home monitoring: The patient is not checking blood sugars at home. Medications: The patient is adherent with their medication regimen. Medication(s): Trulicity, glimepiride, metformin and pioglitazone. Due for: A1C and pneumococcal vaccination. Weight trend: decreasing. Patient complains of having episodes where he \"feels\" his blood sugar is low.     Objective   Vital Signs:  /85 (BP Location: Left arm, Patient Position: Sitting, Cuff Size: Large Adult)   Pulse 72   Temp 98 °F (36.7 °C) (Temporal)   Wt (!) 168 kg (369 lb 9.6 oz)   SpO2 96%   BMI 46.20 kg/m²   Estimated body mass index is 46.2 kg/m² as calculated from the following:    Height as of 5/16/22: 190.5 cm (75\").    Weight as of this encounter: 168 kg (369 lb 9.6 oz).        Physical Exam  Vitals and nursing note reviewed.   Constitutional:       Appearance: Normal appearance.   Cardiovascular:      Rate and Rhythm: Normal rate. Rhythm irregular.   Pulmonary:      Effort: Pulmonary effort is normal.      Breath sounds: Normal breath sounds.   Neurological:      Mental Status: He is alert and oriented to person, place, and time.   Psychiatric:         Mood and Affect: Mood normal.        Result Review :  The following data was reviewed by: KAYLEN Uriostegui on 06/21/2022:  Most Recent A1C    HGBA1C Most Recent 3/18/22   Hemoglobin A1C 7.0 (A)   (A) Abnormal value       Comments are available for some flowsheets but are not being displayed.           Microalbumin    Microalbumin 11/18/21   Microalbumin, Urine 39.3                  Assessment and Plan   Diagnoses and all orders for this visit:    1. Controlled type 2 diabetes mellitus without complication, without long-term " current use of insulin ([facility]) (Primary)  Comments:  - Will decrease glimepiride to once daily and have patient monitor blood sugar at home.   Orders:  -     Hemoglobin A1c  -     Comprehensive Metabolic Panel  -     glimepiride (AMARYL) 4 MG tablet; Take 1 tablet by mouth Every Morning Before Breakfast.  Dispense: 90 tablet; Refill: 3  -     metFORMIN ER (GLUCOPHAGE-XR) 500 MG 24 hr tablet; Take 2 tablets by mouth 2 (Two) Times a Day.  Dispense: 360 tablet; Refill: 3    2. Mixed hyperlipidemia  -     Lipid Panel With / Chol / HDL Ratio  -     Comprehensive Metabolic Panel    3. Need for COVID-19 vaccine  -     COVID-19 Vaccine (Pfizer) Gray Cap    4. Attention deficit disorder (ADD) without hyperactivity  -     atomoxetine (Strattera) 100 MG capsule; Take 1 capsule by mouth Daily.  Dispense: 90 capsule; Refill: 3    5. Major depressive disorder with single episode, in full remission ([facility])  Comments:  - ER if any SI/HI.   Orders:  -     DULoxetine (CYMBALTA) 60 MG capsule; Take 1 capsule by mouth 2 (Two) Times a Day.  Dispense: 180 capsule; Refill: 3    6. YANELI (generalized anxiety disorder)  -     DULoxetine (CYMBALTA) 60 MG capsule; Take 1 capsule by mouth 2 (Two) Times a Day.  Dispense: 180 capsule; Refill: 3    7. Benign prostatic hyperplasia with nocturia  -     tamsulosin (FLOMAX) 0.4 MG capsule 24 hr capsule; Take 1 capsule by mouth Daily.  Dispense: 90 capsule; Refill: 3    8. Primary insomnia  -     traZODone (DESYREL) 100 MG tablet; Take 1 tablet by mouth At Night As Needed for Sleep.  Dispense: 90 tablet; Refill: 3           I spent 27 minutes caring for Duane on this date of service. This time includes time spent by me in the following activities:reviewing tests, performing a medically appropriate examination and/or evaluation , counseling and educating the patient/family/caregiver, ordering medications, tests, or procedures and documenting information in the medical record  Follow Up   Return in about 8  months (around 2/8/2023) for Recheck, Medicare Wellness.  Patient was given instructions and counseling regarding his condition or for health maintenance advice. Please see specific information pulled into the AVS if appropriate.

## 2022-06-22 LAB
ALBUMIN SERPL-MCNC: 4.4 G/DL (ref 3.8–4.8)
ALBUMIN/GLOB SERPL: 1.7 {RATIO} (ref 1.2–2.2)
ALP SERPL-CCNC: 109 IU/L (ref 44–121)
ALT SERPL-CCNC: 12 IU/L (ref 0–44)
AST SERPL-CCNC: 17 IU/L (ref 0–40)
BILIRUB SERPL-MCNC: 0.5 MG/DL (ref 0–1.2)
BUN SERPL-MCNC: 16 MG/DL (ref 8–27)
BUN/CREAT SERPL: 17 (ref 10–24)
CALCIUM SERPL-MCNC: 9.6 MG/DL (ref 8.6–10.2)
CHLORIDE SERPL-SCNC: 103 MMOL/L (ref 96–106)
CHOLEST SERPL-MCNC: 133 MG/DL (ref 100–199)
CHOLEST/HDLC SERPL: 3.3 RATIO (ref 0–5)
CO2 SERPL-SCNC: 25 MMOL/L (ref 20–29)
CREAT SERPL-MCNC: 0.92 MG/DL (ref 0.76–1.27)
EGFRCR SERPLBLD CKD-EPI 2021: 91 ML/MIN/1.73
GLOBULIN SER CALC-MCNC: 2.6 G/DL (ref 1.5–4.5)
GLUCOSE SERPL-MCNC: 34 MG/DL (ref 65–99)
HBA1C MFR BLD: 5.9 % (ref 4.8–5.6)
HDLC SERPL-MCNC: 40 MG/DL
LDLC SERPL CALC-MCNC: 77 MG/DL (ref 0–99)
POTASSIUM SERPL-SCNC: 4.8 MMOL/L (ref 3.5–5.2)
PROT SERPL-MCNC: 7 G/DL (ref 6–8.5)
SODIUM SERPL-SCNC: 145 MMOL/L (ref 134–144)
TRIGL SERPL-MCNC: 81 MG/DL (ref 0–149)
VLDLC SERPL CALC-MCNC: 16 MG/DL (ref 5–40)

## 2022-06-23 DIAGNOSIS — E11.9 CONTROLLED TYPE 2 DIABETES MELLITUS WITHOUT COMPLICATION, WITHOUT LONG-TERM CURRENT USE OF INSULIN: ICD-10-CM

## 2022-06-23 RX ORDER — PIOGLITAZONEHYDROCHLORIDE 15 MG/1
15 TABLET ORAL DAILY
Qty: 90 TABLET | Refills: 3 | Status: SHIPPED | OUTPATIENT
Start: 2022-06-23

## 2022-08-04 DIAGNOSIS — E11.9 CONTROLLED TYPE 2 DIABETES MELLITUS WITHOUT COMPLICATION, WITHOUT LONG-TERM CURRENT USE OF INSULIN: ICD-10-CM

## 2022-08-05 RX ORDER — GLIMEPIRIDE 4 MG/1
TABLET ORAL
Qty: 180 TABLET | Refills: 1 | Status: SHIPPED | OUTPATIENT
Start: 2022-08-05 | End: 2022-11-18 | Stop reason: SDUPTHER

## 2022-08-05 NOTE — TELEPHONE ENCOUNTER
Rx Refill Note  Requested Prescriptions     Pending Prescriptions Disp Refills   • glimepiride (AMARYL) 4 MG tablet [Pharmacy Med Name: GLIMEPIRIDE 4 MG TABLET] 180 tablet 1     Sig: TAKE 1 TABLET BY MOUTH TWICE A DAY      Last office visit with prescribing clinician: 6/21/2022      Next office visit with prescribing clinician: 2/14/2023  Last refill 06/21/2022

## 2022-08-19 ENCOUNTER — TRANSCRIBE ORDERS (OUTPATIENT)
Dept: ADMINISTRATIVE | Facility: HOSPITAL | Age: 67
End: 2022-08-19

## 2022-08-19 DIAGNOSIS — Z01.818 OTHER SPECIFIED PRE-OPERATIVE EXAMINATION: Primary | ICD-10-CM

## 2022-08-26 ENCOUNTER — LAB (OUTPATIENT)
Dept: LAB | Facility: HOSPITAL | Age: 67
End: 2022-08-26

## 2022-08-26 DIAGNOSIS — Z01.818 OTHER SPECIFIED PRE-OPERATIVE EXAMINATION: ICD-10-CM

## 2022-08-26 LAB — SARS-COV-2 ORF1AB RESP QL NAA+PROBE: NOT DETECTED

## 2022-08-26 PROCEDURE — U0005 INFEC AGEN DETEC AMPLI PROBE: HCPCS

## 2022-08-26 PROCEDURE — U0004 COV-19 TEST NON-CDC HGH THRU: HCPCS

## 2022-08-26 PROCEDURE — C9803 HOPD COVID-19 SPEC COLLECT: HCPCS

## 2022-08-26 RX ORDER — HYDROCODONE BITARTRATE AND ACETAMINOPHEN 10; 325 MG/1; MG/1
1 TABLET ORAL EVERY 6 HOURS PRN
COMMUNITY

## 2022-08-29 ENCOUNTER — HOSPITAL ENCOUNTER (OUTPATIENT)
Facility: HOSPITAL | Age: 67
Setting detail: HOSPITAL OUTPATIENT SURGERY
Discharge: HOME | End: 2022-08-29
Attending: INTERNAL MEDICINE | Admitting: INTERNAL MEDICINE

## 2022-08-29 ENCOUNTER — ANESTHESIA (OUTPATIENT)
Dept: GASTROENTEROLOGY | Facility: HOSPITAL | Age: 67
End: 2022-08-29

## 2022-08-29 ENCOUNTER — ANESTHESIA EVENT (OUTPATIENT)
Dept: GASTROENTEROLOGY | Facility: HOSPITAL | Age: 67
End: 2022-08-29

## 2022-08-29 VITALS
BODY MASS INDEX: 40.43 KG/M2 | RESPIRATION RATE: 16 BRPM | WEIGHT: 315 LBS | HEIGHT: 74 IN | OXYGEN SATURATION: 97 % | DIASTOLIC BLOOD PRESSURE: 71 MMHG | TEMPERATURE: 98.6 F | HEART RATE: 89 BPM | SYSTOLIC BLOOD PRESSURE: 128 MMHG

## 2022-08-29 DIAGNOSIS — R13.10 DYSPHAGIA, UNSPECIFIED TYPE: ICD-10-CM

## 2022-08-29 DIAGNOSIS — Z12.11 ENCOUNTER FOR SCREENING FOR MALIGNANT NEOPLASM OF COLON: ICD-10-CM

## 2022-08-29 LAB — GLUCOSE BLDC GLUCOMTR-MCNC: 139 MG/DL (ref 70–130)

## 2022-08-29 PROCEDURE — 45380 COLONOSCOPY AND BIOPSY: CPT | Performed by: INTERNAL MEDICINE

## 2022-08-29 PROCEDURE — 43239 EGD BIOPSY SINGLE/MULTIPLE: CPT | Performed by: INTERNAL MEDICINE

## 2022-08-29 PROCEDURE — 45385 COLONOSCOPY W/LESION REMOVAL: CPT | Performed by: INTERNAL MEDICINE

## 2022-08-29 PROCEDURE — 82962 GLUCOSE BLOOD TEST: CPT

## 2022-08-29 PROCEDURE — 25010000002 PROPOFOL 10 MG/ML EMULSION: Performed by: NURSE ANESTHETIST, CERTIFIED REGISTERED

## 2022-08-29 PROCEDURE — 88305 TISSUE EXAM BY PATHOLOGIST: CPT | Performed by: INTERNAL MEDICINE

## 2022-08-29 PROCEDURE — S0260 H&P FOR SURGERY: HCPCS | Performed by: INTERNAL MEDICINE

## 2022-08-29 RX ORDER — PROPOFOL 10 MG/ML
VIAL (ML) INTRAVENOUS AS NEEDED
Status: DISCONTINUED | OUTPATIENT
Start: 2022-08-29 | End: 2022-08-29 | Stop reason: SURG

## 2022-08-29 RX ORDER — LIDOCAINE HYDROCHLORIDE 10 MG/ML
0.5 INJECTION, SOLUTION INFILTRATION; PERINEURAL ONCE AS NEEDED
Status: DISCONTINUED | OUTPATIENT
Start: 2022-08-29 | End: 2022-08-29 | Stop reason: HOSPADM

## 2022-08-29 RX ORDER — PROPOFOL 10 MG/ML
VIAL (ML) INTRAVENOUS CONTINUOUS PRN
Status: DISCONTINUED | OUTPATIENT
Start: 2022-08-29 | End: 2022-08-29 | Stop reason: SURG

## 2022-08-29 RX ORDER — SODIUM CHLORIDE, SODIUM LACTATE, POTASSIUM CHLORIDE, CALCIUM CHLORIDE 600; 310; 30; 20 MG/100ML; MG/100ML; MG/100ML; MG/100ML
1000 INJECTION, SOLUTION INTRAVENOUS CONTINUOUS
Status: DISCONTINUED | OUTPATIENT
Start: 2022-08-29 | End: 2022-08-29 | Stop reason: HOSPADM

## 2022-08-29 RX ORDER — SODIUM CHLORIDE 0.9 % (FLUSH) 0.9 %
10 SYRINGE (ML) INJECTION AS NEEDED
Status: DISCONTINUED | OUTPATIENT
Start: 2022-08-29 | End: 2022-08-29 | Stop reason: HOSPADM

## 2022-08-29 RX ORDER — PANTOPRAZOLE SODIUM 40 MG/1
40 TABLET, DELAYED RELEASE ORAL DAILY
Qty: 90 TABLET | Refills: 3 | Status: SHIPPED | OUTPATIENT
Start: 2022-08-29 | End: 2022-10-13

## 2022-08-29 RX ORDER — LIDOCAINE HYDROCHLORIDE 20 MG/ML
INJECTION, SOLUTION INFILTRATION; PERINEURAL AS NEEDED
Status: DISCONTINUED | OUTPATIENT
Start: 2022-08-29 | End: 2022-08-29 | Stop reason: SURG

## 2022-08-29 RX ADMIN — Medication 250 MCG/KG/MIN: at 09:54

## 2022-08-29 RX ADMIN — LIDOCAINE HYDROCHLORIDE 60 MG: 20 INJECTION, SOLUTION INFILTRATION; PERINEURAL at 09:54

## 2022-08-29 RX ADMIN — SODIUM CHLORIDE, POTASSIUM CHLORIDE, SODIUM LACTATE AND CALCIUM CHLORIDE 1000 ML: 600; 310; 30; 20 INJECTION, SOLUTION INTRAVENOUS at 09:15

## 2022-08-29 RX ADMIN — PROPOFOL 150 MG: 10 INJECTION, EMULSION INTRAVENOUS at 09:54

## 2022-08-29 NOTE — ANESTHESIA POSTPROCEDURE EVALUATION
"Patient: Duane Mark Witten    Procedure Summary     Date: 08/29/22 Room / Location:  [facility]    Anesthesia Start: 0944 Anesthesia Stop: 1023    Procedures:       COLONOSCOPY INTO CECUM AND TI WITH COLD BX AND COLD SNARE POLYPECTOMIES (N/A )      ESOPHAGOGASTRODUODENOSCOPY WITH BIOPSIES (N/A Esophagus) Diagnosis:       Dysphagia, unspecified type      Encounter for screening for malignant neoplasm of colon      Colon polyps      Diverticulosis      Internal hemorrhoids      Esophagitis      (Dysphagia, unspecified type [R13.10])      (Encounter for screening for malignant neoplasm of colon [Z12.11])    Surgeons: Timothy Rojas MD Provider: Jered Ward MD    Anesthesia Type: MAC ASA Status: 3          Anesthesia Type: MAC    Vitals  Vitals Value Taken Time   /71 08/29/22 1042   Temp     Pulse 89 08/29/22 1042   Resp 16 08/29/22 1042   SpO2 97 % 08/29/22 1042           Post Anesthesia Care and Evaluation    Patient location during evaluation: bedside  Patient participation: complete - patient participated  Level of consciousness: awake and alert  Pain management: adequate    Airway patency: patent  Anesthetic complications: No anesthetic complications    Cardiovascular status: acceptable  Respiratory status: acceptable  Hydration status: acceptable    Comments: /71 (BP Location: Left arm, Patient Position: Lying)   Pulse 89   Temp 37 °C (98.6 °F)   Resp 16   Ht 188 cm (74\")   Wt (!) 171 kg (377 lb)   SpO2 97%   BMI 48.40 kg/m²           "

## 2022-08-29 NOTE — ANESTHESIA PREPROCEDURE EVALUATION
Anesthesia Evaluation     Patient summary reviewed and Nursing notes reviewed   no history of anesthetic complications:  NPO Solid Status: > 6 hours  NPO Liquid Status: > 6 hours           Airway   Mallampati: II  TM distance: >3 FB  Neck ROM: full  Possible difficult intubation and Large neck circumference  Dental - normal exam     Pulmonary - normal exam    breath sounds clear to auscultation  (+) sleep apnea,   (-) rhonchi, decreased breath sounds, wheezes, rales, stridor  Cardiovascular - normal exam    NYHA Classification: I  Patient on routine beta blocker and Beta blocker given within 24 hours of surgery  Rhythm: regular  Rate: normal    (+) hypertension, valvular problems/murmurs AS and MR, dysrhythmias Atrial Fib, hyperlipidemia,   (-) murmur, weak pulses, friction rub, systolic click, carotid bruits, JVD, peripheral edema    ROS comment: November,2018  Echo showed EF of 47%, mild LVH, mild to moderate aortic stenosis, mitral valve prolapse, and mild mitral regurgitation.  CAD on medical tx    Neuro/Psych  (+) psychiatric history Anxiety, ADD and Depression,    GI/Hepatic/Renal/Endo    (+) obesity, morbid obesity,  diabetes mellitus type 2 poorly controlled,     Musculoskeletal     (+) neck pain, radiculopathy Left upper extremity and Right upper extremity  Abdominal  - normal exam    Abdomen: soft.   Substance History - negative use     OB/GYN negative ob/gyn ROS         Other   arthritis,                      Anesthesia Plan    ASA 3     MAC     intravenous induction     Anesthetic plan, risks, benefits, and alternatives have been provided, discussed and informed consent has been obtained with: patient.        CODE STATUS:

## 2022-08-30 LAB
LAB AP CASE REPORT: NORMAL
PATH REPORT.FINAL DX SPEC: NORMAL
PATH REPORT.GROSS SPEC: NORMAL

## 2022-09-14 ENCOUNTER — TELEPHONE (OUTPATIENT)
Dept: GASTROENTEROLOGY | Facility: CLINIC | Age: 67
End: 2022-09-14

## 2022-09-14 NOTE — TELEPHONE ENCOUNTER
Called pt and spoke with pt and wife and advised of Dr Rojas's note. They verbalized understanding.     Colonoscopy placed in recall for 8/29/2027.

## 2022-09-14 NOTE — TELEPHONE ENCOUNTER
----- Message from Timothy Rojas MD sent at 9/14/2022 11:57 AM EDT -----  Polyp benign, repeat colonoscopy 5 years with reflux esophagitis.  Maintain a PPI.

## 2022-10-07 NOTE — TELEPHONE ENCOUNTER
Medication: semaglutide,0.25 or 0.5 mg/DOSE, (OZEMPIC) (1.34 mg/ml) 0.25 or 0.5 MG/DOSE injection    Last appointment: 10/03/2022     Next Appointment: None    Message to Medical Assistant: Prior Authorization needed                                                                                                                                                                                                                                                                                                                                                                                                                                                                                                                                                                                         Referral to PT

## 2022-10-12 ENCOUNTER — TELEPHONE (OUTPATIENT)
Dept: GASTROENTEROLOGY | Facility: CLINIC | Age: 67
End: 2022-10-12

## 2022-10-12 NOTE — TELEPHONE ENCOUNTER
----- Message from Duane M. Witten sent at 10/12/2022 12:29 PM EDT -----  Regarding: Prescription for stomach acid   Contact: [phone]  The doctor said if, the medication doesn’t work then let him know and he would change it. It has been almost a month since the Dr did his scopes and the medicine is not working. Thanks

## 2022-10-13 RX ORDER — DEXLANSOPRAZOLE 60 MG/1
60 CAPSULE, DELAYED RELEASE ORAL DAILY
Qty: 30 CAPSULE | Refills: 6 | Status: ON HOLD | OUTPATIENT
Start: 2022-10-13 | End: 2023-03-14

## 2022-11-01 DIAGNOSIS — E11.9 CONTROLLED TYPE 2 DIABETES MELLITUS WITHOUT COMPLICATION, WITHOUT LONG-TERM CURRENT USE OF INSULIN: ICD-10-CM

## 2022-11-01 DIAGNOSIS — F41.1 GAD (GENERALIZED ANXIETY DISORDER): ICD-10-CM

## 2022-11-01 DIAGNOSIS — F32.5 MAJOR DEPRESSIVE DISORDER WITH SINGLE EPISODE, IN FULL REMISSION: ICD-10-CM

## 2022-11-01 DIAGNOSIS — F98.8 ATTENTION DEFICIT DISORDER (ADD) WITHOUT HYPERACTIVITY: ICD-10-CM

## 2022-11-01 RX ORDER — DULOXETIN HYDROCHLORIDE 60 MG/1
60 CAPSULE, DELAYED RELEASE ORAL 2 TIMES DAILY
Qty: 180 CAPSULE | Refills: 3 | Status: SHIPPED | OUTPATIENT
Start: 2022-11-01

## 2022-11-01 RX ORDER — ATOMOXETINE 100 MG/1
100 CAPSULE ORAL DAILY
Qty: 90 CAPSULE | Refills: 3 | Status: SHIPPED | OUTPATIENT
Start: 2022-11-01

## 2022-11-01 RX ORDER — DULAGLUTIDE 1.5 MG/.5ML
1.5 INJECTION, SOLUTION SUBCUTANEOUS WEEKLY
Qty: 2 ML | Refills: 11 | Status: SHIPPED | OUTPATIENT
Start: 2022-11-01

## 2022-11-03 ENCOUNTER — TELEPHONE (OUTPATIENT)
Dept: FAMILY MEDICINE CLINIC | Facility: CLINIC | Age: 67
End: 2022-11-03

## 2022-11-03 NOTE — TELEPHONE ENCOUNTER
The pharmacy states the following scripts were not signed & need to be resent.  The fax is [phone] ( this will go directly to the pharmacy    Trulicity 1.5 shot, strattera 100 mg, and cymbalta 60mg

## 2022-11-18 ENCOUNTER — OFFICE VISIT (OUTPATIENT)
Dept: FAMILY MEDICINE CLINIC | Facility: CLINIC | Age: 67
End: 2022-11-18

## 2022-11-18 VITALS
SYSTOLIC BLOOD PRESSURE: 154 MMHG | TEMPERATURE: 97.8 F | BODY MASS INDEX: 44.89 KG/M2 | WEIGHT: 315 LBS | DIASTOLIC BLOOD PRESSURE: 89 MMHG | OXYGEN SATURATION: 96 % | HEART RATE: 85 BPM

## 2022-11-18 DIAGNOSIS — R35.1 BENIGN PROSTATIC HYPERPLASIA WITH NOCTURIA: ICD-10-CM

## 2022-11-18 DIAGNOSIS — Z23 IMMUNIZATION DUE: ICD-10-CM

## 2022-11-18 DIAGNOSIS — Z23 NEED FOR INFLUENZA VACCINATION: ICD-10-CM

## 2022-11-18 DIAGNOSIS — N40.1 BENIGN PROSTATIC HYPERPLASIA WITH NOCTURIA: ICD-10-CM

## 2022-11-18 DIAGNOSIS — E11.9 CONTROLLED TYPE 2 DIABETES MELLITUS WITHOUT COMPLICATION, WITHOUT LONG-TERM CURRENT USE OF INSULIN: Primary | ICD-10-CM

## 2022-11-18 DIAGNOSIS — F51.01 PRIMARY INSOMNIA: ICD-10-CM

## 2022-11-18 PROCEDURE — G0008 ADMIN INFLUENZA VIRUS VAC: HCPCS | Performed by: NURSE PRACTITIONER

## 2022-11-18 PROCEDURE — 90662 IIV NO PRSV INCREASED AG IM: CPT | Performed by: NURSE PRACTITIONER

## 2022-11-18 PROCEDURE — 99213 OFFICE O/P EST LOW 20 MIN: CPT | Performed by: NURSE PRACTITIONER

## 2022-11-18 RX ORDER — TAMSULOSIN HYDROCHLORIDE 0.4 MG/1
2 CAPSULE ORAL DAILY
Qty: 180 CAPSULE | Refills: 3 | Status: SHIPPED | OUTPATIENT
Start: 2022-11-18

## 2022-11-18 RX ORDER — GLIMEPIRIDE 4 MG/1
4 TABLET ORAL
Qty: 90 TABLET | Refills: 3
Start: 2022-11-18 | End: 2022-11-23 | Stop reason: SDUPTHER

## 2022-11-18 RX ORDER — TRAZODONE HYDROCHLORIDE 150 MG/1
150 TABLET ORAL NIGHTLY PRN
Qty: 90 TABLET | Refills: 3 | Status: SHIPPED | OUTPATIENT
Start: 2022-11-18

## 2022-11-18 NOTE — PROGRESS NOTES
"Chief Complaint  Diabetes    Subjective        Duane Wojciech Ramirez presents to [facility]  History of Present Illness  Diabetes Mellitus Type II, Follow-up: Patient here for follow-up of Type 2 diabetes mellitus. Symptoms: none. Home monitoring: The patient is not checking blood sugars at home. Medications: The patient is adherent with their medication regimen. Medication(s): metformin, Trulicity, pioglitazone and glimepiride. Due for: A1C and urine microalbumin. Weight trend: decreasing.             Objective   Vital Signs:  /89 (BP Location: Left arm, Patient Position: Sitting, Cuff Size: Large Adult)   Pulse 85   Temp 97.8 °F (36.6 °C) (Temporal)   Wt (!) 159 kg (349 lb 9.6 oz)   SpO2 96%   BMI 44.89 kg/m²   Estimated body mass index is 44.89 kg/m² as calculated from the following:    Height as of 8/29/22: 188 cm (74\").    Weight as of this encounter: 159 kg (349 lb 9.6 oz).        Physical Exam  Vitals and nursing note reviewed.   Constitutional:       Appearance: Normal appearance.   Cardiovascular:      Rate and Rhythm: Normal rate and regular rhythm.      Heart sounds: Normal heart sounds.   Pulmonary:      Effort: Pulmonary effort is normal.      Breath sounds: Normal breath sounds.   Neurological:      Mental Status: He is alert and oriented to person, place, and time.   Psychiatric:         Mood and Affect: Mood normal.        Result Review :  The following data was reviewed by: KAYLEN Uriostegui on 11/18/2022:  CMP    CMP 6/21/22   Glucose 34 (A)   BUN 16   Creatinine 0.92   Sodium 145 (A)   Potassium 4.8   Chloride 103   Calcium 9.6   Total Protein 7.0   Albumin 4.4   Globulin 2.6   Total Bilirubin 0.5   Alkaline Phosphatase 109   AST (SGOT) 17   ALT (SGPT) 12   (A) Abnormal value       Comments are available for some flowsheets but are not being displayed.           Lipid Panel    Lipid Panel 6/21/22   Total Cholesterol 133   Triglycerides 81   HDL Cholesterol " 40   VLDL Cholesterol 16   LDL Cholesterol  77           Most Recent A1C    HGBA1C Most Recent 6/21/22   Hemoglobin A1C 5.9 (A)   (A) Abnormal value       Comments are available for some flowsheets but are not being displayed.                  Assessment and Plan   Diagnoses and all orders for this visit:    1. Controlled type 2 diabetes mellitus without complication, without long-term current use of insulin (HCC) (Primary)  -     Microalbumin / Creatinine Urine Ratio - Urine, Clean Catch  -     Hemoglobin A1c  -     glimepiride (AMARYL) 4 MG tablet; Take 1 tablet by mouth Every Morning Before Breakfast.  Dispense: 90 tablet; Refill: 3    2. Primary insomnia  Comments:  - Will increase trazodone to 150 mg daily.   Orders:  -     traZODone (DESYREL) 150 MG tablet; Take 1 tablet by mouth At Night As Needed for Sleep.  Dispense: 90 tablet; Refill: 3    3. Benign prostatic hyperplasia with nocturia  Comments:  - Will increase tamsulosin to 0.8 mg daily.   Orders:  -     tamsulosin (FLOMAX) 0.4 MG capsule 24 hr capsule; Take 2 capsules by mouth Daily.  Dispense: 180 capsule; Refill: 3    4. Need for influenza vaccination  -     Fluzone High-Dose 65+yrs ([id number])    5. Immunization due           I spent 20 minutes caring for Duane on this date of service. This time includes time spent by me in the following activities:reviewing tests, performing a medically appropriate examination and/or evaluation , counseling and educating the patient/family/caregiver, ordering medications, tests, or procedures and documenting information in the medical record  Follow Up   No follow-ups on file.  Patient was given instructions and counseling regarding his condition or for health maintenance advice. Please see specific information pulled into the AVS if appropriate.

## 2022-11-19 LAB
ALBUMIN/CREAT UR: 31 MG/G CREAT (ref 0–29)
CREAT UR-MCNC: 160.3 MG/DL
HBA1C MFR BLD: 5.9 % (ref 4.8–5.6)
MICROALBUMIN UR-MCNC: 49 UG/ML

## 2022-11-23 ENCOUNTER — TELEPHONE (OUTPATIENT)
Dept: FAMILY MEDICINE CLINIC | Facility: CLINIC | Age: 67
End: 2022-11-23

## 2022-11-23 DIAGNOSIS — E11.9 CONTROLLED TYPE 2 DIABETES MELLITUS WITHOUT COMPLICATION, WITHOUT LONG-TERM CURRENT USE OF INSULIN: ICD-10-CM

## 2022-11-23 RX ORDER — GLIMEPIRIDE 2 MG/1
2 TABLET ORAL
Qty: 90 TABLET | Refills: 3 | Status: SHIPPED | OUTPATIENT
Start: 2022-11-23 | End: 2022-12-15

## 2022-11-23 NOTE — TELEPHONE ENCOUNTER
Urine microalbumin is moderately increased to continue irbesartan as directed.  Diabetes remains well controlled with A1c of 5.9% so would recommend decreasing glimepiride to 2 mg daily.    Patient aware of results and recommendations.

## 2022-11-28 NOTE — PROGRESS NOTES
"SUBJECTIVE:  Subjective  Timmy Estrada is a 3 y.o. male who is here with {Persons; PED relatives w/patient:20734} for No chief complaint on file.    HPI  Current concerns include ***.    Nutrition:  Current diet:{Pediatric Diet:85096::"well balanced diet- three meals/healthy snacks most days","drinks milk/other calcium sources"}    Elimination:  Toilet trained? {gen no default/yes/free text:803151::"yes"}  Stool pattern: {Pediatric Bowel Patterns:41697::"daily, normal consistency"}    Sleep:{Peds Sleep:56040::"no problems"}    Dental:  Brushes teeth twice a day with fluoride? {gen no default/yes/free text:660421::"yes"}  Dental visit within past year?  {gen no default/yes/free text:452494::"yes"}    Social Screening:  Current  arrangements: {Infant childcare:70590}  Lead or Tuberculosis- high risk/previous history of exposure? {gen no default/yes/free text:286551}    Caregiver concerns regarding:  Hearing? {gen no default/yes/free text:004419}  Vision? {gen no default/yes/free text:643524}  Speech? {gen no default/yes/free text:888423}  Motor skills? {gen no default/yes/free text:852393}  Behavior/Activity? {gen no default/yes/free text:946560}    Developmental Screening:  {Age-Appropriate SWYC questionnaire results display below. If not yet completed, Answer Incomplete Questionnaire then Refresh note. All past results can be viewed in SWYC (Milestones) flowsheet in Review Flowsheets. (This text will automatically delete.) :03676}  No flowsheet data found.No SWYC result filed: not completed or not in appropriate age range for screening.    Review of Systems  A comprehensive review of symptoms was completed and negative except as noted above.     OBJECTIVE:  Vital signs  There were no vitals filed for this visit.    Physical Exam     ASSESSMENT/PLAN:  There are no diagnoses linked to this encounter.     Preventive Health Issues Addressed:  1. Anticipatory guidance discussed and a handout covering well-child " Inpatient Rehabilitation Functional Measures Assessment    Functional Measures  [facility] Eating:  [facility] Grooming: [facility] Bathing:  [facility] Upper Body Dressing:  [facility] Lower Body Dressing:  [facility] Toileting:  [facility] Bladder Management  Level of Assistance:  [place]  Frequency/Number of Accidents this Shift:  [facility] Bowel Management  Level of Assistance: [place]  Frequency/Number of Accidents this Shift: [facility] Bed/Chair/Wheelchair Transfer:  [facility] Toilet Transfer:  [facility] Tub/Shower Transfer:  [place]    Previously Documented Mode of Locomotion at Discharge: Field  [place] Expected Mode of Locomotion at Discharge: [facility] Walk/Wheelchair:  [facility] Stairs:  [facility] Comprehension:  Auditory comprehension is the usual mode. Comprehension  Score = 7, Independent.  Patient comprehends complex/abstract information in  their primary language.  Patient is completely independent for auditory  comprehension.  There are no activity limitations.  [place] Expression:  Vocal expression is the usual mode. Expression Score = 7,  Independent.  Patient expresses complex/abstract information in their primary  language.  Patient is completely independent for vocal expression.  There are no  activity limitations.  [place] Social Interaction:  Social Interaction Score = 7, Independent. Patient is  completely independent for social interaction.  There are no activity  limitations.  [place] Problem Solving:  Problem Solving Score = 7, Independent.  Patient makes  appropriate decisions in order to solve complex problems.  Patient is completely  independent for problem solving.  There are no activity limitations.  [place] Memory:  Memory Score = 7, Independent.  Patient is completely independent  for memory.  There are no activity limitations.    Therapy Mode Minutes  Occupational Therapy: Branch  Physical Therapy: Branch  Speech Language Pathology:  Branch    Signed by: Franc Flood RN     issues for age was provided.     2. Age appropriate physical activity and nutritional counseling were completed during today's visit.      3. Immunizations and screening tests today: per orders.    {If a Standardized Developmental Screening test was completed today, remember to confirm the charge in the SmartSet or manually enter code 72820 in Charge Capture. (This text will automatically delete.) :94006}    Follow Up:  No follow-ups on file.

## 2022-12-10 ENCOUNTER — HOSPITAL ENCOUNTER (OUTPATIENT)
Facility: HOSPITAL | Age: 67
Setting detail: OBSERVATION
Discharge: HOME | End: 2022-12-11
Attending: EMERGENCY MEDICINE | Admitting: EMERGENCY MEDICINE

## 2022-12-10 DIAGNOSIS — E78.5 HYPERLIPIDEMIA, UNSPECIFIED HYPERLIPIDEMIA TYPE: ICD-10-CM

## 2022-12-10 DIAGNOSIS — E66.01 MORBID OBESITY: ICD-10-CM

## 2022-12-10 DIAGNOSIS — I10 HYPERTENSION, UNSPECIFIED TYPE: ICD-10-CM

## 2022-12-10 DIAGNOSIS — I48.91 ATRIAL FIBRILLATION, UNSPECIFIED TYPE: ICD-10-CM

## 2022-12-10 DIAGNOSIS — Z79.01 ANTICOAGULATED: ICD-10-CM

## 2022-12-10 DIAGNOSIS — I10 ESSENTIAL HYPERTENSION: ICD-10-CM

## 2022-12-10 DIAGNOSIS — E11.649 TYPE 2 DIABETES MELLITUS WITH HYPOGLYCEMIA WITHOUT COMA, UNSPECIFIED WHETHER LONG TERM INSULIN USE: Primary | ICD-10-CM

## 2022-12-10 PROBLEM — E16.2 HYPOGLYCEMIA: Status: ACTIVE | Noted: 2022-12-10

## 2022-12-10 LAB
ALBUMIN SERPL-MCNC: 4 G/DL (ref 3.5–5.2)
ALBUMIN/GLOB SERPL: 1.4 G/DL
ALP SERPL-CCNC: 103 U/L (ref 39–117)
ALT SERPL W P-5'-P-CCNC: 11 U/L (ref 1–41)
ANION GAP SERPL CALCULATED.3IONS-SCNC: 13.8 MMOL/L (ref 5–15)
ANION GAP SERPL CALCULATED.3IONS-SCNC: 8.9 MMOL/L (ref 5–15)
AST SERPL-CCNC: 11 U/L (ref 1–40)
BASOPHILS # BLD AUTO: 0.06 10*3/MM3 (ref 0–0.2)
BASOPHILS NFR BLD AUTO: 0.5 % (ref 0–1.5)
BILIRUB SERPL-MCNC: 0.5 MG/DL (ref 0–1.2)
BILIRUB UR QL STRIP: NEGATIVE
BUN SERPL-MCNC: 17 MG/DL (ref 8–23)
BUN/CREAT SERPL: 18.5 (ref 7–25)
CALCIUM SPEC-SCNC: 9 MG/DL (ref 8.6–10.5)
CHLORIDE SERPL-SCNC: 103 MMOL/L (ref 98–107)
CHLORIDE SERPL-SCNC: 103 MMOL/L (ref 98–107)
CLARITY UR: CLEAR
CO2 SERPL-SCNC: 26.2 MMOL/L (ref 22–29)
CO2 SERPL-SCNC: 30.1 MMOL/L (ref 22–29)
COLOR UR: YELLOW
CREAT SERPL-MCNC: 0.92 MG/DL (ref 0.76–1.27)
DEPRECATED RDW RBC AUTO: 44.5 FL (ref 37–54)
DEPRECATED RDW RBC AUTO: 45.4 FL (ref 37–54)
EGFRCR SERPLBLD CKD-EPI 2021: 91.2 ML/MIN/1.73
EOSINOPHIL # BLD AUTO: 0.32 10*3/MM3 (ref 0–0.4)
EOSINOPHIL NFR BLD AUTO: 2.6 % (ref 0.3–6.2)
ERYTHROCYTE [DISTWIDTH] IN BLOOD BY AUTOMATED COUNT: 16.5 % (ref 12.3–15.4)
ERYTHROCYTE [DISTWIDTH] IN BLOOD BY AUTOMATED COUNT: 16.7 % (ref 12.3–15.4)
GLOBULIN UR ELPH-MCNC: 2.8 GM/DL
GLUCOSE BLDC GLUCOMTR-MCNC: 106 MG/DL (ref 70–130)
GLUCOSE BLDC GLUCOMTR-MCNC: 114 MG/DL (ref 70–130)
GLUCOSE BLDC GLUCOMTR-MCNC: 118 MG/DL (ref 70–130)
GLUCOSE BLDC GLUCOMTR-MCNC: 138 MG/DL (ref 70–130)
GLUCOSE BLDC GLUCOMTR-MCNC: 142 MG/DL (ref 70–130)
GLUCOSE BLDC GLUCOMTR-MCNC: 33 MG/DL (ref 70–130)
GLUCOSE BLDC GLUCOMTR-MCNC: 35 MG/DL (ref 70–130)
GLUCOSE BLDC GLUCOMTR-MCNC: 36 MG/DL (ref 70–130)
GLUCOSE BLDC GLUCOMTR-MCNC: 43 MG/DL (ref 70–130)
GLUCOSE BLDC GLUCOMTR-MCNC: 47 MG/DL (ref 70–130)
GLUCOSE BLDC GLUCOMTR-MCNC: 48 MG/DL (ref 70–130)
GLUCOSE BLDC GLUCOMTR-MCNC: 52 MG/DL (ref 70–130)
GLUCOSE BLDC GLUCOMTR-MCNC: 60 MG/DL (ref 70–130)
GLUCOSE BLDC GLUCOMTR-MCNC: 63 MG/DL (ref 70–130)
GLUCOSE BLDC GLUCOMTR-MCNC: 73 MG/DL (ref 70–130)
GLUCOSE BLDC GLUCOMTR-MCNC: 93 MG/DL (ref 70–130)
GLUCOSE SERPL-MCNC: 46 MG/DL (ref 65–99)
GLUCOSE UR STRIP-MCNC: NEGATIVE MG/DL
HCT VFR BLD AUTO: 31.2 % (ref 37.5–51)
HCT VFR BLD AUTO: 35.4 % (ref 37.5–51)
HGB BLD-MCNC: 10.7 G/DL (ref 13–17.7)
HGB BLD-MCNC: 9.9 G/DL (ref 13–17.7)
HGB UR QL STRIP.AUTO: NEGATIVE
IMM GRANULOCYTES # BLD AUTO: 0.09 10*3/MM3 (ref 0–0.05)
IMM GRANULOCYTES NFR BLD AUTO: 0.7 % (ref 0–0.5)
KETONES UR QL STRIP: NEGATIVE
LEUKOCYTE ESTERASE UR QL STRIP.AUTO: NEGATIVE
LYMPHOCYTES # BLD AUTO: 1.51 10*3/MM3 (ref 0.7–3.1)
LYMPHOCYTES NFR BLD AUTO: 12.2 % (ref 19.6–45.3)
MAGNESIUM SERPL-MCNC: 1.6 MG/DL (ref 1.6–2.4)
MCH RBC QN AUTO: 23.2 PG (ref 26.6–33)
MCH RBC QN AUTO: 23.2 PG (ref 26.6–33)
MCHC RBC AUTO-ENTMCNC: 30.2 G/DL (ref 31.5–35.7)
MCHC RBC AUTO-ENTMCNC: 31.7 G/DL (ref 31.5–35.7)
MCV RBC AUTO: 73.2 FL (ref 79–97)
MCV RBC AUTO: 76.8 FL (ref 79–97)
MONOCYTES # BLD AUTO: 0.79 10*3/MM3 (ref 0.1–0.9)
MONOCYTES NFR BLD AUTO: 6.4 % (ref 5–12)
NEUTROPHILS NFR BLD AUTO: 77.6 % (ref 42.7–76)
NEUTROPHILS NFR BLD AUTO: 9.6 10*3/MM3 (ref 1.7–7)
NITRITE UR QL STRIP: NEGATIVE
NRBC BLD AUTO-RTO: 0 /100 WBC (ref 0–0.2)
PH UR STRIP.AUTO: 5.5 [PH] (ref 5–8)
PLATELET # BLD AUTO: 263 10*3/MM3 (ref 140–450)
PLATELET # BLD AUTO: 318 10*3/MM3 (ref 140–450)
PMV BLD AUTO: 10 FL (ref 6–12)
PMV BLD AUTO: 9.3 FL (ref 6–12)
POTASSIUM SERPL-SCNC: 3.8 MMOL/L (ref 3.5–5.2)
POTASSIUM SERPL-SCNC: 4 MMOL/L (ref 3.5–5.2)
PROT SERPL-MCNC: 6.8 G/DL (ref 6–8.5)
PROT UR QL STRIP: NEGATIVE
RBC # BLD AUTO: 4.26 10*6/MM3 (ref 4.14–5.8)
RBC # BLD AUTO: 4.61 10*6/MM3 (ref 4.14–5.8)
SODIUM SERPL-SCNC: 142 MMOL/L (ref 136–145)
SODIUM SERPL-SCNC: 143 MMOL/L (ref 136–145)
SP GR UR STRIP: 1.01 (ref 1–1.03)
UROBILINOGEN UR QL STRIP: NORMAL
WBC NRBC COR # BLD: 10.51 10*3/MM3 (ref 3.4–10.8)
WBC NRBC COR # BLD: 12.37 10*3/MM3 (ref 3.4–10.8)

## 2022-12-10 PROCEDURE — G0378 HOSPITAL OBSERVATION PER HR: HCPCS

## 2022-12-10 PROCEDURE — 80051 ELECTROLYTE PANEL: CPT | Performed by: NURSE PRACTITIONER

## 2022-12-10 PROCEDURE — 81003 URINALYSIS AUTO W/O SCOPE: CPT | Performed by: PHYSICIAN ASSISTANT

## 2022-12-10 PROCEDURE — 83735 ASSAY OF MAGNESIUM: CPT | Performed by: PHYSICIAN ASSISTANT

## 2022-12-10 PROCEDURE — 96376 TX/PRO/DX INJ SAME DRUG ADON: CPT

## 2022-12-10 PROCEDURE — 36415 COLL VENOUS BLD VENIPUNCTURE: CPT

## 2022-12-10 PROCEDURE — 82962 GLUCOSE BLOOD TEST: CPT

## 2022-12-10 PROCEDURE — 96375 TX/PRO/DX INJ NEW DRUG ADDON: CPT

## 2022-12-10 PROCEDURE — 85027 COMPLETE CBC AUTOMATED: CPT | Performed by: NURSE PRACTITIONER

## 2022-12-10 PROCEDURE — 80053 COMPREHEN METABOLIC PANEL: CPT | Performed by: PHYSICIAN ASSISTANT

## 2022-12-10 PROCEDURE — 99285 EMERGENCY DEPT VISIT HI MDM: CPT

## 2022-12-10 PROCEDURE — 96366 THER/PROPH/DIAG IV INF ADDON: CPT

## 2022-12-10 PROCEDURE — 96365 THER/PROPH/DIAG IV INF INIT: CPT

## 2022-12-10 PROCEDURE — 85025 COMPLETE CBC W/AUTO DIFF WBC: CPT | Performed by: PHYSICIAN ASSISTANT

## 2022-12-10 PROCEDURE — 96361 HYDRATE IV INFUSION ADD-ON: CPT

## 2022-12-10 RX ORDER — ONDANSETRON 2 MG/ML
4 INJECTION INTRAMUSCULAR; INTRAVENOUS EVERY 6 HOURS PRN
Status: DISCONTINUED | OUTPATIENT
Start: 2022-12-10 | End: 2022-12-11 | Stop reason: HOSPADM

## 2022-12-10 RX ORDER — HYDROCODONE BITARTRATE AND ACETAMINOPHEN 10; 325 MG/1; MG/1
1 TABLET ORAL EVERY 6 HOURS PRN
Status: DISCONTINUED | OUTPATIENT
Start: 2022-12-10 | End: 2022-12-11 | Stop reason: HOSPADM

## 2022-12-10 RX ORDER — DULOXETIN HYDROCHLORIDE 60 MG/1
60 CAPSULE, DELAYED RELEASE ORAL 2 TIMES DAILY
Status: DISCONTINUED | OUTPATIENT
Start: 2022-12-10 | End: 2022-12-11 | Stop reason: HOSPADM

## 2022-12-10 RX ORDER — SODIUM CHLORIDE 9 MG/ML
40 INJECTION, SOLUTION INTRAVENOUS AS NEEDED
Status: DISCONTINUED | OUTPATIENT
Start: 2022-12-10 | End: 2022-12-11 | Stop reason: HOSPADM

## 2022-12-10 RX ORDER — BACLOFEN 10 MG/1
10 TABLET ORAL NIGHTLY
Status: DISCONTINUED | OUTPATIENT
Start: 2022-12-10 | End: 2022-12-11 | Stop reason: HOSPADM

## 2022-12-10 RX ORDER — LOSARTAN POTASSIUM 100 MG/1
100 TABLET ORAL
Status: DISCONTINUED | OUTPATIENT
Start: 2022-12-10 | End: 2022-12-11 | Stop reason: HOSPADM

## 2022-12-10 RX ORDER — DEXTROSE MONOHYDRATE 25 G/50ML
50 INJECTION, SOLUTION INTRAVENOUS ONCE
Status: COMPLETED | OUTPATIENT
Start: 2022-12-10 | End: 2022-12-10

## 2022-12-10 RX ORDER — ONDANSETRON 4 MG/1
4 TABLET, FILM COATED ORAL EVERY 6 HOURS PRN
Status: DISCONTINUED | OUTPATIENT
Start: 2022-12-10 | End: 2022-12-11 | Stop reason: HOSPADM

## 2022-12-10 RX ORDER — SODIUM CHLORIDE 0.9 % (FLUSH) 0.9 %
10 SYRINGE (ML) INJECTION AS NEEDED
Status: DISCONTINUED | OUTPATIENT
Start: 2022-12-10 | End: 2022-12-11 | Stop reason: HOSPADM

## 2022-12-10 RX ORDER — CETIRIZINE HYDROCHLORIDE 10 MG/1
10 TABLET ORAL DAILY
Status: DISCONTINUED | OUTPATIENT
Start: 2022-12-10 | End: 2022-12-11 | Stop reason: HOSPADM

## 2022-12-10 RX ORDER — DEXTROSE MONOHYDRATE 25 G/50ML
INJECTION, SOLUTION INTRAVENOUS
Status: COMPLETED
Start: 2022-12-10 | End: 2022-12-10

## 2022-12-10 RX ORDER — DEXTROSE AND SODIUM CHLORIDE 5; .9 G/100ML; G/100ML
75 INJECTION, SOLUTION INTRAVENOUS CONTINUOUS
Status: DISCONTINUED | OUTPATIENT
Start: 2022-12-10 | End: 2022-12-11 | Stop reason: HOSPADM

## 2022-12-10 RX ORDER — TAMSULOSIN HYDROCHLORIDE 0.4 MG/1
0.4 CAPSULE ORAL 2 TIMES DAILY
Status: DISCONTINUED | OUTPATIENT
Start: 2022-12-10 | End: 2022-12-11 | Stop reason: HOSPADM

## 2022-12-10 RX ORDER — ATORVASTATIN CALCIUM 20 MG/1
20 TABLET, FILM COATED ORAL DAILY
Status: DISCONTINUED | OUTPATIENT
Start: 2022-12-10 | End: 2022-12-11 | Stop reason: HOSPADM

## 2022-12-10 RX ORDER — SODIUM CHLORIDE 0.9 % (FLUSH) 0.9 %
10 SYRINGE (ML) INJECTION EVERY 12 HOURS SCHEDULED
Status: DISCONTINUED | OUTPATIENT
Start: 2022-12-10 | End: 2022-12-11 | Stop reason: HOSPADM

## 2022-12-10 RX ORDER — NITROGLYCERIN 0.4 MG/1
0.4 TABLET SUBLINGUAL
Status: DISCONTINUED | OUTPATIENT
Start: 2022-12-10 | End: 2022-12-11 | Stop reason: HOSPADM

## 2022-12-10 RX ADMIN — TAMSULOSIN HYDROCHLORIDE 0.4 MG: 0.4 CAPSULE ORAL at 23:03

## 2022-12-10 RX ADMIN — Medication 10 ML: at 08:18

## 2022-12-10 RX ADMIN — DEXTROSE MONOHYDRATE 50 ML: 25 INJECTION, SOLUTION INTRAVENOUS at 05:13

## 2022-12-10 RX ADMIN — DULOXETINE HYDROCHLORIDE 60 MG: 60 CAPSULE, DELAYED RELEASE ORAL at 23:03

## 2022-12-10 RX ADMIN — DEXTROSE AND SODIUM CHLORIDE 125 ML/HR: 5; 900 INJECTION, SOLUTION INTRAVENOUS at 12:50

## 2022-12-10 RX ADMIN — TAMSULOSIN HYDROCHLORIDE 0.4 MG: 0.4 CAPSULE ORAL at 08:17

## 2022-12-10 RX ADMIN — BACLOFEN 10 MG: 10 TABLET ORAL at 23:03

## 2022-12-10 RX ADMIN — DEXTROSE MONOHYDRATE 50 ML: 25 INJECTION, SOLUTION INTRAVENOUS at 08:27

## 2022-12-10 RX ADMIN — HYDROCODONE BITARTRATE AND ACETAMINOPHEN 1 TABLET: 10; 325 TABLET ORAL at 19:27

## 2022-12-10 RX ADMIN — DEXTROSE AND SODIUM CHLORIDE 50 ML/HR: 5; 900 INJECTION, SOLUTION INTRAVENOUS at 03:29

## 2022-12-10 RX ADMIN — ATOMOXETINE 100 MG: 60 CAPSULE ORAL at 09:20

## 2022-12-10 RX ADMIN — ATORVASTATIN CALCIUM 20 MG: 20 TABLET, FILM COATED ORAL at 08:17

## 2022-12-10 RX ADMIN — Medication 10 ML: at 23:04

## 2022-12-10 RX ADMIN — DULOXETINE HYDROCHLORIDE 60 MG: 60 CAPSULE, DELAYED RELEASE ORAL at 08:17

## 2022-12-10 RX ADMIN — CETIRIZINE HYDROCHLORIDE 10 MG: 10 TABLET ORAL at 08:17

## 2022-12-10 NOTE — NURSING NOTE
IMAN Schofield aware of low blood glucose. New orders received. BP:113/50, IMAN Schofield verbalized to hold losartan.

## 2022-12-10 NOTE — ED TRIAGE NOTES
Pt arrives to ED via EMS from a bar with c/o hypoglycemia (30).  Pt given 25g of oral glucose and 250cc of D10, BG now 87.  Pt did fall at the bar when BG dropped, has abrasion to left elbow.    Patient was placed in face mask during first look triage.  Patient was wearing a face mask throughout encounter.  I wore personal protective equipment throughout the encounter.  Hand hygiene was performed before and after patient encounter.

## 2022-12-10 NOTE — ED PROVIDER NOTES
EMERGENCY DEPARTMENT ENCOUNTER    Room Number:  02/02  Date of encounter:  12/10/2022  PCP: Marly Myrick APRN  Historian: Patient       HPI:  Chief Complaint: Hypoglycemia  A complete HPI/ROS/PMH/PSH/SH/FH are unobtainable due to: N/A    Context: Duane Mark Witten is a 67 y.o. male with past medical history of T2DM, HTN, HLD, AF on Xarelto and cardiomyopathy, and obesity who presents to the ED c/o low blood glucose.  Patient states that he was in his usual state of health, and then last night took his medicines normally, but did not eat anything and then when he woke up this afternoon had a very difficult time waking up, was difficult to arouse, and felt like his sugars were low.  This continued in the evening and at 1 point they were checked and found to have a glucose of 30.  Patient been given glucose, dextrose, and eaten.      PAST MEDICAL HISTORY  Active Ambulatory Problems     Diagnosis Date Noted   • YANELI (generalized anxiety disorder)    • ADD (attention deficit disorder)    • Depression    • Diabetes type 2, controlled (Former[facility])    • ED (erectile dysfunction) of non-organic origin    • HLD (hyperlipidemia)    • Essential hypertension    • Arthritis    • Psoriasis    • Testosterone deficiency    • Primary insomnia 01/08/2016   • Weakness of both lower extremities 11/22/2018   • Paresthesia of both upper extremities 11/22/2018   • Morbid obesity (HCC) 11/22/2018   • Adverse effect of corticosteroids 11/23/2018   • Type 2 diabetes mellitus with hyperglycemia ([facility]) 11/23/2018   • Cervical stenosis of spinal canal 11/23/2018   • Edema of cervical spinal cord (CMS/HCC) 11/23/2018   • Permanent atrial fibrillation ([facility]) 11/28/2018   • Cervical myelopathy (Formerly [facility]) 12/02/2018   • Dilated cardiomyopathy ([facility]) 03/19/2019   • Cervical spondylosis with myelopathy 05/17/2019   • History of spinal fusion 05/17/2019   • Nonrheumatic aortic valve stenosis 08/21/2019   • Leukocytosis 11/07/2019   • CAMERON (obstructive sleep apnea)  02/26/2020   • Bilateral upper extremity weakness 12/09/2020   • Abnormality of gait 12/16/2020   • Acute pain of right shoulder 07/20/2021   • Other specified arthritis, right shoulder 07/22/2021   • Dysphagia 06/10/2022   • Encounter for screening for malignant neoplasm of colon 06/10/2022     Resolved Ambulatory Problems     Diagnosis Date Noted   • Eosinophilia 11/22/2018   • Postoperative visit 01/02/2019     Past Medical History:   Diagnosis Date   • Anemia    • Anesthesia complication    • Atrial fibrillation (HCC)    • Chronic anticoagulation    • Chronic pain    • Difficulty swallowing solids    • ED (erectile dysfunction)    • Elevated cholesterol    • Generalized weakness    • Osteoarthrosis    • PONV (postoperative nausea and vomiting)    • Type 2 diabetes mellitus (HCC)    • Unsteady gait          PAST SURGICAL HISTORY  Past Surgical History:   Procedure Laterality Date   • ANTERIOR CHANNEL VERTEBRECTOMY/CORPECTOMY Bilateral 11/27/2018    Procedure: C4, C5, C6 ANTERIOR CERVICAL CORPECTOMY;  Surgeon: Chirag Markham MD;  Location: [facility] OR;  Service: Neurosurgery   • BIOPSY / EXCISION / DISSECTION AXILLARY NODE  1994    Lymph Node testing for cancer    • COLONOSCOPY      10+ years; normal   • COLONOSCOPY N/A 8/29/2022    Procedure: COLONOSCOPY INTO CECUM AND TI WITH COLD BX AND COLD SNARE POLYPECTOMIES;  Surgeon: Timothy Rojas MD;  Location: [facility] ENDOSCOPY;  Service: Gastroenterology;  Laterality: N/A;  PRE: ANEMIA  POST: POLYPS, DIVERTICULOSIS, HEMORRHOIDS   • ENDOSCOPY N/A 8/29/2022    Procedure: ESOPHAGOGASTRODUODENOSCOPY WITH BIOPSIES;  Surgeon: Timothy Rojas MD;  Location: [facility] ENDOSCOPY;  Service: Gastroenterology;  Laterality: N/A;  PRE: DYSPHAGIA  POST: ESOPHAGITIS   • LASIK Bilateral    • PARATHYROIDECTOMY  2008         FAMILY HISTORY  Family History   Problem Relation Age of Onset   • Anxiety disorder Mother    • Bipolar disorder Mother    • Depression Mother    •  Glaucoma Mother    • Macular degeneration Mother    • Lupus Mother    • Aortic stenosis Mother         s/p TAVR at age 87   • Cancer Father    • Diabetes Father    • Heart disease Father    • Hypertension Father    • Stroke Father    • Lupus Other         grandmother   • Malig Hyperthermia Neg Hx          SOCIAL HISTORY  Social History     Socioeconomic History   • Marital status:      Spouse name: Mindy   Tobacco Use   • Smoking status: Former     Packs/day: 1.00     Years: 5.00     Pack years: 5.00     Types: Cigarettes     Quit date:      Years since quittin.9   • Smokeless tobacco: Never   Vaping Use   • Vaping Use: Never used   Substance and Sexual Activity   • Alcohol use: Yes     Comment: RARELY   • Drug use: No   • Sexual activity: Defer         ALLERGIES  Patient has no known allergies.        REVIEW OF SYSTEMS  Review of Systems     All systems reviewed and negative except for those discussed in HPI.       PHYSICAL EXAM    I have reviewed the triage vital signs and nursing notes.    ED Triage Vitals [12/10/22 0118]   Temp Heart Rate Resp BP SpO2   97.9 °F (36.6 °C) 63 18 115/90 97 %      Temp src Heart Rate Source Patient Position BP Location FiO2 (%)   -- -- -- -- --       Physical Exam  GENERAL: Alert and orient x4, obese, not distressed  HENT: dry mucous membranes  EYES: no scleral icterus, PERRL, EOMI  CV: regular rhythm, regular rate  RESPIRATORY: normal effort  ABDOMEN: soft/nontender  MUSCULOSKELETAL: no deformity, 3+ pedal edema BLE  NEURO: alert, moves all extremities, follows commands  SKIN: warm, dry, changes of chronic venous stasis to BLE        LAB RESULTS  Recent Results (from the past 24 hour(s))   POC Glucose Once    Collection Time: 12/10/22  1:38 AM    Specimen: Blood   Result Value Ref Range    Glucose 63 (L) 70 - 130 mg/dL   POC Glucose Once    Collection Time: 12/10/22  2:38 AM    Specimen: Blood   Result Value Ref Range    Glucose 73 70 - 130 mg/dL   Comprehensive  Metabolic Panel    Collection Time: 12/10/22  3:17 AM    Specimen: Blood   Result Value Ref Range    Glucose 46 (C) 65 - 99 mg/dL    BUN 17 8 - 23 mg/dL    Creatinine 0.92 0.76 - 1.27 mg/dL    Sodium 143 136 - 145 mmol/L    Potassium 3.8 3.5 - 5.2 mmol/L    Chloride 103 98 - 107 mmol/L    CO2 26.2 22.0 - 29.0 mmol/L    Calcium 9.0 8.6 - 10.5 mg/dL    Total Protein 6.8 6.0 - 8.5 g/dL    Albumin 4.00 3.50 - 5.20 g/dL    ALT (SGPT) 11 1 - 41 U/L    AST (SGOT) 11 1 - 40 U/L    Alkaline Phosphatase 103 39 - 117 U/L    Total Bilirubin 0.5 0.0 - 1.2 mg/dL    Globulin 2.8 gm/dL    A/G Ratio 1.4 g/dL    BUN/Creatinine Ratio 18.5 7.0 - 25.0    Anion Gap 13.8 5.0 - 15.0 mmol/L    eGFR 91.2 >60.0 mL/min/1.73   Magnesium    Collection Time: 12/10/22  3:17 AM    Specimen: Blood   Result Value Ref Range    Magnesium 1.6 1.6 - 2.4 mg/dL   CBC Auto Differential    Collection Time: 12/10/22  3:17 AM    Specimen: Blood   Result Value Ref Range    WBC 12.37 (H) 3.40 - 10.80 10*3/mm3    RBC 4.61 4.14 - 5.80 10*6/mm3    Hemoglobin 10.7 (L) 13.0 - 17.7 g/dL    Hematocrit 35.4 (L) 37.5 - 51.0 %    MCV 76.8 (L) 79.0 - 97.0 fL    MCH 23.2 (L) 26.6 - 33.0 pg    MCHC 30.2 (L) 31.5 - 35.7 g/dL    RDW 16.5 (H) 12.3 - 15.4 %    RDW-SD 45.4 37.0 - 54.0 fl    MPV 10.0 6.0 - 12.0 fL    Platelets 318 140 - 450 10*3/mm3    Neutrophil % 77.6 (H) 42.7 - 76.0 %    Lymphocyte % 12.2 (L) 19.6 - 45.3 %    Monocyte % 6.4 5.0 - 12.0 %    Eosinophil % 2.6 0.3 - 6.2 %    Basophil % 0.5 0.0 - 1.5 %    Immature Grans % 0.7 (H) 0.0 - 0.5 %    Neutrophils, Absolute 9.60 (H) 1.70 - 7.00 10*3/mm3    Lymphocytes, Absolute 1.51 0.70 - 3.10 10*3/mm3    Monocytes, Absolute 0.79 0.10 - 0.90 10*3/mm3    Eosinophils, Absolute 0.32 0.00 - 0.40 10*3/mm3    Basophils, Absolute 0.06 0.00 - 0.20 10*3/mm3    Immature Grans, Absolute 0.09 (H) 0.00 - 0.05 10*3/mm3    nRBC 0.0 0.0 - 0.2 /100 WBC   Urinalysis With Microscopic If Indicated (No Culture) - Urine, Clean Catch     Collection Time: 12/10/22  3:19 AM    Specimen: Urine, Clean Catch   Result Value Ref Range    Color, UA Yellow Yellow, Straw    Appearance, UA Clear Clear    pH, UA 5.5 5.0 - 8.0    Specific Gravity, UA 1.008 1.005 - 1.030    Glucose, UA Negative Negative    Ketones, UA Negative Negative    Bilirubin, UA Negative Negative    Blood, UA Negative Negative    Protein, UA Negative Negative    Leuk Esterase, UA Negative Negative    Nitrite, UA Negative Negative    Urobilinogen, UA 0.2 E.U./dL 0.2 - 1.0 E.U./dL       Ordered the above labs and independently reviewed the results.        RADIOLOGY  No Radiology Exams Resulted Within Past 24 Hours    I ordered the above noted radiological studies. Reviewed by me and discussed with radiologist.  See dictation for official radiology interpretation.      PROCEDURES    Procedures      MEDICATIONS GIVEN IN ER    Medications   sodium chloride 0.9 % flush 10 mL (has no administration in time range)   dextrose 5 % and sodium chloride 0.9 % infusion (50 mL/hr Intravenous New Bag 12/10/22 0329)   nitroglycerin (NITROSTAT) SL tablet 0.4 mg (has no administration in time range)   sodium chloride 0.9 % flush 10 mL (has no administration in time range)   sodium chloride 0.9 % flush 10 mL (has no administration in time range)   sodium chloride 0.9 % infusion 40 mL (has no administration in time range)   ondansetron (ZOFRAN) tablet 4 mg (has no administration in time range)     Or   ondansetron (ZOFRAN) injection 4 mg (has no administration in time range)         PROGRESS, DATA ANALYSIS, CONSULTS, AND MEDICAL DECISION MAKING    All labs have been independently reviewed by me.  All radiology studies have been reviewed by me and discussed with radiologist dictating the report.   EKG's independently viewed and interpreted by me.  Discussion below represents my analysis of pertinent findings related to patient's condition, differential diagnosis, treatment plan and final disposition.    DDx:  Includes but not limited to hypoglycemia, dehydration, generalized weakness    ED Course as of 12/10/22 0422   Sat Dec 10, 2022   0401 WBC(!): 12.37 [SALMA]   0401 Hemoglobin(!): 10.7 [SALMA]   0401 Hematocrit(!): 35.4 [SALMA]   0401 Platelets: 318 [SALMA]   0401 Glucose(!!): 46 [SALMA]   0401 BUN: 17 [SALMA]   0401 Creatinine: 0.92 [SALMA]   0401 Sodium: 143 [SALMA]   0401 Potassium: 3.8 [SALMA]   0401 Chloride: 103 [SALMA]   0401 CO2: 26.2 [SALMA]   0401 Magnesium: 1.6 [SALMA]   0410 Patient history, ER presentation and evaluation discussed with Dangelo ABDULLAHI, will place in observation per Dr. Saunders. [SALMA]      ED Course User Index  [SALMA] Jose Lamas PA       MDM: Patient has persistent hypoglycemia on oral antihyperglycemic's and the patient will therefore be monitored.    PPE: The patient wore a surgical mask throughout the entire patient encounter. I wore an N95.    AS OF 04:22 EST VITALS:    BP - 115/90  HR - 63  TEMP - 97.9 °F (36.6 °C)  O2 SATS - 97%        DIAGNOSIS  Final diagnoses:   Type 2 diabetes mellitus with hypoglycemia without coma, unspecified whether long term insulin use (HCC)   Hypertension, unspecified type   Hyperlipidemia, unspecified hyperlipidemia type   Atrial fibrillation, unspecified type (HCC)   Anticoagulated   Morbid obesity (HCC)         DISPOSITION  ADMISSION    Discussed treatment plan and reason for admission with pt/family and admitting physician.  Pt/family voiced understanding of the plan for admission for further testing/treatment as needed.       Note Disclaimer: At [facility], we believe that sharing information builds trust and better relationships. You are receiving this note because you recently visited [facility]. It is possible you will see health information before a provider has talked with you about it. This kind of information can be easy to misunderstand. To help you fully understand what it means for your health, we urge you to discuss this note with your provider.       Jose Lamas  IMAN [place]TON  12/10/22 0422

## 2022-12-10 NOTE — H&P
.   [facility]   HISTORY AND PHYSICAL    Patient Name: Duane Mark Witten  : 1955  MRN: [id number]  Primary Care Physician:  Marly Myrick APRN  Date of admission: 12/10/2022    Subjective   Subjective     Chief Complaint: Hypoglycemia    HPI:    Duane Mark Witten is a 67 y.o. male with past medical history including but not limited to A. fib, depression, diabetes, hypertension, arthritis, and morbid obesity presents to [facility] with hypoglycemia.  She reports she was playing music at a bar when he started feeling weak  fatigue and felt that his sugar was low.  EMS was called and his glucose was 30 and patient received dextrose and glucose prior to his arrival to the ED.  Patient reports that recently he has been having poor appetite and last time he took his metformin and glimepiride s was Thursday evening.  Patient reports that Thursday he had a similar episode where he was hard to arouse and waking up from sleep.  His significant other at bedside states that she had to feed him some peanut butter in order to get his sugar up but denies checking his glucose at that time.  Reports that his PCP monitor his diabetes.  Denies abdominal pain, nausea, vomit, diarrhea.  Denies cough, chills, fever, or lower extremity edema.    His work-up at the ED shows glucose 46, potassium 3.8, creatinine 0.92, magnesium 1.6, WBC 12.3, hemoglobin 10.7, and platelets 318.  Urinalysis negative.  On arrival to the observation unit patient POC glucose was 35 and and a bolus of 125 mL of D5 1.9 saline given with little to no improvement.  25 g of D50 given IV push and POC glucose went up to 48.  Patient was given a turkey sandwich and orange juice and continued to be hypoglycemic.  Despite of hypoglycemia patient remained asymptomatic.    Review of Systems   All systems were reviewed and negative except for: The mentioned above in HPI    Personal History     Past Medical History:   Diagnosis Date   • ADD  "(attention deficit disorder)    • Anemia     intermittent   • Anesthesia complication     STATES HE IS SLOW TO WAKE UP   • Atrial fibrillation (HCC)     XARELTO   • Chronic anticoagulation     XARELTO   • Chronic pain     \"ALL OVER\" ARTHRITIS   • Depression    • Difficulty swallowing solids     AT TIMES   • Dilated cardiomyopathy (HCC)    • ED (erectile dysfunction)    • Elevated cholesterol    • Essential hypertension    • YANELI (generalized anxiety disorder)    • Generalized weakness     STATES LEGS AND ARMS WEAK-TIRES EASILY   • HLD (hyperlipidemia)    • Leukocytosis    • Morbid obesity (HCC)    • CAMERON (obstructive sleep apnea)     BiPAP nightly   • Osteoarthrosis    • PONV (postoperative nausea and vomiting)    • Psoriasis    • Testosterone deficiency    • Type 2 diabetes mellitus (HCC)    • Unsteady gait     USES CANE       Past Surgical History:   Procedure Laterality Date   • ANTERIOR CHANNEL VERTEBRECTOMY/CORPECTOMY Bilateral 11/27/2018    Procedure: C4, C5, C6 ANTERIOR CERVICAL CORPECTOMY;  Surgeon: Chirag Markham MD;  Location: [facility] MAIN OR;  Service: Neurosurgery   • BIOPSY / EXCISION / DISSECTION AXILLARY NODE  1994    Lymph Node testing for cancer    • COLONOSCOPY      10+ years; normal   • COLONOSCOPY N/A 8/29/2022    Procedure: COLONOSCOPY INTO CECUM AND TI WITH COLD BX AND COLD SNARE POLYPECTOMIES;  Surgeon: Timothy Rojas MD;  Location: [facility] ENDOSCOPY;  Service: Gastroenterology;  Laterality: N/A;  PRE: ANEMIA  POST: POLYPS, DIVERTICULOSIS, HEMORRHOIDS   • ENDOSCOPY N/A 8/29/2022    Procedure: ESOPHAGOGASTRODUODENOSCOPY WITH BIOPSIES;  Surgeon: Timothy Rojas MD;  Location: [facility] ENDOSCOPY;  Service: Gastroenterology;  Laterality: N/A;  PRE: DYSPHAGIA  POST: ESOPHAGITIS   • LASIK Bilateral    • PARATHYROIDECTOMY  2008       Family History: family history includes Anxiety disorder in his mother; Aortic stenosis in his mother; Bipolar disorder in his mother; Cancer in his father; " Depression in his mother; Diabetes in his father; Glaucoma in his mother; Heart disease in his father; Hypertension in his father; Lupus in his mother and another family member; Macular degeneration in his mother; Stroke in his father. Otherwise pertinent FHx was reviewed and not pertinent to current issue.    Social History:  reports that he quit smoking about 43 years ago. He has a 5.00 pack-year smoking history. He has never used smokeless tobacco. He reports current alcohol use. He reports that he does not use drugs.    Home Medications:  DULoxetine, Dulaglutide, HYDROcodone-acetaminophen, atomoxetine, atorvastatin, baclofen, cetirizine, dexlansoprazole, glimepiride, irbesartan, metFORMIN ER, metoprolol succinate XL, pioglitazone, rivaroxaban, tamsulosin, and traZODone    Allergies:  No Known Allergies    Objective   Objective     Vitals:   Temp:  [97.9 °F (36.6 °C)] 97.9 °F (36.6 °C)  Heart Rate:  [63] 63  Resp:  [18] 18  BP: (115)/(90) 115/90  Physical Exam    Constitutional: Awake, alert   Eyes: PERRLA, sclerae anicteric, no conjunctival injection   HENT: NCAT, mucous membranes moist   Neck: Supple, no thyromegaly, no lymphadenopathy, trachea midline   Respiratory: Clear to auscultation bilaterally, nonlabored respirations    Cardiovascular: RRR, no murmurs, rubs, or gallops, palpable pedal pulses bilaterally   Gastrointestinal: Positive bowel sounds, soft, nontender, nondistended   Musculoskeletal: No bilateral ankle edema, no clubbing or cyanosis to extremities   Psychiatric: Appropriate affect, cooperative   Neurologic: Oriented x 3, strength symmetric in all extremities, Cranial Nerves grossly intact to confrontation, speech clear   Skin: No rashes     Result Review    Result Review:  I have personally reviewed the results from the time of this admission to 12/10/2022 04:08 EST and agree with these findings:  []  Laboratory list / accordion  []  Microbiology  []  Radiology  []  EKG/Telemetry   []   Cardiology/Vascular   []  Pathology  []  Old records  []  Other:  Most notable findings include:       Assessment & Plan   Assessment / Plan     Brief Patient Summary:  Duane Mark Witten is a 67 y.o. male who was seen and evaluated at the ED for hypoglycemia that most likely caused by lack of p.o. intake after taking his diabetes medicine.  Patient was started on D5 at the ED and has been admitted to the observation unit for further monitoring and  evaluation.    Active Hospital Problems:  Active Hospital Problems    Diagnosis    • **Hypoglycemia      Plan:   Diabetes  Hypoglycemia  -D5 at 100 mL/hr  -25 g of D50 IV push x1  -Consistent carb diet  -Diabetic educator consult  -Accu-Chek every 2 hour  -Hold metformin and glimepiride    Hypertension and hyperlipidemia  -Chronic conditions, continue home medication  -Vital signs per nursing    A. Fib  -Telemetry monitoring  -Continue Xarelto    ADD  -Continue Strattera    DVT prophylaxis:  Mechanical DVT prophylaxis orders are present.    CODE STATUS:    Level Of Support Discussed With: Patient  Code Status (Patient has no pulse and is not breathing): CPR (Attempt to Resuscitate)  Medical Interventions (Patient has pulse or is breathing): Full Support    Admission Status:  I believe this patient meets observation status.    .I wore an face mask, eye protection, and gloves during this patient encounter. Patient also wearing a surgical mask. Hand hygeine performed before and after seeing the patient.    Electronically signed by KAYLEN Romero, 12/10/22, 4:08 AM EST.

## 2022-12-10 NOTE — ED PROVIDER NOTES
MD ATTESTATION NOTE    The SRINI and I have discussed this patient's history, physical exam, and treatment plan.  I have reviewed the documentation and personally had a face to face interaction with the patient. I affirm the documentation and agree with the treatment and plan.  The attached note describes my personal findings.      I provided a substantive portion of the care of the patient.  I personally performed the physical exam in its entirety, and below are my findings.  For this patient encounter, the patient wore surgical mask, I wore full protective PPE including N95 and eye protection.      Brief HPI: Patient presents for hypoglycemia.  Patient on multiple oral medications.  Patient states tonight blood sugar was 30.  Patient slid to the ground.  Did not fall or hurt himself.  Has never had hypoglycemic episode like this in the past.    PHYSICAL EXAM  ED Triage Vitals [12/10/22 0118]   Temp Heart Rate Resp BP SpO2   97.9 °F (36.6 °C) 63 18 115/90 97 %      Temp src Heart Rate Source Patient Position BP Location FiO2 (%)   -- -- -- -- --         GENERAL: no acute distress  HENT: nares patent  EYES: no scleral icterus  CV: regular rhythm, normal rate  RESPIRATORY: normal effort  ABDOMEN: soft  MUSCULOSKELETAL: no deformity  NEURO: alert, moves all extremities, follows commands  PSYCH:  calm, cooperative  SKIN: warm, dry    Vital signs and nursing notes reviewed.        Plan: Patient will be admitted observation unit       Antelmo Walker MD  12/10/22 1569

## 2022-12-10 NOTE — PROGRESS NOTES
MD ATTESTATION NOTE    The SRINI and I have discussed this patient's history, physical exam, and treatment plan.  I have reviewed the documentation and personally had a face to face interaction with the patient. I affirm the documentation and agree with the treatment and plan.  The attached note describes my personal findings.      I provided a substantive portion of the care of the patient.  I personally performed the physical exam in its entirety, and below are my findings.  For this patient encounter, the patient wore surgical mask, I wore full protective PPE including N95 and eye protection.      Brief HPI: Patient admitted from ED for hypoglycemia.  Patient is on multiple oral medications.  Blood sugar last night was 30.  Patient has not had similar episodes in the past.  Patient states that he has not had much appetite has not been eating very much lately.    PHYSICAL EXAM  ED Triage Vitals   Temp Heart Rate Resp BP SpO2   12/10/22 0118 12/10/22 0118 12/10/22 0118 12/10/22 0118 12/10/22 0118   97.9 °F (36.6 °C) 63 18 115/90 97 %      Temp src Heart Rate Source Patient Position BP Location FiO2 (%)   12/10/22 0454 12/10/22 0454 12/10/22 0454 12/10/22 0454 --   Oral Monitor Lying Left arm          GENERAL: no acute distress  HENT: nares patent  EYES: no scleral icterus  CV: regular rhythm, normal rate  RESPIRATORY: normal effort  ABDOMEN: soft  MUSCULOSKELETAL: no deformity  NEURO: alert, moves all extremities, follows commands  PSYCH:  calm, cooperative  SKIN: warm, dry    Vital signs and nursing notes reviewed.        Plan: Continue to monitor blood sugar.  Patient has been on D5 drip.  Patient was fed around 4 AM eating breakfast this morning.

## 2022-12-11 ENCOUNTER — READMISSION MANAGEMENT (OUTPATIENT)
Dept: CALL CENTER | Facility: HOSPITAL | Age: 67
End: 2022-12-11

## 2022-12-11 VITALS
BODY MASS INDEX: 39.17 KG/M2 | OXYGEN SATURATION: 96 % | DIASTOLIC BLOOD PRESSURE: 66 MMHG | HEART RATE: 77 BPM | SYSTOLIC BLOOD PRESSURE: 165 MMHG | RESPIRATION RATE: 19 BRPM | TEMPERATURE: 98.1 F | WEIGHT: 315 LBS | HEIGHT: 75 IN

## 2022-12-11 PROBLEM — E16.2 HYPOGLYCEMIA: Status: RESOLVED | Noted: 2022-12-10 | Resolved: 2022-12-11

## 2022-12-11 LAB
ANION GAP SERPL CALCULATED.3IONS-SCNC: 12 MMOL/L (ref 5–15)
BUN SERPL-MCNC: 12 MG/DL (ref 8–23)
BUN/CREAT SERPL: 14.8 (ref 7–25)
CALCIUM SPEC-SCNC: 9 MG/DL (ref 8.6–10.5)
CHLORIDE SERPL-SCNC: 101 MMOL/L (ref 98–107)
CO2 SERPL-SCNC: 25 MMOL/L (ref 22–29)
CREAT SERPL-MCNC: 0.81 MG/DL (ref 0.76–1.27)
DEPRECATED RDW RBC AUTO: 44.6 FL (ref 37–54)
EGFRCR SERPLBLD CKD-EPI 2021: 96.6 ML/MIN/1.73
ERYTHROCYTE [DISTWIDTH] IN BLOOD BY AUTOMATED COUNT: 16.6 % (ref 12.3–15.4)
GLUCOSE BLDC GLUCOMTR-MCNC: 118 MG/DL (ref 70–130)
GLUCOSE BLDC GLUCOMTR-MCNC: 123 MG/DL (ref 70–130)
GLUCOSE SERPL-MCNC: 132 MG/DL (ref 65–99)
HCT VFR BLD AUTO: 31.9 % (ref 37.5–51)
HGB BLD-MCNC: 10 G/DL (ref 13–17.7)
MCH RBC QN AUTO: 23.2 PG (ref 26.6–33)
MCHC RBC AUTO-ENTMCNC: 31.3 G/DL (ref 31.5–35.7)
MCV RBC AUTO: 74 FL (ref 79–97)
PLATELET # BLD AUTO: 243 10*3/MM3 (ref 140–450)
PMV BLD AUTO: 9.2 FL (ref 6–12)
POTASSIUM SERPL-SCNC: 4.2 MMOL/L (ref 3.5–5.2)
RBC # BLD AUTO: 4.31 10*6/MM3 (ref 4.14–5.8)
SODIUM SERPL-SCNC: 138 MMOL/L (ref 136–145)
WBC NRBC COR # BLD: 9.74 10*3/MM3 (ref 3.4–10.8)

## 2022-12-11 PROCEDURE — 94799 UNLISTED PULMONARY SVC/PX: CPT

## 2022-12-11 PROCEDURE — 82962 GLUCOSE BLOOD TEST: CPT

## 2022-12-11 PROCEDURE — 85027 COMPLETE CBC AUTOMATED: CPT | Performed by: NURSE PRACTITIONER

## 2022-12-11 PROCEDURE — 94660 CPAP INITIATION&MGMT: CPT

## 2022-12-11 PROCEDURE — 80048 BASIC METABOLIC PNL TOTAL CA: CPT | Performed by: NURSE PRACTITIONER

## 2022-12-11 PROCEDURE — G0378 HOSPITAL OBSERVATION PER HR: HCPCS

## 2022-12-11 NOTE — PLAN OF CARE
Goal Outcome Evaluation:              Outcome Evaluation: this nurse took over patient care at 1745 alert and oriented and able to verbalize needs, glucose 106 with d5 drip infusing, glucose Q3hrs to monitor hypoglacemia. VSS no questions at this time.

## 2022-12-11 NOTE — OUTREACH NOTE
Prep Survey    Flowsheet Row Responses   [facility] [place] patient discharged from? [place]   Is LACE score < 7 ? Yes   Emergency Room discharge w/ pulse ox? No   Eligibility [facility]   Date of Admission 12/10/22   Date of Discharge 12/11/22   Discharge Disposition Home or Self Care   Discharge diagnosis Hypoglycemia   Does the patient have one of the following disease processes/diagnoses(primary or secondary)? Other   Does the patient have Home health ordered? No   Is there a DME ordered? No   Prep survey completed? Yes          REID HOPPER - Registered Nurse

## 2022-12-11 NOTE — NURSING NOTE
Pt found sitting in floor beside bed by RN, pt states was attempting to stand and go to BR, states feet began to slip and he slip himself into the floor in sitting position with back against bed. Denies pain or discomfort, no s/s of injury, notified DON Zimmer, [place] super. Wife in room with pt.

## 2022-12-11 NOTE — PLAN OF CARE
Goal Outcome Evaluation:  Patient is alert and oriented. Patient being discharged home. Discharge instructions given to patient. Patient is agreeable with plan. Questions and concerns addressed. Patient to follow up with primary care. Garrick Dixon verbalized that patient could take morning meds when he gets home. She is aware of elevated bp.

## 2022-12-11 NOTE — DISCHARGE INSTRUCTIONS
Please return to the emergency department if you develop fever greater than 101, chest pain, palpitation, shortness of breath  Monitor your glucose level before meals and at bedtime.  Make sure you eat when you take your medications to prevent hypoglycemia.

## 2022-12-11 NOTE — PROGRESS NOTES
.  ED OBSERVATION PROGRESS/DISCHARGE SUMMARY    Date of Admission: 12/10/2022   LOS: 0 days   PCP: Marly Myrick, KAYLEN      Subjective   Patient has been resting comfortably throughout the night and in no acute distress.    Hospital outcome:  67-year-old male seen and examined at bedside for admission to observation unit for hypoglycemia.His work-up at the ED shows glucose 46, potassium 3.8, creatinine 0.92, magnesium 1.6, WBC 12.3, hemoglobin 10.7, and platelets 318.  Urinalysis negative.   Patient was started on D5 in the ED and the rate increased to 75 here in the observation unit.  On admission to the observation unit patient sugar level was in the 40s and he had received 25 g of D50 with little improvement of his glucose.  Patient remains asymptomatic despite hyperglycemia   metformin and glimepiride held.  Patient glucose level throughout the day has improved and D5 was stopped around 1930.  Patient glucose level has been stable throughout the night with a recent POC glucose of 123. He is requesting discharge home this morning and is planning to follow up with his pcp this week as well as check his blood glucose at home. We have discussed holding glimepiride and actos if his sugar is dropping less than 100 and have educated him regarding dietary changes. Patient and wife agreeable to plan.     ROS:  General: no fevers, chills  Respiratory: no cough, dyspnea  Cardiovascular: no chest pain, palpitations  Abdomen: No abdominal pain, nausea, vomiting, or diarrhea  Neurologic: No focal weakness    Objective   Physical Exam:  I have reviewed the vital signs.  Temp:  [97.9 °F (36.6 °C)-98.2 °F (36.8 °C)] 98.2 °F (36.8 °C)  Heart Rate:  [] 81  Resp:  [16-20] 18  BP: (113-151)/(50-75) 119/60  General Appearance:    Alert, cooperative, no distress  Head:    Normocephalic, atraumatic  Eyes:    Sclerae anicteric  Neck:   Supple, no mass  Lungs: Clear to auscultation bilaterally, respirations unlabored  Heart:  Regular rate and rhythm, S1 and S2 normal, no murmur, rub or gallop  Abdomen:  Soft, non-tender, bowel sounds active, nondistended  Extremities: No clubbing, cyanosis, or edema to lower extremities  Pulses:  2+ and symmetric in distal lower extremities  Skin: No rashes   Neurologic: Oriented x3, Normal strength to extremities    Results Review:    I have reviewed the labs, radiology results and diagnostic studies.    Results from last 7 days   Lab Units 12/10/22  0430   WBC 10*3/mm3 10.51   HEMOGLOBIN g/dL 9.9*   HEMATOCRIT % 31.2*   PLATELETS 10*3/mm3 263     Results from last 7 days   Lab Units 12/10/22  0430 12/10/22  0317   SODIUM mmol/L 142 143   POTASSIUM mmol/L 4.0 3.8   CHLORIDE mmol/L 103 103   CO2 mmol/L 30.1* 26.2   BUN mg/dL  --  17   CREATININE mg/dL  --  0.92   CALCIUM mg/dL  --  9.0   BILIRUBIN mg/dL  --  0.5   ALK PHOS U/L  --  103   ALT (SGPT) U/L  --  11   AST (SGOT) U/L  --  11   GLUCOSE mg/dL  --  46*     Imaging Results (Last 24 Hours)     ** No results found for the last 24 hours. **          I have reviewed the medications.  ---------------------------------------------------------------------------------------------  Assessment & Plan   Assessment/Problem List    Hypoglycemia      Plan:  Diabetes  Hypoglycemia  -Consistent carb diet  -Accu-Chek TID at home     Hypertension and hyperlipidemia  -Chronic conditions, continue home medication     A. Fib  -Continue Xarelto     ADD  -Continue Strattera    Disposition: Home    Follow-up after Discharge: PCP    This note will serve as a discharge summary    .I wore an face mask, eye protection, and gloves during this patient encounter. Patient also wearing a surgical mask. Hand hygeine performed before and after seeing the patient.    Luis Zimmer, APRN 12/11/22 01:33 EST

## 2022-12-12 ENCOUNTER — TRANSITIONAL CARE MANAGEMENT TELEPHONE ENCOUNTER (OUTPATIENT)
Dept: CALL CENTER | Facility: HOSPITAL | Age: 67
End: 2022-12-12

## 2022-12-12 ENCOUNTER — TELEPHONE (OUTPATIENT)
Dept: FAMILY MEDICINE CLINIC | Facility: CLINIC | Age: 67
End: 2022-12-12

## 2022-12-12 NOTE — OUTREACH NOTE
Call Center TCM Note    Flowsheet Row Responses   [facility] patient discharged from? [place]   Does the patient have one of the following disease processes/diagnoses(primary or secondary)? Other   TCM attempt successful? Yes  [Mindy, spouse]   Call start time 1450   Call end time 1458   Discharge diagnosis Hypoglycemia   Person spoke with today (if not patient) and relationship Spouse   Meds reviewed with patient/caregiver? Yes   Is the patient having any side effects they believe may be caused by any medication additions or changes? No   Does the patient have all medications ordered at discharge? N/A   Is the patient taking all medications as directed (includes completed medication regime)? Yes   Does the patient have an appointment with their PCP within 7 days of discharge? No appointments available   Nursing Interventions Routed TCM call to PCP office   Psychosocial issues? No   Did the patient receive a copy of their discharge instructions? Yes   Nursing interventions Reviewed instructions with patient   What is the patient's perception of their health status since discharge? Improving   Is the patient/caregiver able to teach back signs and symptoms related to disease process for when to call PCP? Yes   Is the patient/caregiver able to teach back signs and symptoms related to disease process for when to call 911? Yes   Is the patient/caregiver able to teach back the hierarchy of who to call/visit for symptoms/problems? PCP, Specialist, Home health nurse, Urgent Care, ED, 911 Yes   If the patient is a current smoker, are they able to teach back resources for cessation? Not a smoker   TCM call completed? Yes   Wrap up additional comments Spouse states pt is doing good. Reviewed AVS/medications with spouse. Spouse shares she is calling PCP office to make a fu appt. No questions/concerns.   Call end time 1458   Would this patient benefit from a Referral to Amb Social Work? No   Is the patient interested in  additional calls from an ambulatory ?  NOTE:  applies to high risk patients requiring additional follow-up. Libby Enriquez RN    12/12/2022, 14:59 EST

## 2022-12-12 NOTE — TELEPHONE ENCOUNTER
Caller: Mindy Ramirez    Relationship: Emergency Contact    Best call back number: [phone]    What is the best time to reach you: ANY     Who are you requesting to speak with (clinical staff, provider,  specific staff member): CLINICAL STAFF     What was the call regarding: WOULD LIKE TO KNOW IF THERE ARE ANY BLOOD GLUCOSE METERS IN OFFICE THAT PATIENT MAY USE. WENT TO ED ON Saturday FOR LOW BLOOD SUGAR.     Do you require a callback: YES

## 2022-12-15 ENCOUNTER — TELEMEDICINE (OUTPATIENT)
Dept: FAMILY MEDICINE CLINIC | Facility: CLINIC | Age: 67
End: 2022-12-15

## 2022-12-15 DIAGNOSIS — E11.649 TYPE 2 DIABETES MELLITUS WITH HYPOGLYCEMIA WITHOUT COMA, WITHOUT LONG-TERM CURRENT USE OF INSULIN: Primary | ICD-10-CM

## 2022-12-15 PROCEDURE — 99496 TRANSJ CARE MGMT HIGH F2F 7D: CPT | Performed by: NURSE PRACTITIONER

## 2022-12-15 PROCEDURE — 1111F DSCHRG MED/CURRENT MED MERGE: CPT | Performed by: NURSE PRACTITIONER

## 2022-12-15 RX ORDER — BLOOD-GLUCOSE METER
EACH MISCELLANEOUS
Qty: 1 KIT | Refills: 0 | Status: SHIPPED | OUTPATIENT
Start: 2022-12-15 | End: 2023-02-08 | Stop reason: SDUPTHER

## 2022-12-15 NOTE — PROGRESS NOTES
Transitional Care Follow Up Visit  Subjective     Duane Mark Witten is a 67 y.o. male who presents for a transitional care management visit.    Within 48 business hours after discharge our office contacted him via telephone to coordinate his care and needs.      I reviewed and discussed the details of that call along with the discharge summary, hospital problems, inpatient lab results, inpatient diagnostic studies, and consultation reports with Duane.     Current outpatient and discharge medications have been reconciled for the patient.  Reviewed by: KAYLEN Uriostegui  You have chosen to receive care through a telehealth visit.  Do you consent to use a video/audio connection for your medical care today? Yes via [employer] with patient at home in [place], [place]    Date of TCM Phone Call 12/11/2022   [facility]   Date of Admission 12/10/2022   Date of Discharge 12/11/2022   Discharge Disposition Home or Self Care     Risk for Readmission (LACE) Score: 6 (12/11/2022  6:00 AM)      History of Present Illness   Course During Hospital Stay:  Duane Mark Witten was seen and evaluated in the ED for hypoglycemia that was most likely caused by lack of p.o. intake after taking his diabetes medicine.  Patient was started on D5 at the ED and was admitted to the observation unit for further monitoring and evaluation.     The following portions of the patient's history were reviewed and updated as appropriate: allergies, current medications, past family history, past medical history, past social history, past surgical history and problem list.    Review of Systems   Constitutional: Negative for fatigue and fever.   Respiratory: Negative for shortness of breath.    Cardiovascular: Negative for chest pain.   Neurological: Negative for weakness.   Psychiatric/Behavioral: Negative for confusion.       Objective   Physical Exam  Constitutional:       Appearance: Normal appearance.   Neurological:      Mental  Status: He is alert.   Psychiatric:         Mood and Affect: Mood normal.         Assessment & Plan   Diagnoses and all orders for this visit:    1. Type 2 diabetes mellitus with hypoglycemia without coma, without long-term current use of insulin ([facility]) (Primary)  Comments:  - Will discontinue glimepiride at this time.   - Patient to continue monitoring blood sugars at home.   Orders:  -     Blood Glucose Monitoring Suppl (Accu-Chek Leilani Plus) w/Device kit; MONITOR BLOOD SUGAR THREE TIMES DAILY  Dispense: 1 kit; Refill: 0  -     glucose (DEX4) 4 GM chewable tablet; Chew 4 tablets As Needed for Low Blood Sugar.  Dispense: 120 tablet; Refill: 0

## 2023-01-18 ENCOUNTER — HOSPITAL ENCOUNTER (OUTPATIENT)
Dept: CARDIOLOGY | Facility: HOSPITAL | Age: 68
Discharge: HOME | End: 2023-01-18
Admitting: NURSE PRACTITIONER
Payer: MEDICARE

## 2023-01-18 VITALS
WEIGHT: 315 LBS | SYSTOLIC BLOOD PRESSURE: 142 MMHG | OXYGEN SATURATION: 97 % | DIASTOLIC BLOOD PRESSURE: 80 MMHG | HEIGHT: 75 IN | HEART RATE: 77 BPM | BODY MASS INDEX: 39.17 KG/M2

## 2023-01-18 DIAGNOSIS — Q23.1 BICUSPID AORTIC VALVE: ICD-10-CM

## 2023-01-18 DIAGNOSIS — I35.0 NONRHEUMATIC AORTIC VALVE STENOSIS: ICD-10-CM

## 2023-01-18 LAB
AORTIC ARCH: 3.3 CM
ASCENDING AORTA: 3.3 CM
BH CV ECHO MEAS - ACS: 1.58 CM
BH CV ECHO MEAS - AO MAX PG: 44.9 MMHG
BH CV ECHO MEAS - AO MEAN PG: 24.5 MMHG
BH CV ECHO MEAS - AO ROOT DIAM: 3.7 CM
BH CV ECHO MEAS - AO V2 MAX: 334.9 CM/SEC
BH CV ECHO MEAS - AO V2 VTI: 73.7 CM
BH CV ECHO MEAS - AVA(I,D): 1.08 CM2
BH CV ECHO MEAS - EDV(CUBED): 189.7 ML
BH CV ECHO MEAS - EDV(MOD-SP2): 175 ML
BH CV ECHO MEAS - EDV(MOD-SP4): 94 ML
BH CV ECHO MEAS - EF(MOD-BP): 63.5 %
BH CV ECHO MEAS - EF(MOD-SP2): 61.7 %
BH CV ECHO MEAS - EF(MOD-SP4): 67 %
BH CV ECHO MEAS - ESV(CUBED): 52.3 ML
BH CV ECHO MEAS - ESV(MOD-SP2): 67 ML
BH CV ECHO MEAS - ESV(MOD-SP4): 31 ML
BH CV ECHO MEAS - FS: 34.9 %
BH CV ECHO MEAS - IVS/LVPW: 1.04 CM
BH CV ECHO MEAS - IVSD: 1.31 CM
BH CV ECHO MEAS - LAT PEAK E' VEL: 11.9 CM/SEC
BH CV ECHO MEAS - LV DIASTOLIC VOL/BSA (35-75): 33.8 CM2
BH CV ECHO MEAS - LV MASS(C)D: 322.3 GRAMS
BH CV ECHO MEAS - LV MAX PG: 2.9 MMHG
BH CV ECHO MEAS - LV MEAN PG: 2 MMHG
BH CV ECHO MEAS - LV SYSTOLIC VOL/BSA (12-30): 11.2 CM2
BH CV ECHO MEAS - LV V1 MAX: 84.8 CM/SEC
BH CV ECHO MEAS - LV V1 VTI: 19.9 CM
BH CV ECHO MEAS - LVIDD: 5.7 CM
BH CV ECHO MEAS - LVIDS: 3.7 CM
BH CV ECHO MEAS - LVOT AREA: 4 CM2
BH CV ECHO MEAS - LVOT DIAM: 2.25 CM
BH CV ECHO MEAS - LVPWD: 1.26 CM
BH CV ECHO MEAS - MED PEAK E' VEL: 6.7 CM/SEC
BH CV ECHO MEAS - MV DEC SLOPE: 595.2 CM/SEC2
BH CV ECHO MEAS - MV DEC TIME: 0.16 MSEC
BH CV ECHO MEAS - MV E MAX VEL: 112 CM/SEC
BH CV ECHO MEAS - MV MAX PG: 6.8 MMHG
BH CV ECHO MEAS - MV MEAN PG: 3.2 MMHG
BH CV ECHO MEAS - MV P1/2T: 58.4 MSEC
BH CV ECHO MEAS - MV V2 VTI: 24.8 CM
BH CV ECHO MEAS - MVA(P1/2T): 3.8 CM2
BH CV ECHO MEAS - MVA(VTI): 3.2 CM2
BH CV ECHO MEAS - PA ACC TIME: 0.07 SEC
BH CV ECHO MEAS - PA PR(ACCEL): 46.9 MMHG
BH CV ECHO MEAS - PA V2 MAX: 107.2 CM/SEC
BH CV ECHO MEAS - PULM DIAS VEL: 41.6 CM/SEC
BH CV ECHO MEAS - PULM S/D: 0.83
BH CV ECHO MEAS - PULM SYS VEL: 34.7 CM/SEC
BH CV ECHO MEAS - QP/QS: 1.05
BH CV ECHO MEAS - RAP SYSTOLE: 8 MMHG
BH CV ECHO MEAS - RV MAX PG: 1.63 MMHG
BH CV ECHO MEAS - RV V1 MAX: 63.8 CM/SEC
BH CV ECHO MEAS - RV V1 VTI: 14.1 CM
BH CV ECHO MEAS - RVOT DIAM: 2.7 CM
BH CV ECHO MEAS - RVSP: 46 MMHG
BH CV ECHO MEAS - SI(MOD-SP2): 38.8 ML/M2
BH CV ECHO MEAS - SI(MOD-SP4): 22.7 ML/M2
BH CV ECHO MEAS - SUP REN AO DIAM: 2.8 CM
BH CV ECHO MEAS - SV(LVOT): 79.7 ML
BH CV ECHO MEAS - SV(MOD-SP2): 108 ML
BH CV ECHO MEAS - SV(MOD-SP4): 63 ML
BH CV ECHO MEAS - SV(RVOT): 83.7 ML
BH CV ECHO MEAS - TAPSE (>1.6): 2.48 CM
BH CV ECHO MEAS - TR MAX PG: 38 MMHG
BH CV ECHO MEAS - TR MAX VEL: 308.4 CM/SEC
BH CV ECHO MEASUREMENTS AVERAGE E/E' RATIO: 12.04
BH CV XLRA - RV BASE: 3.1 CM
BH CV XLRA - RV LENGTH: 9.3 CM
BH CV XLRA - RV MID: 3.6 CM
BH CV XLRA - TDI S': 13.4 CM/SEC
LEFT ATRIUM VOLUME INDEX: 35.2 ML/M2
MAXIMAL PREDICTED HEART RATE: 152 BPM
SINUS: 2.9 CM
STJ: 3.1 CM
STRESS TARGET HR: 129 BPM

## 2023-01-18 PROCEDURE — 25010000002 PERFLUTREN (DEFINITY) 8.476 MG IN SODIUM CHLORIDE (PF) 0.9 % 10 ML INJECTION: Performed by: NURSE PRACTITIONER

## 2023-01-18 PROCEDURE — 93306 TTE W/DOPPLER COMPLETE: CPT | Performed by: INTERNAL MEDICINE

## 2023-01-18 PROCEDURE — 93306 TTE W/DOPPLER COMPLETE: CPT

## 2023-01-18 RX ADMIN — PERFLUTREN 1.5 ML: 6.52 INJECTION, SUSPENSION INTRAVENOUS at 12:16

## 2023-01-20 NOTE — PROGRESS NOTES
EF normal, moderate LVH, LA moderately dilated, bicuspid aortic valve possible.  Moderate aortic stenosis, MR not well visualized, trace TR, moderate pulmonary hypertension.  Tried calling patient and left message.  He also needs a follow-up in our office.

## 2023-01-23 ENCOUNTER — TELEPHONE (OUTPATIENT)
Dept: FAMILY MEDICINE CLINIC | Facility: CLINIC | Age: 68
End: 2023-01-23

## 2023-01-23 NOTE — TELEPHONE ENCOUNTER
Caller: Mindy Ramirez    Relationship: Emergency Contact    Best call back number: [phone]    What form or medical record are you requesting: PREVIOUSLY REQUESTED THAT A FORM BE FILLED OUT AND SENT TO [facility] FOR 3 DIFFERENT MEDICATIONS THAT PATIENT RECEIVES.    Dulaglutide (Trulicity) 1.5 MG/0.5ML solution pen-injector  atomoxetine (Strattera) 100 MG capsule  DULoxetine (CYMBALTA) 60 MG capsule    How would you like to receive the form or medical records (pick-up, mail, fax): PATIENT WAS GOING TO PICK THESE FORMS UP FROM OFFICE ONCE COMPLETED    Timeframe paperwork needed: ASAP, PATIENT IS NEARLY OUT OF MEDICATION    Additional notes: MS. WEST HAD THESE FORMS ACCORDING TO WIFE BUT [facility] HAS NEVER RECEIVED COMPLETED FORMS PER A LETTER THE PATIENT RECEIVED RECENTLY.    PLEASE CALL TO DISCUSS.

## 2023-01-23 NOTE — TELEPHONE ENCOUNTER
Informed spouse forms were sent to [employer] on 11/3/22, spouse was going to call the company to see if they received them and if there were any different forms that need to be filled out.

## 2023-01-24 ENCOUNTER — TELEPHONE (OUTPATIENT)
Dept: CARDIOLOGY | Facility: CLINIC | Age: 68
End: 2023-01-24
Payer: MEDICARE

## 2023-01-24 DIAGNOSIS — I27.20 PULMONARY HYPERTENSION: Primary | ICD-10-CM

## 2023-01-24 RX ORDER — FUROSEMIDE 40 MG/1
40 TABLET ORAL DAILY
Qty: 30 TABLET | Refills: 1 | Status: SHIPPED | OUTPATIENT
Start: 2023-01-24 | End: 2023-02-16

## 2023-01-24 NOTE — TELEPHONE ENCOUNTER
Patient given echocardiogram results and verbalizes understanding.  He is feeling short of breath.  I recommended a trial of furosemide 40 mg daily as he has known moderate pulmonary hypertension.  He uses a BiPAP machine.  He will trial this for a week and have a BMP completed.  Will reassess his symptoms.

## 2023-02-08 DIAGNOSIS — I10 ESSENTIAL HYPERTENSION: ICD-10-CM

## 2023-02-08 DIAGNOSIS — E78.2 MIXED HYPERLIPIDEMIA: ICD-10-CM

## 2023-02-08 DIAGNOSIS — E11.649 TYPE 2 DIABETES MELLITUS WITH HYPOGLYCEMIA WITHOUT COMA, WITHOUT LONG-TERM CURRENT USE OF INSULIN: ICD-10-CM

## 2023-02-08 RX ORDER — BLOOD-GLUCOSE METER
EACH MISCELLANEOUS
Qty: 1 KIT | Refills: 0 | Status: SHIPPED | OUTPATIENT
Start: 2023-02-08

## 2023-02-08 RX ORDER — ATORVASTATIN CALCIUM 20 MG/1
20 TABLET, FILM COATED ORAL DAILY
Qty: 90 TABLET | Refills: 3 | Status: SHIPPED | OUTPATIENT
Start: 2023-02-08

## 2023-02-08 RX ORDER — IRBESARTAN 300 MG/1
300 TABLET ORAL EVERY MORNING
Qty: 90 TABLET | Refills: 3 | Status: SHIPPED | OUTPATIENT
Start: 2023-02-08

## 2023-02-08 NOTE — TELEPHONE ENCOUNTER
LOV - 12/15/22  Last filled - atorvastatin - 02/08/22  Last filled - accu-chek guide me meter -12/15/22  Last filled - irbesartan - 12/0822

## 2023-02-16 RX ORDER — FUROSEMIDE 40 MG/1
TABLET ORAL
Qty: 30 TABLET | Refills: 1 | Status: SHIPPED | OUTPATIENT
Start: 2023-02-16 | End: 2023-04-05

## 2023-03-14 ENCOUNTER — HOSPITAL ENCOUNTER (OUTPATIENT)
Facility: HOSPITAL | Age: 68
LOS: 1 days | Discharge: HOME | End: 2023-03-15
Attending: EMERGENCY MEDICINE | Admitting: HOSPITALIST
Payer: MEDICARE

## 2023-03-14 ENCOUNTER — APPOINTMENT (OUTPATIENT)
Dept: GENERAL RADIOLOGY | Facility: HOSPITAL | Age: 68
End: 2023-03-14
Payer: MEDICARE

## 2023-03-14 DIAGNOSIS — R06.00 DYSPNEA, UNSPECIFIED TYPE: ICD-10-CM

## 2023-03-14 DIAGNOSIS — B34.9 VIRAL ILLNESS: ICD-10-CM

## 2023-03-14 DIAGNOSIS — I50.9 ACUTE ON CHRONIC CONGESTIVE HEART FAILURE, UNSPECIFIED HEART FAILURE TYPE: Primary | ICD-10-CM

## 2023-03-14 PROBLEM — N40.1 BPH WITH OBSTRUCTION/LOWER URINARY TRACT SYMPTOMS: Status: ACTIVE | Noted: 2023-03-14

## 2023-03-14 PROBLEM — K21.9 GERD WITHOUT ESOPHAGITIS: Status: ACTIVE | Noted: 2023-03-14

## 2023-03-14 PROBLEM — N13.8 BPH WITH OBSTRUCTION/LOWER URINARY TRACT SYMPTOMS: Status: ACTIVE | Noted: 2023-03-14

## 2023-03-14 PROBLEM — J40 BRONCHITIS DUE TO HUMAN METAPNEUMOVIRUS (HMPV): Status: ACTIVE | Noted: 2023-03-14

## 2023-03-14 PROBLEM — B97.81 BRONCHITIS DUE TO HUMAN METAPNEUMOVIRUS (HMPV): Status: ACTIVE | Noted: 2023-03-14

## 2023-03-14 LAB
ALBUMIN SERPL-MCNC: 3.8 G/DL (ref 3.5–5.2)
ALBUMIN/GLOB SERPL: 1.3 G/DL
ALP SERPL-CCNC: 97 U/L (ref 39–117)
ALT SERPL W P-5'-P-CCNC: 9 U/L (ref 1–41)
ANION GAP SERPL CALCULATED.3IONS-SCNC: 15.6 MMOL/L (ref 5–15)
AST SERPL-CCNC: 9 U/L (ref 1–40)
B PARAPERT DNA SPEC QL NAA+PROBE: NOT DETECTED
B PERT DNA SPEC QL NAA+PROBE: NOT DETECTED
BASOPHILS # BLD AUTO: 0.02 10*3/MM3 (ref 0–0.2)
BASOPHILS NFR BLD AUTO: 0.3 % (ref 0–1.5)
BILIRUB SERPL-MCNC: 1.3 MG/DL (ref 0–1.2)
BUN SERPL-MCNC: 7 MG/DL (ref 8–23)
BUN/CREAT SERPL: 10.8 (ref 7–25)
C PNEUM DNA NPH QL NAA+NON-PROBE: NOT DETECTED
CALCIUM SPEC-SCNC: 8.9 MG/DL (ref 8.6–10.5)
CHLORIDE SERPL-SCNC: 98 MMOL/L (ref 98–107)
CO2 SERPL-SCNC: 25.4 MMOL/L (ref 22–29)
CREAT SERPL-MCNC: 0.65 MG/DL (ref 0.76–1.27)
DEPRECATED RDW RBC AUTO: 48.2 FL (ref 37–54)
EGFRCR SERPLBLD CKD-EPI 2021: 102.6 ML/MIN/1.73
EOSINOPHIL # BLD AUTO: 0.11 10*3/MM3 (ref 0–0.4)
EOSINOPHIL NFR BLD AUTO: 1.5 % (ref 0.3–6.2)
ERYTHROCYTE [DISTWIDTH] IN BLOOD BY AUTOMATED COUNT: 16.9 % (ref 12.3–15.4)
FLUAV SUBTYP SPEC NAA+PROBE: NOT DETECTED
FLUBV RNA ISLT QL NAA+PROBE: NOT DETECTED
GLOBULIN UR ELPH-MCNC: 3 GM/DL
GLUCOSE SERPL-MCNC: 159 MG/DL (ref 65–99)
HADV DNA SPEC NAA+PROBE: NOT DETECTED
HCOV 229E RNA SPEC QL NAA+PROBE: NOT DETECTED
HCOV HKU1 RNA SPEC QL NAA+PROBE: NOT DETECTED
HCOV NL63 RNA SPEC QL NAA+PROBE: NOT DETECTED
HCOV OC43 RNA SPEC QL NAA+PROBE: NOT DETECTED
HCT VFR BLD AUTO: 34 % (ref 37.5–51)
HGB BLD-MCNC: 10.9 G/DL (ref 13–17.7)
HMPV RNA NPH QL NAA+NON-PROBE: DETECTED
HPIV1 RNA ISLT QL NAA+PROBE: NOT DETECTED
HPIV2 RNA SPEC QL NAA+PROBE: NOT DETECTED
HPIV3 RNA NPH QL NAA+PROBE: NOT DETECTED
HPIV4 P GENE NPH QL NAA+PROBE: NOT DETECTED
IMM GRANULOCYTES # BLD AUTO: 0.06 10*3/MM3 (ref 0–0.05)
IMM GRANULOCYTES NFR BLD AUTO: 0.8 % (ref 0–0.5)
INR PPP: 1.52 (ref 0.9–1.1)
LYMPHOCYTES # BLD AUTO: 1.16 10*3/MM3 (ref 0.7–3.1)
LYMPHOCYTES NFR BLD AUTO: 15.4 % (ref 19.6–45.3)
M PNEUMO IGG SER IA-ACNC: NOT DETECTED
MCH RBC QN AUTO: 25.2 PG (ref 26.6–33)
MCHC RBC AUTO-ENTMCNC: 32.1 G/DL (ref 31.5–35.7)
MCV RBC AUTO: 78.5 FL (ref 79–97)
MONOCYTES # BLD AUTO: 0.54 10*3/MM3 (ref 0.1–0.9)
MONOCYTES NFR BLD AUTO: 7.2 % (ref 5–12)
NEUTROPHILS NFR BLD AUTO: 5.65 10*3/MM3 (ref 1.7–7)
NEUTROPHILS NFR BLD AUTO: 74.8 % (ref 42.7–76)
NRBC BLD AUTO-RTO: 0 /100 WBC (ref 0–0.2)
NT-PROBNP SERPL-MCNC: 2264 PG/ML (ref 0–900)
PLATELET # BLD AUTO: 218 10*3/MM3 (ref 140–450)
PMV BLD AUTO: 9.4 FL (ref 6–12)
POTASSIUM SERPL-SCNC: 3.7 MMOL/L (ref 3.5–5.2)
PROT SERPL-MCNC: 6.8 G/DL (ref 6–8.5)
PROTHROMBIN TIME: 18.5 SECONDS (ref 11.7–14.2)
QT INTERVAL: 418 MS
RBC # BLD AUTO: 4.33 10*6/MM3 (ref 4.14–5.8)
RHINOVIRUS RNA SPEC NAA+PROBE: NOT DETECTED
RSV RNA NPH QL NAA+NON-PROBE: NOT DETECTED
SARS-COV-2 RNA NPH QL NAA+NON-PROBE: NOT DETECTED
SODIUM SERPL-SCNC: 139 MMOL/L (ref 136–145)
TROPONIN T SERPL HS-MCNC: 22 NG/L
WBC NRBC COR # BLD: 7.54 10*3/MM3 (ref 3.4–10.8)

## 2023-03-14 PROCEDURE — 93010 ELECTROCARDIOGRAM REPORT: CPT | Performed by: INTERNAL MEDICINE

## 2023-03-14 PROCEDURE — 85610 PROTHROMBIN TIME: CPT | Performed by: EMERGENCY MEDICINE

## 2023-03-14 PROCEDURE — 0202U NFCT DS 22 TRGT SARS-COV-2: CPT | Performed by: EMERGENCY MEDICINE

## 2023-03-14 PROCEDURE — 80053 COMPREHEN METABOLIC PANEL: CPT | Performed by: EMERGENCY MEDICINE

## 2023-03-14 PROCEDURE — 93005 ELECTROCARDIOGRAM TRACING: CPT

## 2023-03-14 PROCEDURE — 94799 UNLISTED PULMONARY SVC/PX: CPT

## 2023-03-14 PROCEDURE — 83880 ASSAY OF NATRIURETIC PEPTIDE: CPT | Performed by: EMERGENCY MEDICINE

## 2023-03-14 PROCEDURE — 25010000002 FUROSEMIDE PER 20 MG: Performed by: EMERGENCY MEDICINE

## 2023-03-14 PROCEDURE — 96374 THER/PROPH/DIAG INJ IV PUSH: CPT

## 2023-03-14 PROCEDURE — 71045 X-RAY EXAM CHEST 1 VIEW: CPT

## 2023-03-14 PROCEDURE — 85025 COMPLETE CBC W/AUTO DIFF WBC: CPT | Performed by: EMERGENCY MEDICINE

## 2023-03-14 PROCEDURE — 96375 TX/PRO/DX INJ NEW DRUG ADDON: CPT

## 2023-03-14 PROCEDURE — 84484 ASSAY OF TROPONIN QUANT: CPT | Performed by: EMERGENCY MEDICINE

## 2023-03-14 PROCEDURE — 99285 EMERGENCY DEPT VISIT HI MDM: CPT

## 2023-03-14 PROCEDURE — 25010000002 METHYLPREDNISOLONE PER 125 MG: Performed by: EMERGENCY MEDICINE

## 2023-03-14 PROCEDURE — 93005 ELECTROCARDIOGRAM TRACING: CPT | Performed by: EMERGENCY MEDICINE

## 2023-03-14 RX ORDER — ALBUTEROL SULFATE 2.5 MG/3ML
2.5 SOLUTION RESPIRATORY (INHALATION) EVERY 6 HOURS PRN
Status: DISCONTINUED | OUTPATIENT
Start: 2023-03-14 | End: 2023-03-15 | Stop reason: HOSPADM

## 2023-03-14 RX ORDER — METOPROLOL SUCCINATE 25 MG/1
25 TABLET, EXTENDED RELEASE ORAL
Status: DISCONTINUED | OUTPATIENT
Start: 2023-03-15 | End: 2023-03-15

## 2023-03-14 RX ORDER — IPRATROPIUM BROMIDE AND ALBUTEROL SULFATE 2.5; .5 MG/3ML; MG/3ML
3 SOLUTION RESPIRATORY (INHALATION)
Status: DISCONTINUED | OUTPATIENT
Start: 2023-03-14 | End: 2023-03-15 | Stop reason: HOSPADM

## 2023-03-14 RX ORDER — METHYLPREDNISOLONE SODIUM SUCCINATE 40 MG/ML
40 INJECTION, POWDER, LYOPHILIZED, FOR SOLUTION INTRAMUSCULAR; INTRAVENOUS DAILY
Status: DISCONTINUED | OUTPATIENT
Start: 2023-03-15 | End: 2023-03-14

## 2023-03-14 RX ORDER — IPRATROPIUM BROMIDE AND ALBUTEROL SULFATE 2.5; .5 MG/3ML; MG/3ML
3 SOLUTION RESPIRATORY (INHALATION) ONCE
Status: COMPLETED | OUTPATIENT
Start: 2023-03-14 | End: 2023-03-14

## 2023-03-14 RX ORDER — FERROUS SULFATE 325(65) MG
325 TABLET ORAL
COMMUNITY

## 2023-03-14 RX ORDER — IBUPROFEN 600 MG/1
1 TABLET ORAL
Status: DISCONTINUED | OUTPATIENT
Start: 2023-03-14 | End: 2023-03-15 | Stop reason: HOSPADM

## 2023-03-14 RX ORDER — FUROSEMIDE 10 MG/ML
80 INJECTION INTRAMUSCULAR; INTRAVENOUS ONCE
Status: COMPLETED | OUTPATIENT
Start: 2023-03-14 | End: 2023-03-14

## 2023-03-14 RX ORDER — METOPROLOL SUCCINATE 25 MG/1
50 TABLET, EXTENDED RELEASE ORAL
Status: DISCONTINUED | OUTPATIENT
Start: 2023-03-15 | End: 2023-03-14

## 2023-03-14 RX ORDER — ENOXAPARIN SODIUM 100 MG/ML
40 INJECTION SUBCUTANEOUS DAILY
Status: DISCONTINUED | OUTPATIENT
Start: 2023-03-14 | End: 2023-03-14

## 2023-03-14 RX ORDER — DEXTROSE MONOHYDRATE 25 G/50ML
25 INJECTION, SOLUTION INTRAVENOUS
Status: DISCONTINUED | OUTPATIENT
Start: 2023-03-14 | End: 2023-03-15 | Stop reason: HOSPADM

## 2023-03-14 RX ORDER — FUROSEMIDE 20 MG/1
40 TABLET ORAL 2 TIMES DAILY
Status: DISCONTINUED | OUTPATIENT
Start: 2023-03-15 | End: 2023-03-14

## 2023-03-14 RX ORDER — IPRATROPIUM BROMIDE AND ALBUTEROL SULFATE 2.5; .5 MG/3ML; MG/3ML
3 SOLUTION RESPIRATORY (INHALATION) EVERY 4 HOURS PRN
Status: DISCONTINUED | OUTPATIENT
Start: 2023-03-14 | End: 2023-03-14

## 2023-03-14 RX ORDER — METHYLPREDNISOLONE SODIUM SUCCINATE 125 MG/2ML
125 INJECTION, POWDER, LYOPHILIZED, FOR SOLUTION INTRAMUSCULAR; INTRAVENOUS ONCE
Status: COMPLETED | OUTPATIENT
Start: 2023-03-14 | End: 2023-03-14

## 2023-03-14 RX ORDER — FUROSEMIDE 10 MG/ML
40 INJECTION INTRAMUSCULAR; INTRAVENOUS EVERY 12 HOURS
Status: DISCONTINUED | OUTPATIENT
Start: 2023-03-15 | End: 2023-03-15 | Stop reason: HOSPADM

## 2023-03-14 RX ORDER — INSULIN LISPRO 100 [IU]/ML
0-9 INJECTION, SOLUTION INTRAVENOUS; SUBCUTANEOUS
Status: DISCONTINUED | OUTPATIENT
Start: 2023-03-15 | End: 2023-03-15 | Stop reason: HOSPADM

## 2023-03-14 RX ORDER — NICOTINE POLACRILEX 4 MG
15 LOZENGE BUCCAL
Status: DISCONTINUED | OUTPATIENT
Start: 2023-03-14 | End: 2023-03-15 | Stop reason: HOSPADM

## 2023-03-14 RX ORDER — NITROGLYCERIN 0.4 MG/1
0.4 TABLET SUBLINGUAL
Status: DISCONTINUED | OUTPATIENT
Start: 2023-03-14 | End: 2023-03-15 | Stop reason: HOSPADM

## 2023-03-14 RX ORDER — SODIUM CHLORIDE 0.9 % (FLUSH) 0.9 %
10 SYRINGE (ML) INJECTION AS NEEDED
Status: DISCONTINUED | OUTPATIENT
Start: 2023-03-14 | End: 2023-03-15 | Stop reason: HOSPADM

## 2023-03-14 RX ORDER — GUAIFENESIN 600 MG/1
600 TABLET, EXTENDED RELEASE ORAL EVERY 12 HOURS SCHEDULED
Status: DISCONTINUED | OUTPATIENT
Start: 2023-03-14 | End: 2023-03-15 | Stop reason: HOSPADM

## 2023-03-14 RX ADMIN — IPRATROPIUM BROMIDE AND ALBUTEROL SULFATE 3 ML: .5; 2.5 SOLUTION RESPIRATORY (INHALATION) at 19:43

## 2023-03-14 RX ADMIN — FUROSEMIDE 80 MG: 10 INJECTION, SOLUTION INTRAMUSCULAR; INTRAVENOUS at 19:48

## 2023-03-14 RX ADMIN — GUAIFENESIN 600 MG: 600 TABLET, EXTENDED RELEASE ORAL at 23:52

## 2023-03-14 RX ADMIN — METHYLPREDNISOLONE SODIUM SUCCINATE 125 MG: 125 INJECTION, POWDER, FOR SOLUTION INTRAMUSCULAR; INTRAVENOUS at 18:29

## 2023-03-14 RX ADMIN — RIVAROXABAN 20 MG: 20 TABLET, FILM COATED ORAL at 23:52

## 2023-03-14 NOTE — ED PROVIDER NOTES
EMERGENCY DEPARTMENT ENCOUNTER    Room Number:  S419/1  Date of encounter:  3/15/2023  PCP: Marly Myrick APRN  Patient Care Team:  Marly Myrick APRN as PCP - General (Family Medicine)  Angelo Lozano II, MD as Consulting Physician (Hematology and Oncology)  Tyler Sellers MD (Anesthesiology)   Independent Historians: Patient    HPI:  Chief Complaint: Cough dyspnea  A complete HPI/ROS/PMH/PSH/SH/FH are unobtainable due to: None    Chronic or social conditions impacting patient care (Social Determinants of Health): None  (Financial Resource Strain / Food Insecurity / Transportation Needs / Physical Activity / Stress / Social Connections / Intimate Partner Violence / Housing Stability)    Context: Duane Mark Witten is a 68 y.o. male who presents to the ED c/o short of breath and cough since Friday.  Denies chest pain.  Reports orthopnea and decreased exercise tolerance.  Patient started to have some wheezing today.    Review of prior external notes (non-ED): I reviewed patient's primary care visit from 11/18/2022    Review of prior external test results outside of this encounter: I reviewed patient's echocardiogram from 1/18/2023    Prescription drug monitoring program review:        PAST MEDICAL HISTORY  Active Ambulatory Problems     Diagnosis Date Noted   • YANELI (generalized anxiety disorder)    • ADD (attention deficit disorder)    • Depression    • Diabetes type 2, controlled (Formerly [facility])    • ED (erectile dysfunction) of non-organic origin    • HLD (hyperlipidemia)    • Essential hypertension    • Arthritis    • Psoriasis    • Testosterone deficiency    • Primary insomnia 01/08/2016   • Weakness of both lower extremities 11/22/2018   • Paresthesia of both upper extremities 11/22/2018   • Morbid obesity (Formerly [facility]) 11/22/2018   • Adverse effect of corticosteroids 11/23/2018   • Type 2 diabetes mellitus with hyperglycemia (Formerly [facility]) 11/23/2018   • Cervical stenosis of spinal canal 11/23/2018   • Edema of cervical spinal  cord (CMS/HCC) 11/23/2018   • Permanent atrial fibrillation (HCC) 11/28/2018   • Cervical myelopathy (HCC) 12/02/2018   • Dilated cardiomyopathy (HCC) 03/19/2019   • Cervical spondylosis with myelopathy 05/17/2019   • History of spinal fusion 05/17/2019   • Nonrheumatic aortic valve stenosis 08/21/2019   • Leukocytosis 11/07/2019   • CAMERON (obstructive sleep apnea) 02/26/2020   • Bilateral upper extremity weakness 12/09/2020   • Abnormality of gait 12/16/2020   • Acute pain of right shoulder 07/20/2021   • Other specified arthritis, right shoulder 07/22/2021   • Dysphagia 06/10/2022   • Encounter for screening for malignant neoplasm of colon 06/10/2022     Resolved Ambulatory Problems     Diagnosis Date Noted   • Eosinophilia 11/22/2018   • Postoperative visit 01/02/2019   • Hypoglycemia 12/10/2022     Past Medical History:   Diagnosis Date   • Anemia    • Anesthesia complication    • Atrial fibrillation (HCC)    • Chronic anticoagulation    • Chronic pain    • Difficulty swallowing solids    • ED (erectile dysfunction)    • Elevated cholesterol    • Generalized weakness    • Osteoarthrosis    • PONV (postoperative nausea and vomiting)    • Type 2 diabetes mellitus (HCC)    • Unsteady gait        The patient has started, but not completed, their COVID-19 vaccination series.    PAST SURGICAL HISTORY  Past Surgical History:   Procedure Laterality Date   • ANTERIOR CHANNEL VERTEBRECTOMY/CORPECTOMY Bilateral 11/27/2018    Procedure: C4, C5, C6 ANTERIOR CERVICAL CORPECTOMY;  Surgeon: Chirag Markham MD;  Location: [facility] OR;  Service: Neurosurgery   • BIOPSY / EXCISION / DISSECTION AXILLARY NODE  1994    Lymph Node testing for cancer    • COLONOSCOPY      10+ years; normal   • COLONOSCOPY N/A 8/29/2022    Procedure: COLONOSCOPY INTO CECUM AND TI WITH COLD BX AND COLD SNARE POLYPECTOMIES;  Surgeon: Timothy Rojas MD;  Location: [facility] ENDOSCOPY;  Service: Gastroenterology;  Laterality: N/A;  PRE: ANEMIA  POST:  POLYPS, DIVERTICULOSIS, HEMORRHOIDS   • ENDOSCOPY N/A 2022    Procedure: ESOPHAGOGASTRODUODENOSCOPY WITH BIOPSIES;  Surgeon: Timothy Rojas MD;  Location: [facility] ENDOSCOPY;  Service: Gastroenterology;  Laterality: N/A;  PRE: DYSPHAGIA  POST: ESOPHAGITIS   • LASIK Bilateral    • PARATHYROIDECTOMY           FAMILY HISTORY  Family History   Problem Relation Age of Onset   • Anxiety disorder Mother    • Bipolar disorder Mother    • Depression Mother    • Glaucoma Mother    • Macular degeneration Mother    • Lupus Mother    • Aortic stenosis Mother         s/p TAVR at age 87   • Cancer Father    • Diabetes Father    • Heart disease Father    • Hypertension Father    • Stroke Father    • Lupus Other         grandmother   • Malig Hyperthermia Neg Hx          SOCIAL HISTORY  Social History     Socioeconomic History   • Marital status:      Spouse name: Mindy   Tobacco Use   • Smoking status: Former     Packs/day: 1.00     Years: 5.00     Pack years: 5.00     Types: Cigarettes     Quit date:      Years since quittin.2   • Smokeless tobacco: Never   Vaping Use   • Vaping Use: Never used   Substance and Sexual Activity   • Alcohol use: Yes     Comment: RARELY   • Drug use: No   • Sexual activity: Defer         ALLERGIES  Aspartame        REVIEW OF SYSTEMS  Review of Systems     All systems reviewed and negative except for those discussed in HPI.       PHYSICAL EXAM    I have reviewed the triage vital signs and nursing notes.    ED Triage Vitals   Temp Heart Rate Resp BP SpO2   23 1732 23 1732 23 1732 23 1753 23 173   98.5 °F (36.9 °C) 88 26 (!) 175/102 96 %      Temp src Heart Rate Source Patient Position BP Location FiO2 (%)   23 1732 23 1732 23 1753 23 1753 --   Tympanic Monitor Lying Right arm        Physical Exam  GENERAL: alert, no acute distress  SKIN: Warm, dry  HENT: Normocephalic, atraumatic  EYES: no scleral icterus  CV: regular  rhythm, regular rate, 2+ pitting edema bilateral lower extremities  RESPIRATORY: normal effort, coarse breath sounds bilaterally mild wheezing  ABDOMEN: soft, nontender, nondistended  MUSCULOSKELETAL: no deformity  NEURO: alert, moves all extremities, follows commands          LAB RESULTS  Recent Results (from the past 24 hour(s))   ECG 12 Lead Dyspnea    Collection Time: 03/14/23  5:43 PM   Result Value Ref Range    QT Interval 418 ms   Respiratory Panel PCR w/COVID-19(SARS-CoV-2) TRAVIS/RADHA/DANITA/PAD/COR/MAD/DARLEEN In-House, NP Swab in UTM/VTM, 3-4 HR TAT - Swab, Nasopharynx    Collection Time: 03/14/23  6:05 PM    Specimen: Nasopharynx; Swab   Result Value Ref Range    ADENOVIRUS, PCR Not Detected Not Detected    Coronavirus 229E Not Detected Not Detected    Coronavirus HKU1 Not Detected Not Detected    Coronavirus NL63 Not Detected Not Detected    Coronavirus OC43 Not Detected Not Detected    COVID19 Not Detected Not Detected - Ref. Range    Human Metapneumovirus Detected (A) Not Detected    Human Rhinovirus/Enterovirus Not Detected Not Detected    Influenza A PCR Not Detected Not Detected    Influenza B PCR Not Detected Not Detected    Parainfluenza Virus 1 Not Detected Not Detected    Parainfluenza Virus 2 Not Detected Not Detected    Parainfluenza Virus 3 Not Detected Not Detected    Parainfluenza Virus 4 Not Detected Not Detected    RSV, PCR Not Detected Not Detected    Bordetella pertussis pcr Not Detected Not Detected    Bordetella parapertussis PCR Not Detected Not Detected    Chlamydophila pneumoniae PCR Not Detected Not Detected    Mycoplasma pneumo by PCR Not Detected Not Detected   Comprehensive Metabolic Panel    Collection Time: 03/14/23  6:05 PM    Specimen: Blood   Result Value Ref Range    Glucose 159 (H) 65 - 99 mg/dL    BUN 7 (L) 8 - 23 mg/dL    Creatinine 0.65 (L) 0.76 - 1.27 mg/dL    Sodium 139 136 - 145 mmol/L    Potassium 3.7 3.5 - 5.2 mmol/L    Chloride 98 98 - 107 mmol/L    CO2 25.4 22.0 - 29.0  mmol/L    Calcium 8.9 8.6 - 10.5 mg/dL    Total Protein 6.8 6.0 - 8.5 g/dL    Albumin 3.8 3.5 - 5.2 g/dL    ALT (SGPT) 9 1 - 41 U/L    AST (SGOT) 9 1 - 40 U/L    Alkaline Phosphatase 97 39 - 117 U/L    Total Bilirubin 1.3 (H) 0.0 - 1.2 mg/dL    Globulin 3.0 gm/dL    A/G Ratio 1.3 g/dL    BUN/Creatinine Ratio 10.8 7.0 - 25.0    Anion Gap 15.6 (H) 5.0 - 15.0 mmol/L    eGFR 102.6 >60.0 mL/min/1.73   Protime-INR    Collection Time: 03/14/23  6:05 PM    Specimen: Blood   Result Value Ref Range    Protime 18.5 (H) 11.7 - 14.2 Seconds    INR 1.52 (H) 0.90 - 1.10   BNP    Collection Time: 03/14/23  6:05 PM    Specimen: Blood   Result Value Ref Range    proBNP 2,264.0 (H) 0.0 - 900.0 pg/mL   High Sensitivity Troponin T    Collection Time: 03/14/23  6:05 PM    Specimen: Blood   Result Value Ref Range    HS Troponin T 22 (H) <15 ng/L   CBC Auto Differential    Collection Time: 03/14/23  6:05 PM    Specimen: Blood   Result Value Ref Range    WBC 7.54 3.40 - 10.80 10*3/mm3    RBC 4.33 4.14 - 5.80 10*6/mm3    Hemoglobin 10.9 (L) 13.0 - 17.7 g/dL    Hematocrit 34.0 (L) 37.5 - 51.0 %    MCV 78.5 (L) 79.0 - 97.0 fL    MCH 25.2 (L) 26.6 - 33.0 pg    MCHC 32.1 31.5 - 35.7 g/dL    RDW 16.9 (H) 12.3 - 15.4 %    RDW-SD 48.2 37.0 - 54.0 fl    MPV 9.4 6.0 - 12.0 fL    Platelets 218 140 - 450 10*3/mm3    Neutrophil % 74.8 42.7 - 76.0 %    Lymphocyte % 15.4 (L) 19.6 - 45.3 %    Monocyte % 7.2 5.0 - 12.0 %    Eosinophil % 1.5 0.3 - 6.2 %    Basophil % 0.3 0.0 - 1.5 %    Immature Grans % 0.8 (H) 0.0 - 0.5 %    Neutrophils, Absolute 5.65 1.70 - 7.00 10*3/mm3    Lymphocytes, Absolute 1.16 0.70 - 3.10 10*3/mm3    Monocytes, Absolute 0.54 0.10 - 0.90 10*3/mm3    Eosinophils, Absolute 0.11 0.00 - 0.40 10*3/mm3    Basophils, Absolute 0.02 0.00 - 0.20 10*3/mm3    Immature Grans, Absolute 0.06 (H) 0.00 - 0.05 10*3/mm3    nRBC 0.0 0.0 - 0.2 /100 WBC       Ordered the above labs and independently reviewed the results.        RADIOLOGY  XR Chest 1  View    Result Date: 3/14/2023  XR CHEST 1 VW-  HISTORY: 68-year-old male with shortness of breath.  FINDINGS: The heart is enlarged and there is pulmonary vascular congestion and likely CHF. There are at least small bilateral pleural effusions.  This report was finalized on 3/14/2023 6:58 PM by Dr. Denise Angelo M.D.        I ordered the above noted radiological studies. Reviewed by me and discussed with radiologist.  See dictation for official radiology interpretation.      PROCEDURES    Procedures      MEDICATIONS GIVEN IN ER    Medications   sodium chloride 0.9 % flush 10 mL (has no administration in time range)   nitroglycerin (NITROSTAT) SL tablet 0.4 mg (has no administration in time range)   dextrose (GLUTOSE) oral gel 15 g (has no administration in time range)   dextrose (D50W) (25 g/50 mL) IV injection 25 g (has no administration in time range)   glucagon (GLUCAGEN) injection 1 mg (has no administration in time range)   insulin lispro (ADMELOG) injection 0-9 Units (has no administration in time range)   ipratropium-albuterol (DUO-NEB) nebulizer solution 3 mL (3 mL Nebulization Not Given 3/14/23 2213)   albuterol (PROVENTIL) nebulizer solution 0.083% 2.5 mg/3mL (has no administration in time range)   guaiFENesin (MUCINEX) 12 hr tablet 600 mg (600 mg Oral Given 3/14/23 2352)   furosemide (LASIX) injection 40 mg (has no administration in time range)   rivaroxaban (XARELTO) tablet 20 mg (20 mg Oral Given 3/14/23 2352)   atomoxetine (STRATTERA) capsule 100 mg (has no administration in time range)   atorvastatin (LIPITOR) tablet 20 mg (has no administration in time range)   baclofen (LIORESAL) tablet 10 mg (has no administration in time range)   cetirizine (zyrTEC) tablet 10 mg (has no administration in time range)   DULoxetine (CYMBALTA) DR capsule 60 mg (has no administration in time range)   ferrous sulfate tablet 325 mg (has no administration in time range)   HYDROcodone-acetaminophen (NORCO)  MG per  tablet 1 tablet (has no administration in time range)   losartan (COZAAR) tablet 100 mg (has no administration in time range)   metoprolol succinate XL (TOPROL-XL) 24 hr tablet 25 mg (has no administration in time range)   tamsulosin (FLOMAX) 24 hr capsule 0.8 mg (has no administration in time range)   traZODone (DESYREL) tablet 150 mg (has no administration in time range)   methylPREDNISolone sodium succinate (SOLU-Medrol) injection 125 mg (125 mg Intravenous Given 3/14/23 1829)   ipratropium-albuterol (DUO-NEB) nebulizer solution 3 mL (3 mL Nebulization Given 3/14/23 1943)   furosemide (LASIX) injection 80 mg (80 mg Intravenous Given 3/14/23 1948)         ORDERS PLACED DURING THIS VISIT:  Orders Placed This Encounter   Procedures   • Respiratory Panel PCR w/COVID-19(SARS-CoV-2) TRAVIS/RADHA/DANITA/PAD/COR/MAD/DARLEEN In-House, NP Swab in UTM/VTM, 3-4 HR TAT - Swab, Nasopharynx   • XR Chest 1 View   • Comprehensive Metabolic Panel   • Protime-INR   • BNP   • High Sensitivity Troponin T   • CBC Auto Differential   • High Sensitivity Troponin T 2Hr   • Comprehensive Metabolic Panel   • Magnesium   • Manual Differential   • CBC Auto Differential   • Diet: Cardiac Diets; Low Sodium (2g); Texture: Regular Texture (IDDSI 7); Fluid Consistency: Thin (IDDSI 0)   • Monitor Blood Pressure   • Daily Weights   • Strict Intake & Output   • Cardiac Monitoring   • Vital Signs   • Orthostatic Blood Pressure   • Continuous Pulse Oximetry   • Check Pulse Oximetry While On Room Air Daily   • Activity As Tolerated   • Do NOT Hold Basal or Correction Scale Insulin When Patient is NPO, Hold Scheduled Mealtime (Bolus) Insulin if NPO   • Code Status and Medical Interventions:   • LHA (on-call MD unless specified) Details   • Inpatient Cardiology Consult   • Oxygen Therapy- Nasal Cannula; Titrate for SPO2: 90% - 95%   • Patient May Use Home CPAP / BIPAP For Sleep or As Needed   • POC Glucose TID AC   • ECG 12 Lead Dyspnea   • Insert Peripheral IV   •  Inpatient Admission   • CBC & Differential   • CBC & Differential         PROGRESS, DATA ANALYSIS, CONSULTS, AND MEDICAL DECISION MAKING    All labs have been independently interpreted by me.  All radiology studies have been reviewed by me and discussed with radiologist dictating the report.   EKG's independently viewed and interpreted by me.  Discussion below represents my analysis of pertinent findings related to patient's condition, differential diagnosis, treatment plan and final disposition.    My differential diagnosis includes but is not limited to:  Asthma, COPD, pneumonia, pulmonary embolus, acute respiratory distress syndrome, pneumothorax, pleural effusion, pulmonary fibrosis, congestive heart failure, myocardial infarction, DKA, uremia, acidosis, sepsis, anemia, drug related, hyperventilation, CNS disease        ED Course as of 03/15/23 0031   Tue Mar 14, 2023   1822 First look:  The patient presents with cough for the past 8 days that is worsened over the past 3 days to the point has become markedly short of breath.  The patient has had audible wheezing.  He denies a history of asthma but states there is a family history of asthma.  He states he stopped smoking in the late 1970s.  He was seen in urgent care and sent to the emergency room for further evaluation.  On examination the patient is mildly dyspneic with wheezes in all lung fields and 2+ pedal edema.    I will treat him with Solu-Medrol and albuterol while obtaining EKG, chest x-ray, RVP and labs for further evaluation [GP]   1946 Echocardiogram from 1/18/2023 showed ejection fraction of 63% aortic stenosis noted [TJ]   2010 EKG          EKG time: 1743  Rhythm/Rate: Atrial fibrillation rate 88  P waves and NC: A-fib  QRS, axis: Narrow normal axis  ST and T waves: Within normal limits    Interpreted Contemporaneously by me, independently viewed  No significant changed compared to prior EKG   [TJ]   2011 I have independently reviewed patient's  chest x-ray; my interpretation is bilateral pleural effusions, pulmonary vascular congestion [TJ]   2011 Patient presented fluid overloaded.  Patient also had desats to 90% while sitting and talking to be on room air.  Patient with no prior history of asthma or COPD.  I suspect that wheezing represents fluid overload.  I will admit the patient for diuresis, echocardiogram. [TJ]      ED Course User Index  [GP] Tim Juarez MD  [TJ] Roberto Saunders MD       I interpreted the cardiac monitor rhythm and my independent interpretation is: normal sinus rhythm.     PPE: The patient wore a mask and I wore an N95 mask throughout the entire patient encounter.       AS OF 00:31 EDT VITALS:    BP - 131/88  HR - 91  TEMP - 96.8 °F (36 °C) (Oral)  O2 SATS - 93%        DIAGNOSIS  Final diagnoses:   Acute on chronic congestive heart failure, unspecified heart failure type (HCC)   Viral illness   Dyspnea, unspecified type         DISPOSITION  ED Disposition     ED Disposition   Decision to Admit    Condition   --    Comment   Level of Care: Telemetry [5]   Diagnosis: Acute on chronic congestive heart failure, unspecified heart failure type (HCC) [[id number]]   Admitting Physician: JOHNSON ELLIOTT [[id number]]   Attending Physician: ROBERTO MORRISSEY [[id number]]   Certification: I Certify That Inpatient Hospital Services Are Medically Necessary For Greater Than 2 Midnights                  Note Disclaimer: At [facility], we believe that sharing information builds trust and better relationships. You are receiving this note because you recently visited [facility]. It is possible you will see health information before a provider has talked with you about it. This kind of information can be easy to misunderstand. To help you fully understand what it means for your health, we urge you to discuss this note with your provider.       Roberto Saunders MD  03/15/23 0031

## 2023-03-14 NOTE — ED TRIAGE NOTES
Patient c/o SOA and cough since Friday. He reports he thought it was allergies at first. For unknown reason he has not taken his BP meds in 3 days.

## 2023-03-15 ENCOUNTER — READMISSION MANAGEMENT (OUTPATIENT)
Dept: CALL CENTER | Facility: HOSPITAL | Age: 68
End: 2023-03-15
Payer: MEDICARE

## 2023-03-15 VITALS
BODY MASS INDEX: 39.17 KG/M2 | TEMPERATURE: 97.1 F | HEIGHT: 75 IN | RESPIRATION RATE: 18 BRPM | SYSTOLIC BLOOD PRESSURE: 144 MMHG | HEART RATE: 87 BPM | OXYGEN SATURATION: 94 % | WEIGHT: 315 LBS | DIASTOLIC BLOOD PRESSURE: 79 MMHG

## 2023-03-15 LAB
ANION GAP SERPL CALCULATED.3IONS-SCNC: 19.2 MMOL/L (ref 5–15)
BASOPHILS # BLD AUTO: 0.01 10*3/MM3 (ref 0–0.2)
BASOPHILS NFR BLD AUTO: 0.2 % (ref 0–1.5)
BUN SERPL-MCNC: 7 MG/DL (ref 8–23)
BUN/CREAT SERPL: 9.9 (ref 7–25)
CALCIUM SPEC-SCNC: 9.2 MG/DL (ref 8.6–10.5)
CHLORIDE SERPL-SCNC: 94 MMOL/L (ref 98–107)
CO2 SERPL-SCNC: 27.8 MMOL/L (ref 22–29)
CREAT SERPL-MCNC: 0.71 MG/DL (ref 0.76–1.27)
DEPRECATED RDW RBC AUTO: 49.5 FL (ref 37–54)
EGFRCR SERPLBLD CKD-EPI 2021: 99.9 ML/MIN/1.73
EOSINOPHIL # BLD AUTO: 0 10*3/MM3 (ref 0–0.4)
EOSINOPHIL NFR BLD AUTO: 0 % (ref 0.3–6.2)
ERYTHROCYTE [DISTWIDTH] IN BLOOD BY AUTOMATED COUNT: 17 % (ref 12.3–15.4)
GLUCOSE BLDC GLUCOMTR-MCNC: 196 MG/DL (ref 70–130)
GLUCOSE BLDC GLUCOMTR-MCNC: 229 MG/DL (ref 70–130)
GLUCOSE SERPL-MCNC: 262 MG/DL (ref 65–99)
HCT VFR BLD AUTO: 37.9 % (ref 37.5–51)
HGB BLD-MCNC: 11.4 G/DL (ref 13–17.7)
HYPOCHROMIA BLD QL: ABNORMAL
LYMPHOCYTES # BLD AUTO: 0.89 10*3/MM3 (ref 0.7–3.1)
LYMPHOCYTES # BLD MANUAL: 0.74 10*3/MM3 (ref 0.7–3.1)
LYMPHOCYTES NFR BLD AUTO: 16.7 % (ref 19.6–45.3)
LYMPHOCYTES NFR BLD MANUAL: 5 % (ref 5–12)
MCH RBC QN AUTO: 23.9 PG (ref 26.6–33)
MCHC RBC AUTO-ENTMCNC: 30.1 G/DL (ref 31.5–35.7)
MCV RBC AUTO: 79.6 FL (ref 79–97)
MONOCYTES # BLD AUTO: 0.21 10*3/MM3 (ref 0.1–0.9)
MONOCYTES # BLD: 0.27 10*3/MM3 (ref 0.1–0.9)
MONOCYTES NFR BLD AUTO: 3.9 % (ref 5–12)
NEUTROPHILS # BLD AUTO: 4.31 10*3/MM3 (ref 1.7–7)
NEUTROPHILS NFR BLD AUTO: 4.16 10*3/MM3 (ref 1.7–7)
NEUTROPHILS NFR BLD AUTO: 78.3 % (ref 42.7–76)
NEUTROPHILS NFR BLD MANUAL: 81 % (ref 42.7–76)
PLAT MORPH BLD: NORMAL
PLATELET # BLD AUTO: 250 10*3/MM3 (ref 140–450)
PMV BLD AUTO: 9.3 FL (ref 6–12)
POTASSIUM SERPL-SCNC: 3.4 MMOL/L (ref 3.5–5.2)
RBC # BLD AUTO: 4.76 10*6/MM3 (ref 4.14–5.8)
SODIUM SERPL-SCNC: 141 MMOL/L (ref 136–145)
VARIANT LYMPHS NFR BLD MANUAL: 14 % (ref 19.6–45.3)
WBC MORPH BLD: NORMAL
WBC NRBC COR # BLD: 5.32 10*3/MM3 (ref 3.4–10.8)

## 2023-03-15 PROCEDURE — 63710000001 INSULIN LISPRO (HUMAN) PER 5 UNITS: Performed by: NURSE PRACTITIONER

## 2023-03-15 PROCEDURE — 94761 N-INVAS EAR/PLS OXIMETRY MLT: CPT

## 2023-03-15 PROCEDURE — 82962 GLUCOSE BLOOD TEST: CPT

## 2023-03-15 PROCEDURE — 94799 UNLISTED PULMONARY SVC/PX: CPT

## 2023-03-15 PROCEDURE — 94664 DEMO&/EVAL PT USE INHALER: CPT

## 2023-03-15 PROCEDURE — 99231 SBSQ HOSP IP/OBS SF/LOW 25: CPT | Performed by: INTERNAL MEDICINE

## 2023-03-15 PROCEDURE — 85027 COMPLETE CBC AUTOMATED: CPT | Performed by: HOSPITALIST

## 2023-03-15 PROCEDURE — G0378 HOSPITAL OBSERVATION PER HR: HCPCS

## 2023-03-15 PROCEDURE — 25010000002 FUROSEMIDE PER 20 MG: Performed by: STUDENT IN AN ORGANIZED HEALTH CARE EDUCATION/TRAINING PROGRAM

## 2023-03-15 PROCEDURE — 96376 TX/PRO/DX INJ SAME DRUG ADON: CPT

## 2023-03-15 PROCEDURE — 94640 AIRWAY INHALATION TREATMENT: CPT

## 2023-03-15 PROCEDURE — 80048 BASIC METABOLIC PNL TOTAL CA: CPT | Performed by: HOSPITALIST

## 2023-03-15 RX ORDER — BACLOFEN 10 MG/1
10 TABLET ORAL 2 TIMES DAILY PRN
Status: DISCONTINUED | OUTPATIENT
Start: 2023-03-15 | End: 2023-03-15 | Stop reason: HOSPADM

## 2023-03-15 RX ORDER — METOPROLOL SUCCINATE 25 MG/1
25 TABLET, EXTENDED RELEASE ORAL
Status: DISCONTINUED | OUTPATIENT
Start: 2023-03-16 | End: 2023-03-15 | Stop reason: HOSPADM

## 2023-03-15 RX ORDER — FERROUS SULFATE 325(65) MG
325 TABLET ORAL
Status: DISCONTINUED | OUTPATIENT
Start: 2023-03-15 | End: 2023-03-15 | Stop reason: HOSPADM

## 2023-03-15 RX ORDER — POTASSIUM CHLORIDE 750 MG/1
40 TABLET, FILM COATED, EXTENDED RELEASE ORAL ONCE
Status: COMPLETED | OUTPATIENT
Start: 2023-03-15 | End: 2023-03-15

## 2023-03-15 RX ORDER — HYDROCODONE BITARTRATE AND ACETAMINOPHEN 10; 325 MG/1; MG/1
1 TABLET ORAL EVERY 6 HOURS PRN
Status: DISCONTINUED | OUTPATIENT
Start: 2023-03-15 | End: 2023-03-15 | Stop reason: HOSPADM

## 2023-03-15 RX ORDER — ATORVASTATIN CALCIUM 20 MG/1
20 TABLET, FILM COATED ORAL DAILY
Status: DISCONTINUED | OUTPATIENT
Start: 2023-03-15 | End: 2023-03-15 | Stop reason: HOSPADM

## 2023-03-15 RX ORDER — LOSARTAN POTASSIUM 100 MG/1
100 TABLET ORAL
Status: DISCONTINUED | OUTPATIENT
Start: 2023-03-16 | End: 2023-03-15 | Stop reason: HOSPADM

## 2023-03-15 RX ORDER — TAMSULOSIN HYDROCHLORIDE 0.4 MG/1
0.8 CAPSULE ORAL DAILY
Status: DISCONTINUED | OUTPATIENT
Start: 2023-03-15 | End: 2023-03-15 | Stop reason: HOSPADM

## 2023-03-15 RX ORDER — DULOXETIN HYDROCHLORIDE 60 MG/1
60 CAPSULE, DELAYED RELEASE ORAL 2 TIMES DAILY
Status: DISCONTINUED | OUTPATIENT
Start: 2023-03-15 | End: 2023-03-15 | Stop reason: HOSPADM

## 2023-03-15 RX ORDER — CETIRIZINE HYDROCHLORIDE 10 MG/1
10 TABLET ORAL DAILY
Status: DISCONTINUED | OUTPATIENT
Start: 2023-03-15 | End: 2023-03-15 | Stop reason: HOSPADM

## 2023-03-15 RX ADMIN — ATOMOXETINE 100 MG: 60 CAPSULE ORAL at 08:18

## 2023-03-15 RX ADMIN — CETIRIZINE HYDROCHLORIDE 10 MG: 10 TABLET ORAL at 08:17

## 2023-03-15 RX ADMIN — POTASSIUM CHLORIDE 40 MEQ: 750 TABLET, EXTENDED RELEASE ORAL at 12:54

## 2023-03-15 RX ADMIN — TAMSULOSIN HYDROCHLORIDE 0.8 MG: 0.4 CAPSULE ORAL at 08:17

## 2023-03-15 RX ADMIN — IPRATROPIUM BROMIDE AND ALBUTEROL SULFATE 3 ML: .5; 2.5 SOLUTION RESPIRATORY (INHALATION) at 07:21

## 2023-03-15 RX ADMIN — DULOXETINE HYDROCHLORIDE 60 MG: 60 CAPSULE, DELAYED RELEASE ORAL at 08:17

## 2023-03-15 RX ADMIN — ATORVASTATIN CALCIUM 20 MG: 20 TABLET, FILM COATED ORAL at 08:17

## 2023-03-15 RX ADMIN — FERROUS SULFATE TAB 325 MG (65 MG ELEMENTAL FE) 325 MG: 325 (65 FE) TAB at 08:17

## 2023-03-15 RX ADMIN — INSULIN LISPRO 4 UNITS: 100 INJECTION, SOLUTION INTRAVENOUS; SUBCUTANEOUS at 12:54

## 2023-03-15 RX ADMIN — INSULIN LISPRO 2 UNITS: 100 INJECTION, SOLUTION INTRAVENOUS; SUBCUTANEOUS at 08:18

## 2023-03-15 RX ADMIN — GUAIFENESIN 600 MG: 600 TABLET, EXTENDED RELEASE ORAL at 08:18

## 2023-03-15 RX ADMIN — FUROSEMIDE 40 MG: 10 INJECTION, SOLUTION INTRAMUSCULAR; INTRAVENOUS at 08:16

## 2023-03-15 RX ADMIN — IPRATROPIUM BROMIDE AND ALBUTEROL SULFATE 3 ML: .5; 2.5 SOLUTION RESPIRATORY (INHALATION) at 11:43

## 2023-03-15 NOTE — PLAN OF CARE
Goal Outcome Evaluation:   Vss, ra, a/o x4. Pt reports he is feeling much better. Dc orders placed

## 2023-03-15 NOTE — CASE MANAGEMENT/SOCIAL WORK
Case Management Discharge Note      Final Note: Home via family, no additional CCP needs. Keenan RN/CCP         Selected Continued Care - Discharged on 3/15/2023 Admission date: 3/14/2023 - Discharge disposition: Home or Self Care    Destination    No services have been selected for the patient.              Durable Medical Equipment    No services have been selected for the patient.              Dialysis/Infusion    No services have been selected for the patient.              Home Medical Care    No services have been selected for the patient.              Therapy    No services have been selected for the patient.              Community Resources    No services have been selected for the patient.              Community & DME    No services have been selected for the patient.                       Final Discharge Disposition Code: 01 - home or self-care

## 2023-03-15 NOTE — DISCHARGE SUMMARY
[facility]    Date of Discharge:  3/15/2023    PCP: Marly Myrick APRN    Discharge Diagnosis:   Active Hospital Problems    Diagnosis  POA   • **Acute on chronic congestive heart failure, unspecified heart failure type (HCC) [I50.9]  Yes   • Bronchitis due to human metapneumovirus (hMPV) [J40, B97.81]  Yes   • GERD without esophagitis [K21.9]  Yes   • BPH with obstruction/lower urinary tract symptoms [N40.1, N13.8]  Yes   • CAMERON (obstructive sleep apnea) [G47.33]  Yes   • Dilated cardiomyopathy (HCC) [I42.0]  Yes   • Permanent atrial fibrillation (HCC) [I48.21]  Yes   • Type 2 diabetes mellitus with hyperglycemia (HCC) [E11.65]  Yes   • Morbid obesity (HCC) [E66.01]  Yes   • Essential hypertension [I10]  Yes      Resolved Hospital Problems   No resolved problems to display.          Consults     Date and Time Order Name Status Description    3/14/2023 10:11 PM Inpatient Cardiology Consult Completed     3/14/2023  7:47 PM [facility] (on-call MD unless specified) Details          Hospital Course  68 y.o. male who ultimately had not been taking his diuretics as prescribed prior to admission.  He was found to have a positive result for human metapneumovirus and this likely further provoked his A-fib to help contribute to acute on chronic diastolic CHF.  This patient has a CPAP at bedside and he is very compliant with that and he was further counseled about the importance is the diameter his neck is at least 20 inches.  He has been counseled about disease evolution from noncompliance with CPAP contributing to CHF as well as increasing risk for stroke.  Regardless he responded well to IV diuresis.  He has been advised to take his Lasix as prescribed with 40 mg daily.  He understands the need to watch daily weights as well.  Cardiology saw in consult and  is okay with disposition from their perspective with no need for additional inpatient work-up and I appreciate  and agree with assessment.  Volume status is much more reassuring and his O2 saturations completely normal on room air as he is laying nearly supine with no respiratory distress or symptomatology.  All questions answered to patient with family present at bedside and all involved are very proactive the idea of going home given resolution of symptoms and I think he is more than medically stable to transition to an outpatient management plan.  No additional discharge needs warranted.      Temp:  [96.2 °F (35.7 °C)-98.6 °F (37 °C)] 97.1 °F (36.2 °C)  Heart Rate:  [] 87  Resp:  [16-32] 18  BP: (131-186)/() 144/79  Body mass index is 41.91 kg/m².    Physical Exam  Constitutional:       Comments: Nontoxic in appearance   HENT:      Head: Normocephalic.      Nose: Nose normal.      Mouth/Throat:      Mouth: Mucous membranes are moist.      Pharynx: Oropharynx is clear.   Eyes:      Conjunctiva/sclera: Conjunctivae normal.      Pupils: Pupils are equal, round, and reactive to light.   Neck:      Comments: Very large diameter neck.  JVD unable to be assessed  Cardiovascular:      Rate and Rhythm: Normal rate. Rhythm irregular.   Pulmonary:      Effort: Pulmonary effort is normal. No respiratory distress.      Breath sounds: Normal breath sounds.   Abdominal:      General: Bowel sounds are normal. There is no distension.      Tenderness: There is no abdominal tenderness.      Comments: MO with BMI 41+   Musculoskeletal:      Comments: Chronic stasis changes with trace edema   Skin:     General: Skin is warm and dry.   Neurological:      Mental Status: He is alert and oriented to person, place, and time. Mental status is at baseline.      Cranial Nerves: No cranial nerve deficit.   Psychiatric:         Mood and Affect: Mood normal.         Behavior: Behavior normal.         Thought Content: Thought content normal.         Judgment: Judgment normal.       Disposition: Home or Self Care       Discharge Medications       Changes to Medications      Instructions Start Date   furosemide 40 MG tablet  Commonly known as: LASIX  What changed: additional instructions   TAKE 1 TABLET BY MOUTH EVERY DAY      metoprolol succinate XL 50 MG 24 hr tablet  Commonly known as: TOPROL-XL  What changed: how much to take   50 mg, Oral, Every Night at Bedtime      Trulicity 1.5 MG/0.5ML solution pen-injector  Generic drug: Dulaglutide  What changed: additional instructions   1.5 mg, Subcutaneous, Weekly         Continue These Medications      Instructions Start Date   Accu-Chek Leilani Plus w/Device kit   MONITOR BLOOD SUGAR THREE TIMES DAILY      atomoxetine 100 MG capsule  Commonly known as: Strattera   100 mg, Oral, Daily      atorvastatin 20 MG tablet  Commonly known as: LIPITOR   20 mg, Oral, Daily      baclofen 10 MG tablet  Commonly known as: LIORESAL   10 mg, Oral, 2 Times Daily PRN      cetirizine 10 MG tablet  Commonly known as: zyrTEC   TAKE 1 TABLET BY MOUTH EVERY DAY      DULoxetine 60 MG capsule  Commonly known as: CYMBALTA   60 mg, Oral, 2 Times Daily      ferrous sulfate 325 (65 FE) MG tablet   325 mg, Oral, Daily With Breakfast      glucose 4 GM chewable tablet  Commonly known as: DEX4   16 g, Oral, As Needed      HYDROcodone-acetaminophen  MG per tablet  Commonly known as: NORCO   1 tablet, Oral, Every 6 Hours PRN, PT STATES TAKES ONE IN THE MORNING AND IN THE AFTERNOON, AND AGAIN AT BEDTIME MOST DAYS      irbesartan 300 MG tablet  Commonly known as: AVAPRO   300 mg, Oral, Every Morning      metFORMIN  MG 24 hr tablet  Commonly known as: GLUCOPHAGE-XR   1,000 mg, Oral, 2 Times Daily      pioglitazone 15 MG tablet  Commonly known as: ACTOS   15 mg, Oral, Daily      rivaroxaban 20 MG tablet  Commonly known as: XARELTO   20 mg, Oral, Daily With Dinner      tamsulosin 0.4 MG capsule 24 hr capsule  Commonly known as: FLOMAX   0.8 mg, Oral, Daily      traZODone 150 MG tablet  Commonly known as: DESYREL   150 mg, Oral, Nightly  PRN              Additional Instructions for the Follow-ups that You Need to Schedule     Discharge Follow-up with PCP   As directed       Currently Documented PCP:    Marly Myrick APRN    PCP Phone Number:    None     Follow Up Details: PCP/cardiology as needed            Follow-up Information     Marly Myrick APRN .    Specialty: Family Medicine  Why: PCP/cardiology as needed  Contact information:  [address] [facility] [address]  [facility] [address]  [phone]                        Pending Labs     Order Current Status    Pathology Consultation Collected (03/15/23 0851)         Garland Watts MD  [facility]  03/15/23    Discharge time spent greater than 30 minutes.

## 2023-03-15 NOTE — ED NOTES
Nursing report ED to floor  Duane Wojciech Ramirez  68 y.o.  male    HPI :   Chief Complaint   Patient presents with    Shortness of Breath    Cough       Admitting doctor:   Miguel A Daniel MD    Admitting diagnosis:   The primary encounter diagnosis was Acute on chronic congestive heart failure, unspecified heart failure type (HCC). Diagnoses of Viral illness and Dyspnea, unspecified type were also pertinent to this visit.    Code status:   Current Code Status       Date Active Code Status Order ID Comments User Context       Prior            Allergies:   Patient has no known allergies.    Isolation:   No active isolations    Intake and Output  No intake or output data in the 24 hours ending 03/14/23 2033    Weight:       03/14/23 1754   Weight: (!) 159 kg (350 lb)       Most recent vitals:   Vitals:    03/14/23 1754 03/14/23 1821 03/14/23 1907 03/14/23 1943   BP:   161/75    BP Location:       Patient Position:       Pulse:  79 82 84   Resp:    20   Temp:       TempSrc:       SpO2:  93% 93% 97%   Weight: (!) 159 kg (350 lb)      Height:           Active LDAs/IV Access:   Lines, Drains & Airways       Active LDAs       Name Placement date Placement time Site Days    Peripheral IV 03/14/23 1806 Right Antecubital 03/14/23  1806  Antecubital  less than 1                    Labs (abnormal labs have a star):   Labs Reviewed   RESPIRATORY PANEL PCR W/ COVID-19 (SARS-COV-2) TRAVIS/RADHA/DANITA/PAD/COR/MAD/DARLEEN IN-HOUSE, NP SWAB IN UTM/VTP, 3-4 HR TAT - Abnormal; Notable for the following components:       Result Value    Human Metapneumovirus Detected (*)     All other components within normal limits    Narrative:     In the setting of a positive respiratory panel with a viral infection PLUS a negative procalcitonin without other underlying concern for bacterial infection, consider observing off antibiotics or discontinuation of antibiotics and continue supportive care. If the respiratory panel is positive for atypical bacterial  infection (Bordetella pertussis, Chlamydophila pneumoniae, or Mycoplasma pneumoniae), consider antibiotic de-escalation to target atypical bacterial infection.   COMPREHENSIVE METABOLIC PANEL - Abnormal; Notable for the following components:    Glucose 159 (*)     BUN 7 (*)     Creatinine 0.65 (*)     Total Bilirubin 1.3 (*)     Anion Gap 15.6 (*)     All other components within normal limits    Narrative:     GFR Normal >60  Chronic Kidney Disease <60  Kidney Failure <15     PROTIME-INR - Abnormal; Notable for the following components:    Protime 18.5 (*)     INR 1.52 (*)     All other components within normal limits   BNP (IN-HOUSE) - Abnormal; Notable for the following components:    proBNP 2,264.0 (*)     All other components within normal limits    Narrative:     Among patients with dyspnea, NT-proBNP is highly sensitive for the detection of acute congestive heart failure. In addition NT-proBNP of <300 pg/ml effectively rules out acute congestive heart failure with 99% negative predictive value.    Results may be falsely decreased if patient taking Biotin.     TROPONIN - Abnormal; Notable for the following components:    HS Troponin T 22 (*)     All other components within normal limits    Narrative:     High Sensitive Troponin T Reference Range:  <10.0 ng/L- Negative Female for AMI  <15.0 ng/L- Negative Male for AMI  >=10 - Abnormal Female indicating possible myocardial injury.  >=15 - Abnormal Male indicating possible myocardial injury.   Clinicians would have to utilize clinical acumen, EKG, Troponin, and serial changes to determine if it is an Acute Myocardial Infarction or myocardial injury due to an underlying chronic condition.        CBC WITH AUTO DIFFERENTIAL - Abnormal; Notable for the following components:    Hemoglobin 10.9 (*)     Hematocrit 34.0 (*)     MCV 78.5 (*)     MCH 25.2 (*)     RDW 16.9 (*)     Lymphocyte % 15.4 (*)     Immature Grans % 0.8 (*)     Immature Grans, Absolute 0.06 (*)      All other components within normal limits   HIGH SENSITIVITIY TROPONIN T 2HR   CBC AND DIFFERENTIAL    Narrative:     The following orders were created for panel order CBC & Differential.  Procedure                               Abnormality         Status                     ---------                               -----------         ------                     CBC Auto Differential[[id number]]        Abnormal            Final result                 Please view results for these tests on the individual orders.       EKG:   ECG 12 Lead Dyspnea   Final Result   HEART RATE= 88  bpm   RR Interval= 682  ms   NJ Interval=   ms   P Horizontal Axis=   deg   P Front Axis=   deg   QRSD Interval= 97  ms   QT Interval= 418  ms   QRS Axis= -1  deg   T Wave Axis= 23  deg   - ABNORMAL ECG -   Atrial fibrillation   Prolonged QT interval   No change from previous tracing   Electronically Signed By: Naima Marquez ([facility]) 14-Mar-2023 17:52:56   Date and Time of Study: 2023 17:43:01          Meds given in ED:   Medications   sodium chloride 0.9 % flush 10 mL (has no administration in time range)   methylPREDNISolone sodium succinate (SOLU-Medrol) injection 125 mg (125 mg Intravenous Given 3/14/23 1829)   ipratropium-albuterol (DUO-NEB) nebulizer solution 3 mL (3 mL Nebulization Given 3/14/23 1943)   furosemide (LASIX) injection 80 mg (80 mg Intravenous Given 3/14/23 1948)       Imaging results:  No radiology results for the last day    Ambulatory status:   - up ad david    Social issues:   Social History     Socioeconomic History    Marital status:      Spouse name: Mindy   Tobacco Use    Smoking status: Former     Packs/day: 1.00     Years: 5.00     Pack years: 5.00     Types: Cigarettes     Quit date:      Years since quittin.2    Smokeless tobacco: Never   Vaping Use    Vaping Use: Never used   Substance and Sexual Activity    Alcohol use: Yes     Comment: RARELY    Drug use: No    Sexual activity: Defer       NIH  Stroke Scale:         Anna Ortega RN  03/14/23 20:33 EDT

## 2023-03-15 NOTE — PLAN OF CARE
Goal Outcome Evaluation:  Admit to floor and orient to unit. Denies pain. States feels breathing has improved since arrival to hospital. VSS

## 2023-03-15 NOTE — OUTREACH NOTE
Prep Survey    Flowsheet Row Responses   [facility] patient discharged from? [place]   Is LACE score < 7 ? No   Eligibility [facility]   Date of Admission 03/14/23   Date of Discharge 03/15/23   Discharge Disposition Home or Self Care   Discharge diagnosis Acute on chronic congestive heart failure   Does the patient have one of the following disease processes/diagnoses(primary or secondary)? CHF   Does the patient have Home health ordered? No   Is there a DME ordered? No   Prep survey completed? Yes          Salena HOPPER - Registered Nurse

## 2023-03-15 NOTE — H&P
Patient Name:  Duane Mark Witten  YOB: 1955  MRN:  [id number]  Admit Date:  3/14/2023  Patient Care Team:  Marly Myrick APRN as PCP - General (Family Medicine)  Code, Angelo SCOTT II, MD as Consulting Physician (Hematology and Oncology)  Tyler Sellers MD (Anesthesiology)      Subjective   History Present Illness     Chief Complaint   Patient presents with   • Shortness of Breath   • Cough       Mr. Ramirez is a 68 y.o. man with permanent atrial fibrillation, essential hypertension, hyperlipidemia, CAMERON on BiPAP, type 2 diabetes, morbid obesity, chronic systolic heart failure with recovered EF, moderate to severe aortic stenosis, GERD, BPH who presents with progressively worsening shortness of breath, dyspnea on exertion, productive cough, and wheezing. He endorses some orthopnea, but denies PND. His wife reports that she thinks his legs have swollen considerably in the last couple of days. He tells me that he missed several days of his lasix because he was feeling too bad to take it    History of Present Illness  Review of Systems   Constitutional: Negative for chills, diaphoresis and fever.   HENT: Negative for sinus pressure and sore throat.    Eyes: Negative.    Respiratory: Positive for cough and shortness of breath.    Cardiovascular: Positive for leg swelling. Negative for chest pain and palpitations.   Gastrointestinal: Negative for constipation.   Endocrine: Negative.    Genitourinary: Negative for dysuria, frequency and urgency.   Musculoskeletal: Negative for arthralgias and myalgias.   Skin: Negative for rash and wound.   Allergic/Immunologic: Positive for environmental allergies. Negative for immunocompromised state.   Neurological: Negative for light-headedness and headaches.   Hematological: Negative.    Psychiatric/Behavioral: Negative for confusion and decreased concentration.        Personal History     Past Medical History:   Diagnosis Date   • ADD (attention deficit disorder)   "  • Anemia     intermittent   • Anesthesia complication     STATES HE IS SLOW TO WAKE UP   • Atrial fibrillation (HCC)     XARELTO   • Chronic anticoagulation     XARELTO   • Chronic pain     \"ALL OVER\" ARTHRITIS   • Depression    • Difficulty swallowing solids     AT TIMES   • Dilated cardiomyopathy (HCC)    • ED (erectile dysfunction)    • Elevated cholesterol    • Essential hypertension    • YANELI (generalized anxiety disorder)    • Generalized weakness     STATES LEGS AND ARMS WEAK-TIRES EASILY   • HLD (hyperlipidemia)    • Leukocytosis    • Morbid obesity (HCC)    • CAMERON (obstructive sleep apnea)     BiPAP nightly   • Osteoarthrosis    • PONV (postoperative nausea and vomiting)    • Psoriasis    • Testosterone deficiency    • Type 2 diabetes mellitus (HCC)    • Unsteady gait     USES CANE     Past Surgical History:   Procedure Laterality Date   • ANTERIOR CHANNEL VERTEBRECTOMY/CORPECTOMY Bilateral 11/27/2018    Procedure: C4, C5, C6 ANTERIOR CERVICAL CORPECTOMY;  Surgeon: Chirag Markham MD;  Location: [facility] MAIN OR;  Service: Neurosurgery   • BIOPSY / EXCISION / DISSECTION AXILLARY NODE  1994    Lymph Node testing for cancer    • COLONOSCOPY      10+ years; normal   • COLONOSCOPY N/A 8/29/2022    Procedure: COLONOSCOPY INTO CECUM AND TI WITH COLD BX AND COLD SNARE POLYPECTOMIES;  Surgeon: Timothy Rojas MD;  Location: [facility] ENDOSCOPY;  Service: Gastroenterology;  Laterality: N/A;  PRE: ANEMIA  POST: POLYPS, DIVERTICULOSIS, HEMORRHOIDS   • ENDOSCOPY N/A 8/29/2022    Procedure: ESOPHAGOGASTRODUODENOSCOPY WITH BIOPSIES;  Surgeon: Timothy Rojas MD;  Location: [facility] ENDOSCOPY;  Service: Gastroenterology;  Laterality: N/A;  PRE: DYSPHAGIA  POST: ESOPHAGITIS   • LASIK Bilateral    • PARATHYROIDECTOMY  2008     Family History   Problem Relation Age of Onset   • Anxiety disorder Mother    • Bipolar disorder Mother    • Depression Mother    • Glaucoma Mother    • Macular degeneration Mother    • Lupus " Mother    • Aortic stenosis Mother         s/p TAVR at age 87   • Cancer Father    • Diabetes Father    • Heart disease Father    • Hypertension Father    • Stroke Father    • Lupus Other         grandmother   • Malig Hyperthermia Neg Hx      Social History     Tobacco Use   • Smoking status: Former     Packs/day: 1.00     Years: 5.00     Pack years: 5.00     Types: Cigarettes     Quit date:      Years since quittin.2   • Smokeless tobacco: Never   Vaping Use   • Vaping Use: Never used   Substance Use Topics   • Alcohol use: Yes     Comment: RARELY   • Drug use: No     No current facility-administered medications on file prior to encounter.     Current Outpatient Medications on File Prior to Encounter   Medication Sig Dispense Refill   • atomoxetine (Strattera) 100 MG capsule Take 1 capsule by mouth Daily. 90 capsule 3   • atorvastatin (LIPITOR) 20 MG tablet Take 1 tablet by mouth Daily. 90 tablet 3   • baclofen (LIORESAL) 10 MG tablet Take 10 mg by mouth every night at bedtime.     • Blood Glucose Monitoring Suppl (Accu-Chek Leilani Plus) w/Device kit MONITOR BLOOD SUGAR THREE TIMES DAILY 1 kit 0   • cetirizine (zyrTEC) 10 MG tablet TAKE 1 TABLET BY MOUTH EVERY DAY (Patient taking differently: Take 10 mg by mouth Daily.) 90 tablet 0   • dexlansoprazole (DEXILANT) 60 MG capsule Take 1 capsule by mouth Daily. 30 capsule 6   • Dulaglutide (Trulicity) 1.5 MG/0.5ML solution pen-injector Inject 1.5 mg under the skin into the appropriate area as directed 1 (One) Time Per Week. 2 mL 11   • DULoxetine (CYMBALTA) 60 MG capsule Take 1 capsule by mouth 2 (Two) Times a Day. 180 capsule 3   • furosemide (LASIX) 40 MG tablet TAKE 1 TABLET BY MOUTH EVERY DAY 30 tablet 1   • glucose (DEX4) 4 GM chewable tablet Chew 4 tablets As Needed for Low Blood Sugar. 120 tablet 0   • HYDROcodone-acetaminophen (NORCO)  MG per tablet Take 1 tablet by mouth Every 6 (Six) Hours As Needed for Moderate Pain . PT STATES TAKES ONE IN THE  MORNING AND IN THE AFTERNOON, AND AGAIN AT BEDTIME MOST DAYS     • irbesartan (AVAPRO) 300 MG tablet Take 1 tablet by mouth Every Morning. 90 tablet 3   • metFORMIN ER (GLUCOPHAGE-XR) 500 MG 24 hr tablet Take 2 tablets by mouth 2 (Two) Times a Day. 360 tablet 3   • metoprolol succinate XL (TOPROL-XL) 50 MG 24 hr tablet Take 1 tablet by mouth every night at bedtime. 90 tablet 3   • pioglitazone (ACTOS) 15 MG tablet Take 1 tablet by mouth Daily. 90 tablet 3   • rivaroxaban (XARELTO) 20 MG tablet Take 1 tablet by mouth Daily With Dinner. Indications: Atrial Fibrillation 90 tablet 3   • tamsulosin (FLOMAX) 0.4 MG capsule 24 hr capsule Take 2 capsules by mouth Daily. 180 capsule 3   • traZODone (DESYREL) 150 MG tablet Take 1 tablet by mouth At Night As Needed for Sleep. 90 tablet 3     No Known Allergies    Objective    Objective     Vital Signs  Temp:  [96.2 °F (35.7 °C)-98.5 °F (36.9 °C)] 98.5 °F (36.9 °C)  Heart Rate:  [] 88  Resp:  [20-32] 20  BP: (158-186)/() 158/70  SpO2:  [93 %-97 %] 94 %  on  Flow (L/min):  [2] 2;   Device (Oxygen Therapy): nasal cannula  Body mass index is 43.75 kg/m².    Physical Exam  Vitals and nursing note reviewed.   Constitutional:       General: He is not in acute distress.     Appearance: He is obese. He is not toxic-appearing.   HENT:      Head: Normocephalic and atraumatic.      Mouth/Throat:      Mouth: Mucous membranes are moist.      Pharynx: Oropharynx is clear. No oropharyngeal exudate.   Eyes:      Extraocular Movements: Extraocular movements intact.      Conjunctiva/sclera: Conjunctivae normal.      Pupils: Pupils are equal, round, and reactive to light.   Cardiovascular:      Rate and Rhythm: Normal rate. Rhythm irregular.      Heart sounds: Normal heart sounds.   Pulmonary:      Effort: Pulmonary effort is normal.      Breath sounds: Wheezing and rhonchi present. No rales.   Abdominal:      General: Bowel sounds are normal. There is no distension.      Palpations:  Abdomen is soft.      Tenderness: There is no abdominal tenderness. There is no guarding or rebound.   Musculoskeletal:      Cervical back: Normal range of motion and neck supple.      Right lower leg: Edema present.      Left lower leg: Edema present.   Skin:     General: Skin is dry.      Capillary Refill: Capillary refill takes less than 2 seconds.      Findings: No erythema or rash.   Neurological:      General: No focal deficit present.      Mental Status: He is alert and oriented to person, place, and time.   Psychiatric:         Mood and Affect: Mood normal.         Behavior: Behavior normal.         Results Review:  I reviewed the patient's new clinical results.  I reviewed the patient's new imaging results and agree with the interpretation.  I reviewed the patient's other test results and agree with the interpretation  I personally viewed and interpreted the patient's EKG/Telemetry data  Discussed with ED provider.    Lab Results (last 24 hours)     Procedure Component Value Units Date/Time    Respiratory Panel PCR w/COVID-19(SARS-CoV-2) TRAVIS/RADHA/DANITA/PAD/COR/MAD/DARLEEN In-House, NP Swab in UTM/VTM, 3-4 HR TAT - Swab, Nasopharynx [[id number]]  (Abnormal) Collected: 03/14/23 1805    Specimen: Swab from Nasopharynx Updated: 03/14/23 1924     ADENOVIRUS, PCR Not Detected     Coronavirus 229E Not Detected     Coronavirus HKU1 Not Detected     Coronavirus NL63 Not Detected     Coronavirus OC43 Not Detected     COVID19 Not Detected     Human Metapneumovirus Detected     Human Rhinovirus/Enterovirus Not Detected     Influenza A PCR Not Detected     Influenza B PCR Not Detected     Parainfluenza Virus 1 Not Detected     Parainfluenza Virus 2 Not Detected     Parainfluenza Virus 3 Not Detected     Parainfluenza Virus 4 Not Detected     RSV, PCR Not Detected     Bordetella pertussis pcr Not Detected     Bordetella parapertussis PCR Not Detected     Chlamydophila pneumoniae PCR Not Detected     Mycoplasma pneumo by PCR Not  Detected    Narrative:      In the setting of a positive respiratory panel with a viral infection PLUS a negative procalcitonin without other underlying concern for bacterial infection, consider observing off antibiotics or discontinuation of antibiotics and continue supportive care. If the respiratory panel is positive for atypical bacterial infection (Bordetella pertussis, Chlamydophila pneumoniae, or Mycoplasma pneumoniae), consider antibiotic de-escalation to target atypical bacterial infection.    CBC & Differential [[id number]]  (Abnormal) Collected: 03/14/23 1805    Specimen: Blood Updated: 03/14/23 1825    Narrative:      The following orders were created for panel order CBC & Differential.  Procedure                               Abnormality         Status                     ---------                               -----------         ------                     CBC Auto Differential[[id number]]        Abnormal            Final result                 Please view results for these tests on the individual orders.    Comprehensive Metabolic Panel [[id number]]  (Abnormal) Collected: 03/14/23 1805    Specimen: Blood Updated: 03/14/23 1906     Glucose 159 mg/dL      BUN 7 mg/dL      Creatinine 0.65 mg/dL      Sodium 139 mmol/L      Potassium 3.7 mmol/L      Chloride 98 mmol/L      CO2 25.4 mmol/L      Calcium 8.9 mg/dL      Total Protein 6.8 g/dL      Albumin 3.8 g/dL      ALT (SGPT) 9 U/L      AST (SGOT) 9 U/L      Alkaline Phosphatase 97 U/L      Total Bilirubin 1.3 mg/dL      Globulin 3.0 gm/dL      A/G Ratio 1.3 g/dL      BUN/Creatinine Ratio 10.8     Anion Gap 15.6 mmol/L      eGFR 102.6 mL/min/1.73     Narrative:      GFR Normal >60  Chronic Kidney Disease <60  Kidney Failure <15      Protime-INR [[id number]]  (Abnormal) Collected: 03/14/23 1805    Specimen: Blood Updated: 03/14/23 1831     Protime 18.5 Seconds      INR 1.52    BNP [[id number]]  (Abnormal) Collected: 03/14/23 1805    Specimen: Blood Updated:  03/14/23 1857     proBNP 2,264.0 pg/mL     Narrative:      Among patients with dyspnea, NT-proBNP is highly sensitive for the detection of acute congestive heart failure. In addition NT-proBNP of <300 pg/ml effectively rules out acute congestive heart failure with 99% negative predictive value.    Results may be falsely decreased if patient taking Biotin.      High Sensitivity Troponin T [[id number]]  (Abnormal) Collected: 03/14/23 1805    Specimen: Blood Updated: 03/14/23 1906     HS Troponin T 22 ng/L     Narrative:      High Sensitive Troponin T Reference Range:  <10.0 ng/L- Negative Female for AMI  <15.0 ng/L- Negative Male for AMI  >=10 - Abnormal Female indicating possible myocardial injury.  >=15 - Abnormal Male indicating possible myocardial injury.   Clinicians would have to utilize clinical acumen, EKG, Troponin, and serial changes to determine if it is an Acute Myocardial Infarction or myocardial injury due to an underlying chronic condition.         CBC Auto Differential [[id number]]  (Abnormal) Collected: 03/14/23 1805    Specimen: Blood Updated: 03/14/23 1825     WBC 7.54 10*3/mm3      RBC 4.33 10*6/mm3      Hemoglobin 10.9 g/dL      Hematocrit 34.0 %      MCV 78.5 fL      MCH 25.2 pg      MCHC 32.1 g/dL      RDW 16.9 %      RDW-SD 48.2 fl      MPV 9.4 fL      Platelets 218 10*3/mm3      Neutrophil % 74.8 %      Lymphocyte % 15.4 %      Monocyte % 7.2 %      Eosinophil % 1.5 %      Basophil % 0.3 %      Immature Grans % 0.8 %      Neutrophils, Absolute 5.65 10*3/mm3      Lymphocytes, Absolute 1.16 10*3/mm3      Monocytes, Absolute 0.54 10*3/mm3      Eosinophils, Absolute 0.11 10*3/mm3      Basophils, Absolute 0.02 10*3/mm3      Immature Grans, Absolute 0.06 10*3/mm3      nRBC 0.0 /100 WBC           Imaging Results (Last 24 Hours)     Procedure Component Value Units Date/Time    XR Chest 1 View [[id number]] Collected: 03/14/23 1857     Updated: 03/14/23 1901    Narrative:      XR CHEST 1 VW-     HISTORY:  68-year-old male with shortness of breath.     FINDINGS: The heart is enlarged and there is pulmonary vascular  congestion and likely CHF. There are at least small bilateral pleural  effusions.     This report was finalized on 3/14/2023 6:58 PM by Dr. Denise Angelo M.D.             Results for orders placed during the hospital encounter of 01/18/23    Adult Transthoracic Echo Complete W/ Cont if Necessary Per Protocol    Interpretation Summary  •  Left ventricular systolic function is normal. Calculated left ventricular EF = 63.5% Normal left ventricular cavity size noted. Left ventricular wall thickness is consistent with moderate concentric hypertrophy. All left ventricular wall segments contract normally. Left ventricular diastolic function was indeterminate.  •  The left atrial cavity is moderately dilated.  •  The aortic valve is abnormal in structure. A bicuspid aortic valve cannot be excluded. There is severe calcification of the aortic valve. No aortic valve regurgitation is present. Moderate aortic valve stenosis is present.  •  Moderate mitral annular calcification is present. There is moderate, bileaflet mitral valve thickening present. The mitral regurgitation is not well visualized. Color imaging on some views suggest that the regurgitation may be more significant however it is not well seen. Consider transesophageal echocardiogram to assess further if clinically indicated No significant mitral valve stenosis is present.  •  Trace tricuspid valve regurgitation is present. Estimated right ventricular systolic pressure from tricuspid regurgitation is moderately elevated (45-55 mmHg). Calculated right ventricular systolic pressure from tricuspid regurgitation is 46.0 mmHg.      ECG 12 Lead Dyspnea   Final Result   HEART RATE= 88  bpm   RR Interval= 682  ms   VA Interval=   ms   P Horizontal Axis=   deg   P Front Axis=   deg   QRSD Interval= 97  ms   QT Interval= 418  ms   QRS Axis= -1  deg   T Wave Axis= 23   deg   - ABNORMAL ECG -   Atrial fibrillation   Prolonged QT interval   No change from previous tracing   Electronically Signed By: Naima Marquez ([facility]) 14-Mar-2023 17:52:56   Date and Time of Study: 2023-03-14 17:43:01           Assessment/Plan     Active Hospital Problems    Diagnosis  POA   • **Acute on chronic congestive heart failure, unspecified heart failure type (HCC) [I50.9]  Yes   • Bronchitis due to human metapneumovirus (hMPV) [J40, B97.81]  Yes   • GERD without esophagitis [K21.9]  Yes   • BPH with obstruction/lower urinary tract symptoms [N40.1, N13.8]  Yes   • CAMERON (obstructive sleep apnea) [G47.33]  Yes   • Dilated cardiomyopathy (HCC) [I42.0]  Yes   • Permanent atrial fibrillation (HCC) [I48.21]  Yes   • Type 2 diabetes mellitus with hyperglycemia (HCC) [E11.65]  Yes   • Morbid obesity (HCC) [E66.01]  Yes   • Essential hypertension [I10]  Yes      Resolved Hospital Problems   No resolved problems to display.       Mr. Ramirez is a 68 y.o. man with permanent atrial fibrillation, essential hypertension, hyperlipidemia, CAMERON on BiPAP, type 2 diabetes, morbid obesity, chronic systolic heart failure with recovered EF, moderate aortic stenosis, GERD, BPH who presents with acute bronchitis due to human metapneumovirus and an acute on chronic heart failure exacerbation.    · Human metapneumovirus causing acute bronchitis-symptomatic treatment with nebulizers and mucolytics.   · Acute on chronic systolic heart failure-he's having a mild exacerbation likely related to missing his home medications for several days. Continue IV lasix. Monitor strict I/Os, daily weights, renal function, and electrolytes. Cardiology consultation  · Restart home medications once reconciled  · I discussed the patient's findings and my recommendations with patient and family.    VTE Prophylaxis - Xarelto (home med).  Code Status - Full code.       Miguel A Daniel MD  [facility]  03/14/23  22:03 EDT

## 2023-03-15 NOTE — CONSULTS
Patient Name: Duane Mark Witten  :1955  68 y.o.    Date of Admission: 3/14/2023  Encounter Provider: Leticia Carrasco MD  Date of Encounter Visit: 03/15/23  Place of Service: [facility] CARDIOLOGY  Referring Provider: Miguel A Daniel MD  Patient Care Team:  Marly Myrick APRN as PCP - General (Family Medicine)  Code, Angelo SCOTT II, MD as Consulting Physician (Hematology and Oncology)  Tyler Sellers MD (Anesthesiology)      Chief complaint:cough and dyspnea      History of Present Illness:     This is a patient of Dr. Bravo's with paroxysmal atrial fibrillation on Xarelto.  He also has hypertension, hyperlipidemia, sleep apnea on BiPAP, diabetes, obesity.  In  he was diagnosed with a low ejection fraction of 47%.  PET scan in 2019 showed possible ischemia.  Echo in  but his ejection fraction at 51% with a bicuspid valve and mild to moderate aortic stenosis.  Carotid Doppler in 2021 showed bilateral carotid plaque.  Echo in  put his ejection fraction at 64% with moderate aortic stenosis    He was admitted with shortness of breath and cough with orthopnea.  He responded well to IV Lasix and says he feels back to his usual this morning.        ECHO 23  Left ventricular systolic function is normal. Calculated left ventricular EF = 63.5% Normal left ventricular cavity size noted. Left ventricular wall thickness is consistent with moderate concentric hypertrophy. All left ventricular wall segments contract normally. Left ventricular diastolic function was indeterminate.  •  The left atrial cavity is moderately dilated.  •  The aortic valve is abnormal in structure. A bicuspid aortic valve cannot be excluded. There is severe calcification of the aortic valve. No aortic valve regurgitation is present. Moderate aortic valve stenosis is present.  •  Moderate mitral annular calcification is present. There is moderate, bileaflet mitral valve thickening  "present. The mitral regurgitation is not well visualized. Color imaging on some views suggest that the regurgitation may be more significant however it is not well seen. Consider transesophageal echocardiogram to assess further if clinically indicated No significant mitral valve stenosis is present.  •  Trace tricuspid valve regurgitation is present. Estimated right ventricular systolic pressure from tricuspid regurgitation is moderately elevated (45-55 mmHg). Calculated right ventricular systolic pressure from tricuspid regurgitation is 46.0 mmHg.       Stress test 2/8/19  • There is diffusely decreased tracer uptake in all wall segments in stress imaging. That being said, there is more pronounced hypoperfusion of the mid anterior wall. It's small and mild and may represent ischemia.  • Left ventricular ejection fraction is moderately reduced (Calculated EF = 36%). Both ventricles are dilated.         Past Medical History:   Diagnosis Date   • ADD (attention deficit disorder)    • Anemia     intermittent   • Anesthesia complication     STATES HE IS SLOW TO WAKE UP   • Atrial fibrillation (HCC)     XARELTO   • Chronic anticoagulation     XARELTO   • Chronic pain     \"ALL OVER\" ARTHRITIS   • Depression    • Difficulty swallowing solids     AT TIMES   • Dilated cardiomyopathy (HCC)    • ED (erectile dysfunction)    • Elevated cholesterol    • Essential hypertension    • YANELI (generalized anxiety disorder)    • Generalized weakness     STATES LEGS AND ARMS WEAK-TIRES EASILY   • HLD (hyperlipidemia)    • Leukocytosis    • Morbid obesity (HCC)    • CAMERON (obstructive sleep apnea)     BiPAP nightly   • Osteoarthrosis    • PONV (postoperative nausea and vomiting)    • Psoriasis    • Testosterone deficiency    • Type 2 diabetes mellitus (HCC)    • Unsteady gait     USES CANE       Past Surgical History:   Procedure Laterality Date   • ANTERIOR CHANNEL VERTEBRECTOMY/CORPECTOMY Bilateral 11/27/2018    Procedure: C4, C5, C6 " ANTERIOR CERVICAL CORPECTOMY;  Surgeon: Chirag Markham MD;  Location: [facility] MAIN OR;  Service: Neurosurgery   • BIOPSY / EXCISION / DISSECTION AXILLARY NODE  1994    Lymph Node testing for cancer    • COLONOSCOPY      10+ years; normal   • COLONOSCOPY N/A 8/29/2022    Procedure: COLONOSCOPY INTO CECUM AND TI WITH COLD BX AND COLD SNARE POLYPECTOMIES;  Surgeon: Timothy Rojas MD;  Location: [facility] ENDOSCOPY;  Service: Gastroenterology;  Laterality: N/A;  PRE: ANEMIA  POST: POLYPS, DIVERTICULOSIS, HEMORRHOIDS   • ENDOSCOPY N/A 8/29/2022    Procedure: ESOPHAGOGASTRODUODENOSCOPY WITH BIOPSIES;  Surgeon: Timothy Rojas MD;  Location: [facility] ENDOSCOPY;  Service: Gastroenterology;  Laterality: N/A;  PRE: DYSPHAGIA  POST: ESOPHAGITIS   • LASIK Bilateral    • PARATHYROIDECTOMY  2008         Prior to Admission medications    Medication Sig Start Date End Date Taking? Authorizing Provider   atomoxetine (Strattera) 100 MG capsule Take 1 capsule by mouth Daily. 11/1/22  Yes Marly Myrick APRN   atorvastatin (LIPITOR) 20 MG tablet Take 1 tablet by mouth Daily. 2/8/23  Yes Carina Lindsey, DO   baclofen (LIORESAL) 10 MG tablet Take 1 tablet by mouth 2 (Two) Times a Day As Needed. 5/12/22  Yes Provider, MD Bridgette   Blood Glucose Monitoring Suppl (Accu-Chek Leilani Plus) w/Device kit MONITOR BLOOD SUGAR THREE TIMES DAILY 2/8/23  Yes Carina Lindsey,    cetirizine (zyrTEC) 10 MG tablet TAKE 1 TABLET BY MOUTH EVERY DAY  Patient taking differently: Take 1 tablet by mouth Daily. 3/23/21  Yes Marly Myrick APRN   Dulaglutide (Trulicity) 1.5 MG/0.5ML solution pen-injector Inject 1.5 mg under the skin into the appropriate area as directed 1 (One) Time Per Week.  Patient taking differently: Inject 1.5 mg under the skin into the appropriate area as directed 1 (One) Time Per Week. Takes on Sunday 11/1/22  Yes Marly Myrick APRN   DULoxetine (CYMBALTA) 60 MG capsule Take 1 capsule by mouth 2 (Two) Times a Day.  "11/1/22  Yes Marly Myrick APRN   ferrous sulfate 325 (65 FE) MG tablet Take 1 tablet by mouth Daily With Breakfast.   Yes Provider, MD Bridgette   furosemide (LASIX) 40 MG tablet TAKE 1 TABLET BY MOUTH EVERY DAY  Patient taking differently: Take 1 tablet by mouth Daily. Pt instructed to take as needed. 2/16/23  Yes Theresa Henderson APRN   glucose (DEX4) 4 GM chewable tablet Chew 4 tablets As Needed for Low Blood Sugar. 12/15/22  Yes Marly Myrick APRN   HYDROcodone-acetaminophen (NORCO)  MG per tablet Take 1 tablet by mouth Every 6 (Six) Hours As Needed for Moderate Pain. PT STATES TAKES ONE IN THE MORNING AND IN THE AFTERNOON, AND AGAIN AT BEDTIME MOST DAYS   Yes ProviderBridgette MD   irbesartan (AVAPRO) 300 MG tablet Take 1 tablet by mouth Every Morning. 2/8/23  Yes Carina Lindsey,    metFORMIN ER (GLUCOPHAGE-XR) 500 MG 24 hr tablet Take 2 tablets by mouth 2 (Two) Times a Day. 6/21/22  Yes Marly Myrick APRN   metoprolol succinate XL (TOPROL-XL) 50 MG 24 hr tablet Take 1 tablet by mouth every night at bedtime.  Patient taking differently: Take 25 mg by mouth every night at bedtime. 4/13/22  Yes Theresa Henderson APRN   pioglitazone (ACTOS) 15 MG tablet Take 1 tablet by mouth Daily. 6/23/22  Yes Marly Myrick APRN   rivaroxaban (XARELTO) 20 MG tablet Take 1 tablet by mouth Daily With Dinner. Indications: Atrial Fibrillation 4/13/22  Yes Theresa Henderson APRN   tamsulosin (FLOMAX) 0.4 MG capsule 24 hr capsule Take 2 capsules by mouth Daily. 11/18/22  Yes Marly Myrick APRN   traZODone (DESYREL) 150 MG tablet Take 1 tablet by mouth At Night As Needed for Sleep. 11/18/22  Yes Marly Myrick APRN       Allergies   Allergen Reactions   • Aspartame Other (See Comments)     \"joints lock up\"       Social History     Socioeconomic History   • Marital status:      Spouse name: Mindy   Tobacco Use   • Smoking status: Former     Packs/day: 1.00     Years: 5.00     Pack years: 5.00    "  Types: Cigarettes     Quit date:      Years since quittin.2   • Smokeless tobacco: Never   Vaping Use   • Vaping Use: Never used   Substance and Sexual Activity   • Alcohol use: Yes     Comment: RARELY   • Drug use: No   • Sexual activity: Defer       Family History   Problem Relation Age of Onset   • Anxiety disorder Mother    • Bipolar disorder Mother    • Depression Mother    • Glaucoma Mother    • Macular degeneration Mother    • Lupus Mother    • Aortic stenosis Mother         s/p TAVR at age 87   • Cancer Father    • Diabetes Father    • Heart disease Father    • Hypertension Father    • Stroke Father    • Lupus Other         grandmother   • Malig Hyperthermia Neg Hx        REVIEW OF SYSTEMS:   All other systems reviewed and negative.        Objective:     Vitals:    23 2334 03/15/23 0500 03/15/23 0721 03/15/23 0749   BP: 131/88   144/79   BP Location: Right arm   Left arm   Patient Position: Standing   Lying   Pulse: 91   87   Resp: 20  18    Temp:    97.1 °F (36.2 °C)   TempSrc:       SpO2: 93%   94%   Weight:  (!) 152 kg (335 lb 4.8 oz)     Height:         Body mass index is 41.91 kg/m².    Intake/Output Summary (Last 24 hours) at 3/15/2023 0804  Last data filed at 3/14/2023 2204  Gross per 24 hour   Intake --   Output 1400 ml   Net -1400 ml     Constitutional: He is oriented to person, place, and time. He appears well-developed. He does not appear ill.   HENT:   Head: Normocephalic and atraumatic. Head is without contusion.   Right Ear: Hearing normal. No drainage.   Left Ear: Hearing normal. No drainage.   Nose: No nasal deformity. No epistaxis.   Eyes: Lids are normal. Right eye exhibits no exudate. Left eye exhibits no exudate.  Neck: No JVD present. Carotid bruit is not present. No tracheal deviation present. No thyroid mass and no thyromegaly present.   Cardiovascular: Irregularly irregular pulmonary/Chest: Effort normal and breath sounds normal.   Abdominal: Soft. Normal appearance  and bowel sounds are normal. There is no tenderness.   Musculoskeletal: Normal range of motion.        Right shoulder: He exhibits no deformity.        Left shoulder: He exhibits no deformity.   Neurological: He is alert and oriented to person, place, and time. He has normal strength.   Skin: Skin is warm, dry and intact. No rash noted.   Psychiatric: He has a normal mood and affect. His behavior is normal. Thought content normal.   Vitals reviewed        Lab Review:     Results from last 7 days   Lab Units 03/14/23  1805   SODIUM mmol/L 139   POTASSIUM mmol/L 3.7   CHLORIDE mmol/L 98   CO2 mmol/L 25.4   BUN mg/dL 7*   CREATININE mg/dL 0.65*   CALCIUM mg/dL 8.9   BILIRUBIN mg/dL 1.3*   ALK PHOS U/L 97   ALT (SGPT) U/L 9   AST (SGOT) U/L 9   GLUCOSE mg/dL 159*     Results from last 7 days   Lab Units 03/14/23  1805   HSTROP T ng/L 22*     Results from last 7 days   Lab Units 03/14/23  1805   WBC 10*3/mm3 7.54   HEMOGLOBIN g/dL 10.9*   HEMATOCRIT % 34.0*   PLATELETS 10*3/mm3 218     Results from last 7 days   Lab Units 03/14/23  1805   INR  1.52*                          I personally viewed and interpreted the patient's EKG/Telemetry data.        Assessment and Plan:     1.  Permanent atrial fibrillation.  Rate controlled and on Xarelto.  He says it is really expensive so going to have my office contact him to get a prescription card and some samples to help with the cost.  CHADS2 Vasc Score of 3.  2.  Acute on chronic diastolic congestive heart failure.  In the past he did have some LV dysfunction but his most recent echo showed a normal ejection fraction.  He responded very well to Lasix and says he feels back to normal.  3.  Bicuspid aortic valve with aortic stenosis.  Moderate.  4.  Hypertension  5.  Hyperlipidemia  6.  Carotid plaque  7.  Diabetes.    He looks euvolemic on exam today.  I am okay with discharge.  He already has an appointment set with Dr. Bravo in April and I encouraged him to keep  it.    Leticia Carrasco MD  03/15/23  08:04 EDT

## 2023-03-16 ENCOUNTER — TRANSITIONAL CARE MANAGEMENT TELEPHONE ENCOUNTER (OUTPATIENT)
Dept: CALL CENTER | Facility: HOSPITAL | Age: 68
End: 2023-03-16
Payer: MEDICARE

## 2023-03-16 NOTE — OUTREACH NOTE
Call Center TCM Note    Flowsheet Row Responses   [facility] patient discharged from? [place]   Does the patient have one of the following disease processes/diagnoses(primary or secondary)? CHF   TCM attempt successful? No   Unsuccessful attempts Attempt 1           Jessica Dye MA    3/16/2023, 14:57 EDT

## 2023-03-16 NOTE — OUTREACH NOTE
Call Center TCM Note    Flowsheet Row Responses   [facility] patient discharged from? [place]   Does the patient have one of the following disease processes/diagnoses(primary or secondary)? CHF   TCM attempt successful? No   Unsuccessful attempts Attempt 2           Jessica Dye MA    3/16/2023, 16:09 EDT

## 2023-03-17 ENCOUNTER — TRANSITIONAL CARE MANAGEMENT TELEPHONE ENCOUNTER (OUTPATIENT)
Dept: CALL CENTER | Facility: HOSPITAL | Age: 68
End: 2023-03-17
Payer: MEDICARE

## 2023-03-17 NOTE — OUTREACH NOTE
Call Center TCM Note    Flowsheet Row Responses   [facility] [place] patient discharged from? [place]   Does the patient have one of the following disease processes/diagnoses(primary or secondary)? CHF   TCM attempt successful? No   Unsuccessful attempts Attempt 3  [Spouse on verbal release-no answer]           Ivy Black RN    3/17/2023, 09:15 EDT

## 2023-04-05 RX ORDER — FUROSEMIDE 40 MG/1
TABLET ORAL
Qty: 30 TABLET | Refills: 1 | Status: SHIPPED | OUTPATIENT
Start: 2023-04-05

## 2023-04-12 NOTE — THERAPY TREATMENT NOTE
Inpatient Rehabilitation - Occupational Therapy Treatment Note    [facility]     Patient Name: Duane Mark Witten  : 1955  MRN: [id number]    Today's Date: 2018                 Admit Date: 2018      Visit Dx:    ICD-10-CM ICD-9-CM   1. Cervical stenosis of spinal canal M48.02 723.0   2. Weakness of both lower extremities R29.898 729.89   3. Paresthesia of both upper extremities R20.2 782.0    M79.601 729.5    M79.602    4. Morbid obesity with BMI of 40.0-44.9, adult (CMS/Formerly [facility]) E66.01 278.01    Z68.41 V85.41   5. Impaired functional mobility, balance, gait, and endurance Z74.09 V49.89   6. Functional gait abnormality R26.89 781.2       Patient Active Problem List   Diagnosis   • YANELI (generalized anxiety disorder)   • ADD (attention deficit disorder)   • Depression   • Diabetes type 2, controlled (CMS/Formerly [facility])   • ED (erectile dysfunction) of non-organic origin   • HLD (hyperlipidemia)   • Essential hypertension   • Arthritis   • Psoriasis   • Testosterone deficiency   • Primary insomnia   • Weakness of both lower extremities   • Paresthesia of both upper extremities   • Morbid obesity with BMI of 40.0-44.9, adult (CMS/Formerly [facility])   • Adverse effect of corticosteroids   • Type 2 diabetes mellitus with hyperglycemia (CMS/Formerly [facility])   • Cervical stenosis of spinal canal   • Edema of cervical spinal cord (CMS/Formerly [facility])   • Postoperative atrial fibrillation (CMS/Formerly [facility])   • Cervical myelopathy (CMS/Formerly [facility])         Therapy Treatment    IRF Treatment Summary     Row Name 18 1503 18 1209 18 0900       Evaluation/Treatment Time and Intent    Subjective Information  complains of;pain;fatigue  -AF  complains of;pain;fatigue  -AF  complains of;pain  -EE    Existing Precautions/Restrictions  fall;spinal brace for comfort  -AF  fall;spinal brace for comfort  -AF  fall;spinal  -EE    Document Type  therapy note (daily note)  -AF  therapy note (daily note)  -AF  therapy note (daily note)  -EE    Mode of Treatment   occupational therapy  -AF  occupational therapy  -AF  physical therapy  -EE    Patient/Family Observations  sitting up in recliner chair   -AF  sitting up in bed in room  -AF  Pt up in BR prior to am session  -EE    Recorded by [AF] Lolis Cooley OTR [AF] Lolis Cooley OTR [EE] Abraham Cathleen, PT    Row Name 12/07/18 1503 12/07/18 1209 12/07/18 0900       Cognition/Psychosocial- PT/OT    Affect/Mental Status (Cognitive)  WFL  -AF  WFL  -AF  WFL  -EE    Orientation Status (Cognition)  oriented x 4  -AF  oriented x 4  -AF  oriented x 4  -EE    Follows Commands (Cognition)  WFL  -AF  WFL  -AF  WFL  -EE    Personal Safety Interventions  fall prevention program maintained;gait belt;nonskid shoes/slippers when out of bed  -AF  fall prevention program maintained;gait belt;nonskid shoes/slippers when out of bed  -AF  fall prevention program maintained;gait belt;muscle strengthening facilitated;nonskid shoes/slippers when out of bed;supervised activity  -EE    Recorded by [AF] Lolis Cooley OTR [AF] Lolis Cooley OTR [EE] Cathleen Rosario, PT    Row Name 12/07/18 1209 12/07/18 0900          Bed Mobility Assessment/Treatment    Supine-Sit [place] (Bed Mobility)  contact guard;verbal cues  -AF  contact guard;verbal cues  -EE     Sit-Supine [place] (Bed Mobility)  --  contact guard;verbal cues  -EE     Assistive Device (Bed Mobility)  --  head of bed elevated  -EE     Comment (Bed Mobility)  --  performed on mat table without bed rails; head elevated on foam wedge  -EE     Recorded by [AF] Lolis Cooley OTR [EE] Cathleen Rosario, PT     Row Name 12/07/18 1503 12/07/18 1209          Transfer Assessment/Treatment    Comment (Transfers)  transfer from recliner chair to w/c with RWX with MIN A  -AF  sit to stand at sink with MIN A, decreased propreception with coming to stand pushes legs against w/c and will move w/c not realizing it  -AF     Recorded by [AF] Lolis Cooley OTR [AF] Lolis Cooley,  OTR     Row Name 12/07/18 1209 12/07/18 0900          Bed-Chair Transfer    Bed-Chair [place] (Transfers)  minimum assist (75% patient effort);verbal cues  -AF  minimum assist (75% patient effort);verbal cues  -EE     Assistive Device (Bed-Chair Transfers)  wheelchair;walker, front-wheeled  -AF  walker, front-wheeled  -EE     Recorded by [AF] Lolis Cooley, OTR [EE] Cathleen Rosario, PT     Row Name 12/07/18 1503 12/07/18 0900          Chair-Bed Transfer    Chair-Bed [place] (Transfers)  minimum assist (75% patient effort);verbal cues  -AF  minimum assist (75% patient effort);verbal cues  -EE     Assistive Device (Chair-Bed Transfers)  walker, front-wheeled;wheelchair  -AF  walker, front-wheeled  -EE     Recorded by [AF] Lolis Cooley, OTR [EE] Cathleen Rosario, PT     Row Name 12/07/18 0900             Sit-Stand Transfer    Sit-Stand [place] (Transfers)  minimum assist (75% patient effort)  -EE      Assistive Device (Sit-Stand Transfers)  walker, front-wheeled  -EE      Recorded by [EE] Cathleen Rosario, PT      Row Name 12/07/18 0900             Stand-Sit Transfer    Stand-Sit [place] (Transfers)  minimum assist (75% patient effort);verbal cues  -EE      Assistive Device (Stand-Sit Transfers)  walker, front-wheeled  -EE      Recorded by [EE] Cathleen Rosario, PT      Row Name 12/07/18 0900             Gait/Stairs Assessment/Training    [place] Level (Gait)  minimum assist (75% patient effort);verbal cues  -EE      Assistive Device (Gait)  walker, front-wheeled  -EE      Distance in Feet (Gait)  80 x 3  -EE      Pattern (Gait)  step-through  -EE      Deviations/Abnormal Patterns (Gait)  ataxic;sarah decreased;stride length decreased  -EE      Bilateral Gait Deviations  heel strike decreased;forward flexed posture  -EE      Right Sided Gait Deviations  foot drop/toe drag slight foot drag at times  -EE      Recorded by [EE] Cathleen Rosario, PT      Row Name 12/07/18 1209             Bathing  Assessment/Treatment    Bathing [place] Level  bathing skills;minimum assist (75% patient effort);verbal cues  -AF      Bathing Position  supported sitting;supported standing  -AF      Bathing Setup Assistance  obtain supplies;adjust water temperature  -AF      Comment (Bathing)  at sink  -AF      Recorded by [AF] Lolis Cooley, MAYOR      Row Name 12/07/18 1209             Upper Body Dressing Assessment/Treatment    Upper Body Dressing Task  upper body dressing skills;minimum assist (75% or more patient effort);verbal cues  -AF      Upper Body Dressing Position  supported sitting  -AF      Set-up Assistance (Upper Body Dressing)  obtain clothing  -AF      Comment (Upper Body Dressing)  w/c level  -AF      Recorded by [AF] Lolis Cooley, OTR      Row Name 12/07/18 1209             Lower Body Dressing Assessment/Treatment    Lower Body Dressing [place] Level  doff;don;pants/bottoms;shoes/slippers;socks;underwear;moderate assist (50% patient effort);verbal cues  -AF      Lower Body Dressing Position  supported sitting;supported standing  -AF      Lower Body Dressing Setup Assistance  obtain clothing  -AF      Comment (Lower Body Dressing)  LH shoe horn  -AF      Recorded by [AF] Lolis Cooley, OTR      Row Name 12/07/18 1209             Grooming Assessment/Treatment    Grooming [place] Level  grooming skills;minimum assist (75% patient effort);supervision  -AF      Grooming Position  sink side;supported sitting  -AF      Grooming Setup Assistance  obtain supplies  -AF      Comment (Grooming)  MIN to comb hair   -AF      Recorded by [AF] Lolis Cooley, OTR      Row Name 12/07/18 1503 12/07/18 1209          Fine Motor Testing & Training    Comment, Fine Motor Coordination  rest breaks required through out tasks, FMC task mainpulating with golf tees, clothespins, in hand manipulating boggle letters all with MOD difficulty with decreased sensation vc's to use visual cue to increase accuracy.    -AF  theraputty manipulation [place] to increase sensory awareness and strength for ADL tasks   -AF     Recorded by [AF] Lolis Cooley OTR [AF] Llois Cooley OTR     Row Name 12/07/18 1503 12/07/18 1209 12/07/18 0900       Pain Scale: Numbers Pre/Post-Treatment    Pain Scale: Numbers, Pretreatment  4/10  -AF  4/10  -AF  4/10  -EE    Pain Scale: Numbers, Post-Treatment  4/10  -AF  4/10  -AF  --    Pain Location - Orientation  incisional  -AF  incisional  -AF  --    Pain Location  neck  -AF  neck  -AF  neck  -EE    Pre/Post Treatment Pain Comment  --  stated that he is very tired today  -AF  tolerated tx  -EE    Pain Intervention(s)  --  --  Repositioned;Medication (See MAR);Ambulation/increased activity  -EE    Recorded by [AF] Lolis Cooley OTR [AF] Lolis Cooley OTR [EE] Cathleen Rosario, PT    Row Name 12/07/18 0900             Balance    Balance  standing balance activity  -EE      Recorded by [EE] Cathleen Rosario, PT      Row Name 12/07/18 0900             Standing Balance Activity    Activities Performed (Standing, Balance Training)  standing reaching outside base of support  -EE      Support Needed for Balance (Standing, Balance Training)  uses at least one upper extremity for support;CGA  -EE      Comment (Standing, Balance Training)  reaching for rings w/B UE x 5 min; 1 UE supported on table  -EE      Recorded by [EE] Cathleen Rosario, HAILEY      Row Name 12/07/18 0900             Lower Extremity Seated Therapeutic Exercise    Performed, Seated Lower Extremity (Therapeutic Exercise)  hip flexion/extension;hip abduction/adduction;knee flexion/extension;ankle dorsiflexion/plantarflexion;LAQ (long arc quad), knee extension  -EE      Device, Seated Lower Extremity (Therapeutic Exercise)  elastic bands/tubing;free weights, cuff;small ball red tband, 1.5# weights  -EE      Exercise Type, Seated Lower Extremity (Therapeutic Exercise)  resistive exercise  -EE      Sets/Reps Detail, Seated Lower Extremity  (Therapeutic Exercise)  1/15  -EE      Recorded by [EE] Cathleen Rosario PT      Row Name 12/07/18 0900             Lower Extremity Supine Therapeutic Exercise    Performed, Supine Lower Extremity (Therapeutic Exercise)  hip abduction/adduction;SAQ (short arc quad) over bolster;ankle dorsiflexion/plantarflexion;heel slides;bridging (bilateral w/bolster)  -EE      Device, Supine Lower Extremity (Therapeutic Exercise)  elastic bands/tubing;free weights, cuff red tband, 1.5# weights  -EE      Exercise Type, Supine Lower Extremity (Therapeutic Exercise)  resistive exercise  -EE      Sets/Reps Detail, Supine Lower Extremity (Therapeutic Exercise)  1/15  -EE      Recorded by [EE] Cathleen Rosario PT      Row Name 12/07/18 1209             Neuromuscular Re-education    Comment (Neuromuscular Re-education)  table slides on flat and slightly angled surface shoulder flex/ext and IR/ER  -AF      Recorded by [AF] Lolis Cooley OTR      Row Name 12/07/18 1503 12/07/18 1209 12/07/18 0900       Positioning and Restraints    Pre-Treatment Position  sitting in chair/recliner  -AF  in bed  -AF  bathroom  -EE    Post Treatment Position  bed  -AF  wheelchair  -AF  wheelchair  -EE    In Bed  sitting EOB;exit alarm on;encouraged to call for assist;call light within reach  -AF  --  --    In Wheelchair  --  sitting;call light within reach;encouraged to call for assist;exit alarm on;notified nsg set up with lunch tray  -AF  sitting;call light within reach;encouraged to call for assist  -EE    Recorded by [AF] Lolis Cooley OTR [AF] Lolis Cooley OTR [EE] Cathleen Rosario PT      User Key  (r) = Recorded By, (t) = Taken By, (c) = Cosigned By    Initials Name Effective Dates    EE Cathleen Rosario PT 04/03/18 -     AF Lolis Cooley OTR 04/03/18 -           Wound 11/27/18 1318 Other (See comments) neck incision (Active)   Dressing Appearance intact;dry;open to air 12/6/2018  8:15 PM   Closure Liquid skin adhesive;Adhesive closure  strips 12/7/2018  7:19 AM   Periwound dry;intact 12/7/2018  7:19 AM   Periwound Temperature warm 12/6/2018  8:15 PM   Drainage Amount none 12/7/2018  7:19 AM   Dressing Care, Wound open to air 12/6/2018  8:15 PM         OT Recommendation and Plan                 OT IRF GOALS     Row Name 12/07/18 1216 12/01/18 1000          Transfer Goal 1 (OT-IRF)    Activity/Assistive Device (Transfer Goal 1, OT-IRF)  toilet;walk-in shower;shower chair;walker, rolling  -AF  toilet  -RP     [place] Level (Transfer Goal 1, OT-IRF)  minimum assist (75% or more patient effort);contact guard assist;verbal cues required  -AF  minimum assist (75% or more patient effort)  -RP     Time Frame (Transfer Goal 1, OT-IRF)  short term goal (STG)  -AF  short term goal (STG)  -RP     Progress/Outcomes (Transfer Goal 1, OT-IRF)  goal ongoing  -AF  goal ongoing  -RP        Transfer Goal 2 (OT-IRF)    Activity/Assistive Device (Transfer Goal 2, OT-IRF)  toilet;shower chair;walk-in shower;walker, rolling  -AF  toilet  -RP     [place] Level (Transfer Goal 2, OT-IRF)  supervision required  -AF  supervision required  -RP     Time Frame (Transfer Goal 2, OT-IRF)  long term goal (LTG)  -AF  long term goal (LTG)  -RP     Progress/Outcomes (Transfer Goal 2, OT-IRF)  goal ongoing  -AF  goal ongoing  -RP        Bathing Goal 1 (OT-IRF)    Activity/Device (Bathing Goal 1, OT-IRF)  bathing skills, all;grab bar/tub rail;hand-held shower spray hose;long-handled sponge;tub bench  -AF  bathing skills, all  -RP     [place] Level (Bathing Goal 1, OT-IRF)  minimum assist (75% or more patient effort);verbal cues required  -AF  moderate assist (50-74% patient effort)  -RP     Time Frame (Bathing Goal 1, OT-IRF)  short term goal (STG)  -AF  short term goal (STG)  -RP     Progress/Outcomes (Bathing Goal 1, OT-IRF)  goal ongoing  -AF  goal ongoing  -RP        Bathing Goal 2 (OT-IRF)    Activity/Device (Bathing Goal 2, OT-IRF)  bathing skills, all;grab bar/tub  rail;hand-held shower spray hose;tub bench;long-handled sponge  -AF  bathing skills, all  -RP     [place] Level (Bathing Goal 2, OT-IRF)  supervision required  -AF  supervision required;standby assist  -RP     Time Frame (Bathing Goal 2, OT-IRF)  long term goal (LTG)  -AF  long term goal (LTG)  -RP     Progress/Outcomes (Bathing Goal 2, OT-IRF)  goal ongoing  -AF  goal ongoing  -RP        UB Dressing Goal 1 (OT-IRF)    Activity/Device (UB Dressing Goal 1, OT-IRF)  upper body dressing  -AF  upper body dressing  -RP     [place] (UB Dress Goal 1, OT-IRF)  minimum assist (75% or more patient effort);verbal cues required  -AF  minimum assist (75% or more patient effort)  -RP     Time Frame (UB Dressing Goal 1, OT-IRF)  short term goal (STG)  -AF  short term goal (STG)  -RP     Progress/Outcomes (UB Dressing Goal 1, OT-IRF)  goal ongoing  -AF  goal ongoing  -RP        UB Dressing Goal 2 (OT-IRF)    Activity/Device (UB Dressing Goal 2, OT-IRF)  upper body dressing  -AF  upper body dressing  -RP     [place] (UB Dress Goal 2, OT-IRF)  supervision required  -AF  supervision required  -RP     Time Frame (UB Dressing Goal 2, OT-IRF)  long term goal (LTG)  -AF  long term goal (LTG)  -RP     Progress/Outcomes (UB Dressing Goal 2, OT-IRF)  goal ongoing  -AF  goal ongoing  -RP        LB Dressing Goal 1 (OT-IRF)    Activity/Device (LB Dressing Goal 1, OT-IRF)  lower body dressing;long handled shoe horn;reacher;elastic laces  -AF  lower body dressing  -RP     [place] (LB Dressing Goal 1, OT-IRF)  moderate assist (50-74% patient effort);verbal cues required;minimum assist (75% or more patient effort)  -AF  maximum assist (25-49% patient effort)  -RP     Time Frame (LB Dressing Goal 1, OT-IRF)  short term goal (STG)  -AF  short term goal (STG)  -RP     Progress/Outcomes (LB Dressing Goal 1, OT-IRF)  goal ongoing  -AF  goal ongoing  -RP        LB Dressing Goal 2 (OT-IRF)    Activity/Device (LB Dressing Goal 2,  OT-IRF)  lower body dressing;long handled shoe horn;elastic laces;reacher  -AF  lower body dressing  -RP     [place] (LB Dressing Goal 2, OT-IRF)  standby assist;verbal cues required  -AF  supervision required;standby assist  -RP     Time Frame (LB Dressing Goal 2, OT-IRF)  long term goal (LTG)  -AF  long term goal (LTG)  -RP     Progress/Outcomes (LB Dressing Goal 2, OT-IRF)  goal ongoing  -AF  goal ongoing  -RP        Grooming Goal 1 (OT-IRF)    Activity/Device (Grooming Goal 1, OT-IRF)  grooming skills, all  -AF  grooming skills, all  -RP     [place] (Grooming Goal 1, OT-IRF)  minimum assist (75% or more patient effort);verbal cues required  -AF  minimum assist (75% or more patient effort)  -RP     Time Frame (Grooming Goal 1, OT-IRF)  short term goal (STG)  -AF  short term goal (STG)  -RP     Progress/Outcomes (Grooming Goal 1, OT-IRF)  goal ongoing  -AF  goal ongoing  -RP        Grooming Goal 2 (OT-IRF)    Activity/Device (Grooming Goal 2, OT-IRF)  grooming skills, all  -AF  grooming skills, all  -RP     [place] (Grooming Goal 2, OT-IRF)  supervision required  -AF  supervision required  -RP     Time Frame (Grooming Goal 2, OT-IRF)  long term goal (LTG)  -AF  long term goal (LTG)  -RP     Progress/Outcomes (Grooming Goal 2, OT-IRF)  goal ongoing  -AF  goal ongoing  -RP        Toileting Goal 1 (OT-IRF)    Activity/Device (Toileting Goal 1, OT-IRF)  toileting skills, all;grab bar/safety frame;raised toilet seat  -AF  toileting skills, all  -RP     [place] Level (Toileting Goal 1, OT-IRF)  moderate assist (50-74% patient effort);verbal cues required  -AF  maximum assist (25-49% patient effort)  -RP     Time Frame (Toileting Goal 1, OT-IRF)  short term goal (STG)  -AF  short term goal (STG)  -RP     Progress/Outcomes (Toileting Goal 1, OT-IRF)  goal ongoing  -AF  goal ongoing  -RP        Toileting Goal 2 (OT-IRF)    Activity/Device (Toileting Goal 2, OT-IRF)  toileting skills, all;grab bar/safety  frame;raised toilet seat  -AF  toileting skills, all  -RP     [place] Level (Toileting Goal 2, OT-IRF)  supervision required;verbal cues required  -AF  supervision required;standby assist  -RP     Time Frame (Toileting Goal 2, OT-IRF)  long term goal (LTG)  -AF  long term goal (LTG)  -RP     Progress/Outcomes (Toileting Goal 2, OT-IRF)  goal ongoing  -AF  goal ongoing  -RP        ROM Goal 1 (OT-IRF)    ROM Goal 1 (OT-IRF)  pt will increase B UE ROM to approx. 3/4 shoulder flexion to assist with ADL tasks   -AF  Pt will increase B UE ROM to approx. 3/4 shoulder flexion to assist w/ ADLs.  -RP     Time Frame (ROM Goal 1, OT-IRF)  long term goal (LTG)  -AF  long term goal (LTG)  -RP     Progress/Outcomes (ROM Goal 1, OT-IRF)  goal ongoing  -AF  goal ongoing  -RP       User Key  (r) = Recorded By, (t) = Taken By, (c) = Cosigned By    Initials Name Provider Type    AF Lolis Cooley OTR Occupational Therapist    RP Precious De Leon OT Occupational Therapist                 Time Calculation:     Time Calculation- OT     Row Name 12/07/18 1508 12/07/18 1219          Time Calculation- OT    OT Start Time  1400  -AF  1030  -AF     OT Stop Time  1430  -AF  1130  -AF     OT Time Calculation (min)  30 min  -AF  60 min  -AF       User Key  (r) = Recorded By, (t) = Taken By, (c) = Cosigned By    Initials Name Provider Type    AF Lolis Cooley OTJEANINE Occupational Therapist          Therapy Suggested Charges     Code   Minutes Charges    None              Therapy Charges for Today     Code Description Service Date Service Provider Modifiers Qty    39986512686 HC OT SELF CARE/MGMT/TRAIN EA 15 MIN 12/6/2018 Lolis Cooley OTR GO 4    24771442141 HC OT SELF CARE/MGMT/TRAIN EA 15 MIN 12/7/2018 Lolis Cooley OTR GO 2    91527620573 HC OT NEUROMUSC RE EDUCATION EA 15 MIN 12/7/2018 Lolis Cooley OTR GO 1    04465200035 HC OT THER PROC EA 15 MIN 12/7/2018 Lolis Cooley OTR GO 1    [id number]  OT NEUROMUSC  RE EDUCATION EA 15 MIN 12/7/2018 Lolis Cooley, OTR GO 1    [id number]  OT THER PROC EA 15 MIN 12/7/2018 Lolis Cooley, OTR GO 1                   oLlis Cooley, OTR  12/7/2018

## 2023-04-28 ENCOUNTER — TELEPHONE (OUTPATIENT)
Dept: CARDIOLOGY | Facility: CLINIC | Age: 68
End: 2023-04-28
Payer: MEDICARE

## 2023-04-28 RX ORDER — FUROSEMIDE 40 MG/1
TABLET ORAL
Qty: 30 TABLET | Refills: 0 | Status: SHIPPED | OUTPATIENT
Start: 2023-04-28 | End: 2023-05-03 | Stop reason: SDUPTHER

## 2023-04-28 NOTE — TELEPHONE ENCOUNTER
I have left a message for patient to return my call to schedule a hospital follow-up appointment with KAYLEN Jackson.  Pharmacy is calling for refills.  I also left a message on his wife's phone number.    Office Scheduling-please try again.  Thank you

## 2023-04-28 NOTE — TELEPHONE ENCOUNTER
04.28.23  I called this patient and left a voicemail to return call to schedule this appointment.    Thanks   Erika

## 2023-04-30 DIAGNOSIS — I10 ESSENTIAL HYPERTENSION: ICD-10-CM

## 2023-05-01 RX ORDER — METOPROLOL SUCCINATE 50 MG/1
TABLET, EXTENDED RELEASE ORAL
Qty: 90 TABLET | Refills: 3 | Status: SHIPPED | OUTPATIENT
Start: 2023-05-01 | End: 2023-05-03 | Stop reason: SDUPTHER

## 2023-05-02 ENCOUNTER — OFFICE VISIT (OUTPATIENT)
Dept: CARDIOLOGY | Facility: CLINIC | Age: 68
End: 2023-05-02
Payer: MEDICARE

## 2023-05-02 VITALS
OXYGEN SATURATION: 96 % | HEIGHT: 75 IN | SYSTOLIC BLOOD PRESSURE: 118 MMHG | HEART RATE: 67 BPM | DIASTOLIC BLOOD PRESSURE: 72 MMHG | BODY MASS INDEX: 39.17 KG/M2 | WEIGHT: 315 LBS

## 2023-05-02 DIAGNOSIS — I27.20 PULMONARY HYPERTENSION: ICD-10-CM

## 2023-05-02 DIAGNOSIS — I48.21 PERMANENT ATRIAL FIBRILLATION: ICD-10-CM

## 2023-05-02 DIAGNOSIS — I10 ESSENTIAL HYPERTENSION: ICD-10-CM

## 2023-05-02 DIAGNOSIS — I50.33 ACUTE ON CHRONIC DIASTOLIC CONGESTIVE HEART FAILURE: Primary | ICD-10-CM

## 2023-05-02 DIAGNOSIS — G47.33 OSA (OBSTRUCTIVE SLEEP APNEA): ICD-10-CM

## 2023-05-02 DIAGNOSIS — I38 VALVULAR HEART DISEASE: ICD-10-CM

## 2023-05-02 DIAGNOSIS — F98.8 ATTENTION DEFICIT DISORDER (ADD) WITHOUT HYPERACTIVITY: ICD-10-CM

## 2023-05-02 DIAGNOSIS — R53.83 OTHER FATIGUE: ICD-10-CM

## 2023-05-02 NOTE — PROGRESS NOTES
Date of Office Visit: 2023  Encounter Provider: KAYLEN Correa  Place of Service: [facility] CARDIOLOGY  Patient Name: Duane Mark Witten  :1955  Primary Cardiologist: Dr. Valeria Bravo     Chief Complaint   Patient presents with   • Diastolic Heart Failure   • Hospital Follow Up Visit   :     HPI: Duane Mark Witten is a 68 y.o. male who presents today for hospital follow-up visit. I reviewed his medical records.    He has been diagnosed with hypertension, hyperlipidemia, obesity, type 2 diabetes, and obstructive sleep apnea (compliant with BiPAP machine).    In 2018, he underwent cervical surgery and postoperatively developed asymptomatic atrial fibrillation.  Echo showed EF of 47%, mild LVH, mild to moderate aortic stenosis, mitral valve prolapse, and mild mitral regurgitation.  In 2019, cardiac PET scan showed possible ischemia felt to be a low risk study and medical treatment was recommended.  He was not felt to be a good candidate for electrical cardioversion.    In 2021, carotid duplex showed mid/right/left carotid plaque and proximal left/right plaque.    In 2023, repeat echocardiogram showed normal EF, moderate LVH, left atrium moderately dilated, possible bicuspid aortic valve, moderate aortic stenosis, mitral valve not well visualized, trace tricuspid regurgitation, and moderate pulmonary hypertension.    In 2023, he presented to the [facility] ED with shortness of breath, cough, and orthopnea.  He underwent diuresis with IV furosemide.  He said he was having trouble affording the rivaroxaban and we would readdress outpatient.  He was to follow-up in our office in April.    He presents today for follow-up visit and his wife is accompanying him.  He explains that he was only taking his furosemide as needed before his hospitalization.  He was not aware that he should have been taking it daily (we spoke about this back  "in January 2023).  Now that he is taking the diuretic, he denies any further heart failure symptoms.  He was told that his potassium was low by his PCP and will be having blood work completed tomorrow.      He reports some mild lightheadedness with position changes and admits that he should be drinking more water.  He has some trouble sleeping.  He denies chest pain, shortness of air, palpitations, edema, syncope, or bleeding.  He is compliant with his BiPAP machine.  He reports that he has ADD and does not feel that the Strattera is very effective.  He was wondering if he could go back on Adderall which was more effective for him. He also says his mother had a TAVR procedure.  He would like to try decreasing the metoprolol dosage.      Past Medical History:   Diagnosis Date   • ADD (attention deficit disorder)    • Anemia     intermittent   • Anesthesia complication     STATES HE IS SLOW TO WAKE UP   • Atrial fibrillation     XARELTO   • Bronchitis due to human metapneumovirus (hMPV) 3/14/2023   • Chronic anticoagulation     XARELTO   • Chronic pain     \"ALL OVER\" ARTHRITIS   • Depression    • Difficulty swallowing solids     AT TIMES   • Dilated cardiomyopathy    • ED (erectile dysfunction)    • Elevated cholesterol    • Essential hypertension    • YANELI (generalized anxiety disorder)    • Generalized weakness     STATES LEGS AND ARMS WEAK-TIRES EASILY   • HLD (hyperlipidemia)    • Leukocytosis    • Morbid obesity    • CAMERON (obstructive sleep apnea)     BiPAP nightly   • Osteoarthrosis    • PONV (postoperative nausea and vomiting)    • Psoriasis    • Pulmonary hypertension    • Testosterone deficiency    • Type 2 diabetes mellitus    • Unsteady gait     USES CANE       Past Surgical History:   Procedure Laterality Date   • ANTERIOR CHANNEL VERTEBRECTOMY/CORPECTOMY Bilateral 11/27/2018    Procedure: C4, C5, C6 ANTERIOR CERVICAL CORPECTOMY;  Surgeon: Chirag Mrakham MD;  Location: [facility] OR;  Service: " "Neurosurgery   • BIOPSY / EXCISION / DISSECTION AXILLARY NODE      Lymph Node testing for cancer    • COLONOSCOPY      10+ years; normal   • COLONOSCOPY N/A 2022    Procedure: COLONOSCOPY INTO CECUM AND TI WITH COLD BX AND COLD SNARE POLYPECTOMIES;  Surgeon: Timothy Rojas MD;  Location: [facility] ENDOSCOPY;  Service: Gastroenterology;  Laterality: N/A;  PRE: ANEMIA  POST: POLYPS, DIVERTICULOSIS, HEMORRHOIDS   • ENDOSCOPY N/A 2022    Procedure: ESOPHAGOGASTRODUODENOSCOPY WITH BIOPSIES;  Surgeon: Timothy Rojas MD;  Location: [facility] ENDOSCOPY;  Service: Gastroenterology;  Laterality: N/A;  PRE: DYSPHAGIA  POST: ESOPHAGITIS   • LASIK Bilateral    • PARATHYROIDECTOMY         Social History     Socioeconomic History   • Marital status:      Spouse name: Mindy   Tobacco Use   • Smoking status: Former     Packs/day: 1.00     Years: 5.00     Pack years: 5.00     Types: Cigarettes     Quit date:      Years since quittin.3   • Smokeless tobacco: Never   Vaping Use   • Vaping Use: Never used   Substance and Sexual Activity   • Alcohol use: Yes     Comment: RARELY   • Drug use: No   • Sexual activity: Defer       Family History   Problem Relation Age of Onset   • Anxiety disorder Mother    • Bipolar disorder Mother    • Depression Mother    • Glaucoma Mother    • Macular degeneration Mother    • Lupus Mother    • Aortic stenosis Mother         s/p TAVR at age 87   • Cancer Father    • Diabetes Father    • Heart disease Father    • Hypertension Father    • Stroke Father    • Lupus Other         grandmother   • Malig Hyperthermia Neg Hx        The following portion of the patient's history were reviewed and updated as appropriate: past medical history, past surgical history, past social history, past family history, allergies, current medications, and problem list.    ROS    Allergies   Allergen Reactions   • Aspartame Other (See Comments)     \"joints lock up\"         Current Outpatient " Medications:   •  atomoxetine (Strattera) 100 MG capsule, Take 1 capsule by mouth Daily., Disp: 90 capsule, Rfl: 3  •  atorvastatin (LIPITOR) 20 MG tablet, Take 1 tablet by mouth Daily., Disp: 90 tablet, Rfl: 3  •  baclofen (LIORESAL) 10 MG tablet, Take 1 tablet by mouth 2 (Two) Times a Day As Needed., Disp: , Rfl:   •  Blood Glucose Monitoring Suppl (Accu-Chek Leilani Plus) w/Device kit, MONITOR BLOOD SUGAR THREE TIMES DAILY, Disp: 1 kit, Rfl: 0  •  cetirizine (zyrTEC) 10 MG tablet, TAKE 1 TABLET BY MOUTH EVERY DAY (Patient taking differently: Take 1 tablet by mouth Daily.), Disp: 90 tablet, Rfl: 0  •  Dulaglutide (Trulicity) 1.5 MG/0.5ML solution pen-injector, Inject 1.5 mg under the skin into the appropriate area as directed 1 (One) Time Per Week., Disp: 2 mL, Rfl: 11  •  DULoxetine (CYMBALTA) 60 MG capsule, Take 1 capsule by mouth 2 (Two) Times a Day., Disp: 180 capsule, Rfl: 3  •  ferrous sulfate 325 (65 FE) MG tablet, Take 1 tablet by mouth Daily With Breakfast., Disp: , Rfl:   •  furosemide (LASIX) 40 MG tablet, Take 1 tablet by mouth Daily., Disp: 90 tablet, Rfl: 3  •  glucose (DEX4) 4 GM chewable tablet, Chew 4 tablets As Needed for Low Blood Sugar., Disp: 120 tablet, Rfl: 0  •  HYDROcodone-acetaminophen (NORCO)  MG per tablet, Take 1 tablet by mouth Every 6 (Six) Hours As Needed for Moderate Pain. PT STATES TAKES ONE IN THE MORNING AND IN THE AFTERNOON, AND AGAIN AT BEDTIME MOST DAYS, Disp: , Rfl:   •  irbesartan (AVAPRO) 300 MG tablet, Take 1 tablet by mouth Every Morning., Disp: 90 tablet, Rfl: 3  •  metFORMIN ER (GLUCOPHAGE-XR) 500 MG 24 hr tablet, Take 2 tablets by mouth 2 (Two) Times a Day., Disp: 360 tablet, Rfl: 3  •  metoprolol succinate XL (TOPROL-XL) 50 MG 24 hr tablet, TAKE 1 TABLET BY MOUTH EVERYDAY AT BEDTIME, Disp: 90 tablet, Rfl: 3  •  pioglitazone (ACTOS) 15 MG tablet, Take 1 tablet by mouth Daily., Disp: 90 tablet, Rfl: 3  •  rivaroxaban (XARELTO) 20 MG tablet, Take 1 tablet by mouth  "Daily With Dinner. Indications: Atrial Fibrillation, Disp: 90 tablet, Rfl: 3  •  tamsulosin (FLOMAX) 0.4 MG capsule 24 hr capsule, Take 2 capsules by mouth Daily., Disp: 180 capsule, Rfl: 3  •  traZODone (DESYREL) 150 MG tablet, Take 1 tablet by mouth At Night As Needed for Sleep., Disp: 90 tablet, Rfl: 3         Objective:     Vitals:    05/02/23 1425   BP: 118/72   BP Location: Left arm   Patient Position: Sitting   Cuff Size: Adult   Pulse: 67   SpO2: 96%   Weight: (!) 147 kg (323 lb)   Height: 190.5 cm (75\")     Body mass index is 40.37 kg/m².    PHYSICAL EXAM:    Vitals Reviewed.   General Appearance: No acute distress, well developed and well nourished.   Eyes: Glasses.   HENT: No hearing loss noted.    Respiratory: No signs of respiratory distress. Respiration rhythm and depth normal.  Clear to auscultation.   Cardiovascular:  Jugular Venous Pressure: Normal  Heart Rate and Rhythm: Normal, Heart Sounds: Normal S1 and S2. No S3 or S4 noted.  Murmurs: No murmurs noted. No rubs, thrills, or gallops.   Lower Extremities: No edema noted.  Musculoskeletal: Normal movement of extremities.  Skin: General appearance normal.    Psychiatric: Patient alert and oriented to person, place, and time. Speech and behavior appropriate. Normal mood and affect.       ECG 12 Lead    Date/Time: 5/2/2023 2:22 PM  Performed by: Theresa Henderson APRN  Authorized by: Theresa Henderson APRN   Comparison: compared with previous ECG from 3/14/2023  Similar to previous ECG  Rhythm: atrial fibrillation  Rate: normal  BPM: 67  Conduction: conduction normal  ST Segments: ST segments normal  T Waves: T waves normal  QRS axis: normal    Clinical impression: abnormal EKG              Assessment:       Diagnosis Plan   1. Acute on chronic diastolic congestive heart failure        2. Valvular heart disease        3. Other fatigue        4. Permanent atrial fibrillation (HCC)  ECG 12 Lead      5. Pulmonary hypertension        6. Essential " hypertension        7. CAMERON (obstructive sleep apnea)        8. Attention deficit disorder (ADD) without hyperactivity               Plan:       1.  Acute on chronic Diastolic Heart Failure: History of LV dysfunction and EF is now normal.  Recently hospitalized for an acute exacerbation.  Diuresed with IV furosemide.  Euvolemic today and weights have been stable.  Continue with furosemide daily.  I have encouraged him to have good water intake.  He will be having a repeat BMP at his PCP office in the near future.  Check daily weights and call with a weight gain of 2 to 3 pounds overnight or 4 to 5 pounds in a week.  Follow low-sodium diet.    2. Valvular Heart Disease: Possible bicuspid aortic valve, moderate aortic stenosis, mitral valve prolapse, and mild mitral regurgitation.    3. Fatigue: Decrease metoprolol to half tablet daily and take at nighttime.    4.  Atrial Fibrillation: Diagnosed in November 2018.  Rate controlled.  Continue rivaroxaban.    5.  Moderate pulmonary hypertension per echocardiogram 1/2023.  Continue furosemide.    6.  Hypertension: Blood pressure normal.    7.  Obstructive Sleep Apnea: Compliant with BiPAP machine.    8.  ADD: He is currently on Strattera and did better on Adderall.  I told him I think it is fine from a heart standpoint to take Adderall, we will just have to monitor his heart rates closely.  He will discuss with his PCP.    9.  I recommended a 3-month follow-up visit with Dr. Bravo.    As always, it has been a pleasure to participate in your patient's care. Thank you.         Sincerely,         KAYLEN Wiseman  [facility] Cardiology      · Dictated utilizing Dragon Dictation  · COVID-19 Precautions - Patient was compliant in wearing a mask. When I saw the patient, I used appropriate personal protective equipment (PPE) including mask and eye shield (standard procedure).  Additionally, I used gown and gloves if indicated.  Hand hygiene was  completed before and after seeing the patient.  · I spent 31 minutes reviewing her medical records/testing/previous office notes/labs, face-to-face interaction with patient, physical examination, formulating the plan of care, and discussion of plan of care with patient.

## 2023-05-03 PROBLEM — I42.0 DILATED CARDIOMYOPATHY: Status: RESOLVED | Noted: 2019-03-19 | Resolved: 2023-05-03

## 2023-05-03 PROBLEM — J40 BRONCHITIS DUE TO HUMAN METAPNEUMOVIRUS (HMPV): Status: RESOLVED | Noted: 2023-03-14 | Resolved: 2023-05-03

## 2023-05-03 PROBLEM — R29.898 UPPER EXTREMITY WEAKNESS: Status: RESOLVED | Noted: 2020-12-09 | Resolved: 2023-05-03

## 2023-05-03 PROBLEM — I50.43 ACUTE ON CHRONIC COMBINED SYSTOLIC AND DIASTOLIC CONGESTIVE HEART FAILURE: Status: ACTIVE | Noted: 2023-05-03

## 2023-05-03 PROBLEM — B97.81 BRONCHITIS DUE TO HUMAN METAPNEUMOVIRUS (HMPV): Status: RESOLVED | Noted: 2023-03-14 | Resolved: 2023-05-03

## 2023-05-03 PROBLEM — M25.511 ACUTE PAIN OF RIGHT SHOULDER: Status: RESOLVED | Noted: 2021-07-20 | Resolved: 2023-05-03

## 2023-05-03 PROBLEM — Z12.11 ENCOUNTER FOR SCREENING FOR MALIGNANT NEOPLASM OF COLON: Status: RESOLVED | Noted: 2022-06-10 | Resolved: 2023-05-03

## 2023-05-03 PROBLEM — I50.9 ACUTE ON CHRONIC CONGESTIVE HEART FAILURE, UNSPECIFIED HEART FAILURE TYPE: Status: RESOLVED | Noted: 2023-03-14 | Resolved: 2023-05-03

## 2023-05-03 RX ORDER — METOPROLOL SUCCINATE 50 MG/1
25 TABLET, EXTENDED RELEASE ORAL
Qty: 45 TABLET | Refills: 0
Start: 2023-05-03

## 2023-05-03 RX ORDER — FUROSEMIDE 40 MG/1
40 TABLET ORAL DAILY
Qty: 90 TABLET | Refills: 3 | Status: SHIPPED | OUTPATIENT
Start: 2023-05-03

## 2023-05-23 ENCOUNTER — TELEPHONE (OUTPATIENT)
Dept: CARDIOLOGY | Facility: CLINIC | Age: 68
End: 2023-05-23
Payer: MEDICARE

## 2023-05-23 DIAGNOSIS — I10 ESSENTIAL HYPERTENSION: ICD-10-CM

## 2023-05-23 NOTE — TELEPHONE ENCOUNTER
I recently saw him in the office.  I recommended furosemide daily.  He was having fatigue so I recommended that he decrease metoprolol to half tablet at nighttime.    I spoke with patient via phone.  He is feeling much better is taking metoprolol 25 mg at nighttime (previously was on 50 mg).  He denies any symptoms or concerns.  Continue current medications and call with any concerns.

## 2023-05-26 RX ORDER — METOPROLOL SUCCINATE 25 MG/1
25 TABLET, EXTENDED RELEASE ORAL
Qty: 30 TABLET | Refills: 0
Start: 2023-05-26

## 2023-08-16 ENCOUNTER — OFFICE VISIT (OUTPATIENT)
Dept: CARDIOLOGY | Facility: CLINIC | Age: 68
End: 2023-08-16
Payer: MEDICARE

## 2023-08-16 VITALS
SYSTOLIC BLOOD PRESSURE: 160 MMHG | DIASTOLIC BLOOD PRESSURE: 90 MMHG | WEIGHT: 315 LBS | HEIGHT: 75 IN | HEART RATE: 66 BPM | BODY MASS INDEX: 39.17 KG/M2

## 2023-08-16 DIAGNOSIS — E78.2 MIXED HYPERLIPIDEMIA: ICD-10-CM

## 2023-08-16 DIAGNOSIS — I10 ESSENTIAL HYPERTENSION: ICD-10-CM

## 2023-08-16 DIAGNOSIS — G47.33 OSA (OBSTRUCTIVE SLEEP APNEA): ICD-10-CM

## 2023-08-16 DIAGNOSIS — E11.9 CONTROLLED TYPE 2 DIABETES MELLITUS WITHOUT COMPLICATION, WITHOUT LONG-TERM CURRENT USE OF INSULIN: ICD-10-CM

## 2023-08-16 DIAGNOSIS — I35.0 NONRHEUMATIC AORTIC VALVE STENOSIS: Primary | ICD-10-CM

## 2023-08-16 DIAGNOSIS — I48.21 PERMANENT ATRIAL FIBRILLATION: ICD-10-CM

## 2023-08-16 DIAGNOSIS — I50.32 CHRONIC HEART FAILURE WITH PRESERVED EJECTION FRACTION (HFPEF): ICD-10-CM

## 2023-08-16 DIAGNOSIS — E66.01 MORBID OBESITY: ICD-10-CM

## 2023-08-16 RX ORDER — CYCLOBENZAPRINE HCL 10 MG
10 TABLET ORAL 3 TIMES DAILY PRN
COMMUNITY
Start: 2023-08-14

## 2023-08-16 RX ORDER — SPIRONOLACTONE 25 MG/1
25 TABLET ORAL EVERY MORNING
Qty: 90 TABLET | Refills: 3 | Status: SHIPPED | OUTPATIENT
Start: 2023-08-16

## 2023-08-16 NOTE — PROGRESS NOTES
"Date of Office Visit: 2023  Encounter Provider: Valeria Bravo MD  Place of Service: [facility] CARDIOLOGY  Patient Name: Duane Mark Witten  :1955      Patient ID:  Duane Mark Witten is a 68 y.o. male is here for  followup for aortic stenosis, chronic HFpEF        History of Present Illness    He has a history of HTN, DM, obstructive sleep apnea (compliant with cpap), aortic stenosis, chronic HFpEF, medication noncompliance and obesity.      He presented to the emergency room on 18 with complaints of 3 weeks of progressive weakness to bilateral lower extremities as well as paraesthesias of the upper extremities. He began feeling his balance was off and that his legs were becoming weaker and \"just didn't want to work right.\"  He was found to have severe cervical stenosis with cord compression. He was seen by Dr. Markham and had underwent C4, C5, C6 anterior cervical corpectomy with cage and plate. During surgery HR was in the 110's. EKG was obtained in the recovery room and showed atrial fibrillation. He was completely asymptomatic.      Echo done 18 showed LVEF 47% with mild LVH and mild LVE, with mild to moderate AS, mild MR with mild anterior prolapse, mild LAE     stress PET scan was completed 19 and showed EF 36%, bilateral ventricular dilation, diffusely decreased tracer uptake in all wall segments on stress imaging and a more pronounced hypoperfusion of the mid anterior wall that was considered small, mild, and may represent ischemia.     Repeat echocardiogram done 2020 shows LVEF low normal with mild concentric left ventricular hypertrophy, moderate left atrial enlargement, bicuspid aortic valve with mild to moderate aortic stenosis..      He was hospitalized 3/14 - 3/15/2023 for short windedness.  He had not been taking his diuretics.  He was found to have acute on chronic HFpEF with metapneumovirus.  He had been compliant with CPAP.  " "Medications were adjusted and he was advised to follow-up in the office.  Labs done 3/14/2023 show high-sensitivity troponin 22, proBNP 2000 264, normal CMP, hemoglobin 10.9, otherwise normal CBC.    Echo done 1/18/2023 showed ejection fraction of 64% with moderate concentric left ventricle hypertrophy, moderate left atrial enlargement, bicuspid aortic valve with severe calcification and moderate aortic stenosis, trace tricuspid insufficiency, RVSP 46 mmHg, mitral valve was not well visualized.    Past Medical History:   Diagnosis Date    ADD (attention deficit disorder)     Anemia     intermittent    Anesthesia complication     STATES HE IS SLOW TO WAKE UP    Atrial fibrillation     XARELTO    Bronchitis due to human metapneumovirus (hMPV) 3/14/2023    Chronic anticoagulation     XARELTO    Chronic pain     \"ALL OVER\" ARTHRITIS    Depression     Difficulty swallowing solids     AT TIMES    Dilated cardiomyopathy     ED (erectile dysfunction)     Elevated cholesterol     Essential hypertension     YANELI (generalized anxiety disorder)     Generalized weakness     STATES LEGS AND ARMS WEAK-TIRES EASILY    HLD (hyperlipidemia)     Leukocytosis     Morbid obesity     CAMERON (obstructive sleep apnea)     BiPAP nightly    Osteoarthrosis     PONV (postoperative nausea and vomiting)     Psoriasis     Pulmonary hypertension     Testosterone deficiency     Type 2 diabetes mellitus     Unsteady gait     USES CANE         Past Surgical History:   Procedure Laterality Date    ANTERIOR CHANNEL VERTEBRECTOMY/CORPECTOMY Bilateral 11/27/2018    Procedure: C4, C5, C6 ANTERIOR CERVICAL CORPECTOMY;  Surgeon: Chirag Markham MD;  Location: [facility];  Service: Neurosurgery    BIOPSY / EXCISION / DISSECTION AXILLARY NODE  1994    Lymph Node testing for cancer     COLONOSCOPY      10+ years; normal    COLONOSCOPY N/A 8/29/2022    Procedure: COLONOSCOPY INTO CECUM AND TI WITH COLD BX AND COLD SNARE POLYPECTOMIES;  Surgeon: Bob " Timothy OLIVO MD;  Location: [facility] ENDOSCOPY;  Service: Gastroenterology;  Laterality: N/A;  PRE: ANEMIA  POST: POLYPS, DIVERTICULOSIS, HEMORRHOIDS    ENDOSCOPY N/A 8/29/2022    Procedure: ESOPHAGOGASTRODUODENOSCOPY WITH BIOPSIES;  Surgeon: Timothy Rojas MD;  Location: [facility] ENDOSCOPY;  Service: Gastroenterology;  Laterality: N/A;  PRE: DYSPHAGIA  POST: ESOPHAGITIS    LASIK Bilateral     PARATHYROIDECTOMY  2008       Current Outpatient Medications on File Prior to Visit   Medication Sig Dispense Refill    atomoxetine (Strattera) 100 MG capsule Take 1 capsule by mouth Daily. 90 capsule 3    atorvastatin (LIPITOR) 20 MG tablet Take 1 tablet by mouth Daily. 90 tablet 3    Blood Glucose Monitoring Suppl (Accu-Chek Leilani Plus) w/Device kit MONITOR BLOOD SUGAR THREE TIMES DAILY 1 kit 0    cetirizine (zyrTEC) 10 MG tablet TAKE 1 TABLET BY MOUTH EVERY DAY (Patient taking differently: Take 1 tablet by mouth Daily.) 90 tablet 0    cyclobenzaprine (FLEXERIL) 10 MG tablet Take 1 tablet by mouth 3 (Three) Times a Day As Needed.      Dulaglutide (Trulicity) 1.5 MG/0.5ML solution pen-injector Inject 1.5 mg under the skin into the appropriate area as directed 1 (One) Time Per Week. 2 mL 11    DULoxetine (CYMBALTA) 60 MG capsule Take 1 capsule by mouth 2 (Two) Times a Day. (Patient taking differently: Take 1 capsule by mouth Daily.) 180 capsule 3    ferrous sulfate 325 (65 FE) MG tablet Take 1 tablet by mouth Daily With Breakfast.      furosemide (LASIX) 40 MG tablet Take 1 tablet by mouth Daily. 90 tablet 3    glucose (DEX4) 4 GM chewable tablet Chew 4 tablets As Needed for Low Blood Sugar. 120 tablet 0    HYDROcodone-acetaminophen (NORCO)  MG per tablet Take 1 tablet by mouth Every 6 (Six) Hours As Needed for Moderate Pain. PT STATES TAKES ONE IN THE MORNING AND IN THE AFTERNOON, AND AGAIN AT BEDTIME MOST DAYS      irbesartan (AVAPRO) 300 MG tablet Take 1 tablet by mouth Every Morning. 90 tablet 3    metFORMIN ER  "(GLUCOPHAGE-XR) 500 MG 24 hr tablet Take 2 tablets by mouth 2 (Two) Times a Day. 360 tablet 3    metoprolol succinate XL (TOPROL-XL) 25 MG 24 hr tablet Take 1 tablet by mouth every night at bedtime. 30 tablet 0    pioglitazone (ACTOS) 15 MG tablet Take 1 tablet by mouth Daily. 90 tablet 3    rivaroxaban (XARELTO) 20 MG tablet Take 1 tablet by mouth Daily With Dinner. Indications: Atrial Fibrillation 90 tablet 3    tamsulosin (FLOMAX) 0.4 MG capsule 24 hr capsule Take 2 capsules by mouth Daily. 180 capsule 3    [DISCONTINUED] baclofen (LIORESAL) 10 MG tablet Take 1 tablet by mouth 2 (Two) Times a Day As Needed. (Patient not taking: Reported on 2023)      [DISCONTINUED] traZODone (DESYREL) 150 MG tablet Take 1 tablet by mouth At Night As Needed for Sleep. (Patient not taking: Reported on 2023) 90 tablet 3     No current facility-administered medications on file prior to visit.       Social History     Socioeconomic History    Marital status:      Spouse name: Mindy   Tobacco Use    Smoking status: Former     Packs/day: 1.00     Years: 5.00     Pack years: 5.00     Types: Cigarettes     Quit date:      Years since quittin.6     Passive exposure: Past    Smokeless tobacco: Never   Vaping Use    Vaping Use: Never used   Substance and Sexual Activity    Alcohol use: Yes     Comment: RARELY    Drug use: No    Sexual activity: Defer           ROS    Procedures    ECG 12 Lead    Date/Time: 2023 1:30 PM  Performed by: Valeria Bravo MD  Authorized by: Valeria Bravo MD   Comparison: compared with previous ECG   Similar to previous ECG  Rhythm: atrial fibrillation    Clinical impression: abnormal EKG            Objective:      Vitals:    23 1319   BP: 160/90   Pulse: 66   Weight: (!) 153 kg (337 lb)   Height: 190.5 cm (75\")     Body mass index is 42.12 kg/mý.    Vitals reviewed.   Constitutional:       General: Not in acute distress.     Appearance: Well-developed. Morbidly " obese. Not diaphoretic.   Eyes:      General: No scleral icterus.     Conjunctiva/sclera: Conjunctivae normal.   HENT:      Head: Normocephalic and atraumatic.   Neck:      Thyroid: No thyromegaly.      Vascular: No carotid bruit or JVD.      Lymphadenopathy: No cervical adenopathy.   Pulmonary:      Effort: Pulmonary effort is normal. No respiratory distress.      Breath sounds: Normal breath sounds. No wheezing. No rhonchi. No rales.   Chest:      Chest wall: Not tender to palpatation.   Cardiovascular:      Normal rate. Irregularly irregular rhythm.      Murmurs: There is a grade 3/6 harsh midsystolic murmur at the URSB, radiating to the neck.      No gallop.  No rub.   Pulses:     Intact distal pulses.      Carotid: 2+ bilaterally.     Radial: 2+ bilaterally.     Dorsalis pedis: 2+ bilaterally.     Posterior tibial: 2+ bilaterally.  Edema:     Peripheral edema absent.   Abdominal:      General: Bowel sounds are normal. There is no distension or abdominal bruit.      Palpations: Abdomen is soft. There is no abdominal mass.      Tenderness: There is no abdominal tenderness.   Musculoskeletal:         General: No deformity.      Extremities: No clubbing present.     Cervical back: Neck supple. Skin:     General: Skin is warm and dry. There is no cyanosis.      Coloration: Skin is not pale.      Findings: No rash.   Neurological:      Mental Status: Alert and oriented to person, place, and time.      Cranial Nerves: No cranial nerve deficit.   Psychiatric:         Judgment: Judgment normal.       Lab Review:       Assessment:      Diagnosis Plan   1. Nonrheumatic aortic valve stenosis  Adult Transthoracic Echo Complete W/ Cont if Necessary Per Protocol    Comprehensive Metabolic Panel    CBC & Differential    Lipid Panel    Magnesium    TSH    Uric Acid      2. Essential hypertension  Comprehensive Metabolic Panel    CBC & Differential    Lipid Panel    Magnesium    TSH    Uric Acid      3. Mixed hyperlipidemia   Comprehensive Metabolic Panel    CBC & Differential    Lipid Panel    Magnesium    TSH    Uric Acid      4. Permanent atrial fibrillation        5. Morbid obesity        6. Controlled type 2 diabetes mellitus without complication, without long-term current use of insulin        7. CAMERON (obstructive sleep apnea)        8. Chronic heart failure with preserved ejection fraction (HFpEF)  proBNP        Persistent atrial fibrillation, on xarelto.   Chronic HFpEF, is euvolemic today.  Would recommend discontinuing pioglitazone as it would cause increased fluid retention.  DM  Hypertension, goal <120/80.  Blood pressure is elevated today.  Remain on Avapro 300 mg daily, remain on metoprolol 25 mg at night and start spironolactone 25 mg in the morning.  Hyperlipidemia on atorvastatin  H/o Cardiomyopathy, now LVEF low normal.   Moderate AS with bicuspid aortic valve, repeat echocardiogram  Carotid bruits, no stenosis noted.  Obesity.   CAMERON, using BiPAP.     Plan:       See Frida at the [facility] office in 6 months, start spironolactone 25 mg in the morning, set up echocardiogram and laboratory values.

## 2023-08-29 ENCOUNTER — LAB (OUTPATIENT)
Dept: LAB | Facility: HOSPITAL | Age: 68
End: 2023-08-29
Payer: MEDICARE

## 2023-08-29 ENCOUNTER — TELEPHONE (OUTPATIENT)
Dept: CARDIOLOGY | Facility: CLINIC | Age: 68
End: 2023-08-29
Payer: MEDICARE

## 2023-08-29 ENCOUNTER — HOSPITAL ENCOUNTER (OUTPATIENT)
Dept: CARDIOLOGY | Facility: HOSPITAL | Age: 68
Discharge: HOME | End: 2023-08-29
Payer: MEDICARE

## 2023-08-29 ENCOUNTER — TELEPHONE (OUTPATIENT)
Dept: CARDIOLOGY | Facility: CLINIC | Age: 68
End: 2023-08-29

## 2023-08-29 VITALS
HEART RATE: 61 BPM | BODY MASS INDEX: 39.17 KG/M2 | SYSTOLIC BLOOD PRESSURE: 128 MMHG | WEIGHT: 315 LBS | HEIGHT: 75 IN | DIASTOLIC BLOOD PRESSURE: 80 MMHG

## 2023-08-29 DIAGNOSIS — E78.2 MIXED HYPERLIPIDEMIA: ICD-10-CM

## 2023-08-29 DIAGNOSIS — I50.32 CHRONIC HEART FAILURE WITH PRESERVED EJECTION FRACTION (HFPEF): ICD-10-CM

## 2023-08-29 DIAGNOSIS — I35.0 NONRHEUMATIC AORTIC VALVE STENOSIS: ICD-10-CM

## 2023-08-29 DIAGNOSIS — I10 ESSENTIAL HYPERTENSION: ICD-10-CM

## 2023-08-29 LAB
ALBUMIN SERPL-MCNC: 4.1 G/DL (ref 3.5–5.2)
ALBUMIN/GLOB SERPL: 1.5 G/DL
ALP SERPL-CCNC: 125 U/L (ref 39–117)
ALT SERPL W P-5'-P-CCNC: 14 U/L (ref 1–41)
ANION GAP SERPL CALCULATED.3IONS-SCNC: 12 MMOL/L (ref 5–15)
AORTIC ARCH: 2.8 CM
ASCENDING AORTA: 3.1 CM
AST SERPL-CCNC: 15 U/L (ref 1–40)
BASOPHILS # BLD AUTO: 0.08 10*3/MM3 (ref 0–0.2)
BASOPHILS NFR BLD AUTO: 0.9 % (ref 0–1.5)
BH CV ECHO MEAS - ACS: 1.44 CM
BH CV ECHO MEAS - AO MAX PG: 38.6 MMHG
BH CV ECHO MEAS - AO MEAN PG: 21 MMHG
BH CV ECHO MEAS - AO ROOT DIAM: 3.5 CM
BH CV ECHO MEAS - AO V2 MAX: 310.8 CM/SEC
BH CV ECHO MEAS - AO V2 VTI: 68.1 CM
BH CV ECHO MEAS - AVA(I,D): 1.08 CM2
BH CV ECHO MEAS - EDV(CUBED): 227 ML
BH CV ECHO MEAS - EDV(MOD-SP2): 187 ML
BH CV ECHO MEAS - EDV(MOD-SP4): 155 ML
BH CV ECHO MEAS - EF(MOD-BP): 59.4 %
BH CV ECHO MEAS - EF(MOD-SP2): 56.7 %
BH CV ECHO MEAS - EF(MOD-SP4): 60.6 %
BH CV ECHO MEAS - ESV(CUBED): 68.4 ML
BH CV ECHO MEAS - ESV(MOD-SP2): 81 ML
BH CV ECHO MEAS - ESV(MOD-SP4): 61 ML
BH CV ECHO MEAS - FS: 32.9 %
BH CV ECHO MEAS - IVS/LVPW: 0.73 CM
BH CV ECHO MEAS - IVSD: 1.1 CM
BH CV ECHO MEAS - LV DIASTOLIC VOL/BSA (35-75): 57.8 CM2
BH CV ECHO MEAS - LV MASS(C)D: 359.6 GRAMS
BH CV ECHO MEAS - LV MAX PG: 1.76 MMHG
BH CV ECHO MEAS - LV MEAN PG: 1.15 MMHG
BH CV ECHO MEAS - LV SYSTOLIC VOL/BSA (12-30): 22.8 CM2
BH CV ECHO MEAS - LV V1 MAX: 66.4 CM/SEC
BH CV ECHO MEAS - LV V1 VTI: 17.4 CM
BH CV ECHO MEAS - LVIDD: 6.1 CM
BH CV ECHO MEAS - LVIDS: 4.1 CM
BH CV ECHO MEAS - LVOT AREA: 4.2 CM2
BH CV ECHO MEAS - LVOT DIAM: 2.32 CM
BH CV ECHO MEAS - LVPWD: 1.5 CM
BH CV ECHO MEAS - MR MAX PG: 119 MMHG
BH CV ECHO MEAS - MR MAX VEL: 545.4 CM/SEC
BH CV ECHO MEAS - MV A DUR: 0.12 SEC
BH CV ECHO MEAS - MV A MAX VEL: 66 CM/SEC
BH CV ECHO MEAS - MV DEC SLOPE: 404.5 CM/SEC2
BH CV ECHO MEAS - MV DEC TIME: 0.14 MSEC
BH CV ECHO MEAS - MV E MAX VEL: 123.5 CM/SEC
BH CV ECHO MEAS - MV E/A: 1.87
BH CV ECHO MEAS - MV MAX PG: 7 MMHG
BH CV ECHO MEAS - MV MEAN PG: 1.91 MMHG
BH CV ECHO MEAS - MV P1/2T: 98.1 MSEC
BH CV ECHO MEAS - MV V2 VTI: 35.3 CM
BH CV ECHO MEAS - MVA(P1/2T): 2.24 CM2
BH CV ECHO MEAS - MVA(VTI): 2.08 CM2
BH CV ECHO MEAS - PA ACC TIME: 0.16 SEC
BH CV ECHO MEAS - PA V2 MAX: 74.7 CM/SEC
BH CV ECHO MEAS - PULM DIAS VEL: 76.3 CM/SEC
BH CV ECHO MEAS - PULM S/D: 0.4
BH CV ECHO MEAS - PULM SYS VEL: 30.6 CM/SEC
BH CV ECHO MEAS - QP/QS: 0.85
BH CV ECHO MEAS - RV MAX PG: 1.42 MMHG
BH CV ECHO MEAS - RV V1 MAX: 59.6 CM/SEC
BH CV ECHO MEAS - RV V1 VTI: 15.7 CM
BH CV ECHO MEAS - RVOT DIAM: 2.25 CM
BH CV ECHO MEAS - SI(MOD-SP2): 39.6 ML/M2
BH CV ECHO MEAS - SI(MOD-SP4): 35.1 ML/M2
BH CV ECHO MEAS - SUP REN AO DIAM: 2.4 CM
BH CV ECHO MEAS - SV(LVOT): 73.4 ML
BH CV ECHO MEAS - SV(MOD-SP2): 106 ML
BH CV ECHO MEAS - SV(MOD-SP4): 94 ML
BH CV ECHO MEAS - SV(RVOT): 62.7 ML
BILIRUB SERPL-MCNC: 0.8 MG/DL (ref 0–1.2)
BUN SERPL-MCNC: 15 MG/DL (ref 8–23)
BUN/CREAT SERPL: 15.5 (ref 7–25)
CALCIUM SPEC-SCNC: 9.1 MG/DL (ref 8.6–10.5)
CHLORIDE SERPL-SCNC: 100 MMOL/L (ref 98–107)
CHOLEST SERPL-MCNC: 141 MG/DL (ref 0–200)
CO2 SERPL-SCNC: 27 MMOL/L (ref 22–29)
CREAT SERPL-MCNC: 0.97 MG/DL (ref 0.76–1.27)
DEPRECATED RDW RBC AUTO: 42.4 FL (ref 37–54)
EGFRCR SERPLBLD CKD-EPI 2021: 85 ML/MIN/1.73
EOSINOPHIL # BLD AUTO: 0.95 10*3/MM3 (ref 0–0.4)
EOSINOPHIL NFR BLD AUTO: 10.6 % (ref 0.3–6.2)
ERYTHROCYTE [DISTWIDTH] IN BLOOD BY AUTOMATED COUNT: 14.2 % (ref 12.3–15.4)
GLOBULIN UR ELPH-MCNC: 2.7 GM/DL
GLUCOSE SERPL-MCNC: 143 MG/DL (ref 65–99)
HCT VFR BLD AUTO: 37.1 % (ref 37.5–51)
HDLC SERPL-MCNC: 35 MG/DL (ref 40–60)
HGB BLD-MCNC: 11.7 G/DL (ref 13–17.7)
IMM GRANULOCYTES # BLD AUTO: 0.05 10*3/MM3 (ref 0–0.05)
IMM GRANULOCYTES NFR BLD AUTO: 0.6 % (ref 0–0.5)
LDLC SERPL CALC-MCNC: 86 MG/DL (ref 0–100)
LDLC/HDLC SERPL: 2.42 {RATIO}
LEFT ATRIUM VOLUME INDEX: 50 ML/M2
LYMPHOCYTES # BLD AUTO: 2.52 10*3/MM3 (ref 0.7–3.1)
LYMPHOCYTES NFR BLD AUTO: 28 % (ref 19.6–45.3)
MAGNESIUM SERPL-MCNC: 1.8 MG/DL (ref 1.6–2.4)
MCH RBC QN AUTO: 25.9 PG (ref 26.6–33)
MCHC RBC AUTO-ENTMCNC: 31.5 G/DL (ref 31.5–35.7)
MCV RBC AUTO: 82.3 FL (ref 79–97)
MONOCYTES # BLD AUTO: 0.47 10*3/MM3 (ref 0.1–0.9)
MONOCYTES NFR BLD AUTO: 5.2 % (ref 5–12)
NEUTROPHILS NFR BLD AUTO: 4.93 10*3/MM3 (ref 1.7–7)
NEUTROPHILS NFR BLD AUTO: 54.7 % (ref 42.7–76)
NRBC BLD AUTO-RTO: 0 /100 WBC (ref 0–0.2)
NT-PROBNP SERPL-MCNC: 509 PG/ML (ref 0–900)
PLATELET # BLD AUTO: 234 10*3/MM3 (ref 140–450)
PMV BLD AUTO: 9.3 FL (ref 6–12)
POTASSIUM SERPL-SCNC: 4.1 MMOL/L (ref 3.5–5.2)
PROT SERPL-MCNC: 6.8 G/DL (ref 6–8.5)
RBC # BLD AUTO: 4.51 10*6/MM3 (ref 4.14–5.8)
SINUS: 3.2 CM
SODIUM SERPL-SCNC: 139 MMOL/L (ref 136–145)
STJ: 2.7 CM
TRIGL SERPL-MCNC: 106 MG/DL (ref 0–150)
TSH SERPL DL<=0.05 MIU/L-ACNC: 1.94 UIU/ML (ref 0.27–4.2)
URATE SERPL-MCNC: 6.2 MG/DL (ref 3.4–7)
VLDLC SERPL-MCNC: 20 MG/DL (ref 5–40)
WBC NRBC COR # BLD: 9 10*3/MM3 (ref 3.4–10.8)

## 2023-08-29 PROCEDURE — 83880 ASSAY OF NATRIURETIC PEPTIDE: CPT

## 2023-08-29 PROCEDURE — 36415 COLL VENOUS BLD VENIPUNCTURE: CPT

## 2023-08-29 PROCEDURE — 25510000001 PERFLUTREN (DEFINITY) 8.476 MG IN SODIUM CHLORIDE (PF) 0.9 % 10 ML INJECTION: Performed by: INTERNAL MEDICINE

## 2023-08-29 PROCEDURE — 80061 LIPID PANEL: CPT

## 2023-08-29 PROCEDURE — 80053 COMPREHEN METABOLIC PANEL: CPT

## 2023-08-29 PROCEDURE — 84550 ASSAY OF BLOOD/URIC ACID: CPT

## 2023-08-29 PROCEDURE — 83735 ASSAY OF MAGNESIUM: CPT

## 2023-08-29 PROCEDURE — 84443 ASSAY THYROID STIM HORMONE: CPT

## 2023-08-29 PROCEDURE — 85025 COMPLETE CBC W/AUTO DIFF WBC: CPT

## 2023-08-29 PROCEDURE — 93306 TTE W/DOPPLER COMPLETE: CPT

## 2023-08-29 RX ADMIN — PERFLUTREN 1.5 ML: 6.52 INJECTION, SUSPENSION INTRAVENOUS at 15:08

## 2023-08-29 NOTE — TELEPHONE ENCOUNTER
Reviewed results with Duane Mark Witten and patient verbalized understanding of results.    Thank you,  Kerri FINK RN  T[place] Nurse [facility]    15:23 EDT

## 2023-08-30 NOTE — TELEPHONE ENCOUNTER
I tried to call Duane Mark Witten but there was no answer.  Left a voicemail asking patient to call back.  Will continue to try to reach pt.    Thank you,    Makenna Lawler RN  Triage [facility] [place]  08/30/23 08:56 EDT

## 2023-08-31 NOTE — TELEPHONE ENCOUNTER
Notified patient of results. Patient verbalized understanding.    Sharon Mane RN  [place] [facility]

## 2023-10-24 ENCOUNTER — HOSPITAL ENCOUNTER (OUTPATIENT)
Dept: GENERAL RADIOLOGY | Facility: HOSPITAL | Age: 68
Discharge: HOME | End: 2023-10-24
Admitting: NEUROLOGICAL SURGERY
Payer: MEDICARE

## 2023-10-24 DIAGNOSIS — Z98.1 HISTORY OF SPINAL FUSION: ICD-10-CM

## 2023-10-24 PROCEDURE — 72050 X-RAY EXAM NECK SPINE 4/5VWS: CPT

## 2023-10-25 ENCOUNTER — OFFICE VISIT (OUTPATIENT)
Dept: NEUROSURGERY | Facility: CLINIC | Age: 68
End: 2023-10-25
Payer: MEDICARE

## 2023-10-25 VITALS
BODY MASS INDEX: 39.17 KG/M2 | WEIGHT: 315 LBS | DIASTOLIC BLOOD PRESSURE: 80 MMHG | HEIGHT: 75 IN | SYSTOLIC BLOOD PRESSURE: 142 MMHG

## 2023-10-25 DIAGNOSIS — Z98.1 HISTORY OF FUSION OF CERVICAL SPINE: Primary | ICD-10-CM

## 2023-10-25 DIAGNOSIS — G95.9 CERVICAL MYELOPATHY: ICD-10-CM

## 2023-10-25 DIAGNOSIS — R26.9 ABNORMALITY OF GAIT: ICD-10-CM

## 2023-10-25 NOTE — PROGRESS NOTES
"Subjective   Patient ID: Duane Mark Witten is a 68 y.o. male is here today for follow-up.    This very nice gentleman is now about 5 years out from an anterior cervical corpectomy at C4, C5, and C6 with a cage and plate from C3-C7 for profound progressive myelopathy.  He is actually recovered quite a bit of function in terms of balance and use of his hands.  He lost 70 pounds.  He ended up not needing a transport chair.  He walks today without a walker.  He says that if it is dark his balance is affected by it which is understandable.  But his x-rays look fine I think he is doing well.  We will continue to follow him and I will see him in 2 years with x-rays, sooner if there is a problem.        History of Present Illness    The following portions of the patient's history were reviewed and updated as appropriate: allergies, current medications, past family history, past medical history, past social history, past surgical history, and problem list.    Review of Systems   Constitutional:  Negative for fever.   Musculoskeletal:  Negative for back pain.   Neurological:  Negative for weakness and numbness.   All other systems reviewed and are negative.          Objective     Vitals:    10/25/23 1543   BP: 142/80   BP Location: Left arm   Patient Position: Sitting   Cuff Size: Adult   Weight: (!) 153 kg (337 lb)   Height: 190.5 cm (75\")   PainSc: 0-No pain     Body mass index is 42.12 kg/m².    Tobacco Use: Medium Risk (10/25/2023)    Patient History     Smoking Tobacco Use: Former     Smokeless Tobacco Use: Never     Passive Exposure: Past          Physical Exam  Constitutional:       Appearance: He is well-developed.   HENT:      Head: Normocephalic and atraumatic.   Eyes:      Extraocular Movements: EOM normal.      Conjunctiva/sclera: Conjunctivae normal.      Pupils: Pupils are equal, round, and reactive to light.   Neck:      Vascular: No carotid bruit.   Neurological:      Mental Status: He is oriented to person, " place, and time.      Coordination: Finger-Nose-Finger Test and Heel to Shin Test normal.      Gait: Gait is intact.      Deep Tendon Reflexes:      Reflex Scores:       Tricep reflexes are 2+ on the right side and 2+ on the left side.       Bicep reflexes are 2+ on the right side and 2+ on the left side.       Brachioradialis reflexes are 2+ on the right side and 2+ on the left side.       Patellar reflexes are 2+ on the right side and 2+ on the left side.       Achilles reflexes are 2+ on the right side and 2+ on the left side.  Psychiatric:         Speech: Speech normal.       Neurologic Exam     Mental Status   Oriented to person, place, and time.   Registration of memory: Good recent and remote memory.   Attention: normal. Concentration: normal.   Speech: speech is normal   Level of consciousness: alert  Knowledge: consistent with education.     Cranial Nerves     CN II   Visual fields full to confrontation.   Visual acuity: normal    CN III, IV, VI   Pupils are equal, round, and reactive to light.  Extraocular motions are normal.     CN V   Facial sensation intact.   Right corneal reflex: normal  Left corneal reflex: normal    CN VII   Facial expression full, symmetric.   Right facial weakness: none  Left facial weakness: none    CN VIII   Hearing: intact    CN IX, X   Palate: symmetric    CN XI   Right sternocleidomastoid strength: normal  Left sternocleidomastoid strength: normal    CN XII   Tongue: not atrophic  Tongue deviation: none    Motor Exam   Muscle bulk: normal  Right arm tone: normal  Left arm tone: normal  Right leg tone: normal  Left leg tone: normal    Strength   Strength 5/5 except as noted.     Sensory Exam   Light touch normal.     Gait, Coordination, and Reflexes     Gait  Gait: normal    Coordination   Finger to nose coordination: normal  Heel to shin coordination: normal    Reflexes   Right brachioradialis: 2+  Left brachioradialis: 2+  Right biceps: 2+  Left biceps: 2+  Right triceps:  2+  Left triceps: 2+  Right patellar: 2+  Left patellar: 2+  Right achilles: 2+  Left achilles: 2+  Right : 2+  Left : 2+Ataxic gait           Assessment & Plan   Independent Review of Radiographic Studies:      I personally reviewed the images from the following studies.    The current x-rays done today show no significant loosening of the hardware from C3-C7 and no significant C7-T1 translational spondylolisthesis.  Agree with the report.        Medical Decision Making:      Actually doing quite well 5 years after a major anterior cervical corpectomy and fusion.  We will continue to follow him for adjacent level disease particularly at C7 and T1.  I will see him in 2 years with x-rays, sooner if he is concerned about any changes.        Diagnoses and all orders for this visit:    1. History of fusion of cervical spine (Primary)  -     XR spine cervical ap and lat w flex and ext; Future    2. Cervical myelopathy    3. Abnormality of gait      Return in about 2 years (around 10/25/2025) for Face-to-face.

## 2024-02-23 ENCOUNTER — TELEPHONE (OUTPATIENT)
Dept: CARDIOLOGY | Facility: CLINIC | Age: 69
End: 2024-02-23
Payer: MEDICARE

## 2024-03-19 ENCOUNTER — OFFICE VISIT (OUTPATIENT)
Dept: CARDIOLOGY | Facility: CLINIC | Age: 69
End: 2024-03-19
Payer: MEDICARE

## 2024-03-19 VITALS
HEIGHT: 75 IN | HEART RATE: 67 BPM | DIASTOLIC BLOOD PRESSURE: 80 MMHG | WEIGHT: 315 LBS | SYSTOLIC BLOOD PRESSURE: 125 MMHG | BODY MASS INDEX: 39.17 KG/M2

## 2024-03-19 DIAGNOSIS — E11.9 CONTROLLED TYPE 2 DIABETES MELLITUS WITHOUT COMPLICATION, WITHOUT LONG-TERM CURRENT USE OF INSULIN: ICD-10-CM

## 2024-03-19 DIAGNOSIS — E66.01 MORBID OBESITY: ICD-10-CM

## 2024-03-19 DIAGNOSIS — I10 ESSENTIAL HYPERTENSION: ICD-10-CM

## 2024-03-19 DIAGNOSIS — E78.2 MIXED HYPERLIPIDEMIA: ICD-10-CM

## 2024-03-19 DIAGNOSIS — G47.33 OSA (OBSTRUCTIVE SLEEP APNEA): ICD-10-CM

## 2024-03-19 DIAGNOSIS — I35.0 NONRHEUMATIC AORTIC VALVE STENOSIS: Primary | ICD-10-CM

## 2024-03-19 DIAGNOSIS — I48.21 PERMANENT ATRIAL FIBRILLATION: ICD-10-CM

## 2024-03-19 DIAGNOSIS — I50.32 CHRONIC HEART FAILURE WITH PRESERVED EJECTION FRACTION (HFPEF): ICD-10-CM

## 2024-03-19 RX ORDER — SPIRONOLACTONE 25 MG/1
25 TABLET ORAL EVERY MORNING
Qty: 90 TABLET | Refills: 3 | Status: SHIPPED | OUTPATIENT
Start: 2024-03-19

## 2024-03-19 RX ORDER — FUROSEMIDE 40 MG/1
40 TABLET ORAL DAILY
Qty: 90 TABLET | Refills: 3 | Status: SHIPPED | OUTPATIENT
Start: 2024-03-19

## 2024-03-19 RX ORDER — OMEPRAZOLE 20 MG/1
1 CAPSULE, DELAYED RELEASE ORAL DAILY
COMMUNITY
Start: 2023-12-26

## 2024-03-19 NOTE — PROGRESS NOTES
"      [facility] CARDIOLOGY  [address]  [place] [address]  Phone: [phone]  Fax: [phone]  Patient Name: Duane Mark Witten  :1955  Age: 69 y.o.  Primary Cardiologist: Valeria Bravo MD  Encounter Provider:  KAYLEN Aponte    History of Present Illness     Duane Mark Witten is a 69 y.o.  male whose medical history includes hypertension, hyperlipidemia, type 2 diabetes, CAMERON/CPAP, obesity.  He is followed in our office by Dr. Bravo for aortic valve stenosis with bicuspid aortic valve, chronic A-fib, chronic HFpEF. I have reviewed the past medical records in preparation of today's visit.     24 Follow-up:  He is here for 6-month follow-up and I am seeing him for the first time today.  At last visit, Dr. Bravo started 25 mg spironolactone.  This in addition to the furosemide really increases his urination.  He sometimes has incontinence of bowel and bladder related to a spinal cord injury.  Because of this he is only been taking the spironolactone every other day.  His weight is up a bit since last visit but he denies any worsening leg swelling, shortness of breath, or orthopnea.  He feels the A-fib but his heart rate remains controlled.  He is not having chest pain or lightheadedness.    Data Review     The following data was reviewed by KAYLEN Aponte on 24:    Vital Signs:   /80   Pulse 67   Ht 190.5 cm (75\")   Wt (!) 157 kg (347 lb)   BMI 43.37 kg/m²       Weight:  Wt Readings from Last 3 Encounters:   24 (!) 157 kg (347 lb)   10/25/23 (!) 153 kg (337 lb)   23 (!) 153 kg (337 lb)     Body mass index is 43.37 kg/m².    Below is a summary of pertinent cardiology findings:  2018 he was seen in the emergency room with progressive weakness; he was found to have severe cervical stenosis with cord compression and underwent C4-C6 anterior cervical corpectomy with cage " and plate.  He had tachycardia during surgery and an EKG in the recovery room showed A-fib.  November 2018 echo showed LVEF 47%, mild LVH, mild LVE, mild to moderate AAS, mild MR with mild anterior prolapse, and mild LAD.  February 2019 stress PET scan showed EF 36%, bilateral ventricular dilation, diffusely decreased tracer uptake in all wall segments on stress imaging, a more pronounced hypoperfusion of the mid anterior wall that was considered small, mild but may represent ischemia.  February 2020 echocardiogram showed LVEF low normal with mild concentric LVH, moderate left atrial enlargement, bicuspid aortic valve, mild to moderate aortic stenosis.  January 2023 echocardiogram showed EF 64%, moderate concentric LVH, moderate left atrial enlargement, a bicuspid aortic valve with severe calcification and moderate aortic stenosis, trace tricuspid insufficiency, the mitral valve was not well-visualized, and the RSVP of 46 mmHg.  March 2023 he was hospitalized for short windedness after he had not been taking his diuretic.  He was diagnosed with metapneumovirus but also had acute on chronic HFpEF.  August 2023 echocardiogram shows EF 59.4%, mild concentric LVH, indeterminate LV diastolic function, moderately increased left atrial volume, moderate aortic valve stenosis.    Labs:  08/29/2023:  cr 1.0, K 4.1, , otherwise unremarkable CMP, uric acid 6.2, proBNP 509, TSH 1.940, Chol 141, HDL 35, LDL 86, Trig 106, Hgb 11.7, Plt 234      ECG 12 Lead    Date/Time: 3/19/2024 12:54 PM  Performed by: Ruby Linda APRN    Authorized by: Ruby Linda, KAYLEN  Comparison: compared with previous ECG from 8/16/2023  Similar to previous ECG  Rhythm: atrial fibrillation  Rate: normal  BPM: 67          Medications     Allergies as of 03/19/2024 - Reviewed 03/19/2024   Allergen Reaction Noted    Aspartame Other (See Comments) 03/14/2023       Current Outpatient Medications   Medication Instructions     atomoxetine (STRATTERA) 100 mg, Oral, Daily    atorvastatin (LIPITOR) 20 mg, Oral, Daily    Blood Glucose Monitoring Suppl (Accu-Chek Leilani Plus) w/Device kit MONITOR BLOOD SUGAR THREE TIMES DAILY    cyclobenzaprine (FLEXERIL) 10 mg, Oral, 3 Times Daily PRN    DULoxetine (CYMBALTA) 60 mg, Oral, 2 Times Daily    ferrous sulfate 325 mg, Oral, Daily With Breakfast    furosemide (LASIX) 40 mg, Oral, Daily    HYDROcodone-acetaminophen (NORCO)  MG per tablet 1 tablet, Oral, Every 6 Hours PRN, PT STATES TAKES ONE IN THE MORNING AND IN THE AFTERNOON, AND AGAIN AT BEDTIME MOST DAYS    irbesartan (AVAPRO) 300 mg, Oral, Every Morning    metFORMIN ER (GLUCOPHAGE-XR) 1,000 mg, Oral, 2 Times Daily    metoprolol succinate XL (TOPROL-XL) 25 mg, Oral, Every Night at Bedtime    omeprazole (priLOSEC) 20 MG capsule 1 capsule, Oral, Daily    rivaroxaban (XARELTO) 20 mg, Oral, Daily With Dinner    spironolactone (ALDACTONE) 25 mg, Oral, Every Morning    tamsulosin (FLOMAX) 0.8 mg, Oral, Daily    Trulicity 1.5 mg, Subcutaneous, Weekly        Past History, Review of Systems, Exam     Past Medical History:   Diagnosis Date    ADD (attention deficit disorder)     Anemia     Anesthesia complication     Atrial fibrillation     Bronchitis due to human metapneumovirus (hMPV) 3/14/2023    Chronic anticoagulation     Chronic pain     Depression     Difficulty swallowing solids     Dilated cardiomyopathy     ED (erectile dysfunction)     Elevated cholesterol     Essential hypertension     YANELI (generalized anxiety disorder)     Generalized weakness     HLD (hyperlipidemia)     Leukocytosis     Morbid obesity     CAMERON (obstructive sleep apnea)     Osteoarthrosis     PONV (postoperative nausea and vomiting)     Psoriasis     Pulmonary hypertension     Testosterone deficiency     Type 2 diabetes mellitus     Unsteady gait        Past Surgical History:   has a past surgical history that includes Colonoscopy; Anterior Channel  Vertebrectomy/Corpectomy (Bilateral, 2018); Parathyroidectomy (); Biopsy / excision / dissection axillary node (); LASIK (Bilateral); Colonoscopy (N/A, 2022); and Esophagogastroduodenoscopy (N/A, 2022).     Social History     Socioeconomic History    Marital status:      Spouse name: Mindy   Tobacco Use    Smoking status: Former     Current packs/day: 0.00     Average packs/day: 1 pack/day for 5.0 years (5.0 ttl pk-yrs)     Types: Cigarettes     Start date:      Quit date:      Years since quittin.2     Passive exposure: Past    Smokeless tobacco: Never   Vaping Use    Vaping status: Never Used   Substance and Sexual Activity    Alcohol use: Yes     Comment: RARELY    Drug use: No    Sexual activity: Defer       Review of Systems   Cardiovascular:  Positive for dyspnea on exertion and irregular heartbeat. Negative for chest pain, claudication, cyanosis, leg swelling, near-syncope, orthopnea, palpitations, paroxysmal nocturnal dyspnea and syncope.   Genitourinary:  Positive for bladder incontinence.       Vitals reviewed.   Constitutional:       Appearance: Not in distress.   Eyes:      Conjunctiva/sclera: Conjunctivae normal.      Pupils: Pupils are equal, round, and reactive to light.   HENT:      Head: Normocephalic.      Nose: Nose normal.    Mouth/Throat:      Pharynx: Oropharynx is clear.   Neck:      Vascular: JVD normal.   Pulmonary:      Effort: Pulmonary effort is normal.      Breath sounds: Normal breath sounds. No wheezing. No rhonchi. No rales.   Cardiovascular:      Normal rate. Irregularly irregular rhythm. Normal S1. Normal S2.       Murmurs: There is a grade 2/6 systolic murmur.   Pulses:     Intact distal pulses.   Edema:     Peripheral edema absent.   Abdominal:      General: Bowel sounds are normal. There is no distension.      Palpations: Abdomen is soft.      Tenderness: There is no abdominal tenderness.   Musculoskeletal: Normal range of motion.       Cervical back: Normal range of motion and neck supple. Skin:     General: Skin is warm and dry.   Neurological:      Mental Status: Alert and oriented to person, place and time.   Psychiatric:         Attention and Perception: Attention normal.         Mood and Affect: Mood normal.         Speech: Speech normal.         Behavior: Behavior is cooperative.          Assessment and Plan     Assessment:  1. Nonrheumatic aortic valve stenosis    2. Chronic heart failure with preserved ejection fraction (HFpEF)    3. Essential hypertension    4. Mixed hyperlipidemia    5. Controlled type 2 diabetes mellitus without complication, without long-term current use of insulin    6. CAMERON (obstructive sleep apnea)    7. Permanent atrial fibrillation    8. Morbid obesity         Aortic valve stenosis: He has severe calcification of a bicuspid aortic valve; August 2023 showed moderate aortic valve stenosis which is stable.  Faint murmur heard on exam today.  Chronic HFpEF: He does have history of biventricular dilation noted on February 2019 stress PET.  August 2023 echocardiogram shows EF 59.4% and indeterminate LV diastolic function.  His medical therapy includes Lasix, irbesartan, metoprolol succinate, and spironolactone.  He has urinary incontinence issues with the addition of spironolactone.  His weight is up but he appears euvolemic today.  Permanent atrial fibrillation: His heart rate is controlled on 25 mg metoprolol succinate nightly.  He is anticoagulated with 20 mg Xarelto.  Hypertension: Controlled.  Type 2 diabetes: No recent hemoglobin A1c to review.  Obstructive sleep apnea: He is compliant with CPAP.  Obesity: His weight is up 10 pounds but he is euvolemic.  His mobility is limited due to previous spinal cord injury.  He is disabled.    Mr. Morillo is a patient of Dr. Bravo's with history of moderate aortic valve stenosis with bicuspid aortic valve, chronic HFpEF, and permanent atrial fibrillation rate controlled  on 25 mg metoprolol succinate nightly and anticoagulated with 20 mg Xarelto.  He did have urinary incontinence with the addition of spironolactone.  He is agreeable to taking this daily and only taking the Lasix 3 times weekly.  He will notify the office if he has weight gain or swelling.    The Xarelto is expensive for him.  I gave him information about contacting the company for financial assistance.  I also gave him some samples today.    He looks good today.  I am going to schedule him to see Dr. Bravo in 6 months.    Return in about 6 months (around 9/19/2024) for Follow-up, Dr. Bravo.  Orders Placed This Encounter   Procedures    ECG 12 Lead      New Medications Ordered This Visit   Medications    rivaroxaban (XARELTO) 20 MG tablet     Sig: Take 1 tablet by mouth Daily With Dinner. Indications: Atrial Fibrillation     Dispense:  30 tablet     Refill:  0     Order Specific Question:   Lot Number?     Answer:   [id number]     Order Specific Question:   Expiration Date?     Answer:   9/28/2023     Order Specific Question:   Quantity     Answer:   14    furosemide (LASIX) 40 MG tablet     Sig: Take 1 tablet by mouth Daily.     Dispense:  90 tablet     Refill:  3    spironolactone (ALDACTONE) 25 MG tablet     Sig: Take 1 tablet by mouth Every Morning.     Dispense:  90 tablet     Refill:  3         Thank you for the opportunity to participate in this patient's care.    KAYLEN Jackson    This office note has been dictated.

## 2024-04-09 ENCOUNTER — TELEPHONE (OUTPATIENT)
Dept: CARDIOLOGY | Facility: CLINIC | Age: 69
End: 2024-04-09

## 2024-04-29 ENCOUNTER — TELEPHONE (OUTPATIENT)
Dept: CARDIOLOGY | Facility: CLINIC | Age: 69
End: 2024-04-29
Payer: MEDICARE

## 2024-04-29 NOTE — TELEPHONE ENCOUNTER
Caller Name: Witten, Duane Mark      Relationship: Self      Best Contact Number: [phone]      Patient is requesting samples of XARELTO 20 MG      How many days of medication do you have left? 1  DAY LEFT        Additional Information: PLEASE CALL PT

## 2024-05-02 ENCOUNTER — TELEPHONE (OUTPATIENT)
Dept: CARDIOLOGY | Facility: CLINIC | Age: 69
End: 2024-05-02
Payer: MEDICARE

## 2024-06-24 ENCOUNTER — OFFICE VISIT (OUTPATIENT)
Dept: SLEEP MEDICINE | Facility: HOSPITAL | Age: 69
End: 2024-06-24
Payer: MEDICARE

## 2024-06-24 VITALS
SYSTOLIC BLOOD PRESSURE: 146 MMHG | DIASTOLIC BLOOD PRESSURE: 86 MMHG | WEIGHT: 315 LBS | OXYGEN SATURATION: 97 % | HEART RATE: 72 BPM | BODY MASS INDEX: 39.17 KG/M2 | HEIGHT: 75 IN

## 2024-06-24 DIAGNOSIS — E66.01 MORBID OBESITY WITH BODY MASS INDEX OF 40.0-49.9: ICD-10-CM

## 2024-06-24 DIAGNOSIS — G47.33 OSA (OBSTRUCTIVE SLEEP APNEA): Primary | ICD-10-CM

## 2024-06-24 PROCEDURE — G0463 HOSPITAL OUTPT CLINIC VISIT: HCPCS

## 2024-06-24 NOTE — PROGRESS NOTES
[facility]  Telephone: [phone] / Fax: [phone] [place]  Telephone: [phone] / Fax: [phone] Maglai Mcmanus    PCP: Deena Otto APRN    Reason for visit: CAMERON f/u    Duane Wojciech Ramirez is a 69 y.o.male  was last seen at [facility] sleep lab in  June 2023. Machine pressures are set at IPAP max 22, EPAP min 15, PS 4. He got his recent machine in 2019. Machine approaching end of its functional usage time and requires replacement before it breaks down completely.  He uses the machine consistently, even during naps. He finds the pressures comfortable.  He does not have a set bedtime schedule. There are no complaints of snoring or apneas while on the device.    SH- no tobacco, no caffeine.    ROS- negative.    DME- [facility]-pt wants to change to DME on [address].    Current Medications:    Current Outpatient Medications:     atomoxetine (Strattera) 100 MG capsule, Take 1 capsule by mouth Daily., Disp: 90 capsule, Rfl: 3    atorvastatin (LIPITOR) 20 MG tablet, Take 1 tablet by mouth Daily., Disp: 90 tablet, Rfl: 3    Blood Glucose Monitoring Suppl (Accu-Chek Leilani Plus) w/Device kit, MONITOR BLOOD SUGAR THREE TIMES DAILY, Disp: 1 kit, Rfl: 0    cyclobenzaprine (FLEXERIL) 10 MG tablet, Take 1 tablet by mouth 3 (Three) Times a Day As Needed., Disp: , Rfl:     Dulaglutide (Trulicity) 1.5 MG/0.5ML solution pen-injector, Inject 1.5 mg under the skin into the appropriate area as directed 1 (One) Time Per Week., Disp: 2 mL, Rfl: 11    DULoxetine (CYMBALTA) 60 MG capsule, Take 1 capsule by mouth 2 (Two) Times a Day. (Patient taking differently: Take 1 capsule by mouth Daily.), Disp: 180 capsule, Rfl: 3    ferrous sulfate 325 (65 FE) MG tablet, Take 1 tablet by mouth Daily With Breakfast., Disp: , Rfl:     furosemide (LASIX) 40 MG tablet, Take 1 tablet by mouth Daily., Disp: 90 tablet, Rfl: 3    HYDROcodone-acetaminophen (NORCO)  MG per tablet, Take 1 tablet by mouth Every 6 (Six)  "Hours As Needed for Moderate Pain. PT STATES TAKES ONE IN THE MORNING AND IN THE AFTERNOON, AND AGAIN AT BEDTIME MOST DAYS, Disp: , Rfl:     irbesartan (AVAPRO) 300 MG tablet, Take 1 tablet by mouth Every Morning., Disp: 90 tablet, Rfl: 3    metFORMIN ER (GLUCOPHAGE-XR) 500 MG 24 hr tablet, Take 2 tablets by mouth 2 (Two) Times a Day., Disp: 360 tablet, Rfl: 3    metoprolol succinate XL (TOPROL-XL) 25 MG 24 hr tablet, Take 1 tablet by mouth every night at bedtime., Disp: 30 tablet, Rfl: 0    omeprazole (priLOSEC) 20 MG capsule, Take 1 capsule by mouth Daily., Disp: , Rfl:     rivaroxaban (XARELTO) 20 MG tablet, Take 1 tablet by mouth Daily With Dinner. Indications: Atrial Fibrillation, Disp: 21 tablet, Rfl: 0    spironolactone (ALDACTONE) 25 MG tablet, Take 1 tablet by mouth Every Morning., Disp: 90 tablet, Rfl: 3    tamsulosin (FLOMAX) 0.4 MG capsule 24 hr capsule, Take 2 capsules by mouth Daily., Disp: 180 capsule, Rfl: 3   also entered in Sleep Questionnaire    Patient  has a past medical history of ADD (attention deficit disorder), Anemia, Anesthesia complication, Atrial fibrillation, Bronchitis due to human metapneumovirus (hMPV) (3/14/2023), Chronic anticoagulation, Chronic pain, Depression, Difficulty swallowing solids, Dilated cardiomyopathy, ED (erectile dysfunction), Elevated cholesterol, Essential hypertension, YANELI (generalized anxiety disorder), Generalized weakness, HLD (hyperlipidemia), Leukocytosis, Morbid obesity, CAMERON (obstructive sleep apnea), Osteoarthrosis, PONV (postoperative nausea and vomiting), Psoriasis, Pulmonary hypertension, Testosterone deficiency, Type 2 diabetes mellitus, and Unsteady gait.    I have reviewed the Past Medical History, Past Surgical History, Social History and Family History.    Vital Signs /86   Pulse 72   Ht 190.5 cm (75\")   Wt (!) 161 kg (354 lb)   SpO2 97%   BMI 44.25 kg/m²  Body mass index is 44.25 kg/m².    General Alert and oriented. No acute distress " noted   Pharynx/Throat Class  IV  Mallampati airway, large tongue, no evidence of redundant lateral pharyngeal tissue. No oral lesions. No thrush. Moist mucous membranes.   Head Normocephalic. Symmetrical. Atraumatic.    Nose No septal deviation. No drainage   Chest Wall Normal shape. Symmetric expansion with respiration. No tenderness.   Neck Trachea midline, no thyromegaly or adenopathy    Lungs Clear to auscultation bilaterally. No wheezes. No rhonchi. No rales. Respirations regular, even and unlabored.   Heart Regular rhythm and normal rate. Normal S1 and S2. No murmur   Abdomen Soft, non-tender and non-distended. Normal bowel sounds. No masses.   Extremities Moves all extremities well. No edema   Psychiatric Normal mood and affect.     Testing:  Download 3/26/24-6/23/24 99% use with average nightly use of 12 hours and 26 minutes on BIPAP , IPAP max 22, EPAP min 15, PS 4,  avg pr 20/16    Study- Dec 2019-  Diagnostic findings: The patient tolerated the home sleep testing with monitoring time of 449 minutes. The data obtained make this a technically adequate study. The apnea hypopneas index(AHI) was 15.8 per sleep hour.  The AHI during supine position was 17.7 per sleep hour. Mean heart rate of 70.1 BPM.  Snoring was noted 30.2% of sleep time. Lowest oxygen saturation during the study was 80%. Saturation below 89% was noted for 5.5 mins.        Impression:  1. CAMERON (obstructive sleep apnea)    2. Morbid obesity with body mass index of 40.0-49.9          Plan:  Order replacement unit-auto BIPAP, IPAP max 22, EPAP min 15, PS 4. Pt uses the machine and benefits from its use.  I reviewed download report and original sleep study report with the patient. I advised pt to contact us if PAP pressures feel excessive or insufficient.    Weight loss will be strongly beneficial to reduce the severity of sleep-disordered breathing.      Pt was asked to call if he does not hear from DME in 2 weeks.    Follow up with Dr. Deluna in  3 months to review response to therapy with new device.    Thank you for allowing me to participate in your patient's care.      KAYLEN Ramirez  [facility]  Phone: [phone]      Part of this note may be an electronic transcription/translation of spoken language to printed text using the Dragon Dictation System.

## 2024-07-15 ENCOUNTER — TELEPHONE (OUTPATIENT)
Dept: SLEEP MEDICINE | Facility: HOSPITAL | Age: 69
End: 2024-07-15
Payer: MEDICARE

## 2024-07-26 ENCOUNTER — TELEPHONE (OUTPATIENT)
Dept: CARDIOLOGY | Facility: CLINIC | Age: 69
End: 2024-07-26

## 2024-07-26 NOTE — TELEPHONE ENCOUNTER
Caller: Witten, Duane Mark    Relationship: Self    Best call back number:  [phone]      PATIENT IS REQUESTING SAMPLES OF XARELTO FOR  AT [facility]

## 2024-08-02 NOTE — TELEPHONE ENCOUNTER
PT SAYS [facility] OFFICE HAS BEEN CLOSED ALL WEEK, WANTS TO NOW PICK THEM UP AT [facility]. PLEASE GET THEM READY IF POSSIBLE. HAS BEEN OUT A FEW DAYS.

## 2024-08-22 ENCOUNTER — TELEPHONE (OUTPATIENT)
Dept: CARDIOLOGY | Facility: CLINIC | Age: 69
End: 2024-08-22
Payer: MEDICARE

## 2024-09-24 ENCOUNTER — OFFICE VISIT (OUTPATIENT)
Dept: CARDIOLOGY | Facility: CLINIC | Age: 69
End: 2024-09-24
Payer: MEDICARE

## 2024-09-24 VITALS
HEART RATE: 76 BPM | HEIGHT: 75 IN | WEIGHT: 315 LBS | SYSTOLIC BLOOD PRESSURE: 80 MMHG | DIASTOLIC BLOOD PRESSURE: 60 MMHG | BODY MASS INDEX: 39.17 KG/M2

## 2024-09-24 DIAGNOSIS — R06.09 DYSPNEA ON EXERTION: ICD-10-CM

## 2024-09-24 DIAGNOSIS — Q23.1 BICUSPID AORTIC VALVE: ICD-10-CM

## 2024-09-24 DIAGNOSIS — I10 ESSENTIAL HYPERTENSION: ICD-10-CM

## 2024-09-24 DIAGNOSIS — E11.9 CONTROLLED TYPE 2 DIABETES MELLITUS WITHOUT COMPLICATION, WITHOUT LONG-TERM CURRENT USE OF INSULIN: ICD-10-CM

## 2024-09-24 DIAGNOSIS — I35.0 NONRHEUMATIC AORTIC VALVE STENOSIS: ICD-10-CM

## 2024-09-24 DIAGNOSIS — I48.21 PERMANENT ATRIAL FIBRILLATION: Primary | ICD-10-CM

## 2024-09-24 DIAGNOSIS — E78.2 MIXED HYPERLIPIDEMIA: ICD-10-CM

## 2024-09-24 DIAGNOSIS — G47.33 OSA (OBSTRUCTIVE SLEEP APNEA): ICD-10-CM

## 2024-09-24 DIAGNOSIS — E66.01 MORBID OBESITY: ICD-10-CM

## 2024-09-24 PROCEDURE — 3074F SYST BP LT 130 MM HG: CPT | Performed by: INTERNAL MEDICINE

## 2024-09-24 PROCEDURE — 1160F RVW MEDS BY RX/DR IN RCRD: CPT | Performed by: INTERNAL MEDICINE

## 2024-09-24 PROCEDURE — 99214 OFFICE O/P EST MOD 30 MIN: CPT | Performed by: INTERNAL MEDICINE

## 2024-09-24 PROCEDURE — 1159F MED LIST DOCD IN RCRD: CPT | Performed by: INTERNAL MEDICINE

## 2024-09-24 PROCEDURE — 3078F DIAST BP <80 MM HG: CPT | Performed by: INTERNAL MEDICINE

## 2024-09-24 PROCEDURE — 93000 ELECTROCARDIOGRAM COMPLETE: CPT | Performed by: INTERNAL MEDICINE

## 2024-09-24 RX ORDER — METOPROLOL SUCCINATE 25 MG/1
12.5 TABLET, EXTENDED RELEASE ORAL
Status: SHIPPED
Start: 2024-09-24

## 2024-09-24 RX ORDER — PIOGLITAZONEHYDROCHLORIDE 15 MG/1
1 TABLET ORAL DAILY
COMMUNITY
Start: 2024-07-25

## 2024-09-24 RX ORDER — GLIMEPIRIDE 2 MG/1
1 TABLET ORAL DAILY
COMMUNITY
Start: 2024-08-21

## 2024-10-08 ENCOUNTER — OFFICE VISIT (OUTPATIENT)
Dept: SLEEP MEDICINE | Facility: HOSPITAL | Age: 69
End: 2024-10-08
Payer: MEDICARE

## 2024-10-08 ENCOUNTER — TELEPHONE (OUTPATIENT)
Dept: CARDIOLOGY | Facility: CLINIC | Age: 69
End: 2024-10-08
Payer: MEDICARE

## 2024-10-08 VITALS
HEIGHT: 75 IN | DIASTOLIC BLOOD PRESSURE: 61 MMHG | OXYGEN SATURATION: 97 % | WEIGHT: 315 LBS | SYSTOLIC BLOOD PRESSURE: 126 MMHG | BODY MASS INDEX: 39.17 KG/M2 | HEART RATE: 99 BPM

## 2024-10-08 DIAGNOSIS — E66.01 OBESITY, MORBID, BMI 40.0-49.9: ICD-10-CM

## 2024-10-08 DIAGNOSIS — G47.33 OBSTRUCTIVE SLEEP APNEA, ADULT: Primary | ICD-10-CM

## 2024-10-08 PROCEDURE — G0463 HOSPITAL OUTPT CLINIC VISIT: HCPCS

## 2024-10-08 NOTE — PROGRESS NOTES
[facility] SLEEP MEDICINE  [address]  [address]  [place] [address] [phone]    PCP: Deena Otto APRN    Reason for visit:  Sleep disorders: CAMERON    Duane is a 69 y.o.male who was seen in the [facility] today. He got new device and here to review. He uses nasal pillows and prefers the Andrews Fx. Has variable sleep habits. He likes the CPAP machine and finds it beneficial. No EDS.  Rueter Sleepiness Scale is dnc. Caffeine - per day. Alcohol - per week.    Duane  reports that he quit smoking about 45 years ago. His smoking use included cigarettes. He started smoking about 50 years ago. He has a 5 pack-year smoking history. He has been exposed to tobacco smoke. He has never used smokeless tobacco.    Pertinent Positive Review of Systems of denies  Rest of Review of Systems was negative as recorded in Sleep Questionnaire.    Patient  has a past medical history of ADD (attention deficit disorder), Anemia, Anesthesia complication, Atrial fibrillation, Bronchitis due to human metapneumovirus (hMPV) (3/14/2023), Chronic anticoagulation, Chronic pain, Depression, Difficulty swallowing solids, Dilated cardiomyopathy, ED (erectile dysfunction), Elevated cholesterol, Essential hypertension, YANELI (generalized anxiety disorder), Generalized weakness, HLD (hyperlipidemia), Leukocytosis, Morbid obesity, CAMERON (obstructive sleep apnea), Osteoarthrosis, PONV (postoperative nausea and vomiting), Psoriasis, Pulmonary hypertension, Testosterone deficiency, Type 2 diabetes mellitus, and Unsteady gait.     Current Medications:    Current Outpatient Medications:     albuterol sulfate  (90 Base) MCG/ACT inhaler, Inhale 2 puffs Every 6 (Six) Hours As Needed for Wheezing., Disp: 6.7 g, Rfl: 0    atomoxetine (Strattera) 100 MG capsule, Take 1 capsule by mouth Daily., Disp: 90 capsule, Rfl: 3    atorvastatin (LIPITOR) 20 MG tablet, Take 1 tablet by mouth Daily., Disp: 90 tablet, Rfl: 3     azithromycin (ZITHROMAX) 250 MG tablet, Take 2 tabs on first day.  Take 1 tab a day for remaining 4 days., Disp: 6 tablet, Rfl: 0    Blood Glucose Monitoring Suppl (Accu-Chek Leilani Plus) w/Device kit, MONITOR BLOOD SUGAR THREE TIMES DAILY, Disp: 1 kit, Rfl: 0    cyclobenzaprine (FLEXERIL) 10 MG tablet, Take 1 tablet by mouth 3 (Three) Times a Day As Needed., Disp: , Rfl:     Dulaglutide (Trulicity) 1.5 MG/0.5ML solution pen-injector, Inject 1.5 mg under the skin into the appropriate area as directed 1 (One) Time Per Week., Disp: 2 mL, Rfl: 11    DULoxetine (CYMBALTA) 60 MG capsule, Take 1 capsule by mouth 2 (Two) Times a Day. (Patient taking differently: Take 1 capsule by mouth Daily.), Disp: 180 capsule, Rfl: 3    ferrous sulfate 325 (65 FE) MG tablet, Take 1 tablet by mouth Daily With Breakfast., Disp: , Rfl:     furosemide (LASIX) 40 MG tablet, Take 1 tablet by mouth Daily., Disp: 90 tablet, Rfl: 3    glimepiride (AMARYL) 2 MG tablet, Take 1 tablet by mouth Daily., Disp: , Rfl:     HYDROcodone-acetaminophen (NORCO)  MG per tablet, Take 1 tablet by mouth Every 6 (Six) Hours As Needed for Moderate Pain. PT STATES TAKES ONE IN THE MORNING AND IN THE AFTERNOON, AND AGAIN AT BEDTIME MOST DAYS, Disp: , Rfl:     irbesartan (AVAPRO) 300 MG tablet, Take 1 tablet by mouth Every Morning., Disp: 90 tablet, Rfl: 3    metFORMIN (GLUCOPHAGE) 1000 MG tablet, Take 1 tablet by mouth Every 12 (Twelve) Hours., Disp: , Rfl:     metoprolol succinate XL (TOPROL-XL) 25 MG 24 hr tablet, Take 0.5 tablets by mouth every night at bedtime., Disp: , Rfl:     omeprazole (priLOSEC) 20 MG capsule, Take 1 capsule by mouth Daily., Disp: , Rfl:     pioglitazone (ACTOS) 15 MG tablet, Take 1 tablet by mouth Daily., Disp: , Rfl:     rivaroxaban (XARELTO) 20 MG tablet, Take 1 tablet by mouth Daily With Dinner. Indications: Atrial Fibrillation, Disp: 21 tablet, Rfl: 0    spironolactone (ALDACTONE) 25 MG tablet, Take 1 tablet by mouth Every  "Morning., Disp: 90 tablet, Rfl: 3    tamsulosin (FLOMAX) 0.4 MG capsule 24 hr capsule, Take 2 capsules by mouth Daily., Disp: 180 capsule, Rfl: 3   also entered in Sleep Questionnaire         Vital Signs: /61   Pulse 99   Ht 190.5 cm (75\")   Wt (!) 160 kg (352 lb 6.4 oz)   SpO2 97%   BMI 44.05 kg/m²     Body mass index is 44.05 kg/m².       Tongue: Normal       Dentition: good       Pharynx: Posterior pharyngeal pillars are wide   Mallampatti: II (hard and soft palate, upper portion of tonsils anduvula visible)        General: Alert. Cooperative. Well developed. No acute distress.             Head:  Normocephalic. Symmetrical. Atraumatic.              Nose: No septal deviation. No drainage.          Throat: No oral lesions. No thrush. Moist mucous membranes.    Chest Wall:  Normal shape. Symmetric expansion with respiration. No tenderness.             Neck:  Trachea midline.           Lungs:  Clear to auscultation bilaterally. No wheezes. No rhonchi. No rales. Respirations regular, even and unlabored.            Heart:  Regular rhythm and normal rate. Normal S1 and S2. No murmur.     Abdomen:  Soft, non-tender and non-distended. Normal bowel sounds. No masses.  Extremities:  Moves all extremities well. No edema.    Psychiatric: Normal mood and affect.    Diagnostic data available to date is as below and was reviewed on current visit:  Study 12/19/19- The patient tolerated the home sleep testing with monitoring time of 449 minutes. The data obtained make this a technically adequate study. The apnea hypopneas index(AHI) was 15.8 per sleep hour.  The AHI during supine position was 17.7 per sleep hour. Mean heart rate of 70.1 BPM.  Snoring was noted 30.2% of sleep time. Lowest oxygen saturation during the study was 80%. Saturation below 89% was noted for 5.5 mins.     Lab Results   Component Value Date    [facility] 316 04/26/2022       Most current available usage data reviewed on 10/08/2024:    [facility] [employer]: " Rhett    Prescription to DME for replacement supplies as below:    nasal pillow      Description Replacement    Nasal PILLOWS     x A 7034 Nasal Pillows  every 3 mth   x A 7033 Repl Nasal Pillows  2 per mth    Nasal MASK/CUSHION      A 7034 Nasal Mask/Cushion  every 3 mth    A 7032 Repl Nasal Mask/Cushion  2 per mth    Full Face MASK      A 7030 Full Face Mask  every 3 mth    A 7031 Repl Face Mask  1 per mth      A 4604 Heated Tubing  every 3 mth    A 7037 Standard Tubing  every 3 mth   x A 7035 Headgear  every 3 mth   x A 7046 Repl Humidifier Chamber  every 6 yrs   x A 7038 Disposable Filters  2 per mth   x A 7039 Non-disposable Filter  every 6 mth   x A 7036 Chin Strap  every 6 mth     Orders Placed This Encounter   Procedures    Pulmonary Results Scan          Impression:  1. Obstructive sleep apnea, adult    2. Obesity, morbid, BMI 40.0-49.9        Plan:  Duane is doing well though he does not like his current nasal pillows.  He likes the Andrews Fx mask which is no longer manufactured and I asked him to try F&P Solo mask.  His sleep apnea is controlled and current pressures appear adequate.  Asked to continue replacement of supplies regularly.    I reiterated the importance of effective treatment of obstructive sleep apnea with PAP machine.  Cardiovascular health risks of untreated sleep apnea were again reviewed.  Patient was asked to remain cautious if there is persistent hypersomnolence. The benefit of weight loss in reducing severity of obstructive sleep apnea was discussed.  Patient would benefit from adhering to a strict diet to achieve ideal BMI.     Change of PAP supplies regularly is important for effective use.  Change of cushion on the mask or plugs on nasal pillows along with disposable filters once every month and change of mask frame, tubing, headgear and Velcro straps every 6 months at the minimum was reiterated.    This patient is compliant with PAP machine and benefits from its use.  Apnea  hypopneas index is corrected/improved.  Daytime hypersomnolence has resolved.     Patient will follow up in this clinic in 1 year APRN    Thank you for allowing me to participate in your patient's care.    Electronically signed by Myron Deluna MD, 10/08/24, 1:19 PM EDT.    Part of this note may be an electronic transcription/translation of spoken language to printed text using the Dragon Dictation System.

## 2024-10-09 ENCOUNTER — TELEPHONE (OUTPATIENT)
Dept: CARDIOLOGY | Facility: CLINIC | Age: 69
End: 2024-10-09
Payer: MEDICARE

## 2024-10-09 ENCOUNTER — HOSPITAL ENCOUNTER (OUTPATIENT)
Dept: CARDIOLOGY | Facility: HOSPITAL | Age: 69
Discharge: HOME | End: 2024-10-09
Payer: MEDICARE

## 2024-10-09 ENCOUNTER — HOSPITAL ENCOUNTER (OUTPATIENT)
Dept: CARDIOLOGY | Facility: HOSPITAL | Age: 69
Discharge: HOME | End: 2024-10-09
Admitting: INTERNAL MEDICINE
Payer: MEDICARE

## 2024-10-09 VITALS
HEIGHT: 75 IN | WEIGHT: 315 LBS | HEART RATE: 64 BPM | BODY MASS INDEX: 39.17 KG/M2 | DIASTOLIC BLOOD PRESSURE: 70 MMHG | SYSTOLIC BLOOD PRESSURE: 122 MMHG | OXYGEN SATURATION: 95 %

## 2024-10-09 DIAGNOSIS — E11.9 CONTROLLED TYPE 2 DIABETES MELLITUS WITHOUT COMPLICATION, WITHOUT LONG-TERM CURRENT USE OF INSULIN: ICD-10-CM

## 2024-10-09 DIAGNOSIS — R06.09 DYSPNEA ON EXERTION: ICD-10-CM

## 2024-10-09 DIAGNOSIS — I10 ESSENTIAL HYPERTENSION: ICD-10-CM

## 2024-10-09 DIAGNOSIS — I48.21 PERMANENT ATRIAL FIBRILLATION: ICD-10-CM

## 2024-10-09 DIAGNOSIS — Q23.81 BICUSPID AORTIC VALVE: ICD-10-CM

## 2024-10-09 DIAGNOSIS — I35.0 NONRHEUMATIC AORTIC VALVE STENOSIS: ICD-10-CM

## 2024-10-09 LAB
AORTIC DIMENSIONLESS INDEX: 0.3 (DI)
BH CV ECHO MEAS - ACS: 1.24 CM
BH CV ECHO MEAS - AO MAX PG: 38.2 MMHG
BH CV ECHO MEAS - AO MEAN PG: 21.1 MMHG
BH CV ECHO MEAS - AO ROOT AREA (BSA CORRECTED): 0.9 CM2
BH CV ECHO MEAS - AO ROOT DIAM: 2.36 CM
BH CV ECHO MEAS - AO V2 MAX: 309.2 CM/SEC
BH CV ECHO MEAS - AO V2 VTI: 75.5 CM
BH CV ECHO MEAS - AVA(I,D): 0.87 CM2
BH CV ECHO MEAS - EDV(CUBED): 286.2 ML
BH CV ECHO MEAS - EDV(MOD-SP2): 143 ML
BH CV ECHO MEAS - EDV(MOD-SP4): 157 ML
BH CV ECHO MEAS - EF(MOD-BP): 58.8 %
BH CV ECHO MEAS - EF(MOD-SP2): 60.1 %
BH CV ECHO MEAS - EF(MOD-SP4): 58.6 %
BH CV ECHO MEAS - ESV(CUBED): 66.2 ML
BH CV ECHO MEAS - ESV(MOD-SP2): 57 ML
BH CV ECHO MEAS - ESV(MOD-SP4): 65 ML
BH CV ECHO MEAS - FS: 38.6 %
BH CV ECHO MEAS - IVS/LVPW: 1.03 CM
BH CV ECHO MEAS - IVSD: 1.01 CM
BH CV ECHO MEAS - LAT PEAK E' VEL: 9.9 CM/SEC
BH CV ECHO MEAS - LV DIASTOLIC VOL/BSA (35-75): 56.3 CM2
BH CV ECHO MEAS - LV MASS(C)D: 287.8 GRAMS
BH CV ECHO MEAS - LV MAX PG: 2.9 MMHG
BH CV ECHO MEAS - LV MEAN PG: 1.61 MMHG
BH CV ECHO MEAS - LV SYSTOLIC VOL/BSA (12-30): 23.3 CM2
BH CV ECHO MEAS - LV V1 MAX: 85.1 CM/SEC
BH CV ECHO MEAS - LV V1 VTI: 20.5 CM
BH CV ECHO MEAS - LVIDD: 6.6 CM
BH CV ECHO MEAS - LVIDS: 4 CM
BH CV ECHO MEAS - LVOT AREA: 3.2 CM2
BH CV ECHO MEAS - LVOT DIAM: 2.02 CM
BH CV ECHO MEAS - LVPWD: 0.98 CM
BH CV ECHO MEAS - MED PEAK E' VEL: 8 CM/SEC
BH CV ECHO MEAS - MR MAX PG: 54.1 MMHG
BH CV ECHO MEAS - MR MAX VEL: 367.7 CM/SEC
BH CV ECHO MEAS - MV DEC SLOPE: 599.6 CM/SEC2
BH CV ECHO MEAS - MV DEC TIME: 0.17 SEC
BH CV ECHO MEAS - MV E MAX VEL: 126 CM/SEC
BH CV ECHO MEAS - MV MAX PG: 7.7 MMHG
BH CV ECHO MEAS - MV MEAN PG: 3.3 MMHG
BH CV ECHO MEAS - MV P1/2T: 66.3 MSEC
BH CV ECHO MEAS - MV V2 VTI: 27.3 CM
BH CV ECHO MEAS - MVA(P1/2T): 3.3 CM2
BH CV ECHO MEAS - MVA(VTI): 2.41 CM2
BH CV ECHO MEAS - PA V2 MAX: 83.6 CM/SEC
BH CV ECHO MEAS - PULM DIAS VEL: 24.8 CM/SEC
BH CV ECHO MEAS - PULM S/D: 0.81
BH CV ECHO MEAS - PULM SYS VEL: 20 CM/SEC
BH CV ECHO MEAS - QP/QS: 0.6
BH CV ECHO MEAS - RAP SYSTOLE: 8 MMHG
BH CV ECHO MEAS - RV MAX PG: 0.9 MMHG
BH CV ECHO MEAS - RV V1 MAX: 47.6 CM/SEC
BH CV ECHO MEAS - RV V1 VTI: 10.6 CM
BH CV ECHO MEAS - RVOT DIAM: 2.17 CM
BH CV ECHO MEAS - RVSP: 38.5 MMHG
BH CV ECHO MEAS - SV(LVOT): 65.7 ML
BH CV ECHO MEAS - SV(MOD-SP2): 86 ML
BH CV ECHO MEAS - SV(MOD-SP4): 92 ML
BH CV ECHO MEAS - SV(RVOT): 39.2 ML
BH CV ECHO MEAS - SVI(LVOT): 23.5 ML/M2
BH CV ECHO MEAS - SVI(MOD-SP2): 30.8 ML/M2
BH CV ECHO MEAS - SVI(MOD-SP4): 33 ML/M2
BH CV ECHO MEAS - TAPSE (>1.6): 1.75 CM
BH CV ECHO MEAS - TR MAX PG: 30.5 MMHG
BH CV ECHO MEAS - TR MAX VEL: 276 CM/SEC
BH CV ECHO MEASUREMENTS AVERAGE E/E' RATIO: 14.08
BH CV XLRA - RV BASE: 3.6 CM
BH CV XLRA - RV LENGTH: 7.9 CM
BH CV XLRA - RV MID: 2.46 CM
BH CV XLRA - TDI S': 12.6 CM/SEC
LEFT ATRIUM VOLUME INDEX: 40.9 ML/M2
SINUS: 3 CM
STJ: 2.3 CM

## 2024-10-09 PROCEDURE — A9502 TC99M TETROFOSMIN: HCPCS | Performed by: INTERNAL MEDICINE

## 2024-10-09 PROCEDURE — 25010000002 REGADENOSON 0.4 MG/5ML SOLUTION: Performed by: INTERNAL MEDICINE

## 2024-10-09 PROCEDURE — 0 TECHNETIUM TETROFOSMIN KIT: Performed by: INTERNAL MEDICINE

## 2024-10-09 PROCEDURE — 78452 HT MUSCLE IMAGE SPECT MULT: CPT

## 2024-10-09 PROCEDURE — 93306 TTE W/DOPPLER COMPLETE: CPT

## 2024-10-09 PROCEDURE — 25510000001 PERFLUTREN 6.52 MG/ML SUSPENSION 2 ML VIAL: Performed by: INTERNAL MEDICINE

## 2024-10-09 PROCEDURE — 93017 CV STRESS TEST TRACING ONLY: CPT

## 2024-10-09 RX ORDER — REGADENOSON 0.08 MG/ML
0.4 INJECTION, SOLUTION INTRAVENOUS
Status: COMPLETED | OUTPATIENT
Start: 2024-10-09 | End: 2024-10-09

## 2024-10-09 RX ADMIN — REGADENOSON 0.4 MG: 0.08 INJECTION, SOLUTION INTRAVENOUS at 13:15

## 2024-10-09 RX ADMIN — PERFLUTREN 1 ML: 6.52 INJECTION, SUSPENSION INTRAVENOUS at 13:03

## 2024-10-09 RX ADMIN — TETROFOSMIN 1 DOSE: 1.38 INJECTION, POWDER, LYOPHILIZED, FOR SOLUTION INTRAVENOUS at 13:15

## 2024-10-09 NOTE — TELEPHONE ENCOUNTER
Notified Duane Mark Witten of results, patient verbalizes understanding.    Anastasiya Gaines RN  [facility] Triage  10/09/24 16:08 EDT

## 2024-10-11 ENCOUNTER — TELEPHONE (OUTPATIENT)
Dept: CARDIOLOGY | Facility: CLINIC | Age: 69
End: 2024-10-11
Payer: MEDICARE

## 2024-10-14 ENCOUNTER — TELEPHONE (OUTPATIENT)
Dept: CARDIOLOGY | Facility: CLINIC | Age: 69
End: 2024-10-14
Payer: MEDICARE

## 2024-10-14 ENCOUNTER — HOSPITAL ENCOUNTER (OUTPATIENT)
Dept: CARDIOLOGY | Facility: HOSPITAL | Age: 69
Discharge: HOME | End: 2024-10-14
Payer: MEDICARE

## 2024-10-14 ENCOUNTER — PATIENT MESSAGE (OUTPATIENT)
Dept: CARDIOLOGY | Facility: CLINIC | Age: 69
End: 2024-10-14
Payer: MEDICARE

## 2024-10-14 VITALS — BODY MASS INDEX: 39.17 KG/M2 | WEIGHT: 315 LBS | HEIGHT: 75 IN

## 2024-10-14 LAB
BH CV NUCLEAR PRIOR STUDY: 2
BH CV REST NUCLEAR ISOTOPE DOSE: 41.4 MCI
BH CV STRESS BP STAGE 1: NORMAL
BH CV STRESS COMMENTS STAGE 1: NORMAL
BH CV STRESS DOSE REGADENOSON STAGE 1: 0.4
BH CV STRESS DURATION MIN STAGE 1: 0
BH CV STRESS DURATION SEC STAGE 1: 10
BH CV STRESS HR STAGE 1: 76
BH CV STRESS NUCLEAR ISOTOPE DOSE: 41.9 MCI
BH CV STRESS PROTOCOL 1: NORMAL
BH CV STRESS RECOVERY BP: NORMAL MMHG
BH CV STRESS RECOVERY HR: 64 BPM
BH CV STRESS STAGE 1: 1
LV EF NUC BP: 50 %
MAXIMAL PREDICTED HEART RATE: 151 BPM
PERCENT MAX PREDICTED HR: 50.33 %
STRESS BASELINE BP: NORMAL MMHG
STRESS BASELINE HR: 63 BPM
STRESS PERCENT HR: 59 %
STRESS POST EXERCISE DUR SEC: 10 SEC
STRESS POST PEAK BP: NORMAL MMHG
STRESS POST PEAK HR: 76 BPM
STRESS TARGET HR: 128 BPM

## 2024-10-14 PROCEDURE — A9502 TC99M TETROFOSMIN: HCPCS | Performed by: INTERNAL MEDICINE

## 2024-10-14 PROCEDURE — 0 TECHNETIUM TETROFOSMIN KIT: Performed by: INTERNAL MEDICINE

## 2024-10-14 RX ADMIN — TETROFOSMIN 1 DOSE: 1.38 INJECTION, POWDER, LYOPHILIZED, FOR SOLUTION INTRAVENOUS at 14:05

## 2024-10-14 NOTE — TELEPHONE ENCOUNTER
I called pt and reviewed his stress test results   He has no new symptoms today and reports his exertional dyspnea has remained stable since his office visit 9/24/24.   I told him I would discuss his findings with Dr. Bravo in the morning as she is out this afternoon and we would call him again tomorrow to discuss further and he understands the plan.

## 2024-10-15 NOTE — TELEPHONE ENCOUNTER
We do not have samples.  I have called patient to let him know and gave him the low income subsity program phone number to see if they can be of assistance.    Kvng

## 2024-10-15 NOTE — TELEPHONE ENCOUNTER
I called Duane and reviewed plan with him.   CT is scheduled for October 17, we will contact him with results and recommendations following that and he is agreeable

## 2024-10-17 ENCOUNTER — HOSPITAL ENCOUNTER (OUTPATIENT)
Dept: CT IMAGING | Facility: HOSPITAL | Age: 69
Discharge: HOME | End: 2024-10-17
Admitting: INTERNAL MEDICINE
Payer: MEDICARE

## 2024-10-17 DIAGNOSIS — I48.21 PERMANENT ATRIAL FIBRILLATION: ICD-10-CM

## 2024-10-17 DIAGNOSIS — R06.09 DYSPNEA ON EXERTION: ICD-10-CM

## 2024-10-17 DIAGNOSIS — E11.9 CONTROLLED TYPE 2 DIABETES MELLITUS WITHOUT COMPLICATION, WITHOUT LONG-TERM CURRENT USE OF INSULIN: ICD-10-CM

## 2024-10-17 DIAGNOSIS — I35.0 NONRHEUMATIC AORTIC VALVE STENOSIS: ICD-10-CM

## 2024-10-17 DIAGNOSIS — Q23.81 BICUSPID AORTIC VALVE: ICD-10-CM

## 2024-10-17 DIAGNOSIS — I10 ESSENTIAL HYPERTENSION: ICD-10-CM

## 2024-10-17 PROCEDURE — 25510000001 IOPAMIDOL PER 1 ML: Performed by: INTERNAL MEDICINE

## 2024-10-17 PROCEDURE — 71275 CT ANGIOGRAPHY CHEST: CPT

## 2024-10-17 PROCEDURE — 82565 ASSAY OF CREATININE: CPT

## 2024-10-17 RX ORDER — IOPAMIDOL 755 MG/ML
100 INJECTION, SOLUTION INTRAVASCULAR
Status: COMPLETED | OUTPATIENT
Start: 2024-10-17 | End: 2024-10-17

## 2024-10-17 RX ADMIN — IOPAMIDOL 95 ML: 755 INJECTION, SOLUTION INTRAVENOUS at 13:10

## 2024-10-18 ENCOUNTER — TELEPHONE (OUTPATIENT)
Dept: CARDIOLOGY | Facility: CLINIC | Age: 69
End: 2024-10-18
Payer: MEDICARE

## 2024-10-18 LAB — CREAT BLDA-MCNC: 0.9 MG/DL (ref 0.6–1.3)

## 2024-10-22 ENCOUNTER — TELEPHONE (OUTPATIENT)
Dept: CARDIOLOGY | Facility: CLINIC | Age: 69
End: 2024-10-22
Payer: MEDICARE

## 2024-10-22 DIAGNOSIS — E04.2 MULTIPLE THYROID NODULES: Primary | ICD-10-CM

## 2024-10-22 DIAGNOSIS — D73.89 SPLENIC LESION: ICD-10-CM

## 2024-10-22 NOTE — TELEPHONE ENCOUNTER
I spoke with the patient, given results of his CTA chest which shows a stable aorta but he has a splenic lesion and thyroid nodules.  I ordered a multiphasic CT abdomen as well as thyroid ultrasound.  In addition, he has extensive coronary calcification and appears to possibly have a severe RCA stenosis based on combining his CT images with his stress study.  When he gets back into town, he likely needs a cardiac catheterization.  No medication changes for now.  He will call me when he gets back into town.

## 2024-11-19 ENCOUNTER — HOSPITAL ENCOUNTER (OUTPATIENT)
Dept: ULTRASOUND IMAGING | Facility: HOSPITAL | Age: 69
Discharge: HOME | End: 2024-11-19
Payer: MEDICARE

## 2024-11-19 ENCOUNTER — HOSPITAL ENCOUNTER (OUTPATIENT)
Dept: CT IMAGING | Facility: HOSPITAL | Age: 69
Discharge: HOME | End: 2024-11-19
Payer: MEDICARE

## 2024-11-19 DIAGNOSIS — D73.89 SPLENIC LESION: ICD-10-CM

## 2024-11-19 DIAGNOSIS — E04.2 MULTIPLE THYROID NODULES: ICD-10-CM

## 2024-11-19 PROCEDURE — 74170 CT ABD WO CNTRST FLWD CNTRST: CPT

## 2024-11-19 PROCEDURE — 76536 US EXAM OF HEAD AND NECK: CPT

## 2024-11-19 PROCEDURE — 25510000002 DIATRIZOATE MEGLUMINE & SODIUM PER 1 ML: Performed by: INTERNAL MEDICINE

## 2024-11-19 PROCEDURE — 25510000001 IOPAMIDOL 61 % SOLUTION: Performed by: INTERNAL MEDICINE

## 2024-11-19 PROCEDURE — 82565 ASSAY OF CREATININE: CPT

## 2024-11-19 RX ORDER — DIATRIZOATE MEGLUMINE AND DIATRIZOATE SODIUM 660; 100 MG/ML; MG/ML
30 SOLUTION ORAL; RECTAL
Status: COMPLETED | OUTPATIENT
Start: 2024-11-19 | End: 2024-11-19

## 2024-11-19 RX ORDER — IOPAMIDOL 612 MG/ML
100 INJECTION, SOLUTION INTRAVASCULAR
Status: COMPLETED | OUTPATIENT
Start: 2024-11-19 | End: 2024-11-19

## 2024-11-19 RX ADMIN — DIATRIZOATE MEGLUMINE AND DIATRIZOATE SODIUM 30 ML: 660; 100 LIQUID ORAL; RECTAL at 12:10

## 2024-11-19 RX ADMIN — IOPAMIDOL 85 ML: 612 INJECTION, SOLUTION INTRAVENOUS at 13:32

## 2024-11-20 LAB — CREAT BLDA-MCNC: 1 MG/DL (ref 0.6–1.3)

## 2024-11-22 ENCOUNTER — TELEPHONE (OUTPATIENT)
Dept: CARDIOLOGY | Facility: CLINIC | Age: 69
End: 2024-11-22
Payer: MEDICARE

## 2024-11-22 NOTE — TELEPHONE ENCOUNTER
Notified patient of results and recommendations; patient verbalizes understanding. I have faxed a copy of this thread and the CT AP to Fam ABDULLAHI-confirmation received.    Anastasiya Gaines RN  [facility] Triage  11/22/24 12:53 EST

## 2024-11-22 NOTE — TELEPHONE ENCOUNTER
Notified patient of results/recommendations. Patient verbalized understanding. Report sent to PCP.     Sharon Cervantes RN  Triage [facility]

## 2024-11-22 NOTE — TELEPHONE ENCOUNTER
Please call the patient and let him know that I did get a CT abdomen pelvis which shows some changes in his pancreas and liver.  He needs to follow-up with his regular doctor about this because he probably needs an MRI done of the pancreas.  Please fax a copy of CT abdomen pelvis to KAYLEN Savage his PCP.

## 2024-11-22 NOTE — TELEPHONE ENCOUNTER
Please call let him know that his thyroid ultrasound did show a nodule which probably needs biopsy.  Please call his PCP about this.  Please fax this report over to his PCP.

## 2025-02-13 ENCOUNTER — PATIENT OUTREACH (OUTPATIENT)
Dept: CASE MANAGEMENT | Facility: OTHER | Age: 70
End: 2025-02-13
Payer: MEDICARE

## 2025-02-13 NOTE — OUTREACH NOTE
AMBULATORY CASE MANAGEMENT NOTE    Names and Relationships of Patient/Support Persons: Contact: Mindy Ramirez; Relationship: Emergency Contact -     Care Coordination    Received call from patient spouse today, verified by name and date of birth.    Requesting assistance with new patient scheduling for our providers.    Requesting recommendations as specific provider requested was not currently accepting patients.  Spouse agreeable to scheduling with APRN, 2/25/25- provider made aware.    No further needs.    Education Documentation  No documentation found.        Shae BENITEZ  Ambulatory Case Management    2/13/2025, 09:29 EST

## 2025-02-25 ENCOUNTER — OFFICE VISIT (OUTPATIENT)
Dept: FAMILY MEDICINE CLINIC | Facility: CLINIC | Age: 70
End: 2025-02-25
Payer: MEDICARE

## 2025-02-25 VITALS
DIASTOLIC BLOOD PRESSURE: 70 MMHG | TEMPERATURE: 97.9 F | WEIGHT: 315 LBS | HEART RATE: 71 BPM | SYSTOLIC BLOOD PRESSURE: 100 MMHG | HEIGHT: 75 IN | OXYGEN SATURATION: 100 % | BODY MASS INDEX: 39.17 KG/M2

## 2025-02-25 DIAGNOSIS — E11.65 TYPE 2 DIABETES MELLITUS WITH HYPERGLYCEMIA, WITHOUT LONG-TERM CURRENT USE OF INSULIN: ICD-10-CM

## 2025-02-25 DIAGNOSIS — F41.1 GAD (GENERALIZED ANXIETY DISORDER): ICD-10-CM

## 2025-02-25 DIAGNOSIS — Z79.899 CONTROLLED SUBSTANCE AGREEMENT SIGNED: ICD-10-CM

## 2025-02-25 DIAGNOSIS — R35.1 BENIGN PROSTATIC HYPERPLASIA WITH NOCTURIA: ICD-10-CM

## 2025-02-25 DIAGNOSIS — Z23 NEED FOR VACCINATION: ICD-10-CM

## 2025-02-25 DIAGNOSIS — I48.21 PERMANENT ATRIAL FIBRILLATION: ICD-10-CM

## 2025-02-25 DIAGNOSIS — N40.1 BENIGN PROSTATIC HYPERPLASIA WITH NOCTURIA: ICD-10-CM

## 2025-02-25 DIAGNOSIS — E78.00 PURE HYPERCHOLESTEROLEMIA: ICD-10-CM

## 2025-02-25 DIAGNOSIS — Z12.5 SCREENING FOR PROSTATE CANCER: ICD-10-CM

## 2025-02-25 DIAGNOSIS — F32.5 MAJOR DEPRESSIVE DISORDER WITH SINGLE EPISODE, IN FULL REMISSION: ICD-10-CM

## 2025-02-25 DIAGNOSIS — F90.0 ATTENTION DEFICIT HYPERACTIVITY DISORDER (ADHD), PREDOMINANTLY INATTENTIVE TYPE: ICD-10-CM

## 2025-02-25 DIAGNOSIS — I10 ESSENTIAL HYPERTENSION: Primary | ICD-10-CM

## 2025-02-25 DIAGNOSIS — Z51.81 THERAPEUTIC DRUG MONITORING: ICD-10-CM

## 2025-02-25 PROCEDURE — 90662 IIV NO PRSV INCREASED AG IM: CPT

## 2025-02-25 PROCEDURE — 1159F MED LIST DOCD IN RCRD: CPT

## 2025-02-25 PROCEDURE — 99214 OFFICE O/P EST MOD 30 MIN: CPT

## 2025-02-25 PROCEDURE — 1160F RVW MEDS BY RX/DR IN RCRD: CPT

## 2025-02-25 PROCEDURE — 3078F DIAST BP <80 MM HG: CPT

## 2025-02-25 PROCEDURE — G2211 COMPLEX E/M VISIT ADD ON: HCPCS

## 2025-02-25 PROCEDURE — G0008 ADMIN INFLUENZA VIRUS VAC: HCPCS

## 2025-02-25 PROCEDURE — 1125F AMNT PAIN NOTED PAIN PRSNT: CPT

## 2025-02-25 PROCEDURE — 3074F SYST BP LT 130 MM HG: CPT

## 2025-02-25 RX ORDER — GLIMEPIRIDE 2 MG/1
2 TABLET ORAL DAILY
Qty: 90 TABLET | Refills: 1 | Status: SHIPPED | OUTPATIENT
Start: 2025-02-25

## 2025-02-25 RX ORDER — ATORVASTATIN CALCIUM 20 MG/1
20 TABLET, FILM COATED ORAL DAILY
Qty: 90 TABLET | Refills: 3 | Status: SHIPPED | OUTPATIENT
Start: 2025-02-25

## 2025-02-25 RX ORDER — GABAPENTIN 100 MG/1
1 CAPSULE ORAL 3 TIMES DAILY
COMMUNITY
Start: 2025-01-20

## 2025-02-25 RX ORDER — TAMSULOSIN HYDROCHLORIDE 0.4 MG/1
2 CAPSULE ORAL DAILY
Qty: 180 CAPSULE | Refills: 1 | Status: SHIPPED | OUTPATIENT
Start: 2025-02-25

## 2025-02-25 RX ORDER — OMEPRAZOLE 40 MG/1
40 CAPSULE, DELAYED RELEASE ORAL DAILY
Qty: 90 CAPSULE | Refills: 3 | Status: SHIPPED | OUTPATIENT
Start: 2025-02-25

## 2025-02-25 RX ORDER — BLOOD-GLUCOSE METER
EACH MISCELLANEOUS
Qty: 1 KIT | Refills: 5 | Status: SHIPPED | OUTPATIENT
Start: 2025-02-25

## 2025-02-25 RX ORDER — HYDROXYZINE PAMOATE 25 MG/1
50 CAPSULE ORAL
Qty: 180 CAPSULE | Refills: 3 | Status: SHIPPED | OUTPATIENT
Start: 2025-02-25

## 2025-02-25 RX ORDER — CYCLOBENZAPRINE HCL 10 MG
10 TABLET ORAL 3 TIMES DAILY PRN
Qty: 90 TABLET | Refills: 1 | Status: SHIPPED | OUTPATIENT
Start: 2025-02-25

## 2025-02-25 RX ORDER — HYDROXYZINE PAMOATE 25 MG/1
CAPSULE ORAL
COMMUNITY
Start: 2024-12-01 | End: 2025-02-25 | Stop reason: SDUPTHER

## 2025-02-25 RX ORDER — DULOXETIN HYDROCHLORIDE 60 MG/1
60 CAPSULE, DELAYED RELEASE ORAL 2 TIMES DAILY
Qty: 180 CAPSULE | Refills: 1 | Status: SHIPPED | OUTPATIENT
Start: 2025-02-25

## 2025-02-25 NOTE — LETTER
Controlled Substance Prescribing Agreement          I, Duane Mark Witten [PATIENT],  1955 [] a patient of  Laura Gorman APRN   [PROVIDER] at [facility] [PRACTICE], have been informed that  individuals who are prescribed certain Controlled Substances including, but not limited to, narcotic pain medicines, stimulants, benzodiazepine tranquilizers, and barbiturate sedatives, can abuse those substances or may allow abuse by others, and have some risk of developing an addictive disorder or suffering a relapse of a prior addiction. Therefore, I have been informed that it is necessary to observe strict rules pertaining to their use, and I agree to follow the terms and procedures described in this Agreement as consideration for, and as a condition of, the willingness of the physician whose signature appears below to consider prescribing or to continue prescribing Controlled Substances to treat my pain.     1. I will inform my physician of any current or past substance abuse, or any current or past substance abuse of any immediate member of my immediate family.     2. I agree that I may be subject to a voluntary evaluation by psychologists and/or psychiatrists, possibly at my own expense, before any Controlled Substances will be prescribed to me. I agree that the need to be evaluated by psychologists and/or psychiatrists may be revisited every three (3) to six (6) months thereafter while taking the medication.     3. All Controlled Substances must come from a provider in the PROVIDER’S PRACTICE. My Controlled Substances will come from the PROVIDER whose signature appears below, or during his or her absence, by the covering provider, unless specific written authorization is obtained from the office for an exception.     4. I will obtain all Controlled Substances from the same pharmacy. Should the need arise to change pharmacies, I will inform the PROVIDER’S office.     5. I will  inform the PROVIDER’S office of any new medications or medical conditions, and of any adverse effects I experience from any of the medications that I take.     6. I will inform my other health care providers that I am taking the Controlled Substances listed above, and of the existence of this Agreement. In the event of an emergency, I will provide the foregoing information to emergency department providers.     7. I agree that my prescribing PROVIDER has permission to discuss all diagnostic and treatment details with other health care providers, pharmacists, or other professionals who provide my health care regarding my use of Controlled Substances for purposes of maintaining accountability.     8. I will not allow anyone else to have, use sell, or otherwise have access to these medications. The sharing of medications with anyone is absolutely forbidden and is against the law.         9. I understand that Controlled Substances may be hazardous or lethal to a person who is not tolerant to their effects, especially a child, and that I must keep them out of reach of such people for their own safety.     10. I understand that tampering with a written prescription is a felony and I will not change or tamper with the PROVIDER’S written prescription.     11. I am aware that attempting to obtain a Controlled Substance under false pretenses is illegal.     12. I agree not to alter my medication in any way, and I will take my medication whole, and it will not be broken, chewed, crushed, injected, or snorted.     13. I will take my medication as instructed and prescribed, and I will not exceed the maximum prescribed dose. Any change in dosage must be approved by the PROVIDER or a physician within the PRACTICE.     14. I understand that these drugs should not be stopped abruptly, as withdrawal syndromes may develop.     15. I will cooperate with unannounced urine or serum toxicology screenings as may be requested, as well as  any random pill counts of medication by the PROVIDER. Failure to comply may result in termination of the PROVIDER-patient relationship.     16. I understand that the presence of unauthorized and/or illegal substances in the screenings described in the paragraph above may prompt referral for assessment for a substance abuse disorder or termination of the PROVIDER-patient relationship.     17. I understand that medications may not be replaced if they are lost, damaged, or stolen. If any of these situations arise that cause me to request an early refill of my medication, a copy of a filed police report or a statement from me explaining the circumstances may be required before additional prescriptions are considered. If I request an early refill secondary to lost, damaged, or stolen prescriptions twice within a year, I may be discharged from the practice.     18. I understand that a prescription may be given early if the PROVIDER or the patient will be out of town when the refill is due. These prescriptions will contain instructions to the pharmacist that the prescriptions(s) may not be filled prior to the appropriate date.     19. If the responsible legal authorities have questions concerning my treatment, as may occur, for example, if I obtained medication at several pharmacies, all confidentiality is waived, and these authorities may be given full access to my full records of Controlled Substances administration.     20. I will keep my scheduled appointments in order to receive medication renewals. If I need to cancel my appointment, I will do so a minimum of twenty-four (24) hours before it is scheduled.     21. I understand that I may be asked to bring my medications in their original container to the PROVIDER’s office while I am on controlled medication.     22. Refills generally will not be given over the phone, after office hours, during the weekends, and on holidays.     23. I understand that any medical  treatment is initially a trial, with the goal of treatment being to improve the quality of life and ability to function and/or work. These parameters will be assessed periodically to determine the benefits of continued therapy, and continued prescription is contingent on whether my physician believes that the medication usage benefits me. I will comply with all treatments as outlined by the PROVIDER.     24. I have been explained the risks and potential benefits of these therapies, including, but not limited to, psychological addiction, physical dependence, withdrawal and over dosage.     25. I understand that failure to adhere to these policies and/or failure to comply with the PROVIDER’S treatment plan may result in cessation of therapy with Controlled Substance prescribing by the PROVIDER or referral for further specialty assessment, as well as possible discharge from the PRACTICE.     26. I, the undersigned patient, attest that the foregoing was discussed with me, and that I have read, fully understand, and agree to all of the above requirements and instructions. I affirm that I have the full right and power to sign and be bound by this  Agreement.         Date:  __________________________________________    Time:  __________________________________________    Patient Printed Name:  _____________________________    Patient Signature:  ________________________________           Date:  __________________________________________    Time:  __________________________________________    Provider Signature:  _______________________________

## 2025-02-25 NOTE — ASSESSMENT & PLAN NOTE
Wants to stop Strattera and start Adderall.   I advised the patient he will need clearance from Cardiology to start Adderall.  Controlled substance contract signed.  UDS ordered.  Follow up in 4 weeks.

## 2025-02-25 NOTE — ASSESSMENT & PLAN NOTE
Orders:    Lipid panel    atorvastatin (LIPITOR) 20 MG tablet; Take 1 tablet by mouth Daily.

## 2025-02-25 NOTE — ASSESSMENT & PLAN NOTE
Orders:    Comprehensive metabolic panel    CBC and Differential    TSH    Microalbumin / Creatinine Urine Ratio - Urine, Clean Catch

## 2025-02-25 NOTE — ASSESSMENT & PLAN NOTE
Orders:    Comprehensive metabolic panel    Microalbumin / Creatinine Urine Ratio - Urine, Clean Catch    Hemoglobin A1c    Blood Glucose Monitoring Suppl (Accu-Chek Leilani Plus) w/Device kit; MONITOR BLOOD SUGAR THREE TIMES DAILY    glimepiride (AMARYL) 2 MG tablet; Take 1 tablet by mouth Daily.    metFORMIN (GLUCOPHAGE) 1000 MG tablet; Take 1 tablet by mouth Every 12 (Twelve) Hours.

## 2025-02-25 NOTE — ASSESSMENT & PLAN NOTE
Orders:    DULoxetine (CYMBALTA) 60 MG capsule; Take 1 capsule by mouth 2 (Two) Times a Day.

## 2025-02-25 NOTE — PROGRESS NOTES
Chief Complaint  Establish Care and BMI    Subjective          History of Present Illness  History of Present Illness  Duane Mark Witten 70 y.o. male presents today for a new patient appointment. He is here to establish care and is a new patient to me. I reviewed the On license of [facility] recorded today by my MA/LPN staff.       Mr. Ramirez has a longstanding history of atrial fibrillation, managed with Xarelto 20 mg daily under the supervision of Dr. Bravo, Cardiology. He also has a history of heart failure, which is currently under control. His medication regimen includes metoprolol 25 mg daily, furosemide 40 mg daily, irbesartan 300 mg daily, spironolactone 25 mg daily, and tamsulosin 0.4 mg twice daily. He takes one half tablet of metoprolol 25 mg because his heart rate was going too low. He is also on atorvastatin 20 mg daily for cholesterol management. He is on iron, vitamin D, and magnesium supplements.    He has diabetes and is on Trulicity 3 mg injection once a week, glimepiride 2 mg once a day, and metformin 1000 mg twice a day. He has also been taking Pioglitazone (Actos) 15 mg once daily. I informed the patient that he should not be taking this, as Actos is contraindicated with a history of heart failure.    He does not see an Endocrinologist for diabetes. His last A1c was 6.3 or 6.4% (per patient).    He has a history of ADD, diagnosed after completing a questionnaire for his child. He was initially prescribed Adderall, which proved effective, but due to Dr. Minaya's [facility], he was switched to Atomoxetine 100 mg daily. He reports that Atomoxetine has been ineffective in managing his ADD symptoms. He has previously tolerated Adderall well, without any side effects such as palpitations. He has discussed the possibility of resuming Adderall with Dr. Bravo, Cardiology.     Mr. 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 This required a level 4 corpectomy and a month-long inpatient rehabilitation to regain mobility. The incident has left him with residual balance issues and limited walking distance. He is currently at approximately 60% of his pre-incident functionality. He is under the care of Dr. Tyler Branch for pain management, taking hydrocodone, gabapentin 100 mg three times daily, and cyclobenzaprine 10 mg three times daily as needed.    He has been experiencing severe heartburn, which is not adequately controlled with Omeprazole 20 mg daily. He is requesting an increase in the dosage to 40 mg.    ALLERGIES  The patient is allergic to CATS.    MEDICATIONS  Xarelto, Trulicity, glimepiride, metformin, metoprolol, furosemide, duloxetine, irbesartan, hydroxyzine, atomoxetine, omeprazole, tamsulosin, pioglitazone, spironolactone, hydrocodone, gabapentin, cyclobenzaprine, iron, vitamin D, magnesium, atorvastatin.           Review of Systems   Constitutional:  Negative for fever.   Respiratory:  Negative for chest tightness and shortness of breath.    Cardiovascular:  Negative for chest pain and palpitations.   Gastrointestinal:  Negative for abdominal pain, nausea and vomiting.        Heartburn   Endocrine: Negative for polydipsia, polyphagia and polyuria.        Objective   Vital Signs:   /70 (BP Location: Left arm, Patient Position: Sitting, Cuff Size: Large Adult)   Pulse 71   Temp 97.9 °F (36.6 °C) (Oral)   Ht 190.5 cm (75\")   Wt (!) 160 kg (352 lb 6.4 oz)   SpO2 100%   BMI 44.05 kg/m²      Class 3 Severe Obesity (BMI >=40). Obesity-related health conditions include the following: hypertension, coronary heart disease, diabetes mellitus, impaired fasting glucose, dyslipidemias, GERD, and osteoarthritis. Obesity is newly identified. BMI is is above average; BMI management plan is completed. We discussed low calorie, low carb based diet program, portion control, and increasing exercise.   "     Physical Exam  Vitals and nursing note reviewed.   Constitutional:       General: He is not in acute distress.     Appearance: He is well-developed. He is obese.   HENT:      Head: Normocephalic and atraumatic.   Eyes:      General: No scleral icterus.        Right eye: No discharge.         Left eye: No discharge.      Conjunctiva/sclera: Conjunctivae normal.      Pupils: Pupils are equal, round, and reactive to light.   Neck:      Vascular: No carotid bruit.      Trachea: No tracheal deviation.   Cardiovascular:      Rate and Rhythm: Normal rate and regular rhythm.      Pulses: Normal pulses.      Heart sounds: Normal heart sounds.   Pulmonary:      Effort: Pulmonary effort is normal. No respiratory distress.      Breath sounds: Normal breath sounds. No wheezing or rales.   Musculoskeletal:         General: Normal range of motion.      Cervical back: Normal range of motion and neck supple.   Skin:     General: Skin is warm.      Findings: No rash.   Neurological:      Mental Status: He is alert and oriented to person, place, and time.      Motor: No abnormal muscle tone.      Coordination: Coordination normal.   Psychiatric:         Mood and Affect: Mood normal.        Physical Exam  Lungs were auscultated.  Heart was examined.    The following data was reviewed by: KAYLEN Salcedo on 02/25/2025:  Lipid Panel (08/29/2023 13:37)       Results  Laboratory Studies  LDL cholesterol was 86 in 2023. A1c was 6.3 or 6.4%.               Assessment and Plan    Assessment & Plan  1. Atrial Fibrillation.  He is currently managed by Cardiology and is on Xarelto 20 mg once a day. Continue current medication regimen.    2. Heart Failure.  Managed by Cardiology. Continue current medication regimen including metoprolol 25 mg once a day (half tablet), spironolactone 25 mg once a day, and furosemide 40 mg once a day.    3. Diabetes Mellitus.  He is on Trulicity 3 mg once a week, glimepiride 2 mg once a day, and metformin  1000 mg twice a day. His last A1c was around 6.3% (per patient). Continue current medication regimen. Refill for Trulicity will be sent to the pharmacy. Actos was discontinued due to the patient's history of heart failure. He may need a different DM medication depending on A1c result.    4. Gastroesophageal Reflux Disease.  He is experiencing heartburn. Omeprazole will be increased to 40 mg once a day. Prescription will be sent to the pharmacy.    5. Attention Deficit Disorder.  He is currently on Atomoxetine 100 mg once a day but reports it is not effective. A letter from Dr. Bravo, Cardiology is required to clear him for stimulant medication. A urine drug screen will be added to today's labs. If cleared by Cardiology, he will be started on the lowest effective dose of Adderall, and a controlled substance agreement contract will be signed.    6. Cholesterol Management.  His LDL goal is less than 70, but recent guidelines suggest a target of less than 55 for patients with his history. His last recorded LDL was 86 in 2023, but it has been as low as 69 in the past. A recheck of his cholesterol levels is necessary. His current Atorvastatin dosage is 20 mg daily, which is considered a low dose for patients with a history of atrial fibrillation, diabetes, and heart failure. A 10-year atherosclerotic cardiovascular disease risk score will be calculated upon receipt of his cholesterol results. If his cholesterol levels exceed 70, an increase in his Atorvastatin dosage to the medium range will be recommended to achieve tighter control and prevent potential complications such as myocardial infarction and cerebrovascular accident.    Follow-up  The patient will follow up in 1 month.    PROCEDURE  The patient underwent a level 4 corpectomy in 2018 following a severe episode of cervical disc herniation into the spinal column, which resulted in spinal cord compression and paralysis.    Assessment & Plan  Essential  hypertension      Orders:    Comprehensive metabolic panel    CBC and Differential    TSH    Microalbumin / Creatinine Urine Ratio - Urine, Clean Catch    Permanent atrial fibrillation    Orders:    Lipid panel    Pure hypercholesterolemia       Orders:    Lipid panel    atorvastatin (LIPITOR) 20 MG tablet; Take 1 tablet by mouth Daily.    Type 2 diabetes mellitus with hyperglycemia, without long-term current use of insulin      Orders:    Comprehensive metabolic panel    Microalbumin / Creatinine Urine Ratio - Urine, Clean Catch    Hemoglobin A1c    Blood Glucose Monitoring Suppl (Accu-Chek Leilani Plus) w/Device kit; MONITOR BLOOD SUGAR THREE TIMES DAILY    glimepiride (AMARYL) 2 MG tablet; Take 1 tablet by mouth Daily.    metFORMIN (GLUCOPHAGE) 1000 MG tablet; Take 1 tablet by mouth Every 12 (Twelve) Hours.    Attention deficit hyperactivity disorder (ADHD), predominantly inattentive type  Wants to stop Strattera and start Adderall.   I advised the patient he will need clearance from Cardiology to start Adderall.  Controlled substance contract signed.  UDS ordered.  Follow up in 4 weeks.         Major depressive disorder with single episode, in full remission      Orders:    DULoxetine (CYMBALTA) 60 MG capsule; Take 1 capsule by mouth 2 (Two) Times a Day.    YANELI (generalized anxiety disorder)      Orders:    DULoxetine (CYMBALTA) 60 MG capsule; Take 1 capsule by mouth 2 (Two) Times a Day.    Benign prostatic hyperplasia with nocturia    Orders:    tamsulosin (FLOMAX) 0.4 MG capsule 24 hr capsule; Take 2 capsules by mouth Daily.    Need for vaccination    Orders:    Fluzone High-Dose 65+yrs (5340-1887)    Screening for prostate cancer    Orders:    PSA Screen    Therapeutic drug monitoring    Orders:    ToxAssure Flex 23, Ur - Urine, Clean Catch    Controlled substance agreement signed                  Follow Up     Return in about 1 month (around 3/25/2025) for Next scheduled follow up.    Patient or patient  representative verbalized consent for the use of Ambient Listening during the visit with  KAYLEN Salcedo for chart documentation. 2/25/2025  08:52 EST    Patient was given instructions and counseling regarding his condition or for health maintenance advice. Please see specific information pulled into the AVS if appropriate.

## 2025-02-26 ENCOUNTER — PATIENT ROUNDING (BHMG ONLY) (OUTPATIENT)
Dept: FAMILY MEDICINE CLINIC | Facility: CLINIC | Age: 70
End: 2025-02-26
Payer: MEDICARE

## 2025-02-26 NOTE — PROGRESS NOTES
A My-Chart message has been sent to the patient for PATIENT ROUNDING with [facility] [place], [place]

## 2025-02-28 LAB
1OH-MIDAZOLAM UR QL SCN: NOT DETECTED NG/MG CREAT
6MAM UR QL SCN: NEGATIVE NG/ML
7AMINOCLONAZEPAM/CREAT UR: NOT DETECTED NG/MG CREAT
A-OH ALPRAZ/CREAT UR: NOT DETECTED NG/MG CREAT
A-OH-TRIAZOLAM/CREAT UR CFM: NOT DETECTED NG/MG CREAT
ACP UR QL CFM: NOT DETECTED
ALBUMIN SERPL-MCNC: 4.4 G/DL (ref 3.9–4.9)
ALBUMIN/CREAT UR: 8 MG/G CREAT (ref 0–29)
ALP SERPL-CCNC: 133 IU/L (ref 44–121)
ALPRAZ/CREAT UR CFM: NOT DETECTED NG/MG CREAT
ALT SERPL-CCNC: 12 IU/L (ref 0–44)
AMPHETAMINES UR QL SCN: NEGATIVE NG/ML
APAP UR QL SCN: NORMAL UG/ML
APAP UR QL: NORMAL
APAP UR-MCNC: PRESENT UG/ML
AST SERPL-CCNC: 15 IU/L (ref 0–40)
BARBITURATES UR QL SCN: NEGATIVE NG/ML
BASOPHILS # BLD AUTO: 0.1 X10E3/UL (ref 0–0.2)
BASOPHILS NFR BLD AUTO: 1 %
BENZODIAZ SCN METH UR: NEGATIVE
BILIRUB SERPL-MCNC: 0.7 MG/DL (ref 0–1.2)
BUN SERPL-MCNC: 14 MG/DL (ref 8–27)
BUN/CREAT SERPL: 12 (ref 10–24)
BUPRENORPHINE UR QL SCN: NEGATIVE
BUPRENORPHINE/CREAT UR: NOT DETECTED NG/MG CREAT
CALCIUM SERPL-MCNC: 9.2 MG/DL (ref 8.6–10.2)
CANNABINOIDS UR QL CFM: NORMAL
CANNABINOIDS UR QL SCN: NORMAL NG/ML
CARBOXYTHC UR CFM-MCNC: 672 NG/MG CREAT
CARISOPRODOL UR QL: NEGATIVE NG/ML
CHLORIDE SERPL-SCNC: 102 MMOL/L (ref 96–106)
CHOLEST SERPL-MCNC: 126 MG/DL (ref 100–199)
CLONAZEPAM/CREAT UR CFM: NOT DETECTED NG/MG CREAT
CO2 SERPL-SCNC: 22 MMOL/L (ref 20–29)
COCAINE+BZE UR QL SCN: NEGATIVE NG/ML
CODEINE UR CFM-MCNC: NOT DETECTED NG/MG CREAT
CREAT SERPL-MCNC: 1.19 MG/DL (ref 0.76–1.27)
CREAT UR-MCNC: 137 MG/DL
CREAT UR-MCNC: 144.1 MG/DL
D-METHORPHAN UR-MCNC: NOT DETECTED NG/ML
D-METHORPHAN+LEVORPHANOL UR QL: NOT DETECTED
DESALKYLFLURAZ/CREAT UR: NOT DETECTED NG/MG CREAT
DHC/CREAT UR: 176 NG/MG CREAT
DIAZEPAM/CREAT UR: NOT DETECTED NG/MG CREAT
EGFRCR SERPLBLD CKD-EPI 2021: 66 ML/MIN/1.73
EOSINOPHIL # BLD AUTO: 1.3 X10E3/UL (ref 0–0.4)
EOSINOPHIL NFR BLD AUTO: 11 %
ERYTHROCYTE [DISTWIDTH] IN BLOOD BY AUTOMATED COUNT: 14.6 % (ref 11.6–15.4)
ETHANOL UR QL SCN: NEGATIVE G/DL
ETHANOL UR QL SCN: NEGATIVE NG/ML
FENTANYL CTO UR SCN-MCNC: NEGATIVE NG/ML
FENTANYL/CREAT UR: NOT DETECTED NG/MG CREAT
FLUNITRAZEPAM UR QL SCN: NOT DETECTED NG/MG CREAT
GABAPENTIN UR-MCNC: NEGATIVE UG/ML
GLOBULIN SER CALC-MCNC: 2.1 G/DL (ref 1.5–4.5)
GLUCOSE SERPL-MCNC: 102 MG/DL (ref 70–99)
HALLUCINOGENS UR: NEGATIVE
HBA1C MFR BLD: 6.4 % (ref 4.8–5.6)
HCT VFR BLD AUTO: 38.5 % (ref 37.5–51)
HDLC SERPL-MCNC: 38 MG/DL
HGB BLD-MCNC: 12 G/DL (ref 13–17.7)
HYDROCODONE UR CFM-MCNC: 724 NG/MG CREAT
HYDROMORPHONE UR CFM-MCNC: NOT DETECTED NG/MG CREAT
HYPNOTICS UR QL SCN: NEGATIVE
IMM GRANULOCYTES # BLD AUTO: 0 X10E3/UL (ref 0–0.1)
IMM GRANULOCYTES NFR BLD AUTO: 0 %
KETAMINE UR QL: NOT DETECTED
LDLC SERPL CALC-MCNC: 71 MG/DL (ref 0–99)
LORAZEPAM/CREAT UR: NOT DETECTED NG/MG CREAT
LYMPHOCYTES # BLD AUTO: 3.2 X10E3/UL (ref 0.7–3.1)
LYMPHOCYTES NFR BLD AUTO: 28 %
MCH RBC QN AUTO: 27.8 PG (ref 26.6–33)
MCHC RBC AUTO-ENTMCNC: 31.2 G/DL (ref 31.5–35.7)
MCV RBC AUTO: 89 FL (ref 79–97)
MEPERIDINE UR QL SCN: NEGATIVE NG/ML
METHADONE UR QL SCN: NEGATIVE NG/ML
METHADONE+METAB UR QL SCN: NEGATIVE NG/ML
MICROALBUMIN UR-MCNC: 11.2 UG/ML
MIDAZOLAM/CREAT UR CFM: NOT DETECTED NG/MG CREAT
MISCELLANEOUS, UR: NEGATIVE
MONOCYTES # BLD AUTO: 0.6 X10E3/UL (ref 0.1–0.9)
MONOCYTES NFR BLD AUTO: 6 %
MORPHINE UR CFM-MCNC: NOT DETECTED NG/MG CREAT
NEUTROPHILS # BLD AUTO: 6.1 X10E3/UL (ref 1.4–7)
NEUTROPHILS NFR BLD AUTO: 54 %
NORBUPRENORPHINE/CREAT UR: NOT DETECTED NG/MG CREAT
NORCODEINE/CREAT UR CFM: NOT DETECTED NG/MG CREAT
NORDIAZEPAM/CREAT UR: NOT DETECTED NG/MG CREAT
NORFENTANYL/CREAT UR: NOT DETECTED NG/MG CREAT
NORFLUNITRAZEPAM UR-MCNC: NOT DETECTED NG/MG CREAT
NORHYDROCODONE UR CFM-MCNC: 718 NG/MG CREAT
NORKETAMINE UR-MCNC: NOT DETECTED UG/ML
NORMORPHINE UR-MCNC: NOT DETECTED NG/MG CREAT
OPIATES UR QL CFM: NORMAL
OPIATES UR SCN-MCNC: NORMAL NG/ML
OXAZEPAM/CREAT UR: NOT DETECTED NG/MG CREAT
OXYCODONE CTO UR SCN-MCNC: NEGATIVE NG/ML
PCP UR QL SCN: NEGATIVE NG/ML
PLATELET # BLD AUTO: 256 X10E3/UL (ref 150–450)
POTASSIUM SERPL-SCNC: 4.6 MMOL/L (ref 3.5–5.2)
PRESCRIBED MEDICATIONS: NORMAL
PROPOXYPH UR QL SCN: NEGATIVE NG/ML
PROT SERPL-MCNC: 6.5 G/DL (ref 6–8.5)
PSA SERPL-MCNC: 0.6 NG/ML (ref 0–4)
RBC # BLD AUTO: 4.31 X10E6/UL (ref 4.14–5.8)
SODIUM SERPL-SCNC: 141 MMOL/L (ref 134–144)
TAPENTADOL CTO UR SCN-MCNC: NEGATIVE NG/ML
TEMAZEPAM/CREAT UR: NOT DETECTED NG/MG CREAT
TRAMADOL UR QL SCN: NEGATIVE NG/ML
TRIGL SERPL-MCNC: 88 MG/DL (ref 0–149)
TSH SERPL DL<=0.005 MIU/L-ACNC: 2.26 UIU/ML (ref 0.45–4.5)
VLDLC SERPL CALC-MCNC: 17 MG/DL (ref 5–40)
WBC # BLD AUTO: 11.3 X10E3/UL (ref 3.4–10.8)
ZALEPLON UR-MCNC: NOT DETECTED NG/ML
ZOLPIDEM PHENYL-4-CARB UR QL SCN: NOT DETECTED
ZOLPIDEM UR QL SCN: NOT DETECTED
ZOPICLONE-N-OXIDE UR-MCNC: NOT DETECTED NG/ML

## 2025-03-02 DIAGNOSIS — D72.829 LEUKOCYTOSIS, UNSPECIFIED TYPE: Primary | ICD-10-CM

## 2025-03-10 ENCOUNTER — RESULTS FOLLOW-UP (OUTPATIENT)
Dept: FAMILY MEDICINE CLINIC | Facility: CLINIC | Age: 70
End: 2025-03-10
Payer: MEDICARE

## 2025-03-21 ENCOUNTER — OFFICE VISIT (OUTPATIENT)
Dept: FAMILY MEDICINE CLINIC | Facility: CLINIC | Age: 70
End: 2025-03-21
Payer: MEDICARE

## 2025-03-21 VITALS
SYSTOLIC BLOOD PRESSURE: 102 MMHG | BODY MASS INDEX: 39.17 KG/M2 | HEART RATE: 71 BPM | TEMPERATURE: 98.1 F | DIASTOLIC BLOOD PRESSURE: 60 MMHG | HEIGHT: 75 IN | WEIGHT: 315 LBS | OXYGEN SATURATION: 100 %

## 2025-03-21 DIAGNOSIS — Z23 NEED FOR VACCINATION: ICD-10-CM

## 2025-03-21 DIAGNOSIS — J01.10 ACUTE FRONTAL SINUSITIS, RECURRENCE NOT SPECIFIED: Primary | ICD-10-CM

## 2025-03-21 RX ORDER — CEFDINIR 300 MG/1
300 CAPSULE ORAL 2 TIMES DAILY
Qty: 20 CAPSULE | Refills: 0 | Status: SHIPPED | OUTPATIENT
Start: 2025-03-21 | End: 2025-03-31

## 2025-03-21 NOTE — PROGRESS NOTES
Chief Complaint  ADHD    Subjective          History of Present Illness     The patient is a 70-year-old male who presents for evaluation of sinus symptoms, hearing loss, diabetes, ADHD, and sleep issues.    He reports experiencing sinus pain, pressure, and congestion, predominantly in the frontal region above his eyebrows. He has a history of allergies and smoking-induced sinus infections, which improved upon cessation of smoking. His current symptoms include persistent clear nasal discharge and postnasal drip. He also reports decreased bilateral hearing, describing it as akin to water running in his ears. This symptom predates his sinus issues. He has been using Q-tips for ear cleaning and has not tried Flonase. He does not have any fever, chills, cough, or shortness of breath. He also does not have any ear pain or sore throat. He has previously responded well to Omnicef without any adverse reactions.    He has a history of diabetes with fluctuating blood sugar levels. He experienced a hypoglycemic episode with a blood sugar level of 30, resulting in loss of consciousness and hospitalization. He is considering the use of a continuous glucose monitor.    He has a lifelong history of ADD, which he believes contributes to his sleep disturbances. He is currently on Strattera 100 mg daily, which he reports as ineffective. He has previously consulted a psychiatrist during his second marriage and was prescribed Adderall, which he found beneficial. He is currently taking hydroxyzine and magnesium glycinate 500 mg daily.    He reports difficulty falling asleep, often taking hours to do so. He avoids eating and caffeine intake before bedtime. He is currently taking 12 mg of melatonin.    Supplemental Information  He has a history of poor balance due to neck spinal cord issues and has experienced dizziness upon standing, but no falls. He also reports constant tinnitus in both ears and has consulted an audiologist.    SOCIAL  "HISTORY  The patient used to smoke but has quit. He takes THC edibles for ADHD and anxiety.    ALLERGIES  The patient has no known allergies.    MEDICATIONS  Current: Strattera, hydroxyzine, melatonin, magnesium glycinate, D3  Duanealonso Ramirez 70 y.o. male presents         Review of Systems   Constitutional:  Negative for appetite change, chills, fatigue and fever.   HENT:  Positive for congestion, hearing loss (bilateral), postnasal drip, rhinorrhea, sinus pressure and tinnitus. Negative for ear discharge, ear pain, sore throat and trouble swallowing.    Eyes:  Negative for visual disturbance.   Respiratory:  Negative for cough, chest tightness, shortness of breath and wheezing.    Cardiovascular:  Negative for chest pain, palpitations and leg swelling.   Gastrointestinal:  Negative for abdominal pain, constipation, diarrhea, nausea and vomiting.   Genitourinary:  Negative for dysuria, frequency and urgency.   Musculoskeletal:  Negative for gait problem, myalgias and neck pain.   Skin:  Negative for rash.   Neurological:  Positive for dizziness. Negative for syncope, weakness and light-headedness.   Psychiatric/Behavioral:  Negative for dysphoric mood. The patient is not nervous/anxious.         Objective   Vital Signs:   /60 (BP Location: Left arm, Patient Position: Sitting, Cuff Size: Large Adult)   Pulse 71   Temp 98.1 °F (36.7 °C) (Oral)   Ht 190.5 cm (75\")   Wt (!) 159 kg (350 lb 3.2 oz)   SpO2 100%   BMI 43.77 kg/m²             Physical Exam  Vitals and nursing note reviewed.   Constitutional:       General: He is not in acute distress.     Appearance: He is well-developed. He is obese.   HENT:      Head: Normocephalic and atraumatic.      Right Ear: Decreased hearing noted. No drainage or swelling. A middle ear effusion is present. Tympanic membrane is bulging. Tympanic membrane is not injected or erythematous.      Left Ear: Decreased hearing noted. No drainage or swelling. A middle ear " effusion is present. Tympanic membrane is bulging. Tympanic membrane is not injected or erythematous.      Nose: Congestion and rhinorrhea present. Rhinorrhea is clear.      Right Sinus: Frontal sinus tenderness present. No maxillary sinus tenderness.      Left Sinus: Frontal sinus tenderness present. No maxillary sinus tenderness.      Mouth/Throat:      Mouth: Mucous membranes are moist.      Pharynx: Postnasal drip present. No pharyngeal swelling, oropharyngeal exudate, posterior oropharyngeal erythema or uvula swelling.      Tonsils: No tonsillar exudate or tonsillar abscesses.   Eyes:      General: No scleral icterus.        Right eye: No discharge.         Left eye: No discharge.      Conjunctiva/sclera: Conjunctivae normal.      Pupils: Pupils are equal, round, and reactive to light.   Neck:      Vascular: No carotid bruit.      Trachea: No tracheal deviation.   Cardiovascular:      Rate and Rhythm: Normal rate and regular rhythm.      Pulses: Normal pulses.      Heart sounds: Normal heart sounds.   Pulmonary:      Effort: Pulmonary effort is normal. No respiratory distress.      Breath sounds: Normal breath sounds. No decreased breath sounds, wheezing, rhonchi or rales.   Musculoskeletal:      Cervical back: Normal range of motion and neck supple.   Skin:     General: Skin is warm.   Neurological:      Mental Status: He is alert and oriented to person, place, and time.      Motor: No abnormal muscle tone.      Coordination: Coordination normal.   Psychiatric:         Mood and Affect: Mood normal.           Fluid is present in both ears. There is drainage in the back of the throat. No irritation in the mucosa of the nose.  Lungs are clear.  Heart was examined.       The following data was reviewed by: KAYLEN Salcedo on 03/21/2025:  ToxAssure Flex 23, Ur - Urine, Clean Catch (02/25/2025 12:18)       Results  Laboratory Studies  Urine drug screen positive for opiates, acetaminophen, and THC.                Assessment and Plan       1. Sinusitis.  Symptoms include sinus pain and pressure, particularly above the eyebrows, along with clear nasal drainage and decreased hearing in both ears. There is no fever, chills, cough, or shortness of breath. Physical examination revealed fluid in both ears and drainage in the back of the throat. Omnicef (cefdinir) will be prescribed for 10 days. He is advised to take all the medication until it is completely gone. A daily antihistamine, Zyrtec (cetirizine), is recommended. Flonase nasal steroid spray will be prescribed, with instructions to use 2 sprays in each nostril once daily until symptoms improve. He is also advised to drink plenty of water and clear liquids to help with fluid drainage.    2. Hearing loss.  Decreased hearing in both ears is noted, which may be related to fluid buildup. He is advised to locate his hearing aids and use them. If unable to find them, he should schedule an appointment with his audiologist. He is also advised against using Q-tips for ear cleaning and instead use a warm washcloth daily. If earwax buildup is observed, Debrox over-the-counter earwax removal kit can be used.    3. Diabetes mellitus.  He reports fluctuating blood sugar levels and a previous episode of hypoglycemia requiring hospitalization. A prescription for a Dexcom G7 continuous glucose monitor will be sent to his pharmacy. He is advised to change the sensor every 10 days.    4. Attention deficit disorder (ADD).  He is currently on Strattera 100 mg once daily, which is the maximum dose and not providing sufficient relief. Other nonstimulant options like Qelbree and Intuniv were discussed but are not covered by his insurance or indicated for adults. A referral to psychiatry was offered for potential stimulant therapy initiation, but he declined at this time. He will inform if he changes his mind about seeing psychiatry.    5. Insomnia.  He reports difficulty sleeping, which may be  related to ADD. He is currently taking 12 mg of melatonin and 500 mg of magnesium glycinate daily. Hydroxyzine was discussed as a potential treatment but is not recommended due to his cardiac history. Unisom over-the-counter sleep aid was suggested as an alternative.       Assessment & Plan  Need for vaccination    Orders:    Pneumococcal Conjugate Vaccine 20-Valent All    Acute frontal sinusitis, recurrence not specified    Orders:    cefdinir (OMNICEF) 300 MG capsule; Take 1 capsule by mouth 2 (Two) Times a Day for 10 days.             Follow Up     Return if symptoms worsen or fail to improve.    Patient or patient representative verbalized consent for the use of Ambient Listening during the visit with  KAYLEN Salcedo for chart documentation. 3/21/2025  11:04 EDT    Patient was given instructions and counseling regarding his condition or for health maintenance advice. Please see specific information pulled into the AVS if appropriate.

## 2025-03-28 ENCOUNTER — OFFICE VISIT (OUTPATIENT)
Dept: CARDIOLOGY | Facility: CLINIC | Age: 70
End: 2025-03-28
Payer: MEDICARE

## 2025-03-28 VITALS
DIASTOLIC BLOOD PRESSURE: 74 MMHG | WEIGHT: 315 LBS | HEART RATE: 88 BPM | BODY MASS INDEX: 43.75 KG/M2 | SYSTOLIC BLOOD PRESSURE: 122 MMHG

## 2025-03-28 DIAGNOSIS — R94.39 ABNORMAL STRESS TEST: ICD-10-CM

## 2025-03-28 DIAGNOSIS — I48.21 PERMANENT ATRIAL FIBRILLATION: Primary | ICD-10-CM

## 2025-03-28 DIAGNOSIS — E78.00 PURE HYPERCHOLESTEROLEMIA: ICD-10-CM

## 2025-03-28 DIAGNOSIS — I10 ESSENTIAL HYPERTENSION: ICD-10-CM

## 2025-03-28 DIAGNOSIS — I35.0 NONRHEUMATIC AORTIC VALVE STENOSIS: ICD-10-CM

## 2025-03-28 DIAGNOSIS — E04.1 LEFT THYROID NODULE: Primary | ICD-10-CM

## 2025-03-28 DIAGNOSIS — R91.1 LEFT LOWER LOBE PULMONARY NODULE: ICD-10-CM

## 2025-03-28 DIAGNOSIS — R59.9 ADENOPATHY: ICD-10-CM

## 2025-03-28 DIAGNOSIS — K86.9 PANCREATIC LESION: ICD-10-CM

## 2025-03-28 PROBLEM — I25.10 CORONARY ARTERY CALCIFICATION: Status: ACTIVE | Noted: 2025-03-28

## 2025-03-28 NOTE — PROGRESS NOTES
"Date of Office Visit: 2025  Encounter Provider: KAYLEN Haddad  Place of Service: [facility] CARDIOLOGY  Established cardiologist: Valeria Bravo MD  Patient Name: Duane Mark Witten  :1955      Patient ID:  Duane Mark Witten is a 70 y.o. male is here for  followup    With a pertinent medical history of HTN, DM, obstructive sleep apnea (compliant with cpap), aortic stenosis, chronic HFpEF, medication noncompliance and obesity.        History of Present Illness  He presented to the emergency room on 18 with complaints of 3 weeks of progressive weakness to bilateral lower extremities as well as paraesthesias of the upper extremities. He began feeling his balance was off and that his legs were becoming weaker and \"just didn't want to work right.\"  He was found to have severe cervical stenosis with cord compression. He was seen by Dr. Markham and had underwent C4, C5, C6 anterior cervical corpectomy with cage and plate. During surgery HR was in the 110's. EKG was obtained in the recovery room and showed atrial fibrillation. He was completely asymptomatic.      Echo done 18 showed LVEF 47% with mild LVH and mild LVE, with mild to moderate AS, mild MR with mild anterior prolapse, mild LAE     stress PET scan was completed 19 and showed EF 36%, bilateral ventricular dilation, diffusely decreased tracer uptake in all wall segments on stress imaging and a more pronounced hypoperfusion of the mid anterior wall that was considered small, mild, and may represent ischemia.     Repeat echocardiogram done 2020 shows LVEF low normal with mild concentric left ventricular hypertrophy, moderate left atrial enlargement, bicuspid aortic valve with mild to moderate aortic stenosis..      He was hospitalized 3/14 - 3/15/2023 for short windedness.  He had not been taking his diuretics.  He was found to have acute on chronic HFpEF with metapneumovirus.  He had " been compliant with CPAP.  Medications were adjusted and he was advised to follow-up in the office.      Echo done 1/18/2023 showed ejection fraction of 64% with moderate concentric left ventricle hypertrophy, moderate left atrial enlargement, bicuspid aortic valve with severe calcification and moderate aortic stenosis, trace tricuspid insufficiency, RVSP 46 mmHg, mitral valve was not well visualized.    He saw Dr. Bravo 9/24/2024.  He will he had low blood pressure.  There was exertional dyspnea and progressive fatigue.  Stress nuclear study and CTA chest and echocardiogram were ordered.  His metoprolol was decreased to 12.5 mg nightly.    Echocardiogram was done 10/9/2024 This showed an ejection fraction of 58.8%, normal LV size and wall thickness and normal LV wall motion indeterminate LV diastolic function.  Moderately dilated left atrium.  Mildly dilated right atrium.  Bicuspid aortic valve that was moderately calcified no aortic regurgitation.  There is moderate aortic stenosis with a mean pressure gradient 21.1 mmHg.  Mild mitral regurgitation.  Mild tricuspid regurgitation.  Mildly elevated RVSP at 38.5 mmHg.    Lexiscan SPECT stress study 10/9/2024 Showed small sized mild to moderately severe area of ischemia in the inferior wall.      CTA chest 10/17/2024 showed 3.7 cm central splenic low-density lesion follow-up with CT abdomen was recommended.  There were coronary artery calcifications present.  There was aortic dilation.  Aortic valvular calcifications are present.  Multiple tiny pulmonary nodules.  In the left lower thyroid nodule.     CT abdomen was done 11/19/2024 that showed a cystic lesion within the pancreas MRI was recommended for follow-up of this.  Lobulated appearance of the liver thyroid ultrasound showed left lower pole thyroid nodule meeting the criteria for fine-needle aspiration.    Dr. Bravo did review the CT chest which showed extensive coronary calcifications that appear to be  possibly severe in the right coronary artery.     Today Duane is here with his wife.  He has continued to experience dyspnea on exertion and fatigue.  This really has not been any better since he was seen in our office in September, but it has not worsened.  He has no chest pain or pressure.  There is not been any presyncope/syncope, heart racing or palpitations or edema.  He is now following with primary care here at [facility] with Laura Gorman.     Current Outpatient Medications on File Prior to Visit   Medication Sig Dispense Refill    albuterol sulfate  (90 Base) MCG/ACT inhaler Inhale 2 puffs Every 6 (Six) Hours As Needed for Wheezing. 6.7 g 0    atomoxetine (Strattera) 100 MG capsule Take 1 capsule by mouth Daily. 90 capsule 3    atorvastatin (LIPITOR) 20 MG tablet Take 1 tablet by mouth Daily. 90 tablet 3    Blood Glucose Monitoring Suppl (Accu-Chek Leilani Plus) w/Device kit MONITOR BLOOD SUGAR THREE TIMES DAILY 1 kit 5    cefdinir (OMNICEF) 300 MG capsule Take 1 capsule by mouth 2 (Two) Times a Day for 10 days. 20 capsule 0    cyclobenzaprine (FLEXERIL) 10 MG tablet Take 1 tablet by mouth 3 (Three) Times a Day As Needed for Muscle Spasms. 90 tablet 1    Dulaglutide 3 MG/0.5ML solution auto-injector Inject 3 mg under the skin into the appropriate area as directed 1 (One) Time Per Week. 8 mL 1    DULoxetine (CYMBALTA) 60 MG capsule Take 1 capsule by mouth 2 (Two) Times a Day. 180 capsule 1    ferrous sulfate 325 (65 FE) MG tablet Take 1 tablet by mouth Daily With Breakfast.      furosemide (LASIX) 40 MG tablet Take 1 tablet by mouth Daily. 90 tablet 3    gabapentin (NEURONTIN) 100 MG capsule Take 1 capsule by mouth 3 times a day.      glimepiride (AMARYL) 2 MG tablet Take 1 tablet by mouth Daily. 90 tablet 1    HYDROcodone-acetaminophen (NORCO)  MG per tablet Take 1 tablet by mouth Every 6 (Six) Hours As Needed for Moderate Pain. PT STATES TAKES ONE IN THE MORNING AND IN THE AFTERNOON, AND AGAIN AT  BEDTIME MOST DAYS      hydrOXYzine pamoate (VISTARIL) 25 MG capsule Take 2 capsules by mouth every night at bedtime. 180 capsule 3    irbesartan (AVAPRO) 300 MG tablet Take 1 tablet by mouth Every Morning. 90 tablet 3    metFORMIN (GLUCOPHAGE) 1000 MG tablet Take 1 tablet by mouth Every 12 (Twelve) Hours. 180 tablet 1    metoprolol succinate XL (TOPROL-XL) 25 MG 24 hr tablet Take 0.5 tablets by mouth every night at bedtime.      omeprazole (priLOSEC) 40 MG capsule Take 1 capsule by mouth Daily. 90 capsule 3    rivaroxaban (XARELTO) 20 MG tablet Take 1 tablet by mouth Daily With Dinner. Indications: Atrial Fibrillation 14 tablet 0    spironolactone (ALDACTONE) 25 MG tablet Take 1 tablet by mouth Every Morning. 90 tablet 3    tamsulosin (FLOMAX) 0.4 MG capsule 24 hr capsule Take 2 capsules by mouth Daily. 180 capsule 1     No current facility-administered medications on file prior to visit.         Procedures    ECG 12 Lead    Date/Time: 3/28/2025 11:41 AM  Performed by: Krupa Hubbard APRN    Authorized by: Krupa Hubbard APRN  Comparison: compared with previous ECG from 9/24/2024  Rhythm: atrial fibrillation  BPM: 88            Objective:      Vitals:    03/28/25 1017   BP: 122/74   BP Location: Left arm   Patient Position: Sitting   Cuff Size: Adult   Pulse: 88   Weight: (!) 159 kg (350 lb)     Body mass index is 43.75 kg/m².  Wt Readings from Last 3 Encounters:   03/28/25 (!) 159 kg (350 lb)   03/21/25 (!) 159 kg (350 lb 3.2 oz)   02/25/25 (!) 160 kg (352 lb 6.4 oz)     Vitals reviewed.   Constitutional:       General: Not in acute distress.     Appearance: Morbidly obese. Not diaphoretic.   Neck:      Vascular: No JVD.   Pulmonary:      Effort: Pulmonary effort is normal.      Breath sounds: Normal breath sounds.   Cardiovascular:      Normal rate. Irregularly irregular rhythm.      Murmurs: There is a grade 3/6 harsh midsystolic murmur at the URSB, radiating to the neck.      No gallop.  No rub.    Pulses:     Radial: 2+ bilaterally.     Dorsalis pedis: 2+ bilaterally.     Posterior tibial: 2+ bilaterally.  Edema:     Peripheral edema absent.     Assessment:     1. Permanent atrial fibrillation    2. Nonrheumatic aortic valve stenosis    3. Essential hypertension    4. Pure hypercholesterolemia    5. Abnormal stress test    Coronary artery calcification.  Abnormal stress test was completed 10/2024.  He has continued to have chronic stable FLORES since at least September 2024.  Persistent atrial fibrillation, on xarelto.   Chronic HFpEF, is euvolemic today.  He does have a lot of exertional dyspnea and fatigue.  DM  Hypertension, goal <120/80.  Blood pressure is well-controlled.  Hyperlipidemia on atorvastatin  H/o Cardiomyopathy  Moderate AS with bicuspid aortic valve, stable on repeat echo  Dilated sinus of Valsalva measuring 3.8 cm on CTA chest October 2024  Carotid bruits, no stenosis noted.  Obesity.   CAMERON, using BiPAP.  Thyroid nodules  Pancreatic lesion on CTA abdomen 11/2024    Plan:   No medication changes were made  Cardiac catheterization is recommended for his continued dyspnea on exertion and fatigue with abnormal stress test and coronary calcification of the RCA that appears severe on CT chest.   With stability of symptoms and his need to complete FNA of thyroid nodule would recommend he schedule this to be done soon and then we will schedule cardiac catheterization after this in case any DAPT is required.   Will message his PCP today with this plan and request for referral for FNA to be done he would like to go to a [facility] provider for this.         Thank you for allowing me to participate in this patient's care. Please call with any questions or concerns.          Dragon dictation device was utilized in this note.

## 2025-04-03 DIAGNOSIS — R59.9 ADENOPATHY: ICD-10-CM

## 2025-04-03 DIAGNOSIS — K86.9 PANCREATIC LESION: Primary | ICD-10-CM

## 2025-04-17 ENCOUNTER — TELEPHONE (OUTPATIENT)
Dept: CARDIOLOGY | Facility: CLINIC | Age: 70
End: 2025-04-17
Payer: MEDICARE

## 2025-04-17 NOTE — TELEPHONE ENCOUNTER
Pt says his appt has been scheduled and it's 4/24/25.  It's actually a new patient appt with Endocrinology, Dr. Beltran.  Pt expresses concern that the bx won't happen on that day since it's just a new pt appt.  He wants to know if there's another way he can complete this bx any sooner.  Is there a general doctor or a certain area of the hospital where he could have this thyroid nodule bx completed?  Pt is eager to proceed with CC and doesn't want for this to hold him up that long.    Makenna SINGER , RN  Triage [facility]  04/17/25 14:37 EDT

## 2025-04-17 NOTE — TELEPHONE ENCOUNTER
"Permission for the hub to transfer this call directly to the nurse triage line at [facility]; or if you cannot reach the switchboard/, please send to \"[facility] Triage Nurses\" pool.  Thank you!    Attempted to call Duane Mark Witten, no answer.  Left a voicemail for patient to call back and ask to speak with the triage nurses.  Will continue to try to reach patient.    Anastasiya Gaines RN  [facility] Triage  04/17/25 09:47 EDT    "

## 2025-04-17 NOTE — TELEPHONE ENCOUNTER
Please call pt and find out if thyroid nodule biopsy (Fine needle aspiration) has been scheduled yet    (We are waiting for him to complete this so we can schedule cardiac catheterization)    Thanks

## 2025-04-18 NOTE — TELEPHONE ENCOUNTER
I spoke with Duane Mark Witten and gave them message from the provider.  They verbalized understanding & have no further questions at this time.    Makenna SINGER RN  Triage [facility] [place]  04/18/25 16:17 EDT

## 2025-04-22 ENCOUNTER — OFFICE VISIT (OUTPATIENT)
Dept: ENDOCRINOLOGY | Age: 70
End: 2025-04-22
Payer: MEDICARE

## 2025-04-22 VITALS
SYSTOLIC BLOOD PRESSURE: 128 MMHG | HEIGHT: 75 IN | WEIGHT: 315 LBS | OXYGEN SATURATION: 96 % | BODY MASS INDEX: 39.17 KG/M2 | DIASTOLIC BLOOD PRESSURE: 74 MMHG | HEART RATE: 49 BPM

## 2025-04-22 DIAGNOSIS — E04.1 THYROID NODULE: Primary | ICD-10-CM

## 2025-04-22 NOTE — PROGRESS NOTES
05/02/25 0001   Pre-Procedure Phone Call   Procedure Time Verified Yes   Arrival Time 1130   Procedure Location Verified Yes   Medical History Reviewed No   NPO Status Reinforced No   Ride and Caregiver Arranged N/A   Phone Number for Ride/Caregiver instructed pt that if he takes any meds prior to procedure to relax him then he will need a . Pt voiced understanding.   Patient Knows to Bring Current Medications No   Bring Outside Films Requested No

## 2025-04-22 NOTE — PROGRESS NOTES
Chief complaint   Chief Complaint   Patient presents with    Left thyroid nodule          Subjective     History of Present Illness:      This is a 70 years old male I am seeing for thyroid disease   referred by PCP     other medical issues   1- HLD   2- HTN   3- A fib   4- Other   Pt was Dx with thyroid nodule in 2018   Is not on thyroid medication now   had blood work done by PCP in 2-2025: TSH was 2.2    pt states that he had an MRI of the neck in 2018 that showed a left thyroid complex cyst or nodule. After that he had a thyroid US in 2019 that showed a highly suspicious markedly hypoechoic nodule within the inferior left thyroid gland measuring up to 2.3 cm. Fine-needle aspiration was recommended per TIRADS criteria. The subcentimeter nodule within the right thyroid gland does not meet TIRADS criteria for fine-needle aspiration or followup. After that had another thyroid US in  that showed Along the lower pole of the left thyroid gland, there is again demonstrated a hypoechoic solid nodule. There is a now central macrocalcification demonstrated. Also, new peripheral calcification. It measures 2.7 x 2.1 x 2.2 cm. Previously measuring 2.3 x 2.1 x 2.2 cm.  Subtle interval enlargement. TI-RADS category 4. This qualifies for fine-needle aspiration. and there is no history head and Neck radiation, no History of thyroid surgery, no family history of Thyroid disease, No prior history of Thyroid Dysfunction,  and has No dysphagia, No dyspnea, No dysphonia  No change size of neck, No neck pain or discomfort  No nervousness, No shakiness, No palpitations  Weight stable   BM daily, No diarrhea, No constipation  No edema, No proximal muscle weak  No Cold or Heat Intolerance  No Insomnia  No hair Loss, No Skin Drynress  Appetite is OK  No visual changes     Latest Reference Range & Units 02/25/25 12:18   TSH Baseline 0.450 - 4.500 uIU/mL 2.260     Family History   Problem Relation Age of Onset    Anxiety disorder  "Mother     Bipolar disorder Mother     Depression Mother     Glaucoma Mother     Macular degeneration Mother     Lupus Mother     Aortic stenosis Mother         s/p TAVR at age 87    Cancer Father     Diabetes Father     Heart disease Father     Hypertension Father     Stroke Father     Heart attack Father         Father    Lupus Other         grandmother    Malig Hyperthermia Neg Hx      Social History     Socioeconomic History    Marital status:      Spouse name: Mindy   Tobacco Use    Smoking status: Former     Current packs/day: 1.00     Average packs/day: 1 pack/day for 51.3 years (51.3 ttl pk-yrs)     Types: Cigarettes     Start date: 1974     Passive exposure: Past    Smokeless tobacco: Never   Vaping Use    Vaping status: Never Used   Substance and Sexual Activity    Alcohol use: Yes     Comment: RARELY    Drug use: No    Sexual activity: Defer     Past Medical History:   Diagnosis Date    ADD (attention deficit disorder)     Anemia     intermittent    Anesthesia complication     STATES HE IS SLOW TO WAKE UP    Arrhythmia [id number]    Atrial fibrillation     XARELTO    Bronchitis due to human metapneumovirus (hMPV) 03/14/2023    Chronic anticoagulation     XARELTO    Chronic pain     \"ALL OVER\" ARTHRITIS    Depression     Difficulty swallowing solids     AT TIMES    Dilated cardiomyopathy     ED (erectile dysfunction)     Elevated cholesterol     Essential hypertension     YANELI (generalized anxiety disorder)     Generalized weakness     STATES LEGS AND ARMS WEAK-TIRES EASILY    HLD (hyperlipidemia)     Leukocytosis     Morbid obesity     CAMERON (obstructive sleep apnea)     BiPAP nightly    Osteoarthrosis     PONV (postoperative nausea and vomiting)     Psoriasis     Pulmonary hypertension     Testosterone deficiency     Type 2 diabetes mellitus     Unsteady gait     USES CANE     Past Surgical History:   Procedure Laterality Date    ANTERIOR CHANNEL VERTEBRECTOMY/CORPECTOMY Bilateral 11/27/2018    " Procedure: C4, C5, C6 ANTERIOR CERVICAL CORPECTOMY;  Surgeon: Chirag Markham MD;  Location: [facility] MAIN OR;  Service: Neurosurgery    BIOPSY / EXCISION / DISSECTION AXILLARY NODE  1994    Lymph Node testing for cancer     COLONOSCOPY      10+ years; normal    COLONOSCOPY N/A 08/29/2022    Procedure: COLONOSCOPY INTO CECUM AND TI WITH COLD BX AND COLD SNARE POLYPECTOMIES;  Surgeon: Timothy Rojas MD;  Location: [facility] ENDOSCOPY;  Service: Gastroenterology;  Laterality: N/A;  PRE: ANEMIA  POST: POLYPS, DIVERTICULOSIS, HEMORRHOIDS    ENDOSCOPY N/A 08/29/2022    Procedure: ESOPHAGOGASTRODUODENOSCOPY WITH BIOPSIES;  Surgeon: Timothy Rojas MD;  Location: [facility] ENDOSCOPY;  Service: Gastroenterology;  Laterality: N/A;  PRE: DYSPHAGIA  POST: ESOPHAGITIS    LASIK Bilateral     PARATHYROIDECTOMY  2008    TONSILLECTOMY         Current Outpatient Medications:     albuterol sulfate  (90 Base) MCG/ACT inhaler, Inhale 2 puffs Every 6 (Six) Hours As Needed for Wheezing., Disp: 6.7 g, Rfl: 0    atomoxetine (Strattera) 100 MG capsule, Take 1 capsule by mouth Daily., Disp: 90 capsule, Rfl: 3    atorvastatin (LIPITOR) 20 MG tablet, Take 1 tablet by mouth Daily., Disp: 90 tablet, Rfl: 3    Blood Glucose Monitoring Suppl (Accu-Chek Leilani Plus) w/Device kit, MONITOR BLOOD SUGAR THREE TIMES DAILY, Disp: 1 kit, Rfl: 5    cyclobenzaprine (FLEXERIL) 10 MG tablet, Take 1 tablet by mouth 3 (Three) Times a Day As Needed for Muscle Spasms., Disp: 90 tablet, Rfl: 1    Dulaglutide 3 MG/0.5ML solution auto-injector, Inject 3 mg under the skin into the appropriate area as directed 1 (One) Time Per Week., Disp: 8 mL, Rfl: 1    DULoxetine (CYMBALTA) 60 MG capsule, Take 1 capsule by mouth 2 (Two) Times a Day., Disp: 180 capsule, Rfl: 1    ferrous sulfate 325 (65 FE) MG tablet, Take 1 tablet by mouth Daily With Breakfast., Disp: , Rfl:     furosemide (LASIX) 40 MG tablet, Take 1 tablet by mouth Daily., Disp: 90 tablet, Rfl: 3     gabapentin (NEURONTIN) 100 MG capsule, Take 1 capsule by mouth 3 times a day., Disp: , Rfl:     glimepiride (AMARYL) 2 MG tablet, Take 1 tablet by mouth Daily., Disp: 90 tablet, Rfl: 1    HYDROcodone-acetaminophen (NORCO)  MG per tablet, Take 1 tablet by mouth Every 6 (Six) Hours As Needed for Moderate Pain. PT STATES TAKES ONE IN THE MORNING AND IN THE AFTERNOON, AND AGAIN AT BEDTIME MOST DAYS, Disp: , Rfl:     hydrOXYzine pamoate (VISTARIL) 25 MG capsule, Take 2 capsules by mouth every night at bedtime., Disp: 180 capsule, Rfl: 3    irbesartan (AVAPRO) 300 MG tablet, Take 1 tablet by mouth Every Morning., Disp: 90 tablet, Rfl: 3    metFORMIN (GLUCOPHAGE) 1000 MG tablet, Take 1 tablet by mouth Every 12 (Twelve) Hours., Disp: 180 tablet, Rfl: 1    metoprolol succinate XL (TOPROL-XL) 25 MG 24 hr tablet, Take 0.5 tablets by mouth every night at bedtime., Disp: , Rfl:     omeprazole (priLOSEC) 40 MG capsule, Take 1 capsule by mouth Daily., Disp: 90 capsule, Rfl: 3    rivaroxaban (XARELTO) 20 MG tablet, Take 1 tablet by mouth Daily With Dinner. Indications: Atrial Fibrillation, Disp: 14 tablet, Rfl: 0    spironolactone (ALDACTONE) 25 MG tablet, Take 1 tablet by mouth Every Morning., Disp: 90 tablet, Rfl: 3    tamsulosin (FLOMAX) 0.4 MG capsule 24 hr capsule, Take 2 capsules by mouth Daily., Disp: 180 capsule, Rfl: 1  Aspartame    Objective   Vitals:    04/22/25 1035   BP: 128/74   Pulse: (!) 49   SpO2: 96%          Physical Exam  Neurological:      General: No focal deficit present.      Mental Status: He is alert and oriented to person, place, and time.               Diagnoses and all orders for this visit:    1. Thyroid nodule (Primary)         Assessment:     This is a 70 years old male I am seeing for thyroid disease, referred by PCP   Is not on thyroid medication now   had blood work done by PCP in 2-2025: TSH was 2.2  Pt was Dx with thyroid nodule after he had an MRI of the neck in 2018 that showed a left  thyroid complex cyst or nodule. After that he had a thyroid US in 2019 that showed a highly suspicious markedly hypoechoic nodule within the inferior left thyroid gland measuring up to 2.3 cm. Fine-needle aspiration was recommended per TIRADS criteria. The subcentimeter nodule within the right thyroid gland does not meet TIRADS criteria for fine-needle aspiration or followup. After that had another thyroid US in  that showed Along the lower pole of the left thyroid gland, there is again demonstrated a hypoechoic solid nodule. There is a now central macrocalcification demonstrated. Also, new peripheral calcification. It measures 2.7 x 2.1 x 2.2 cm. Previously measuring 2.3 x 2.1 x 2.2 cm.Subtle interval enlargement. TI-RADS category 4. This qualifies for fine-needle aspiration.   No previous bx was done     Plan:    1. No thyroid treatment is needed now as last levels were normal   2. Labs every 6 months :TSH, Free T4    3. Will arrange for a thyroid biopsy , need to do that soon as he is in need for a cardiac cath and they like to know if he needs thyroid surgery or not   Told him that he has a 4 % risk for cancer   4. Return in 1 year   5. Was informed that based on the results of the bx will now if surgery is needed   6. Labs reviewed with the Patient : TSH   7- I reviewed the notes from PCP   8- Needs to go back on PCP for all of the other sx he is having         I discussed with the patient/legal representative the risks and the benefits associated with the medications.  The patient/legal representative has been given the opportunity to ask questions.  Alternatives to the proposed treatment (s) were discussed, including the likely results of no treatment.  The patient/legal representative wishes to proceed.       Vishal Beltran MD   04/22/25  10:45 EDT

## 2025-04-23 ENCOUNTER — PATIENT MESSAGE (OUTPATIENT)
Dept: FAMILY MEDICINE CLINIC | Facility: CLINIC | Age: 70
End: 2025-04-23
Payer: MEDICARE

## 2025-05-02 ENCOUNTER — HOSPITAL ENCOUNTER (OUTPATIENT)
Dept: ULTRASOUND IMAGING | Facility: HOSPITAL | Age: 70
Discharge: HOME | End: 2025-05-02
Payer: MEDICARE

## 2025-05-02 VITALS
OXYGEN SATURATION: 97 % | TEMPERATURE: 97.7 F | WEIGHT: 315 LBS | DIASTOLIC BLOOD PRESSURE: 69 MMHG | RESPIRATION RATE: 16 BRPM | SYSTOLIC BLOOD PRESSURE: 108 MMHG | BODY MASS INDEX: 39.17 KG/M2 | HEART RATE: 62 BPM | HEIGHT: 75 IN

## 2025-05-02 PROCEDURE — 88305 TISSUE EXAM BY PATHOLOGIST: CPT | Performed by: INTERNAL MEDICINE

## 2025-05-02 PROCEDURE — 25010000002 LIDOCAINE 1 % SOLUTION: Performed by: RADIOLOGY

## 2025-05-02 PROCEDURE — 88173 CYTOPATH EVAL FNA REPORT: CPT | Performed by: INTERNAL MEDICINE

## 2025-05-02 RX ORDER — LIDOCAINE HYDROCHLORIDE 10 MG/ML
10 INJECTION, SOLUTION INFILTRATION; PERINEURAL ONCE
Status: COMPLETED | OUTPATIENT
Start: 2025-05-02 | End: 2025-05-02

## 2025-05-02 RX ORDER — SODIUM CHLORIDE 0.9 % (FLUSH) 0.9 %
10 SYRINGE (ML) INJECTION AS NEEDED
Status: DISCONTINUED | OUTPATIENT
Start: 2025-05-02 | End: 2025-05-03 | Stop reason: HOSPADM

## 2025-05-02 RX ADMIN — LIDOCAINE HYDROCHLORIDE 3 ML: 10 INJECTION, SOLUTION INFILTRATION; PERINEURAL at 12:28

## 2025-05-02 NOTE — DISCHARGE INSTRUCTIONS
EDUCATION / DISCHARGE INSTRUCTIONS  Aspiration:  An aspiration is a procedure used to take a sample of cells or tissue from a lump, mass or other area of concern.   Within a week the radiologist will send a report to your physician.  A pathologist will also examine the tissue and send a report.  THIS EDUCATION INFORMATION WAS REVIEWED PRIOR TO THE PROCEDURE AND CONSENT.       Post Procedure:    *  Rest today (no pushing pulling or straining).   *  Slowly increase activity tomorow.    *  If you received sedation do not drive for 24 hours.   *  Keep dressing clean and dry.   *  Leave dressing on puncture site for 24 hours.    *  You may shower when dressing removed.   *  If needed, use an ice pack at the site for up to 20 minutes at a time for pain.    Call your doctor if experiencing:   *  Signs of infection such as redness, swelling, excessive pain and / or foul smelling drainage from the puncture site.   *  Chills or fever over 101 degrees (by mouth).   *  Unrelieved pain.   *  Any new or severe symptoms.      Following the procedure:     Follow-up with the ordering physician as directed.    Continue to take other medications as directed by your physician unless otherwise instructed.   If applicable, resume taking your blood thinners or Aspirin on ___________.     If you have any concerns please call the Radiology Nurses Desk at [phone].  You are the most important factor in your recovery.  Follow the above instructions carefully.

## 2025-05-05 LAB
CYTO UR: NORMAL
LAB AP CASE REPORT: NORMAL
LAB AP NON-GYN SPECIMEN ADEQUACY: NORMAL
PATH REPORT.FINAL DX SPEC: NORMAL
PATH REPORT.GROSS SPEC: NORMAL

## 2025-05-07 ENCOUNTER — TELEPHONE (OUTPATIENT)
Dept: FAMILY MEDICINE CLINIC | Facility: CLINIC | Age: 70
End: 2025-05-07
Payer: MEDICARE

## 2025-05-07 DIAGNOSIS — I48.21 PERMANENT ATRIAL FIBRILLATION: ICD-10-CM

## 2025-05-07 DIAGNOSIS — E78.2 MIXED HYPERLIPIDEMIA: Primary | ICD-10-CM

## 2025-05-07 DIAGNOSIS — R94.39 ABNORMAL STRESS TEST: ICD-10-CM

## 2025-05-07 DIAGNOSIS — I10 ESSENTIAL HYPERTENSION: ICD-10-CM

## 2025-05-07 DIAGNOSIS — I10 ESSENTIAL HYPERTENSION: Primary | ICD-10-CM

## 2025-05-07 DIAGNOSIS — I25.10 CORONARY ARTERY CALCIFICATION: ICD-10-CM

## 2025-05-07 DIAGNOSIS — I35.0 NONRHEUMATIC AORTIC VALVE STENOSIS: ICD-10-CM

## 2025-05-07 DIAGNOSIS — E78.2 MIXED HYPERLIPIDEMIA: ICD-10-CM

## 2025-05-07 NOTE — TELEPHONE ENCOUNTER
Caller: [facility]    Relationship:     Best call back number: [phone]     What form or medical record are you requesting:      Who is requesting this form or medical record from you:      How would you like to receive the form or medical records (pick-up, mail, fax):    If fax, what is the fax number  If mail, what is the address:    If pick-up, provide patient with address and location details    Timeframe paperwork needed:      Additional notes: [facility] CALLED AND THEY FAXED ON 5/01/25 THE REQUEST FOR CHART NOTES FOR THE PATIENTS [employer]PLIES.  HAS THE OFFICE RECEIVED IT.  PLEASE CALL TO LET HER KNOW.

## 2025-05-07 NOTE — TELEPHONE ENCOUNTER
Called and it sounded like it is a fax number. We did receive the paperwork. I sent in office notes and faxed it on 4/24/25. This looks like it is asking for hospital records. Will give to Laura to clarify.

## 2025-05-08 ENCOUNTER — TELEPHONE (OUTPATIENT)
Dept: CARDIOLOGY | Age: 70
End: 2025-05-08

## 2025-05-08 NOTE — TELEPHONE ENCOUNTER
Hub staff attempted to follow warm transfer process and was unsuccessful     Caller: Witten, Duane Mark    Relationship to patient: Self    Best call back number: [phone]    Patient is needing: PATIENT'S WIFE BENITEZ RAMIREZ STATED THAT SHE WAS RETURNING A CALL FROM [facility] ABOUT SCHEDULING A HEART CATH PROCEDURE. PATIENT WOULD LIKE A CALL BACK. THANK YOU.

## 2025-05-12 ENCOUNTER — TELEPHONE (OUTPATIENT)
Dept: CARDIOLOGY | Age: 70
End: 2025-05-12
Payer: MEDICARE

## 2025-05-12 ENCOUNTER — HOSPITAL ENCOUNTER (OUTPATIENT)
Dept: MRI IMAGING | Facility: HOSPITAL | Age: 70
Discharge: HOME | End: 2025-05-12
Payer: MEDICARE

## 2025-05-12 DIAGNOSIS — R59.9 ADENOPATHY: ICD-10-CM

## 2025-05-12 DIAGNOSIS — K86.9 PANCREATIC LESION: ICD-10-CM

## 2025-05-12 PROCEDURE — 74183 MRI ABD W/O CNTR FLWD CNTR: CPT

## 2025-05-12 PROCEDURE — 82565 ASSAY OF CREATININE: CPT

## 2025-05-12 PROCEDURE — A9577 INJ MULTIHANCE: HCPCS

## 2025-05-12 PROCEDURE — 25510000002 GADOBENATE DIMEGLUMINE 529 MG/ML SOLUTION

## 2025-05-12 RX ADMIN — GADOBENATE DIMEGLUMINE 20 ML: 529 INJECTION, SOLUTION INTRAVENOUS at 10:29

## 2025-05-12 NOTE — TELEPHONE ENCOUNTER
Called pt and spoke to him about procedure 5.15.2025. Pt will get labs done tomorrow, 5.13.2025. I have emailed pt procedure instructions as he did not get them via My. We went over instructions and pt did verbalize understanding of instructions.     Labs completed 5.13.2025.

## 2025-05-13 ENCOUNTER — LAB (OUTPATIENT)
Dept: LAB | Facility: HOSPITAL | Age: 70
End: 2025-05-13
Payer: MEDICARE

## 2025-05-13 DIAGNOSIS — E78.2 MIXED HYPERLIPIDEMIA: ICD-10-CM

## 2025-05-13 DIAGNOSIS — R94.39 ABNORMAL STRESS TEST: ICD-10-CM

## 2025-05-13 DIAGNOSIS — I10 ESSENTIAL HYPERTENSION: ICD-10-CM

## 2025-05-13 DIAGNOSIS — I35.0 NONRHEUMATIC AORTIC VALVE STENOSIS: ICD-10-CM

## 2025-05-13 DIAGNOSIS — I25.10 CORONARY ARTERY CALCIFICATION: ICD-10-CM

## 2025-05-13 DIAGNOSIS — I48.21 PERMANENT ATRIAL FIBRILLATION: ICD-10-CM

## 2025-05-13 LAB
ANION GAP SERPL CALCULATED.3IONS-SCNC: 15.1 MMOL/L (ref 5–15)
BASOPHILS # BLD AUTO: 0.12 10*3/MM3 (ref 0–0.2)
BASOPHILS NFR BLD AUTO: 0.9 % (ref 0–1.5)
BUN SERPL-MCNC: 13 MG/DL (ref 8–23)
BUN/CREAT SERPL: 10.4 (ref 7–25)
CALCIUM SPEC-SCNC: 9.4 MG/DL (ref 8.6–10.5)
CHLORIDE SERPL-SCNC: 101 MMOL/L (ref 98–107)
CO2 SERPL-SCNC: 22.9 MMOL/L (ref 22–29)
CREAT SERPL-MCNC: 1.25 MG/DL (ref 0.76–1.27)
DEPRECATED RDW RBC AUTO: 47.7 FL (ref 37–54)
EGFRCR SERPLBLD CKD-EPI 2021: 61.9 ML/MIN/1.73
EOSINOPHIL # BLD AUTO: 1.55 10*3/MM3 (ref 0–0.4)
EOSINOPHIL NFR BLD AUTO: 11 % (ref 0.3–6.2)
ERYTHROCYTE [DISTWIDTH] IN BLOOD BY AUTOMATED COUNT: 14 % (ref 12.3–15.4)
GLUCOSE SERPL-MCNC: 155 MG/DL (ref 65–99)
HCT VFR BLD AUTO: 39.5 % (ref 37.5–51)
HGB BLD-MCNC: 12.7 G/DL (ref 13–17.7)
IMM GRANULOCYTES # BLD AUTO: 0.1 10*3/MM3 (ref 0–0.05)
IMM GRANULOCYTES NFR BLD AUTO: 0.7 % (ref 0–0.5)
LYMPHOCYTES # BLD AUTO: 3.84 10*3/MM3 (ref 0.7–3.1)
LYMPHOCYTES NFR BLD AUTO: 27.3 % (ref 19.6–45.3)
MCH RBC QN AUTO: 29.4 PG (ref 26.6–33)
MCHC RBC AUTO-ENTMCNC: 32.2 G/DL (ref 31.5–35.7)
MCV RBC AUTO: 91.4 FL (ref 79–97)
MONOCYTES # BLD AUTO: 1 10*3/MM3 (ref 0.1–0.9)
MONOCYTES NFR BLD AUTO: 7.1 % (ref 5–12)
NEUTROPHILS NFR BLD AUTO: 53 % (ref 42.7–76)
NEUTROPHILS NFR BLD AUTO: 7.47 10*3/MM3 (ref 1.7–7)
NRBC BLD AUTO-RTO: 0 /100 WBC (ref 0–0.2)
PLATELET # BLD AUTO: 274 10*3/MM3 (ref 140–450)
PMV BLD AUTO: 9.8 FL (ref 6–12)
POTASSIUM SERPL-SCNC: 4.4 MMOL/L (ref 3.5–5.2)
RBC # BLD AUTO: 4.32 10*6/MM3 (ref 4.14–5.8)
SODIUM SERPL-SCNC: 139 MMOL/L (ref 136–145)
WBC NRBC COR # BLD AUTO: 14.08 10*3/MM3 (ref 3.4–10.8)

## 2025-05-13 PROCEDURE — 80048 BASIC METABOLIC PNL TOTAL CA: CPT

## 2025-05-13 PROCEDURE — 85025 COMPLETE CBC W/AUTO DIFF WBC: CPT

## 2025-05-13 PROCEDURE — 36415 COLL VENOUS BLD VENIPUNCTURE: CPT

## 2025-05-14 LAB
CREAT BLDA-MCNC: 1.4 MG/DL (ref 0.6–1.3)
CREAT BLDA-MCNC: 1.4 MG/DL (ref 0.6–1.3)

## 2025-05-15 ENCOUNTER — TELEPHONE (OUTPATIENT)
Dept: CARDIOLOGY | Age: 70
End: 2025-05-15
Payer: MEDICARE

## 2025-05-15 ENCOUNTER — HOSPITAL ENCOUNTER (OUTPATIENT)
Facility: HOSPITAL | Age: 70
Setting detail: HOSPITAL OUTPATIENT SURGERY
Discharge: HOME | End: 2025-05-15
Attending: STUDENT IN AN ORGANIZED HEALTH CARE EDUCATION/TRAINING PROGRAM | Admitting: STUDENT IN AN ORGANIZED HEALTH CARE EDUCATION/TRAINING PROGRAM
Payer: MEDICARE

## 2025-05-15 VITALS
TEMPERATURE: 96.8 F | OXYGEN SATURATION: 98 % | HEIGHT: 75 IN | SYSTOLIC BLOOD PRESSURE: 134 MMHG | RESPIRATION RATE: 18 BRPM | WEIGHT: 315 LBS | HEART RATE: 63 BPM | BODY MASS INDEX: 39.17 KG/M2 | DIASTOLIC BLOOD PRESSURE: 49 MMHG

## 2025-05-15 DIAGNOSIS — E78.2 MIXED HYPERLIPIDEMIA: ICD-10-CM

## 2025-05-15 DIAGNOSIS — I10 ESSENTIAL HYPERTENSION: ICD-10-CM

## 2025-05-15 DIAGNOSIS — I25.10 CORONARY ARTERY CALCIFICATION: ICD-10-CM

## 2025-05-15 DIAGNOSIS — R94.39 ABNORMAL STRESS TEST: ICD-10-CM

## 2025-05-15 LAB — GLUCOSE BLDC GLUCOMTR-MCNC: 148 MG/DL (ref 70–130)

## 2025-05-15 PROCEDURE — 25810000003 SODIUM CHLORIDE 0.9 % SOLUTION: Performed by: STUDENT IN AN ORGANIZED HEALTH CARE EDUCATION/TRAINING PROGRAM

## 2025-05-15 PROCEDURE — 93458 L HRT ARTERY/VENTRICLE ANGIO: CPT | Performed by: STUDENT IN AN ORGANIZED HEALTH CARE EDUCATION/TRAINING PROGRAM

## 2025-05-15 PROCEDURE — S0260 H&P FOR SURGERY: HCPCS | Performed by: STUDENT IN AN ORGANIZED HEALTH CARE EDUCATION/TRAINING PROGRAM

## 2025-05-15 PROCEDURE — 25510000001 IOPAMIDOL PER 1 ML: Performed by: STUDENT IN AN ORGANIZED HEALTH CARE EDUCATION/TRAINING PROGRAM

## 2025-05-15 PROCEDURE — 99152 MOD SED SAME PHYS/QHP 5/>YRS: CPT | Performed by: STUDENT IN AN ORGANIZED HEALTH CARE EDUCATION/TRAINING PROGRAM

## 2025-05-15 PROCEDURE — 82948 REAGENT STRIP/BLOOD GLUCOSE: CPT

## 2025-05-15 PROCEDURE — 25010000002 LIDOCAINE 2% SOLUTION: Performed by: STUDENT IN AN ORGANIZED HEALTH CARE EDUCATION/TRAINING PROGRAM

## 2025-05-15 PROCEDURE — 25010000002 HEPARIN (PORCINE) PER 1000 UNITS: Performed by: STUDENT IN AN ORGANIZED HEALTH CARE EDUCATION/TRAINING PROGRAM

## 2025-05-15 PROCEDURE — C1894 INTRO/SHEATH, NON-LASER: HCPCS | Performed by: STUDENT IN AN ORGANIZED HEALTH CARE EDUCATION/TRAINING PROGRAM

## 2025-05-15 PROCEDURE — C1769 GUIDE WIRE: HCPCS | Performed by: STUDENT IN AN ORGANIZED HEALTH CARE EDUCATION/TRAINING PROGRAM

## 2025-05-15 PROCEDURE — 25010000002 FENTANYL CITRATE (PF) 50 MCG/ML SOLUTION: Performed by: STUDENT IN AN ORGANIZED HEALTH CARE EDUCATION/TRAINING PROGRAM

## 2025-05-15 PROCEDURE — 25010000002 MIDAZOLAM PER 1 MG: Performed by: STUDENT IN AN ORGANIZED HEALTH CARE EDUCATION/TRAINING PROGRAM

## 2025-05-15 RX ORDER — IOPAMIDOL 755 MG/ML
INJECTION, SOLUTION INTRAVASCULAR
Status: DISCONTINUED | OUTPATIENT
Start: 2025-05-15 | End: 2025-05-15 | Stop reason: HOSPADM

## 2025-05-15 RX ORDER — VERAPAMIL HYDROCHLORIDE 2.5 MG/ML
INJECTION INTRAVENOUS
Status: DISCONTINUED | OUTPATIENT
Start: 2025-05-15 | End: 2025-05-15 | Stop reason: HOSPADM

## 2025-05-15 RX ORDER — SODIUM CHLORIDE 9 MG/ML
40 INJECTION, SOLUTION INTRAVENOUS AS NEEDED
Status: DISCONTINUED | OUTPATIENT
Start: 2025-05-15 | End: 2025-05-15 | Stop reason: HOSPADM

## 2025-05-15 RX ORDER — HEPARIN SODIUM 1000 [USP'U]/ML
INJECTION, SOLUTION INTRAVENOUS; SUBCUTANEOUS
Status: DISCONTINUED | OUTPATIENT
Start: 2025-05-15 | End: 2025-05-15 | Stop reason: HOSPADM

## 2025-05-15 RX ORDER — MIDAZOLAM HYDROCHLORIDE 1 MG/ML
INJECTION, SOLUTION INTRAMUSCULAR; INTRAVENOUS
Status: DISCONTINUED | OUTPATIENT
Start: 2025-05-15 | End: 2025-05-15 | Stop reason: HOSPADM

## 2025-05-15 RX ORDER — SODIUM CHLORIDE 0.9 % (FLUSH) 0.9 %
10 SYRINGE (ML) INJECTION EVERY 12 HOURS SCHEDULED
Status: DISCONTINUED | OUTPATIENT
Start: 2025-05-15 | End: 2025-05-15 | Stop reason: HOSPADM

## 2025-05-15 RX ORDER — SODIUM CHLORIDE 9 MG/ML
75 INJECTION, SOLUTION INTRAVENOUS CONTINUOUS
Status: DISCONTINUED | OUTPATIENT
Start: 2025-05-15 | End: 2025-05-15 | Stop reason: HOSPADM

## 2025-05-15 RX ORDER — ACETAMINOPHEN 325 MG/1
650 TABLET ORAL EVERY 4 HOURS PRN
Status: DISCONTINUED | OUTPATIENT
Start: 2025-05-15 | End: 2025-05-15 | Stop reason: HOSPADM

## 2025-05-15 RX ORDER — LIDOCAINE HYDROCHLORIDE 20 MG/ML
INJECTION, SOLUTION INFILTRATION; PERINEURAL
Status: DISCONTINUED | OUTPATIENT
Start: 2025-05-15 | End: 2025-05-15 | Stop reason: HOSPADM

## 2025-05-15 RX ORDER — SODIUM CHLORIDE 0.9 % (FLUSH) 0.9 %
10 SYRINGE (ML) INJECTION AS NEEDED
Status: DISCONTINUED | OUTPATIENT
Start: 2025-05-15 | End: 2025-05-15 | Stop reason: HOSPADM

## 2025-05-15 RX ORDER — FENTANYL CITRATE 50 UG/ML
INJECTION, SOLUTION INTRAMUSCULAR; INTRAVENOUS
Status: DISCONTINUED | OUTPATIENT
Start: 2025-05-15 | End: 2025-05-15 | Stop reason: HOSPADM

## 2025-05-15 RX ADMIN — SODIUM CHLORIDE 75 ML/HR: 9 INJECTION, SOLUTION INTRAVENOUS at 07:49

## 2025-05-15 NOTE — H&P
"Date of Hospital Visit: 05/15/25  Encounter Provider: Joaquin Suarez MD  Place of Service: [facility] CARDIOLOGY  Patient Name: Duane Mark Witten  :1955  [id number]    Chief complaint: Dyspnea on exertion    History of Present Illness:  70-year-old man with hypertension, diabetes, chronic HFpEF, moderate aortic stenosis who presented with dyspnea on exertion in the setting of abnormal stress test.  He was referred for coronary angiography for further evaluation.    Past Medical History:   Diagnosis Date    ADD (attention deficit disorder)     Anemia     intermittent    Anesthesia complication     STATES HE IS SLOW TO WAKE UP    Arrhythmia [id number]    Atrial fibrillation     XARELTO    Bronchitis due to human metapneumovirus (hMPV) 2023    Chronic anticoagulation     XARELTO    Chronic pain     \"ALL OVER\" ARTHRITIS    Depression     Difficulty swallowing solids     AT TIMES    Dilated cardiomyopathy     ED (erectile dysfunction)     Elevated cholesterol     Essential hypertension     YANELI (generalized anxiety disorder)     Generalized weakness     STATES LEGS AND ARMS WEAK-TIRES EASILY    HLD (hyperlipidemia)     Leukocytosis     Morbid obesity     CAMERON (obstructive sleep apnea)     BiPAP nightly    Osteoarthrosis     PONV (postoperative nausea and vomiting)     Psoriasis     Pulmonary hypertension     Testosterone deficiency     Type 2 diabetes mellitus     Unsteady gait     USES CANE       Past Surgical History:   Procedure Laterality Date    ANTERIOR CHANNEL VERTEBRECTOMY/CORPECTOMY Bilateral 2018    Procedure: C4, C5, C6 ANTERIOR CERVICAL CORPECTOMY;  Surgeon: Chirag Markham MD;  Location: [facility];  Service: Neurosurgery    BIOPSY / EXCISION / DISSECTION AXILLARY NODE  1994    Lymph Node testing for cancer     COLONOSCOPY      10+ years; normal    COLONOSCOPY N/A 2022    Procedure: COLONOSCOPY INTO CECUM AND TI WITH COLD BX AND COLD SNARE POLYPECTOMIES;  " Surgeon: Timothy Rojas MD;  Location: [facility] ENDOSCOPY;  Service: Gastroenterology;  Laterality: N/A;  PRE: ANEMIA  POST: POLYPS, DIVERTICULOSIS, HEMORRHOIDS    ENDOSCOPY N/A 08/29/2022    Procedure: ESOPHAGOGASTRODUODENOSCOPY WITH BIOPSIES;  Surgeon: Timothy Rojas MD;  Location: [facility] ENDOSCOPY;  Service: Gastroenterology;  Laterality: N/A;  PRE: DYSPHAGIA  POST: ESOPHAGITIS    LASIK Bilateral     PARATHYROIDECTOMY  2008    TONSILLECTOMY         Medications Prior to Admission   Medication Sig Dispense Refill Last Dose/Taking    atomoxetine (Strattera) 100 MG capsule Take 1 capsule by mouth Daily. 90 capsule 3 5/14/2025    atorvastatin (LIPITOR) 20 MG tablet Take 1 tablet by mouth Daily. 90 tablet 3 5/14/2025    cyclobenzaprine (FLEXERIL) 10 MG tablet Take 1 tablet by mouth 3 (Three) Times a Day As Needed for Muscle Spasms. 90 tablet 1 5/14/2025    DULoxetine (CYMBALTA) 60 MG capsule Take 1 capsule by mouth 2 (Two) Times a Day. 180 capsule 1 5/14/2025    glimepiride (AMARYL) 2 MG tablet Take 1 tablet by mouth Daily. 90 tablet 1 5/14/2025    HYDROcodone-acetaminophen (NORCO)  MG per tablet Take 1 tablet by mouth Every 6 (Six) Hours As Needed for Moderate Pain. PT STATES TAKES ONE IN THE MORNING AND IN THE AFTERNOON, AND AGAIN AT BEDTIME MOST DAYS   5/14/2025    hydrOXYzine pamoate (VISTARIL) 25 MG capsule Take 2 capsules by mouth every night at bedtime. 180 capsule 3 5/14/2025    irbesartan (AVAPRO) 300 MG tablet Take 1 tablet by mouth Every Morning. 90 tablet 3 5/14/2025    metFORMIN (GLUCOPHAGE) 1000 MG tablet Take 1 tablet by mouth Every 12 (Twelve) Hours. 180 tablet 1 5/14/2025    metoprolol succinate XL (TOPROL-XL) 25 MG 24 hr tablet Take 0.5 tablets by mouth every night at bedtime.   5/14/2025    omeprazole (priLOSEC) 40 MG capsule Take 1 capsule by mouth Daily. 90 capsule 3 5/14/2025    spironolactone (ALDACTONE) 25 MG tablet Take 1 tablet by mouth Every Morning. 90 tablet 3 5/14/2025     tamsulosin (FLOMAX) 0.4 MG capsule 24 hr capsule Take 2 capsules by mouth Daily. 180 capsule 1 5/14/2025    albuterol sulfate  (90 Base) MCG/ACT inhaler Inhale 2 puffs Every 6 (Six) Hours As Needed for Wheezing. 6.7 g 0 More than a month    Blood Glucose Monitoring Suppl (Accu-Chek Leilani Plus) w/Device kit MONITOR BLOOD SUGAR THREE TIMES DAILY 1 kit 5     Cholecalciferol (VITAMIN D-3 PO) Take 1 tablet by mouth Daily.   5/13/2025    Dulaglutide 3 MG/0.5ML solution auto-injector Inject 3 mg under the skin into the appropriate area as directed 1 (One) Time Per Week. 8 mL 1 5/13/2025    ferrous sulfate 325 (65 FE) MG tablet Take 1 tablet by mouth Daily With Breakfast.   5/13/2025    furosemide (LASIX) 40 MG tablet Take 1 tablet by mouth Daily. 90 tablet 3 5/12/2025    gabapentin (NEURONTIN) 100 MG capsule Take 1 capsule by mouth 3 times a day.       MAGNESIUM PO Take 1 tablet by mouth Daily.   5/13/2025    rivaroxaban (XARELTO) 20 MG tablet Take 1 tablet by mouth Daily With Dinner. Indications: Atrial Fibrillation 14 tablet 0 5/12/2025    VITAMIN E PO Take 1 tablet by mouth Daily.   5/13/2025       Current Meds  No current facility-administered medications on file prior to encounter.     Current Outpatient Medications on File Prior to Encounter   Medication Sig Dispense Refill    atomoxetine (Strattera) 100 MG capsule Take 1 capsule by mouth Daily. 90 capsule 3    atorvastatin (LIPITOR) 20 MG tablet Take 1 tablet by mouth Daily. 90 tablet 3    cyclobenzaprine (FLEXERIL) 10 MG tablet Take 1 tablet by mouth 3 (Three) Times a Day As Needed for Muscle Spasms. 90 tablet 1    DULoxetine (CYMBALTA) 60 MG capsule Take 1 capsule by mouth 2 (Two) Times a Day. 180 capsule 1    glimepiride (AMARYL) 2 MG tablet Take 1 tablet by mouth Daily. 90 tablet 1    HYDROcodone-acetaminophen (NORCO)  MG per tablet Take 1 tablet by mouth Every 6 (Six) Hours As Needed for Moderate Pain. PT STATES TAKES ONE IN THE MORNING AND IN THE  AFTERNOON, AND AGAIN AT BEDTIME MOST DAYS      hydrOXYzine pamoate (VISTARIL) 25 MG capsule Take 2 capsules by mouth every night at bedtime. 180 capsule 3    irbesartan (AVAPRO) 300 MG tablet Take 1 tablet by mouth Every Morning. 90 tablet 3    metFORMIN (GLUCOPHAGE) 1000 MG tablet Take 1 tablet by mouth Every 12 (Twelve) Hours. 180 tablet 1    metoprolol succinate XL (TOPROL-XL) 25 MG 24 hr tablet Take 0.5 tablets by mouth every night at bedtime.      omeprazole (priLOSEC) 40 MG capsule Take 1 capsule by mouth Daily. 90 capsule 3    spironolactone (ALDACTONE) 25 MG tablet Take 1 tablet by mouth Every Morning. 90 tablet 3    tamsulosin (FLOMAX) 0.4 MG capsule 24 hr capsule Take 2 capsules by mouth Daily. 180 capsule 1    albuterol sulfate  (90 Base) MCG/ACT inhaler Inhale 2 puffs Every 6 (Six) Hours As Needed for Wheezing. 6.7 g 0    Blood Glucose Monitoring Suppl (Accu-Chek Leilani Plus) w/Device kit MONITOR BLOOD SUGAR THREE TIMES DAILY 1 kit 5    Cholecalciferol (VITAMIN D-3 PO) Take 1 tablet by mouth Daily.      Dulaglutide 3 MG/0.5ML solution auto-injector Inject 3 mg under the skin into the appropriate area as directed 1 (One) Time Per Week. 8 mL 1    ferrous sulfate 325 (65 FE) MG tablet Take 1 tablet by mouth Daily With Breakfast.      furosemide (LASIX) 40 MG tablet Take 1 tablet by mouth Daily. 90 tablet 3    gabapentin (NEURONTIN) 100 MG capsule Take 1 capsule by mouth 3 times a day.      MAGNESIUM PO Take 1 tablet by mouth Daily.      rivaroxaban (XARELTO) 20 MG tablet Take 1 tablet by mouth Daily With Dinner. Indications: Atrial Fibrillation 14 tablet 0    VITAMIN E PO Take 1 tablet by mouth Daily.         Social History     Socioeconomic History    Marital status:      Spouse name: Mindy   Tobacco Use    Smoking status: Former     Current packs/day: 1.00     Average packs/day: 1 pack/day for 51.4 years (51.4 ttl pk-yrs)     Types: Cigarettes     Start date: 1974     Passive exposure: Past  "   Smokeless tobacco: Never   Vaping Use    Vaping status: Never Used   Substance and Sexual Activity    Alcohol use: Yes     Comment: RARELY    Drug use: No    Sexual activity: Defer       Family Hx: Non-contributory    REVIEW OF SYSTEMS:   ROS was performed and is negative except as outlined in HPI     REVIEW OF SYSTEMS:   CONSTITUTIONAL: No weight loss, fever, chills, weakness or fatigue.   HEENT: Eyes: No visual loss, blurred vision, double vision or yellow sclerae. Ears, Nose, Throat: No hearing loss, sneezing, congestion, runny nose or sore throat.   SKIN: No rash or itching.     RESPIRATORY: No shortness of breath, hemoptysis, cough or sputum.   GASTROINTESTINAL: No anorexia, nausea, vomiting or diarrhea. No abdominal pain, bright red blood per rectum or melena.  NEUROLOGICAL: No headache, dizziness, syncope, paralysis, numbness or tingling in the extremities.  MUSCULOSKELETAL: No muscle, back pain, joint pain or stiffness.   HEMATOLOGIC: No anemia, bleeding or bruising.   LYMPHATICS: No enlarged nodes.  PSYCHIATRIC: No history of depression, anxiety, hallucinations.   ENDOCRINOLOGIC: No reports of sweating, cold or heat intolerance. No polyuria or polydipsia.        Objective:     Vitals:    05/15/25 0731   BP: 117/69   BP Location: Right arm   Patient Position: Lying   Pulse: 66   Resp: 18   Temp: 96.8 °F (36 °C)   TempSrc: Temporal   SpO2: 98%   Weight: (!) 159 kg (350 lb)   Height: 190.5 cm (75\")     Body mass index is 43.75 kg/m².  Flowsheet Rows      Flowsheet Row First Filed Value   Admission Height 190.5 cm (75\") Documented at 05/15/2025 0731   Admission Weight 159 kg (350 lb) Documented at 05/15/2025 0731            GEN: no distress, alert and oriented  HEENT: NACT, EOMI, moist mucous membranes  Lungs: CTAB, no wheezes, rales or rhonchi  CV: normal rate, regular rhythm, normal S1, S2, no murmurs, +2 radial pulses b/l, no carotid bruit  Abdomen: soft, nontender, nondistended, NABS  Extremities: no " edema  Skin: no rash, warm, dry  Heme/Lymph: no bruising  Psych: organized thought, normal behavior and affect    Results from last 7 days   Lab Units 05/13/25  1320   SODIUM mmol/L 139   POTASSIUM mmol/L 4.4   CHLORIDE mmol/L 101   CO2 mmol/L 22.9   BUN mg/dL 13   CREATININE mg/dL 1.25   CALCIUM mg/dL 9.4   GLUCOSE mg/dL 155*           Results from last 7 days   Lab Units 05/13/25  1320   WBC 10*3/mm3 14.08*   HEMOGLOBIN g/dL 12.7*   HEMATOCRIT % 39.5   PLATELETS 10*3/mm3 274                   I personally viewed and interpreted the patient's EKG/Telemetry data      Assessment:  Active Hospital Problems    Diagnosis  POA    Coronary artery calcification [I25.10]  Unknown    Abnormal stress test [R94.39]  Unknown    HLD (hyperlipidemia) [E78.5]  Unknown    Essential hypertension [I10]  Unknown      Resolved Hospital Problems   No resolved problems to display.       Plan: Coronary angiography with possible PCI        Joaquin Corado MD, [facility]  05/15/25  08:23 EDT.

## 2025-05-15 NOTE — Clinical Note
Hemostasis started on the right radial artery. R-Band was used in achieving hemostasis. Radial compression device applied to vessel. Hemostasis achieved successfully. Closure device additional comment: Vasc band 14 cc air

## 2025-05-15 NOTE — DISCHARGE INSTRUCTIONS
[facility]  [address] [place] [address]    Coronary Angiogram (Radial/Ulnar Approach) After Care    Refer to this sheet in the next few weeks. These instructions provide you with information on caring for yourself after your procedure. Your caregiver may also give you more specific instructions. Your treatment has been planned according to current medical practices, but problems sometimes occur. Call your caregiver if you have any problems or questions after your procedure.    Home Care Instructions:  You may shower the day after the procedure. Remove the bandage (dressing) and gently wash the site with plain soap and water. Gently pat the site dry. You may apply a band aid daily for 2 days if desired.    Do not apply powder or lotion to the site.  Do not submerge the affected site in water for 3 to 5 days or until the site is completely healed.   Do not lift, push or pull anything over 5 pounds for 5 days after your procedure or as directed by your physician.  As a reference, a gallon of milk weighs 8 pounds.   Inspect the site at least twice daily. You may notice some bruising at the site and it may be tender for 1 to 2 weeks.     Increase your fluid intake for the next 2 days.    Keep arm elevated for 24 hours. For the remainder of the day, keep your arm in “Pledge of Allegiance” position when up and about.     You may drive 24 hours after the procedure unless otherwise instructed by your caregiver.  Do not operate machinery or power tools for 24 hours.  A responsible adult should be with you for the first 24 hours after you arrive home. Do not make any important legal decisions or sign legal papers for 24 hours.  Do not drink alcohol for 24 hours.    Metformin or any medications containing Metformin should not be taken for 48 hours after your procedure.      Call Your Doctor if:   You have unusual pain at the radial/ulnar (wrist) site.  You have redness, warmth, swelling, or pain at the  radial/ulnar (wrist) site.  You have drainage (other than a small amount of blood on the dressing).  `You have chills or a fever > 101.  Your arm becomes pale or dark, cool, tingly, or numb.  You develop chest pain, shortness of breath, feel faint or pass out.    You have heavy bleeding from the site, hold pressure on the site for 20 minutes.  If the bleeding stops, apply a fresh bandage and call your cardiologist.  However, if you        continue to have bleeding, call 911 and continue to apply pressure to the site.   You have any symptoms of a stroke.  Remember BE FAST  B-balance. Sudden trouble walking or loss of balance.  E-eyes.  Sudden changes in how you see or a sudden onset of a very bad headache.   F-face. Sudden weakness or loss of feeling of the face or facial droop on one side.   A-arms Sudden weakness or numbness in one arm.  One arm drifts down if they are both held out in front of you. This happens suddenly and usually on one side of the body.   S-speech.  Sudden trouble speaking, slurred speech or trouble understanding what are saying.   T-time  Time to call emergency services.  Write down the symptoms and the time they started.

## 2025-05-21 ENCOUNTER — OFFICE VISIT (OUTPATIENT)
Dept: CARDIOLOGY | Age: 70
End: 2025-05-21
Payer: MEDICARE

## 2025-05-21 VITALS
SYSTOLIC BLOOD PRESSURE: 130 MMHG | BODY MASS INDEX: 44.37 KG/M2 | WEIGHT: 315 LBS | HEART RATE: 64 BPM | DIASTOLIC BLOOD PRESSURE: 80 MMHG | OXYGEN SATURATION: 98 %

## 2025-05-21 DIAGNOSIS — I25.119 CORONARY ARTERY DISEASE INVOLVING NATIVE CORONARY ARTERY OF NATIVE HEART WITH ANGINA PECTORIS: ICD-10-CM

## 2025-05-21 DIAGNOSIS — I10 ESSENTIAL HYPERTENSION: ICD-10-CM

## 2025-05-21 DIAGNOSIS — I35.0 NONRHEUMATIC AORTIC VALVE STENOSIS: ICD-10-CM

## 2025-05-21 DIAGNOSIS — I48.21 PERMANENT ATRIAL FIBRILLATION: Primary | ICD-10-CM

## 2025-05-21 DIAGNOSIS — E78.2 MIXED HYPERLIPIDEMIA: ICD-10-CM

## 2025-05-21 PROCEDURE — 3079F DIAST BP 80-89 MM HG: CPT | Performed by: FAMILY MEDICINE

## 2025-05-21 PROCEDURE — 3075F SYST BP GE 130 - 139MM HG: CPT | Performed by: FAMILY MEDICINE

## 2025-05-21 PROCEDURE — 93000 ELECTROCARDIOGRAM COMPLETE: CPT | Performed by: FAMILY MEDICINE

## 2025-05-21 PROCEDURE — 99215 OFFICE O/P EST HI 40 MIN: CPT | Performed by: FAMILY MEDICINE

## 2025-05-21 NOTE — PROGRESS NOTES
"Date of Office Visit: 2025  Encounter Provider: KAYLEN Haddad  Place of Service: [facility]  Established cardiologist: Valeria Bravo MD  Patient Name: Duane Mark Witten  :1955      Patient ID:  Duane Mark Witten is a 70 y.o. male is here for  followup    With a pertinent medical history of HTN, DM, obstructive sleep apnea (compliant with cpap), aortic stenosis, chronic HFpEF, medication noncompliance and obesity.     History of Present Illness  He presented to the emergency room on 18 with complaints of 3 weeks of progressive weakness to bilateral lower extremities as well as paraesthesias of the upper extremities. He began feeling his balance was off and that his legs were becoming weaker and \"just didn't want to work right.\"  He was found to have severe cervical stenosis with cord compression. He was seen by Dr. Markham and had underwent C4, C5, C6 anterior cervical corpectomy with cage and plate. During surgery HR was in the 110's. EKG was obtained in the recovery room and showed atrial fibrillation. He was completely asymptomatic.      Echo done 18 showed LVEF 47% with mild LVH and mild LVE, with mild to moderate AS, mild MR with mild anterior prolapse, mild LAE     stress PET scan was completed 19 and showed EF 36%, bilateral ventricular dilation, diffusely decreased tracer uptake in all wall segments on stress imaging and a more pronounced hypoperfusion of the mid anterior wall that was considered small, mild, and may represent ischemia.     Repeat echocardiogram done 2020 shows LVEF low normal with mild concentric left ventricular hypertrophy, moderate left atrial enlargement, bicuspid aortic valve with mild to moderate aortic stenosis..      He was hospitalized 3/14 - 3/15/2023 for short windedness.  He had not been taking his diuretics.  He was found to have acute on chronic HFpEF with metapneumovirus.  He had been " compliant with CPAP.  Medications were adjusted and he was advised to follow-up in the office.      Echo done 1/18/2023 showed ejection fraction of 64% with moderate concentric left ventricle hypertrophy, moderate left atrial enlargement, bicuspid aortic valve with severe calcification and moderate aortic stenosis, trace tricuspid insufficiency, RVSP 46 mmHg, mitral valve was not well visualized.     He saw Dr. Bravo 9/24/2024.  He will he had low blood pressure.  There was exertional dyspnea and progressive fatigue.  Stress nuclear study and CTA chest and echocardiogram were ordered.  His metoprolol was decreased to 12.5 mg nightly.     Echocardiogram was done 10/9/2024 This showed an ejection fraction of 58.8%, normal LV size and wall thickness and normal LV wall motion indeterminate LV diastolic function.  Moderately dilated left atrium.  Mildly dilated right atrium.  Bicuspid aortic valve that was moderately calcified no aortic regurgitation.  There is moderate aortic stenosis with a mean pressure gradient 21.1 mmHg.  Mild mitral regurgitation.  Mild tricuspid regurgitation.  Mildly elevated RVSP at 38.5 mmHg.     Lexiscan SPECT stress study 10/9/2024 Showed small sized mild to moderately severe area of ischemia in the inferior wall.       CTA chest 10/17/2024 showed 3.7 cm central splenic low-density lesion follow-up with CT abdomen was recommended.  There were coronary artery calcifications present.  There was aortic dilation.  Aortic valvular calcifications are present.  Multiple tiny pulmonary nodules.  In the left lower thyroid nodule.      CT abdomen was done 11/19/2024 that showed a cystic lesion within the pancreas MRI was recommended for follow-up of this.  Lobulated appearance of the liver thyroid ultrasound showed left lower pole thyroid nodule meeting the criteria for fine-needle aspiration.     Dr. Bravo did review the CT chest which showed extensive coronary calcifications that appear to be  possibly severe in the right coronary artery.     He had LHC with Dr. Suarez 5/15/25, this showed  mid RCA with collaterals, otherwise mild CAD. Peak to peak gradient of 22 mmHg suggesting moderate aortic stenosis. Also noted on previous tte.     Today Wojciech is here with his wife. He continues to have exertional fatigue. It has not worsened since I saw him last. There is no chest pain or pressure, soa, yoder, pnd, lightheadedness, dizziness, presyncope/syncope, leg swelling, heart racing, or palpitations. Xarelto continues to be costly for him.       Current Outpatient Medications on File Prior to Visit   Medication Sig Dispense Refill    albuterol sulfate  (90 Base) MCG/ACT inhaler Inhale 2 puffs Every 6 (Six) Hours As Needed for Wheezing. 6.7 g 0    atomoxetine (Strattera) 100 MG capsule Take 1 capsule by mouth Daily. 90 capsule 3    atorvastatin (LIPITOR) 20 MG tablet Take 1 tablet by mouth Daily. 90 tablet 3    Blood Glucose Monitoring Suppl (Accu-Chek Leilani Plus) w/Device kit MONITOR BLOOD SUGAR THREE TIMES DAILY 1 kit 5    Cholecalciferol (VITAMIN D-3 PO) Take 1 tablet by mouth Daily.      cyclobenzaprine (FLEXERIL) 10 MG tablet Take 1 tablet by mouth 3 (Three) Times a Day As Needed for Muscle Spasms. 90 tablet 1    Dulaglutide 3 MG/0.5ML solution auto-injector Inject 3 mg under the skin into the appropriate area as directed 1 (One) Time Per Week. 8 mL 1    DULoxetine (CYMBALTA) 60 MG capsule Take 1 capsule by mouth 2 (Two) Times a Day. 180 capsule 1    ferrous sulfate 325 (65 FE) MG tablet Take 1 tablet by mouth Daily With Breakfast.      furosemide (LASIX) 40 MG tablet Take 1 tablet by mouth Daily. 90 tablet 3    gabapentin (NEURONTIN) 100 MG capsule Take 1 capsule by mouth 3 times a day.      glimepiride (AMARYL) 2 MG tablet Take 1 tablet by mouth Daily. 90 tablet 1    HYDROcodone-acetaminophen (NORCO)  MG per tablet Take 1 tablet by mouth Every 6 (Six) Hours As Needed for Moderate Pain. PT  STATES TAKES ONE IN THE MORNING AND IN THE AFTERNOON, AND AGAIN AT BEDTIME MOST DAYS      hydrOXYzine pamoate (VISTARIL) 25 MG capsule Take 2 capsules by mouth every night at bedtime. 180 capsule 3    irbesartan (AVAPRO) 300 MG tablet Take 1 tablet by mouth Every Morning. 90 tablet 3    MAGNESIUM PO Take 1 tablet by mouth Daily.      metFORMIN (GLUCOPHAGE) 1000 MG tablet Take 1 tablet by mouth Every 12 (Twelve) Hours. 180 tablet 1    omeprazole (priLOSEC) 40 MG capsule Take 1 capsule by mouth Daily. 90 capsule 3    rivaroxaban (XARELTO) 20 MG tablet Take 1 tablet by mouth Daily With Dinner. Indications: Atrial Fibrillation 14 tablet 0    spironolactone (ALDACTONE) 25 MG tablet Take 1 tablet by mouth Every Morning. 90 tablet 3    tamsulosin (FLOMAX) 0.4 MG capsule 24 hr capsule Take 2 capsules by mouth Daily. 180 capsule 1    VITAMIN E PO Take 1 tablet by mouth Daily.       No current facility-administered medications on file prior to visit.         Procedures    ECG 12 Lead    Date/Time: 5/21/2025 4:02 PM  Performed by: Krupa Hubbard APRN    Authorized by: Krupa Hubbard APRN  Comparison: compared with previous ECG from 9/24/2024  Rhythm: atrial fibrillation  BPM: 64              Objective:      Vitals:    05/21/25 1450   BP: 130/80   Pulse: 64   SpO2: 98%   Weight: (!) 161 kg (355 lb)     Body mass index is 44.37 kg/m².  Wt Readings from Last 3 Encounters:   05/21/25 (!) 161 kg (355 lb)   05/15/25 (!) 159 kg (350 lb)   05/02/25 (!) 159 kg (350 lb)     Constitutional:       General: Not in acute distress.     Appearance: Morbidly obese. Not diaphoretic.   Neck:      Vascular: No JVD.   Pulmonary:      Effort: Pulmonary effort is normal.      Breath sounds: Normal breath sounds.   Cardiovascular:      Normal rate. Irregularly irregular rhythm.      Murmurs: There is a grade 3/6 harsh midsystolic murmur at the URSB, radiating to the neck.      No gallop.  No rub.   Pulses:     Radial: 2+ bilaterally.      Dorsalis pedis: 2+ bilaterally.     Posterior tibial: 2+ bilaterally.  Edema:     Peripheral edema absent.         Lab Review:     Lab Results   Component Value Date    GLUCOSE 155 (H) 05/13/2025    CALCIUM 9.4 05/13/2025     05/13/2025    K 4.4 05/13/2025    CO2 22.9 05/13/2025     05/13/2025    BUN 13 05/13/2025    CREATININE 1.25 05/13/2025    EGFR 61.9 05/13/2025    BCR 10.4 05/13/2025    ANIONGAP 15.1 (H) 05/13/2025     Lab Results   Component Value Date    WBC 14.08 (H) 05/13/2025    HGB 12.7 (L) 05/13/2025    HCT 39.5 05/13/2025    MCV 91.4 05/13/2025     05/13/2025       Assessment:     1. Permanent atrial fibrillation    2. Coronary artery disease involving native coronary artery of native heart with angina pectoris    3. Nonrheumatic aortic valve stenosis    4. Essential hypertension    5. Mixed hyperlipidemia      Coronary artery calcification.  Abnormal stress test was completed 10/2024.  He has continued to have chronic stable FLORES since at least September 2024.  Persistent atrial fibrillation, on xarelto.   Chronic HFpEF, is euvolemic today.  He does have a lot of exertional dyspnea and fatigue.  DM  Hypertension, goal <120/80.  Blood pressure is well-controlled.  Hyperlipidemia on atorvastatin  H/o Cardiomyopathy  Moderate AS with bicuspid aortic valve, stable on repeat echo  Dilated sinus of Valsalva measuring 3.8 cm on CTA chest October 2024  Carotid bruits, no stenosis noted.  Obesity.   CAMERON, using BiPAP.  Thyroid nodules  Pancreatic lesion on CTA abdomen 11/2024      Plan:   Has had fatigue and bradycardia on metoprolol, now on lowest dose- first stop this and see if any of his symptoms improve  Will call him in two weeks to see if this is any better   If not we will resume this and plan to try isosorbide for exertional fatigue  Will discuss asa vs clopidogrel in addition to his rivaroxaban with Dr. Bravo and contact them to let them know what she recommends  He has med D and  pay look into the smooth program to help cover deductible and his medications- we discussed this today.   Keep appointment with me in July       Thank you for allowing me to participate in this patient's care. Please call with any questions or concerns.        > 40 minutes spent in pt care  I spent 5 minutes on the separately reported service of EKG. This time is not included in the time used to support the E/M service also reported today.    Dragon dictation device was utilized in this note.

## 2025-05-22 ENCOUNTER — TELEPHONE (OUTPATIENT)
Dept: CARDIOLOGY | Age: 70
End: 2025-05-22
Payer: MEDICARE

## 2025-05-22 NOTE — TELEPHONE ENCOUNTER
Called and left VM, will continue to try to reach pt.    HUB- please put patient straight through to triage    Haritha Miller RN  Triage RN  05/22/25 12:54 EDT

## 2025-05-22 NOTE — TELEPHONE ENCOUNTER
Please let Wojciech know I reviewed our recent visit with Dr. Bravo. start aspirin 81 mg daily in addition to his xarelto    Thanks

## 2025-05-23 ENCOUNTER — TELEPHONE (OUTPATIENT)
Dept: FAMILY MEDICINE CLINIC | Facility: CLINIC | Age: 70
End: 2025-05-23
Payer: MEDICARE

## 2025-05-23 RX ORDER — ASPIRIN 81 MG/1
81 TABLET ORAL DAILY
Start: 2025-05-23

## 2025-05-23 NOTE — TELEPHONE ENCOUNTER
Reviewed recommendations with patient, verbalized understanding, will call with any further questions or complaints.  Pt states that he will start aspirin 81mg daily in addition to xarelto as recommended.  Med list updated.    Haritha Miller RN  Triage Nurse  05/23/25 10:06 EDT

## 2025-05-23 NOTE — TELEPHONE ENCOUNTER
Caller: KIMBERLY    Relationship: [employer]     Best call back number: [phone]    What is the best time to reach you: ANYTIME    Who are you requesting to speak with (clinical staff, provider,  specific staff member): STAFF    What was the call regarding: KIMBERLY FROM [employer] IS WANTING TO VERIFY IF A FAX REQUESTING AN ADDENDUM FROM 5/12/25 WAS RECEIVED. KIMBERLY STATED THAT SHE WILL FAX IT AGAIN JUST IN CASE. KIMBERLY IS REQUESTING A CALLBACK FOR VERIFICATION.

## 2025-05-26 PROBLEM — I25.119 CORONARY ARTERY DISEASE INVOLVING NATIVE CORONARY ARTERY OF NATIVE HEART WITH ANGINA PECTORIS: Status: ACTIVE | Noted: 2025-05-26

## 2025-06-02 NOTE — TELEPHONE ENCOUNTER
Stable s Rx.       For headache and migraine prevention I would suggest a combination vitamin supplement which may include 1 or more of the following ingredients:  Magnesium 400 mg , Riboflavin (vitamin B2) 400 - 600 mg,  Butterbur 150 mg (PA free). Other ingredients that may be helpful are feverfew, coenzyme Q10 100 mg three times a day,  melatonin and pamela.  Some example brands that combine some of these ingredients are Migravent or Dolovent.      Orders:    Magnesium; Future    Magnesium; Future      LM for patient's wife letting her know that his disability/FMLA forms are ready to be picked up in the office

## 2025-06-04 ENCOUNTER — TELEPHONE (OUTPATIENT)
Dept: CARDIOLOGY | Age: 70
End: 2025-06-04
Payer: MEDICARE

## 2025-06-04 ENCOUNTER — OFFICE VISIT (OUTPATIENT)
Dept: FAMILY MEDICINE CLINIC | Facility: CLINIC | Age: 70
End: 2025-06-04
Payer: MEDICARE

## 2025-06-04 VITALS
HEIGHT: 75 IN | BODY MASS INDEX: 39.17 KG/M2 | WEIGHT: 315 LBS | DIASTOLIC BLOOD PRESSURE: 52 MMHG | SYSTOLIC BLOOD PRESSURE: 96 MMHG | HEART RATE: 92 BPM | TEMPERATURE: 98.5 F | OXYGEN SATURATION: 93 %

## 2025-06-04 DIAGNOSIS — D72.829 LEUKOCYTOSIS, UNSPECIFIED TYPE: ICD-10-CM

## 2025-06-04 DIAGNOSIS — E78.00 PURE HYPERCHOLESTEROLEMIA: ICD-10-CM

## 2025-06-04 DIAGNOSIS — I10 ESSENTIAL HYPERTENSION: ICD-10-CM

## 2025-06-04 DIAGNOSIS — E11.65 TYPE 2 DIABETES MELLITUS WITH HYPERGLYCEMIA, WITHOUT LONG-TERM CURRENT USE OF INSULIN: Primary | ICD-10-CM

## 2025-06-04 RX ORDER — ATORVASTATIN CALCIUM 40 MG/1
40 TABLET, FILM COATED ORAL DAILY
Qty: 90 TABLET | Refills: 1 | Status: SHIPPED | OUTPATIENT
Start: 2025-06-04

## 2025-06-04 RX ORDER — CLINDAMYCIN HYDROCHLORIDE 300 MG/1
CAPSULE ORAL
COMMUNITY
Start: 2025-06-02

## 2025-06-04 NOTE — TELEPHONE ENCOUNTER
Called and left VM. Will continue to try to reach patient. HUB transfer call to triage.     Sharon Cervantes RN  [place] [facility] [place]

## 2025-06-04 NOTE — ASSESSMENT & PLAN NOTE
Diabetes is stable.   Continue current treatment regimen.  Reminded to bring in blood sugar diary at next visit.  Recommended an ADA diet.  Recommended a Mediterranean style of eating  Regular aerobic exercise.  Reminded to get yearly retinal exam.  Diabetes will be reassessed in 6 months    Orders:    Comprehensive metabolic panel    Hemoglobin A1c

## 2025-06-04 NOTE — PROGRESS NOTES
"Chief Complaint  Diabetes ([employer])    Subjective          Diabetes  Associated symptoms:     no chest pain, no fatigue and no weakness    Hypoglycemia symptoms:     dizziness      no headaches           Duane Mark Witten 70 y.o. male presents today for medical management. Since the last visit, he has overall felt well. He has DMII well controlled on medication and will continue regimen and Hyperlipidemia with goals met with current Rx. He has been compliant with current medications, and I have reviewed them. The patient denies medication side effects. Will refill medications.     Mr. Ramirez is in need of continuous glucose monitoring.  He has a history of one Level 3 hypoglycemic event (glucose less than 54 mg/dL 3.0 mmoL/L) characterized by altered mental status and/or his physical state requiring third-party assistance for treatment of hypoglycemia. No recent episodes of hypoglycemia. He would benefit from the use of a CGM due to intermittent episodes of hypoglycemia.      Review of Systems   Constitutional:  Negative for chills, diaphoresis, fatigue and fever.   HENT:  Negative for congestion and sore throat.    Respiratory:  Negative for cough.    Cardiovascular:  Negative for chest pain.   Gastrointestinal:  Negative for abdominal pain, nausea and vomiting.   Genitourinary:  Negative for dysuria.   Musculoskeletal:  Negative for myalgias and neck pain.   Skin:  Negative for rash.   Neurological:  Positive for dizziness and light-headedness. Negative for weakness and numbness.        Objective   Vital Signs:   BP 96/52 (BP Location: Left arm, Patient Position: Sitting, Cuff Size: Large Adult)   Pulse 92   Temp 98.5 °F (36.9 °C) (Oral)   Ht 190.5 cm (75\")   Wt (!) 158 kg (349 lb 6.4 oz)   SpO2 93%   BMI 43.67 kg/m²      Class 3 Severe Obesity (BMI >=40). Obesity-related health conditions include the following: obstructive sleep apnea, hypertension, coronary heart disease, diabetes mellitus, dyslipidemias, " and GERD. Obesity is improving with lifestyle modifications. BMI is is above average; BMI management plan is completed. We discussed low calorie, low carb based diet program, portion control, and increasing exercise.       Physical Exam  Vitals and nursing note reviewed.   Constitutional:       General: He is not in acute distress.     Appearance: He is well-developed. He is obese.   HENT:      Head: Normocephalic and atraumatic.   Eyes:      General: No scleral icterus.        Right eye: No discharge.         Left eye: No discharge.      Conjunctiva/sclera: Conjunctivae normal.      Pupils: Pupils are equal, round, and reactive to light.   Neck:      Vascular: No carotid bruit.      Trachea: No tracheal deviation.   Cardiovascular:      Rate and Rhythm: Normal rate and regular rhythm.      Pulses: Normal pulses.      Heart sounds: Murmur (not a new finding) heard.      Systolic murmur is present.   Pulmonary:      Effort: Pulmonary effort is normal. No respiratory distress.      Breath sounds: Normal breath sounds. No wheezing or rales.   Musculoskeletal:         General: Normal range of motion.      Cervical back: Normal range of motion and neck supple.   Skin:     General: Skin is warm.   Neurological:      Mental Status: He is alert and oriented to person, place, and time.      Motor: No weakness or abnormal muscle tone.   Psychiatric:         Mood and Affect: Mood normal.                  The following data was reviewed by: KAYLEN Salcedo on 06/04/2025:  Hemoglobin A1c (02/25/2025 12:18)   Lipid panel (02/25/2025 12:18)   CBC & Differential (05/13/2025 13:20)   Results                 Assessment and Plan                 Type 2 diabetes mellitus with hyperglycemia, without long-term current use of insulin  Diabetes is stable.   Continue current treatment regimen.  Reminded to bring in blood sugar diary at next visit.  Recommended an ADA diet.  Recommended a Mediterranean style of eating  Regular aerobic  exercise.  Reminded to get yearly retinal exam.  Diabetes will be reassessed in 6 months    Orders:    Comprehensive metabolic panel    Hemoglobin A1c    Essential hypertension  Hypertension is uncontrolled. BP is low.  Dietary sodium restriction.  Ambulatory blood pressure monitoring.  Recommend decreasing Irbesartan to 150 mg daily.  Monitor BP closely and report BP readings to Cardiology provider ASAP.  Blood pressure will be reassessedby Cardiology.         Pure hypercholesterolemia  Improved on prior lipid panel  Continue current regimen  Low-cholesterol diet, exercise, weight loss  Lipid panel ordered  Follow-up in 6 months    Orders:    atorvastatin (Lipitor) 40 MG tablet; Take 1 tablet by mouth Daily.    Lipid panel    Leukocytosis, unspecified type  Asymptomatic, will recheck CBC    Orders:    CBC w AUTO Differential             Follow Up     Return in about 6 months (around 12/4/2025) for Medicare Wellness.    Patient was given instructions and counseling regarding his condition or for health maintenance advice. Please see specific information pulled into the AVS if appropriate.

## 2025-06-04 NOTE — ASSESSMENT & PLAN NOTE
Hypertension is uncontrolled. BP is low.  Dietary sodium restriction.  Ambulatory blood pressure monitoring.  Recommend decreasing Irbesartan to 150 mg daily.  Monitor BP closely and report BP readings to Cardiology provider ASAP.  Blood pressure will be reassessedby Cardiology.

## 2025-06-05 NOTE — TELEPHONE ENCOUNTER
Called and left VM. Will continue to try to reach patient. HUB transfer call to triage.     Sharon Cervantes RN  Triage [facility] [place]

## 2025-06-06 ENCOUNTER — TELEPHONE (OUTPATIENT)
Dept: CARDIOLOGY | Age: 70
End: 2025-06-06
Payer: MEDICARE

## 2025-06-06 NOTE — TELEPHONE ENCOUNTER
Bibi from [facility] called and stated that the patient is needing to have dental surgery.  He is needing cardiac clearance and directions on holding medication.  Please advise.    NOV-07/01/25-EE  LOV-05/21/25-EE    Coronary artery calcification.  Abnormal stress test was completed 10/2024.  He has continued to have chronic stable FLORES since at least September 2024.  Persistent atrial fibrillation, on xarelto.   Chronic HFpEF, is euvolemic today.  He does have a lot of exertional dyspnea and fatigue.  DM  Hypertension, goal <120/80.  Blood pressure is well-controlled.  Hyperlipidemia on atorvastatin  H/o Cardiomyopathy  Moderate AS with bicuspid aortic valve, stable on repeat echo  Dilated sinus of Valsalva measuring 3.8 cm on CTA chest October 2024  Carotid bruits, no stenosis noted.  Obesity.   CAMERON, using BiPAP.  Thyroid nodules  Pancreatic lesion on CTA abdomen 11/2024        Plan:   Has had fatigue and bradycardia on metoprolol, now on lowest dose- first stop this and see if any of his symptoms improve  Will call him in two weeks to see if this is any better   If not we will resume this and plan to try isosorbide for exertional fatigue  Will discuss asa vs clopidogrel in addition to his rivaroxaban with Dr. Bravo and contact them to let them know what she recommends  He has med D and pay look into the smooth program to help cover deductible and his medications- we discussed this today.   Keep appointment with me in July

## 2025-06-06 NOTE — TELEPHONE ENCOUNTER
"Pt returned call to triage.  When I asked if fatigue has improved off of metoprolol, pt said, \"Yeah a little bit\".    Makenna SINGER RN  [place] [facility] [place]  06/06/25 15:20 EDT    "

## 2025-06-09 NOTE — TELEPHONE ENCOUNTER
Do know when he is planning for procedure?    We have been following him for symptoms and adjusting medications I would like to address this at an office appointment if possible.     He is scheduled to see me in July 1.  We can review at this time unless he is planning to have this done prior to that thank you

## 2025-06-10 NOTE — TELEPHONE ENCOUNTER
Called and left VM, will continue to try to reach pt.    HUB- please put patient straight through to triage    Haritha Miller, VENKATESH  Triage RN  06/10/25 13:11 EDT

## 2025-06-10 NOTE — TELEPHONE ENCOUNTER
Pt called back. Went over recommendations from Krupa. Pt verbalized understanding.    Thank you,    Hannah James, RN  [place] [facility] [place]  06/10/25 14:32 EDT

## 2025-06-10 NOTE — TELEPHONE ENCOUNTER
I spoke with pt.  Reviewed recommendations, verbalized understanding, will call with any further questions or complaints.    Krupa- pt states that he is scheduled to have all remaining teeth pulled and dental implant posts implanted at the same time on Wed 6/18/25.  He remains on aspirin 81mg and xarelto 20mg daily.  I don't see any openings on either your schedule or Dr. Bravo's schedule prior to this date.  Recommendations?    Thanks so much,  Haritha Miller, RN  Triage Nurse  06/10/25 08:32 EDT

## 2025-06-12 NOTE — TELEPHONE ENCOUNTER
Zhen from [facility] ([phone]) called in to triage requesting permission for pt to hold aspirin and xarelto f or upcoming dental extraction (upper and lowers) and 4 lower dental implants for denture scheduled on 6/19.  I faxed this encounter since this has already been addressed by Krupa, to [phone].  Successful fax transmission received from [id number] in [employer].      Haritha Miller RN  Triage RN  06/12/25 12:14 EDT

## 2025-06-26 LAB
ALBUMIN SERPL-MCNC: 4.5 G/DL (ref 3.5–5.2)
ALBUMIN/GLOB SERPL: 1.8 G/DL
ALP SERPL-CCNC: 98 U/L (ref 39–117)
ALT SERPL-CCNC: 21 U/L (ref 1–41)
AST SERPL-CCNC: 17 U/L (ref 1–40)
BASOPHILS # BLD AUTO: 0.08 10*3/MM3 (ref 0–0.2)
BASOPHILS NFR BLD AUTO: 0.5 % (ref 0–1.5)
BILIRUB SERPL-MCNC: 0.7 MG/DL (ref 0–1.2)
BUN SERPL-MCNC: 21 MG/DL (ref 8–23)
BUN/CREAT SERPL: 18.3 (ref 7–25)
CALCIUM SERPL-MCNC: 10 MG/DL (ref 8.6–10.5)
CHLORIDE SERPL-SCNC: 96 MMOL/L (ref 98–107)
CHOLEST SERPL-MCNC: 129 MG/DL (ref 0–200)
CO2 SERPL-SCNC: 26 MMOL/L (ref 22–29)
CREAT SERPL-MCNC: 1.15 MG/DL (ref 0.76–1.27)
EGFRCR SERPLBLD CKD-EPI 2021: 68.5 ML/MIN/1.73
EOSINOPHIL # BLD AUTO: 0.64 10*3/MM3 (ref 0–0.4)
EOSINOPHIL NFR BLD AUTO: 4.2 % (ref 0.3–6.2)
ERYTHROCYTE [DISTWIDTH] IN BLOOD BY AUTOMATED COUNT: 13.3 % (ref 12.3–15.4)
GLOBULIN SER CALC-MCNC: 2.5 GM/DL
GLUCOSE SERPL-MCNC: 120 MG/DL (ref 65–99)
HBA1C MFR BLD: 7 % (ref 4.8–5.6)
HCT VFR BLD AUTO: 39.6 % (ref 37.5–51)
HDLC SERPL-MCNC: 41 MG/DL (ref 40–60)
HGB BLD-MCNC: 13.4 G/DL (ref 13–17.7)
IMM GRANULOCYTES # BLD AUTO: 0.15 10*3/MM3 (ref 0–0.05)
IMM GRANULOCYTES NFR BLD AUTO: 1 % (ref 0–0.5)
LDLC SERPL CALC-MCNC: 70 MG/DL (ref 0–100)
LYMPHOCYTES # BLD AUTO: 3.96 10*3/MM3 (ref 0.7–3.1)
LYMPHOCYTES NFR BLD AUTO: 26 % (ref 19.6–45.3)
MCH RBC QN AUTO: 29.5 PG (ref 26.6–33)
MCHC RBC AUTO-ENTMCNC: 33.8 G/DL (ref 31.5–35.7)
MCV RBC AUTO: 87 FL (ref 79–97)
MONOCYTES # BLD AUTO: 0.88 10*3/MM3 (ref 0.1–0.9)
MONOCYTES NFR BLD AUTO: 5.8 % (ref 5–12)
NEUTROPHILS # BLD AUTO: 9.5 10*3/MM3 (ref 1.7–7)
NEUTROPHILS NFR BLD AUTO: 62.5 % (ref 42.7–76)
NRBC BLD AUTO-RTO: 0 /100 WBC (ref 0–0.2)
PLATELET # BLD AUTO: 314 10*3/MM3 (ref 140–450)
POTASSIUM SERPL-SCNC: 4.6 MMOL/L (ref 3.5–5.2)
PROT SERPL-MCNC: 7 G/DL (ref 6–8.5)
RBC # BLD AUTO: 4.55 10*6/MM3 (ref 4.14–5.8)
SODIUM SERPL-SCNC: 136 MMOL/L (ref 136–145)
TRIGL SERPL-MCNC: 92 MG/DL (ref 0–150)
VLDLC SERPL CALC-MCNC: 18 MG/DL (ref 5–40)
WBC # BLD AUTO: 15.21 10*3/MM3 (ref 3.4–10.8)

## 2025-07-01 ENCOUNTER — OFFICE VISIT (OUTPATIENT)
Dept: CARDIOLOGY | Facility: CLINIC | Age: 70
End: 2025-07-01
Payer: MEDICARE

## 2025-07-01 ENCOUNTER — TELEPHONE (OUTPATIENT)
Dept: FAMILY MEDICINE CLINIC | Facility: CLINIC | Age: 70
End: 2025-07-01

## 2025-07-01 VITALS
HEIGHT: 75 IN | SYSTOLIC BLOOD PRESSURE: 130 MMHG | HEART RATE: 97 BPM | WEIGHT: 315 LBS | BODY MASS INDEX: 39.17 KG/M2 | DIASTOLIC BLOOD PRESSURE: 92 MMHG

## 2025-07-01 DIAGNOSIS — I48.21 PERMANENT ATRIAL FIBRILLATION: Primary | ICD-10-CM

## 2025-07-01 DIAGNOSIS — I25.119 CORONARY ARTERY DISEASE INVOLVING NATIVE CORONARY ARTERY OF NATIVE HEART WITH ANGINA PECTORIS: ICD-10-CM

## 2025-07-01 DIAGNOSIS — I10 ESSENTIAL HYPERTENSION: ICD-10-CM

## 2025-07-01 DIAGNOSIS — I35.0 NONRHEUMATIC AORTIC VALVE STENOSIS: ICD-10-CM

## 2025-07-01 DIAGNOSIS — E78.2 MIXED HYPERLIPIDEMIA: ICD-10-CM

## 2025-07-01 PROCEDURE — 3075F SYST BP GE 130 - 139MM HG: CPT | Performed by: FAMILY MEDICINE

## 2025-07-01 PROCEDURE — 99214 OFFICE O/P EST MOD 30 MIN: CPT | Performed by: FAMILY MEDICINE

## 2025-07-01 PROCEDURE — 3080F DIAST BP >= 90 MM HG: CPT | Performed by: FAMILY MEDICINE

## 2025-07-01 PROCEDURE — 93000 ELECTROCARDIOGRAM COMPLETE: CPT | Performed by: FAMILY MEDICINE

## 2025-07-01 RX ORDER — BLOOD PRESSURE TEST KIT
1 KIT MISCELLANEOUS DAILY
Qty: 1 EACH | Refills: 0 | Status: SHIPPED | OUTPATIENT
Start: 2025-07-01

## 2025-07-01 NOTE — PROGRESS NOTES
"Date of Office Visit: 2025  Encounter Provider: KAYLEN Haddad  Place of Service: [facility] CARDIOLOGY  Established cardiologist: Valeria Bravo MD  Patient Name: Duane Mark Witten  :1955      Patient ID:  Duane Mark Witten is a 70 y.o. male is here for  followup    With a pertinent medical history of HTN, DM, obstructive sleep apnea (compliant with cpap), aortic stenosis, chronic HFpEF, medication noncompliance and obesity.     History of Present Illness  He presented to the emergency room on 18 with complaints of 3 weeks of progressive weakness to bilateral lower extremities as well as paraesthesias of the upper extremities. He began feeling his balance was off and that his legs were becoming weaker and \"just didn't want to work right.\"  He was found to have severe cervical stenosis with cord compression. He was seen by Dr. Markham and had underwent C4, C5, C6 anterior cervical corpectomy with cage and plate. During surgery HR was in the 110's. EKG was obtained in the recovery room and showed atrial fibrillation. He was completely asymptomatic.      Echo done 18 showed LVEF 47% with mild LVH and mild LVE, with mild to moderate AS, mild MR with mild anterior prolapse, mild LAE     stress PET scan was completed 19 and showed EF 36%, bilateral ventricular dilation, diffusely decreased tracer uptake in all wall segments on stress imaging and a more pronounced hypoperfusion of the mid anterior wall that was considered small, mild, and may represent ischemia.     Repeat echocardiogram done 2020 shows LVEF low normal with mild concentric left ventricular hypertrophy, moderate left atrial enlargement, bicuspid aortic valve with mild to moderate aortic stenosis..      He was hospitalized 3/14 - 3/15/2023 for short windedness.  He had not been taking his diuretics.  He was found to have acute on chronic HFpEF with metapneumovirus.  He had been " compliant with CPAP.  Medications were adjusted and he was advised to follow-up in the office.      Echo done 1/18/2023 showed ejection fraction of 64% with moderate concentric left ventricle hypertrophy, moderate left atrial enlargement, bicuspid aortic valve with severe calcification and moderate aortic stenosis, trace tricuspid insufficiency, RVSP 46 mmHg, mitral valve was not well visualized.     He saw Dr. Bravo 9/24/2024.  He will he had low blood pressure.  There was exertional dyspnea and progressive fatigue.  Stress nuclear study and CTA chest and echocardiogram were ordered.  His metoprolol was decreased to 12.5 mg nightly.     Echocardiogram was done 10/9/2024 This showed an ejection fraction of 58.8%, normal LV size and wall thickness and normal LV wall motion indeterminate LV diastolic function.  Moderately dilated left atrium.  Mildly dilated right atrium.  Bicuspid aortic valve that was moderately calcified no aortic regurgitation.  There is moderate aortic stenosis with a mean pressure gradient 21.1 mmHg.  Mild mitral regurgitation.  Mild tricuspid regurgitation.  Mildly elevated RVSP at 38.5 mmHg.     Lexiscan SPECT stress study 10/9/2024 Showed small sized mild to moderately severe area of ischemia in the inferior wall.       CTA chest 10/17/2024 showed 3.7 cm central splenic low-density lesion follow-up with CT abdomen was recommended.  There were coronary artery calcifications present.  There was aortic dilation.  Aortic valvular calcifications are present.  Multiple tiny pulmonary nodules.  In the left lower thyroid nodule.      CT abdomen was done 11/19/2024 that showed a cystic lesion within the pancreas MRI was recommended for follow-up of this.  Lobulated appearance of the liver thyroid ultrasound showed left lower pole thyroid nodule meeting the criteria for fine-needle aspiration.     Dr. Bravo did review the CT chest which showed extensive coronary calcifications that appear to be  possibly severe in the right coronary artery.      He had LHC with Dr. Suarez 5/15/25, this showed  mid RCA with collaterals, otherwise mild CAD. Peak to peak gradient of 22 mmHg suggesting moderate aortic stenosis. Also noted on previous tte.     I saw Wojciech in the office 5/21/2025.  He continued to have exertional fatigue.  He was also bradycardic and metoprolol was held to see if any of the symptoms improved.  Aspirin 81 mg was added in addition to his Xarelto.       Louis is here with his wife today.  They tell me when he stopped the metoprolol his breathing did improve and his fatigue was better.  He then had extensive dental procedures with many teeth removed and procedure for placement of clip and implants this was extensive he had a lot of facial bruising following this he had stitches in his mouth he then had an infection he was on antibiotics and prednisone.  He is now off of those medicines but he has had a lot of fatigue since then.  He is not having shortness of breath chest pain or pressure presyncope/syncope heart racing palpitations or edema.  His blood sugars have been erratic he has had some in the 50s as well as some in the 300s.  He has not been eating very much since the oral surgery.       Current Outpatient Medications on File Prior to Visit   Medication Sig Dispense Refill    albuterol sulfate  (90 Base) MCG/ACT inhaler Inhale 2 puffs Every 6 (Six) Hours As Needed for Wheezing. 6.7 g 0    aspirin 81 MG EC tablet Take 1 tablet by mouth Daily.      atomoxetine (Strattera) 100 MG capsule Take 1 capsule by mouth Daily. 90 capsule 3    atorvastatin (Lipitor) 40 MG tablet Take 1 tablet by mouth Daily. 90 tablet 1    Blood Glucose Monitoring Suppl (Accu-Chek Leilani Plus) w/Device kit MONITOR BLOOD SUGAR THREE TIMES DAILY 1 kit 5    Cholecalciferol (VITAMIN D-3 PO) Take 1 tablet by mouth Daily.      cyclobenzaprine (FLEXERIL) 10 MG tablet Take 1 tablet by mouth 3 (Three) Times a Day As Needed  for Muscle Spasms. 90 tablet 1    Dulaglutide 3 MG/0.5ML solution auto-injector Inject 3 mg under the skin into the appropriate area as directed 1 (One) Time Per Week. 8 mL 1    DULoxetine (CYMBALTA) 60 MG capsule Take 1 capsule by mouth 2 (Two) Times a Day. 180 capsule 1    ferrous sulfate 325 (65 FE) MG tablet Take 1 tablet by mouth Daily With Breakfast.      furosemide (LASIX) 40 MG tablet Take 1 tablet by mouth Daily. 90 tablet 3    gabapentin (NEURONTIN) 100 MG capsule Take 3 capsules by mouth 3 times a day. Was changed 3 months ago.      glimepiride (AMARYL) 2 MG tablet Take 1 tablet by mouth Daily. 90 tablet 1    HYDROcodone-acetaminophen (NORCO)  MG per tablet Take 1 tablet by mouth Every 6 (Six) Hours As Needed for Moderate Pain. PT STATES TAKES ONE IN THE MORNING AND IN THE AFTERNOON, AND AGAIN AT BEDTIME MOST DAYS      hydrOXYzine pamoate (VISTARIL) 25 MG capsule Take 2 capsules by mouth every night at bedtime. 180 capsule 3    irbesartan (AVAPRO) 300 MG tablet Take 1 tablet by mouth Every Morning. 90 tablet 3    MAGNESIUM PO Take 1 tablet by mouth Daily.      metFORMIN (GLUCOPHAGE) 1000 MG tablet Take 1 tablet by mouth Every 12 (Twelve) Hours. 180 tablet 1    omeprazole (priLOSEC) 40 MG capsule Take 1 capsule by mouth Daily. 90 capsule 3    rivaroxaban (XARELTO) 20 MG tablet Take 1 tablet by mouth Daily With Dinner. Indications: Atrial Fibrillation 14 tablet 0    spironolactone (ALDACTONE) 25 MG tablet Take 1 tablet by mouth Every Morning. 90 tablet 3    tamsulosin (FLOMAX) 0.4 MG capsule 24 hr capsule Take 2 capsules by mouth Daily. 180 capsule 1    VITAMIN E PO Take 1 tablet by mouth Daily.       No current facility-administered medications on file prior to visit.         Procedures    ECG 12 Lead    Date/Time: 7/1/2025 2:49 PM  Performed by: Krupa Hubbard APRN    Authorized by: Krupa Hubbard APRN  Comparison: compared with previous ECG from 5/21/2025  Rhythm: atrial  "fibrillation  BPM: 97              Objective:      Vitals:    07/01/25 1414   BP: 130/92   Pulse: 97   Weight: (!) 154 kg (339 lb 12.8 oz)   Height: 190.5 cm (75\")     Body mass index is 42.47 kg/m².  Wt Readings from Last 3 Encounters:   07/01/25 (!) 154 kg (339 lb 12.8 oz)   06/04/25 (!) 158 kg (349 lb 6.4 oz)   05/21/25 (!) 161 kg (355 lb)     Constitutional:       General: Not in acute distress.     Appearance: Morbidly obese. Not diaphoretic.   Neck:      Vascular: No JVD.   Pulmonary:      Effort: Pulmonary effort is normal.      Breath sounds: Normal breath sounds.   Cardiovascular:      Normal rate. Irregularly irregular rhythm.      Murmurs: There is a grade 3/6 harsh midsystolic murmur at the URSB, radiating to the neck.      No gallop.  No rub.   Pulses:     Radial: 2+ bilaterally.     Dorsalis pedis: 2+ bilaterally.     Posterior tibial: 2+ bilaterally.  Edema:     Peripheral edema absent.         Lab Review:   Lab Results   Component Value Date    WBC 15.21 (H) 06/25/2025    HGB 13.4 06/25/2025    HCT 39.6 06/25/2025    MCV 87.0 06/25/2025     06/25/2025     Lab Results   Component Value Date    GLUCOSE 120 (H) 06/25/2025    CALCIUM 10.0 06/25/2025     06/25/2025    K 4.6 06/25/2025    CO2 26.0 06/25/2025    CL 96 (L) 06/25/2025    BUN 21.0 06/25/2025    CREATININE 1.15 06/25/2025    EGFR 68.5 06/25/2025    BCR 18.3 06/25/2025    ANIONGAP 15.1 (H) 05/13/2025         Assessment:     1. Permanent atrial fibrillation    2. Coronary artery disease involving native coronary artery of native heart with angina pectoris    3. Essential hypertension    4. Mixed hyperlipidemia    5. Nonrheumatic aortic valve stenosis        Coronary artery calcification.  Abnormal stress test was completed 10/2024.  He has continued to have chronic stable FLORES since at least September 2024.  Persistent atrial fibrillation, on xarelto.   Chronic HFpEF, is euvolemic today.  He does have a lot of exertional dyspnea and " fatigue.  DM  Hypertension, goal <120/80.  Blood pressure is well-controlled.  Hyperlipidemia on atorvastatin  H/o Cardiomyopathy  Moderate AS with bicuspid aortic valve, stable on repeat echo  Dilated sinus of Valsalva measuring 3.8 cm on CTA chest October 2024  Carotid bruits, no stenosis noted.  Obesity.   CAMERON, using BiPAP.  Thyroid nodules  Pancreatic lesion on CTA abdomen 11/2024    Plan:   No medication changes were made  No angina.  His shortness of breath improved after stopping metoprolol.  He is now recovering from dental surgery anticipating clip in dental implant soon he had an infection in his mouth and was on antibiotics and prednisone until yesterday this is now resolved he is still having some fatigue and poor appetite.  I am not making any medicine changes at this time we will see him again in 6 weeks. If he has recovered, but fatigue persists we may try isosorbide, but I think it is related to his dental procedure.       Thank you for allowing me to participate in this patient's care. Please call with any questions or concerns.          Dragon dictation device was utilized in this note.

## 2025-07-01 NOTE — TELEPHONE ENCOUNTER
Hub staff attempted to follow warm transfer process and was unsuccessful     Caller: NICOLAS    Relationship to patient: [facility]    Best call back number: [phone]     Patient is needing: CALLER IS REQUESTING TO HAVE OFFICE NOTES AND MEDICAL RECORDS THAT WAS FAX TO THEM CORRECTED AND SIGNED BY PATIENT PROVIDER.       .”

## 2025-07-02 NOTE — TELEPHONE ENCOUNTER
Called and spoke with Madeline, informed her we received the paper work asking for the office notes and signature. I informed her I will fax today.

## 2025-07-07 DIAGNOSIS — D72.829 LEUKOCYTOSIS, UNSPECIFIED TYPE: Primary | ICD-10-CM

## 2025-07-07 DIAGNOSIS — D72.820 ELEVATED LYMPHOCYTES: ICD-10-CM

## 2025-07-07 DIAGNOSIS — D72.9 ABNORMAL NEUTROPHIL COUNT: ICD-10-CM

## 2025-08-18 ENCOUNTER — OFFICE VISIT (OUTPATIENT)
Dept: CARDIOLOGY | Facility: CLINIC | Age: 70
End: 2025-08-18
Payer: MEDICARE

## 2025-08-18 VITALS
BODY MASS INDEX: 39.17 KG/M2 | HEART RATE: 83 BPM | HEIGHT: 75 IN | DIASTOLIC BLOOD PRESSURE: 68 MMHG | SYSTOLIC BLOOD PRESSURE: 140 MMHG | WEIGHT: 315 LBS

## 2025-08-18 DIAGNOSIS — I35.0 NONRHEUMATIC AORTIC VALVE STENOSIS: ICD-10-CM

## 2025-08-18 DIAGNOSIS — I48.21 PERMANENT ATRIAL FIBRILLATION: Primary | ICD-10-CM

## 2025-08-18 DIAGNOSIS — E78.2 MIXED HYPERLIPIDEMIA: ICD-10-CM

## 2025-08-18 DIAGNOSIS — I10 ESSENTIAL HYPERTENSION: ICD-10-CM

## 2025-08-18 DIAGNOSIS — I25.119 CORONARY ARTERY DISEASE INVOLVING NATIVE CORONARY ARTERY OF NATIVE HEART WITH ANGINA PECTORIS: ICD-10-CM

## 2025-08-18 DIAGNOSIS — I50.32 CHRONIC HEART FAILURE WITH PRESERVED EJECTION FRACTION (HFPEF): ICD-10-CM

## 2025-08-18 PROCEDURE — 99214 OFFICE O/P EST MOD 30 MIN: CPT | Performed by: FAMILY MEDICINE

## 2025-08-18 PROCEDURE — 93000 ELECTROCARDIOGRAM COMPLETE: CPT | Performed by: FAMILY MEDICINE

## 2025-08-18 PROCEDURE — 3078F DIAST BP <80 MM HG: CPT | Performed by: FAMILY MEDICINE

## 2025-08-18 PROCEDURE — 3077F SYST BP >= 140 MM HG: CPT | Performed by: FAMILY MEDICINE

## 2025-08-18 RX ORDER — ISOSORBIDE MONONITRATE 30 MG/1
30 TABLET, EXTENDED RELEASE ORAL DAILY
Qty: 30 TABLET | Refills: 11 | Status: SHIPPED | OUTPATIENT
Start: 2025-08-18

## 2025-08-24 DIAGNOSIS — I10 ESSENTIAL HYPERTENSION: ICD-10-CM

## 2025-08-24 DIAGNOSIS — E11.65 TYPE 2 DIABETES MELLITUS WITH HYPERGLYCEMIA, WITHOUT LONG-TERM CURRENT USE OF INSULIN: ICD-10-CM

## 2025-08-25 RX ORDER — IRBESARTAN 300 MG/1
300 TABLET ORAL EVERY MORNING
Qty: 90 TABLET | Refills: 3 | OUTPATIENT
Start: 2025-08-25

## 2025-08-25 RX ORDER — GLIMEPIRIDE 2 MG/1
2 TABLET ORAL DAILY
Qty: 90 TABLET | Refills: 1 | Status: SHIPPED | OUTPATIENT
Start: 2025-08-25

## (undated) DEVICE — CATH DIAG DXTERITY ULTRA TRANSRADIAL 4.0 5F 100CM 0/SH

## (undated) DEVICE — ADAPT CLN BIOGUARD AIR/H2O DISP

## (undated) DEVICE — DISPOSABLE BIPOLAR FORCEPS 7 3/4" (19.7CM) SCOVILLE BAYONET, 1.5MM TIP AND 12 FT. (3.6M) CABLE: Brand: KIRWAN

## (undated) DEVICE — CATH DIAG CARD PERFORMA JL3.5 BT 4F100CM

## (undated) DEVICE — OIL CARTRIDGE: Brand: CORE, MAESTRO

## (undated) DEVICE — NDL SPINE 20G 3 1/2 YEL STRL 1P/U

## (undated) DEVICE — MSK PROC CURAPLEX O2 2/ADAPT 7FT

## (undated) DEVICE — 3M™ STERI-STRIP™ REINFORCED ADHESIVE SKIN CLOSURES, R1547, 1/2 IN X 4 IN (12 MM X 100 MM), 6 STRIPS/ENVELOPE: Brand: 3M™ STERI-STRIP™

## (undated) DEVICE — DRILL BIT 7080510 11 MM DRILL BIT S

## (undated) DEVICE — APPL CHLORAPREP W/TINT 10.5ML PERC STRL

## (undated) DEVICE — THE VASC BAND HEMOSTAT IS A COMPRESSION DEVICE TO ASSIST HEMOSTASIS OF ARTERIAL, VENOUS AND HEMODIALYSIS PERCUTANEOUS ACCESS SITES.: Brand: VASC BAND™ HEMOSTAT

## (undated) DEVICE — LN SMPL CO2 SHTRM SD STREAM W/M LUER

## (undated) DEVICE — TUBING, SUCTION, 1/4" X 10', STRAIGHT: Brand: MEDLINE

## (undated) DEVICE — GLV SURG BIOGEL LTX PF 7

## (undated) DEVICE — JACKSON-PRATT 100CC BULB RESERVOIR: Brand: CARDINAL HEALTH

## (undated) DEVICE — PK ATS CUST W CARDIOTOMY RESEVOIR

## (undated) DEVICE — 3.0MM NEURO (MATCH HEAD) LESS AGGRESSIVE

## (undated) DEVICE — DRN JP FLT NO TROC SIL FUL/PERF 7MM

## (undated) DEVICE — PK NEURO SPINE 40

## (undated) DEVICE — DRSNG TELFA PAD NONADH STR 1S 3X4IN

## (undated) DEVICE — BITEBLOCK OMNI BLOC

## (undated) DEVICE — DIFFUSER: Brand: CORE, MAESTRO

## (undated) DEVICE — SINGLE-USE BIOPSY FORCEPS: Brand: RADIAL JAW 4

## (undated) DEVICE — DRSNG WND GZ PAD BORDERED 4X8IN STRL

## (undated) DEVICE — DGW .035 FC J3MM 260CM TEF: Brand: EMERALD

## (undated) DEVICE — ANTIBACTERIAL UNDYED BRAIDED (POLYGLACTIN 910), SYNTHETIC ABSORBABLE SUTURE: Brand: COATED VICRYL

## (undated) DEVICE — KT MANIFLD CARDIAC

## (undated) DEVICE — CATH DIAG CARD PERFORM JR4.0 BT 4F100CM

## (undated) DEVICE — BLOOD TRANSFUSION FILTER: Brand: HAEMONETICS

## (undated) DEVICE — SNAR POLYP CAPTIVATOR RND STFF 2.4 240CM 10MM 1P/U

## (undated) DEVICE — SENSR O2 OXIMAX FNGR A/ 18IN NONSTR

## (undated) DEVICE — DRN WND JP RND W TROC SIL 10F 1/8IN

## (undated) DEVICE — KT ORCA ORCAPOD DISP STRL

## (undated) DEVICE — PK CATH CARD 40

## (undated) DEVICE — LIMB HOLDER, WRIST/ANKLE: Brand: DEROYAL

## (undated) DEVICE — ENCORE® LATEX ORTHO SIZE 8, STERILE LATEX POWDER-FREE SURGICAL GLOVE: Brand: ENCORE

## (undated) DEVICE — DRP MICROSCOPE 4 BINOCULAR CV 54X150IN

## (undated) DEVICE — GOWN,NON-REINFORCED,SIRUS,SET IN SLV,XXL: Brand: MEDLINE

## (undated) DEVICE — GLIDESHEATH SLENDER STAINLESS STEEL KIT: Brand: GLIDESHEATH SLENDER

## (undated) DEVICE — DRSNG SURESITE WNDW 4X4.5